# Patient Record
Sex: FEMALE | Race: BLACK OR AFRICAN AMERICAN | NOT HISPANIC OR LATINO | Employment: UNEMPLOYED | URBAN - METROPOLITAN AREA
[De-identification: names, ages, dates, MRNs, and addresses within clinical notes are randomized per-mention and may not be internally consistent; named-entity substitution may affect disease eponyms.]

---

## 2021-03-07 PROBLEM — M06.9 RHEUMATOID ARTHRITIS: Status: ACTIVE | Noted: 2021-03-07

## 2022-04-12 NOTE — PROGRESS NOTES
Subjective:      Patient ID: Nicole Quiñones is a 47 y.o. female.    Chief Complaint: Disease Management    HPI:   Includes joint pain with subjective swelling.   Antecedent event includes: None;  Pain location includes: shoulders, elbows, hands, knees and feet;  Gradual onset; beginning >years ago;  Constant ache, sharp and burning, Moderate in severity,   Lasting >minutes, Worse at night time;   Improved with change in position;   Worsened with activity and overuse;     Review of Systems   Constitutional: Negative for chills, fatigue, fever and unexpected weight change.   HENT: Negative for nasal congestion, ear pain, hearing loss, mouth dryness, mouth sores, nosebleeds and trouble swallowing.    Eyes: Positive for eye dryness. Negative for photophobia, pain, redness and visual disturbance.   Respiratory: Negative for cough and shortness of breath.    Cardiovascular: Negative for chest pain, palpitations and leg swelling.   Gastrointestinal: Positive for constipation. Negative for abdominal distention, abdominal pain, blood in stool, diarrhea, rectal pain, vomiting and fecal incontinence.   Endocrine: Negative for polydipsia and polyuria.   Genitourinary: Negative for bladder incontinence, dysuria, enuresis, genital sores and hematuria.   Musculoskeletal: Positive for arthralgias and joint swelling. Negative for myalgias.   Integumentary:  Negative for rash, wound and mole/lesion.   Neurological: Positive for headaches. Negative for weakness.   Hematological: Negative for adenopathy. Does not bruise/bleed easily.   Psychiatric/Behavioral: Positive for dysphoric mood and sleep disturbance. The patient is nervous/anxious.       Past Medical History:   Diagnosis Date    Anemia     Hypertension     TTP (thrombotic thrombocytopenic purpura)     TTP (thrombotic thrombocytopenic purpura)      Past Surgical History:   Procedure Laterality Date    SURGICAL REMOVAL OF ENDOMETRIOSIS        See the Assessment/Plan for  further characterization of the HPI, ROS, Medical, Surgical, Family, and Social Histories including Tobacco use, Meds; all of which has been reviewed in Epic.    Medication List with Changes/Refills   Current Medications    ALBUTEROL (PROVENTIL/VENTOLIN HFA) 90 MCG/ACTUATION INHALER    SMARTSI Puff(s) By Mouth Every 4 Hours PRN    AZELASTINE (ASTELIN) 137 MCG (0.1 %) NASAL SPRAY    SPRAY 2 SPRAY(S) TWICE A DAY BY INTRANASAL ROUTE AS NEEDED FOR 7 DAYS.    BUTALBITAL-ACETAMINOPHEN-CAFFEINE -40 MG (FIORICET, ESGIC) -40 MG PER TABLET    SMARTSI Tablet(s) By Mouth Every 6 Hours PRN    CETIRIZINE (ZYRTEC) 10 MG TABLET    Take 10 mg by mouth once daily.    DICLOFENAC SODIUM (VOLTAREN) 1 % GEL    Apply 2 g topically 4 (four) times daily.    DULOXETINE (CYMBALTA) 60 MG CAPSULE    SMARTSI Capsule(s) By Mouth Every Morning    ERGOCALCIFEROL (ERGOCALCIFEROL) 50,000 UNIT CAP    SMARTSI Capsule(s) By Mouth Once a Week    FOLIC ACID (FOLVITE) 1 MG TABLET    SMARTSI Tablet(s) By Mouth Daily    GABAPENTIN (NEURONTIN) 300 MG CAPSULE    Take 300 mg by mouth 3 (three) times daily.    HYDROCODONE-ACETAMINOPHEN (NORCO) 5-325 MG PER TABLET    Take 1 tablet by mouth every 6 (six) hours as needed.    LACTULOSE (CHRONULAC) 10 GRAM/15 ML SOLUTION    SMARTSIG:15 Milliliter(s) By Mouth Daily PRN    LINZESS 72 MCG CAP CAPSULE        LOSARTAN (COZAAR) 50 MG TABLET    SMARTSI Tablet(s) By Mouth Daily    METOPROLOL SUCCINATE (TOPROL-XL) 100 MG 24 HR TABLET    SMARTSI Tablet(s) By Mouth Every Night    QUETIAPINE (SEROQUEL) 50 MG TABLET    SMARTSI Tablet(s) By Mouth Every Evening    RIMEGEPANT SULFATE (NURTEC ODT ORAL)        SERTRALINE (ZOLOFT) 100 MG TABLET    Take 100 mg by mouth once daily.    SIMVASTATIN (ZOCOR) 20 MG TABLET    SMARTSI Tablet(s) By Mouth Every Evening    TRAMADOL (ULTRAM) 50 MG TABLET    SMARTSI Tablet(s) By Mouth Every 8 Hours PRN    VERAPAMIL (CALAN-SR) 180 MG CR TABLET    SMARTSI  "Tablet(s) By Mouth Twice Daily   Discontinued Medications    DOXEPIN (SINEQUAN) 25 MG CAPSULE    SMARTSI Capsule(s) By Mouth Every Night PRN    GABAPENTIN (NEURONTIN) 100 MG CAPSULE    Take 1 capsule (100 mg total) by mouth 3 (three) times daily.    HYDROXYZINE PAMOATE (VISTARIL) 50 MG CAP    SMARTSI Capsule(s) By Mouth Every Night    LORATADINE (CLARITIN) 10 MG TABLET    Take 10 mg by mouth daily as needed.    METHOTREXATE 2.5 MG TAB    Take 6 tablets (15 mg total) by mouth every 7 days.    PANTOPRAZOLE (PROTONIX) 20 MG TABLET    Take 20 mg by mouth once daily.    PANTOPRAZOLE (PROTONIX) 40 MG TABLET    SMARTSI Tablet(s) By Mouth Daily    ZOLPIDEM (AMBIEN) 5 MG TAB    Take 5 mg by mouth nightly as needed.         Objective:   BP (!) 140/81   Pulse 84   Resp 16   Ht 5' 6" (1.676 m)   Wt 117.9 kg (260 lb)   SpO2 98%   BMI 41.97 kg/m²   Physical Exam  Non-toxic appearance. No distress.   Normocephalic and atraumatic.   External ears normal.    Conjunctivae and EOM are normal. No scleral icterus.   RRR, No friction rub; palpable distal pulses.    No tachypnea or signs of respiratory distress. CTAB.  Abdominal: No guarding or rebound tenderness.   Neurological: Oriented. Normal thought content. No cranial nerve deficit. Strength is grossly normal without focal deficit.  Skin is warm. No pallor.   Musculoskeletal: DJD. see below for further input. No overt synovitis.     X-Ray Shoulder 2 or More views Bilateral  EXAMINATION:  XR HAND COMPLETE 3 VIEW LEFT; XR KNEE 3 VIEW RIGHT; XR KNEE 3 VIEW LEFT; XR SHOULDER COMPLETE 2 OR MORE VIEWS BILATERAL; XR HAND COMPLETE 3 VIEW RIGHT    CLINICAL HISTORY:  Pain in unspecified joint; Pain in right knee; Pain in right shoulder    TECHNIQUE:  XR HAND COMPLETE 3 VIEW LEFT; XR KNEE 3 VIEW RIGHT; XR KNEE 3 VIEW LEFT; XR SHOULDER COMPLETE 2 OR MORE VIEWS BILATERAL; XR HAND COMPLETE 3 VIEW RIGHT    COMPARISON:  None    FINDINGS:  Hands: Mild osteoarthritis of the " interphalangeal, 1st CMC, and MCP joints.  There is no acute fracture or dislocation.  There is no erosion or soft tissue swelling.  Bone quality is unremarkable.    Knee: Mild osteoarthritis of the knee joint.  There is no acute fracture or dislocation.  There is no erosion or soft tissue swelling.  Bone quality is unremarkable.    Shoulders: Severe osteoarthritis of the glenohumeral and AC joint.  There is no acute fracture or dislocation.  There is no erosion or soft tissue swelling.  Bone quality is unremarkable.    Electronically signed by resident: Ed Cisneros  Date:    01/13/2021  Time:    13:41    Electronically signed by: Wes Clark  Date:    01/13/2021  Time:    14:15  X-Ray Hand 3 view Right  EXAMINATION:  XR HAND COMPLETE 3 VIEW LEFT; XR KNEE 3 VIEW RIGHT; XR KNEE 3 VIEW LEFT; XR SHOULDER COMPLETE 2 OR MORE VIEWS BILATERAL; XR HAND COMPLETE 3 VIEW RIGHT    CLINICAL HISTORY:  Pain in unspecified joint; Pain in right knee; Pain in right shoulder    TECHNIQUE:  XR HAND COMPLETE 3 VIEW LEFT; XR KNEE 3 VIEW RIGHT; XR KNEE 3 VIEW LEFT; XR SHOULDER COMPLETE 2 OR MORE VIEWS BILATERAL; XR HAND COMPLETE 3 VIEW RIGHT    COMPARISON:  None    FINDINGS:  Hands: Mild osteoarthritis of the interphalangeal, 1st CMC, and MCP joints.  There is no acute fracture or dislocation.  There is no erosion or soft tissue swelling.  Bone quality is unremarkable.    Knee: Mild osteoarthritis of the knee joint.  There is no acute fracture or dislocation.  There is no erosion or soft tissue swelling.  Bone quality is unremarkable.    Shoulders: Severe osteoarthritis of the glenohumeral and AC joint.  There is no acute fracture or dislocation.  There is no erosion or soft tissue swelling.  Bone quality is unremarkable.    Electronically signed by resident: Ed Cisneros  Date:    01/13/2021  Time:    13:41    Electronically signed by: Wes Clark  Date:    01/13/2021  Time:    14:15  X-Ray Knee 3 View  Right  EXAMINATION:  XR HAND COMPLETE 3 VIEW LEFT; XR KNEE 3 VIEW RIGHT; XR KNEE 3 VIEW LEFT; XR SHOULDER COMPLETE 2 OR MORE VIEWS BILATERAL; XR HAND COMPLETE 3 VIEW RIGHT    CLINICAL HISTORY:  Pain in unspecified joint; Pain in right knee; Pain in right shoulder    TECHNIQUE:  XR HAND COMPLETE 3 VIEW LEFT; XR KNEE 3 VIEW RIGHT; XR KNEE 3 VIEW LEFT; XR SHOULDER COMPLETE 2 OR MORE VIEWS BILATERAL; XR HAND COMPLETE 3 VIEW RIGHT    COMPARISON:  None    FINDINGS:  Hands: Mild osteoarthritis of the interphalangeal, 1st CMC, and MCP joints.  There is no acute fracture or dislocation.  There is no erosion or soft tissue swelling.  Bone quality is unremarkable.    Knee: Mild osteoarthritis of the knee joint.  There is no acute fracture or dislocation.  There is no erosion or soft tissue swelling.  Bone quality is unremarkable.    Shoulders: Severe osteoarthritis of the glenohumeral and AC joint.  There is no acute fracture or dislocation.  There is no erosion or soft tissue swelling.  Bone quality is unremarkable.    Electronically signed by resident: Ed Cisneros  Date:    01/13/2021  Time:    13:41    Electronically signed by: Wes Clark  Date:    01/13/2021  Time:    14:15  X-Ray Knee 3 View Left  EXAMINATION:  XR HAND COMPLETE 3 VIEW LEFT; XR KNEE 3 VIEW RIGHT; XR KNEE 3 VIEW LEFT; XR SHOULDER COMPLETE 2 OR MORE VIEWS BILATERAL; XR HAND COMPLETE 3 VIEW RIGHT    CLINICAL HISTORY:  Pain in unspecified joint; Pain in right knee; Pain in right shoulder    TECHNIQUE:  XR HAND COMPLETE 3 VIEW LEFT; XR KNEE 3 VIEW RIGHT; XR KNEE 3 VIEW LEFT; XR SHOULDER COMPLETE 2 OR MORE VIEWS BILATERAL; XR HAND COMPLETE 3 VIEW RIGHT    COMPARISON:  None    FINDINGS:  Hands: Mild osteoarthritis of the interphalangeal, 1st CMC, and MCP joints.  There is no acute fracture or dislocation.  There is no erosion or soft tissue swelling.  Bone quality is unremarkable.    Knee: Mild osteoarthritis of the knee joint.  There is no acute fracture  or dislocation.  There is no erosion or soft tissue swelling.  Bone quality is unremarkable.    Shoulders: Severe osteoarthritis of the glenohumeral and AC joint.  There is no acute fracture or dislocation.  There is no erosion or soft tissue swelling.  Bone quality is unremarkable.    Electronically signed by resident: Ed Cisneros  Date:    01/13/2021  Time:    13:41    Electronically signed by: Wes Clark  Date:    01/13/2021  Time:    14:15  X-Ray Hand 3 view Left  EXAMINATION:  XR HAND COMPLETE 3 VIEW LEFT; XR KNEE 3 VIEW RIGHT; XR KNEE 3 VIEW LEFT; XR SHOULDER COMPLETE 2 OR MORE VIEWS BILATERAL; XR HAND COMPLETE 3 VIEW RIGHT    CLINICAL HISTORY:  Pain in unspecified joint; Pain in right knee; Pain in right shoulder    TECHNIQUE:  XR HAND COMPLETE 3 VIEW LEFT; XR KNEE 3 VIEW RIGHT; XR KNEE 3 VIEW LEFT; XR SHOULDER COMPLETE 2 OR MORE VIEWS BILATERAL; XR HAND COMPLETE 3 VIEW RIGHT    COMPARISON:  None    FINDINGS:  Hands: Mild osteoarthritis of the interphalangeal, 1st CMC, and MCP joints.  There is no acute fracture or dislocation.  There is no erosion or soft tissue swelling.  Bone quality is unremarkable.    Knee: Mild osteoarthritis of the knee joint.  There is no acute fracture or dislocation.  There is no erosion or soft tissue swelling.  Bone quality is unremarkable.    Shoulders: Severe osteoarthritis of the glenohumeral and AC joint.  There is no acute fracture or dislocation.  There is no erosion or soft tissue swelling.  Bone quality is unremarkable.    Electronically signed by resident: Ed Cisneros  Date:    01/13/2021  Time:    13:41    Electronically signed by: Wes Clark  Date:    01/13/2021  Time:    14:15    No visits with results within 1 Year(s) from this visit.   Latest known visit with results is:   Lab Visit on 04/21/2021   Component Date Value Ref Range Status    WBC 04/21/2021 4.38  3.90 - 12.70 K/uL Final    RBC 04/21/2021 6.05 (A) 4.00 - 5.40 M/uL Final    Hemoglobin  04/21/2021 13.5  10.9 - 14.3 g/dL Final    Hematocrit 04/21/2021 45.1  32.0 - 45.4 % Final    MCV 04/21/2021 75 (A) 82 - 98 fL Final    MCH 04/21/2021 22.3 (A) 27.0 - 31.0 pg Final    MCHC 04/21/2021 29.9  29.6 - 33.8 g/dL Final    RDW 04/21/2021 17.9 (A) 11.5 - 14.5 % Final    Platelets 04/21/2021 314  150 - 450 K/uL Final    MPV 04/21/2021 10.1  9.2 - 12.9 fL Final    Immature Granulocytes 04/21/2021 0.0  0.0 - 0.5 % Final    Gran # (ANC) 04/21/2021 2.5  1.8 - 7.7 K/uL Final    Immature Grans (Abs) 04/21/2021 0.00  0.00 - 0.04 K/uL Final    Comment: Mild elevation in immature granulocytes is non specific and   can be seen in a variety of conditions including stress response,   acute inflammation, trauma and pregnancy. Correlation with other   laboratory and clinical findings is essential.      Lymph # 04/21/2021 1.1  1.0 - 4.8 K/uL Final    Mono # 04/21/2021 0.6  0.3 - 1.0 K/uL Final    Eos # 04/21/2021 0.2  0.0 - 0.5 K/uL Final    Baso # 04/21/2021 0.04  0.00 - 0.20 K/uL Final    nRBC 04/21/2021 0  0 /100 WBC Final    Gran % 04/21/2021 56.8  38.0 - 73.0 % Final    Lymph % 04/21/2021 25.6  18.0 - 48.0 % Final    Mono % 04/21/2021 13.0  4.0 - 15.0 % Final    Eosinophil % 04/21/2021 3.7  0.0 - 8.0 % Final    Basophil % 04/21/2021 0.9  0.0 - 1.9 % Final    Differential Method 04/21/2021 Automated   Final    Total Protein 04/21/2021 7.1  6.0 - 8.4 g/dL Final    Albumin 04/21/2021 3.6  3.5 - 5.2 g/dL Final    Total Bilirubin 04/21/2021 0.6  0.1 - 1.0 mg/dL Final    Comment: For infants and newborns, interpretation of results should be based  on gestational age, weight and in agreement with clinical  observations.    Premature Infant recommended reference ranges:  Up to 24 hours.............<8.0 mg/dL  Up to 48 hours............<12.0 mg/dL  3-5 days..................<15.0 mg/dL  6-29 days.................<15.0 mg/dL    For patients on Eltrombopag therapy, use of Dimension Vista TBIL is   not    recommended.      Bilirubin, Direct 04/21/2021 0.1  0.1 - 0.3 mg/dL Final    AST 04/21/2021 12  10 - 40 U/L Final    ALT 04/21/2021 20  10 - 44 U/L Final    Alkaline Phosphatase 04/21/2021 150 (A) 55 - 135 U/L Final    Glucose 04/21/2021 89  70 - 110 mg/dL Final    Sodium 04/21/2021 143  136 - 145 mmol/L Final    Potassium 04/21/2021 4.0  3.5 - 5.1 mmol/L Final    Chloride 04/21/2021 111 (A) 95 - 110 mmol/L Final    CO2 04/21/2021 29  23 - 29 mmol/L Final    BUN 04/21/2021 11  6 - 20 mg/dL Final    Calcium 04/21/2021 9.4  8.7 - 10.5 mg/dL Final    Creatinine 04/21/2021 0.95  0.50 - 1.40 mg/dL Final    Albumin 04/21/2021 3.6  3.5 - 5.2 g/dL Final    Phosphorus 04/21/2021 2.9  2.5 - 4.9 mg/dL Final    eGFR if African American 04/21/2021 >60.0  >60 mL/min/1.73 m^2 Final    eGFR if non African American 04/21/2021 >60.0  >60 mL/min/1.73 m^2 Final    Comment: Calculation used to obtain the estimated glomerular filtration  rate (eGFR) is the CKD-EPI equation.       Anion Gap 04/21/2021 3 (A) 8 - 16 mmol/L Final    CRP 04/21/2021 1.04 (A) 0.00 - 0.90 mg/dL Final    Sed Rate 04/21/2021 7  0 - 20 mm/Hr Final        All of the data above and below has been reviewed by myself and any further interpretations will be reflected in the assessment and plan.   The data includes review of external notes, and independent interpretation of lab results, x-rays, and imaging reports.  Active inflammatory arthritis is an illness that poses a threat to bodily function and even a threat to life in some cases.  Drug therapy to treat inflammatory arthritis usually requires intensive monitoring for toxicity.    Review of patient's allergies indicates:   Allergen Reactions    Nsaids (non-steroidal anti-inflammatory drug) Other (See Comments)     Causes ulcers to bleed.     Assessment/Plan:       Lab in 2021: SETH neg RF neg CCP6.9+(<3) Acute hepatitis B and C panel negative 2021 TB negative    There is some history to suggest  inflammatory arthritis.    Had been treated for Rheumatoid arthritis with Methotrexate until seeing most recent Rheumatologist below    TTP:  Patient was diagnosed with TTP in 2015 and was treated with plasmapheresis and Rituxan infusions.  She relapsed 3 times since then and had received Rituxan infusion each time.  Last Rituxan infusion was in October 2020.  She does not report any improvement in her joint symptoms after receiving Rituxan therapy.     There is also some mechanical and neurogenic pain.    EMG/NCS showed left cervical radiculopathy and bilateral carpal tunnel    Was to have shoulder surgery for advanced DJD shoulders    She did have partial right shoulder surgery since and to have possible right shoulder replacement in future noted    She tells me she last used 40-20mg prednisone a month ago for widespread 'inflammation all over' that did not help much, but 'may have kept the inflammation at bay'    No overt synovitis  Unable to actively flex shoulders above her head    Verbal education; no overt active disease but she has been treated    Blood work to further evaluate    She should also be working with Orthopedics, Physical Medicine and Rehab MD, and consider Neurosurgery if not already depending on symptoms    Amanda will plan to update her meds next visit    cymbalta    gabapentin 600mg tid    Flexeril prn     Revisit weeks    See above and below    Contact us prn    They are having trouble with Epic per Amanda unable to check in patient and Amanda is to input things when they fix the problem and I will then be able to also close the note    Has been managing with:    Prior Prednisone 40mg for 7 days then 20mg for 7 days     Flexeril    Gabapentin 600mg tid    voltaren gel     Cannot use oral NSAIDs noted    Prior recent tramadol    Prior Methotrexate    Prior Rituxan for TTP      recently started taking her gabapentin to 100 mg t.i.d. and     has been taking her Cymbalta 60 mg daily.      Has  been working with:    We will seek records from PCP    Records PCP Yokasta Abad some of recent note 2/15/22: medical clearance for right shoulder surgery; EMG/NCS showed left cervical radiculopathy and bilateral carpal tunnel; prednisone and flexeril and gabapentin increased 600mg; MRI cervical spine f/u 2 weeks       Records Rheumatology some of note 9/15/21:  ((Nicole Quiñones  is an  46 y.o. female who is being seen in Rheumatology Clinic at [facility]  for the first visit at the request of No ref. provider found      Chief Complaint:  Multiple joint pain     HPI:  Patient is an  55-year-old female with past medical history of TTP, morbid obesity, migraine headaches and hypertension who is being referred to rheumatology clinic for evaluation of multiple joint pain.  Patient reports pain, stiffness and swelling hand joints.  She also has pain and locking of the shoulders bilaterally.  She has pain in the right hip and both knees associated with difficulty walking and climbing stairs.  She has done physical therapy for the knees in the past 2 years ago with some relief.  She is currently on tramadol for her joint pains.  She reports morning stiffness in the joints lasting for 30 minutes in hurts more at the end of the day before she goes to bed.  According to the patient she was diagnosed with rheumatoid arthritis by a rheumatologist and was prescribed tramadol since then.  On review of the outside medical records, she was found to have elevated sed rate at 43 and CRP at 1.1.  X-rays of C-spine, shoulders, hips, knees and hands showing degenerative arthritis.  MRI lower extremity showed mild tendinosis of gluteus medius and minimus muscle.    TTP:  Patient was diagnosed with TTP in 2015 and was treated with plasmapheresis and Rituxan infusions.  She relapsed 3 times since then and had received Rituxan infusion each time.  Last Rituxan infusion was in October 2020.  She  does not report any improvement in her joint symptoms after receiving Rituxan therapy.     Interval history:   Patient returns to clinic for regular follow-up.  Patient was recently seen in rheumatology clinic, she followed up with her PCP in [place] and was told that none of the medications she is currently taking is for rheumatoid arthritis.  Patient was concerned and decided to follow-up her clinic again.  Patient continues to complain of right shoulder locking and left knee pain especially at night.  She is unable to sleep due to the pain.  The patient only recently started taking her gabapentin to 100 mg t.i.d. and has been taking her Cymbalta 60 mg daily.  Patient denies any current fevers, chills, rashes, shortness of breath, vision changes, joint swelling.     Current rheum medications:  1. MTX 15 mg weekly   2. Folic acid 1 mg daily       Allergies:  Nsaids (non-steroidal anti-inflammatory drug)     Past Medical History:   Diagnosis Date    Anemia      Hypertension      TTP (thrombotic thrombocytopenic purpura)      TTP (thrombotic thrombocytopenic purpura)              Past Surgical History:   Procedure Laterality Date    SURGICAL REMOVAL OF ENDOMETRIOSIS          Assessment/Recommendations:     Inflammatory arthritis?  Osteoarthritis  Morbid obesity  History of TTP  Fibromyalgia        -after reviewing labs and imaging, it is likely that the patient has fibromyalgia and not rheumatoid arthritis  -The outside medical records:  X-rays of hands, shoulders, hips, knees showing degenerative joint disease.  -hepatitis panel and T spot negative   -Discontinue MTX 15 mg weekly   -x-rays of knees, hands and shoulders, showing DJD   -Ordered and reviewed the blood work for medication adverse effects and disease activity.   -gave paper script for the physical therapy for bilateral knee osteoarthritis.  Eventually patient may MRI of the knee after completing physical therapy and muscle referral for  orthopedics.  Advised to start using knee brace and use topical Voltaren gel as needed for pain.  I also educated about weight loss which could help with the knee osteoarthritis.  -patient also has fibromyalgia explaining the fatigue and pain.  Poor  sleep might be causing the worsening of symptoms.  -patient reassured that she does not have limited arthritis at this point based on lab tests and diagnostic imaging, explained to her regarding fibromyalgia  -recommend to patient that she should do her physical therapy exercises at home, also recommend to lose weight and exercise, recommended patient also do yoga or [employer] and water aerobics ; will refer patient to physical therapy again  -will inject right shoulder and left knee today  -she is already on Cymbalta 60 mg daily and will start patient on gabapentin 100 mg t.i.d.     RIGHT SHOULDER JOINT/BURSA INJECTION PROCEDURE NOTE :  Medications used : 40 mg Depomedrol, 1% Lidocaine  LEFT KNEE INTRA ARTICULAR INJECTION PROCEDURE NOTE :   Medication Uses : 40 mg Depomedrol and 1% lidocaine  Staff: Dr. Danica Ugarte MD  Rheumatology))    Review of medical records as stated above.  Lab +/- imaging and other ordered diagnostic studies to further evaluate.  See the orders associated with this note visit.  Medications as prescribed as tolerated.    Education including verbal and those noted in the patient instructions.  Revisit as scheduled and call prn.    The following diagnoses influence medical decision making and/or need further workup including the orders listed below:    Pain in other joint  -     SETH by IFA, w/Rflx; Future; Expected date: 04/26/2022  -     Protein Electrophoresis, Serum; Future; Expected date: 04/26/2022  -     Quantiferon Gold TB; Future; Expected date: 04/26/2022  -     Rheumatoid Factor; Future; Expected date: 04/26/2022  -     Sedimentation rate; Future; Expected date: 04/26/2022  -     Uric Acid; Future; Expected date: 04/26/2022  -      Urinalysis; Future; Expected date: 04/26/2022  -     Cyclic Citrullinated Peptide Antibody, IgG; Future; Expected date: 04/26/2022  -     C-Reactive Protein; Future; Expected date: 04/26/2022  -     Comprehensive Metabolic Panel; Future; Expected date: 04/26/2022  -     CK; Future; Expected date: 04/26/2022  -     CBC Auto Differential; Future; Expected date: 04/26/2022  -     C4 Complement; Future; Expected date: 04/26/2022  -     C3 Complement; Future; Expected date: 04/26/2022    Polyarthralgia  -     SETH by IFA, w/Rflx; Future; Expected date: 04/26/2022  -     Protein Electrophoresis, Serum; Future; Expected date: 04/26/2022  -     Quantiferon Gold TB; Future; Expected date: 04/26/2022  -     Rheumatoid Factor; Future; Expected date: 04/26/2022  -     Sedimentation rate; Future; Expected date: 04/26/2022  -     Uric Acid; Future; Expected date: 04/26/2022  -     Urinalysis; Future; Expected date: 04/26/2022  -     Cyclic Citrullinated Peptide Antibody, IgG; Future; Expected date: 04/26/2022  -     C-Reactive Protein; Future; Expected date: 04/26/2022  -     Comprehensive Metabolic Panel; Future; Expected date: 04/26/2022  -     CK; Future; Expected date: 04/26/2022  -     CBC Auto Differential; Future; Expected date: 04/26/2022  -     C4 Complement; Future; Expected date: 04/26/2022  -     C3 Complement; Future; Expected date: 04/26/2022    Other osteoarthritis involving multiple joints  -     SETH by IFA, w/Rflx; Future; Expected date: 04/26/2022  -     Protein Electrophoresis, Serum; Future; Expected date: 04/26/2022  -     Quantiferon Gold TB; Future; Expected date: 04/26/2022  -     Rheumatoid Factor; Future; Expected date: 04/26/2022  -     Sedimentation rate; Future; Expected date: 04/26/2022  -     Uric Acid; Future; Expected date: 04/26/2022  -     Urinalysis; Future; Expected date: 04/26/2022  -     Cyclic Citrullinated Peptide Antibody, IgG; Future; Expected date: 04/26/2022  -     C-Reactive Protein;  Future; Expected date: 04/26/2022  -     Comprehensive Metabolic Panel; Future; Expected date: 04/26/2022  -     CK; Future; Expected date: 04/26/2022  -     CBC Auto Differential; Future; Expected date: 04/26/2022  -     C4 Complement; Future; Expected date: 04/26/2022  -     C3 Complement; Future; Expected date: 04/26/2022    History of inflammatory arthritis  -     SETH by IFA, w/Rflx; Future; Expected date: 04/26/2022  -     Protein Electrophoresis, Serum; Future; Expected date: 04/26/2022  -     Quantiferon Gold TB; Future; Expected date: 04/26/2022  -     Rheumatoid Factor; Future; Expected date: 04/26/2022  -     Sedimentation rate; Future; Expected date: 04/26/2022  -     Uric Acid; Future; Expected date: 04/26/2022  -     Urinalysis; Future; Expected date: 04/26/2022  -     Cyclic Citrullinated Peptide Antibody, IgG; Future; Expected date: 04/26/2022  -     C-Reactive Protein; Future; Expected date: 04/26/2022  -     Comprehensive Metabolic Panel; Future; Expected date: 04/26/2022  -     CK; Future; Expected date: 04/26/2022  -     CBC Auto Differential; Future; Expected date: 04/26/2022  -     C4 Complement; Future; Expected date: 04/26/2022  -     C3 Complement; Future; Expected date: 04/26/2022    TTP (thrombotic thrombopenic purpura)  -     SETH by IFA, w/Rflx; Future; Expected date: 04/26/2022  -     Protein Electrophoresis, Serum; Future; Expected date: 04/26/2022  -     Quantiferon Gold TB; Future; Expected date: 04/26/2022  -     Rheumatoid Factor; Future; Expected date: 04/26/2022  -     Sedimentation rate; Future; Expected date: 04/26/2022  -     Uric Acid; Future; Expected date: 04/26/2022  -     Urinalysis; Future; Expected date: 04/26/2022  -     Cyclic Citrullinated Peptide Antibody, IgG; Future; Expected date: 04/26/2022  -     C-Reactive Protein; Future; Expected date: 04/26/2022  -     Comprehensive Metabolic Panel; Future; Expected date: 04/26/2022  -     CK; Future; Expected date:  04/26/2022  -     CBC Auto Differential; Future; Expected date: 04/26/2022  -     C4 Complement; Future; Expected date: 04/26/2022  -     C3 Complement; Future; Expected date: 04/26/2022    Cervical spondylosis  -     SETH by IFA, w/Rflx; Future; Expected date: 04/26/2022  -     Protein Electrophoresis, Serum; Future; Expected date: 04/26/2022  -     Quantiferon Gold TB; Future; Expected date: 04/26/2022  -     Rheumatoid Factor; Future; Expected date: 04/26/2022  -     Sedimentation rate; Future; Expected date: 04/26/2022  -     Uric Acid; Future; Expected date: 04/26/2022  -     Urinalysis; Future; Expected date: 04/26/2022  -     Cyclic Citrullinated Peptide Antibody, IgG; Future; Expected date: 04/26/2022  -     C-Reactive Protein; Future; Expected date: 04/26/2022  -     Comprehensive Metabolic Panel; Future; Expected date: 04/26/2022  -     CK; Future; Expected date: 04/26/2022  -     CBC Auto Differential; Future; Expected date: 04/26/2022  -     C4 Complement; Future; Expected date: 04/26/2022  -     C3 Complement; Future; Expected date: 04/26/2022    Rotator cuff arthropathy of both shoulders  -     SETH by IFA, w/Rflx; Future; Expected date: 04/26/2022  -     Protein Electrophoresis, Serum; Future; Expected date: 04/26/2022  -     Quantiferon Gold TB; Future; Expected date: 04/26/2022  -     Rheumatoid Factor; Future; Expected date: 04/26/2022  -     Sedimentation rate; Future; Expected date: 04/26/2022  -     Uric Acid; Future; Expected date: 04/26/2022  -     Urinalysis; Future; Expected date: 04/26/2022  -     Cyclic Citrullinated Peptide Antibody, IgG; Future; Expected date: 04/26/2022  -     C-Reactive Protein; Future; Expected date: 04/26/2022  -     Comprehensive Metabolic Panel; Future; Expected date: 04/26/2022  -     CK; Future; Expected date: 04/26/2022  -     CBC Auto Differential; Future; Expected date: 04/26/2022  -     C4 Complement; Future; Expected date: 04/26/2022  -     C3 Complement;  Future; Expected date: 04/26/2022    Bilateral carpal tunnel syndrome  -     SETH by IFA, w/Rflx; Future; Expected date: 04/26/2022  -     Protein Electrophoresis, Serum; Future; Expected date: 04/26/2022  -     Quantiferon Gold TB; Future; Expected date: 04/26/2022  -     Rheumatoid Factor; Future; Expected date: 04/26/2022  -     Sedimentation rate; Future; Expected date: 04/26/2022  -     Uric Acid; Future; Expected date: 04/26/2022  -     Urinalysis; Future; Expected date: 04/26/2022  -     Cyclic Citrullinated Peptide Antibody, IgG; Future; Expected date: 04/26/2022  -     C-Reactive Protein; Future; Expected date: 04/26/2022  -     Comprehensive Metabolic Panel; Future; Expected date: 04/26/2022  -     CK; Future; Expected date: 04/26/2022  -     CBC Auto Differential; Future; Expected date: 04/26/2022  -     C4 Complement; Future; Expected date: 04/26/2022  -     C3 Complement; Future; Expected date: 04/26/2022    Nonspecific abnormal finding  -     SETH by IFA, w/Rflx; Future; Expected date: 04/26/2022  -     Protein Electrophoresis, Serum; Future; Expected date: 04/26/2022  -     Quantiferon Gold TB; Future; Expected date: 04/26/2022  -     Rheumatoid Factor; Future; Expected date: 04/26/2022  -     Sedimentation rate; Future; Expected date: 04/26/2022  -     Uric Acid; Future; Expected date: 04/26/2022  -     Urinalysis; Future; Expected date: 04/26/2022  -     Cyclic Citrullinated Peptide Antibody, IgG; Future; Expected date: 04/26/2022  -     C-Reactive Protein; Future; Expected date: 04/26/2022  -     Comprehensive Metabolic Panel; Future; Expected date: 04/26/2022  -     CK; Future; Expected date: 04/26/2022  -     CBC Auto Differential; Future; Expected date: 04/26/2022  -     C4 Complement; Future; Expected date: 04/26/2022  -     C3 Complement; Future; Expected date: 04/26/2022    Screening for rheumatic disorder  -     SETH by IFA, w/Rflx; Future; Expected date: 04/26/2022  -     Protein  Electrophoresis, Serum; Future; Expected date: 04/26/2022  -     Quantiferon Gold TB; Future; Expected date: 04/26/2022  -     Rheumatoid Factor; Future; Expected date: 04/26/2022  -     Sedimentation rate; Future; Expected date: 04/26/2022  -     Uric Acid; Future; Expected date: 04/26/2022  -     Urinalysis; Future; Expected date: 04/26/2022  -     Cyclic Citrullinated Peptide Antibody, IgG; Future; Expected date: 04/26/2022  -     C-Reactive Protein; Future; Expected date: 04/26/2022  -     Comprehensive Metabolic Panel; Future; Expected date: 04/26/2022  -     CK; Future; Expected date: 04/26/2022  -     CBC Auto Differential; Future; Expected date: 04/26/2022  -     C4 Complement; Future; Expected date: 04/26/2022  -     C3 Complement; Future; Expected date: 04/26/2022      Follow up in about 4 weeks (around 5/24/2022).     Otto Marcial MD

## 2022-04-26 ENCOUNTER — OFFICE VISIT (OUTPATIENT)
Dept: RHEUMATOLOGY | Facility: CLINIC | Age: 47
End: 2022-04-26
Payer: MEDICAID

## 2022-04-26 VITALS
BODY MASS INDEX: 41.78 KG/M2 | HEART RATE: 84 BPM | HEIGHT: 66 IN | DIASTOLIC BLOOD PRESSURE: 81 MMHG | WEIGHT: 260 LBS | SYSTOLIC BLOOD PRESSURE: 140 MMHG | RESPIRATION RATE: 16 BRPM | OXYGEN SATURATION: 98 %

## 2022-04-26 DIAGNOSIS — M25.50 POLYARTHRALGIA: ICD-10-CM

## 2022-04-26 DIAGNOSIS — R68.89 NONSPECIFIC ABNORMAL FINDING: ICD-10-CM

## 2022-04-26 DIAGNOSIS — M47.812 CERVICAL SPONDYLOSIS: ICD-10-CM

## 2022-04-26 DIAGNOSIS — M15.8 OTHER OSTEOARTHRITIS INVOLVING MULTIPLE JOINTS: ICD-10-CM

## 2022-04-26 DIAGNOSIS — M25.59 PAIN IN OTHER JOINT: Primary | ICD-10-CM

## 2022-04-26 DIAGNOSIS — M12.811 ROTATOR CUFF ARTHROPATHY OF BOTH SHOULDERS: ICD-10-CM

## 2022-04-26 DIAGNOSIS — G56.03 BILATERAL CARPAL TUNNEL SYNDROME: ICD-10-CM

## 2022-04-26 DIAGNOSIS — Z87.39 HISTORY OF INFLAMMATORY ARTHRITIS: ICD-10-CM

## 2022-04-26 DIAGNOSIS — Z13.89 SCREENING FOR RHEUMATIC DISORDER: ICD-10-CM

## 2022-04-26 DIAGNOSIS — M12.812 ROTATOR CUFF ARTHROPATHY OF BOTH SHOULDERS: ICD-10-CM

## 2022-04-26 DIAGNOSIS — M31.19 TTP (THROMBOTIC THROMBOPENIC PURPURA): ICD-10-CM

## 2022-04-26 LAB
ALBUMIN SERPL BCP-MCNC: 3.6 G/DL (ref 3.4–5)
ALBUMIN/GLOBULIN RATIO: 0.92 RATIO (ref 1.1–1.8)
ALP SERPL-CCNC: 147 U/L (ref 46–116)
ALT SERPL W P-5'-P-CCNC: 17 U/L (ref 12–78)
ANION GAP SERPL CALC-SCNC: 7 MMOL/L (ref 3–11)
AST SERPL-CCNC: 16 U/L (ref 15–37)
BASOPHILS NFR BLD: 0.4 % (ref 0–3)
BILIRUB SERPL-MCNC: 0.5 MG/DL (ref 0–1)
BUN SERPL-MCNC: 15 MG/DL (ref 7–18)
BUN/CREAT SERPL: 17.04 RATIO (ref 7–18)
CALCIUM SERPL-MCNC: 8.6 MG/DL (ref 8.8–10.5)
CHLORIDE SERPL-SCNC: 107 MMOL/L (ref 100–108)
CO2 SERPL-SCNC: 26 MMOL/L (ref 21–32)
CREAT SERPL-MCNC: 0.88 MG/DL (ref 0.55–1.02)
CREATINE KINASE, SERUM: 87 U/L (ref 26–308)
CRP QUANTITATIVE: 1.1 MG/DL (ref 0–0.9)
EOSINOPHIL NFR BLD: 2 % (ref 1–3)
ERYTHROCYTE [DISTWIDTH] IN BLOOD BY AUTOMATED COUNT: 15.9 % (ref 12.5–18)
ERYTHROCYTE [SEDIMENTATION RATE] IN BLOOD: 6 MM/HR (ref 0–20)
GFR ESTIMATION: > 60
GLOBULIN: 3.9 G/DL (ref 2.3–3.5)
GLUCOSE SERPL-MCNC: 88 MG/DL (ref 70–110)
HCT VFR BLD AUTO: 43.9 % (ref 37–47)
HGB BLD-MCNC: 13.3 G/DL (ref 12–16)
LYMPHOCYTES NFR BLD: 20.7 % (ref 25–40)
MCH RBC QN AUTO: 22.1 PG (ref 27–31.2)
MCHC RBC AUTO-ENTMCNC: 30.3 G/DL (ref 31.8–35.4)
MCV RBC AUTO: 73 FL (ref 80–97)
MICROCYTES BLD QL SMEAR: NORMAL
MONOCYTES NFR BLD: 11.8 % (ref 1–15)
NEUTROPHILS # BLD AUTO: 2.98 10*3/UL (ref 1.8–7.7)
NEUTROPHILS NFR BLD: 64.9 % (ref 37–80)
NUCLEATED RED BLOOD CELLS: 0 %
PLATELETS: 262 10*3/UL (ref 142–424)
POTASSIUM SERPL-SCNC: 3.5 MMOL/L (ref 3.6–5.2)
PROT SERPL-MCNC: 7.5 G/DL (ref 6.4–8.2)
RBC # BLD AUTO: 6.01 10*6/UL (ref 4.2–5.4)
SODIUM BLD-SCNC: 140 MMOL/L (ref 135–145)
URATE SERPL-MCNC: 3.9 MG/DL (ref 2.6–6)
WBC # BLD: 4.6 10*3/UL (ref 4.6–10.2)

## 2022-04-26 PROCEDURE — 3079F PR MOST RECENT DIASTOLIC BLOOD PRESSURE 80-89 MM HG: ICD-10-PCS | Mod: CPTII,S$GLB,, | Performed by: INTERNAL MEDICINE

## 2022-04-26 PROCEDURE — 1160F PR REVIEW ALL MEDS BY PRESCRIBER/CLIN PHARMACIST DOCUMENTED: ICD-10-PCS | Mod: CPTII,S$GLB,, | Performed by: INTERNAL MEDICINE

## 2022-04-26 PROCEDURE — 3077F PR MOST RECENT SYSTOLIC BLOOD PRESSURE >= 140 MM HG: ICD-10-PCS | Mod: CPTII,S$GLB,, | Performed by: INTERNAL MEDICINE

## 2022-04-26 PROCEDURE — 99204 PR OFFICE/OUTPT VISIT, NEW, LEVL IV, 45-59 MIN: ICD-10-PCS | Mod: S$GLB,,, | Performed by: INTERNAL MEDICINE

## 2022-04-26 PROCEDURE — 1159F MED LIST DOCD IN RCRD: CPT | Mod: CPTII,S$GLB,, | Performed by: INTERNAL MEDICINE

## 2022-04-26 PROCEDURE — 3008F BODY MASS INDEX DOCD: CPT | Mod: CPTII,S$GLB,, | Performed by: INTERNAL MEDICINE

## 2022-04-26 PROCEDURE — 1160F RVW MEDS BY RX/DR IN RCRD: CPT | Mod: CPTII,S$GLB,, | Performed by: INTERNAL MEDICINE

## 2022-04-26 PROCEDURE — 3077F SYST BP >= 140 MM HG: CPT | Mod: CPTII,S$GLB,, | Performed by: INTERNAL MEDICINE

## 2022-04-26 PROCEDURE — 99204 OFFICE O/P NEW MOD 45 MIN: CPT | Mod: S$GLB,,, | Performed by: INTERNAL MEDICINE

## 2022-04-26 PROCEDURE — 3079F DIAST BP 80-89 MM HG: CPT | Mod: CPTII,S$GLB,, | Performed by: INTERNAL MEDICINE

## 2022-04-26 PROCEDURE — 3008F PR BODY MASS INDEX (BMI) DOCUMENTED: ICD-10-PCS | Mod: CPTII,S$GLB,, | Performed by: INTERNAL MEDICINE

## 2022-04-26 PROCEDURE — 1159F PR MEDICATION LIST DOCUMENTED IN MEDICAL RECORD: ICD-10-PCS | Mod: CPTII,S$GLB,, | Performed by: INTERNAL MEDICINE

## 2022-04-26 PROCEDURE — 4010F ACE/ARB THERAPY RXD/TAKEN: CPT | Mod: CPTII,S$GLB,, | Performed by: INTERNAL MEDICINE

## 2022-04-26 PROCEDURE — 4010F PR ACE/ARB THEARPY RXD/TAKEN: ICD-10-PCS | Mod: CPTII,S$GLB,, | Performed by: INTERNAL MEDICINE

## 2022-04-26 RX ORDER — SERTRALINE HYDROCHLORIDE 100 MG/1
100 TABLET, FILM COATED ORAL DAILY
COMMUNITY
Start: 2022-04-11 | End: 2023-04-19

## 2022-04-26 RX ORDER — HYDROCODONE BITARTRATE AND ACETAMINOPHEN 5; 325 MG/1; MG/1
1 TABLET ORAL EVERY 6 HOURS PRN
COMMUNITY
Start: 2022-04-07 | End: 2023-04-19

## 2022-04-29 LAB
ALBUMIN, ELECTROPHORESIS: 3.69 G/DL (ref 3.75–5.01)
ALPHA1 GLOB FLD ELPH-MCNC: 0.39 G/DL (ref 0.19–0.46)
ALPHA2 GLOB FLD ELPH-MCNC: 0.89 G/DL (ref 0.48–1.05)
ANA SER-ACNC: NORMAL
B-GLOBULIN FLD ELPH-MCNC: 1.05 G/DL (ref 0.48–1.1)
C3 SERPL-MCNC: 160 MG/DL (ref 90–180)
C4 NEF SERPL-MCNC: 37 MG/DL (ref 10–40)
CYCLIC CITRULLINATED PEPTIDE (CCP)AB(IGG): 34 UNITS (ref 0–19)
EER PROTEIN ELECTROPHORESIS, SERUM: ABNORMAL
GAMMA GLOB FLD ELPH-MCNC: 0.98 G/DL (ref 0.62–1.51)
IMMUNOFIXATION INTERPRETATION: ABNORMAL
RHEUMATOID FACT SERPL-ACNC: 11 IU/ML (ref 0–14)
TOTAL PROTEIN, ELECTROPHORESIS: 7 G/DL (ref 6.3–8.2)

## 2022-05-17 NOTE — PROGRESS NOTES
Subjective:      Patient ID: Nicole Quiñones is a 47 y.o. female.    Chief Complaint: Disease Management    HPI:   Includes joint pain without subjective swelling.   Antecedent event includes: None;  Pain location includes: shoulders, hands and knees;  Gradual onset; beginning >years ago;  Constant ache, Mild in severity,   Lasting >minutes, Worse during the daytime;   Improved with rest, medication, change in position and none;   Worsened with activity and overuse;     Review of Systems   Constitutional: Negative for fever and unexpected weight change.   HENT: Negative for mouth sores and trouble swallowing.    Eyes: Negative for redness.   Respiratory: Negative for cough and shortness of breath.    Cardiovascular: Negative for chest pain.   Gastrointestinal: Negative for constipation and diarrhea.   Genitourinary: Negative for dysuria and genital sores.   Musculoskeletal: Positive for arthralgias.   Integumentary:  Negative for rash.   Neurological: Positive for headaches.   Hematological: Does not bruise/bleed easily.   Psychiatric/Behavioral: Negative for dysphoric mood and sleep disturbance. The patient is not nervous/anxious.       Past Medical History:   Diagnosis Date    Anemia     Hypertension     TTP (thrombotic thrombocytopenic purpura)     TTP (thrombotic thrombocytopenic purpura)      Past Surgical History:   Procedure Laterality Date    SURGICAL REMOVAL OF ENDOMETRIOSIS        See the Assessment/Plan for further characterization of the HPI, ROS, Medical, Surgical, Family, and Social Histories including Tobacco use, Meds; all of which has been reviewed in Epic.    Medication List with Changes/Refills   New Medications    CALCIUM CARBONATE-VITAMIN D3 (CALCIUM 500 + D) 500 MG-10 MCG (400 UNIT) TAB    Take 1 tablet by mouth once daily at 6am.    HYDROXYCHLOROQUINE (PLAQUENIL) 200 MG TABLET    Take 1 tablet (200 mg total) by mouth once daily.   Current Medications    ALBUTEROL (PROVENTIL/VENTOLIN HFA)  "90 MCG/ACTUATION INHALER    SMARTSI Puff(s) By Mouth Every 4 Hours PRN    AZELASTINE (ASTELIN) 137 MCG (0.1 %) NASAL SPRAY    SPRAY 2 SPRAY(S) TWICE A DAY BY INTRANASAL ROUTE AS NEEDED FOR 7 DAYS.    BUTALBITAL-ACETAMINOPHEN-CAFFEINE -40 MG (FIORICET, ESGIC) -40 MG PER TABLET    SMARTSI Tablet(s) By Mouth Every 6 Hours PRN    CETIRIZINE (ZYRTEC) 10 MG TABLET    Take 10 mg by mouth once daily.    DICLOFENAC SODIUM (VOLTAREN) 1 % GEL    Apply 2 g topically 4 (four) times daily.    DULOXETINE (CYMBALTA) 60 MG CAPSULE    SMARTSI Capsule(s) By Mouth Every Morning    ERGOCALCIFEROL (ERGOCALCIFEROL) 50,000 UNIT CAP    SMARTSI Capsule(s) By Mouth Once a Week    FOLIC ACID (FOLVITE) 1 MG TABLET    SMARTSI Tablet(s) By Mouth Daily    GABAPENTIN (NEURONTIN) 300 MG CAPSULE    Take 300 mg by mouth 3 (three) times daily.    HYDROCODONE-ACETAMINOPHEN (NORCO) 5-325 MG PER TABLET    Take 1 tablet by mouth every 6 (six) hours as needed.    LACTULOSE (CHRONULAC) 10 GRAM/15 ML SOLUTION    SMARTSIG:15 Milliliter(s) By Mouth Daily PRN    LINZESS 72 MCG CAP CAPSULE        LOSARTAN (COZAAR) 50 MG TABLET    SMARTSI Tablet(s) By Mouth Daily    METOPROLOL SUCCINATE (TOPROL-XL) 100 MG 24 HR TABLET    SMARTSI Tablet(s) By Mouth Every Night    QUETIAPINE (SEROQUEL) 50 MG TABLET    SMARTSI Tablet(s) By Mouth Every Evening    RIMEGEPANT SULFATE (NURTEC ODT ORAL)        SERTRALINE (ZOLOFT) 100 MG TABLET    Take 100 mg by mouth once daily.    SIMVASTATIN (ZOCOR) 20 MG TABLET    SMARTSI Tablet(s) By Mouth Every Evening    TRAMADOL (ULTRAM) 50 MG TABLET    SMARTSI Tablet(s) By Mouth Every 8 Hours PRN    VERAPAMIL (CALAN-SR) 180 MG CR TABLET    SMARTSI Tablet(s) By Mouth Twice Daily         Objective:   /82   Pulse 84   Resp 16   Ht 5' 6" (1.676 m)   Wt 117 kg (258 lb)   SpO2 99%   BMI 41.64 kg/m²   Physical Exam  Non-toxic appearance. No distress.   Normocephalic and atraumatic.   External ears " normal.    Conjunctivae and EOM are normal. No scleral icterus.   OP clear, moist.  No salivary gland enlargement or tenderness.  No submental, no submandibular, no preauricular, nor cervical adenopathy.   No JVD. No carotid bruit. No thyromegaly.   RRR, No friction rub; palpable distal pulses.    No tachypnea or signs of respiratory distress. CTAB.  Abdominal: No guarding or rebound tenderness.   Neurological: Oriented. Normal thought content. No cranial nerve deficit. Strength is grossly normal without focal deficit.  Skin is warm. No pallor.   Musculoskeletal: see below for further input. No overt synovitis.     X-Ray Shoulder 2 or More views Bilateral  EXAMINATION:  XR HAND COMPLETE 3 VIEW LEFT; XR KNEE 3 VIEW RIGHT; XR KNEE 3 VIEW LEFT; XR SHOULDER COMPLETE 2 OR MORE VIEWS BILATERAL; XR HAND COMPLETE 3 VIEW RIGHT    CLINICAL HISTORY:  Pain in unspecified joint; Pain in right knee; Pain in right shoulder    TECHNIQUE:  XR HAND COMPLETE 3 VIEW LEFT; XR KNEE 3 VIEW RIGHT; XR KNEE 3 VIEW LEFT; XR SHOULDER COMPLETE 2 OR MORE VIEWS BILATERAL; XR HAND COMPLETE 3 VIEW RIGHT    COMPARISON:  None    FINDINGS:  Hands: Mild osteoarthritis of the interphalangeal, 1st CMC, and MCP joints.  There is no acute fracture or dislocation.  There is no erosion or soft tissue swelling.  Bone quality is unremarkable.    Knee: Mild osteoarthritis of the knee joint.  There is no acute fracture or dislocation.  There is no erosion or soft tissue swelling.  Bone quality is unremarkable.    Shoulders: Severe osteoarthritis of the glenohumeral and AC joint.  There is no acute fracture or dislocation.  There is no erosion or soft tissue swelling.  Bone quality is unremarkable.    Electronically signed by resident: Ed Cisneros  Date:    01/13/2021  Time:    13:41    Electronically signed by: Wes Clark  Date:    01/13/2021  Time:    14:15  X-Ray Hand 3 view Right  EXAMINATION:  XR HAND COMPLETE 3 VIEW LEFT; XR KNEE 3 VIEW RIGHT; XR  KNEE 3 VIEW LEFT; XR SHOULDER COMPLETE 2 OR MORE VIEWS BILATERAL; XR HAND COMPLETE 3 VIEW RIGHT    CLINICAL HISTORY:  Pain in unspecified joint; Pain in right knee; Pain in right shoulder    TECHNIQUE:  XR HAND COMPLETE 3 VIEW LEFT; XR KNEE 3 VIEW RIGHT; XR KNEE 3 VIEW LEFT; XR SHOULDER COMPLETE 2 OR MORE VIEWS BILATERAL; XR HAND COMPLETE 3 VIEW RIGHT    COMPARISON:  None    FINDINGS:  Hands: Mild osteoarthritis of the interphalangeal, 1st CMC, and MCP joints.  There is no acute fracture or dislocation.  There is no erosion or soft tissue swelling.  Bone quality is unremarkable.    Knee: Mild osteoarthritis of the knee joint.  There is no acute fracture or dislocation.  There is no erosion or soft tissue swelling.  Bone quality is unremarkable.    Shoulders: Severe osteoarthritis of the glenohumeral and AC joint.  There is no acute fracture or dislocation.  There is no erosion or soft tissue swelling.  Bone quality is unremarkable.    Electronically signed by resident: Ed Cisneros  Date:    01/13/2021  Time:    13:41    Electronically signed by: Wes Clark  Date:    01/13/2021  Time:    14:15  X-Ray Knee 3 View Right  EXAMINATION:  XR HAND COMPLETE 3 VIEW LEFT; XR KNEE 3 VIEW RIGHT; XR KNEE 3 VIEW LEFT; XR SHOULDER COMPLETE 2 OR MORE VIEWS BILATERAL; XR HAND COMPLETE 3 VIEW RIGHT    CLINICAL HISTORY:  Pain in unspecified joint; Pain in right knee; Pain in right shoulder    TECHNIQUE:  XR HAND COMPLETE 3 VIEW LEFT; XR KNEE 3 VIEW RIGHT; XR KNEE 3 VIEW LEFT; XR SHOULDER COMPLETE 2 OR MORE VIEWS BILATERAL; XR HAND COMPLETE 3 VIEW RIGHT    COMPARISON:  None    FINDINGS:  Hands: Mild osteoarthritis of the interphalangeal, 1st CMC, and MCP joints.  There is no acute fracture or dislocation.  There is no erosion or soft tissue swelling.  Bone quality is unremarkable.    Knee: Mild osteoarthritis of the knee joint.  There is no acute fracture or dislocation.  There is no erosion or soft tissue swelling.  Bone  quality is unremarkable.    Shoulders: Severe osteoarthritis of the glenohumeral and AC joint.  There is no acute fracture or dislocation.  There is no erosion or soft tissue swelling.  Bone quality is unremarkable.    Electronically signed by resident: Ed Cisneros  Date:    01/13/2021  Time:    13:41    Electronically signed by: Wes Clark  Date:    01/13/2021  Time:    14:15  X-Ray Knee 3 View Left  EXAMINATION:  XR HAND COMPLETE 3 VIEW LEFT; XR KNEE 3 VIEW RIGHT; XR KNEE 3 VIEW LEFT; XR SHOULDER COMPLETE 2 OR MORE VIEWS BILATERAL; XR HAND COMPLETE 3 VIEW RIGHT    CLINICAL HISTORY:  Pain in unspecified joint; Pain in right knee; Pain in right shoulder    TECHNIQUE:  XR HAND COMPLETE 3 VIEW LEFT; XR KNEE 3 VIEW RIGHT; XR KNEE 3 VIEW LEFT; XR SHOULDER COMPLETE 2 OR MORE VIEWS BILATERAL; XR HAND COMPLETE 3 VIEW RIGHT    COMPARISON:  None    FINDINGS:  Hands: Mild osteoarthritis of the interphalangeal, 1st CMC, and MCP joints.  There is no acute fracture or dislocation.  There is no erosion or soft tissue swelling.  Bone quality is unremarkable.    Knee: Mild osteoarthritis of the knee joint.  There is no acute fracture or dislocation.  There is no erosion or soft tissue swelling.  Bone quality is unremarkable.    Shoulders: Severe osteoarthritis of the glenohumeral and AC joint.  There is no acute fracture or dislocation.  There is no erosion or soft tissue swelling.  Bone quality is unremarkable.    Electronically signed by resident: Ed Cisneros  Date:    01/13/2021  Time:    13:41    Electronically signed by: Wes Clark  Date:    01/13/2021  Time:    14:15  X-Ray Hand 3 view Left  EXAMINATION:  XR HAND COMPLETE 3 VIEW LEFT; XR KNEE 3 VIEW RIGHT; XR KNEE 3 VIEW LEFT; XR SHOULDER COMPLETE 2 OR MORE VIEWS BILATERAL; XR HAND COMPLETE 3 VIEW RIGHT    CLINICAL HISTORY:  Pain in unspecified joint; Pain in right knee; Pain in right shoulder    TECHNIQUE:  XR HAND COMPLETE 3 VIEW LEFT; XR KNEE 3 VIEW RIGHT;  XR KNEE 3 VIEW LEFT; XR SHOULDER COMPLETE 2 OR MORE VIEWS BILATERAL; XR HAND COMPLETE 3 VIEW RIGHT    COMPARISON:  None    FINDINGS:  Hands: Mild osteoarthritis of the interphalangeal, 1st CMC, and MCP joints.  There is no acute fracture or dislocation.  There is no erosion or soft tissue swelling.  Bone quality is unremarkable.    Knee: Mild osteoarthritis of the knee joint.  There is no acute fracture or dislocation.  There is no erosion or soft tissue swelling.  Bone quality is unremarkable.    Shoulders: Severe osteoarthritis of the glenohumeral and AC joint.  There is no acute fracture or dislocation.  There is no erosion or soft tissue swelling.  Bone quality is unremarkable.    Electronically signed by resident: Ed Cisneros  Date:    01/13/2021  Time:    13:41    Electronically signed by: Wes Clark  Date:    01/13/2021  Time:    14:15    Office Visit on 04/26/2022   Component Date Value Ref Range Status    Total Protein, Electrophoresis 04/26/2022 7.0  6.3 - 8.2 g/dL Final    Albumin, Electrophoresis 04/26/2022 3.69 (A) 3.75 - 5.01 g/dL Final    Alpha-1-Globulin 04/26/2022 0.39  0.19 - 0.46 g/dL Final    Alpha-2-Globulin 04/26/2022 0.89  0.48 - 1.05 g/dL Final    Beta Globulin 04/26/2022 1.05  0.48 - 1.10 g/dL Final    Gamma Globulin 04/26/2022 0.98  0.62 - 1.51 g/dL Final    Immunofix Interp. 04/26/2022 SEE NOTE   Final    Normal SPEP pattern.  Immunofixation electrophoresis (ZULMA)is a more sensitive technique for the identification ofsmall M-proteins.    EER Protein Electrophoresis 04/26/2022 SEE NOTE   Final    Access AR Enhanced Report using the link below:-Direct access:[email or web address] By: [address] [id number] Director: Keri Hernandez MD    Uric Acid 04/26/2022 3.9  2.6 - 6.0 mg/dL Final    Cyclic Citrullinated Peptide (CCP)* 04/26/2022 34 (A) 0 - 19 Units Final    Comment: INTERPRETIVE  INFORMATION: Cyclic Citrullinated Peptide Antibody,IgG  19 Units or less ................... Negative  20-39 Units ........................ Weak Positive  40-59 Units ........................ Moderate Positive  60 Units or greater   ................ Strong PositiveAnti-cyclic citrullinated peptide (anti-CCP), IgG antibodies arepresent in about 69-83 percent of patients with rheumatoidarthritis (RA) and have specificities of 93-95 percent. Theseautoantibodies may be present in the   preclinical phase ofdisease, are associated with future RA development, and maypredict radiographic joint destruction. Patients with weakpositive results should be monitored and testing repeated.Performed By: [facility] [place] [address] [id number] Director: Keri Hernandez MD      CRP QUANTITATIVE 04/26/2022 1.1 (A) 0.0 - 0.9 mg/dL Final    Sodium 04/26/2022 140  135 - 145 mmol/L Final    Potassium 04/26/2022 3.5 (A) 3.6 - 5.2 mmol/L Final    Chloride 04/26/2022 107  100 - 108 mmol/L Final    CO2 04/26/2022 26  21 - 32 mmol/L Final    Anion Gap 04/26/2022 7.0  3.0 - 11.0 mmol/L Final    BUN 04/26/2022 15  7 - 18 mg/dL Final    Creatinine 04/26/2022 0.88  0.55 - 1.02 mg/dL Final    GFR ESTIMATION 04/26/2022 > 60  >60 Final               DESCRIPTION       GLOMERULAR FILTRATION RATE             NORMAL                     >60             KIDNEY  DISEASE           15-60             KIDNEY FAILURE             <15    BUN/Creatinine Ratio 04/26/2022 17.04  7 - 18 Ratio Final    Glucose 04/26/2022 88  70 - 110 mg/dL Final    Calcium 04/26/2022 8.6 (A) 8.8 - 10.5 mg/dL Final    Total Bilirubin 04/26/2022 0.5  0.0 - 1.0 mg/dL Final    AST 04/26/2022 16  15 - 37 U/L Final    ALT 04/26/2022 17  12 - 78 U/L Final    Total Protein 04/26/2022 7.5  6.4 - 8.2 g/dL Final    Albumin 04/26/2022 3.6  3.4 - 5.0 g/dL Final    Globulin 04/26/2022 3.9 (A) 2.3 - 3.5 g/dL Final    Albumin/Globulin Ratio 04/26/2022  0.923 (A) 1.1 - 1.8 Ratio Final    Alkaline Phosphatase 04/26/2022 147 (A) 46 - 116 U/L Final    CK, Serum 04/26/2022 87  26 - 308 U/L Final    WBC 04/26/2022 4.6  4.6 - 10.2 10*3/uL Final    RBC 04/26/2022 6.01 (A) 4.2 - 5.4 10*6/uL Final    Hemoglobin 04/26/2022 13.3  12.0 - 16.0 g/dL Final    Hematocrit 04/26/2022 43.9  37.0 - 47.0 % Final    MCV 04/26/2022 73.0 (A) 80 - 97 fL Final    MCH 04/26/2022 22.1 (A) 27.0 - 31.2 pg Final    MCHC 04/26/2022 30.3 (A) 31.8 - 35.4 g/dL Final    RDW RBC Auto-Rto 04/26/2022 15.9  12.5 - 18.0 % Final    Platelets 04/26/2022 262  142 - 424 10*3/uL Final    Neutrophils 04/26/2022 64.9  37 - 80 % Final    Lymphocytes 04/26/2022 20.7 (A) 25 - 40 % Final    Monocytes 04/26/2022 11.8  1 - 15 % Final    Eosinophils 04/26/2022 2.0  1 - 3 % Final    Basophils 04/26/2022 0.4  0 - 3 % Final    nRBC# 04/26/2022 0.0  % Final    Neutrophils Absolute 04/26/2022 2.98  1.8 - 7.7 10*3/uL Final    C4 Complement 04/26/2022 37  10 - 40 mg/dL Final    Comment: REFERENCE INTERVAL: Complement Component 4Access complete set of age- and/or gender-specific referenceintervals for this test in the [facility] Laboratory Test Directory(aruplab.com).Performed By: [facility] [place] [address]   [id number] Director: Keri Hernandez MD      C3 Complement 04/26/2022 160  90 - 180 mg/dL Final    Comment: REFERENCE INTERVAL: Complement Component 3Access complete set of age- and/or gender-specific referenceintervals for this test in the [facility] Laboratory Test Directory([email or web address].com).Performed By: [facility] [place] [address]   [id number] Director: Keri Hernandez MD      Microcytes 04/26/2022 1+   Final    Sed Rate 04/26/2022 6  0 - 20 mm/hr Final    SETH 04/26/2022 NONE DETECTED  None Detected Final    Comment: If suspicion of connective tissue disease is strong and SETH EIAis negative, consider testing for SETH by IFA ([id number]).No antibodies to  Anti-Nuclear Antibodies (SETH) detected.  TheExtractable Nuclear Antigen Antibodies (RNP, Navarro, SSA 52, SSA60,   Scleroderma, Chely-1 and SSB) and Double Stranded DNA (dsDNA)Antibody, IgG will not be performed.INTERPRETIVE INFORMATION: Anti-Nuclear Antibodies (SETH), IgG byELISAAntinuclear Antibodies (SETH), IgG by EVELINA: SETH specimens arescreened using enzyme-linked   immunosorbent assay (EVELINA)methodology. All EVELINA results reported as Detected are furthertested by indirect fluorescent assay (IFA) using HEp-2 substratewith an IgG-specific conjugate. The SETH EVELINA screen is designedto detect antibodies against dsDNA,   histones, SS-A (Ro), SS-B(La), Navarro, Smith/RNP, Scl-70, Chely-1, centromeric proteins,other antigens extracted from the HEp-2 cell nucleus. SETH ELISAassays have been reported to have lower sensitivities than ANAIFA for systemic autoimmune rheum                           atic   diseases (SARD).Negative results do not necessarily rule out SARD.Performed By: [facility] [place] [id number] Director: Keri Hernandez MD      Rheumatoid Factor 04/26/2022 11  0 - 14 IU/mL Final    Performed By: [facility] [place], [place] [id number] Director: Keri Hernandez MD        All of the data above and below has been reviewed by myself and any further interpretations will be reflected in the assessment and plan.   The data includes review of external notes, and independent interpretation of lab results, x-rays, and imaging reports.  Active inflammatory arthritis is an illness that poses a threat to bodily function and even a threat to life in some cases.  Drug therapy to treat inflammatory arthritis usually requires intensive monitoring for toxicity.    Review of patient's allergies indicates:   Allergen Reactions    Nsaids (non-steroidal anti-inflammatory drug) Other (See Comments)     Causes ulcers to bleed.     Assessment/Plan:       Prev noted/prior  plan:  (No overt synovitis  Unable to actively flex shoulders above her head     Verbal education; no overt active disease but she has been treated     Blood work to further evaluate     She should also be working with Orthopedics, Physical Medicine and Rehab MD, and consider Neurosurgery if not already depending on symptoms     Amanda will plan to update her meds next visit)      This visit:      Interim lab:  Lab Results   Component Value Date     04/26/2022    K 3.5 (L) 04/26/2022     04/26/2022    CO2 26 04/26/2022    ANIONGAP 7.0 04/26/2022    GLU 88 04/26/2022    CALCIUM 8.6 (L) 04/26/2022    BUN 15 04/26/2022    CREATININE 0.88 04/26/2022    PROT 7.5 04/26/2022    AST 16 04/26/2022    ALT 17 04/26/2022    BILITOT 0.5 04/26/2022    ALKPHOS 147 (H) 04/26/2022    WBC 4.6 04/26/2022    NEUTROPHILS 64.9 04/26/2022    HGB 13.3 04/26/2022    MCV 73.0 (L) 04/26/2022     04/21/2021    LABPLAT 262 04/26/2022    SEDRATE 6 04/26/2022       Ca 8.6    SPEP normal    CPK 87    C3,4 normal    ESR 6 CRP 1.1      SETH neg RF neg CCP 34+ uric acid 3.9    Prev noted cymbalta 60mg  gabapentin 600mg tid   recently started taking her gabapentin to 100 mg t.i.d. and    has been taking her Cymbalta 60 mg daily.    Flexeril prn)        She has been having interim symptoms of inflammatory arthritis    She also has mechanical and neurogenic pain    She increased gabapentin to 600mg tid and has not noticed much noted    No overt synovitis  DJD    See prior    Consider Add plaquenil 200mg daily with eye exam; she can start prior but if vision problem hold off until seeing ophthal    Add Ca D daily to her vitamin D she uses    Revisit lab 2 weeks prior    Contact us prn    Addendum 6/20/22: Patient called Amanda to let her know she was   recently found to have a fracture in her right wrist with an MD and   to revisit MD next week but it was causing her a lot of pain and she was not even given a splint, which   Amanda  had her contact the MD who told her she had the fracture and have the records forwarded/shared with us to clarify things;       Lab in 2021: SETH neg RF neg CCP6.9+(<3) Acute hepatitis B and C panel negative 2021 TB negative  SETH neg RF neg CCP 34+ uric acid 3.9   There is some history to suggest inflammatory arthritis.     Had been treated for Rheumatoid arthritis with Methotrexate until seeing most recent Rheumatologist below     TTP:  Patient was diagnosed with TTP in 2015 and was treated with plasmapheresis and Rituxan infusions.  She relapsed 3 times since then and had received Rituxan infusion each time.  Last Rituxan infusion was in October 2020.  She does not report any improvement in her joint symptoms after receiving Rituxan therapy.     There is also some mechanical and neurogenic pain.     EMG/NCS showed left cervical radiculopathy and bilateral carpal tunnel     Was to have shoulder surgery for advanced DJD shoulders     She did have partial right shoulder surgery since and to have possible right shoulder replacement in future noted     She tells me she last used 40-20mg prednisone a month ago for widespread 'inflammation all over' that did not help much, but 'may have kept the inflammation at bay'     Prev noted/prior plan:  (No overt synovitis  Unable to actively flex shoulders above her head     Verbal education; no overt active disease but she has been treated     Blood work to further evaluate     She should also be working with Orthopedics, Physical Medicine and Rehab MD, and consider Neurosurgery if not already depending on symptoms     Amanda will plan to update her meds next visit)     cymbalta 60mg     gabapentin 600mg tid     Flexeril prn      Revisit weeks     See above and below     Contact us prn     They are having trouble with Epic per Amanda unable to check in patient and Amanda is to input things when they fix the problem and I will then be able to also close the note prev  noted     Has been managing with:     Prior Prednisone 40mg for 7 days then 20mg for 7 days      Flexeril     Gabapentin 600mg tid     voltaren gel      Cannot use oral NSAIDs noted     Prior recent tramadol     Prior Methotrexate     Prior Rituxan for TTP        recently started taking her gabapentin to 100 mg t.i.d. and      has been taking her Cymbalta 60 mg daily.       Has been working with:     We will seek records from PCP     Records PCP Yokasta Abad some of recent note 2/15/22: medical clearance for right shoulder surgery; EMG/NCS showed left cervical radiculopathy and bilateral carpal tunnel; prednisone and flexeril and gabapentin increased 600mg; MRI cervical spine f/u 2 weeks         Records Rheumatology some of note 9/15/21:  ((Nicole Quiñones  is an  46 y.o. female who is being seen in Rheumatology Clinic at [facility]  for the first visit at the request of No ref. provider found      Chief Complaint:  Multiple joint pain     HPI:  Patient is an  55-year-old female with past medical history of TTP, morbid obesity, migraine headaches and hypertension who is being referred to rheumatology clinic for evaluation of multiple joint pain.  Patient reports pain, stiffness and swelling hand joints.  She also has pain and locking of the shoulders bilaterally.  She has pain in the right hip and both knees associated with difficulty walking and climbing stairs.  She has done physical therapy for the knees in the past 2 years ago with some relief.  She is currently on tramadol for her joint pains.  She reports morning stiffness in the joints lasting for 30 minutes in hurts more at the end of the day before she goes to bed.  According to the patient she was diagnosed with rheumatoid arthritis by a rheumatologist and was prescribed tramadol since then.  On review of the outside medical records, she was found to have elevated sed rate at 43 and CRP at 1.1.  X-rays of  C-spine, shoulders, hips, knees and hands showing degenerative arthritis.  MRI lower extremity showed mild tendinosis of gluteus medius and minimus muscle.    TTP:  Patient was diagnosed with TTP in 2015 and was treated with plasmapheresis and Rituxan infusions.  She relapsed 3 times since then and had received Rituxan infusion each time.  Last Rituxan infusion was in October 2020.  She does not report any improvement in her joint symptoms after receiving Rituxan therapy.     Interval history:   Patient returns to clinic for regular follow-up.  Patient was recently seen in rheumatology clinic, she followed up with her PCP in [place]  and was told that none of the medications she is currently taking is for rheumatoid arthritis.  Patient was concerned and decided to follow-up her clinic again.  Patient continues to complain of right shoulder locking and left knee pain especially at night.  She is unable to sleep due to the pain.  The patient only recently started taking her gabapentin to 100 mg t.i.d. and has been taking her Cymbalta 60 mg daily.  Patient denies any current fevers, chills, rashes, shortness of breath, vision changes, joint swelling.     Current rheum medications:  1. MTX 15 mg weekly   2. Folic acid 1 mg daily       Allergies:  Nsaids (non-steroidal anti-inflammatory drug)          Past Medical History:   Diagnosis Date    Anemia      Hypertension      TTP (thrombotic thrombocytopenic purpura)      TTP (thrombotic thrombocytopenic purpura)                  Past Surgical History:   Procedure Laterality Date    SURGICAL REMOVAL OF ENDOMETRIOSIS          Assessment/Recommendations:     Inflammatory arthritis?  Osteoarthritis  Morbid obesity  History of TTP  Fibromyalgia        -after reviewing labs and imaging, it is likely that the patient has fibromyalgia and not rheumatoid arthritis  -The outside medical records:  X-rays of hands, shoulders, hips, knees showing degenerative joint  disease.  -hepatitis panel and T spot negative   -Discontinue MTX 15 mg weekly   -x-rays of knees, hands and shoulders, showing DJD   -Ordered and reviewed the blood work for medication adverse effects and disease activity.   -gave paper script for the physical therapy for bilateral knee osteoarthritis.  Eventually patient may MRI of the knee after completing physical therapy and muscle referral for orthopedics.  Advised to start using knee brace and use topical Voltaren gel as needed for pain.  I also educated about weight loss which could help with the knee osteoarthritis.  -patient also has fibromyalgia explaining the fatigue and pain.  Poor  sleep might be causing the worsening of symptoms.  -patient reassured that she does not have limited arthritis at this point based on lab tests and diagnostic imaging, explained to her regarding fibromyalgia  -recommend to patient that she should do her physical therapy exercises at home, also recommend to lose weight and exercise, recommended patient also do yoga or [employer] and water aerobics ; will refer patient to physical therapy again  -will inject right shoulder and left knee today  -she is already on Cymbalta 60 mg daily and will start patient on gabapentin 100 mg t.i.d.     RIGHT SHOULDER JOINT/BURSA INJECTION PROCEDURE NOTE :  Medications used : 40 mg Depomedrol, 1% Lidocaine  LEFT KNEE INTRA ARTICULAR INJECTION PROCEDURE NOTE :   Medication Uses : 40 mg Depomedrol and 1% lidocaine  Staff: Dr. Danica Ugarte MD  Rheumatology))     Review of medical records as stated above.  Lab +/- imaging and other ordered diagnostic studies to further evaluate.  See the orders associated with this note visit.  Medications as prescribed as tolerated.    Education including verbal and those noted in the patient instructions.  Revisit as scheduled and call prn.    The following diagnoses influence medical decision making and/or need further workup including the orders listed  below:    Rheumatoid arthritis of multiple sites with negative rheumatoid factor  -     CBC Auto Differential; Future; Expected date: 09/12/2022  -     Comprehensive Metabolic Panel; Future; Expected date: 09/12/2022  -     C-Reactive Protein; Future; Expected date: 09/12/2022  -     Sedimentation rate; Future; Expected date: 09/12/2022  -     Urinalysis; Future; Expected date: 09/12/2022  -     CK; Future; Expected date: 09/12/2022  -     hydrOXYchloroQUINE (PLAQUENIL) 200 mg tablet; Take 1 tablet (200 mg total) by mouth once daily.  Dispense: 30 tablet; Refill: 5  -     Ambulatory referral/consult to Ophthalmology; Future; Expected date: 06/07/2022    Other osteoarthritis involving multiple joints  -     CBC Auto Differential; Future; Expected date: 09/12/2022  -     Comprehensive Metabolic Panel; Future; Expected date: 09/12/2022  -     C-Reactive Protein; Future; Expected date: 09/12/2022  -     Sedimentation rate; Future; Expected date: 09/12/2022  -     Urinalysis; Future; Expected date: 09/12/2022  -     CK; Future; Expected date: 09/12/2022    History of inflammatory arthritis  -     CBC Auto Differential; Future; Expected date: 09/12/2022  -     Comprehensive Metabolic Panel; Future; Expected date: 09/12/2022  -     C-Reactive Protein; Future; Expected date: 09/12/2022  -     Sedimentation rate; Future; Expected date: 09/12/2022  -     Urinalysis; Future; Expected date: 09/12/2022  -     CK; Future; Expected date: 09/12/2022    TTP (thrombotic thrombopenic purpura)  -     CBC Auto Differential; Future; Expected date: 09/12/2022  -     Comprehensive Metabolic Panel; Future; Expected date: 09/12/2022  -     C-Reactive Protein; Future; Expected date: 09/12/2022  -     Sedimentation rate; Future; Expected date: 09/12/2022  -     Urinalysis; Future; Expected date: 09/12/2022  -     CK; Future; Expected date: 09/12/2022    Cervical spondylosis  -     CBC Auto Differential; Future; Expected date: 09/12/2022  -      Comprehensive Metabolic Panel; Future; Expected date: 09/12/2022  -     C-Reactive Protein; Future; Expected date: 09/12/2022  -     Sedimentation rate; Future; Expected date: 09/12/2022  -     Urinalysis; Future; Expected date: 09/12/2022  -     CK; Future; Expected date: 09/12/2022    Rotator cuff arthropathy of both shoulders  -     CBC Auto Differential; Future; Expected date: 09/12/2022  -     Comprehensive Metabolic Panel; Future; Expected date: 09/12/2022  -     C-Reactive Protein; Future; Expected date: 09/12/2022  -     Sedimentation rate; Future; Expected date: 09/12/2022  -     Urinalysis; Future; Expected date: 09/12/2022  -     CK; Future; Expected date: 09/12/2022    Bilateral carpal tunnel syndrome  -     CBC Auto Differential; Future; Expected date: 09/12/2022  -     Comprehensive Metabolic Panel; Future; Expected date: 09/12/2022  -     C-Reactive Protein; Future; Expected date: 09/12/2022  -     Sedimentation rate; Future; Expected date: 09/12/2022  -     Urinalysis; Future; Expected date: 09/12/2022  -     CK; Future; Expected date: 09/12/2022    Screening for rheumatic disorder  -     CBC Auto Differential; Future; Expected date: 09/12/2022  -     Comprehensive Metabolic Panel; Future; Expected date: 09/12/2022  -     C-Reactive Protein; Future; Expected date: 09/12/2022  -     Sedimentation rate; Future; Expected date: 09/12/2022  -     Urinalysis; Future; Expected date: 09/12/2022  -     CK; Future; Expected date: 09/12/2022    Medication monitoring encounter  -     CBC Auto Differential; Future; Expected date: 09/12/2022  -     Comprehensive Metabolic Panel; Future; Expected date: 09/12/2022  -     C-Reactive Protein; Future; Expected date: 09/12/2022  -     Sedimentation rate; Future; Expected date: 09/12/2022  -     Urinalysis; Future; Expected date: 09/12/2022  -     CK; Future; Expected date: 09/12/2022  -     hydrOXYchloroQUINE (PLAQUENIL) 200 mg tablet; Take 1 tablet (200 mg total) by  mouth once daily.  Dispense: 30 tablet; Refill: 5  -     Ambulatory referral/consult to Ophthalmology; Future; Expected date: 06/07/2022    Hypocalcemia  -     calcium carbonate-vitamin D3 (CALCIUM 500 + D) 500 mg-10 mcg (400 unit) Tab; Take 1 tablet by mouth once daily at 6am.  Dispense: 30 each; Refill: 6      Follow up in about 4 months (around 9/30/2022).     Otto Marcial MD

## 2022-05-31 ENCOUNTER — OFFICE VISIT (OUTPATIENT)
Dept: RHEUMATOLOGY | Facility: CLINIC | Age: 47
End: 2022-05-31
Payer: MEDICAID

## 2022-05-31 VITALS
DIASTOLIC BLOOD PRESSURE: 82 MMHG | RESPIRATION RATE: 16 BRPM | HEART RATE: 84 BPM | HEIGHT: 66 IN | OXYGEN SATURATION: 99 % | WEIGHT: 258 LBS | BODY MASS INDEX: 41.46 KG/M2 | SYSTOLIC BLOOD PRESSURE: 132 MMHG

## 2022-05-31 DIAGNOSIS — M12.811 ROTATOR CUFF ARTHROPATHY OF BOTH SHOULDERS: ICD-10-CM

## 2022-05-31 DIAGNOSIS — Z13.89 SCREENING FOR RHEUMATIC DISORDER: ICD-10-CM

## 2022-05-31 DIAGNOSIS — M12.812 ROTATOR CUFF ARTHROPATHY OF BOTH SHOULDERS: ICD-10-CM

## 2022-05-31 DIAGNOSIS — G56.03 BILATERAL CARPAL TUNNEL SYNDROME: ICD-10-CM

## 2022-05-31 DIAGNOSIS — M06.09 RHEUMATOID ARTHRITIS OF MULTIPLE SITES WITH NEGATIVE RHEUMATOID FACTOR: Primary | ICD-10-CM

## 2022-05-31 DIAGNOSIS — Z51.81 MEDICATION MONITORING ENCOUNTER: ICD-10-CM

## 2022-05-31 DIAGNOSIS — Z87.39 HISTORY OF INFLAMMATORY ARTHRITIS: ICD-10-CM

## 2022-05-31 DIAGNOSIS — M15.8 OTHER OSTEOARTHRITIS INVOLVING MULTIPLE JOINTS: ICD-10-CM

## 2022-05-31 DIAGNOSIS — E83.51 HYPOCALCEMIA: ICD-10-CM

## 2022-05-31 DIAGNOSIS — M47.812 CERVICAL SPONDYLOSIS: ICD-10-CM

## 2022-05-31 DIAGNOSIS — M31.19 TTP (THROMBOTIC THROMBOPENIC PURPURA): ICD-10-CM

## 2022-05-31 PROCEDURE — 3008F PR BODY MASS INDEX (BMI) DOCUMENTED: ICD-10-PCS | Mod: CPTII,S$GLB,, | Performed by: INTERNAL MEDICINE

## 2022-05-31 PROCEDURE — 1159F MED LIST DOCD IN RCRD: CPT | Mod: CPTII,S$GLB,, | Performed by: INTERNAL MEDICINE

## 2022-05-31 PROCEDURE — 4010F ACE/ARB THERAPY RXD/TAKEN: CPT | Mod: CPTII,S$GLB,, | Performed by: INTERNAL MEDICINE

## 2022-05-31 PROCEDURE — 99214 PR OFFICE/OUTPT VISIT, EST, LEVL IV, 30-39 MIN: ICD-10-PCS | Mod: S$GLB,,, | Performed by: INTERNAL MEDICINE

## 2022-05-31 PROCEDURE — 1159F PR MEDICATION LIST DOCUMENTED IN MEDICAL RECORD: ICD-10-PCS | Mod: CPTII,S$GLB,, | Performed by: INTERNAL MEDICINE

## 2022-05-31 PROCEDURE — 3079F PR MOST RECENT DIASTOLIC BLOOD PRESSURE 80-89 MM HG: ICD-10-PCS | Mod: CPTII,S$GLB,, | Performed by: INTERNAL MEDICINE

## 2022-05-31 PROCEDURE — 99214 OFFICE O/P EST MOD 30 MIN: CPT | Mod: S$GLB,,, | Performed by: INTERNAL MEDICINE

## 2022-05-31 PROCEDURE — 4010F PR ACE/ARB THEARPY RXD/TAKEN: ICD-10-PCS | Mod: CPTII,S$GLB,, | Performed by: INTERNAL MEDICINE

## 2022-05-31 PROCEDURE — 1160F RVW MEDS BY RX/DR IN RCRD: CPT | Mod: CPTII,S$GLB,, | Performed by: INTERNAL MEDICINE

## 2022-05-31 PROCEDURE — 3075F PR MOST RECENT SYSTOLIC BLOOD PRESS GE 130-139MM HG: ICD-10-PCS | Mod: CPTII,S$GLB,, | Performed by: INTERNAL MEDICINE

## 2022-05-31 PROCEDURE — 1160F PR REVIEW ALL MEDS BY PRESCRIBER/CLIN PHARMACIST DOCUMENTED: ICD-10-PCS | Mod: CPTII,S$GLB,, | Performed by: INTERNAL MEDICINE

## 2022-05-31 PROCEDURE — 3075F SYST BP GE 130 - 139MM HG: CPT | Mod: CPTII,S$GLB,, | Performed by: INTERNAL MEDICINE

## 2022-05-31 PROCEDURE — 3008F BODY MASS INDEX DOCD: CPT | Mod: CPTII,S$GLB,, | Performed by: INTERNAL MEDICINE

## 2022-05-31 PROCEDURE — 3079F DIAST BP 80-89 MM HG: CPT | Mod: CPTII,S$GLB,, | Performed by: INTERNAL MEDICINE

## 2022-05-31 RX ORDER — PNV NO.95/FERROUS FUM/FOLIC AC 28MG-0.8MG
1 TABLET ORAL DAILY
Qty: 30 EACH | Refills: 6 | Status: SHIPPED | OUTPATIENT
Start: 2022-05-31 | End: 2022-09-28 | Stop reason: SDUPTHER

## 2022-05-31 RX ORDER — HYDROXYCHLOROQUINE SULFATE 200 MG/1
200 TABLET, FILM COATED ORAL DAILY
Qty: 30 TABLET | Refills: 5 | Status: SHIPPED | OUTPATIENT
Start: 2022-05-31 | End: 2022-09-28 | Stop reason: SDUPTHER

## 2022-05-31 NOTE — PATIENT INSTRUCTIONS
Hydroxychloroquine (Plaquenil) is considered a disease-modifying anti-rheumatic drug (DMARD). It can decrease the pain and swelling of arthritis. It may prevent joint damage and reduce the risk of long-term disability. Hydroxychloroquine is in a class of medications that was first used to prevent and treat malaria. Today, it is used to treat rheumatoid arthritis, some symptoms of lupus, childhood arthritis (or juvenile idiopathic arthritis) and other autoimmune diseases.     How to Take It  Hydroxychloroquine comes in an oral tablet. Adult dosing ranges from 200 mg or 400 mg per day (6.5mg/kg). In some cases, higher doses can be used. It is recommended one tablet twice daily if taking more than one tablet. It is recommended to be taken with food.  It may take up to six months before the full benefits of this medication are experienced.    Hydroxychloroquine typically is very well tolerated. Serious side effects are rare. The most common side effects are nausea and diarrhea, which often improve with time. Less common side effects include rash, changes in skin pigment (such as darkening or dark spots), hair changes.    In rare cases, hydroxychloroquine can cause visual changes or loss of vision. Such vision problems are more likely to occur in individuals taking high doses for many years, individuals 60 years or older, or those with significant kidney disease. At the recommended dose, development of visual problems due to the medication is rare. It is recommended that you have an eye exam within the first year of use, then repeat every one to five years based on current guidelines.    Tell Your Doctor  Although there are few drug interactions with hydroxychloroquine, to be safe be sure to tell your doctor about all of the medications you are taking, including over-the-counter drugs and natural remedies.    Be sure to notify your other physicians when taking this drug. This drug does not have a strong effect on the  immune system, so vaccines recommended by other physicians are generally acceptable. Notify your eye doctor when you are on this medication so regular visual screening tests can be performed. If you are pregnant, considering becoming pregnant, or lactating, please discuss this with your doctor before taking this medication. However, hydroxycholorquine has been shown to be safe during pregnancy and breast feeding.

## 2022-08-29 PROBLEM — E66.01 MORBID OBESITY WITH BMI OF 40.0-44.9, ADULT: Status: ACTIVE | Noted: 2022-08-29

## 2022-08-29 PROBLEM — M22.41 CHONDROMALACIA OF PATELLOFEMORAL JOINT, RIGHT: Status: ACTIVE | Noted: 2022-08-29

## 2022-08-29 PROBLEM — M22.42 CHONDROMALACIA OF PATELLOFEMORAL JOINT, LEFT: Status: ACTIVE | Noted: 2022-08-29

## 2022-09-14 NOTE — PROGRESS NOTES
Subjective:      Patient ID: Nicole Quiñones is a 47 y.o. female.    Chief Complaint: Disease Management    HPI:   Includes joint pain without subjective swelling.   Antecedent event includes: None;  Pain location includes: shoulder, hand, and knees;  Gradual onset; beginning >years ago;  Constant ache, Mild in severity,   Lasting >minutes, Worse during the daytime;   Improved with rest, medication, change in position, and none;   Worsened with activity, overuse, stress, and rest;         Review of Systems   Constitutional:  Negative for chills, fatigue and fever.   HENT:  Negative for nasal congestion, ear pain, hearing loss, mouth dryness, mouth sores, nosebleeds and trouble swallowing.    Eyes:  Positive for itching. Negative for photophobia, pain, redness, visual disturbance and eye dryness.   Respiratory:  Negative for cough and shortness of breath.    Cardiovascular:  Positive for chest pain. Negative for palpitations and leg swelling.   Gastrointestinal:  Negative for abdominal distention, abdominal pain, blood in stool, constipation, diarrhea, rectal pain, vomiting and fecal incontinence.   Endocrine: Negative for polydipsia and polyuria.   Genitourinary:  Negative for bladder incontinence, dysuria, enuresis, genital sores and hematuria.   Musculoskeletal:  Positive for arthralgias. Negative for joint swelling and myalgias.   Integumentary:  Negative for rash, wound and mole/lesion.   Neurological:  Negative for weakness, numbness and headaches.   Hematological:  Negative for adenopathy. Does not bruise/bleed easily.   Psychiatric/Behavioral:  Positive for sleep disturbance. Negative for dysphoric mood. The patient is nervous/anxious.     Past Medical History:   Diagnosis Date    Anemia     Arthritis     Hypertension     Migraines     TTP (thrombotic thrombocytopenic purpura)     TTP (thrombotic thrombocytopenic purpura)      Past Surgical History:   Procedure Laterality Date    SURGICAL REMOVAL OF  ENDOMETRIOSIS        See the Assessment/Plan for further characterization of the HPI, ROS, Medical, Surgical, Family, and Social Histories including Tobacco use, Meds; all of which has been reviewed in Epic.    Medication List with Changes/Refills   Current Medications    ALBUTEROL (PROVENTIL) 2.5 MG /3 ML (0.083 %) NEBULIZER SOLUTION    USE 1 VIAL IN NEBULIZER EVERY 8 HOURS AS NEEDED    ALBUTEROL (PROVENTIL/VENTOLIN HFA) 90 MCG/ACTUATION INHALER    SMARTSI Puff(s) By Mouth Every 4 Hours PRN    ARIPIPRAZOLE (ABILIFY) 2 MG TAB    TAKE 1 TABLET(S) ONCE A DAY (IN THE EVENING)    AZELASTINE (ASTELIN) 137 MCG (0.1 %) NASAL SPRAY    SPRAY 2 SPRAY(S) TWICE A DAY BY INTRANASAL ROUTE AS NEEDED FOR 7 DAYS.    BUTALBITAL-ACETAMINOPHEN-CAFFEINE -40 MG (FIORICET, ESGIC) -40 MG PER TABLET    SMARTSI Tablet(s) By Mouth Every 6 Hours PRN    EMGALITY  MG/ML PNIJ    AS DIRECTED SUBCUTANEOUS MONTHLY 30 DAY(S)    ERGOCALCIFEROL (ERGOCALCIFEROL) 50,000 UNIT CAP    SMARTSI Capsule(s) By Mouth Once a Week    FOLIC ACID (FOLVITE) 1 MG TABLET    SMARTSI Tablet(s) By Mouth Daily    GABAPENTIN (NEURONTIN) 600 MG TABLET    Take 600 mg by mouth 3 (three) times daily.    HYDROCODONE-ACETAMINOPHEN (NORCO) 5-325 MG PER TABLET    Take 1 tablet by mouth every 6 (six) hours as needed.    ISOSORBIDE MONONITRATE (IMDUR) 30 MG 24 HR TABLET    Take 30 mg by mouth once daily.    LACTULOSE (CHRONULAC) 10 GRAM/15 ML SOLUTION    SMARTSIG:15 Milliliter(s) By Mouth Daily PRN    LORATADINE (CLARITIN) 10 MG TABLET    1 tablet.    LOSARTAN (COZAAR) 50 MG TABLET    SMARTSI Tablet(s) By Mouth Daily    METOPROLOL SUCCINATE (TOPROL-XL) 100 MG 24 HR TABLET    SMARTSI Tablet(s) By Mouth Every Night    ONDANSETRON (ZOFRAN) 8 MG TABLET    TAKE 1 TABLET BY MOUTH EVERY 8 HOURS AS NEEDED FOR NAUSEA AND VOMITING    RIMEGEPANT SULFATE (NURTEC ODT ORAL)        SERTRALINE (ZOLOFT) 100 MG TABLET    Take 100 mg by mouth once daily.     "TIZANIDINE (ZANAFLEX) 4 MG TABLET    TAKE 1 TABLET BY MOUTH AT BEDTIME AS NEEDED FOR MUSCLE TENSION    TRAMADOL (ULTRAM) 50 MG TABLET    SMARTSI Tablet(s) By Mouth Every 8 Hours PRN    VERAPAMIL (CALAN-SR) 180 MG CR TABLET    SMARTSI Tablet(s) By Mouth Twice Daily   Changed and/or Refilled Medications    Modified Medication Previous Medication    CALCIUM CARBONATE-VITAMIN D3 (CALCIUM 500 + D) 500 MG-10 MCG (400 UNIT) TAB calcium carbonate-vitamin D3 (CALCIUM 500 + D) 500 mg-10 mcg (400 unit) Tab       Take 1 tablet by mouth once daily.    Take 1 tablet by mouth once daily at 6am.    HYDROXYCHLOROQUINE (PLAQUENIL) 200 MG TABLET hydrOXYchloroQUINE (PLAQUENIL) 200 mg tablet       Take 1 tablet (200 mg total) by mouth once daily.    Take 1 tablet (200 mg total) by mouth once daily.   Discontinued Medications    CETIRIZINE (ZYRTEC) 10 MG TABLET    Take 10 mg by mouth once daily.    DICLOFENAC SODIUM (VOLTAREN) 1 % GEL    Apply 2 g topically 4 (four) times daily.    DULOXETINE (CYMBALTA) 60 MG CAPSULE    SMARTSI Capsule(s) By Mouth Every Morning    GABAPENTIN (NEURONTIN) 300 MG CAPSULE    Take 300 mg by mouth 3 (three) times daily.    LINZESS 72 MCG CAP CAPSULE        QUETIAPINE (SEROQUEL) 50 MG TABLET    SMARTSI Tablet(s) By Mouth Every Evening    SIMVASTATIN (ZOCOR) 20 MG TABLET    SMARTSI Tablet(s) By Mouth Every Evening         Objective:   /85   Pulse 72   Resp 18   Ht 5' 6" (1.676 m)   Wt 116.8 kg (257 lb 9.6 oz)   SpO2 99%   BMI 41.58 kg/m²   Physical Exam  Non-toxic appearance. No distress.   Normocephalic and atraumatic.   External ears normal.    Conjunctivae and EOM are normal. No scleral icterus.   RRR, No friction rub; palpable distal pulses.    No tachypnea or signs of respiratory distress. CTAB.  Abdominal: No guarding or rebound tenderness.   Neurological: Oriented. Normal thought content. No cranial nerve deficit. Strength is grossly normal without focal deficit.  Skin is warm. " No pallor.   Musculoskeletal:  see below for further input. No overt synovitis.     X-Ray Knee 3 View Left  See Procedure Notes for results.     IMPRESSION: Please see Ortho procedure notes for report.      This procedure was auto-finalized by: Virtual Radiologist    Office Visit on 04/26/2022   Component Date Value Ref Range Status    Total Protein, Electrophoresis 04/26/2022 7.0  6.3 - 8.2 g/dL Final    Albumin, Electrophoresis 04/26/2022 3.69 (A)  3.75 - 5.01 g/dL Final    Alpha-1-Globulin 04/26/2022 0.39  0.19 - 0.46 g/dL Final    Alpha-2-Globulin 04/26/2022 0.89  0.48 - 1.05 g/dL Final    Beta Globulin 04/26/2022 1.05  0.48 - 1.10 g/dL Final    Gamma Globulin 04/26/2022 0.98  0.62 - 1.51 g/dL Final    Immunofix Interp. 04/26/2022 SEE NOTE   Final    Normal SPEP pattern.  Immunofixation electrophoresis (ZULMA)is a more sensitive technique for the identification ofsmall M-proteins.    EER Protein Electrophoresis 04/26/2022 SEE NOTE   Final    Access [employer] Enhanced Report using the link below:-Direct access:[email or web address] By: [facility] [place] [id number] Director: Keri Hernandez MD    Uric Acid 04/26/2022 3.9  2.6 - 6.0 mg/dL Final    Cyclic Citrullinated Peptide (CCP)* 04/26/2022 34 (A)  0 - 19 Units Final    Comment: INTERPRETIVE INFORMATION: Cyclic Citrullinated Peptide Antibody,IgG  19 Units or less ................... Negative  20-39 Units ........................ Weak Positive  40-59 Units ........................ Moderate Positive  60 Units or greater   ................ Strong PositiveAnti-cyclic citrullinated peptide (anti-CCP), IgG antibodies arepresent in about 69-83 percent of patients with rheumatoidarthritis (RA) and have specificities of 93-95 percent. Theseautoantibodies may be present in the   preclinical phase ofdisease, are associated with future RA development, and maypredict radiographic joint destruction.  Patients with weakpositive results should be monitored and testing repeated.Performed By: [facility] [place] [id number] Director: Keri Hernandez MD      CRP QUANTITATIVE 04/26/2022 1.1 (H)  0.0 - 0.9 mg/dL Final    Sodium 04/26/2022 140  135 - 145 mmol/L Final    Potassium 04/26/2022 3.5 (L)  3.6 - 5.2 mmol/L Final    Chloride 04/26/2022 107  100 - 108 mmol/L Final    CO2 04/26/2022 26  21 - 32 mmol/L Final    Anion Gap 04/26/2022 7.0  3.0 - 11.0 mmol/L Final    BUN 04/26/2022 15  7 - 18 mg/dL Final    Creatinine 04/26/2022 0.88  0.55 - 1.02 mg/dL Final    GFR ESTIMATION 04/26/2022 > 60  >60 Final               DESCRIPTION       GLOMERULAR FILTRATION RATE             NORMAL                     >60             KIDNEY  DISEASE           15-60             KIDNEY FAILURE             <15    BUN/Creatinine Ratio 04/26/2022 17.04  7 - 18 Ratio Final    Glucose 04/26/2022 88  70 - 110 mg/dL Final    Calcium 04/26/2022 8.6 (L)  8.8 - 10.5 mg/dL Final    Total Bilirubin 04/26/2022 0.5  0.0 - 1.0 mg/dL Final    AST 04/26/2022 16  15 - 37 U/L Final    ALT 04/26/2022 17  12 - 78 U/L Final    Total Protein 04/26/2022 7.5  6.4 - 8.2 g/dL Final    Albumin 04/26/2022 3.6  3.4 - 5.0 g/dL Final    Globulin 04/26/2022 3.9 (H)  2.3 - 3.5 g/dL Final    Albumin/Globulin Ratio 04/26/2022 0.923 (L)  1.1 - 1.8 Ratio Final    Alkaline Phosphatase 04/26/2022 147 (H)  46 - 116 U/L Final    CK, Serum 04/26/2022 87  26 - 308 U/L Final    WBC 04/26/2022 4.6  4.6 - 10.2 10*3/uL Final    RBC 04/26/2022 6.01 (H)  4.2 - 5.4 10*6/uL Final    Hemoglobin 04/26/2022 13.3  12.0 - 16.0 g/dL Final    Hematocrit 04/26/2022 43.9  37.0 - 47.0 % Final    MCV 04/26/2022 73.0 (L)  80 - 97 fL Final    MCH 04/26/2022 22.1 (L)  27.0 - 31.2 pg Final    MCHC 04/26/2022 30.3 (L)  31.8 - 35.4 g/dL Final    RDW RBC Auto-Rto 04/26/2022 15.9  12.5 - 18.0 % Final    Platelets 04/26/2022 262  142 - 424 10*3/uL Final    Neutrophils  04/26/2022 64.9  37 - 80 % Final    Lymphocytes 04/26/2022 20.7 (L)  25 - 40 % Final    Monocytes 04/26/2022 11.8  1 - 15 % Final    Eosinophils 04/26/2022 2.0  1 - 3 % Final    Basophils 04/26/2022 0.4  0 - 3 % Final    nRBC# 04/26/2022 0.0  % Final    Neutrophils Absolute 04/26/2022 2.98  1.8 - 7.7 10*3/uL Final    C4 Complement 04/26/2022 37  10 - 40 mg/dL Final    Comment: REFERENCE INTERVAL: Complement Component 4Access complete set of age- and/or gender-specific referenceintervals for this test in the [facility] Test Directory([email or web address]).Performed By: [facility] [place] [address]   [id number] Director: Keri Hernandez MD      C3 Complement 04/26/2022 160  90 - 180 mg/dL Final    Comment: REFERENCE INTERVAL: Complement Component 3Access complete set of age- and/or gender-specific referenceintervals for this test in the [facility] Test Directory([email or web address]).Performed By: [facility] [place] [address]   [id number] Director: Keri Hernandez MD      Microcytes 04/26/2022 1+   Final    Sed Rate 04/26/2022 6  0 - 20 mm/hr Final    SETH 04/26/2022 NONE DETECTED  None Detected Final    Comment: If suspicion of connective tissue disease is strong and SETH EIAis negative, consider testing for SETH by IFA ([id number]).No antibodies to Anti-Nuclear Antibodies (SETH) detected.  TheExtractable Nuclear Antigen Antibodies (RNP, Navarro, SSA 52, SSA60,   Scleroderma, Chely-1 and SSB) and Double Stranded DNA (dsDNA)Antibody, IgG will not be performed.INTERPRETIVE INFORMATION: Anti-Nuclear Antibodies (SETH), IgG byELISAAntinuclear Antibodies (SETH), IgG by EVELINA: SETH specimens arescreened using enzyme-linked   immunosorbent assay (EVELINA)methodology. All EVELINA results reported as Detected are furthertested by indirect fluorescent assay (IFA) using HEp-2 substratewith an IgG-specific conjugate. The SETH EVELINA screen is designedto detect antibodies against dsDNA,   histones, SS-A  (Ro), SS-B(La), Navarro, Smith/RNP, Scl-70, Chely-1, centromeric proteins,other antigens extracted from the HEp-2 cell nucleus. SETH ELISAassays have been reported to have lower sensitivities than ANAIFA for systemic autoimmune rheum                           atic   diseases (SARD).Negative results do not necessarily rule out SARD.Performed By: [email or web address] [place], [place] [id number] Director: Keri Hernandez MD      Rheumatoid Factor 04/26/2022 11  0 - 14 IU/mL Final    Performed By: [email or web address] [place], [place] [id number] Director: Keri Hernandez MD        All of the data above and below has been reviewed by myself and any further interpretations will be reflected in the assessment and plan.   The data includes review of external notes, and independent interpretation of lab results, x-rays, and imaging reports.  Active inflammatory arthritis is an illness that poses a threat to bodily function and even a threat to life in some cases.  Drug therapy to treat inflammatory arthritis usually requires intensive monitoring for toxicity.    Review of patient's allergies indicates:   Allergen Reactions    Nsaids (non-steroidal anti-inflammatory drug) Other (See Comments)     Causes ulcers to bleed.     Assessment/Plan:       Prev noted/prior plan:  (No overt synovitis  Unable to actively flex shoulders above her head     Verbal education; no overt active disease but she has been treated     Blood work to further evaluate     She should also be working with Orthopedics, Physical Medicine and Rehab MD, and consider Neurosurgery if not already depending on symptoms     Amanda will plan to update her meds next visit)      Last visit:      Interim lab:        Lab Results   Component Value Date      04/26/2022     K 3.5 (L) 04/26/2022      04/26/2022     CO2 26 04/26/2022     ANIONGAP 7.0 04/26/2022     GLU 88 04/26/2022     CALCIUM 8.6 (L) 04/26/2022     BUN 15  04/26/2022     CREATININE 0.88 04/26/2022     PROT 7.5 04/26/2022     AST 16 04/26/2022     ALT 17 04/26/2022     BILITOT 0.5 04/26/2022     ALKPHOS 147 (H) 04/26/2022     WBC 4.6 04/26/2022     NEUTROPHILS 64.9 04/26/2022     HGB 13.3 04/26/2022     MCV 73.0 (L) 04/26/2022      04/21/2021     LABPLAT 262 04/26/2022     SEDRATE 6 04/26/2022         Ca 8.6     SPEP normal     CPK 87     C3,4 normal     ESR 6 CRP 1.1        SETH neg RF neg CCP 34+ uric acid 3.9     Prev noted cymbalta 60mg  gabapentin 600mg tid   recently started taking her gabapentin to 100 mg t.i.d. and    has been taking her Cymbalta 60 mg daily.    Flexeril prn)         She has been having interim symptoms of inflammatory arthritis     She also has mechanical and neurogenic pain     She increased gabapentin to 600mg tid and has not noticed much noted     No overt synovitis  DJD      See prior     Consider Add plaquenil 200mg daily with eye exam; she can start prior but if vision problem hold off until seeing ophthal     Add Ca D daily to her vitamin D she uses     Revisit lab 2 weeks prior     Contact us prn     Addendum 6/20/22: Patient called Amanda to let her know she was   recently found to have a fracture in her right wrist with an MD and   to revisit MD next week but it was causing her a lot of pain and she was not even given a splint, which   Amanda had her contact the MD who told her she had the fracture and have the records forwarded/shared with us to clarify things;      This visit:      Interim lab:        Prev noted/prior plan:  (cymbalta 60mg  gabapentin 600mg tid   recently started taking her gabapentin to 100 mg t.i.d. and    has been taking her Cymbalta 60 mg daily.    Flexeril prn   She increased gabapentin to 600mg tid and has not noticed much noted  See prior   Consider Add plaquenil 200mg daily with eye exam; she can start prior but if vision problem hold off until seeing ophthal   Add Ca D daily to her vitamin D she  "uses  Addendum 6/20/22: Patient called Amanda to let her know she was   recently found to have a fracture in her right wrist with an MD and   to revisit MD next week but it was causing her a lot of pain and she was not even given a splint, which   Amanda had her contact the MD who told her she had the fracture and have the records forwarded/shared with us to clarify things; ))))       Kristina was unable to get in touch with her about the lab and she came in this morning and may have had lab and Kristina is tracking down    She has some interim pain in knees, hands and shoulder but sounds like doing fairly well    Has the fracture healed? She tells me yes; she did not need cast only splint and no longer needing    Interim some of recent note from Orthopedics Dr. Snow 8/29/22: Chondromalacia of patellofemoral: ("Had a long discussion with the patient regarding her weight and the affect it is actually having on her patellofemoral joint and knees in general.  Strongly recommended aquatic therapy and aerobics.  Do not believe that steroid injections will have much of any affect.  I do not believe viscosupplementation will have any benefit.  So essentially she is down to either losing weight or patellofemoral joint reconstruction.  She does not wish to proceed with any surgical remedy so will hold off on the referral for that.  She is to contact her primary care physician for medical treatment and weight loss plan.")    Looks like she may have also been treated for pneumonia interim per very limited records in Epic 8/17/22 diagnosis?    No longer using cymbalta? No changed to zoloft and a 'sleeping medicine' with a new PCP who she tells me also did some lab and treated her for pneumonia with an inhaler    No overt synovitis  DJD generalized    Verbal education    She will get Kristina's contact information and we can work on getting her recent lab and records and touch base in advance if needed moving forward    Cont plaquenil " 200mg daily    gabapentin 600mg tid     Ca D daily with vitamin D     Revisit 2 months  Revisit lab 2 weeks prior    Contact us prn     Lab in 2021: SETH neg RF neg CCP6.9+(<3) Acute hepatitis B and C panel negative 2021 TB negative  SETH neg RF neg CCP 34+ uric acid 3.9   There is some history to suggest inflammatory arthritis.     Had been treated for Rheumatoid arthritis with Methotrexate until seeing most recent Rheumatologist below     TTP:  Patient was diagnosed with TTP in 2015 and was treated with plasmapheresis and Rituxan infusions.  She relapsed 3 times since then and had received Rituxan infusion each time.  Last Rituxan infusion was in October 2020.  She does not report any improvement in her joint symptoms after receiving Rituxan therapy.     There is also some mechanical and neurogenic pain.     EMG/NCS showed left cervical radiculopathy and bilateral carpal tunnel     Was to have shoulder surgery for advanced DJD shoulders     She did have partial right shoulder surgery since and to have possible right shoulder replacement in future noted     She tells me she last used 40-20mg prednisone a month ago for widespread 'inflammation all over' that did not help much, but 'may have kept the inflammation at bay'     Prev noted/prior plan:  (No overt synovitis  Unable to actively flex shoulders above her head     Verbal education; no overt active disease but she has been treated     Blood work to further evaluate     She should also be working with Orthopedics, Physical Medicine and Rehab MD, and consider Neurosurgery if not already depending on symptoms     Amanda will plan to update her meds next visit)     cymbalta 60mg     gabapentin 600mg tid     Flexeril prn      Revisit weeks     See above and below     Contact us prn     They are having trouble with Epic per Amanda unable to check in patient and Amanda is to input things when they fix the problem and I will then be able to also close the note prev  noted     Has been managing with:     Prior Prednisone 40mg for 7 days then 20mg for 7 days      Flexeril     Gabapentin 600mg tid     voltaren gel      Cannot use oral NSAIDs noted     Prior recent tramadol     Prior Methotrexate     Prior Rituxan for TTP        recently started taking her gabapentin to 100 mg t.i.d. and      has been taking her Cymbalta 60 mg daily.       Has been working with:     We will seek records from PCP     Records PCP Yokasta Abad some of recent note 2/15/22: medical clearance for right shoulder surgery; EMG/NCS showed left cervical radiculopathy and bilateral carpal tunnel; prednisone and flexeril and gabapentin increased 600mg; MRI cervical spine f/u 2 weeks         Records Rheumatology some of note 9/15/21:  ((Nicole Quiñones  is an  46 y.o. female who is being seen in Rheumatology Clinic at [facility]  for the first visit at the request of No ref. provider found      Chief Complaint:  Multiple joint pain     HPI:  Patient is an  55-year-old female with past medical history of TTP, morbid obesity, migraine headaches and hypertension who is being referred to rheumatology clinic for evaluation of multiple joint pain.  Patient reports pain, stiffness and swelling hand joints.  She also has pain and locking of the shoulders bilaterally.  She has pain in the right hip and both knees associated with difficulty walking and climbing stairs.  She has done physical therapy for the knees in the past 2 years ago with some relief.  She is currently on tramadol for her joint pains.  She reports morning stiffness in the joints lasting for 30 minutes in hurts more at the end of the day before she goes to bed.  According to the patient she was diagnosed with rheumatoid arthritis by a rheumatologist and was prescribed tramadol since then.  On review of the outside medical records, she was found to have elevated sed rate at 43 and CRP at 1.1.  X-rays of  C-spine, shoulders, hips, knees and hands showing degenerative arthritis.  MRI lower extremity showed mild tendinosis of gluteus medius and minimus muscle.    TTP:  Patient was diagnosed with TTP in 2015 and was treated with plasmapheresis and Rituxan infusions.  She relapsed 3 times since then and had received Rituxan infusion each time.  Last Rituxan infusion was in October 2020.  She does not report any improvement in her joint symptoms after receiving Rituxan therapy.     Interval history:   Patient returns to clinic for regular follow-up.  Patient was recently seen in rheumatology clinic, she followed up with her PCP in [place]  and was told that none of the medications she is currently taking is for rheumatoid arthritis.  Patient was concerned and decided to follow-up her clinic again.  Patient continues to complain of right shoulder locking and left knee pain especially at night.  She is unable to sleep due to the pain.  The patient only recently started taking her gabapentin to 100 mg t.i.d. and has been taking her Cymbalta 60 mg daily.  Patient denies any current fevers, chills, rashes, shortness of breath, vision changes, joint swelling.     Current rheum medications:  1. MTX 15 mg weekly   2. Folic acid 1 mg daily       Allergies:  Nsaids (non-steroidal anti-inflammatory drug)             Past Medical History:   Diagnosis Date    Anemia      Hypertension      TTP (thrombotic thrombocytopenic purpura)      TTP (thrombotic thrombocytopenic purpura)                  Past Surgical History:   Procedure Laterality Date    SURGICAL REMOVAL OF ENDOMETRIOSIS          Assessment/Recommendations:     Inflammatory arthritis?  Osteoarthritis  Morbid obesity  History of TTP  Fibromyalgia        -after reviewing labs and imaging, it is likely that the patient has fibromyalgia and not rheumatoid arthritis  -The outside medical records:  X-rays of hands, shoulders, hips, knees showing degenerative joint  disease.  -hepatitis panel and T spot negative   -Discontinue MTX 15 mg weekly   -x-rays of knees, hands and shoulders, showing DJD   -Ordered and reviewed the blood work for medication adverse effects and disease activity.   -gave paper script for the physical therapy for bilateral knee osteoarthritis.  Eventually patient may MRI of the knee after completing physical therapy and muscle referral for orthopedics.  Advised to start using knee brace and use topical Voltaren gel as needed for pain.  I also educated about weight loss which could help with the knee osteoarthritis.  -patient also has fibromyalgia explaining the fatigue and pain.  Poor  sleep might be causing the worsening of symptoms.  -patient reassured that she does not have limited arthritis at this point based on lab tests and diagnostic imaging, explained to her regarding fibromyalgia  -recommend to patient that she should do her physical therapy exercises at home, also recommend to lose weight and exercise, recommended patient also do yoga or [employer] and water aerobics ; will refer patient to physical therapy again  -will inject right shoulder and left knee today  -she is already on Cymbalta 60 mg daily and will start patient on gabapentin 100 mg t.i.d.     RIGHT SHOULDER JOINT/BURSA INJECTION PROCEDURE NOTE :  Medications used : 40 mg Depomedrol, 1% Lidocaine  LEFT KNEE INTRA ARTICULAR INJECTION PROCEDURE NOTE :   Medication Uses : 40 mg Depomedrol and 1% lidocaine  Staff: Dr. Danica Ugarte MD  Rheumatology))     Review of medical records as stated above.  Lab +/- imaging and other ordered diagnostic studies to further evaluate.  See the orders associated with this note visit.  Medications as prescribed as tolerated.    Education including verbal and those noted in the patient instructions.  Revisit as scheduled and call prn.    The following diagnoses influence medical decision making and/or need further workup including the orders listed  below:    Rheumatoid arthritis of multiple sites with negative rheumatoid factor  -     hydrOXYchloroQUINE (PLAQUENIL) 200 mg tablet; Take 1 tablet (200 mg total) by mouth once daily.  Dispense: 30 tablet; Refill: 5  -     CBC Auto Differential; Future; Expected date: 11/14/2022  -     CK; Future; Expected date: 11/14/2022  -     Comprehensive Metabolic Panel; Future; Expected date: 11/14/2022  -     C-Reactive Protein; Future; Expected date: 11/14/2022  -     Sedimentation rate; Future; Expected date: 11/14/2022  -     Urinalysis; Future; Expected date: 11/14/2022    Other osteoarthritis involving multiple joints  -     CBC Auto Differential; Future; Expected date: 11/14/2022  -     CK; Future; Expected date: 11/14/2022  -     Comprehensive Metabolic Panel; Future; Expected date: 11/14/2022  -     C-Reactive Protein; Future; Expected date: 11/14/2022  -     Sedimentation rate; Future; Expected date: 11/14/2022  -     Urinalysis; Future; Expected date: 11/14/2022    Cervical spondylosis  -     CBC Auto Differential; Future; Expected date: 11/14/2022  -     CK; Future; Expected date: 11/14/2022  -     Comprehensive Metabolic Panel; Future; Expected date: 11/14/2022  -     C-Reactive Protein; Future; Expected date: 11/14/2022  -     Sedimentation rate; Future; Expected date: 11/14/2022  -     Urinalysis; Future; Expected date: 11/14/2022    Rotator cuff arthropathy of both shoulders  -     CBC Auto Differential; Future; Expected date: 11/14/2022  -     CK; Future; Expected date: 11/14/2022  -     Comprehensive Metabolic Panel; Future; Expected date: 11/14/2022  -     C-Reactive Protein; Future; Expected date: 11/14/2022  -     Sedimentation rate; Future; Expected date: 11/14/2022  -     Urinalysis; Future; Expected date: 11/14/2022    Medication monitoring encounter  -     hydrOXYchloroQUINE (PLAQUENIL) 200 mg tablet; Take 1 tablet (200 mg total) by mouth once daily.  Dispense: 30 tablet; Refill: 5  -     CBC Auto  Differential; Future; Expected date: 11/14/2022  -     CK; Future; Expected date: 11/14/2022  -     Comprehensive Metabolic Panel; Future; Expected date: 11/14/2022  -     C-Reactive Protein; Future; Expected date: 11/14/2022  -     Sedimentation rate; Future; Expected date: 11/14/2022  -     Urinalysis; Future; Expected date: 11/14/2022    TTP (thrombotic thrombopenic purpura)  -     CBC Auto Differential; Future; Expected date: 11/14/2022  -     CK; Future; Expected date: 11/14/2022  -     Comprehensive Metabolic Panel; Future; Expected date: 11/14/2022  -     C-Reactive Protein; Future; Expected date: 11/14/2022  -     Sedimentation rate; Future; Expected date: 11/14/2022  -     Urinalysis; Future; Expected date: 11/14/2022    Bilateral carpal tunnel syndrome  -     CBC Auto Differential; Future; Expected date: 11/14/2022  -     CK; Future; Expected date: 11/14/2022  -     Comprehensive Metabolic Panel; Future; Expected date: 11/14/2022  -     C-Reactive Protein; Future; Expected date: 11/14/2022  -     Sedimentation rate; Future; Expected date: 11/14/2022  -     Urinalysis; Future; Expected date: 11/14/2022    Hypocalcemia  -     calcium carbonate-vitamin D3 (CALCIUM 500 + D) 500 mg-10 mcg (400 unit) Tab; Take 1 tablet by mouth once daily.  Dispense: 30 each; Refill: 6  -     CBC Auto Differential; Future; Expected date: 11/14/2022  -     CK; Future; Expected date: 11/14/2022  -     Comprehensive Metabolic Panel; Future; Expected date: 11/14/2022  -     C-Reactive Protein; Future; Expected date: 11/14/2022  -     Sedimentation rate; Future; Expected date: 11/14/2022  -     Urinalysis; Future; Expected date: 11/14/2022    Follow up in about 2 months (around 11/28/2022).     Otto Marcial MD

## 2022-09-28 ENCOUNTER — OFFICE VISIT (OUTPATIENT)
Dept: RHEUMATOLOGY | Facility: CLINIC | Age: 47
End: 2022-09-28
Payer: MEDICAID

## 2022-09-28 VITALS
RESPIRATION RATE: 18 BRPM | DIASTOLIC BLOOD PRESSURE: 85 MMHG | BODY MASS INDEX: 41.4 KG/M2 | HEART RATE: 72 BPM | SYSTOLIC BLOOD PRESSURE: 123 MMHG | WEIGHT: 257.63 LBS | HEIGHT: 66 IN | OXYGEN SATURATION: 99 %

## 2022-09-28 DIAGNOSIS — G56.03 BILATERAL CARPAL TUNNEL SYNDROME: ICD-10-CM

## 2022-09-28 DIAGNOSIS — Z51.81 MEDICATION MONITORING ENCOUNTER: ICD-10-CM

## 2022-09-28 DIAGNOSIS — M06.09 RHEUMATOID ARTHRITIS OF MULTIPLE SITES WITH NEGATIVE RHEUMATOID FACTOR: Primary | ICD-10-CM

## 2022-09-28 DIAGNOSIS — M47.812 CERVICAL SPONDYLOSIS: ICD-10-CM

## 2022-09-28 DIAGNOSIS — M12.811 ROTATOR CUFF ARTHROPATHY OF BOTH SHOULDERS: ICD-10-CM

## 2022-09-28 DIAGNOSIS — M12.812 ROTATOR CUFF ARTHROPATHY OF BOTH SHOULDERS: ICD-10-CM

## 2022-09-28 DIAGNOSIS — M15.8 OTHER OSTEOARTHRITIS INVOLVING MULTIPLE JOINTS: ICD-10-CM

## 2022-09-28 DIAGNOSIS — M31.19 TTP (THROMBOTIC THROMBOPENIC PURPURA): ICD-10-CM

## 2022-09-28 DIAGNOSIS — E83.51 HYPOCALCEMIA: ICD-10-CM

## 2022-09-28 PROCEDURE — 1160F RVW MEDS BY RX/DR IN RCRD: CPT | Mod: CPTII,S$GLB,, | Performed by: INTERNAL MEDICINE

## 2022-09-28 PROCEDURE — 1159F PR MEDICATION LIST DOCUMENTED IN MEDICAL RECORD: ICD-10-PCS | Mod: CPTII,S$GLB,, | Performed by: INTERNAL MEDICINE

## 2022-09-28 PROCEDURE — 3074F PR MOST RECENT SYSTOLIC BLOOD PRESSURE < 130 MM HG: ICD-10-PCS | Mod: CPTII,S$GLB,, | Performed by: INTERNAL MEDICINE

## 2022-09-28 PROCEDURE — 99214 PR OFFICE/OUTPT VISIT, EST, LEVL IV, 30-39 MIN: ICD-10-PCS | Mod: S$GLB,,, | Performed by: INTERNAL MEDICINE

## 2022-09-28 PROCEDURE — 3079F PR MOST RECENT DIASTOLIC BLOOD PRESSURE 80-89 MM HG: ICD-10-PCS | Mod: CPTII,S$GLB,, | Performed by: INTERNAL MEDICINE

## 2022-09-28 PROCEDURE — 4010F PR ACE/ARB THEARPY RXD/TAKEN: ICD-10-PCS | Mod: CPTII,S$GLB,, | Performed by: INTERNAL MEDICINE

## 2022-09-28 PROCEDURE — 4010F ACE/ARB THERAPY RXD/TAKEN: CPT | Mod: CPTII,S$GLB,, | Performed by: INTERNAL MEDICINE

## 2022-09-28 PROCEDURE — 3079F DIAST BP 80-89 MM HG: CPT | Mod: CPTII,S$GLB,, | Performed by: INTERNAL MEDICINE

## 2022-09-28 PROCEDURE — 3008F PR BODY MASS INDEX (BMI) DOCUMENTED: ICD-10-PCS | Mod: CPTII,S$GLB,, | Performed by: INTERNAL MEDICINE

## 2022-09-28 PROCEDURE — 1160F PR REVIEW ALL MEDS BY PRESCRIBER/CLIN PHARMACIST DOCUMENTED: ICD-10-PCS | Mod: CPTII,S$GLB,, | Performed by: INTERNAL MEDICINE

## 2022-09-28 PROCEDURE — 99214 OFFICE O/P EST MOD 30 MIN: CPT | Mod: S$GLB,,, | Performed by: INTERNAL MEDICINE

## 2022-09-28 PROCEDURE — 3074F SYST BP LT 130 MM HG: CPT | Mod: CPTII,S$GLB,, | Performed by: INTERNAL MEDICINE

## 2022-09-28 PROCEDURE — 1159F MED LIST DOCD IN RCRD: CPT | Mod: CPTII,S$GLB,, | Performed by: INTERNAL MEDICINE

## 2022-09-28 PROCEDURE — 3008F BODY MASS INDEX DOCD: CPT | Mod: CPTII,S$GLB,, | Performed by: INTERNAL MEDICINE

## 2022-09-28 RX ORDER — ONDANSETRON HYDROCHLORIDE 8 MG/1
TABLET, FILM COATED ORAL
COMMUNITY
Start: 2022-07-12 | End: 2023-07-25 | Stop reason: SDUPTHER

## 2022-09-28 RX ORDER — ISOSORBIDE MONONITRATE 30 MG/1
30 TABLET, EXTENDED RELEASE ORAL DAILY
COMMUNITY
Start: 2022-09-04 | End: 2023-04-19

## 2022-09-28 RX ORDER — HYDROXYCHLOROQUINE SULFATE 200 MG/1
200 TABLET, FILM COATED ORAL DAILY
Qty: 30 TABLET | Refills: 5 | Status: SHIPPED | OUTPATIENT
Start: 2022-09-28 | End: 2022-11-28 | Stop reason: SDUPTHER

## 2022-09-28 RX ORDER — TIZANIDINE 4 MG/1
TABLET ORAL
COMMUNITY
Start: 2022-09-04 | End: 2023-04-19

## 2022-09-28 RX ORDER — GABAPENTIN 600 MG/1
600 TABLET ORAL 3 TIMES DAILY
COMMUNITY
Start: 2022-09-19 | End: 2023-05-23 | Stop reason: SDUPTHER

## 2022-09-28 RX ORDER — ARIPIPRAZOLE 2 MG/1
TABLET ORAL
COMMUNITY
Start: 2022-09-18 | End: 2023-04-19

## 2022-09-28 RX ORDER — LORATADINE 10 MG/1
1 TABLET ORAL
COMMUNITY
End: 2023-06-30

## 2022-09-28 RX ORDER — ALBUTEROL SULFATE 0.83 MG/ML
SOLUTION RESPIRATORY (INHALATION)
COMMUNITY
End: 2023-05-23 | Stop reason: SDUPTHER

## 2022-09-28 RX ORDER — PNV NO.95/FERROUS FUM/FOLIC AC 28MG-0.8MG
1 TABLET ORAL DAILY
Qty: 30 EACH | Refills: 6 | Status: SHIPPED | OUTPATIENT
Start: 2022-09-28 | End: 2022-11-28 | Stop reason: SDUPTHER

## 2022-09-28 RX ORDER — GALCANEZUMAB 120 MG/ML
INJECTION, SOLUTION SUBCUTANEOUS
COMMUNITY
Start: 2022-08-26 | End: 2024-01-11 | Stop reason: SDUPTHER

## 2022-11-14 NOTE — PROGRESS NOTES
Subjective:      Patient ID: Nicole Quiñones is a 47 y.o. female.    Chief Complaint: Disease Management    HPI:   Includes joint pain without subjective swelling.   Antecedent event includes: None;  Pain location includes: hands and knees;  Gradual onset; beginning >years ago;  Constant ache, Moderate in severity,   Lasting >minutes, Worse during the daytime;   Improved with rest, medication, change in position, and none;   Worsened with activity, overuse, and stress;         Review of Systems   Constitutional:  Negative for chills, fatigue and fever.   HENT:  Negative for nasal congestion, ear pain, hearing loss, mouth dryness, mouth sores, nosebleeds and trouble swallowing.    Eyes:  Negative for photophobia, pain, redness, visual disturbance and eye dryness.   Respiratory:  Negative for cough and shortness of breath.    Cardiovascular:  Negative for chest pain, palpitations and leg swelling.   Gastrointestinal:  Negative for abdominal distention, abdominal pain, blood in stool, constipation, diarrhea, rectal pain, vomiting and fecal incontinence.   Endocrine: Negative for polydipsia and polyuria.   Genitourinary:  Negative for bladder incontinence, dysuria, enuresis, genital sores and hematuria.   Musculoskeletal:  Positive for arthralgias. Negative for joint swelling and myalgias.   Integumentary:  Negative for rash, wound and mole/lesion.   Neurological:  Negative for weakness, numbness and headaches.   Hematological:  Negative for adenopathy. Does not bruise/bleed easily.   Psychiatric/Behavioral:  Negative for dysphoric mood and sleep disturbance. The patient is not nervous/anxious.     Past Medical History:   Diagnosis Date    Anemia     Arthritis     Hypertension     Migraines     TTP (thrombotic thrombocytopenic purpura)     TTP (thrombotic thrombocytopenic purpura)      Past Surgical History:   Procedure Laterality Date    SURGICAL REMOVAL OF ENDOMETRIOSIS        See the Assessment/Plan for further  characterization of the HPI, ROS, Medical, Surgical, Family, and Social Histories including Tobacco use, Meds; all of which has been reviewed in Epic.    Medication List with Changes/Refills   Current Medications    ALBUTEROL (PROVENTIL) 2.5 MG /3 ML (0.083 %) NEBULIZER SOLUTION    USE 1 VIAL IN NEBULIZER EVERY 8 HOURS AS NEEDED    ALBUTEROL (PROVENTIL/VENTOLIN HFA) 90 MCG/ACTUATION INHALER    SMARTSI Puff(s) By Mouth Every 4 Hours PRN    ARIPIPRAZOLE (ABILIFY) 2 MG TAB    TAKE 1 TABLET(S) ONCE A DAY (IN THE EVENING)    AZELASTINE (ASTELIN) 137 MCG (0.1 %) NASAL SPRAY    SPRAY 2 SPRAY(S) TWICE A DAY BY INTRANASAL ROUTE AS NEEDED FOR 7 DAYS.    BUTALBITAL-ACETAMINOPHEN-CAFFEINE -40 MG (FIORICET, ESGIC) -40 MG PER TABLET    SMARTSI Tablet(s) By Mouth Every 6 Hours PRN    EMGALITY  MG/ML PNIJ    AS DIRECTED SUBCUTANEOUS MONTHLY 30 DAY(S)    ERGOCALCIFEROL (ERGOCALCIFEROL) 50,000 UNIT CAP    SMARTSI Capsule(s) By Mouth Once a Week    FOLIC ACID (FOLVITE) 1 MG TABLET    SMARTSI Tablet(s) By Mouth Daily    GABAPENTIN (NEURONTIN) 600 MG TABLET    Take 600 mg by mouth 3 (three) times daily.    HYDROCODONE-ACETAMINOPHEN (NORCO) 5-325 MG PER TABLET    Take 1 tablet by mouth every 6 (six) hours as needed.    ISOSORBIDE MONONITRATE (IMDUR) 30 MG 24 HR TABLET    Take 30 mg by mouth once daily.    LACTULOSE (CHRONULAC) 10 GRAM/15 ML SOLUTION    SMARTSIG:15 Milliliter(s) By Mouth Daily PRN    LORATADINE (CLARITIN) 10 MG TABLET    1 tablet.    METOPROLOL SUCCINATE (TOPROL-XL) 100 MG 24 HR TABLET    SMARTSI Tablet(s) By Mouth Every Night    OLMESARTAN-HYDROCHLOROTHIAZIDE (BENICAR HCT) 20-12.5 MG PER TABLET    Take 1 tablet by mouth.    ONDANSETRON (ZOFRAN) 8 MG TABLET    TAKE 1 TABLET BY MOUTH EVERY 8 HOURS AS NEEDED FOR NAUSEA AND VOMITING    RIMEGEPANT SULFATE (NURTEC ODT ORAL)        SERTRALINE (ZOLOFT) 100 MG TABLET    Take 100 mg by mouth once daily.    TIZANIDINE (ZANAFLEX) 4 MG TABLET    TAKE  "1 TABLET BY MOUTH AT BEDTIME AS NEEDED FOR MUSCLE TENSION    TRAMADOL (ULTRAM) 50 MG TABLET    SMARTSI Tablet(s) By Mouth Every 8 Hours PRN    VERAPAMIL (CALAN-SR) 180 MG CR TABLET    SMARTSI Tablet(s) By Mouth Twice Daily   Changed and/or Refilled Medications    Modified Medication Previous Medication    CALCIUM CARBONATE-VITAMIN D3 (CALCIUM 500 + D) 500 MG-10 MCG (400 UNIT) TAB calcium carbonate-vitamin D3 (CALCIUM 500 + D) 500 mg-10 mcg (400 unit) Tab       Take 1 tablet by mouth once daily.    Take 1 tablet by mouth once daily.    HYDROXYCHLOROQUINE (PLAQUENIL) 200 MG TABLET hydrOXYchloroQUINE (PLAQUENIL) 200 mg tablet       Take 1 tablet (200 mg total) by mouth once daily.    Take 1 tablet (200 mg total) by mouth once daily.   Discontinued Medications    LOSARTAN (COZAAR) 50 MG TABLET    SMARTSI Tablet(s) By Mouth Daily         Objective:   BP (!) 140/88   Pulse 83   Resp 18   Ht 5' 6" (1.676 m)   Wt 118.2 kg (260 lb 8 oz)   SpO2 98%   BMI 42.05 kg/m²   Physical Exam  Non-toxic appearance. No distress.   Normocephalic and atraumatic.   External ears normal.    Conjunctivae and EOM are normal. No scleral icterus.   RRR, No friction rub; palpable distal pulses.    No tachypnea or signs of respiratory distress.   Abdominal: No guarding or rebound tenderness.   Neurological: Oriented. Normal thought content. No cranial nerve deficit. Strength is grossly normal without focal deficit.  Skin is warm. No pallor.   Musculoskeletal: see below for further input. No overt synovitis.     X-Ray Knee 3 View Left  See Procedure Notes for results.     IMPRESSION: Please see Ortho procedure notes for report.      This procedure was auto-finalized by: Virtual Radiologist    Office Visit on 2022   Component Date Value Ref Range Status    Total Protein, Electrophoresis 2022 7.0  6.3 - 8.2 g/dL Final    Albumin, Electrophoresis 2022 3.69 (A)  3.75 - 5.01 g/dL Final    Alpha-1-Globulin 2022 0.39 "  0.19 - 0.46 g/dL Final    Alpha-2-Globulin 04/26/2022 0.89  0.48 - 1.05 g/dL Final    Beta Globulin 04/26/2022 1.05  0.48 - 1.10 g/dL Final    Gamma Globulin 04/26/2022 0.98  0.62 - 1.51 g/dL Final    Immunofix Interp. 04/26/2022 SEE NOTE   Final    Normal SPEP pattern.  Immunofixation electrophoresis (ZULMA)is a more sensitive technique for the identification ofsmall M-proteins.    EER Protein Electrophoresis 04/26/2022 SEE NOTE   Final    Access [facility] Enhanced Report using the link below:-Direct access:[email or web address] By: [employer] [place] [address] [id number] Director: Keri Hernandez MD    Uric Acid 04/26/2022 3.9  2.6 - 6.0 mg/dL Final    Cyclic Citrullinated Peptide (CCP)* 04/26/2022 34 (A)  0 - 19 Units Final    Comment: INTERPRETIVE INFORMATION: Cyclic Citrullinated Peptide Antibody,IgG  19 Units or less ................... Negative  20-39 Units ........................ Weak Positive  40-59 Units ........................ Moderate Positive  60 Units or greater   ................ Strong PositiveAnti-cyclic citrullinated peptide (anti-CCP), IgG antibodies arepresent in about 69-83 percent of patients with rheumatoidarthritis (RA) and have specificities of 93-95 percent. Theseautoantibodies may be present in the   preclinical phase ofdisease, are associated with future RA development, and maypredict radiographic joint destruction. Patients with weakpositive results should be monitored and testing repeated.Performed By: [employer] [place] [address] [id number] Director: Keri Hernandez MD      CRP QUANTITATIVE 04/26/2022 1.1 (H)  0.0 - 0.9 mg/dL Final    Sodium 04/26/2022 140  135 - 145 mmol/L Final    Potassium 04/26/2022 3.5 (L)  3.6 - 5.2 mmol/L Final    Chloride 04/26/2022 107  100 - 108 mmol/L Final    CO2 04/26/2022 26  21 - 32 mmol/L Final    Anion Gap 04/26/2022 7.0  3.0 - 11.0 mmol/L Final    BUN  04/26/2022 15  7 - 18 mg/dL Final    Creatinine 04/26/2022 0.88  0.55 - 1.02 mg/dL Final    GFR ESTIMATION 04/26/2022 > 60  >60 Final               DESCRIPTION       GLOMERULAR FILTRATION RATE             NORMAL                     >60             KIDNEY  DISEASE           15-60             KIDNEY FAILURE             <15    BUN/Creatinine Ratio 04/26/2022 17.04  7 - 18 Ratio Final    Glucose 04/26/2022 88  70 - 110 mg/dL Final    Calcium 04/26/2022 8.6 (L)  8.8 - 10.5 mg/dL Final    Total Bilirubin 04/26/2022 0.5  0.0 - 1.0 mg/dL Final    AST 04/26/2022 16  15 - 37 U/L Final    ALT 04/26/2022 17  12 - 78 U/L Final    Total Protein 04/26/2022 7.5  6.4 - 8.2 g/dL Final    Albumin 04/26/2022 3.6  3.4 - 5.0 g/dL Final    Globulin 04/26/2022 3.9 (H)  2.3 - 3.5 g/dL Final    Albumin/Globulin Ratio 04/26/2022 0.923 (L)  1.1 - 1.8 Ratio Final    Alkaline Phosphatase 04/26/2022 147 (H)  46 - 116 U/L Final    CK, Serum 04/26/2022 87  26 - 308 U/L Final    WBC 04/26/2022 4.6  4.6 - 10.2 10*3/uL Final    RBC 04/26/2022 6.01 (H)  4.2 - 5.4 10*6/uL Final    Hemoglobin 04/26/2022 13.3  12.0 - 16.0 g/dL Final    Hematocrit 04/26/2022 43.9  37.0 - 47.0 % Final    MCV 04/26/2022 73.0 (L)  80 - 97 fL Final    MCH 04/26/2022 22.1 (L)  27.0 - 31.2 pg Final    MCHC 04/26/2022 30.3 (L)  31.8 - 35.4 g/dL Final    RDW RBC Auto-Rto 04/26/2022 15.9  12.5 - 18.0 % Final    Platelets 04/26/2022 262  142 - 424 10*3/uL Final    Neutrophils 04/26/2022 64.9  37 - 80 % Final    Lymphocytes 04/26/2022 20.7 (L)  25 - 40 % Final    Monocytes 04/26/2022 11.8  1 - 15 % Final    Eosinophils 04/26/2022 2.0  1 - 3 % Final    Basophils 04/26/2022 0.4  0 - 3 % Final    nRBC# 04/26/2022 0.0  % Final    Neutrophils Absolute 04/26/2022 2.98  1.8 - 7.7 10*3/uL Final    C4 Complement 04/26/2022 37  10 - 40 mg/dL Final    Comment: REFERENCE INTERVAL: Complement Component 4Access complete set of age- and/or gender-specific referenceintervals for this test in the  [facility] Test Directory([email or web address]).Performed By: [employer] [place] [address]   [id number] Director: Keri Hernandez MD      C3 Complement 04/26/2022 160  90 - 180 mg/dL Final    Comment: REFERENCE INTERVAL: Complement Component 3Access complete set of age- and/or gender-specific referenceintervals for this test in the [facility] Test [facility]([email or web address]).Performed By: [employer] [place] [address]   [id number] Director: Keri Hernandez MD      Microcytes 04/26/2022 1+   Final    Sed Rate 04/26/2022 6  0 - 20 mm/hr Final    SETH 04/26/2022 NONE DETECTED  None Detected Final    Comment: If suspicion of connective tissue disease is strong and SETH EIAis negative, consider testing for SETH by IFA ([id number]).No antibodies to Anti-Nuclear Antibodies (SETH) detected.  TheExtractable Nuclear Antigen Antibodies (RNP, Navarro, SSA 52, SSA60,   Scleroderma, Chely-1 and SSB) and Double Stranded DNA (dsDNA)Antibody, IgG will not be performed.INTERPRETIVE INFORMATION: Anti-Nuclear Antibodies (SETH), IgG byELISAAntinuclear Antibodies (SETH), IgG by EVELINA: SETH specimens arescreened using enzyme-linked   immunosorbent assay (EVELINA)methodology. All EVELINA results reported as Detected are furthertested by indirect fluorescent assay (IFA) using HEp-2 substratewith an IgG-specific conjugate. The SETH EVELINA screen is designedto detect antibodies against dsDNA,   histones, SS-A (Ro), SS-B(La), Navarro, Smith/RNP, Scl-70, Chely-1, centromeric proteins,other antigens extracted from the HEp-2 cell nucleus. SETH ELISAassays have been reported to have lower sensitivities than ANAIFA for systemic autoimmune rheum                           atic   diseases (SARD).Negative results do not necessarily rule out SARD.Performed By: [employer] [place] [address] [id number] Director: Keri Hernandez MD      Rheumatoid Factor 04/26/2022 11  0 - 14 IU/mL Final    Performed By:  [address] [id number] Director: Keri Hernandez MD        All of the data above and below has been reviewed by myself and any further interpretations will be reflected in the assessment and plan.   The data includes review of external notes, and independent interpretation of lab results, x-rays, and imaging reports.  Active inflammatory arthritis is an illness that poses a threat to bodily function and even a threat to life in some cases.  Drug therapy to treat inflammatory arthritis usually requires intensive monitoring for toxicity.    Review of patient's allergies indicates:   Allergen Reactions    Nsaids (non-steroidal anti-inflammatory drug) Other (See Comments)     Causes ulcers to bleed.     Assessment/Plan:       Prev noted/prior plan:  (No overt synovitis  Unable to actively flex shoulders above her head     Verbal education; no overt active disease but she has been treated     Blood work to further evaluate     She should also be working with Orthopedics, Physical Medicine and Rehab MD, and consider Neurosurgery if not already depending on symptoms     Amanda will plan to update her meds next visit)      Visit prior to Last visit:      Interim lab:            Lab Results   Component Value Date      04/26/2022     K 3.5 (L) 04/26/2022      04/26/2022     CO2 26 04/26/2022     ANIONGAP 7.0 04/26/2022     GLU 88 04/26/2022     CALCIUM 8.6 (L) 04/26/2022     BUN 15 04/26/2022     CREATININE 0.88 04/26/2022     PROT 7.5 04/26/2022     AST 16 04/26/2022     ALT 17 04/26/2022     BILITOT 0.5 04/26/2022     ALKPHOS 147 (H) 04/26/2022     WBC 4.6 04/26/2022     NEUTROPHILS 64.9 04/26/2022     HGB 13.3 04/26/2022     MCV 73.0 (L) 04/26/2022      04/21/2021     LABPLAT 262 04/26/2022     SEDRATE 6 04/26/2022         Ca 8.6     SPEP normal     CPK 87     C3,4 normal     ESR 6 CRP 1.1        SETH neg RF neg CCP 34+ uric acid 3.9     Prev noted cymbalta  60mg  gabapentin 600mg tid   recently started taking her gabapentin to 100 mg t.i.d. and    has been taking her Cymbalta 60 mg daily.    Flexeril prn)         She has been having interim symptoms of inflammatory arthritis     She also has mechanical and neurogenic pain     She increased gabapentin to 600mg tid and has not noticed much noted     No overt synovitis  DJD      See prior     Consider Add plaquenil 200mg daily with eye exam; she can start prior but if vision problem hold off until seeing ophthal     Add Ca D daily to her vitamin D she uses     Revisit lab 2 weeks prior     Contact us prn     Addendum 6/20/22: Patient called Amanda to let her know she was   recently found to have a fracture in her right wrist with an MD and   to revisit MD next week but it was causing her a lot of pain and she was not even given a splint, which   Amanda had her contact the MD who told her she had the fracture and have the records forwarded/shared with us to clarify things;      Last visit:        Interim lab:           Prev noted/prior plan:  (cymbalta 60mg  gabapentin 600mg tid   recently started taking her gabapentin to 100 mg t.i.d. and    has been taking her Cymbalta 60 mg daily.    Flexeril prn   She increased gabapentin to 600mg tid and has not noticed much noted  See prior   Consider Add plaquenil 200mg daily with eye exam; she can start prior but if vision problem hold off until seeing ophthal   Add Ca D daily to her vitamin D she uses  Addendum 6/20/22: Patient called Amanda to let her know she was   recently found to have a fracture in her right wrist with an MD and   to revisit MD next week but it was causing her a lot of pain and she was not even given a splint, which   Amanda had her contact the MD who told her she had the fracture and have the records forwarded/shared with us to clarify things; ))))        Kristina was unable to get in touch with her about the lab and she came in this morning and may have had  "lab and Kristina is tracking down     She has some interim pain in knees, hands and shoulder but sounds like doing fairly well     Has the fracture healed? She tells me yes; she did not need cast only splint and no longer needing     Interim some of recent note from Orthopedics Dr. Snow 8/29/22: Chondromalacia of patellofemoral: ("Had a long discussion with the patient regarding her weight and the affect it is actually having on her patellofemoral joint and knees in general.  Strongly recommended aquatic therapy and aerobics.  Do not believe that steroid injections will have much of any affect.  I do not believe viscosupplementation will have any benefit.  So essentially she is down to either losing weight or patellofemoral joint reconstruction.  She does not wish to proceed with any surgical remedy so will hold off on the referral for that.  She is to contact her primary care physician for medical treatment and weight loss plan.")     Looks like she may have also been treated for pneumonia interim per very limited records in Epic 8/17/22 diagnosis?     No longer using cymbalta? No changed to zoloft and a 'sleeping medicine' with a new PCP who she tells me also did some lab and treated her for pneumonia with an inhaler     No overt synovitis  DJD generalized     Verbal education     She will get Kristina's contact information and we can work on getting her recent lab and records and touch base in advance if needed moving forward     Cont plaquenil 200mg daily     gabapentin 600mg tid      Ca D daily with vitamin D      Revisit 2 months  Revisit lab 2 weeks prior     Contact us prn    This visit:    Interim lab Kristina is looking into as some from the prior visit were recently sent also noted:      Prev noted/prior plan:  (((Prev noted/prior plan: (cymbalta 60mg  gabapentin 600mg tid   recently started taking her gabapentin to 100 mg t.i.d. and    has been taking her Cymbalta 60 mg daily.    Flexeril prn   She increased " "gabapentin to 600mg tid and has not noticed much noted  See prior   Consider Add plaquenil 200mg daily with eye exam; she can start prior but if vision problem hold off until seeing ophthal   Add Ca D daily to her vitamin D she uses  Addendum 6/20/22: Patient called Amanda to let her know she was   recently found to have a fracture in her right wrist with an MD and   to revisit MD next week but it was causing her a lot of pain and she was not even given a splint, which   Amanda had her contact the MD who told her she had the fracture and have the records forwarded/shared with us to clarify things; ))))      Kristina was unable to get in touch with her about the lab and she came in this morning and may have had lab and Kristina is tracking down     She has some interim pain in knees, hands and shoulder but sounds like doing fairly well     Has the fracture healed? She tells me yes; she did not need cast only splint and no longer needing     Interim some of recent note from Orthopedics Dr. Snow 8/29/22: Chondromalacia of patellofemoral: ("Had a long discussion with the patient regarding her weight and the affect it is actually having on her patellofemoral joint and knees in general.  Strongly recommended aquatic therapy and aerobics.  Do not believe that steroid injections will have much of any affect.  I do not believe viscosupplementation will have any benefit.  So essentially she is down to either losing weight or patellofemoral joint reconstruction.  She does not wish to proceed with any surgical remedy so will hold off on the referral for that.  She is to contact her primary care physician for medical treatment and weight loss plan.")     Looks like she may have also been treated for pneumonia interim per very limited records in Epic 8/17/22 diagnosis?   No longer using cymbalta? No changed to zoloft and a 'sleeping medicine' with a new PCP who she tells me also did some lab and treated her for pneumonia with an " inhaler     No overt synovitis  DJD generalized   Verbal education   She will get Kristina's contact information and we can work on getting her recent lab and records and touch base in advance if needed moving forward   Cont plaquenil 200mg daily   gabapentin 600mg tid    Ca D daily with vitamin D)))))    She came in without lab results but Kristina tells me she may have done and they are tracking florian    She has interim arthralgias in hands and knees but sounds like managing fairly well; she is a bit back and forth but can call us if needed; we are here for her    She asks if she has lupus or rheumatoid arthritis as she tells me her other doctors are still entertaining both which Rheumatoid is more suggestive given her history and serology    No overt synovitis    Cont current    Contact us prn    We will track down lab          Revisit lab 2 weeks prior    Contact us prn     Lab in 2021: SETH neg RF neg CCP6.9+(<3) Acute hepatitis B and C panel negative 2021 TB negative  SETH neg RF neg CCP 34+ uric acid 3.9   There is some history to suggest inflammatory arthritis.     Had been treated for Rheumatoid arthritis with Methotrexate until seeing most recent Rheumatologist below     TTP:  Patient was diagnosed with TTP in 2015 and was treated with plasmapheresis and Rituxan infusions.  She relapsed 3 times since then and had received Rituxan infusion each time.  Last Rituxan infusion was in October 2020.  She does not report any improvement in her joint symptoms after receiving Rituxan therapy.     There is also some mechanical and neurogenic pain.     EMG/NCS showed left cervical radiculopathy and bilateral carpal tunnel     Was to have shoulder surgery for advanced DJD shoulders     She did have partial right shoulder surgery since and to have possible right shoulder replacement in future noted     She tells me she last used 40-20mg prednisone a month ago for widespread 'inflammation all over' that did not help much, but 'may  "have kept the inflammation at bay' prev noted    recent note from Orthopedics Dr. Snow 8/29/22: Chondromalacia of patellofemoral: ("Had a long discussion with the patient regarding her weight and the affect it is actually having on her patellofemoral joint and knees in general.  Strongly recommended aquatic therapy and aerobics.  Do not believe that steroid injections will have much of any affect.  I do not believe viscosupplementation will have any benefit.  So essentially she is down to either losing weight or patellofemoral joint reconstruction.  She does not wish to proceed with any surgical remedy so will hold off on the referral for that.  She is to contact her primary care physician for medical treatment and weight loss plan.")      Prev noted/prior plan:  (No overt synovitis  Unable to actively flex shoulders above her head     Verbal education; no overt active disease but she has been treated     Blood work to further evaluate     She should also be working with Orthopedics, Physical Medicine and Rehab MD, and consider Neurosurgery if not already depending on symptoms     Amanda will plan to update her meds next visit)     cymbalta 60mg     gabapentin 600mg tid     Flexeril prn      Revisit weeks     See above and below     Contact us prn     They are having trouble with Epic per Amanda unable to check in patient and Amanda is to input things when they fix the problem and I will then be able to also close the note prev noted     Has been managing with:     Prior Prednisone 40mg for 7 days then 20mg for 7 days      Flexeril     Gabapentin 600mg tid     voltaren gel      Cannot use oral NSAIDs noted     Prior recent tramadol     Prior Methotrexate     Prior Rituxan for TTP        recently started taking her gabapentin to 100 mg t.i.d. and      has been taking her Cymbalta 60 mg daily.       Has been working with:     We will seek records from PCP     Records PCP Yokasta Abad some of recent note " 2/15/22: medical clearance for right shoulder surgery; EMG/NCS showed left cervical radiculopathy and bilateral carpal tunnel; prednisone and flexeril and gabapentin increased 600mg; MRI cervical spine f/u 2 weeks         Records Rheumatology some of note 9/15/21:  ((Nicole Quiñones  is an  46 y.o. female who is being seen in Rheumatology Clinic at [facility]  for the first visit at the request of No ref. provider found      Chief Complaint:  Multiple joint pain     HPI:  Patient is an  55-year-old female with past medical history of TTP, morbid obesity, migraine headaches and hypertension who is being referred to rheumatology clinic for evaluation of multiple joint pain.  Patient reports pain, stiffness and swelling hand joints.  She also has pain and locking of the shoulders bilaterally.  She has pain in the right hip and both knees associated with difficulty walking and climbing stairs.  She has done physical therapy for the knees in the past 2 years ago with some relief.  She is currently on tramadol for her joint pains.  She reports morning stiffness in the joints lasting for 30 minutes in hurts more at the end of the day before she goes to bed.  According to the patient she was diagnosed with rheumatoid arthritis by a rheumatologist and was prescribed tramadol since then.  On review of the outside medical records, she was found to have elevated sed rate at 43 and CRP at 1.1.  X-rays of C-spine, shoulders, hips, knees and hands showing degenerative arthritis.  MRI lower extremity showed mild tendinosis of gluteus medius and minimus muscle.    TTP:  Patient was diagnosed with TTP in 2015 and was treated with plasmapheresis and Rituxan infusions.  She relapsed 3 times since then and had received Rituxan infusion each time.  Last Rituxan infusion was in October 2020.  She does not report any improvement in her joint symptoms after receiving Rituxan therapy.     Interval  history:   Patient returns to clinic for regular follow-up.  Patient was recently seen in rheumatology clinic, she followed up with [place] PCP in [place] and was told that none of the medications she is currently taking is for rheumatoid arthritis.  Patient was concerned and decided to follow-up her clinic again.  Patient continues to complain of right shoulder locking and left knee pain especially at night.  She is unable to sleep due to the pain.  The patient only recently started taking her gabapentin to 100 mg t.i.d. and has been taking her Cymbalta 60 mg daily.  Patient denies any current fevers, chills, rashes, shortness of breath, vision changes, joint swelling.     Current rheum medications:  1. MTX 15 mg weekly   2. Folic acid 1 mg daily       Allergies:  Nsaids (non-steroidal anti-inflammatory drug)             Past Medical History:   Diagnosis Date    Anemia      Hypertension      TTP (thrombotic thrombocytopenic purpura)      TTP (thrombotic thrombocytopenic purpura)                  Past Surgical History:   Procedure Laterality Date    SURGICAL REMOVAL OF ENDOMETRIOSIS          Assessment/Recommendations:     Inflammatory arthritis?  Osteoarthritis  Morbid obesity  History of TTP  Fibromyalgia        -after reviewing labs and imaging, it is likely that the patient has fibromyalgia and not rheumatoid arthritis  -The outside medical records:  X-rays of hands, shoulders, hips, knees showing degenerative joint disease.  -hepatitis panel and T spot negative   -Discontinue MTX 15 mg weekly   -x-rays of knees, hands and shoulders, showing DJD   -Ordered and reviewed the blood work for medication adverse effects and disease activity.   -gave paper script for the physical therapy for bilateral knee osteoarthritis.  Eventually patient may MRI of the knee after completing physical therapy and muscle referral for orthopedics.  Advised to start using knee brace and use topical Voltaren gel as needed for pain.  I  also educated about weight loss which could help with the knee osteoarthritis.  -patient also has fibromyalgia explaining the fatigue and pain.  Poor  sleep might be causing the worsening of symptoms.  -patient reassured that she does not have limited arthritis at this point based on lab tests and diagnostic imaging, explained to her regarding fibromyalgia  -recommend to patient that she should do her physical therapy exercises at home, also recommend to lose weight and exercise, recommended patient also do yoga or [employer] and water aerobics ; will refer patient to physical therapy again  -will inject right shoulder and left knee today  -she is already on Cymbalta 60 mg daily and will start patient on gabapentin 100 mg t.i.d.     RIGHT SHOULDER JOINT/BURSA INJECTION PROCEDURE NOTE :  Medications used : 40 mg Depomedrol, 1% Lidocaine  LEFT KNEE INTRA ARTICULAR INJECTION PROCEDURE NOTE :   Medication Uses : 40 mg Depomedrol and 1% lidocaine  Staff: Dr. Danica Ugarte MD  Rheumatology))     Review of medical records as stated above.  Lab +/- imaging and other ordered diagnostic studies to further evaluate.  See the orders associated with this note visit.  Medications as prescribed as tolerated.    Education including verbal and those noted in the patient instructions.  Revisit as scheduled and call prn.    The following diagnoses influence medical decision making and/or need further workup including the orders listed below:    Rheumatoid arthritis of multiple sites with negative rheumatoid factor  -     SETH by IFA, w/Rflx; Future; Expected date: 02/13/2023  -     Comprehensive Metabolic Panel; Future; Expected date: 02/13/2023  -     C-Reactive Protein; Future; Expected date: 02/13/2023  -     C3 Complement; Future; Expected date: 02/13/2023  -     C4 Complement; Future; Expected date: 02/13/2023  -     CBC Auto Differential; Future; Expected date: 02/13/2023  -     Urinalysis Microscopic; Future; Expected  date: 02/13/2023  -     Sedimentation rate; Future; Expected date: 02/13/2023  -     CK; Future; Expected date: 02/13/2023  -     Rheumatoid Factor; Future; Expected date: 02/13/2023  -     Cyclic Citrullinated Peptide Antibody, IgG; Future; Expected date: 02/13/2023  -     hydrOXYchloroQUINE (PLAQUENIL) 200 mg tablet; Take 1 tablet (200 mg total) by mouth once daily.  Dispense: 30 tablet; Refill: 5    Other osteoarthritis involving multiple joints  -     SETH by IFA, w/Rflx; Future; Expected date: 02/13/2023  -     Comprehensive Metabolic Panel; Future; Expected date: 02/13/2023  -     C-Reactive Protein; Future; Expected date: 02/13/2023  -     C3 Complement; Future; Expected date: 02/13/2023  -     C4 Complement; Future; Expected date: 02/13/2023  -     CBC Auto Differential; Future; Expected date: 02/13/2023  -     Urinalysis Microscopic; Future; Expected date: 02/13/2023  -     Sedimentation rate; Future; Expected date: 02/13/2023  -     CK; Future; Expected date: 02/13/2023  -     Rheumatoid Factor; Future; Expected date: 02/13/2023  -     Cyclic Citrullinated Peptide Antibody, IgG; Future; Expected date: 02/13/2023    Cervical spondylosis  -     SETH by IFA, w/Rflx; Future; Expected date: 02/13/2023  -     Comprehensive Metabolic Panel; Future; Expected date: 02/13/2023  -     C-Reactive Protein; Future; Expected date: 02/13/2023  -     C3 Complement; Future; Expected date: 02/13/2023  -     C4 Complement; Future; Expected date: 02/13/2023  -     CBC Auto Differential; Future; Expected date: 02/13/2023  -     Urinalysis Microscopic; Future; Expected date: 02/13/2023  -     Sedimentation rate; Future; Expected date: 02/13/2023  -     CK; Future; Expected date: 02/13/2023  -     Rheumatoid Factor; Future; Expected date: 02/13/2023  -     Cyclic Citrullinated Peptide Antibody, IgG; Future; Expected date: 02/13/2023    Rotator cuff arthropathy of both shoulders  -     SETH by IFA, w/Rflx; Future; Expected date:  02/13/2023  -     Comprehensive Metabolic Panel; Future; Expected date: 02/13/2023  -     C-Reactive Protein; Future; Expected date: 02/13/2023  -     C3 Complement; Future; Expected date: 02/13/2023  -     C4 Complement; Future; Expected date: 02/13/2023  -     CBC Auto Differential; Future; Expected date: 02/13/2023  -     Urinalysis Microscopic; Future; Expected date: 02/13/2023  -     Sedimentation rate; Future; Expected date: 02/13/2023  -     CK; Future; Expected date: 02/13/2023  -     Rheumatoid Factor; Future; Expected date: 02/13/2023  -     Cyclic Citrullinated Peptide Antibody, IgG; Future; Expected date: 02/13/2023    TTP (thrombotic thrombopenic purpura)  -     SETH by darrick BISHOP/Rflx; Future; Expected date: 02/13/2023  -     Comprehensive Metabolic Panel; Future; Expected date: 02/13/2023  -     C-Reactive Protein; Future; Expected date: 02/13/2023  -     C3 Complement; Future; Expected date: 02/13/2023  -     C4 Complement; Future; Expected date: 02/13/2023  -     CBC Auto Differential; Future; Expected date: 02/13/2023  -     Urinalysis Microscopic; Future; Expected date: 02/13/2023  -     Sedimentation rate; Future; Expected date: 02/13/2023  -     CK; Future; Expected date: 02/13/2023  -     Rheumatoid Factor; Future; Expected date: 02/13/2023  -     Cyclic Citrullinated Peptide Antibody, IgG; Future; Expected date: 02/13/2023    Medication monitoring encounter  -     SETH by DARIO w/Rflx; Future; Expected date: 02/13/2023  -     Comprehensive Metabolic Panel; Future; Expected date: 02/13/2023  -     C-Reactive Protein; Future; Expected date: 02/13/2023  -     C3 Complement; Future; Expected date: 02/13/2023  -     C4 Complement; Future; Expected date: 02/13/2023  -     CBC Auto Differential; Future; Expected date: 02/13/2023  -     Urinalysis Microscopic; Future; Expected date: 02/13/2023  -     Sedimentation rate; Future; Expected date: 02/13/2023  -     CK; Future; Expected date: 02/13/2023  -      Rheumatoid Factor; Future; Expected date: 02/13/2023  -     Cyclic Citrullinated Peptide Antibody, IgG; Future; Expected date: 02/13/2023  -     hydrOXYchloroQUINE (PLAQUENIL) 200 mg tablet; Take 1 tablet (200 mg total) by mouth once daily.  Dispense: 30 tablet; Refill: 5    Bilateral carpal tunnel syndrome  -     SETH by IFA, w/Rflx; Future; Expected date: 02/13/2023  -     Comprehensive Metabolic Panel; Future; Expected date: 02/13/2023  -     C-Reactive Protein; Future; Expected date: 02/13/2023  -     C3 Complement; Future; Expected date: 02/13/2023  -     C4 Complement; Future; Expected date: 02/13/2023  -     CBC Auto Differential; Future; Expected date: 02/13/2023  -     Urinalysis Microscopic; Future; Expected date: 02/13/2023  -     Sedimentation rate; Future; Expected date: 02/13/2023  -     CK; Future; Expected date: 02/13/2023  -     Rheumatoid Factor; Future; Expected date: 02/13/2023  -     Cyclic Citrullinated Peptide Antibody, IgG; Future; Expected date: 02/13/2023    Hypocalcemia  -     SETH by IFA, w/Rflx; Future; Expected date: 02/13/2023  -     Comprehensive Metabolic Panel; Future; Expected date: 02/13/2023  -     C-Reactive Protein; Future; Expected date: 02/13/2023  -     C3 Complement; Future; Expected date: 02/13/2023  -     C4 Complement; Future; Expected date: 02/13/2023  -     CBC Auto Differential; Future; Expected date: 02/13/2023  -     Urinalysis Microscopic; Future; Expected date: 02/13/2023  -     Sedimentation rate; Future; Expected date: 02/13/2023  -     CK; Future; Expected date: 02/13/2023  -     Rheumatoid Factor; Future; Expected date: 02/13/2023  -     Cyclic Citrullinated Peptide Antibody, IgG; Future; Expected date: 02/13/2023  -     calcium carbonate-vitamin D3 (CALCIUM 500 + D) 500 mg-10 mcg (400 unit) Tab; Take 1 tablet by mouth once daily.  Dispense: 30 each; Refill: 6    Follow up in about 3 months (around 2/28/2023).     Otto Marcial MD

## 2022-11-28 ENCOUNTER — OFFICE VISIT (OUTPATIENT)
Dept: RHEUMATOLOGY | Facility: CLINIC | Age: 47
End: 2022-11-28
Payer: MEDICAID

## 2022-11-28 VITALS
HEART RATE: 83 BPM | RESPIRATION RATE: 18 BRPM | DIASTOLIC BLOOD PRESSURE: 88 MMHG | OXYGEN SATURATION: 98 % | SYSTOLIC BLOOD PRESSURE: 140 MMHG | BODY MASS INDEX: 41.87 KG/M2 | WEIGHT: 260.5 LBS | HEIGHT: 66 IN

## 2022-11-28 DIAGNOSIS — M15.8 OTHER OSTEOARTHRITIS INVOLVING MULTIPLE JOINTS: ICD-10-CM

## 2022-11-28 DIAGNOSIS — M12.812 ROTATOR CUFF ARTHROPATHY OF BOTH SHOULDERS: ICD-10-CM

## 2022-11-28 DIAGNOSIS — M12.811 ROTATOR CUFF ARTHROPATHY OF BOTH SHOULDERS: ICD-10-CM

## 2022-11-28 DIAGNOSIS — G56.03 BILATERAL CARPAL TUNNEL SYNDROME: ICD-10-CM

## 2022-11-28 DIAGNOSIS — M31.19 TTP (THROMBOTIC THROMBOPENIC PURPURA): ICD-10-CM

## 2022-11-28 DIAGNOSIS — M47.812 CERVICAL SPONDYLOSIS: ICD-10-CM

## 2022-11-28 DIAGNOSIS — E83.51 HYPOCALCEMIA: ICD-10-CM

## 2022-11-28 DIAGNOSIS — Z51.81 MEDICATION MONITORING ENCOUNTER: ICD-10-CM

## 2022-11-28 DIAGNOSIS — M06.09 RHEUMATOID ARTHRITIS OF MULTIPLE SITES WITH NEGATIVE RHEUMATOID FACTOR: Primary | ICD-10-CM

## 2022-11-28 PROCEDURE — 3079F PR MOST RECENT DIASTOLIC BLOOD PRESSURE 80-89 MM HG: ICD-10-PCS | Mod: CPTII,S$GLB,, | Performed by: INTERNAL MEDICINE

## 2022-11-28 PROCEDURE — 1159F MED LIST DOCD IN RCRD: CPT | Mod: CPTII,S$GLB,, | Performed by: INTERNAL MEDICINE

## 2022-11-28 PROCEDURE — 4010F ACE/ARB THERAPY RXD/TAKEN: CPT | Mod: CPTII,S$GLB,, | Performed by: INTERNAL MEDICINE

## 2022-11-28 PROCEDURE — 3008F PR BODY MASS INDEX (BMI) DOCUMENTED: ICD-10-PCS | Mod: CPTII,S$GLB,, | Performed by: INTERNAL MEDICINE

## 2022-11-28 PROCEDURE — 1159F PR MEDICATION LIST DOCUMENTED IN MEDICAL RECORD: ICD-10-PCS | Mod: CPTII,S$GLB,, | Performed by: INTERNAL MEDICINE

## 2022-11-28 PROCEDURE — 99214 OFFICE O/P EST MOD 30 MIN: CPT | Mod: S$GLB,,, | Performed by: INTERNAL MEDICINE

## 2022-11-28 PROCEDURE — 3077F PR MOST RECENT SYSTOLIC BLOOD PRESSURE >= 140 MM HG: ICD-10-PCS | Mod: CPTII,S$GLB,, | Performed by: INTERNAL MEDICINE

## 2022-11-28 PROCEDURE — 3079F DIAST BP 80-89 MM HG: CPT | Mod: CPTII,S$GLB,, | Performed by: INTERNAL MEDICINE

## 2022-11-28 PROCEDURE — 1160F PR REVIEW ALL MEDS BY PRESCRIBER/CLIN PHARMACIST DOCUMENTED: ICD-10-PCS | Mod: CPTII,S$GLB,, | Performed by: INTERNAL MEDICINE

## 2022-11-28 PROCEDURE — 4010F PR ACE/ARB THEARPY RXD/TAKEN: ICD-10-PCS | Mod: CPTII,S$GLB,, | Performed by: INTERNAL MEDICINE

## 2022-11-28 PROCEDURE — 3008F BODY MASS INDEX DOCD: CPT | Mod: CPTII,S$GLB,, | Performed by: INTERNAL MEDICINE

## 2022-11-28 PROCEDURE — 99214 PR OFFICE/OUTPT VISIT, EST, LEVL IV, 30-39 MIN: ICD-10-PCS | Mod: S$GLB,,, | Performed by: INTERNAL MEDICINE

## 2022-11-28 PROCEDURE — 3077F SYST BP >= 140 MM HG: CPT | Mod: CPTII,S$GLB,, | Performed by: INTERNAL MEDICINE

## 2022-11-28 PROCEDURE — 1160F RVW MEDS BY RX/DR IN RCRD: CPT | Mod: CPTII,S$GLB,, | Performed by: INTERNAL MEDICINE

## 2022-11-28 RX ORDER — PNV NO.95/FERROUS FUM/FOLIC AC 28MG-0.8MG
1 TABLET ORAL DAILY
Qty: 30 EACH | Refills: 6 | Status: SHIPPED | OUTPATIENT
Start: 2022-11-28 | End: 2023-03-03 | Stop reason: SDUPTHER

## 2022-11-28 RX ORDER — HYDROXYCHLOROQUINE SULFATE 200 MG/1
200 TABLET, FILM COATED ORAL DAILY
Qty: 30 TABLET | Refills: 5 | Status: SHIPPED | OUTPATIENT
Start: 2022-11-28 | End: 2023-03-03 | Stop reason: SDUPTHER

## 2022-11-28 RX ORDER — OLMESARTAN MEDOXOMIL AND HYDROCHLOROTHIAZIDE 20/12.5 20; 12.5 MG/1; MG/1
1 TABLET ORAL
COMMUNITY
Start: 2022-11-18 | End: 2023-04-19 | Stop reason: SDUPTHER

## 2023-02-21 NOTE — PROGRESS NOTES
Subjective:      Patient ID: Nicole Quiñones is a 47 y.o. female.    Chief Complaint: Disease Management    HPI:   Includes joint pain without subjective swelling.   Antecedent event includes: None;  Pain location includes: hands and knees;  Gradual onset; beginning >years ago;  Constant ache, Mild in severity,   Lasting >minutes, Worse during the daytime;   Improved with rest, medication, change in position, and none;   Worsened with activity and overuse;         Review of Systems   Constitutional:  Positive for fatigue. Negative for chills and fever.   HENT:  Negative for nasal congestion, ear pain, hearing loss, mouth dryness, mouth sores, nosebleeds and trouble swallowing.    Eyes:  Negative for photophobia, pain, redness, visual disturbance and eye dryness.   Respiratory:  Negative for cough and shortness of breath.    Cardiovascular:  Negative for chest pain, palpitations and leg swelling.   Gastrointestinal:  Positive for constipation. Negative for abdominal distention, abdominal pain, blood in stool, diarrhea, rectal pain, vomiting and fecal incontinence.   Endocrine: Negative for polydipsia and polyuria.   Genitourinary:  Negative for bladder incontinence, dysuria, enuresis, genital sores and hematuria.   Musculoskeletal:  Positive for arthralgias. Negative for joint swelling and myalgias.   Integumentary:  Negative for rash, wound and mole/lesion.   Neurological:  Positive for headaches. Negative for weakness.   Hematological:  Negative for adenopathy. Does not bruise/bleed easily.   Psychiatric/Behavioral:  Positive for sleep disturbance. Negative for dysphoric mood. The patient is not nervous/anxious.     Past Medical History:   Diagnosis Date    Anemia     Arthritis     Hypertension     Migraines     TTP (thrombotic thrombocytopenic purpura)     TTP (thrombotic thrombocytopenic purpura)      Past Surgical History:   Procedure Laterality Date    SURGICAL REMOVAL OF ENDOMETRIOSIS        See the  Assessment/Plan for further characterization of the HPI, ROS, Medical, Surgical, Family, and Social Histories including Tobacco use, Meds; all of which has been reviewed in Epic.    Medication List with Changes/Refills   New Medications    METHOTREXATE 2.5 MG TAB    Take 3 tablets (7.5 mg total) by mouth every 7 days.   Current Medications    ALBUTEROL (PROVENTIL) 2.5 MG /3 ML (0.083 %) NEBULIZER SOLUTION    USE 1 VIAL IN NEBULIZER EVERY 8 HOURS AS NEEDED    ALBUTEROL (PROVENTIL/VENTOLIN HFA) 90 MCG/ACTUATION INHALER    SMARTSI Puff(s) By Mouth Every 4 Hours PRN    ARIPIPRAZOLE (ABILIFY) 2 MG TAB    TAKE 1 TABLET(S) ONCE A DAY (IN THE EVENING)    AZELASTINE (ASTELIN) 137 MCG (0.1 %) NASAL SPRAY    SPRAY 2 SPRAY(S) TWICE A DAY BY INTRANASAL ROUTE AS NEEDED FOR 7 DAYS.    BUTALBITAL-ACETAMINOPHEN-CAFFEINE -40 MG (FIORICET, ESGIC) -40 MG PER TABLET    SMARTSI Tablet(s) By Mouth Every 6 Hours PRN    EMGALITY  MG/ML PNIJ    AS DIRECTED SUBCUTANEOUS MONTHLY 30 DAY(S)    ERGOCALCIFEROL (ERGOCALCIFEROL) 50,000 UNIT CAP    SMARTSI Capsule(s) By Mouth Once a Week    GABAPENTIN (NEURONTIN) 600 MG TABLET    Take 600 mg by mouth 3 (three) times daily.    HYDROCODONE-ACETAMINOPHEN (NORCO) 5-325 MG PER TABLET    Take 1 tablet by mouth every 6 (six) hours as needed.    ISOSORBIDE MONONITRATE (IMDUR) 30 MG 24 HR TABLET    Take 30 mg by mouth once daily.    LACTULOSE (CHRONULAC) 10 GRAM/15 ML SOLUTION    SMARTSIG:15 Milliliter(s) By Mouth Daily PRN    LORATADINE (CLARITIN) 10 MG TABLET    1 tablet.    METOPROLOL SUCCINATE (TOPROL-XL) 100 MG 24 HR TABLET    SMARTSI Tablet(s) By Mouth Every Night    OLMESARTAN-HYDROCHLOROTHIAZIDE (BENICAR HCT) 20-12.5 MG PER TABLET    Take 1 tablet by mouth.    ONDANSETRON (ZOFRAN) 8 MG TABLET    TAKE 1 TABLET BY MOUTH EVERY 8 HOURS AS NEEDED FOR NAUSEA AND VOMITING    RIMEGEPANT SULFATE (NURTEC ODT ORAL)        SERTRALINE (ZOLOFT) 100 MG TABLET    Take 100 mg by mouth  "once daily.    TIZANIDINE (ZANAFLEX) 4 MG TABLET    TAKE 1 TABLET BY MOUTH AT BEDTIME AS NEEDED FOR MUSCLE TENSION    TRAMADOL (ULTRAM) 50 MG TABLET    SMARTSI Tablet(s) By Mouth Every 8 Hours PRN    VERAPAMIL (CALAN-SR) 180 MG CR TABLET    SMARTSI Tablet(s) By Mouth Twice Daily   Changed and/or Refilled Medications    Modified Medication Previous Medication    CALCIUM CARBONATE-VITAMIN D3 (CALCIUM 500 + D) 500 MG-10 MCG (400 UNIT) TAB calcium carbonate-vitamin D3 (CALCIUM 500 + D) 500 mg-10 mcg (400 unit) Tab       Take 1 tablet by mouth once daily.    Take 1 tablet by mouth once daily.    FOLIC ACID (FOLVITE) 1 MG TABLET folic acid (FOLVITE) 1 MG tablet       Take 1 tablet (1,000 mcg total) by mouth once daily.    SMARTSI Tablet(s) By Mouth Daily    HYDROXYCHLOROQUINE (PLAQUENIL) 200 MG TABLET hydrOXYchloroQUINE (PLAQUENIL) 200 mg tablet       Take 1 tablet (200 mg total) by mouth once daily.    Take 1 tablet (200 mg total) by mouth once daily.         Objective:   BP (!) 141/99   Pulse 76   Resp 18   Ht 5' 6" (1.676 m)   Wt 112.4 kg (247 lb 12.8 oz)   SpO2 96%   BMI 40.00 kg/m²   Physical Exam  Non-toxic appearance. No distress.   Normocephalic and atraumatic.   External ears normal.    Conjunctivae and EOM are normal. No scleral icterus.   OP clear, moist.  No salivary gland enlargement or tenderness.  No submental, no submandibular, no preauricular, nor cervical adenopathy.   No JVD. No carotid bruit. No thyromegaly.   RRR, No friction rub; palpable distal pulses.    No tachypnea or signs of respiratory distress.   Abdominal: No guarding or rebound tenderness.   Neurological: Oriented. Normal thought content.   Skin is warm. No pallor.   Musculoskeletal: see below for further input.     X-Ray Knee 3 View Left  See Procedure Notes for results.     IMPRESSION: Please see Ortho procedure notes for report.      This procedure was auto-finalized by: Virtual Radiologist    Office Visit on 2022 "   Component Date Value Ref Range Status    Total Protein, Electrophoresis 04/26/2022 7.0  6.3 - 8.2 g/dL Final    Albumin, Electrophoresis 04/26/2022 3.69 (A)  3.75 - 5.01 g/dL Final    Alpha-1-Globulin 04/26/2022 0.39  0.19 - 0.46 g/dL Final    Alpha-2-Globulin 04/26/2022 0.89  0.48 - 1.05 g/dL Final    Beta Globulin 04/26/2022 1.05  0.48 - 1.10 g/dL Final    Gamma Globulin 04/26/2022 0.98  0.62 - 1.51 g/dL Final    Immunofix Interp. 04/26/2022 SEE NOTE   Final    Normal SPEP pattern.  Immunofixation electrophoresis (ZULMA)is a more sensitive technique for the identification ofsmall M-proteins.    EER Protein Electrophoresis 04/26/2022 SEE NOTE   Final    Access [facility] Enhanced Report using the link below:-Direct access:[email or web address] By: [facility] [place] [address] [id number] Director: Keri Hernandez MD    Uric Acid 04/26/2022 3.9  2.6 - 6.0 mg/dL Final    Cyclic Citrullinated Peptide (CCP)* 04/26/2022 34 (A)  0 - 19 Units Final    Comment: INTERPRETIVE INFORMATION: Cyclic Citrullinated Peptide Antibody,IgG  19 Units or less ................... Negative  20-39 Units ........................ Weak Positive  40-59 Units ........................ Moderate Positive  60 Units or greater   ................ Strong PositiveAnti-cyclic citrullinated peptide (anti-CCP), IgG antibodies arepresent in about 69-83 percent of patients with rheumatoidarthritis (RA) and have specificities of 93-95 percent. Theseautoantibodies may be present in the   preclinical phase ofdisease, are associated with future RA development, and maypredict radiographic joint destruction. Patients with weakpositive results should be monitored and testing repeated.Performed By: [facility] [place] [address] [id number] Director: Keri Hernandez MD      CRP QUANTITATIVE 04/26/2022 1.1 (H)  0.0 - 0.9 mg/dL Final    Sodium 04/26/2022 140  135 - 145 mmol/L Final     Potassium 04/26/2022 3.5 (L)  3.6 - 5.2 mmol/L Final    Chloride 04/26/2022 107  100 - 108 mmol/L Final    CO2 04/26/2022 26  21 - 32 mmol/L Final    Anion Gap 04/26/2022 7.0  3.0 - 11.0 mmol/L Final    BUN 04/26/2022 15  7 - 18 mg/dL Final    Creatinine 04/26/2022 0.88  0.55 - 1.02 mg/dL Final    GFR ESTIMATION 04/26/2022 > 60  >60 Final               DESCRIPTION       GLOMERULAR FILTRATION RATE             NORMAL                     >60             KIDNEY  DISEASE           15-60             KIDNEY FAILURE             <15    BUN/Creatinine Ratio 04/26/2022 17.04  7 - 18 Ratio Final    Glucose 04/26/2022 88  70 - 110 mg/dL Final    Calcium 04/26/2022 8.6 (L)  8.8 - 10.5 mg/dL Final    Total Bilirubin 04/26/2022 0.5  0.0 - 1.0 mg/dL Final    AST 04/26/2022 16  15 - 37 U/L Final    ALT 04/26/2022 17  12 - 78 U/L Final    Total Protein 04/26/2022 7.5  6.4 - 8.2 g/dL Final    Albumin 04/26/2022 3.6  3.4 - 5.0 g/dL Final    Globulin 04/26/2022 3.9 (H)  2.3 - 3.5 g/dL Final    Albumin/Globulin Ratio 04/26/2022 0.923 (L)  1.1 - 1.8 Ratio Final    Alkaline Phosphatase 04/26/2022 147 (H)  46 - 116 U/L Final    CK, Serum 04/26/2022 87  26 - 308 U/L Final    WBC 04/26/2022 4.6  4.6 - 10.2 10*3/uL Final    RBC 04/26/2022 6.01 (H)  4.2 - 5.4 10*6/uL Final    Hemoglobin 04/26/2022 13.3  12.0 - 16.0 g/dL Final    Hematocrit 04/26/2022 43.9  37.0 - 47.0 % Final    MCV 04/26/2022 73.0 (L)  80 - 97 fL Final    MCH 04/26/2022 22.1 (L)  27.0 - 31.2 pg Final    MCHC 04/26/2022 30.3 (L)  31.8 - 35.4 g/dL Final    RDW RBC Auto-Rto 04/26/2022 15.9  12.5 - 18.0 % Final    Platelets 04/26/2022 262  142 - 424 10*3/uL Final    Neutrophils 04/26/2022 64.9  37 - 80 % Final    Lymphocytes 04/26/2022 20.7 (L)  25 - 40 % Final    Monocytes 04/26/2022 11.8  1 - 15 % Final    Eosinophils 04/26/2022 2.0  1 - 3 % Final    Basophils 04/26/2022 0.4  0 - 3 % Final    nRBC# 04/26/2022 0.0  % Final    Neutrophils Absolute 04/26/2022 2.98  1.8 - 7.7  10*3/uL Final    C4 Complement 04/26/2022 37  10 - 40 mg/dL Final    Comment: REFERENCE INTERVAL: Complement Component 4Access complete set of age- and/or gender-specific referenceintervals for this test in the [facility] Test Directory([email or web address]).Performed By: [facility] [place] [address]   [id number] Director: Keri Hernandez MD      C3 Complement 04/26/2022 160  90 - 180 mg/dL Final    Comment: REFERENCE INTERVAL: Complement Component 3Access complete set of age- and/or gender-specific referenceintervals for this test in the [facility] Test Directory([email or web address]).Performed By: [facility] [place] [address]   [id number] Director: Keri Hernandez MD      Microcytes 04/26/2022 1+   Final    Sed Rate 04/26/2022 6  0 - 20 mm/hr Final    SETH 04/26/2022 NONE DETECTED  None Detected Final    Comment: If suspicion of connective tissue disease is strong and SETH EIAis negative, consider testing for SETH by IFA ([id number]).No antibodies to Anti-Nuclear Antibodies (SETH) detected.  TheExtractable Nuclear Antigen Antibodies (RNP, Navarro, SSA 52, SSA60,   Scleroderma, Chely-1 and SSB) and Double Stranded DNA (dsDNA)Antibody, IgG will not be performed.INTERPRETIVE INFORMATION: Anti-Nuclear Antibodies (SETH), IgG byELISAAntinuclear Antibodies (SETH), IgG by EVELINA: SETH specimens arescreened using enzyme-linked   immunosorbent assay (EVELINA)methodology. All EVELINA results reported as Detected are furthertested by indirect fluorescent assay (IFA) using HEp-2 substratewith an IgG-specific conjugate. The SETH EVELINA screen is designedto detect antibodies against dsDNA,   histones, SS-A (Ro), SS-B(La), Navarro, Smith/RNP, Scl-70, Chely-1, centromeric proteins,other antigens extracted from the HEp-2 cell nucleus. SETH ELISAassays have been reported to have lower sensitivities than ANAIFA for systemic autoimmune rheum                           atic   diseases (SARD).Negative results do not  necessarily rule out SARD.Performed By: [facility] [place], [place] [id number] Director: Keri Hernandez MD      Rheumatoid Factor 04/26/2022 11  0 - 14 IU/mL Final    Performed By: [facility] [place], [place] [id number] Director: Keri Hernandez MD        All of the data above and below has been reviewed by myself and any further interpretations will be reflected in the assessment and plan.   The data includes review of external notes, and independent interpretation of lab results, x-rays, and imaging reports.  Active inflammatory arthritis is an illness that poses a threat to bodily function and even a threat to life in some cases.  Drug therapy to treat inflammatory arthritis usually requires intensive monitoring for toxicity.    Review of patient's allergies indicates:   Allergen Reactions    Nsaids (non-steroidal anti-inflammatory drug) Other (See Comments)     Causes ulcers to bleed.     Assessment/Plan:       Prev noted/prior plan:  (No overt synovitis  Unable to actively flex shoulders above her head     Verbal education; no overt active disease but she has been treated     Blood work to further evaluate     She should also be working with Orthopedics, Physical Medicine and Rehab MD, and consider Neurosurgery if not already depending on symptoms     Amanda will plan to update her meds next visit)      Visit prior to Visit prior to Last visit:      Interim lab:            Lab Results   Component Value Date      04/26/2022     K 3.5 (L) 04/26/2022      04/26/2022     CO2 26 04/26/2022     ANIONGAP 7.0 04/26/2022     GLU 88 04/26/2022     CALCIUM 8.6 (L) 04/26/2022     BUN 15 04/26/2022     CREATININE 0.88 04/26/2022     PROT 7.5 04/26/2022     AST 16 04/26/2022     ALT 17 04/26/2022     BILITOT 0.5 04/26/2022     ALKPHOS 147 (H) 04/26/2022     WBC 4.6 04/26/2022     NEUTROPHILS 64.9 04/26/2022     HGB 13.3 04/26/2022     MCV 73.0 (L)  04/26/2022      04/21/2021     LABPLAT 262 04/26/2022     SEDRATE 6 04/26/2022         Ca 8.6     SPEP normal     CPK 87     C3,4 normal     ESR 6 CRP 1.1        SETH neg RF neg CCP 34+ uric acid 3.9     Prev noted cymbalta 60mg  gabapentin 600mg tid   recently started taking her gabapentin to 100 mg t.i.d. and    has been taking her Cymbalta 60 mg daily.    Flexeril prn)         She has been having interim symptoms of inflammatory arthritis     She also has mechanical and neurogenic pain     She increased gabapentin to 600mg tid and has not noticed much noted     No overt synovitis  DJD      See prior     Consider Add plaquenil 200mg daily with eye exam; she can start prior but if vision problem hold off until seeing ophthal     Add Ca D daily to her vitamin D she uses     Revisit lab 2 weeks prior     Contact us prn     Addendum 6/20/22: Patient called Amanda to let her know she was   recently found to have a fracture in her right wrist with an MD and   to revisit MD next week but it was causing her a lot of pain and she was not even given a splint, which   Amanda had her contact the MD who told her she had the fracture and have the records forwarded/shared with us to clarify things;      Visit prior to Last visit:        Interim lab:           Prev noted/prior plan:  (cymbalta 60mg  gabapentin 600mg tid   recently started taking her gabapentin to 100 mg t.i.d. and    has been taking her Cymbalta 60 mg daily.    Flexeril prn   She increased gabapentin to 600mg tid and has not noticed much noted  See prior   Consider Add plaquenil 200mg daily with eye exam; she can start prior but if vision problem hold off until seeing ophthal   Add Ca D daily to her vitamin D she uses  Addendum 6/20/22: Patient called Amanda to let her know she was   recently found to have a fracture in her right wrist with an MD and   to revisit MD next week but it was causing her a lot of pain and she was not even given a splint, which  "  Amanda had her contact the MD who told her she had the fracture and have the records forwarded/shared with us to clarify things; ))))        Kristina was unable to get in touch with her about the lab and she came in this morning and may have had lab and Kristina is tracking down     She has some interim pain in knees, hands and shoulder but sounds like doing fairly well     Has the fracture healed? She tells me yes; she did not need cast only splint and no longer needing     Interim some of recent note from Orthopedics Dr. Snow 8/29/22: Chondromalacia of patellofemoral: ("Had a long discussion with the patient regarding her weight and the affect it is actually having on her patellofemoral joint and knees in general.  Strongly recommended aquatic therapy and aerobics.  Do not believe that steroid injections will have much of any affect.  I do not believe viscosupplementation will have any benefit.  So essentially she is down to either losing weight or patellofemoral joint reconstruction.  She does not wish to proceed with any surgical remedy so will hold off on the referral for that.  She is to contact her primary care physician for medical treatment and weight loss plan.")     Looks like she may have also been treated for pneumonia interim per very limited records in Epic 8/17/22 diagnosis?     No longer using cymbalta? No changed to zoloft and a 'sleeping medicine' with a new PCP who she tells me also did some lab and treated her for pneumonia with an inhaler     No overt synovitis  DJD generalized     Verbal education     She will get Kristina's contact information and we can work on getting her recent lab and records and touch base in advance if needed moving forward     Cont plaquenil 200mg daily     gabapentin 600mg tid      Ca D daily with vitamin D      Revisit 2 months  Revisit lab 2 weeks prior     Contact us prn     Last visit:     Interim lab Kristina is looking into as some from the prior visit were recently sent " "also noted:        Prev noted/prior plan:  (((Prev noted/prior plan: (cymbalta 60mg  gabapentin 600mg tid   recently started taking her gabapentin to 100 mg t.i.d. and    has been taking her Cymbalta 60 mg daily.    Flexeril prn   She increased gabapentin to 600mg tid and has not noticed much noted  See prior   Consider Add plaquenil 200mg daily with eye exam; she can start prior but if vision problem hold off until seeing ophthal   Add Ca D daily to her vitamin D she uses  Addendum 6/20/22: Patient called Amanda to let her know she was   recently found to have a fracture in her right wrist with an MD and   to revisit MD next week but it was causing her a lot of pain and she was not even given a splint, which   Amanda had her contact the MD who told her she had the fracture and have the records forwarded/shared with us to clarify things; ))))      Kristina was unable to get in touch with her about the lab and she came in this morning and may have had lab and Kristina is tracking down     She has some interim pain in knees, hands and shoulder but sounds like doing fairly well     Has the fracture healed? She tells me yes; she did not need cast only splint and no longer needing     Interim some of recent note from Orthopedics Dr. Snow 8/29/22: Chondromalacia of patellofemoral: ("Had a long discussion with the patient regarding her weight and the affect it is actually having on her patellofemoral joint and knees in general.  Strongly recommended aquatic therapy and aerobics.  Do not believe that steroid injections will have much of any affect.  I do not believe viscosupplementation will have any benefit.  So essentially she is down to either losing weight or patellofemoral joint reconstruction.  She does not wish to proceed with any surgical remedy so will hold off on the referral for that.  She is to contact her primary care physician for medical treatment and weight loss plan.")      Looks like she may have also been " treated for pneumonia interim per very limited records in Epic 8/17/22 diagnosis?   No longer using cymbalta? No changed to zoloft and a 'sleeping medicine' with a new PCP who she tells me also did some lab and treated her for pneumonia with an inhaler      No overt synovitis  DJD generalized   Verbal education   She will get Kristina's contact information and we can work on getting her recent lab and records and touch base in advance if needed moving forward   Cont plaquenil 200mg daily   gabapentin 600mg tid    Ca D daily with vitamin D)))))     She came in without lab results but Kristina tells me she may have done and they are tracking florian     She has interim arthralgias in hands and knees but sounds like managing fairly well; she is a bit back and forth but can call us if needed; we are here for her     She asks if she has lupus or rheumatoid arthritis as she tells me her other doctors are still entertaining both which Rheumatoid is more suggestive given her history and serology     No overt synovitis     Cont current     Contact us prn     We will track down lab              Revisit lab 2 weeks prior     Contact us prn     This visit:    Interim lab:      Kristina is seeking as not sent    2/23/23: WBC 4.7 Hgb 12.8; plt 281    UA SG 1.026 5-6 WBC     GFR 81    Alb 4.2    CPK 87      SETH+ 1:160 specific Ab not done RF 23+ CCP 42+    C3,4 161, 36        ESR 78 CRP 1.1    Prev noted/prior plan:  (Interim lab Kristina is looking into as some from the prior visit were recently sent also noted:      ((Prev noted/prior plan: (cymbalta 60mg  gabapentin 600mg tid   recently started taking her gabapentin to 100 mg t.i.d. and    has been taking her Cymbalta 60 mg daily.    Flexeril prn   She increased gabapentin to 600mg tid and has not noticed much noted  See prior   Consider Add plaquenil 200mg daily with eye exam; she can start prior but if vision problem hold off until seeing ophthal   Add Ca D daily to her vitamin D she  "uses  Addendum 6/20/22: Patient called Amanda to let her know she was   recently found to have a fracture in her right wrist with an MD and   to revisit MD next week but it was causing her a lot of pain and she was not even given a splint, which   Amanda had her contact the MD who told her she had the fracture and have the records forwarded/shared with us to clarify things; ))))    Prev noted/prior plan:    (Kristina was unable to get in touch with her about the lab and she came in this morning and may have had lab and Kristina is tracking down     She has some interim pain in knees, hands and shoulder but sounds like doing fairly well     Has the fracture healed? She tells me yes; she did not need cast only splint and no longer needing     Interim some of recent note from Orthopedics Dr. Snow 8/29/22: Chondromalacia of patellofemoral: ("Had a long discussion with the patient regarding her weight and the affect it is actually having on her patellofemoral joint and knees in general.  Strongly recommended aquatic therapy and aerobics.  Do not believe that steroid injections will have much of any affect.  I do not believe viscosupplementation will have any benefit.  So essentially she is down to either losing weight or patellofemoral joint reconstruction.  She does not wish to proceed with any surgical remedy so will hold off on the referral for that.  She is to contact her primary care physician for medical treatment and weight loss plan.")      Looks like she may have also been treated for pneumonia interim per very limited records in Epic 8/17/22 diagnosis?   No longer using cymbalta? No changed to zoloft and a 'sleeping medicine' with a new PCP who she tells me also did some lab and treated her for pneumonia with an inhaler      No overt synovitis  DJD generalized   Verbal education   She will get Kristina's contact information and we can work on getting her recent lab and records and touch base in advance if needed " moving forward   Cont plaquenil 200mg daily   gabapentin 600mg tid    Ca D daily with vitamin D)))))     She came in without lab results but Kristina tells me she may have done and they are tracking florian     She has interim arthralgias in hands and knees but sounds like managing fairly well; she is a bit back and forth but can call us if needed; we are here for her     She asks if she has lupus or rheumatoid arthritis as she tells me her other doctors are still entertaining both which Rheumatoid is more suggestive given her history and serology     No overt synovitis   Cont current   Contact us prn   We will track down lab)       She came in without lab and Kristina is seeking if she had it done; she did and Kristina sought as per above    She has some interim pain in knees and fingers but sounds like managing fairly well    She tells me she rather recently went to ED for sudden onset of left sided pain and thought to have pinched nerve and [facility]    She rather clarifies she has been having a lot of pain and interim swelling hands    No overt synovitis but possibly subtle    Verbal education and some written    Went over lab    She should go elsewhere for the lab moving forward where we can get all of the ordered results and also shared with us    Resume Methotrexate 7.5mg PO weekly for now  Cont/resume folic acid 1mg daily    She will plan to clarify meds with us moving forward as sounds like no longer using many on the list noted    Cont plaquenil 200mg daily    Gabapentin 600mg tid noted    Tizanidine noted    She cannot use NSAID noted    Ca D daily with vitamin D    Revisit lab 2 weeks prior    Contact us prn      Lab in 2021: SETH neg RF neg CCP6.9+(<3) Acute hepatitis B and C panel negative 2021 TB negative  SETH neg RF neg CCP 34+ uric acid 3.9   Repeat SETH+ 1:160 specific Ab not done RF 23+ CCP 42+  There is some history to suggest inflammatory arthritis.     Had  "been treated for Rheumatoid arthritis with Methotrexate until seeing most recent Rheumatologist below     TTP:  Patient was diagnosed with TTP in 2015 and was treated with plasmapheresis and Rituxan infusions.  She relapsed 3 times since then and had received Rituxan infusion each time.  Last Rituxan infusion was in October 2020.  She does not report any improvement in her joint symptoms after receiving Rituxan therapy.     There is also some mechanical and neurogenic pain.     EMG/NCS showed left cervical radiculopathy and bilateral carpal tunnel     Was to have shoulder surgery for advanced DJD shoulders     She did have partial right shoulder surgery since and to have possible right shoulder replacement in future noted     She tells me she last used 40-20mg prednisone a month ago for widespread 'inflammation all over' that did not help much, but 'may have kept the inflammation at bay' prev noted     recent note from Orthopedics Dr. Snow 8/29/22: Chondromalacia of patellofemoral: ("Had a long discussion with the patient regarding her weight and the affect it is actually having on her patellofemoral joint and knees in general.  Strongly recommended aquatic therapy and aerobics.  Do not believe that steroid injections will have much of any affect.  I do not believe viscosupplementation will have any benefit.  So essentially she is down to either losing weight or patellofemoral joint reconstruction.  She does not wish to proceed with any surgical remedy so will hold off on the referral for that.  She is to contact her primary care physician for medical treatment and weight loss plan.")      Prev noted/prior plan:  (No overt synovitis  Unable to actively flex shoulders above her head     Verbal education; no overt active disease but she has been treated     Blood work to further evaluate     She should also be working with Orthopedics, Physical Medicine and Rehab MD, and consider Neurosurgery if not already " depending on symptoms     Amanda will plan to update her meds next visit)     cymbalta 60mg     gabapentin 600mg tid     Flexeril prn      Revisit weeks     See above and below     Contact us prn     They are having trouble with Epic per Amanda unable to check in patient and Amanda is to input things when they fix the problem and I will then be able to also close the note prev noted     Has been managing with:     Prior Prednisone 40mg for 7 days then 20mg for 7 days      Flexeril     Gabapentin 600mg tid     voltaren gel      Cannot use oral NSAIDs noted     Prior recent tramadol     Prior Methotrexate     Prior Rituxan for TTP        recently started taking her gabapentin to 100 mg t.i.d. and      has been taking her Cymbalta 60 mg daily.       Has been working with:     We will seek records from PCP     Records PCP Yokasta Abad some of recent note 2/15/22: medical clearance for right shoulder surgery; EMG/NCS showed left cervical radiculopathy and bilateral carpal tunnel; prednisone and flexeril and gabapentin increased 600mg; MRI cervical spine f/u 2 weeks         Records Rheumatology some of note 9/15/21:  ((Nicole Quiñones  is an  46 y.o. female who is being seen in Rheumatology Clinic at [facility]  for the first visit at the request of No ref. provider found      Chief Complaint:  Multiple joint pain     HPI:  Patient is an  55-year-old female with past medical history of TTP, morbid obesity, migraine headaches and hypertension who is being referred to rheumatology clinic for evaluation of multiple joint pain.  Patient reports pain, stiffness and swelling hand joints.  She also has pain and locking of the shoulders bilaterally.  She has pain in the right hip and both knees associated with difficulty walking and climbing stairs.  She has done physical therapy for the knees in the past 2 years ago with some relief.  She is currently on tramadol for her joint  pains.  She reports morning stiffness in the joints lasting for 30 minutes in hurts more at the end of the day before she goes to bed.  According to the patient she was diagnosed with rheumatoid arthritis by a rheumatologist and was prescribed tramadol since then.  On review of the outside medical records, she was found to have elevated sed rate at 43 and CRP at 1.1.  X-rays of C-spine, shoulders, hips, knees and hands showing degenerative arthritis.  MRI lower extremity showed mild tendinosis of gluteus medius and minimus muscle.    TTP:  Patient was diagnosed with TTP in 2015 and was treated with plasmapheresis and Rituxan infusions.  She relapsed 3 times since then and had received Rituxan infusion each time.  Last Rituxan infusion was in October 2020.  She does not report any improvement in her joint symptoms after receiving Rituxan therapy.     Interval history:   Patient returns to clinic for regular follow-up.  Patient was recently seen in rheumatology clinic, [facility] and was told that none of the medications she is currently taking is for rheumatoid arthritis.  Patient was concerned and decided to follow-up her clinic again.  Patient continues to complain of right shoulder locking and left knee pain especially at night.  She is unable to sleep due to the pain.  The patient only recently started taking her gabapentin to 100 mg t.i.d. and has been taking her Cymbalta 60 mg daily.  Patient denies any current fevers, chills, rashes, shortness of breath, vision changes, joint swelling.     Current rheum medications:  1. MTX 15 mg weekly   2. Folic acid 1 mg daily       Allergies:  Nsaids (non-steroidal anti-inflammatory drug)             Past Medical History:   Diagnosis Date    Anemia      Hypertension      TTP (thrombotic thrombocytopenic purpura)      TTP (thrombotic thrombocytopenic purpura)                  Past Surgical History:   Procedure Laterality Date    SURGICAL  REMOVAL OF ENDOMETRIOSIS          Assessment/Recommendations:     Inflammatory arthritis?  Osteoarthritis  Morbid obesity  History of TTP  Fibromyalgia        -after reviewing labs and imaging, it is likely that the patient has fibromyalgia and not rheumatoid arthritis  -The outside medical records:  X-rays of hands, shoulders, hips, knees showing degenerative joint disease.  -hepatitis panel and T spot negative   -Discontinue MTX 15 mg weekly   -x-rays of knees, hands and shoulders, showing DJD   -Ordered and reviewed the blood work for medication adverse effects and disease activity.   -gave paper script for the physical therapy for bilateral knee osteoarthritis.  Eventually patient may MRI of the knee after completing physical therapy and muscle referral for orthopedics.  Advised to start using knee brace and use topical Voltaren gel as needed for pain.  I also educated about weight loss which could help with the knee osteoarthritis.  -patient also has fibromyalgia explaining the fatigue and pain.  Poor  sleep might be causing the worsening of symptoms.  -patient reassured that she does not have limited arthritis at this point based on lab tests and diagnostic imaging, explained to her regarding fibromyalgia  -recommend to patient that she should do her physical therapy exercises at home, also recommend to lose weight and exercise, recommended patient also do yoga or [employer] and water aerobics ; will refer patient to physical therapy again  -will inject right shoulder and left knee today  -she is already on Cymbalta 60 mg daily and will start patient on gabapentin 100 mg t.i.d.     RIGHT SHOULDER JOINT/BURSA INJECTION PROCEDURE NOTE :  Medications used : 40 mg Depomedrol, 1% Lidocaine  LEFT KNEE INTRA ARTICULAR INJECTION PROCEDURE NOTE :   Medication Uses : 40 mg Depomedrol and 1% lidocaine  Staff: Dr. Danica Ugarte MD  Rheumatology))    Review of medical records as stated above.  Lab +/- imaging and  other ordered diagnostic studies to further evaluate.  See the orders associated with this note visit.  Medications as prescribed as tolerated.    Education including verbal and those noted in the patient instructions.  Revisit as scheduled and call prn.    The following diagnoses influence medical decision making and/or need further workup including the orders listed below:    Rheumatoid arthritis involving multiple sites with positive rheumatoid factor  -     folic acid (FOLVITE) 1 MG tablet; Take 1 tablet (1,000 mcg total) by mouth once daily.  Dispense: 30 tablet; Refill: 3  -     hydrOXYchloroQUINE (PLAQUENIL) 200 mg tablet; Take 1 tablet (200 mg total) by mouth once daily.  Dispense: 30 tablet; Refill: 5  -     methotrexate 2.5 MG Tab; Take 3 tablets (7.5 mg total) by mouth every 7 days.  Dispense: 12 tablet; Refill: 3  -     SETH by IFA, w/Rflx; Future; Expected date: 04/19/2023  -     Comprehensive Metabolic Panel; Future; Expected date: 04/19/2023  -     C-Reactive Protein; Future; Expected date: 04/19/2023  -     Anti-DNA Ab, Double-Stranded; Future; Expected date: 04/19/2023  -     C3 Complement; Future; Expected date: 04/19/2023  -     C4 Complement; Future; Expected date: 04/19/2023  -     CBC Auto Differential; Future; Expected date: 04/19/2023  -     Urinalysis Microscopic; Future; Expected date: 04/19/2023  -     Sedimentation rate; Future; Expected date: 04/19/2023  -     CK; Future; Expected date: 04/19/2023  -     Rheumatoid Factor; Future; Expected date: 04/19/2023  -     Cyclic Citrullinated Peptide Antibody, IgG; Future; Expected date: 04/19/2023    Other osteoarthritis involving multiple joints  -     SETH by IFA, w/Rflx; Future; Expected date: 04/19/2023  -     Comprehensive Metabolic Panel; Future; Expected date: 04/19/2023  -     C-Reactive Protein; Future; Expected date: 04/19/2023  -     Anti-DNA Ab, Double-Stranded; Future; Expected date: 04/19/2023  -     C3 Complement; Future; Expected  date: 04/19/2023  -     C4 Complement; Future; Expected date: 04/19/2023  -     CBC Auto Differential; Future; Expected date: 04/19/2023  -     Urinalysis Microscopic; Future; Expected date: 04/19/2023  -     Sedimentation rate; Future; Expected date: 04/19/2023  -     CK; Future; Expected date: 04/19/2023  -     Rheumatoid Factor; Future; Expected date: 04/19/2023  -     Cyclic Citrullinated Peptide Antibody, IgG; Future; Expected date: 04/19/2023    Cervical spondylosis  -     SETH by IFA, w/Rflx; Future; Expected date: 04/19/2023  -     Comprehensive Metabolic Panel; Future; Expected date: 04/19/2023  -     C-Reactive Protein; Future; Expected date: 04/19/2023  -     Anti-DNA Ab, Double-Stranded; Future; Expected date: 04/19/2023  -     C3 Complement; Future; Expected date: 04/19/2023  -     C4 Complement; Future; Expected date: 04/19/2023  -     CBC Auto Differential; Future; Expected date: 04/19/2023  -     Urinalysis Microscopic; Future; Expected date: 04/19/2023  -     Sedimentation rate; Future; Expected date: 04/19/2023  -     CK; Future; Expected date: 04/19/2023  -     Rheumatoid Factor; Future; Expected date: 04/19/2023  -     Cyclic Citrullinated Peptide Antibody, IgG; Future; Expected date: 04/19/2023    Rotator cuff arthropathy of both shoulders  -     SETH by IFA, w/Rflx; Future; Expected date: 04/19/2023  -     Comprehensive Metabolic Panel; Future; Expected date: 04/19/2023  -     C-Reactive Protein; Future; Expected date: 04/19/2023  -     Anti-DNA Ab, Double-Stranded; Future; Expected date: 04/19/2023  -     C3 Complement; Future; Expected date: 04/19/2023  -     C4 Complement; Future; Expected date: 04/19/2023  -     CBC Auto Differential; Future; Expected date: 04/19/2023  -     Urinalysis Microscopic; Future; Expected date: 04/19/2023  -     Sedimentation rate; Future; Expected date: 04/19/2023  -     CK; Future; Expected date: 04/19/2023  -     Rheumatoid Factor; Future; Expected date:  04/19/2023  -     Cyclic Citrullinated Peptide Antibody, IgG; Future; Expected date: 04/19/2023    TTP (thrombotic thrombopenic purpura)  -     SETH by IFA, w/Rflx; Future; Expected date: 04/19/2023  -     Comprehensive Metabolic Panel; Future; Expected date: 04/19/2023  -     C-Reactive Protein; Future; Expected date: 04/19/2023  -     Anti-DNA Ab, Double-Stranded; Future; Expected date: 04/19/2023  -     C3 Complement; Future; Expected date: 04/19/2023  -     C4 Complement; Future; Expected date: 04/19/2023  -     CBC Auto Differential; Future; Expected date: 04/19/2023  -     Urinalysis Microscopic; Future; Expected date: 04/19/2023  -     Sedimentation rate; Future; Expected date: 04/19/2023  -     CK; Future; Expected date: 04/19/2023  -     Rheumatoid Factor; Future; Expected date: 04/19/2023  -     Cyclic Citrullinated Peptide Antibody, IgG; Future; Expected date: 04/19/2023    Medication monitoring encounter  -     folic acid (FOLVITE) 1 MG tablet; Take 1 tablet (1,000 mcg total) by mouth once daily.  Dispense: 30 tablet; Refill: 3  -     hydrOXYchloroQUINE (PLAQUENIL) 200 mg tablet; Take 1 tablet (200 mg total) by mouth once daily.  Dispense: 30 tablet; Refill: 5  -     methotrexate 2.5 MG Tab; Take 3 tablets (7.5 mg total) by mouth every 7 days.  Dispense: 12 tablet; Refill: 3  -     SETH by IFA, w/Rflx; Future; Expected date: 04/19/2023  -     Comprehensive Metabolic Panel; Future; Expected date: 04/19/2023  -     C-Reactive Protein; Future; Expected date: 04/19/2023  -     Anti-DNA Ab, Double-Stranded; Future; Expected date: 04/19/2023  -     C3 Complement; Future; Expected date: 04/19/2023  -     C4 Complement; Future; Expected date: 04/19/2023  -     CBC Auto Differential; Future; Expected date: 04/19/2023  -     Urinalysis Microscopic; Future; Expected date: 04/19/2023  -     Sedimentation rate; Future; Expected date: 04/19/2023  -     CK; Future; Expected date: 04/19/2023  -     Rheumatoid Factor;  Future; Expected date: 04/19/2023  -     Cyclic Citrullinated Peptide Antibody, IgG; Future; Expected date: 04/19/2023    Bilateral carpal tunnel syndrome  -     SETH by IFA, w/Rflx; Future; Expected date: 04/19/2023  -     Comprehensive Metabolic Panel; Future; Expected date: 04/19/2023  -     C-Reactive Protein; Future; Expected date: 04/19/2023  -     Anti-DNA Ab, Double-Stranded; Future; Expected date: 04/19/2023  -     C3 Complement; Future; Expected date: 04/19/2023  -     C4 Complement; Future; Expected date: 04/19/2023  -     CBC Auto Differential; Future; Expected date: 04/19/2023  -     Urinalysis Microscopic; Future; Expected date: 04/19/2023  -     Sedimentation rate; Future; Expected date: 04/19/2023  -     CK; Future; Expected date: 04/19/2023  -     Rheumatoid Factor; Future; Expected date: 04/19/2023  -     Cyclic Citrullinated Peptide Antibody, IgG; Future; Expected date: 04/19/2023    SETH positive  -     SETH by IFA, w/Rflx; Future; Expected date: 04/19/2023  -     Comprehensive Metabolic Panel; Future; Expected date: 04/19/2023  -     C-Reactive Protein; Future; Expected date: 04/19/2023  -     Anti-DNA Ab, Double-Stranded; Future; Expected date: 04/19/2023  -     C3 Complement; Future; Expected date: 04/19/2023  -     C4 Complement; Future; Expected date: 04/19/2023  -     CBC Auto Differential; Future; Expected date: 04/19/2023  -     Urinalysis Microscopic; Future; Expected date: 04/19/2023  -     Sedimentation rate; Future; Expected date: 04/19/2023  -     CK; Future; Expected date: 04/19/2023  -     Rheumatoid Factor; Future; Expected date: 04/19/2023  -     Cyclic Citrullinated Peptide Antibody, IgG; Future; Expected date: 04/19/2023    Screening for rheumatic disorder  -     SETH by IFA, w/Rflx; Future; Expected date: 04/19/2023  -     Comprehensive Metabolic Panel; Future; Expected date: 04/19/2023  -     C-Reactive Protein; Future; Expected date: 04/19/2023  -     Anti-DNA Ab,  Double-Stranded; Future; Expected date: 04/19/2023  -     C3 Complement; Future; Expected date: 04/19/2023  -     C4 Complement; Future; Expected date: 04/19/2023  -     CBC Auto Differential; Future; Expected date: 04/19/2023  -     Urinalysis Microscopic; Future; Expected date: 04/19/2023  -     Sedimentation rate; Future; Expected date: 04/19/2023  -     CK; Future; Expected date: 04/19/2023  -     Rheumatoid Factor; Future; Expected date: 04/19/2023  -     Cyclic Citrullinated Peptide Antibody, IgG; Future; Expected date: 04/19/2023    Hypocalcemia  -     calcium carbonate-vitamin D3 (CALCIUM 500 + D) 500 mg-10 mcg (400 unit) Tab; Take 1 tablet by mouth once daily.  Dispense: 30 each; Refill: 6  -     SETH by IFA, w/Rflx; Future; Expected date: 04/19/2023  -     Comprehensive Metabolic Panel; Future; Expected date: 04/19/2023  -     C-Reactive Protein; Future; Expected date: 04/19/2023  -     Anti-DNA Ab, Double-Stranded; Future; Expected date: 04/19/2023  -     C3 Complement; Future; Expected date: 04/19/2023  -     C4 Complement; Future; Expected date: 04/19/2023  -     CBC Auto Differential; Future; Expected date: 04/19/2023  -     Urinalysis Microscopic; Future; Expected date: 04/19/2023  -     Sedimentation rate; Future; Expected date: 04/19/2023  -     CK; Future; Expected date: 04/19/2023  -     Rheumatoid Factor; Future; Expected date: 04/19/2023  -     Cyclic Citrullinated Peptide Antibody, IgG; Future; Expected date: 04/19/2023    Rheumatoid arthritis of multiple sites with negative rheumatoid factor  -     SETH by IFA, w/Rflx; Future; Expected date: 04/19/2023  -     Comprehensive Metabolic Panel; Future; Expected date: 04/19/2023  -     C-Reactive Protein; Future; Expected date: 04/19/2023  -     Anti-DNA Ab, Double-Stranded; Future; Expected date: 04/19/2023  -     C3 Complement; Future; Expected date: 04/19/2023  -     C4 Complement; Future; Expected date: 04/19/2023  -     CBC Auto Differential;  Future; Expected date: 04/19/2023  -     Urinalysis Microscopic; Future; Expected date: 04/19/2023  -     Sedimentation rate; Future; Expected date: 04/19/2023  -     CK; Future; Expected date: 04/19/2023  -     Rheumatoid Factor; Future; Expected date: 04/19/2023  -     Cyclic Citrullinated Peptide Antibody, IgG; Future; Expected date: 04/19/2023      Follow up in about 2 months (around 5/3/2023).     Otto Marcial MD

## 2023-03-03 ENCOUNTER — OFFICE VISIT (OUTPATIENT)
Dept: RHEUMATOLOGY | Facility: CLINIC | Age: 48
End: 2023-03-03
Payer: MEDICAID

## 2023-03-03 VITALS
SYSTOLIC BLOOD PRESSURE: 141 MMHG | BODY MASS INDEX: 39.82 KG/M2 | HEART RATE: 76 BPM | HEIGHT: 66 IN | WEIGHT: 247.81 LBS | RESPIRATION RATE: 18 BRPM | OXYGEN SATURATION: 96 % | DIASTOLIC BLOOD PRESSURE: 99 MMHG

## 2023-03-03 DIAGNOSIS — M15.8 OTHER OSTEOARTHRITIS INVOLVING MULTIPLE JOINTS: ICD-10-CM

## 2023-03-03 DIAGNOSIS — M47.812 CERVICAL SPONDYLOSIS: ICD-10-CM

## 2023-03-03 DIAGNOSIS — M06.09 RHEUMATOID ARTHRITIS OF MULTIPLE SITES WITH NEGATIVE RHEUMATOID FACTOR: ICD-10-CM

## 2023-03-03 DIAGNOSIS — E83.51 HYPOCALCEMIA: ICD-10-CM

## 2023-03-03 DIAGNOSIS — R76.8 ANA POSITIVE: ICD-10-CM

## 2023-03-03 DIAGNOSIS — Z13.89 SCREENING FOR RHEUMATIC DISORDER: ICD-10-CM

## 2023-03-03 DIAGNOSIS — M12.811 ROTATOR CUFF ARTHROPATHY OF BOTH SHOULDERS: ICD-10-CM

## 2023-03-03 DIAGNOSIS — G56.03 BILATERAL CARPAL TUNNEL SYNDROME: ICD-10-CM

## 2023-03-03 DIAGNOSIS — M31.19 TTP (THROMBOTIC THROMBOPENIC PURPURA): ICD-10-CM

## 2023-03-03 DIAGNOSIS — M05.79 RHEUMATOID ARTHRITIS INVOLVING MULTIPLE SITES WITH POSITIVE RHEUMATOID FACTOR: Primary | ICD-10-CM

## 2023-03-03 DIAGNOSIS — M12.812 ROTATOR CUFF ARTHROPATHY OF BOTH SHOULDERS: ICD-10-CM

## 2023-03-03 DIAGNOSIS — Z51.81 MEDICATION MONITORING ENCOUNTER: ICD-10-CM

## 2023-03-03 PROCEDURE — 3008F BODY MASS INDEX DOCD: CPT | Mod: CPTII,S$GLB,, | Performed by: INTERNAL MEDICINE

## 2023-03-03 PROCEDURE — 99214 PR OFFICE/OUTPT VISIT, EST, LEVL IV, 30-39 MIN: ICD-10-PCS | Mod: S$GLB,,, | Performed by: INTERNAL MEDICINE

## 2023-03-03 PROCEDURE — 3080F PR MOST RECENT DIASTOLIC BLOOD PRESSURE >= 90 MM HG: ICD-10-PCS | Mod: CPTII,S$GLB,, | Performed by: INTERNAL MEDICINE

## 2023-03-03 PROCEDURE — 99214 OFFICE O/P EST MOD 30 MIN: CPT | Mod: S$GLB,,, | Performed by: INTERNAL MEDICINE

## 2023-03-03 PROCEDURE — 3008F PR BODY MASS INDEX (BMI) DOCUMENTED: ICD-10-PCS | Mod: CPTII,S$GLB,, | Performed by: INTERNAL MEDICINE

## 2023-03-03 PROCEDURE — 3077F SYST BP >= 140 MM HG: CPT | Mod: CPTII,S$GLB,, | Performed by: INTERNAL MEDICINE

## 2023-03-03 PROCEDURE — 3080F DIAST BP >= 90 MM HG: CPT | Mod: CPTII,S$GLB,, | Performed by: INTERNAL MEDICINE

## 2023-03-03 PROCEDURE — 3077F PR MOST RECENT SYSTOLIC BLOOD PRESSURE >= 140 MM HG: ICD-10-PCS | Mod: CPTII,S$GLB,, | Performed by: INTERNAL MEDICINE

## 2023-03-03 PROCEDURE — 1159F PR MEDICATION LIST DOCUMENTED IN MEDICAL RECORD: ICD-10-PCS | Mod: CPTII,S$GLB,, | Performed by: INTERNAL MEDICINE

## 2023-03-03 PROCEDURE — 1160F RVW MEDS BY RX/DR IN RCRD: CPT | Mod: CPTII,S$GLB,, | Performed by: INTERNAL MEDICINE

## 2023-03-03 PROCEDURE — 1160F PR REVIEW ALL MEDS BY PRESCRIBER/CLIN PHARMACIST DOCUMENTED: ICD-10-PCS | Mod: CPTII,S$GLB,, | Performed by: INTERNAL MEDICINE

## 2023-03-03 PROCEDURE — 1159F MED LIST DOCD IN RCRD: CPT | Mod: CPTII,S$GLB,, | Performed by: INTERNAL MEDICINE

## 2023-03-03 RX ORDER — PNV NO.95/FERROUS FUM/FOLIC AC 28MG-0.8MG
1 TABLET ORAL DAILY
Qty: 30 EACH | Refills: 6 | Status: SHIPPED | OUTPATIENT
Start: 2023-03-03 | End: 2023-04-19

## 2023-03-03 RX ORDER — FOLIC ACID 1 MG/1
1000 TABLET ORAL DAILY
Qty: 30 TABLET | Refills: 3 | Status: SHIPPED | OUTPATIENT
Start: 2023-03-03 | End: 2023-04-24 | Stop reason: SDUPTHER

## 2023-03-03 RX ORDER — METHOTREXATE 2.5 MG/1
7.5 TABLET ORAL
Qty: 12 TABLET | Refills: 3 | Status: SHIPPED | OUTPATIENT
Start: 2023-03-03 | End: 2023-04-24 | Stop reason: SDUPTHER

## 2023-03-03 RX ORDER — HYDROXYCHLOROQUINE SULFATE 200 MG/1
200 TABLET, FILM COATED ORAL DAILY
Qty: 30 TABLET | Refills: 5 | Status: SHIPPED | OUTPATIENT
Start: 2023-03-03 | End: 2023-04-24 | Stop reason: SDUPTHER

## 2023-03-03 NOTE — PATIENT INSTRUCTIONS
Methotrexate is one of the most effective and commonly used medications in the treatment of rheumatoid arthritis and other forms of inflammatory arthritis, and also may be used to treat lupus, inflammatory myositis, vasculitis, and some forms of childhood arthritis. It is often used in combination with other medications to treat arthritis. It is known as a disease-modifying anti-rheumatic drug (DMARD), because it not only decreases the pain and swelling of arthritis, but it also can decrease damage to joints and long-term disability.    How to Take It  Methotrexate comes either as pills or as a subcutaneous injection. Methotrexate is usually taken as a single dose once per week, although occasionally the dose is split into two doses, taken once per week, to improve absorption or avoid side effects. Your doctor also may prescribe a folic acid (or folate) vitamin supplement to decrease the chance of side effects. Methotrexate should not be taken if kidney or liver function is not normal. Alcohol significantly increases the risk for liver damage while taking methotrexate, so alcohol should be avoided. Regular laboratory monitoring is required to monitor blood counts and your liver while taking methotrexate. Improvements in arthritis and other conditions usually are first seen in three - six weeks. The full benefit of this drug may not be seen until after 12 weeks of treatment.    Side Effects  The most common side effects are gastrointestinal upset and elevations of liver function tests.    About 1 - 3% of patients develop mouth sores (called stomatitis), rash, diarrhea, and abnormalities in blood counts. Some side effects do not cause symptoms, so it is important to have routine blood tests performed every 8 - 12 weeks.    Methotrexate may cause cirrhosis (scarring) of the liver, but this side effect is rare and most likely to occur in patients who already have liver problems or are using alcohol or taking other drugs  that are toxic to the liver. Lung problems (persistent cough or unexplained shortness of breath) can occur rarely when taking methotrexate. Slow hair loss is seen in some patients, but hair grows back when the person stops taking this medication. This can often be managed by taking folic acid. It is important to remember that most patients do not experience side effects, and that, for those who do, many of the minor side effects will improve with time.    Tell Your Doctor  Be sure to let your doctor know if you are pregnant, planning to get pregnant, or if you are breastfeeding.  Methotrexate treatment should be discontinued for at least three months before attempting to become pregnant. Even though methotrexate should not be taken during pregnancy, it does not reduce a womans chance of becoming pregnant in the future.

## 2023-04-14 LAB — HPV16+18+H RISK 12 DNA CVX-IMP: NOT DETECTED

## 2023-04-19 ENCOUNTER — OFFICE VISIT (OUTPATIENT)
Dept: FAMILY MEDICINE | Facility: CLINIC | Age: 48
End: 2023-04-19
Payer: MEDICAID

## 2023-04-19 VITALS
BODY MASS INDEX: 40.34 KG/M2 | HEIGHT: 66 IN | RESPIRATION RATE: 18 BRPM | DIASTOLIC BLOOD PRESSURE: 84 MMHG | HEART RATE: 78 BPM | OXYGEN SATURATION: 99 % | TEMPERATURE: 98 F | SYSTOLIC BLOOD PRESSURE: 129 MMHG | WEIGHT: 251 LBS

## 2023-04-19 DIAGNOSIS — M05.79 RHEUMATOID ARTHRITIS INVOLVING MULTIPLE SITES WITH POSITIVE RHEUMATOID FACTOR: ICD-10-CM

## 2023-04-19 DIAGNOSIS — M25.561 BILATERAL CHRONIC KNEE PAIN: ICD-10-CM

## 2023-04-19 DIAGNOSIS — G89.29 BILATERAL CHRONIC KNEE PAIN: ICD-10-CM

## 2023-04-19 DIAGNOSIS — Z12.11 ENCOUNTER FOR COLORECTAL CANCER SCREENING: ICD-10-CM

## 2023-04-19 DIAGNOSIS — Z23 ENCOUNTER FOR IMMUNIZATION: ICD-10-CM

## 2023-04-19 DIAGNOSIS — K21.9 GASTROESOPHAGEAL REFLUX DISEASE WITHOUT ESOPHAGITIS: ICD-10-CM

## 2023-04-19 DIAGNOSIS — Z76.89 ENCOUNTER TO ESTABLISH CARE: ICD-10-CM

## 2023-04-19 DIAGNOSIS — M25.562 BILATERAL CHRONIC KNEE PAIN: ICD-10-CM

## 2023-04-19 DIAGNOSIS — Z12.12 ENCOUNTER FOR COLORECTAL CANCER SCREENING: ICD-10-CM

## 2023-04-19 DIAGNOSIS — I10 PRIMARY HYPERTENSION: Primary | ICD-10-CM

## 2023-04-19 DIAGNOSIS — Z12.31 ENCOUNTER FOR SCREENING MAMMOGRAM FOR BREAST CANCER: ICD-10-CM

## 2023-04-19 LAB
ALBUMIN SERPL-MCNC: 3.8 G/DL (ref 3.5–5)
ALBUMIN/GLOB SERPL: 1 RATIO (ref 1.1–2)
ALP SERPL-CCNC: 128 UNIT/L (ref 40–150)
ALT SERPL-CCNC: 9 UNIT/L (ref 0–55)
APPEARANCE UR: CLEAR
AST SERPL-CCNC: 14 UNIT/L (ref 5–34)
BACTERIA #/AREA URNS AUTO: ABNORMAL /HPF
BASOPHILS # BLD AUTO: 0.03 X10(3)/MCL (ref 0–0.2)
BASOPHILS NFR BLD AUTO: 0.7 %
BILIRUB UR QL STRIP.AUTO: NEGATIVE MG/DL
BILIRUBIN DIRECT+TOT PNL SERPL-MCNC: 0.7 MG/DL
BUN SERPL-MCNC: 13.2 MG/DL (ref 7–18.7)
C3 SERPL-MCNC: 143 MG/DL (ref 80–173)
C4 SERPL-MCNC: 37 MG/DL (ref 13–46)
CALCIUM SERPL-MCNC: 9.4 MG/DL (ref 8.4–10.2)
CHLORIDE SERPL-SCNC: 106 MMOL/L (ref 98–107)
CK SERPL-CCNC: 78 U/L (ref 29–168)
CO2 SERPL-SCNC: 25 MMOL/L (ref 22–29)
COLOR UR AUTO: YELLOW
CREAT SERPL-MCNC: 0.91 MG/DL (ref 0.55–1.02)
CRP SERPL-MCNC: 11.9 MG/L
EOSINOPHIL # BLD AUTO: 0.11 X10(3)/MCL (ref 0–0.9)
EOSINOPHIL NFR BLD AUTO: 2.5 %
ERYTHROCYTE [DISTWIDTH] IN BLOOD BY AUTOMATED COUNT: 15.9 % (ref 11.5–17)
ERYTHROCYTE [SEDIMENTATION RATE] IN BLOOD: 47 MM/HR (ref 0–20)
GFR SERPLBLD CREATININE-BSD FMLA CKD-EPI: >60 MLS/MIN/1.73/M2
GLOBULIN SER-MCNC: 3.8 GM/DL (ref 2.4–3.5)
GLUCOSE SERPL-MCNC: 92 MG/DL (ref 74–100)
GLUCOSE UR QL STRIP.AUTO: NORMAL MG/DL
HCT VFR BLD AUTO: 43.1 % (ref 37–47)
HGB BLD-MCNC: 13.3 G/DL (ref 12–16)
HYALINE CASTS #/AREA URNS LPF: ABNORMAL /LPF
IMM GRANULOCYTES # BLD AUTO: 0.01 X10(3)/MCL (ref 0–0.04)
IMM GRANULOCYTES NFR BLD AUTO: 0.2 %
KETONES UR QL STRIP.AUTO: NEGATIVE MG/DL
LEUKOCYTE ESTERASE UR QL STRIP.AUTO: NEGATIVE UNIT/L
LYMPHOCYTES # BLD AUTO: 1.19 X10(3)/MCL (ref 0.6–4.6)
LYMPHOCYTES NFR BLD AUTO: 27.1 %
MCH RBC QN AUTO: 21.5 PG (ref 27–31)
MCHC RBC AUTO-ENTMCNC: 30.9 G/DL (ref 33–36)
MCV RBC AUTO: 69.7 FL (ref 80–94)
MONOCYTES # BLD AUTO: 0.5 X10(3)/MCL (ref 0.1–1.3)
MONOCYTES NFR BLD AUTO: 11.4 %
MUCOUS THREADS URNS QL MICRO: ABNORMAL /LPF
NEUTROPHILS # BLD AUTO: 2.55 X10(3)/MCL (ref 2.1–9.2)
NEUTROPHILS NFR BLD AUTO: 58.1 %
NITRITE UR QL STRIP.AUTO: NEGATIVE
NRBC BLD AUTO-RTO: 0 %
PH UR STRIP.AUTO: 5.5 [PH]
PLATELET # BLD AUTO: 276 X10(3)/MCL (ref 130–400)
PMV BLD AUTO: 9.9 FL (ref 7.4–10.4)
POTASSIUM SERPL-SCNC: 4 MMOL/L (ref 3.5–5.1)
PROT SERPL-MCNC: 7.6 GM/DL (ref 6.4–8.3)
PROT UR QL STRIP.AUTO: ABNORMAL MG/DL
RBC # BLD AUTO: 6.18 X10(6)/MCL (ref 4.2–5.4)
RBC #/AREA URNS AUTO: ABNORMAL /HPF
RBC UR QL AUTO: NEGATIVE UNIT/L
SODIUM SERPL-SCNC: 139 MMOL/L (ref 136–145)
SP GR UR STRIP.AUTO: 1.03
SQUAMOUS #/AREA URNS LPF: ABNORMAL /HPF
UROBILINOGEN UR STRIP-ACNC: NORMAL MG/DL
WBC # SPEC AUTO: 4.4 X10(3)/MCL (ref 4.5–11.5)
WBC #/AREA URNS AUTO: ABNORMAL /HPF

## 2023-04-19 PROCEDURE — 3074F SYST BP LT 130 MM HG: CPT | Mod: CPTII,,, | Performed by: NURSE PRACTITIONER

## 2023-04-19 PROCEDURE — 99204 OFFICE O/P NEW MOD 45 MIN: CPT | Mod: S$PBB,,, | Performed by: NURSE PRACTITIONER

## 2023-04-19 PROCEDURE — 90471 IMMUNIZATION ADMIN: CPT | Mod: PBBFAC

## 2023-04-19 PROCEDURE — 3074F PR MOST RECENT SYSTOLIC BLOOD PRESSURE < 130 MM HG: ICD-10-PCS | Mod: CPTII,,, | Performed by: NURSE PRACTITIONER

## 2023-04-19 PROCEDURE — 3008F BODY MASS INDEX DOCD: CPT | Mod: CPTII,,, | Performed by: NURSE PRACTITIONER

## 2023-04-19 PROCEDURE — 3079F DIAST BP 80-89 MM HG: CPT | Mod: CPTII,,, | Performed by: NURSE PRACTITIONER

## 2023-04-19 PROCEDURE — 99215 OFFICE O/P EST HI 40 MIN: CPT | Mod: PBBFAC | Performed by: NURSE PRACTITIONER

## 2023-04-19 PROCEDURE — 90686 IIV4 VACC NO PRSV 0.5 ML IM: CPT | Mod: PBBFAC

## 2023-04-19 PROCEDURE — 36415 COLL VENOUS BLD VENIPUNCTURE: CPT | Performed by: INTERNAL MEDICINE

## 2023-04-19 PROCEDURE — 99204 PR OFFICE/OUTPT VISIT, NEW, LEVL IV, 45-59 MIN: ICD-10-PCS | Mod: S$PBB,,, | Performed by: NURSE PRACTITIONER

## 2023-04-19 PROCEDURE — 1159F MED LIST DOCD IN RCRD: CPT | Mod: CPTII,,, | Performed by: NURSE PRACTITIONER

## 2023-04-19 PROCEDURE — 3008F PR BODY MASS INDEX (BMI) DOCUMENTED: ICD-10-PCS | Mod: CPTII,,, | Performed by: NURSE PRACTITIONER

## 2023-04-19 PROCEDURE — 1160F RVW MEDS BY RX/DR IN RCRD: CPT | Mod: CPTII,,, | Performed by: NURSE PRACTITIONER

## 2023-04-19 PROCEDURE — 90472 IMMUNIZATION ADMIN EACH ADD: CPT | Mod: PBBFAC

## 2023-04-19 PROCEDURE — 90677 PCV20 VACCINE IM: CPT | Mod: PBBFAC

## 2023-04-19 PROCEDURE — 3079F PR MOST RECENT DIASTOLIC BLOOD PRESSURE 80-89 MM HG: ICD-10-PCS | Mod: CPTII,,, | Performed by: NURSE PRACTITIONER

## 2023-04-19 PROCEDURE — 1159F PR MEDICATION LIST DOCUMENTED IN MEDICAL RECORD: ICD-10-PCS | Mod: CPTII,,, | Performed by: NURSE PRACTITIONER

## 2023-04-19 PROCEDURE — 1160F PR REVIEW ALL MEDS BY PRESCRIBER/CLIN PHARMACIST DOCUMENTED: ICD-10-PCS | Mod: CPTII,,, | Performed by: NURSE PRACTITIONER

## 2023-04-19 RX ORDER — CETIRIZINE HYDROCHLORIDE 10 MG/1
10 TABLET ORAL DAILY
COMMUNITY
Start: 2023-04-14 | End: 2023-04-19

## 2023-04-19 RX ORDER — PANTOPRAZOLE SODIUM 20 MG/1
20 TABLET, DELAYED RELEASE ORAL DAILY
COMMUNITY
Start: 2023-04-14 | End: 2023-07-25 | Stop reason: SDUPTHER

## 2023-04-19 RX ORDER — ISOSORBIDE DINITRATE 40 MG/1
40 TABLET ORAL DAILY
Qty: 90 TABLET | Refills: 2 | Status: SHIPPED | OUTPATIENT
Start: 2023-04-19 | End: 2024-02-08 | Stop reason: SDUPTHER

## 2023-04-19 RX ORDER — UBROGEPANT 50 MG/1
1 TABLET ORAL 2 TIMES DAILY
COMMUNITY
Start: 2022-10-28 | End: 2023-05-23 | Stop reason: SDUPTHER

## 2023-04-19 RX ORDER — ISOSORBIDE DINITRATE 40 MG/1
1 TABLET ORAL DAILY
COMMUNITY
End: 2023-04-19 | Stop reason: SDUPTHER

## 2023-04-19 RX ORDER — PREDNISONE 20 MG/1
1 TABLET ORAL DAILY PRN
COMMUNITY
Start: 2023-02-20 | End: 2023-04-19

## 2023-04-19 RX ORDER — CLONIDINE HYDROCHLORIDE 0.1 MG/1
1 TABLET ORAL DAILY PRN
COMMUNITY
Start: 2023-01-06

## 2023-04-19 RX ORDER — SUCRALFATE 1 G/1
1 TABLET ORAL 3 TIMES DAILY
COMMUNITY
Start: 2023-01-10 | End: 2024-02-08 | Stop reason: ALTCHOICE

## 2023-04-19 RX ORDER — METOCLOPRAMIDE 10 MG/1
1 TABLET ORAL 2 TIMES DAILY
COMMUNITY
Start: 2023-02-18 | End: 2023-04-19

## 2023-04-19 RX ORDER — OLMESARTAN MEDOXOMIL AND HYDROCHLOROTHIAZIDE 20/12.5 20; 12.5 MG/1; MG/1
1 TABLET ORAL DAILY
Qty: 90 TABLET | Refills: 2 | Status: SHIPPED | OUTPATIENT
Start: 2023-04-19 | End: 2023-06-27 | Stop reason: ALTCHOICE

## 2023-04-19 RX ORDER — BUDESONIDE 180 UG/1
1 AEROSOL, POWDER RESPIRATORY (INHALATION) 2 TIMES DAILY
COMMUNITY
End: 2023-04-19

## 2023-04-19 RX ORDER — LINACLOTIDE 72 UG/1
1 CAPSULE, GELATIN COATED ORAL DAILY
COMMUNITY
Start: 2023-01-11 | End: 2023-09-19

## 2023-04-19 RX ORDER — EZETIMIBE 10 MG/1
10 TABLET ORAL DAILY
COMMUNITY
Start: 2023-01-10 | End: 2024-02-08 | Stop reason: SDUPTHER

## 2023-04-19 RX ORDER — MOMETASONE FUROATE AND FORMOTEROL FUMARATE DIHYDRATE 100; 5 UG/1; UG/1
1 AEROSOL RESPIRATORY (INHALATION) 2 TIMES DAILY
COMMUNITY
Start: 2022-12-12 | End: 2024-02-08 | Stop reason: SDUPTHER

## 2023-04-19 RX ORDER — FLUTICASONE PROPIONATE 50 MCG
2 SPRAY, SUSPENSION (ML) NASAL 2 TIMES DAILY
COMMUNITY
Start: 2022-12-21 | End: 2023-05-23 | Stop reason: SDUPTHER

## 2023-04-19 RX ORDER — METOPROLOL SUCCINATE 100 MG/1
100 TABLET, EXTENDED RELEASE ORAL NIGHTLY
Qty: 90 TABLET | Refills: 2 | Status: SHIPPED | OUTPATIENT
Start: 2023-04-19 | End: 2023-10-09 | Stop reason: SDUPTHER

## 2023-04-19 RX ADMIN — INFLUENZA VIRUS VACCINE 0.5 ML: 15; 15; 15; 15 SUSPENSION INTRAMUSCULAR at 10:04

## 2023-04-19 RX ADMIN — PNEUMOCOCCAL 20-VALENT CONJUGATE VACCINE 0.5 ML
2.2; 2.2; 2.2; 2.2; 2.2; 2.2; 2.2; 2.2; 2.2; 2.2; 2.2; 2.2; 2.2; 2.2; 2.2; 2.2; 4.4; 2.2; 2.2; 2.2 INJECTION, SUSPENSION INTRAMUSCULAR at 10:04

## 2023-04-19 NOTE — PROGRESS NOTES
"Patient Name: Nicole Quiñones   : 1975  MRN: [id number]     SUBJECTIVE DATA:    CHIEF COMPLAINT:   Nicole Quiñones is a 48 y.o. female who presents to clinic today with Medication Refill (re) and Referral (Cardiology, Ortho)        HPI:  HPI      ALLERGIES:   Review of patient's allergies indicates:   Allergen Reactions    Nsaids (non-steroidal anti-inflammatory drug) Other (See Comments)     Causes ulcers to bleed.         ROS:  ROS      OBJECTIVE DATA:  Vital signs  Vitals:    23 0903   BP: (!) 144/89   Pulse: 78   Resp: 18   Temp: 97.9 °F (36.6 °C)   TempSrc: Oral   SpO2: 99%   Weight: 113.9 kg (251 lb)   Height: 5' 6" (1.676 m)      Body mass index is 40.51 kg/m².    PHYSICAL EXAM:   Physical Exam     ASSESSMENT/PLAN:  {There are no diagnoses linked to this encounter. (Refresh or delete this SmartLink)}       RESULTS:  No results found for this or any previous visit (from the past 1008 hour(s)).      Follow Up:  No follow-ups on file.     Total time 30 minutes spent with patient: greater than 50% of that time spent discussing {HIS/HER:39093} chronic conditions and management of disease processes, educating on ***, med management, and answering questions.          This note was created with the assistance of a voice recognition software or phone dictation. There may be transcription errors as a result of using this technology however minimal. Effort has been made to assure accuracy of transcription but any obvious errors or omissions should be clarified with the author of the document    "

## 2023-04-19 NOTE — PROGRESS NOTES
Patient Name: Nicole Quiñones   : 1975  MRN: [id number]     SUBJECTIVE DATA:    CHIEF COMPLAINT:   Nicole Quiñones is a 48 y.o. female who presents to clinic today with Medication Refill (re) and Referral (Cardiology, Ortho)      HPI:  48-year-old female presents to the clinic to establish care.  Patient state she has moved from [place] to reside in [place]. Past medical history of hypertension, rheumatoid arthritis, migraine headaches, GERD, constipation.    Hypertension:  Initial blood pressure 144 / 89, patient states she has not taking her medications for over 4 weeks.  Patient requesting medication refill on olmesartan-hydrochlorothiazide 20mg/12.5mg, metoprolol  mg p.o. once nightly, isosorbide dinitrate 40 mg p.o. once daily.  Patient stopped taking verapamil.  Patient states she used to be managed by [facility] cardiology in [place], [place].  Patient verbalized she is not sure why she is taking all these medications.  Medical records requested.  Continue medications as directed.  Follow low-salt diet, lose weight, start physical activity at least brisk walking 30 minutes a day up to 5 days a week, stay hydrated with water, follow low-fat, low-cholesterol diet, lifestyle modification.  Questions solicited answered, patient verbalized.  Patient to return 4 weeks for follow-up.    Rheumatology:  Patient currently being followed by Dr. Otto Marcial.  Patient requesting to be established locally with rheumatologist.  Patient has an appointment coming up in May.  Patient has outpatient lab orders to be completed as requested by Dr. Menjivar.  Patient requesting to be established with Rheumatology. Rheumatology referral has been initiated.  Instructed patient to ask if Dr. Otto Marcial can give a courtesy call to Keshav Peguero.  Instructed patient to continue medication as prescribed to keep follow-up appointment.  Questions solicited and answered, patient verbalized.    GERD:  Patient  "currently in Carafate 1 g po t.i.d. daily with Protonix 20 mg p.o. once daily.  Discussed GERD diet and lifestyle modification, questions solicited answered, patient verbalized.    Bilateral knee pain:  Patient has chronic bilateral knee pain with left knee being the worst.  Patient was under care of Dr. Abraham Snow at [facility].  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  Denies any complaints at this time.    Care gaps:    1-Occult fecal immunoassay initiated, patient to complete.   2-Mammogram referral initiated, medical records from [facility] mammogram records requested.  3-immunizations:  Flu vaccine, pneumococcal 20 IM given the clinic.  4-patient to complete Pap smear tomorrow with Dr. Lonnie SANDY.    Patient denies chest pain, shortness of breath, dyspnea on exertion, palpitations, peripheral edema, abdominal pain, nausea, vomiting, diarrhea, constipation, fatigue, fever, chills, dysuria,  hematuria,  or hematochezia.     Different medical records has been requested from different specialties, please see under media.  Patient to return in 4 weeks for wellness with fasting blood work.     HPI      ALLERGIES:   Review of patient's allergies indicates:   Allergen Reactions    Nsaids (non-steroidal anti-inflammatory drug) Other (See Comments)     Causes ulcers to bleed.         ROS:  Review of Systems   All other systems reviewed and are negative.      OBJECTIVE DATA:  Vital signs  Vitals:    04/19/23 0903 04/19/23 1012   BP: (!) 144/89 129/84   Pulse: 78    Resp: 18    Temp: 97.9 °F (36.6 °C)    TempSrc: Oral    SpO2: 99%    Weight: 113.9 kg (251 lb)    Height: 5' 6" (1.676 m)       Body mass index is 40.51 kg/m².    PHYSICAL EXAM:   Physical Exam  Vitals and nursing note reviewed.   Constitutional:       General: She is awake. She is not in acute distress.     Appearance: Normal " appearance. She is well-developed and well-groomed. She is morbidly obese. She is not ill-appearing, toxic-appearing or diaphoretic.   HENT:      Head: Normocephalic and atraumatic.      Right Ear: Tympanic membrane, ear canal and external ear normal. There is no impacted cerumen.      Left Ear: Tympanic membrane, ear canal and external ear normal. There is no impacted cerumen.      Nose: Nose normal. No congestion or rhinorrhea.      Mouth/Throat:      Lips: Pink.      Mouth: Mucous membranes are moist.      Pharynx: Oropharynx is clear. Uvula midline. No posterior oropharyngeal erythema.   Eyes:      General: Lids are normal. Gaze aligned appropriately.      Extraocular Movements: Extraocular movements intact.      Conjunctiva/sclera: Conjunctivae normal.      Pupils: Pupils are equal, round, and reactive to light.   Neck:      Trachea: Trachea and phonation normal.   Cardiovascular:      Rate and Rhythm: Normal rate and regular rhythm.      Pulses: Normal pulses.           Radial pulses are 2+ on the right side and 2+ on the left side.      Heart sounds: Normal heart sounds. No murmur heard.  Pulmonary:      Effort: Pulmonary effort is normal.      Breath sounds: Normal breath sounds and air entry. No wheezing or rhonchi.   Abdominal:      General: Bowel sounds are normal.      Palpations: Abdomen is soft.      Tenderness: There is no abdominal tenderness. There is no right CVA tenderness or left CVA tenderness.   Musculoskeletal:         General: Normal range of motion.      Cervical back: Normal range of motion and neck supple. No rigidity or tenderness.      Right lower leg: No edema.      Left lower leg: No edema.   Lymphadenopathy:      Cervical: No cervical adenopathy.   Skin:     General: Skin is warm and dry.      Capillary Refill: Capillary refill takes less than 2 seconds.      Findings: No rash.   Neurological:      General: No focal deficit present.      Mental Status: She is alert and oriented to  person, place, and time. Mental status is at baseline.      GCS: GCS eye subscore is 4. GCS verbal subscore is 5. GCS motor subscore is 6.      Gait: Gait normal.   Psychiatric:         Attention and Perception: Attention and perception normal.         Mood and Affect: Mood normal.         Speech: Speech normal.         Behavior: Behavior normal. Behavior is cooperative.         Thought Content: Thought content normal.         Cognition and Memory: Cognition and memory normal.         Judgment: Judgment normal.        ASSESSMENT/PLAN:  1. Primary hypertension  Assessment & Plan:  Continue medications as directed.  Follow low-salt diet, lose weight, start physical activity at least brisk walking 30 minutes a day up to 5 days a week, stay hydrated with water, follow low-fat, low-cholesterol diet, lifestyle modification.  Questions solicited answered, patient verbalized.  Patient to return 4 weeks for follow-up.    Orders:  -     olmesartan-hydrochlorothiazide (BENICAR HCT) 20-12.5 mg per tablet; Take 1 tablet by mouth once daily.  Dispense: 90 tablet; Refill: 2  -     metoprolol succinate (TOPROL-XL) 100 MG 24 hr tablet; Take 1 tablet (100 mg total) by mouth every evening.  Dispense: 90 tablet; Refill: 2  -     isosorbide dinitrate (ISORDIL) 40 MG Tab; Take 1 tablet (40 mg total) by mouth once daily.  Dispense: 90 tablet; Refill: 2    2. Rheumatoid arthritis involving multiple sites with positive rheumatoid factor  Assessment & Plan:  Rheumatology referral has been initiated.  Instructed patient to ask if Dr. Otto Marcial can give a courtesy call to Keshav Peguero.  Instructed patient to continue medication as prescribed to keep follow-up appointment.   Take medications as prescribed. Questions solicited and answered, patient verbalized.    Orders:  -     Ambulatory referral/consult to Rheumatology; Future; Expected date: 04/26/2023    3. Bilateral chronic knee pain  Assessment & Plan:  Referral to orthopedic to  establish care has been initiated.    Orders:  -     Ambulatory referral/consult to Orthopedics; Future; Expected date: 04/26/2023    4. Gastroesophageal reflux disease without esophagitis  Assessment & Plan:  Continue Carafate 1 g po t.i.d. daily with Protonix 20 mg p.o. once daily.  Discussed GERD diet and lifestyle modification, questions solicited answered, patient verbalized.      5. Encounter for screening mammogram for breast cancer  -     Mammo Digital Screening Bilat w/ Fareed; Future; Expected date: 04/19/2023    6. Encounter to establish care  -     Mammo Digital Screening Bilat w/ Fareed; Future; Expected date: 04/19/2023  -     OCCULT BLOOD FECAL IMMUNOASSAY; Future; Expected date: 04/19/2023  -     influenza (QUADRIVALENT PF) vaccine 0.5 mL  -     pneumoc 20-lilian conj-dip cr(PF) (PREVNAR-20 (PF)) injection Syrg 0.5 mL  -     Ambulatory referral/consult to Rheumatology; Future; Expected date: 04/26/2023  -     olmesartan-hydrochlorothiazide (BENICAR HCT) 20-12.5 mg per tablet; Take 1 tablet by mouth once daily.  Dispense: 90 tablet; Refill: 2  -     metoprolol succinate (TOPROL-XL) 100 MG 24 hr tablet; Take 1 tablet (100 mg total) by mouth every evening.  Dispense: 90 tablet; Refill: 2  -     isosorbide dinitrate (ISORDIL) 40 MG Tab; Take 1 tablet (40 mg total) by mouth once daily.  Dispense: 90 tablet; Refill: 2  -     Ambulatory referral/consult to Orthopedics; Future; Expected date: 04/26/2023    7. Encounter for colorectal cancer screening  -     OCCULT BLOOD FECAL IMMUNOASSAY; Future; Expected date: 04/19/2023    8. Encounter for immunization  -     influenza (QUADRIVALENT PF) vaccine 0.5 mL  -     pneumoc 20-lilian conj-dip cr(PF) (PREVNAR-20 (PF)) injection Syrg 0.5 mL           RESULTS:  Recent Results (from the past 1008 hour(s))   CK    Collection Time: 04/19/23 10:45 AM   Result Value Ref Range    Creatine Kinase 78 29 - 168 U/L   Comprehensive Metabolic Panel    Collection Time: 04/19/23 10:45 AM    Result Value Ref Range    Sodium Level 139 136 - 145 mmol/L    Potassium Level 4.0 3.5 - 5.1 mmol/L    Chloride 106 98 - 107 mmol/L    Carbon Dioxide 25 22 - 29 mmol/L    Glucose Level 92 74 - 100 mg/dL    Blood Urea Nitrogen 13.2 7.0 - 18.7 mg/dL    Creatinine 0.91 0.55 - 1.02 mg/dL    Calcium Level Total 9.4 8.4 - 10.2 mg/dL    Protein Total 7.6 6.4 - 8.3 gm/dL    Albumin Level 3.8 3.5 - 5.0 g/dL    Globulin 3.8 (H) 2.4 - 3.5 gm/dL    Albumin/Globulin Ratio 1.0 (L) 1.1 - 2.0 ratio    Bilirubin Total 0.7 <=1.5 mg/dL    Alkaline Phosphatase 128 40 - 150 unit/L    Alanine Aminotransferase 9 0 - 55 unit/L    Aspartate Aminotransferase 14 5 - 34 unit/L    eGFR >60 mls/min/1.73/m2   C-Reactive Protein    Collection Time: 04/19/23 10:45 AM   Result Value Ref Range    C-Reactive Protein 11.90 (H) <5.00 mg/L   Sedimentation rate    Collection Time: 04/19/23 10:45 AM   Result Value Ref Range    Sed Rate 47 (H) 0 - 20 mm/hr   Urinalysis    Collection Time: 04/19/23 10:45 AM   Result Value Ref Range    Color, UA Yellow Yellow, Light-Yellow, Dark Yellow, Abby, Straw    Appearance, UA Clear Clear    Specific Gravity, UA 1.030     pH, UA 5.5 5.0 - 8.5    Protein, UA Trace (A) Negative mg/dL    Glucose, UA Normal Negative, Normal mg/dL    Ketones, UA Negative Negative mg/dL    Blood, UA Negative Negative unit/L    Bilirubin, UA Negative Negative mg/dL    Urobilinogen, UA Normal 0.2, 1.0, Normal mg/dL    Nitrites, UA Negative Negative    Leukocyte Esterase, UA Negative Negative unit/L    WBC, UA 0-5 None Seen, 0-2, 3-5, 0-5 /HPF    Bacteria, UA Trace (A) None Seen /HPF    Squamous Epithelial Cells, UA Occ (A) None Seen /HPF    Mucous, UA Many (A) None Seen /LPF    Hyaline Casts, UA 0-2 (A) None Seen /lpf    RBC, UA 0-5 None Seen, 0-2, 3-5, 0-5 /HPF   C3 Complement    Collection Time: 04/19/23 10:45 AM   Result Value Ref Range    C3 Complement 143 80 - 173 mg/dL   C4 Complement    Collection Time: 04/19/23 10:45 AM   Result  Value Ref Range    C4 Complement 37.0 13.0 - 46.0 mg/dL   CBC with Differential    Collection Time: 04/19/23 10:45 AM   Result Value Ref Range    WBC 4.4 (L) 4.5 - 11.5 x10(3)/mcL    RBC 6.18 (H) 4.20 - 5.40 x10(6)/mcL    Hgb 13.3 12.0 - 16.0 g/dL    Hct 43.1 37.0 - 47.0 %    MCV 69.7 (L) 80.0 - 94.0 fL    MCH 21.5 (L) 27.0 - 31.0 pg    MCHC 30.9 (L) 33.0 - 36.0 g/dL    RDW 15.9 11.5 - 17.0 %    Platelet 276 130 - 400 x10(3)/mcL    MPV 9.9 7.4 - 10.4 fL    Neut % 58.1 %    Lymph % 27.1 %    Mono % 11.4 %    Eos % 2.5 %    Basophil % 0.7 %    Lymph # 1.19 0.6 - 4.6 x10(3)/mcL    Neut # 2.55 2.1 - 9.2 x10(3)/mcL    Mono # 0.50 0.1 - 1.3 x10(3)/mcL    Eos # 0.11 0 - 0.9 x10(3)/mcL    Baso # 0.03 0 - 0.2 x10(3)/mcL    IG# 0.01 0 - 0.04 x10(3)/mcL    IG% 0.2 %    NRBC% 0.0 %         Follow Up:  Follow up in about 4 weeks (around 5/17/2023).      Previous medical history/lab work/radiology reviewed and considered during medical management decisions.   Medication list reviewed and medication reconciliation performed.  Patient was provided  and care about his/her current diagnosis (es) and medications including risk/benefit and side effects/adverse events, over the counter medication uses/doses, home self-care and contact precautions,  and red flags and indications for when to seek immediate medical attention.   Patient was advised to continue compliance with current medication list and medical recommendations.  Patient dvised continued compliance with recommended eating habits/ diets for medical conditions and exercise 150 minutes/ week (if possible) for medical condition (s).  Educational handouts and instructions on selected disease management in AVS (After Visit Summary).    All of the patient's questions were answered to patient's satisfaction.   The patient was receptive, expressed verbal understanding and agreement the above plan.         This note was created with the assistance of a voice recognition  software or phone dictation. There may be transcription errors as a result of using this technology however minimal. Effort has been made to assure accuracy of transcription but any obvious errors or omissions should be clarified with the author of the document

## 2023-04-19 NOTE — ASSESSMENT & PLAN NOTE
Continue medications as directed.  Follow low-salt diet, lose weight, start physical activity at least brisk walking 30 minutes a day up to 5 days a week, stay hydrated with water, follow low-fat, low-cholesterol diet, lifestyle modification.  Questions solicited answered, patient verbalized.  Patient to return 4 weeks for follow-up.

## 2023-04-19 NOTE — ASSESSMENT & PLAN NOTE
Rheumatology referral has been initiated.  Instructed patient to ask if Dr. Otto Marcial can give a courtesy call to Keshav Peguero.  Instructed patient to continue medication as prescribed to keep follow-up appointment.   Take medications as prescribed. Questions solicited and answered, patient verbalized.

## 2023-04-19 NOTE — ASSESSMENT & PLAN NOTE
Continue Carafate 1 g po t.i.d. daily with Protonix 20 mg p.o. once daily.  Discussed GERD diet and lifestyle modification, questions solicited answered, patient verbalized.

## 2023-04-21 LAB
CCP IGG SERPL-ACNC: 69 UNITS
RHEUMATOID FACT SERPL-ACNC: 26 IU/ML

## 2023-04-24 ENCOUNTER — TELEPHONE (OUTPATIENT)
Dept: FAMILY MEDICINE | Facility: CLINIC | Age: 48
End: 2023-04-24
Payer: MEDICAID

## 2023-04-24 ENCOUNTER — OFFICE VISIT (OUTPATIENT)
Dept: RHEUMATOLOGY | Facility: CLINIC | Age: 48
End: 2023-04-24
Payer: MEDICAID

## 2023-04-24 VITALS
SYSTOLIC BLOOD PRESSURE: 137 MMHG | HEIGHT: 66 IN | WEIGHT: 251.31 LBS | RESPIRATION RATE: 18 BRPM | BODY MASS INDEX: 40.39 KG/M2 | HEART RATE: 77 BPM | DIASTOLIC BLOOD PRESSURE: 86 MMHG | OXYGEN SATURATION: 99 %

## 2023-04-24 DIAGNOSIS — M05.79 RHEUMATOID ARTHRITIS INVOLVING MULTIPLE SITES WITH POSITIVE RHEUMATOID FACTOR: Primary | ICD-10-CM

## 2023-04-24 DIAGNOSIS — M47.812 CERVICAL SPONDYLOSIS: ICD-10-CM

## 2023-04-24 DIAGNOSIS — Z13.89 SCREENING FOR RHEUMATIC DISORDER: ICD-10-CM

## 2023-04-24 DIAGNOSIS — M12.812 ROTATOR CUFF ARTHROPATHY OF BOTH SHOULDERS: ICD-10-CM

## 2023-04-24 DIAGNOSIS — M31.19 TTP (THROMBOTIC THROMBOPENIC PURPURA): ICD-10-CM

## 2023-04-24 DIAGNOSIS — G56.03 BILATERAL CARPAL TUNNEL SYNDROME: ICD-10-CM

## 2023-04-24 DIAGNOSIS — Z51.81 MEDICATION MONITORING ENCOUNTER: ICD-10-CM

## 2023-04-24 DIAGNOSIS — M15.8 OTHER OSTEOARTHRITIS INVOLVING MULTIPLE JOINTS: ICD-10-CM

## 2023-04-24 DIAGNOSIS — M12.811 ROTATOR CUFF ARTHROPATHY OF BOTH SHOULDERS: ICD-10-CM

## 2023-04-24 DIAGNOSIS — Z11.1 SCREENING-PULMONARY TB: ICD-10-CM

## 2023-04-24 PROCEDURE — 3075F PR MOST RECENT SYSTOLIC BLOOD PRESS GE 130-139MM HG: ICD-10-PCS | Mod: CPTII,S$GLB,, | Performed by: INTERNAL MEDICINE

## 2023-04-24 PROCEDURE — 3079F DIAST BP 80-89 MM HG: CPT | Mod: CPTII,S$GLB,, | Performed by: INTERNAL MEDICINE

## 2023-04-24 PROCEDURE — 99214 PR OFFICE/OUTPT VISIT, EST, LEVL IV, 30-39 MIN: ICD-10-PCS | Mod: S$GLB,,, | Performed by: INTERNAL MEDICINE

## 2023-04-24 PROCEDURE — 1159F PR MEDICATION LIST DOCUMENTED IN MEDICAL RECORD: ICD-10-PCS | Mod: CPTII,S$GLB,, | Performed by: INTERNAL MEDICINE

## 2023-04-24 PROCEDURE — 1159F MED LIST DOCD IN RCRD: CPT | Mod: CPTII,S$GLB,, | Performed by: INTERNAL MEDICINE

## 2023-04-24 PROCEDURE — 3075F SYST BP GE 130 - 139MM HG: CPT | Mod: CPTII,S$GLB,, | Performed by: INTERNAL MEDICINE

## 2023-04-24 PROCEDURE — 1160F PR REVIEW ALL MEDS BY PRESCRIBER/CLIN PHARMACIST DOCUMENTED: ICD-10-PCS | Mod: CPTII,S$GLB,, | Performed by: INTERNAL MEDICINE

## 2023-04-24 PROCEDURE — 99214 OFFICE O/P EST MOD 30 MIN: CPT | Mod: S$GLB,,, | Performed by: INTERNAL MEDICINE

## 2023-04-24 PROCEDURE — 1160F RVW MEDS BY RX/DR IN RCRD: CPT | Mod: CPTII,S$GLB,, | Performed by: INTERNAL MEDICINE

## 2023-04-24 PROCEDURE — 3008F BODY MASS INDEX DOCD: CPT | Mod: CPTII,S$GLB,, | Performed by: INTERNAL MEDICINE

## 2023-04-24 PROCEDURE — 3079F PR MOST RECENT DIASTOLIC BLOOD PRESSURE 80-89 MM HG: ICD-10-PCS | Mod: CPTII,S$GLB,, | Performed by: INTERNAL MEDICINE

## 2023-04-24 PROCEDURE — 3008F PR BODY MASS INDEX (BMI) DOCUMENTED: ICD-10-PCS | Mod: CPTII,S$GLB,, | Performed by: INTERNAL MEDICINE

## 2023-04-24 RX ORDER — METHOTREXATE 2.5 MG/1
12.5 TABLET ORAL
Qty: 20 TABLET | Refills: 3 | Status: SHIPPED | OUTPATIENT
Start: 2023-04-24 | End: 2023-07-13

## 2023-04-24 RX ORDER — FOLIC ACID 1 MG/1
1000 TABLET ORAL DAILY
Qty: 30 TABLET | Refills: 3 | Status: SHIPPED | OUTPATIENT
Start: 2023-04-24 | End: 2024-02-09 | Stop reason: ALTCHOICE

## 2023-04-24 RX ORDER — HYDROXYCHLOROQUINE SULFATE 200 MG/1
200 TABLET, FILM COATED ORAL DAILY
Qty: 30 TABLET | Refills: 5 | Status: SHIPPED | OUTPATIENT
Start: 2023-04-24 | End: 2023-07-13

## 2023-04-24 NOTE — TELEPHONE ENCOUNTER
Patient called stating that she saw her rheumatologist. She states that she was told she has prediabetes and lupus. She wants to know if you can reorder the test and explain them to her.

## 2023-04-24 NOTE — PROGRESS NOTES
Subjective:      Patient ID: Nicole Quiñones is a 48 y.o. female.    Chief Complaint: Disease Management    HPI:   Includes joint pain without subjective swelling.   Antecedent event includes: None;  Pain location includes: shoulders, hands, and knees;  Gradual onset; beginning >years ago;  Constant ache, Moderate in severity,   Lasting >minutes, Worse during the daytime;   Improved with rest, medication, change in position, and none;   Worsened with activity, overuse, stress, and rest;         Review of Systems   Constitutional:  Negative for chills, fatigue and fever.   HENT:  Negative for nasal congestion, ear pain, hearing loss, mouth dryness, mouth sores, nosebleeds and trouble swallowing.    Eyes:  Negative for photophobia, pain, redness, visual disturbance and eye dryness.   Respiratory:  Negative for cough and shortness of breath.    Cardiovascular:  Negative for chest pain, palpitations and leg swelling.   Gastrointestinal:  Negative for abdominal distention, abdominal pain, blood in stool, constipation, diarrhea, rectal pain, vomiting and fecal incontinence.   Endocrine: Negative for polydipsia and polyuria.   Genitourinary:  Negative for bladder incontinence, dysuria, enuresis, genital sores and hematuria.   Musculoskeletal:  Positive for arthralgias. Negative for joint swelling and myalgias.   Integumentary:  Negative for rash, wound and mole/lesion.   Neurological:  Positive for headaches. Negative for weakness.   Hematological:  Negative for adenopathy. Does not bruise/bleed easily.   Psychiatric/Behavioral:  Positive for sleep disturbance. Negative for dysphoric mood. The patient is not nervous/anxious.     Past Medical History:   Diagnosis Date    Anemia     Arthritis     Hypertension     Migraines     Thrombocytopenia, unspecified     TTP (thrombotic thrombocytopenic purpura)     TTP (thrombotic thrombocytopenic purpura)      Past Surgical History:   Procedure Laterality Date    CARPAL TUNNEL  RELEASE      SURGICAL REMOVAL OF ENDOMETRIOSIS        See the Assessment/Plan for further characterization of the HPI, ROS, Medical, Surgical, Family, and Social Histories including Tobacco use, Meds; all of which has been reviewed in Epic.    Medication List with Changes/Refills   Current Medications    ALBUTEROL (PROVENTIL) 2.5 MG /3 ML (0.083 %) NEBULIZER SOLUTION    USE 1 VIAL IN NEBULIZER EVERY 8 HOURS AS NEEDED    BUTALBITAL-ACETAMINOPHEN-CAFFEINE -40 MG (FIORICET, ESGIC) -40 MG PER TABLET    SMARTSI Tablet(s) By Mouth Every 6 Hours PRN    CLONIDINE (CATAPRES) 0.1 MG TABLET    Take 1 tablet by mouth daily as needed.    DULERA 100-5 MCG/ACTUATION HFAA    Inhale 1 puff into the lungs 2 (two) times daily.    EMGALITY  MG/ML PNIJ    AS DIRECTED SUBCUTANEOUS MONTHLY 30 DAY(S)    ERGOCALCIFEROL (ERGOCALCIFEROL) 50,000 UNIT CAP    SMARTSI Capsule(s) By Mouth Once a Week    EZETIMIBE (ZETIA) 10 MG TABLET    Take 10 mg by mouth once daily.    FLUTICASONE PROPIONATE (FLONASE) 50 MCG/ACTUATION NASAL SPRAY    2 sprays by Each Nostril route 2 (two) times a day.    GABAPENTIN (NEURONTIN) 600 MG TABLET    Take 600 mg by mouth 3 (three) times daily.    ISOSORBIDE DINITRATE (ISORDIL) 40 MG TAB    Take 1 tablet (40 mg total) by mouth once daily.    LINZESS 72 MCG CAP CAPSULE    Take 1 capsule by mouth once daily.    LORATADINE (CLARITIN) 10 MG TABLET    1 tablet.    METOPROLOL SUCCINATE (TOPROL-XL) 100 MG 24 HR TABLET    Take 1 tablet (100 mg total) by mouth every evening.    OLMESARTAN-HYDROCHLOROTHIAZIDE (BENICAR HCT) 20-12.5 MG PER TABLET    Take 1 tablet by mouth once daily.    ONDANSETRON (ZOFRAN) 8 MG TABLET    TAKE 1 TABLET BY MOUTH EVERY 8 HOURS AS NEEDED FOR NAUSEA AND VOMITING    PANTOPRAZOLE (PROTONIX) 20 MG TABLET    Take 20 mg by mouth once daily.    SUCRALFATE (CARAFATE) 1 GRAM TABLET    Take 1 g by mouth 3 (three) times daily.    UBRELVY 50 MG TABLET    Take 1 tablet by mouth 2 (two)  "times a day.    VERAPAMIL (CALAN-SR) 180 MG CR TABLET    SMARTSI Tablet(s) By Mouth Twice Daily   Changed and/or Refilled Medications    Modified Medication Previous Medication    FOLIC ACID (FOLVITE) 1 MG TABLET folic acid (FOLVITE) 1 MG tablet       Take 1 tablet (1,000 mcg total) by mouth once daily.    Take 1 tablet (1,000 mcg total) by mouth once daily.    HYDROXYCHLOROQUINE (PLAQUENIL) 200 MG TABLET hydrOXYchloroQUINE (PLAQUENIL) 200 mg tablet       Take 1 tablet (200 mg total) by mouth once daily.    Take 1 tablet (200 mg total) by mouth once daily.    METHOTREXATE 2.5 MG TAB methotrexate 2.5 MG Tab       Take 5 tablets (12.5 mg total) by mouth every 7 days.    Take 3 tablets (7.5 mg total) by mouth every 7 days.         Objective:   /86   Pulse 77   Resp 18   Ht 5' 6" (1.676 m)   Wt 114 kg (251 lb 4.8 oz)   SpO2 99%   BMI 40.56 kg/m²   Physical Exam  Non-toxic appearance. No distress.   Normocephalic and atraumatic.   External ears normal.    Conjunctivae and EOM are normal. No scleral icterus.   RRR, No friction rub; palpable distal pulses.    No tachypnea or signs of respiratory distress.   Abdominal: No guarding or rebound tenderness.   Neurological: Oriented. Normal thought content.   Skin is warm. No pallor.   Musculoskeletal: see below for further input.     X-Ray Knee 3 View Left  See Procedure Notes for results.     IMPRESSION: Please see Ortho procedure notes for report.      This procedure was auto-finalized by: Virtual Radiologist    Office Visit on 2022   Component Date Value Ref Range Status    C3 Complement 2023 143  80 - 173 mg/dL Final    C4 Complement 2023 37.0  13.0 - 46.0 mg/dL Final    Rheumatoid Factor 2023 26 (H)  0 - 14 IU/mL Final    Performed By: [facility]  [address] [place] [id number]  : Alejandro Berrios MD, PhD    Cyclic Citrullinated Peptide Ab, I* 2023 69 (H)  0 - 19 Units Final    " INTERPRETIVE INFORMATION: Cyclic Citrullinated Peptide Antibody,   IgG      19 Units or less ................... Negative    20-39 Units ........................ Weak Positive    40-59 Units ........................ Moderate Positive    60 Units or greater ................ Strong Positive    Anti-cyclic citrullinated peptide (anti-CCP), IgG antibodies are   present in about 69-83 percent of patients with rheumatoid   arthritis (RA) and have specificities of 93-95 percent. These   autoantibodies may be present in the preclinical phase of disease,   are associated with future RA development, and may predict   radiographic joint destruction. Patients with weak positive   results should be monitored and testing repeated.  Performed By: [facility]  [address] [place] [id number]  : Alejandro Berrios MD, PhD   Office Visit on 09/28/2022   Component Date Value Ref Range Status    Creatine Kinase 04/19/2023 78  29 - 168 U/L Final    Sodium Level 04/19/2023 139  136 - 145 mmol/L Final    Potassium Level 04/19/2023 4.0  3.5 - 5.1 mmol/L Final    Chloride 04/19/2023 106  98 - 107 mmol/L Final    Carbon Dioxide 04/19/2023 25  22 - 29 mmol/L Final    Glucose Level 04/19/2023 92  74 - 100 mg/dL Final    Blood Urea Nitrogen 04/19/2023 13.2  7.0 - 18.7 mg/dL Final    Creatinine 04/19/2023 0.91  0.55 - 1.02 mg/dL Final    Calcium Level Total 04/19/2023 9.4  8.4 - 10.2 mg/dL Final    Protein Total 04/19/2023 7.6  6.4 - 8.3 gm/dL Final    Albumin Level 04/19/2023 3.8  3.5 - 5.0 g/dL Final    Globulin 04/19/2023 3.8 (H)  2.4 - 3.5 gm/dL Final    Albumin/Globulin Ratio 04/19/2023 1.0 (L)  1.1 - 2.0 ratio Final    Bilirubin Total 04/19/2023 0.7  <=1.5 mg/dL Final    Alkaline Phosphatase 04/19/2023 128  40 - 150 unit/L Final    Alanine Aminotransferase 04/19/2023 9  0 - 55 unit/L Final    Aspartate Aminotransferase 04/19/2023 14  5 - 34 unit/L Final    eGFR 04/19/2023 >60  mls/min/1.73/m2 Final     C-Reactive Protein 04/19/2023 11.90 (H)  <5.00 mg/L Final    Sed Rate 04/19/2023 47 (H)  0 - 20 mm/hr Final    Color, UA 04/19/2023 Yellow  Yellow, Light-Yellow, Dark Yellow, Abby, Straw Final    Appearance, UA 04/19/2023 Clear  Clear Final    Specific Gravity, UA 04/19/2023 1.030   Final    pH, UA 04/19/2023 5.5  5.0 - 8.5 Final    Protein, UA 04/19/2023 Trace (A)  Negative mg/dL Final    Glucose, UA 04/19/2023 Normal  Negative, Normal mg/dL Final    Ketones, UA 04/19/2023 Negative  Negative mg/dL Final    Blood, UA 04/19/2023 Negative  Negative unit/L Final    Bilirubin, UA 04/19/2023 Negative  Negative mg/dL Final    Urobilinogen, UA 04/19/2023 Normal  0.2, 1.0, Normal mg/dL Final    Nitrites, UA 04/19/2023 Negative  Negative Final    Leukocyte Esterase, UA 04/19/2023 Negative  Negative unit/L Final    WBC, UA 04/19/2023 0-5  None Seen, 0-2, 3-5, 0-5 /HPF Final    Bacteria, UA 04/19/2023 Trace (A)  None Seen /HPF Final    Squamous Epithelial Cells, UA 04/19/2023 Occ (A)  None Seen /HPF Final    Mucous, UA 04/19/2023 Many (A)  None Seen /LPF Final    Hyaline Casts, UA 04/19/2023 0-2 (A)  None Seen /lpf Final    RBC, UA 04/19/2023 0-5  None Seen, 0-2, 3-5, 0-5 /HPF Final    WBC 04/19/2023 4.4 (L)  4.5 - 11.5 x10(3)/mcL Final    RBC 04/19/2023 6.18 (H)  4.20 - 5.40 x10(6)/mcL Final    Hgb 04/19/2023 13.3  12.0 - 16.0 g/dL Final    Hct 04/19/2023 43.1  37.0 - 47.0 % Final    MCV 04/19/2023 69.7 (L)  80.0 - 94.0 fL Final    MCH 04/19/2023 21.5 (L)  27.0 - 31.0 pg Final    MCHC 04/19/2023 30.9 (L)  33.0 - 36.0 g/dL Final    RDW 04/19/2023 15.9  11.5 - 17.0 % Final    Platelet 04/19/2023 276  130 - 400 x10(3)/mcL Final    MPV 04/19/2023 9.9  7.4 - 10.4 fL Final    Neut % 04/19/2023 58.1  % Final    Lymph % 04/19/2023 27.1  % Final    Mono % 04/19/2023 11.4  % Final    Eos % 04/19/2023 2.5  % Final    Basophil % 04/19/2023 0.7  % Final    Lymph # 04/19/2023 1.19  0.6 - 4.6 x10(3)/mcL Final    Neut # 04/19/2023 2.55  2.1  - 9.2 x10(3)/mcL Final    Mono # 04/19/2023 0.50  0.1 - 1.3 x10(3)/mcL Final    Eos # 04/19/2023 0.11  0 - 0.9 x10(3)/mcL Final    Baso # 04/19/2023 0.03  0 - 0.2 x10(3)/mcL Final    IG# 04/19/2023 0.01  0 - 0.04 x10(3)/mcL Final    IG% 04/19/2023 0.2  % Final    NRBC% 04/19/2023 0.0  % Final   Office Visit on 04/26/2022   Component Date Value Ref Range Status    Total Protein, Electrophoresis 04/26/2022 7.0  6.3 - 8.2 g/dL Final    Albumin, Electrophoresis 04/26/2022 3.69 (A)  3.75 - 5.01 g/dL Final    Alpha-1-Globulin 04/26/2022 0.39  0.19 - 0.46 g/dL Final    Alpha-2-Globulin 04/26/2022 0.89  0.48 - 1.05 g/dL Final    Beta Globulin 04/26/2022 1.05  0.48 - 1.10 g/dL Final    Gamma Globulin 04/26/2022 0.98  0.62 - 1.51 g/dL Final    Immunofix Interp. 04/26/2022 SEE NOTE   Final    Normal SPEP pattern.  Immunofixation electrophoresis (ZULMA)is a more sensitive technique for the identification ofsmall M-proteins.    EER Protein Electrophoresis 04/26/2022 SEE NOTE   Final    Access [facility] [[email or web address]] Enhanced Report using the link below:-Direct access:[email or web address] By: [facility] [place], [place] [id number] Director: Keri Hernandez MD    Uric Acid 04/26/2022 3.9  2.6 - 6.0 mg/dL Final    Cyclic Citrullinated Peptide (CCP)* 04/26/2022 34 (A)  0 - 19 Units Final    Comment: INTERPRETIVE INFORMATION: Cyclic Citrullinated Peptide Antibody,IgG  19 Units or less ................... Negative  20-39 Units ........................ Weak Positive  40-59 Units ........................ Moderate Positive  60 Units or greater   ................ Strong PositiveAnti-cyclic citrullinated peptide (anti-CCP), IgG antibodies arepresent in about 69-83 percent of patients with rheumatoidarthritis (RA) and have specificities of 93-95 percent. Theseautoantibodies may be present in the   preclinical phase ofdisease, are associated with future RA development, and maypredict  radiographic joint destruction. Patients with weakpositive results should be monitored and testing repeated.Performed By: [facility] [address] [place] [id number] Director: Keri Hernandez MD      CRP QUANTITATIVE 04/26/2022 1.1 (H)  0.0 - 0.9 mg/dL Final    Sodium 04/26/2022 140  135 - 145 mmol/L Final    Potassium 04/26/2022 3.5 (L)  3.6 - 5.2 mmol/L Final    Chloride 04/26/2022 107  100 - 108 mmol/L Final    CO2 04/26/2022 26  21 - 32 mmol/L Final    Anion Gap 04/26/2022 7.0  3.0 - 11.0 mmol/L Final    BUN 04/26/2022 15  7 - 18 mg/dL Final    Creatinine 04/26/2022 0.88  0.55 - 1.02 mg/dL Final    GFR ESTIMATION 04/26/2022 > 60  >60 Final               DESCRIPTION       GLOMERULAR FILTRATION RATE             NORMAL                     >60             KIDNEY  DISEASE           15-60             KIDNEY FAILURE             <15    BUN/Creatinine Ratio 04/26/2022 17.04  7 - 18 Ratio Final    Glucose 04/26/2022 88  70 - 110 mg/dL Final    Calcium 04/26/2022 8.6 (L)  8.8 - 10.5 mg/dL Final    Total Bilirubin 04/26/2022 0.5  0.0 - 1.0 mg/dL Final    AST 04/26/2022 16  15 - 37 U/L Final    ALT 04/26/2022 17  12 - 78 U/L Final    Total Protein 04/26/2022 7.5  6.4 - 8.2 g/dL Final    Albumin 04/26/2022 3.6  3.4 - 5.0 g/dL Final    Globulin 04/26/2022 3.9 (H)  2.3 - 3.5 g/dL Final    Albumin/Globulin Ratio 04/26/2022 0.923 (L)  1.1 - 1.8 Ratio Final    Alkaline Phosphatase 04/26/2022 147 (H)  46 - 116 U/L Final    CK, Serum 04/26/2022 87  26 - 308 U/L Final    WBC 04/26/2022 4.6  4.6 - 10.2 10*3/uL Final    RBC 04/26/2022 6.01 (H)  4.2 - 5.4 10*6/uL Final    Hemoglobin 04/26/2022 13.3  12.0 - 16.0 g/dL Final    Hematocrit 04/26/2022 43.9  37.0 - 47.0 % Final    MCV 04/26/2022 73.0 (L)  80 - 97 fL Final    MCH 04/26/2022 22.1 (L)  27.0 - 31.2 pg Final    MCHC 04/26/2022 30.3 (L)  31.8 - 35.4 g/dL Final    RDW RBC Auto-Rto 04/26/2022 15.9  12.5 - 18.0 % Final    Platelets 04/26/2022 262  142 - 424  10*3/uL Final    Neutrophils 04/26/2022 64.9  37 - 80 % Final    Lymphocytes 04/26/2022 20.7 (L)  25 - 40 % Final    Monocytes 04/26/2022 11.8  1 - 15 % Final    Eosinophils 04/26/2022 2.0  1 - 3 % Final    Basophils 04/26/2022 0.4  0 - 3 % Final    nRBC# 04/26/2022 0.0  % Final    Neutrophils Absolute 04/26/2022 2.98  1.8 - 7.7 10*3/uL Final    C4 Complement 04/26/2022 37  10 - 40 mg/dL Final    Comment: REFERENCE INTERVAL: Complement Component 4Access complete set of age- and/or gender-specific referenceintervals for this test in the [facility] Test Directory([email or web address]).Performed By: [facility] [place] [address]   [id number] Director: Keri Hernandez MD      C3 Complement 04/26/2022 160  90 - 180 mg/dL Final    Comment: REFERENCE INTERVAL: Complement Component 3Access complete set of age- and/or gender-specific referenceintervals for this test in the [facility] Test Directory([email or web address]).Performed By: [facility] [place] [address]   [id number] Director: Keri Hernandez MD      Microcytes 04/26/2022 1+   Final    Sed Rate 04/26/2022 6  0 - 20 mm/hr Final    SETH 04/26/2022 NONE DETECTED  None Detected Final    Comment: If suspicion of connective tissue disease is strong and SETH EIAis negative, consider testing for SETH by IFA ([id number]).No antibodies to Anti-Nuclear Antibodies (SETH) detected.  TheExtractable Nuclear Antigen Antibodies (RNP, Navarro, SSA 52, SSA60,   Scleroderma, Chely-1 and SSB) and Double Stranded DNA (dsDNA)Antibody, IgG will not be performed.INTERPRETIVE INFORMATION: Anti-Nuclear Antibodies (SETH), IgG byELISAAntinuclear Antibodies (SETH), IgG by EVELINA: SETH specimens arescreened using enzyme-linked   immunosorbent assay (EVELINA)methodology. All EVELINA results reported as Detected are furthertested by indirect fluorescent assay (IFA) using HEp-2 substratewith an IgG-specific conjugate. The SETH EVELINA screen is designedto detect antibodies  against dsDNA,   histones, SS-A (Ro), SS-B(La), Navarro, Smith/RNP, Scl-70, Chely-1, centromeric proteins,other antigens extracted from the HEp-2 cell nucleus. SETH ELISAassays have been reported to have lower sensitivities than ANAIFA for systemic autoimmune rheum                           atic   diseases (SARD).Negative results do not necessarily rule out SARD.Performed By: [facility] [place] [id number] Director: Keri Hernandez MD      Rheumatoid Factor 04/26/2022 11  0 - 14 IU/mL Final    Performed By: [facility] [place], [place] [address]Neanxgynxt Director: Keri Hernandez MD        All of the data above and below has been reviewed by myself and any further interpretations will be reflected in the assessment and plan.   The data includes review of external notes, and independent interpretation of lab results, x-rays, and imaging reports.  Active inflammatory arthritis is an illness that poses a threat to bodily function and even a threat to life in some cases.  Drug therapy to treat inflammatory arthritis usually requires intensive monitoring for toxicity.    Review of patient's allergies indicates:   Allergen Reactions    Nsaids (non-steroidal anti-inflammatory drug) Other (See Comments)     Causes ulcers to bleed.     Assessment/Plan:       Prev noted/prior plan:  (No overt synovitis  Unable to actively flex shoulders above her head     Verbal education; no overt active disease but she has been treated     Blood work to further evaluate     She should also be working with Orthopedics, Physical Medicine and Rehab MD, and consider Neurosurgery if not already depending on symptoms     Amanda will plan to update her meds next visit)      Visit prior to Visit prior to Visit prior to Last visit:      Interim lab:            Lab Results   Component Value Date      04/26/2022     K 3.5 (L) 04/26/2022      04/26/2022     CO2 26 04/26/2022     ANIONGAP  7.0 04/26/2022     GLU 88 04/26/2022     CALCIUM 8.6 (L) 04/26/2022     BUN 15 04/26/2022     CREATININE 0.88 04/26/2022     PROT 7.5 04/26/2022     AST 16 04/26/2022     ALT 17 04/26/2022     BILITOT 0.5 04/26/2022     ALKPHOS 147 (H) 04/26/2022     WBC 4.6 04/26/2022     NEUTROPHILS 64.9 04/26/2022     HGB 13.3 04/26/2022     MCV 73.0 (L) 04/26/2022      04/21/2021     LABPLAT 262 04/26/2022     SEDRATE 6 04/26/2022         Ca 8.6     SPEP normal     CPK 87     C3,4 normal     ESR 6 CRP 1.1        SETH neg RF neg CCP 34+ uric acid 3.9     Prev noted cymbalta 60mg  gabapentin 600mg tid   recently started taking her gabapentin to 100 mg t.i.d. and    has been taking her Cymbalta 60 mg daily.    Flexeril prn)         She has been having interim symptoms of inflammatory arthritis     She also has mechanical and neurogenic pain     She increased gabapentin to 600mg tid and has not noticed much noted     No overt synovitis  DJD      See prior     Consider Add plaquenil 200mg daily with eye exam; she can start prior but if vision problem hold off until seeing ophthal     Add Ca D daily to her vitamin D she uses     Revisit lab 2 weeks prior     Contact us prn     Addendum 6/20/22: Patient called Amanda to let her know she was   recently found to have a fracture in her right wrist with an MD and   to revisit MD next week but it was causing her a lot of pain and she was not even given a splint, which   Amanda had her contact the MD who told her she had the fracture and have the records forwarded/shared with us to clarify things;      Visit prior to Visit prior to Last visit:        Interim lab:           Prev noted/prior plan:  (cymbalta 60mg  gabapentin 600mg tid   recently started taking her gabapentin to 100 mg t.i.d. and    has been taking her Cymbalta 60 mg daily.    Flexeril prn   She increased gabapentin to 600mg tid and has not noticed much noted  See prior   Consider Add plaquenil 200mg daily with eye  "exam; she can start prior but if vision problem hold off until seeing ophthal   Add Ca D daily to her vitamin D she uses  Addendum 6/20/22: Patient called Amanda to let her know she was   recently found to have a fracture in her right wrist with an MD and   to revisit MD next week but it was causing her a lot of pain and she was not even given a splint, which   Amanda had her contact the MD who told her she had the fracture and have the records forwarded/shared with us to clarify things; ))))        Kristina was unable to get in touch with her about the lab and she came in this morning and may have had lab and Kristina is tracking down     She has some interim pain in knees, hands and shoulder but sounds like doing fairly well     Has the fracture healed? She tells me yes; she did not need cast only splint and no longer needing     Interim some of recent note from Orthopedics Dr. Snow 8/29/22: Chondromalacia of patellofemoral: ("Had a long discussion with the patient regarding her weight and the affect it is actually having on her patellofemoral joint and knees in general.  Strongly recommended aquatic therapy and aerobics.  Do not believe that steroid injections will have much of any affect.  I do not believe viscosupplementation will have any benefit.  So essentially she is down to either losing weight or patellofemoral joint reconstruction.  She does not wish to proceed with any surgical remedy so will hold off on the referral for that.  She is to contact her primary care physician for medical treatment and weight loss plan.")     Looks like she may have also been treated for pneumonia interim per very limited records in Epic 8/17/22 diagnosis?     No longer using cymbalta? No changed to zoloft and a 'sleeping medicine' with a new PCP who she tells me also did some lab and treated her for pneumonia with an inhaler     No overt synovitis  DJD generalized     Verbal education     She will get Kristina's contact " "information and we can work on getting her recent lab and records and touch base in advance if needed moving forward     Cont plaquenil 200mg daily     gabapentin 600mg tid      Ca D daily with vitamin D      Revisit 2 months  Revisit lab 2 weeks prior     Contact us prn     Visit prior to Last visit:     Interim lab Kristina is looking into as some from the prior visit were recently sent also noted:        Prev noted/prior plan:  (((Prev noted/prior plan: (cymbalta 60mg  gabapentin 600mg tid   recently started taking her gabapentin to 100 mg t.i.d. and    has been taking her Cymbalta 60 mg daily.    Flexeril prn   She increased gabapentin to 600mg tid and has not noticed much noted  See prior   Consider Add plaquenil 200mg daily with eye exam; she can start prior but if vision problem hold off until seeing ophthal   Add Ca D daily to her vitamin D she uses  Addendum 6/20/22: Patient called Amanda to let her know she was   recently found to have a fracture in her right wrist with an MD and   to revisit MD next week but it was causing her a lot of pain and she was not even given a splint, which   Amanda had her contact the MD who told her she had the fracture and have the records forwarded/shared with us to clarify things; ))))      Kristina was unable to get in touch with her about the lab and she came in this morning and may have had lab and Kristina is tracking down     She has some interim pain in knees, hands and shoulder but sounds like doing fairly well     Has the fracture healed? She tells me yes; she did not need cast only splint and no longer needing     Interim some of recent note from Orthopedics Dr. Snow 8/29/22: Chondromalacia of patellofemoral: ("Had a long discussion with the patient regarding her weight and the affect it is actually having on her patellofemoral joint and knees in general.  Strongly recommended aquatic therapy and aerobics.  Do not believe that steroid injections will have much of any " "affect.  I do not believe viscosupplementation will have any benefit.  So essentially she is down to either losing weight or patellofemoral joint reconstruction.  She does not wish to proceed with any surgical remedy so will hold off on the referral for that.  She is to contact her primary care physician for medical treatment and weight loss plan.")      Looks like she may have also been treated for pneumonia interim per very limited records in Epic 8/17/22 diagnosis?   No longer using cymbalta? No changed to zoloft and a 'sleeping medicine' with a new PCP who she tells me also did some lab and treated her for pneumonia with an inhaler      No overt synovitis  DJD generalized   Verbal education   She will get Kristina's contact information and we can work on getting her recent lab and records and touch base in advance if needed moving forward   Cont plaquenil 200mg daily   gabapentin 600mg tid    Ca D daily with vitamin D)))))     She came in without lab results but Kristina tells me she may have done and they are tracking florian     She has interim arthralgias in hands and knees but sounds like managing fairly well; she is a bit back and forth but can call us if needed; we are here for her     She asks if she has lupus or rheumatoid arthritis as she tells me her other doctors are still entertaining both which Rheumatoid is more suggestive given her history and serology     No overt synovitis     Cont current     Contact us prn     We will track down lab              Revisit lab 2 weeks prior     Contact us prn     Last visit:     Interim lab:        Kristina is seeking as not sent     2/23/23: WBC 4.7 Hgb 12.8; plt 281     UA SG 1.026 5-6 WBC      GFR 81     Alb 4.2     CPK 87        SETH+ 1:160 specific Ab not done RF 23+ CCP 42+     C3,4 161, 36           ESR 78 CRP 1.1     Prev noted/prior plan:  (Interim lab Kristina is looking into as some from the prior visit were recently sent also noted:      ((Prev noted/prior plan: (cymbalta " "60mg  gabapentin 600mg tid   recently started taking her gabapentin to 100 mg t.i.d. and    has been taking her Cymbalta 60 mg daily.    Flexeril prn   She increased gabapentin to 600mg tid and has not noticed much noted  See prior   Consider Add plaquenil 200mg daily with eye exam; she can start prior but if vision problem hold off until seeing ophthal   Add Ca D daily to her vitamin D she uses  Addendum 6/20/22: Patient called Amanda to let her know she was   recently found to have a fracture in her right wrist with an MD and   to revisit MD next week but it was causing her a lot of pain and she was not even given a splint, which   Amanda had her contact the MD who told her she had the fracture and have the records forwarded/shared with us to clarify things; ))))     Prev noted/prior plan:    (Kristina was unable to get in touch with her about the lab and she came in this morning and may have had lab and Kristina is tracking down     She has some interim pain in knees, hands and shoulder but sounds like doing fairly well     Has the fracture healed? She tells me yes; she did not need cast only splint and no longer needing     Interim some of recent note from Orthopedics Dr. Snow 8/29/22: Chondromalacia of patellofemoral: ("Had a long discussion with the patient regarding her weight and the affect it is actually having on her patellofemoral joint and knees in general.  Strongly recommended aquatic therapy and aerobics.  Do not believe that steroid injections will have much of any affect.  I do not believe viscosupplementation will have any benefit.  So essentially she is down to either losing weight or patellofemoral joint reconstruction.  She does not wish to proceed with any surgical remedy so will hold off on the referral for that.  She is to contact her primary care physician for medical treatment and weight loss plan.")      Looks like she may have also been treated for pneumonia interim per very limited records " in Epic 8/17/22 diagnosis?   No longer using cymbalta? No changed to zoloft and a 'sleeping medicine' with a new PCP who she tells me also did some lab and treated her for pneumonia with an inhaler      No overt synovitis  DJD generalized   Verbal education   She will get Kristina's contact information and we can work on getting her recent lab and records and touch base in advance if needed moving forward   Cont plaquenil 200mg daily   gabapentin 600mg tid    Ca D daily with vitamin D)))))     She came in without lab results but Kristina tells me she may have done and they are tracking florian     She has interim arthralgias in hands and knees but sounds like managing fairly well; she is a bit back and forth but can call us if needed; we are here for her     She asks if she has lupus or rheumatoid arthritis as she tells me her other doctors are still entertaining both which Rheumatoid is more suggestive given her history and serology     No overt synovitis   Cont current   Contact us prn   We will track down lab)        She came in without lab and Kristina is seeking if she had it done; she did and Kristina sought as per above     She has some interim pain in knees and fingers but sounds like managing fairly well     She tells me she rather recently went to ED for sudden onset of left sided pain and thought to have pinched nerve and [facility]     She rather clarifies she has been having a lot of pain and interim swelling hands     No overt synovitis but possibly subtle     Verbal education and some written     Went over lab     She should go elsewhere for the lab moving forward where we can get all of the ordered results and also shared with us     Resume Methotrexate 7.5mg PO weekly for now  Cont/resume folic acid 1mg daily     She will plan to clarify meds with us moving forward as sounds like no longer using many on the list noted     Cont plaquenil 200mg daily     Gabapentin  "600mg tid noted     Tizanidine noted     She cannot use NSAID noted     Ca D daily with vitamin D    This visit:    Interim lab:        Prev noted/prior plan:  (Interim lab Kristina is looking into as some from the prior visit were recently sent also noted:      ((Prev noted/prior plan: (cymbalta 60mg  gabapentin 600mg tid   recently started taking her gabapentin to 100 mg t.i.d. and    has been taking her Cymbalta 60 mg daily.    Flexeril prn   She increased gabapentin to 600mg tid and has not noticed much noted  See prior   Consider Add plaquenil 200mg daily with eye exam; she can start prior but if vision problem hold off until seeing ophthal   Add Ca D daily to her vitamin D she uses  Addendum 6/20/22: Patient called Amanda to let her know she was   recently found to have a fracture in her right wrist with an MD and   to revisit MD next week but it was causing her a lot of pain and she was not even given a splint, which   Amanda had her contact the MD who told her she had the fracture and have the records forwarded/shared with us to clarify things; ))))     Prev noted/prior plan:    (Kristina was unable to get in touch with her about the lab and she came in this morning and may have had lab and Kristina is tracking down     She has some interim pain in knees, hands and shoulder but sounds like doing fairly well     Has the fracture healed? She tells me yes; she did not need cast only splint and no longer needing     Interim some of recent note from Orthopedics Dr. Snow 8/29/22: Chondromalacia of patellofemoral: ("Had a long discussion with the patient regarding her weight and the affect it is actually having on her patellofemoral joint and knees in general.  Strongly recommended aquatic therapy and aerobics.  Do not believe that steroid injections will have much of any affect.  I do not believe viscosupplementation will have any benefit.  So essentially she is down to either losing weight or patellofemoral joint " "reconstruction.  She does not wish to proceed with any surgical remedy so will hold off on the referral for that.  She is to contact her primary care physician for medical treatment and weight loss plan.")      Looks like she may have also been treated for pneumonia interim per very limited records in Epic 8/17/22 diagnosis?   No longer using cymbalta? No changed to zoloft and a 'sleeping medicine' with a new PCP who she tells me also did some lab and treated her for pneumonia with an inhaler      No overt synovitis  DJD generalized   Verbal education   She will get Kristina's contact information and we can work on getting her recent lab and records and touch base in advance if needed moving forward   Cont plaquenil 200mg daily   gabapentin 600mg tid    Ca D daily with vitamin D)))))     She came in without lab results but Kristina tells me she may have done and they are tracking florian     She has interim arthralgias in hands and knees but sounds like managing fairly well; she is a bit back and forth but can call us if needed; we are here for her     She asks if she has lupus or rheumatoid arthritis as she tells me her other doctors are still entertaining both which Rheumatoid is more suggestive given her history and serology     No overt synovitis   Cont current   Contact us prn   We will track down lab)     Prev noted/prior plan:   (She came in without lab and Kristina is seeking if she had it done; she did and Kristina sought as per above     She has some interim pain in knees and fingers but sounds like managing fairly well     She tells me she rather recently went to ED for sudden onset of left sided pain and thought to have pinched nerve and saw [facility]CP [facility]     She rather clarifies she has been having a lot of pain and interim swelling hands     No overt synovitis but possibly subtle     Verbal education and some written     Went over lab     She should go elsewhere for the lab " moving forward where we can get all of the ordered results and also shared with us     Resume Methotrexate 7.5mg PO weekly for now  Cont/resume folic acid 1mg daily     She will plan to clarify meds with us moving forward as sounds like no longer using many on the list noted     Cont plaquenil 200mg daily     Gabapentin 600mg tid noted     Tizanidine noted     She cannot use NSAID noted     Ca D daily with vitamin D)      She was put on the schedule urgently without notice and then came in without lab and Kristina is working on getting her results as she had done at some outside clinic per Kristina.    She tells me the call center put her on the schedule and she will get Kristina's number moving forward and can sort this out ahead of her appointment moving forward as sounds like most of her lab was missed again per Kristina    She should go to different lab moving forward also      She has interim pain in knees shoulders, fingers    No overt synovitis        Prev noted:  (She should go elsewhere for the lab moving forward where we can get all of the ordered results and also shared with us     Resume Methotrexate 7.5mg PO weekly for now  Cont/resume folic acid 1mg daily     She will plan to clarify meds with us moving forward as sounds like no longer using many on the list noted     Cont plaquenil 200mg daily     Gabapentin 600mg tid noted     Tizanidine noted     She cannot use NSAID noted     Ca D daily with vitamin D)    Increase Methotrexate to 12.5mg PO weekly     Cont folic acid 1mg daily    Cont plaquenil 200mg daily    She rather has intermittently been using gabapentin as tells me having trouble communicating with her pharmacist to get the correct script (I see she has not gotten since January PMP?) noted    I don't think she is using tizanidine    Nor using the calcium D; she will plan to clarify     Revisit lab 2 weeks prior     Contact us prn        Lab in 2021: SETH neg RF neg CCP6.9+(<3) Acute hepatitis B and C panel  "negative 2021 TB negative  SETH neg RF neg CCP 34+ uric acid 3.9   Repeat SETH+ 1:160 specific Ab not done RF 23+ CCP 42+  There is some history to suggest inflammatory arthritis.     2021 TB negative    Had been treated for Rheumatoid arthritis with Methotrexate until seeing most recent Rheumatologist below     TTP:  Patient was diagnosed with TTP in 2015 and was treated with plasmapheresis and Rituxan infusions.  She relapsed 3 times since then and had received Rituxan infusion each time.  Last Rituxan infusion was in October 2020.  She does not report any improvement in her joint symptoms after receiving Rituxan therapy.     There is also some mechanical and neurogenic pain.     EMG/NCS showed left cervical radiculopathy and bilateral carpal tunnel     Was to have shoulder surgery for advanced DJD shoulders     She did have partial right shoulder surgery since and to have possible right shoulder replacement in future noted     She tells me she last used 40-20mg prednisone a month ago for widespread 'inflammation all over' that did not help much, but 'may have kept the inflammation at bay' prev noted     recent note from Orthopedics Dr. Snow 8/29/22: Chondromalacia of patellofemoral: ("Had a long discussion with the patient regarding her weight and the affect it is actually having on her patellofemoral joint and knees in general.  Strongly recommended aquatic therapy and aerobics.  Do not believe that steroid injections will have much of any affect.  I do not believe viscosupplementation will have any benefit.  So essentially she is down to either losing weight or patellofemoral joint reconstruction.  She does not wish to proceed with any surgical remedy so will hold off on the referral for that.  She is to contact her primary care physician for medical treatment and weight loss plan.")      Prev noted/prior plan:  (No overt synovitis  Unable to actively flex shoulders above her head     Verbal education; no " overt active disease but she has been treated     Blood work to further evaluate     She should also be working with Orthopedics, Physical Medicine and Rehab MD, and consider Neurosurgery if not already depending on symptoms     Amanda will plan to update her meds next visit)     cymbalta 60mg     gabapentin 600mg tid     Flexeril prn      Revisit weeks     See above and below     Contact us prn     They are having trouble with Epic per Amanda unable to check in patient and Amanda is to input things when they fix the problem and I will then be able to also close the note prev noted     Has been managing with:     Prior Prednisone 40mg for 7 days then 20mg for 7 days      Flexeril     Gabapentin 600mg tid     voltaren gel      Cannot use oral NSAIDs noted     Prior recent tramadol     Prior Methotrexate     Prior Rituxan for TTP        recently started taking her gabapentin to 100 mg t.i.d. and      has been taking her Cymbalta 60 mg daily.       Has been working with:     We will seek records from PCP     Records PCP Yokasta Abad some of recent note 2/15/22: medical clearance for right shoulder surgery; EMG/NCS showed left cervical radiculopathy and bilateral carpal tunnel; prednisone and flexeril and gabapentin increased 600mg; MRI cervical spine f/u 2 weeks         Records Rheumatology some of note 9/15/21:  ((Nicole Quiñones  is an  46 y.o. female who is being seen in Rheumatology Clinic at [facility]  for the first visit at the request of No ref. provider found      Chief Complaint:  Multiple joint pain     HPI:  Patient is an  55-year-old female with past medical history of TTP, morbid obesity, migraine headaches and hypertension who is being referred to rheumatology clinic for evaluation of multiple joint pain.  Patient reports pain, stiffness and swelling hand joints.  She also has pain and locking of the shoulders bilaterally.  She has pain in the right hip  and both knees associated with difficulty walking and climbing stairs.  She has done physical therapy for the knees in the past 2 years ago with some relief.  She is currently on tramadol for her joint pains.  She reports morning stiffness in the joints lasting for 30 minutes in hurts more at the end of the day before she goes to bed.  According to the patient she was diagnosed with rheumatoid arthritis by a rheumatologist and was prescribed tramadol since then.  On review of the outside medical records, she was found to have elevated sed rate at 43 and CRP at 1.1.  X-rays of C-spine, shoulders, hips, knees and hands showing degenerative arthritis.  MRI lower extremity showed mild tendinosis of gluteus medius and minimus muscle.    TTP:  Patient was diagnosed with TTP in 2015 and was treated with plasmapheresis and Rituxan infusions.  She relapsed 3 times since then and had received Rituxan infusion each time.  Last Rituxan infusion was in October 2020.  She does not report any improvement in her joint symptoms after receiving Rituxan therapy.     Interval history:   Patient returns to clinic for regular follow-up.  Patient was recently seen in rheumatology clinic, she followed up with her PCP in [facility] and was told that none of the medications she is currently taking is for rheumatoid arthritis.  Patient was concerned and decided to follow-up her clinic again.  Patient continues to complain of right shoulder locking and left knee pain especially at night.  She is unable to sleep due to the pain.  The patient only recently started taking her gabapentin to 100 mg t.i.d. and has been taking her Cymbalta 60 mg daily.  Patient denies any current fevers, chills, rashes, shortness of breath, vision changes, joint swelling.     Current rheum medications:  1. MTX 15 mg weekly   2. Folic acid 1 mg daily       Allergies:  Nsaids (non-steroidal anti-inflammatory drug)             Past Medical History:   Diagnosis Date     Anemia      Hypertension      TTP (thrombotic thrombocytopenic purpura)      TTP (thrombotic thrombocytopenic purpura)                  Past Surgical History:   Procedure Laterality Date    SURGICAL REMOVAL OF ENDOMETRIOSIS          Assessment/Recommendations:     Inflammatory arthritis?  Osteoarthritis  Morbid obesity  History of TTP  Fibromyalgia        -after reviewing labs and imaging, it is likely that the patient has fibromyalgia and not rheumatoid arthritis  -The outside medical records:  X-rays of hands, shoulders, hips, knees showing degenerative joint disease.  -hepatitis panel and T spot negative   -Discontinue MTX 15 mg weekly   -x-rays of knees, hands and shoulders, showing DJD   -Ordered and reviewed the blood work for medication adverse effects and disease activity.   -gave paper script for the physical therapy for bilateral knee osteoarthritis.  Eventually patient may MRI of the knee after completing physical therapy and muscle referral for orthopedics.  Advised to start using knee brace and use topical Voltaren gel as needed for pain.  I also educated about weight loss which could help with the knee osteoarthritis.  -patient also has fibromyalgia explaining the fatigue and pain.  Poor  sleep might be causing the worsening of symptoms.  -patient reassured that she does not have limited arthritis at this point based on lab tests and diagnostic imaging, explained to her regarding fibromyalgia  -recommend to patient that she should do her physical therapy exercises at home, also recommend to lose weight and exercise, recommended patient also do yoga or [employer] and water aerobics ; will refer patient to physical therapy again  -will inject right shoulder and left knee today  -she is already on Cymbalta 60 mg daily and will start patient on gabapentin 100 mg t.i.d.     RIGHT SHOULDER JOINT/BURSA INJECTION PROCEDURE NOTE :  Medications used : 40 mg Depomedrol, 1% Lidocaine  LEFT KNEE INTRA ARTICULAR  INJECTION PROCEDURE NOTE :   Medication Uses : 40 mg Depomedrol and 1% lidocaine  Staff: Dr. Danica Ugarte MD  Rheumatology))     Review of medical records as stated above.  Lab +/- imaging and other ordered diagnostic studies to further evaluate.  See the orders associated with this note visit.  Medications as prescribed as tolerated.    Education including verbal and those noted in the patient instructions.  Revisit as scheduled and call prn.    The following diagnoses influence medical decision making and/or need further workup including the orders listed below:    Rheumatoid arthritis involving multiple sites with positive rheumatoid factor  -     CBC Auto Differential; Future; Expected date: 06/09/2023  -     CK; Future; Expected date: 06/09/2023  -     Comprehensive Metabolic Panel; Future; Expected date: 06/09/2023  -     C-Reactive Protein; Future; Expected date: 06/09/2023  -     Sedimentation rate; Future; Expected date: 06/09/2023  -     Urinalysis; Future; Expected date: 06/09/2023  -     Quantiferon Gold TB; Future; Expected date: 06/09/2023  -     hydrOXYchloroQUINE (PLAQUENIL) 200 mg tablet; Take 1 tablet (200 mg total) by mouth once daily.  Dispense: 30 tablet; Refill: 5  -     folic acid (FOLVITE) 1 MG tablet; Take 1 tablet (1,000 mcg total) by mouth once daily.  Dispense: 30 tablet; Refill: 3  -     methotrexate 2.5 MG Tab; Take 5 tablets (12.5 mg total) by mouth every 7 days.  Dispense: 20 tablet; Refill: 3    Other osteoarthritis involving multiple joints  -     CBC Auto Differential; Future; Expected date: 06/09/2023  -     CK; Future; Expected date: 06/09/2023  -     Comprehensive Metabolic Panel; Future; Expected date: 06/09/2023  -     C-Reactive Protein; Future; Expected date: 06/09/2023  -     Sedimentation rate; Future; Expected date: 06/09/2023  -     Urinalysis; Future; Expected date: 06/09/2023  -     Quantiferon Gold TB; Future; Expected date: 06/09/2023    Cervical  spondylosis  -     CBC Auto Differential; Future; Expected date: 06/09/2023  -     CK; Future; Expected date: 06/09/2023  -     Comprehensive Metabolic Panel; Future; Expected date: 06/09/2023  -     C-Reactive Protein; Future; Expected date: 06/09/2023  -     Sedimentation rate; Future; Expected date: 06/09/2023  -     Urinalysis; Future; Expected date: 06/09/2023  -     Quantiferon Gold TB; Future; Expected date: 06/09/2023    Rotator cuff arthropathy of both shoulders  -     CBC Auto Differential; Future; Expected date: 06/09/2023  -     CK; Future; Expected date: 06/09/2023  -     Comprehensive Metabolic Panel; Future; Expected date: 06/09/2023  -     C-Reactive Protein; Future; Expected date: 06/09/2023  -     Sedimentation rate; Future; Expected date: 06/09/2023  -     Urinalysis; Future; Expected date: 06/09/2023  -     Quantiferon Gold TB; Future; Expected date: 06/09/2023    TTP (thrombotic thrombopenic purpura)  -     CBC Auto Differential; Future; Expected date: 06/09/2023  -     CK; Future; Expected date: 06/09/2023  -     Comprehensive Metabolic Panel; Future; Expected date: 06/09/2023  -     C-Reactive Protein; Future; Expected date: 06/09/2023  -     Sedimentation rate; Future; Expected date: 06/09/2023  -     Urinalysis; Future; Expected date: 06/09/2023  -     Quantiferon Gold TB; Future; Expected date: 06/09/2023    Medication monitoring encounter  -     CBC Auto Differential; Future; Expected date: 06/09/2023  -     CK; Future; Expected date: 06/09/2023  -     Comprehensive Metabolic Panel; Future; Expected date: 06/09/2023  -     C-Reactive Protein; Future; Expected date: 06/09/2023  -     Sedimentation rate; Future; Expected date: 06/09/2023  -     Urinalysis; Future; Expected date: 06/09/2023  -     Quantiferon Gold TB; Future; Expected date: 06/09/2023  -     hydrOXYchloroQUINE (PLAQUENIL) 200 mg tablet; Take 1 tablet (200 mg total) by mouth once daily.  Dispense: 30 tablet; Refill: 5  -      folic acid (FOLVITE) 1 MG tablet; Take 1 tablet (1,000 mcg total) by mouth once daily.  Dispense: 30 tablet; Refill: 3  -     methotrexate 2.5 MG Tab; Take 5 tablets (12.5 mg total) by mouth every 7 days.  Dispense: 20 tablet; Refill: 3    Bilateral carpal tunnel syndrome  -     CBC Auto Differential; Future; Expected date: 06/09/2023  -     CK; Future; Expected date: 06/09/2023  -     Comprehensive Metabolic Panel; Future; Expected date: 06/09/2023  -     C-Reactive Protein; Future; Expected date: 06/09/2023  -     Sedimentation rate; Future; Expected date: 06/09/2023  -     Urinalysis; Future; Expected date: 06/09/2023  -     Quantiferon Gold TB; Future; Expected date: 06/09/2023    Screening for rheumatic disorder  -     CBC Auto Differential; Future; Expected date: 06/09/2023  -     CK; Future; Expected date: 06/09/2023  -     Comprehensive Metabolic Panel; Future; Expected date: 06/09/2023  -     C-Reactive Protein; Future; Expected date: 06/09/2023  -     Sedimentation rate; Future; Expected date: 06/09/2023  -     Urinalysis; Future; Expected date: 06/09/2023  -     Quantiferon Gold TB; Future; Expected date: 06/09/2023    Screening-pulmonary TB  -     CBC Auto Differential; Future; Expected date: 06/09/2023  -     CK; Future; Expected date: 06/09/2023  -     Comprehensive Metabolic Panel; Future; Expected date: 06/09/2023  -     C-Reactive Protein; Future; Expected date: 06/09/2023  -     Sedimentation rate; Future; Expected date: 06/09/2023  -     Urinalysis; Future; Expected date: 06/09/2023  -     Quantiferon Gold TB; Future; Expected date: 06/09/2023      Follow up in about 2 months (around 6/24/2023).     Otto Marcial MD

## 2023-04-28 ENCOUNTER — HOSPITAL ENCOUNTER (OUTPATIENT)
Dept: RADIOLOGY | Facility: HOSPITAL | Age: 48
Discharge: HOME | End: 2023-04-28
Attending: NURSE PRACTITIONER
Payer: MEDICAID

## 2023-04-28 DIAGNOSIS — Z76.89 ENCOUNTER TO ESTABLISH CARE: ICD-10-CM

## 2023-04-28 DIAGNOSIS — Z12.31 ENCOUNTER FOR SCREENING MAMMOGRAM FOR BREAST CANCER: ICD-10-CM

## 2023-04-28 PROCEDURE — 77067 SCR MAMMO BI INCL CAD: CPT | Mod: 26,,, | Performed by: RADIOLOGY

## 2023-04-28 PROCEDURE — 77063 BREAST TOMOSYNTHESIS BI: CPT | Mod: 26,,, | Performed by: RADIOLOGY

## 2023-04-28 PROCEDURE — 77063 MAMMO DIGITAL SCREENING BILAT WITH TOMO: ICD-10-PCS | Mod: 26,,, | Performed by: RADIOLOGY

## 2023-04-28 PROCEDURE — 77067 SCR MAMMO BI INCL CAD: CPT | Mod: TC

## 2023-04-28 PROCEDURE — 77067 MAMMO DIGITAL SCREENING BILAT WITH TOMO: ICD-10-PCS | Mod: 26,,, | Performed by: RADIOLOGY

## 2023-05-01 NOTE — PROGRESS NOTES
Please notify patient regarding her mammogram test results,  FINDINGS:   No suspicious mass, asymmetry, distortion, or calcification is identified.         IMPRESSION: NEGATIVE  Right Breast: Negative (BI-RADS 1)  Left Breast: Negative (BI-RADS 1)     Recommendations:  Recommend continued annual screening mammography (with Digital Breast Tomosynthesis), according to [employer] guidelines.

## 2023-05-02 ENCOUNTER — TELEPHONE (OUTPATIENT)
Dept: FAMILY MEDICINE | Facility: CLINIC | Age: 48
End: 2023-05-02
Payer: MEDICAID

## 2023-05-02 NOTE — TELEPHONE ENCOUNTER
----- Message from BHAVANA Nguyen sent at 5/1/2023  7:52 AM CDT -----  Please notify patient regarding her mammogram test results,  FINDINGS:   No suspicious mass, asymmetry, distortion, or calcification is identified.         IMPRESSION: NEGATIVE  Right Breast: Negative (BI-RADS 1)  Left Breast: Negative (BI-RADS 1)     Recommendations:  Recommend continued annual screening mammography (with Digital Breast Tomosynthesis), according to [employer] guidelines.

## 2023-05-09 ENCOUNTER — HOSPITAL ENCOUNTER (EMERGENCY)
Facility: HOSPITAL | Age: 48
Discharge: HOME | End: 2023-05-09
Attending: FAMILY MEDICINE
Payer: MEDICAID

## 2023-05-09 VITALS
BODY MASS INDEX: 40.07 KG/M2 | DIASTOLIC BLOOD PRESSURE: 90 MMHG | RESPIRATION RATE: 18 BRPM | WEIGHT: 249.31 LBS | HEIGHT: 66 IN | SYSTOLIC BLOOD PRESSURE: 147 MMHG | TEMPERATURE: 97 F | OXYGEN SATURATION: 97 % | HEART RATE: 74 BPM

## 2023-05-09 DIAGNOSIS — G43.009 MIGRAINE WITHOUT AURA AND WITHOUT STATUS MIGRAINOSUS, NOT INTRACTABLE: Primary | ICD-10-CM

## 2023-05-09 LAB
ALBUMIN SERPL-MCNC: 3.8 G/DL (ref 3.5–5)
ALBUMIN/GLOB SERPL: 1.1 RATIO (ref 1.1–2)
ALP SERPL-CCNC: 136 UNIT/L (ref 40–150)
ALT SERPL-CCNC: 9 UNIT/L (ref 0–55)
APPEARANCE UR: CLEAR
AST SERPL-CCNC: 13 UNIT/L (ref 5–34)
BACTERIA #/AREA URNS AUTO: ABNORMAL /HPF
BASOPHILS # BLD AUTO: 0.03 X10(3)/MCL
BASOPHILS NFR BLD AUTO: 0.7 %
BILIRUB UR QL STRIP.AUTO: NEGATIVE MG/DL
BILIRUBIN DIRECT+TOT PNL SERPL-MCNC: 0.3 MG/DL
BUN SERPL-MCNC: 14.7 MG/DL (ref 7–18.7)
CALCIUM SERPL-MCNC: 9 MG/DL (ref 8.4–10.2)
CHLORIDE SERPL-SCNC: 109 MMOL/L (ref 98–107)
CO2 SERPL-SCNC: 23 MMOL/L (ref 22–29)
COLOR UR AUTO: YELLOW
CREAT SERPL-MCNC: 0.81 MG/DL (ref 0.55–1.02)
EOSINOPHIL # BLD AUTO: 0.19 X10(3)/MCL (ref 0–0.9)
EOSINOPHIL NFR BLD AUTO: 4.2 %
ERYTHROCYTE [DISTWIDTH] IN BLOOD BY AUTOMATED COUNT: 15.5 % (ref 11.5–17)
GFR SERPLBLD CREATININE-BSD FMLA CKD-EPI: >60 MLS/MIN/1.73/M2
GLOBULIN SER-MCNC: 3.6 GM/DL (ref 2.4–3.5)
GLUCOSE SERPL-MCNC: 97 MG/DL (ref 74–100)
GLUCOSE UR QL STRIP.AUTO: NORMAL MG/DL
HCT VFR BLD AUTO: 42.2 % (ref 37–47)
HGB BLD-MCNC: 13.2 G/DL (ref 12–16)
HYALINE CASTS #/AREA URNS LPF: ABNORMAL /LPF
IMM GRANULOCYTES # BLD AUTO: 0.01 X10(3)/MCL (ref 0–0.04)
IMM GRANULOCYTES NFR BLD AUTO: 0.2 %
KETONES UR QL STRIP.AUTO: NEGATIVE MG/DL
LEUKOCYTE ESTERASE UR QL STRIP.AUTO: 25 UNIT/L
LYMPHOCYTES # BLD AUTO: 1.4 X10(3)/MCL (ref 0.6–4.6)
LYMPHOCYTES NFR BLD AUTO: 30.6 %
MCH RBC QN AUTO: 21.9 PG (ref 27–31)
MCHC RBC AUTO-ENTMCNC: 31.3 G/DL (ref 33–36)
MCV RBC AUTO: 69.9 FL (ref 80–94)
MONOCYTES # BLD AUTO: 0.49 X10(3)/MCL (ref 0.1–1.3)
MONOCYTES NFR BLD AUTO: 10.7 %
MUCOUS THREADS URNS QL MICRO: ABNORMAL /LPF
NEUTROPHILS # BLD AUTO: 2.45 X10(3)/MCL (ref 2.1–9.2)
NEUTROPHILS NFR BLD AUTO: 53.6 %
NITRITE UR QL STRIP.AUTO: NEGATIVE
NRBC BLD AUTO-RTO: 0 %
PH UR STRIP.AUTO: 5.5 [PH]
PLATELET # BLD AUTO: 297 X10(3)/MCL (ref 130–400)
PMV BLD AUTO: 10.1 FL (ref 7.4–10.4)
POTASSIUM SERPL-SCNC: 3.8 MMOL/L (ref 3.5–5.1)
PROT SERPL-MCNC: 7.4 GM/DL (ref 6.4–8.3)
PROT UR QL STRIP.AUTO: ABNORMAL MG/DL
RBC # BLD AUTO: 6.04 X10(6)/MCL (ref 4.2–5.4)
RBC #/AREA URNS AUTO: ABNORMAL /HPF
RBC UR QL AUTO: ABNORMAL UNIT/L
SODIUM SERPL-SCNC: 143 MMOL/L (ref 136–145)
SP GR UR STRIP.AUTO: 1.03
SQUAMOUS #/AREA URNS LPF: ABNORMAL /HPF
UROBILINOGEN UR STRIP-ACNC: ABNORMAL MG/DL
WBC # SPEC AUTO: 4.57 X10(3)/MCL (ref 4.5–11.5)
WBC #/AREA URNS AUTO: ABNORMAL /HPF

## 2023-05-09 PROCEDURE — 99284 EMERGENCY DEPT VISIT MOD MDM: CPT | Mod: 25

## 2023-05-09 PROCEDURE — 81001 URINALYSIS AUTO W/SCOPE: CPT | Performed by: FAMILY MEDICINE

## 2023-05-09 PROCEDURE — 96374 THER/PROPH/DIAG INJ IV PUSH: CPT

## 2023-05-09 PROCEDURE — 96375 TX/PRO/DX INJ NEW DRUG ADDON: CPT

## 2023-05-09 PROCEDURE — 85025 COMPLETE CBC W/AUTO DIFF WBC: CPT | Performed by: FAMILY MEDICINE

## 2023-05-09 PROCEDURE — 80053 COMPREHEN METABOLIC PANEL: CPT | Performed by: FAMILY MEDICINE

## 2023-05-09 PROCEDURE — 25000003 PHARM REV CODE 250: Performed by: FAMILY MEDICINE

## 2023-05-09 PROCEDURE — 63600175 PHARM REV CODE 636 W HCPCS: Performed by: FAMILY MEDICINE

## 2023-05-09 RX ORDER — METOCLOPRAMIDE HYDROCHLORIDE 5 MG/ML
10 INJECTION INTRAMUSCULAR; INTRAVENOUS
Status: COMPLETED | OUTPATIENT
Start: 2023-05-09 | End: 2023-05-09

## 2023-05-09 RX ORDER — DIPHENHYDRAMINE HYDROCHLORIDE 50 MG/ML
12.5 INJECTION INTRAMUSCULAR; INTRAVENOUS
Status: COMPLETED | OUTPATIENT
Start: 2023-05-09 | End: 2023-05-09

## 2023-05-09 RX ADMIN — SODIUM CHLORIDE 1000 ML: 9 INJECTION, SOLUTION INTRAVENOUS at 10:05

## 2023-05-09 RX ADMIN — METOCLOPRAMIDE 10 MG: 5 INJECTION, SOLUTION INTRAMUSCULAR; INTRAVENOUS at 10:05

## 2023-05-09 RX ADMIN — DIPHENHYDRAMINE HYDROCHLORIDE 12.5 MG: 50 INJECTION INTRAMUSCULAR; INTRAVENOUS at 10:05

## 2023-05-10 NOTE — ED PROVIDER NOTES
Encounter Date: 5/9/2023       History     Chief Complaint   Patient presents with    Neck Pain     Neck and bilat should pain x 3 days, no trauma      Patient is a 48-year-old female presents emergency room complaints of headache for the past 3 days.  Patient reports headache is mainly posterior.  Reports light sensitivity, and symptoms worsening with loud noises.  Patient has a history of migraine headaches.  Has been using medications at home, but still has persistent headaches.  Patient has a history of rheumatoid arthritis, osteoarthritis, thrombocytopenia.  Patient reports an NSAID allergy due to gastric ulcers.  She denies dysuria or hematuria.  Denies fever chills.  Also reports discomfort in both shoulders posteriorly similar to tension in her shoulders that she normally gets.  Patient reports that she normally tries to manage at home, but a primary care physician has encouraged her to come to the emergency room when she has these symptoms in order to help keep track of how many flares the patient experiences.    The history is provided by the patient.   Review of patient's allergies indicates:   Allergen Reactions    Nsaids (non-steroidal anti-inflammatory drug) Nausea Only     History reviewed. No pertinent past medical history.  History reviewed. No pertinent surgical history.  History reviewed. No pertinent family history.     Review of Systems   Constitutional:  Negative for chills, fatigue and fever.   HENT:  Negative for ear pain, rhinorrhea and sore throat.    Eyes:  Negative for photophobia and pain.   Respiratory:  Negative for cough, shortness of breath and wheezing.    Cardiovascular:  Negative for chest pain.   Gastrointestinal:  Negative for abdominal pain, diarrhea, nausea and vomiting.   Genitourinary:  Negative for dysuria.   Neurological:  Positive for headaches. Negative for dizziness and weakness.   All other systems reviewed and are negative.    Physical Exam     Initial Vitals  [05/09/23 2055]   BP Pulse Resp Temp SpO2   (!) 159/99 84 18 97 °F (36.1 °C) 100 %      MAP       --         Physical Exam    Nursing note and vitals reviewed.  Constitutional: She appears well-developed and well-nourished. No distress.   Patient currently nontoxic appearing, sitting in a dark room   HENT:   Head: Normocephalic and atraumatic.   Eyes: Conjunctivae and EOM are normal. Pupils are equal, round, and reactive to light.   Neck: Neck supple.   Full range of motion of the neck without restriction.  Tenderness to palpation bilateral trapezius region.  No tenderness to palpation over the cervical processes themselves.  No increased pain with neck flexion.   Normal range of motion.  Cardiovascular:  Normal rate, regular rhythm, normal heart sounds and intact distal pulses.           Pulmonary/Chest: Breath sounds normal. No respiratory distress. She has no wheezes. She has no rhonchi. She has no rales.   Abdominal: Abdomen is soft. Bowel sounds are normal. She exhibits no distension. There is no abdominal tenderness. There is no rebound and no guarding.   Musculoskeletal:         General: Normal range of motion.      Cervical back: Normal range of motion and neck supple.     Neurological: She is alert and oriented to person, place, and time.   Skin: Skin is warm and dry. Capillary refill takes less than 2 seconds. No erythema.   Psychiatric: She has a normal mood and affect. Her behavior is normal. Judgment and thought content normal.       ED Course   Procedures  Labs Reviewed   COMPREHENSIVE METABOLIC PANEL - Abnormal; Notable for the following components:       Result Value    Chloride 109 (*)     Globulin 3.6 (*)     All other components within normal limits   URINALYSIS, REFLEX TO URINE CULTURE - Abnormal; Notable for the following components:    Protein, UA Trace (*)     Blood, UA Trace (*)     Urobilinogen, UA 1+ (*)     Leukocyte Esterase, UA 25 (*)     Bacteria, UA Trace (*)     Squamous Epithelial  Cells, UA Few (*)     Mucous, UA Moderate (*)     Hyaline Casts, UA 0-2 (*)     All other components within normal limits   CBC WITH DIFFERENTIAL - Abnormal; Notable for the following components:    RBC 6.04 (*)     MCV 69.9 (*)     MCH 21.9 (*)     MCHC 31.3 (*)     All other components within normal limits   CBC W/ AUTO DIFFERENTIAL    Narrative:     The following orders were created for panel order CBC Auto Differential.  Procedure                               Abnormality         Status                     ---------                               -----------         ------                     CBC with Differential[[id number]]        Abnormal            Final result                 Please view results for these tests on the individual orders.   EXTRA TUBES    Narrative:     The following orders were created for panel order EXTRA TUBES.  Procedure                               Abnormality         Status                     ---------                               -----------         ------                     Gold Top Hold[[id number]]                                                                 Please view results for these tests on the individual orders.   GOLD TOP HOLD          Imaging Results    None          Medications   diphenhydrAMINE injection 12.5 mg (12.5 mg Intravenous Given 5/9/23 2232)   metoclopramide HCl injection 10 mg (10 mg Intravenous Given 5/9/23 2233)   sodium chloride 0.9% bolus 1,000 mL 1,000 mL (1,000 mLs Intravenous New Bag 5/9/23 2233)     Medical Decision Making:   Initial Assessment:   Patient is a 39-year-old female with a history of rheumatoid arthritis and osteoarthritis presents emergency room with complaints of migraine for the past 3 days.  Currently nontoxic appearing.  Will give medications for headache treatment, and also check laboratory evaluation including a CBC and CMP.  Also provide IV fluids to help with the headache.  Will continue to monitor.  Differential Diagnosis:    Tension headache, migraine him, electrolyte abnormality           ED Course as of 05/09/23 2348   Tue May 09, 2023   2346 Patient feeling much improved.  Stable for discharge to home.  Patient denies any dysuria.  Urinalysis obtained in the event there was any abnormality with renal function.  Patient noted to have trace bacteria on urinalysis, but due to not complaining of any dysuria, will not treat for urinary tract infection.  Stable for discharge home.  ER precautions given for any acute worsening. [MW]      ED Course User Index  [MW] James Hendricks MD                 Clinical Impression:   Final diagnoses:  [G43.009] Migraine without aura and without status migrainosus, not intractable (Primary)        ED Disposition Condition    Discharge Stable          ED Prescriptions    None       Follow-up Information       Follow up With Specialties Details Why Contact Info    Joy Huff MD Family Medicine   [address] [facility] [address]  [phone]      [facility] Emergency Medicine  As needed, If symptoms worsen [address] [facility] [place] [id number] [phone]             James Hendricks MD  05/09/23 2342

## 2023-05-23 ENCOUNTER — OFFICE VISIT (OUTPATIENT)
Dept: FAMILY MEDICINE | Facility: CLINIC | Age: 48
End: 2023-05-23
Payer: MEDICAID

## 2023-05-23 VITALS
SYSTOLIC BLOOD PRESSURE: 121 MMHG | OXYGEN SATURATION: 100 % | DIASTOLIC BLOOD PRESSURE: 86 MMHG | TEMPERATURE: 98 F | BODY MASS INDEX: 39.7 KG/M2 | WEIGHT: 247 LBS | HEIGHT: 66 IN | RESPIRATION RATE: 18 BRPM | HEART RATE: 86 BPM

## 2023-05-23 DIAGNOSIS — G89.29 CHRONIC PAIN OF LEFT KNEE: ICD-10-CM

## 2023-05-23 DIAGNOSIS — Z76.0 ENCOUNTER FOR MEDICATION REFILL: ICD-10-CM

## 2023-05-23 DIAGNOSIS — J02.9 SORE THROAT: ICD-10-CM

## 2023-05-23 DIAGNOSIS — B35.3 TINEA PEDIS OF LEFT FOOT: ICD-10-CM

## 2023-05-23 DIAGNOSIS — M25.562 CHRONIC PAIN OF LEFT KNEE: ICD-10-CM

## 2023-05-23 DIAGNOSIS — Z00.00 WELLNESS EXAMINATION: Primary | ICD-10-CM

## 2023-05-23 LAB
ALBUMIN SERPL-MCNC: 3.6 G/DL (ref 3.5–5)
ALBUMIN/GLOB SERPL: 0.9 RATIO (ref 1.1–2)
ALP SERPL-CCNC: 127 UNIT/L (ref 40–150)
ALT SERPL-CCNC: 12 UNIT/L (ref 0–55)
APPEARANCE UR: CLEAR
AST SERPL-CCNC: 13 UNIT/L (ref 5–34)
BACTERIA #/AREA URNS AUTO: ABNORMAL /HPF
BILIRUB UR QL STRIP.AUTO: NEGATIVE MG/DL
BILIRUBIN DIRECT+TOT PNL SERPL-MCNC: 0.6 MG/DL
BUN SERPL-MCNC: 14.2 MG/DL (ref 7–18.7)
CALCIUM SERPL-MCNC: 9.3 MG/DL (ref 8.4–10.2)
CHLORIDE SERPL-SCNC: 106 MMOL/L (ref 98–107)
CHOLEST SERPL-MCNC: 217 MG/DL
CHOLEST/HDLC SERPL: 5 {RATIO} (ref 0–5)
CO2 SERPL-SCNC: 24 MMOL/L (ref 22–29)
COLOR UR: YELLOW
CREAT SERPL-MCNC: 0.85 MG/DL (ref 0.55–1.02)
ERYTHROCYTE [DISTWIDTH] IN BLOOD BY AUTOMATED COUNT: 15.5 % (ref 11.5–17)
EST. AVERAGE GLUCOSE BLD GHB EST-MCNC: 105.4 MG/DL
GFR SERPLBLD CREATININE-BSD FMLA CKD-EPI: >60 MLS/MIN/1.73/M2
GLOBULIN SER-MCNC: 4.1 GM/DL (ref 2.4–3.5)
GLUCOSE SERPL-MCNC: 74 MG/DL (ref 74–100)
GLUCOSE UR QL STRIP.AUTO: NORMAL MG/DL
HBA1C MFR BLD: 5.3 %
HCT VFR BLD AUTO: 42.1 % (ref 37–47)
HDLC SERPL-MCNC: 42 MG/DL (ref 35–60)
HGB BLD-MCNC: 13 G/DL (ref 12–16)
HIV 1+2 AB+HIV1 P24 AG SERPL QL IA: NONREACTIVE
HYALINE CASTS #/AREA URNS LPF: ABNORMAL /LPF
KETONES UR QL STRIP.AUTO: NEGATIVE MG/DL
LDLC SERPL CALC-MCNC: 158 MG/DL (ref 50–140)
LEUKOCYTE ESTERASE UR QL STRIP.AUTO: NEGATIVE UNIT/L
MCH RBC QN AUTO: 21.8 PG (ref 27–31)
MCHC RBC AUTO-ENTMCNC: 30.9 G/DL (ref 33–36)
MCV RBC AUTO: 70.8 FL (ref 80–94)
MUCOUS THREADS URNS QL MICRO: ABNORMAL /LPF
NITRITE UR QL STRIP.AUTO: NEGATIVE
NRBC BLD AUTO-RTO: 0 %
PH UR STRIP.AUTO: 6 [PH]
PLATELET # BLD AUTO: 277 X10(3)/MCL (ref 130–400)
PMV BLD AUTO: 10.2 FL (ref 7.4–10.4)
POTASSIUM SERPL-SCNC: 3.7 MMOL/L (ref 3.5–5.1)
PROT SERPL-MCNC: 7.7 GM/DL (ref 6.4–8.3)
PROT UR QL STRIP.AUTO: ABNORMAL MG/DL
RBC # BLD AUTO: 5.95 X10(6)/MCL (ref 4.2–5.4)
RBC #/AREA URNS AUTO: ABNORMAL /HPF
RBC UR QL AUTO: NEGATIVE UNIT/L
SODIUM SERPL-SCNC: 140 MMOL/L (ref 136–145)
SP GR UR STRIP.AUTO: 1.03
SQUAMOUS #/AREA URNS LPF: ABNORMAL /HPF
STREP A PCR (OHS): NOT DETECTED
TRIGL SERPL-MCNC: 87 MG/DL (ref 37–140)
TSH SERPL-ACNC: 1.21 UIU/ML (ref 0.35–4.94)
UROBILINOGEN UR STRIP-ACNC: ABNORMAL MG/DL
VLDLC SERPL CALC-MCNC: 17 MG/DL
WBC # SPEC AUTO: 5.74 X10(3)/MCL (ref 4.5–11.5)
WBC #/AREA URNS AUTO: ABNORMAL /HPF

## 2023-05-23 PROCEDURE — 83036 HEMOGLOBIN GLYCOSYLATED A1C: CPT | Performed by: NURSE PRACTITIONER

## 2023-05-23 PROCEDURE — 84443 ASSAY THYROID STIM HORMONE: CPT | Performed by: NURSE PRACTITIONER

## 2023-05-23 PROCEDURE — 85027 COMPLETE CBC AUTOMATED: CPT | Performed by: NURSE PRACTITIONER

## 2023-05-23 PROCEDURE — 99214 OFFICE O/P EST MOD 30 MIN: CPT | Mod: S$PBB,25,, | Performed by: NURSE PRACTITIONER

## 2023-05-23 PROCEDURE — 80053 COMPREHEN METABOLIC PANEL: CPT | Performed by: NURSE PRACTITIONER

## 2023-05-23 PROCEDURE — 99215 OFFICE O/P EST HI 40 MIN: CPT | Mod: PBBFAC | Performed by: NURSE PRACTITIONER

## 2023-05-23 PROCEDURE — 1160F RVW MEDS BY RX/DR IN RCRD: CPT | Mod: CPTII,,, | Performed by: NURSE PRACTITIONER

## 2023-05-23 PROCEDURE — 99396 PREV VISIT EST AGE 40-64: CPT | Mod: S$PBB,,, | Performed by: NURSE PRACTITIONER

## 2023-05-23 PROCEDURE — 1160F PR REVIEW ALL MEDS BY PRESCRIBER/CLIN PHARMACIST DOCUMENTED: ICD-10-PCS | Mod: CPTII,,, | Performed by: NURSE PRACTITIONER

## 2023-05-23 PROCEDURE — 3079F PR MOST RECENT DIASTOLIC BLOOD PRESSURE 80-89 MM HG: ICD-10-PCS | Mod: CPTII,,, | Performed by: NURSE PRACTITIONER

## 2023-05-23 PROCEDURE — 3074F SYST BP LT 130 MM HG: CPT | Mod: CPTII,,, | Performed by: NURSE PRACTITIONER

## 2023-05-23 PROCEDURE — 87389 HIV-1 AG W/HIV-1&-2 AB AG IA: CPT | Performed by: NURSE PRACTITIONER

## 2023-05-23 PROCEDURE — 3074F PR MOST RECENT SYSTOLIC BLOOD PRESSURE < 130 MM HG: ICD-10-PCS | Mod: CPTII,,, | Performed by: NURSE PRACTITIONER

## 2023-05-23 PROCEDURE — 3079F DIAST BP 80-89 MM HG: CPT | Mod: CPTII,,, | Performed by: NURSE PRACTITIONER

## 2023-05-23 PROCEDURE — 36415 COLL VENOUS BLD VENIPUNCTURE: CPT | Performed by: NURSE PRACTITIONER

## 2023-05-23 PROCEDURE — 1159F PR MEDICATION LIST DOCUMENTED IN MEDICAL RECORD: ICD-10-PCS | Mod: CPTII,,, | Performed by: NURSE PRACTITIONER

## 2023-05-23 PROCEDURE — 1159F MED LIST DOCD IN RCRD: CPT | Mod: CPTII,,, | Performed by: NURSE PRACTITIONER

## 2023-05-23 PROCEDURE — 3008F PR BODY MASS INDEX (BMI) DOCUMENTED: ICD-10-PCS | Mod: CPTII,,, | Performed by: NURSE PRACTITIONER

## 2023-05-23 PROCEDURE — 3008F BODY MASS INDEX DOCD: CPT | Mod: CPTII,,, | Performed by: NURSE PRACTITIONER

## 2023-05-23 PROCEDURE — 99396 PR PREVENTIVE VISIT,EST,40-64: ICD-10-PCS | Mod: S$PBB,,, | Performed by: NURSE PRACTITIONER

## 2023-05-23 PROCEDURE — 81001 URINALYSIS AUTO W/SCOPE: CPT | Performed by: NURSE PRACTITIONER

## 2023-05-23 PROCEDURE — 99214 PR OFFICE/OUTPT VISIT, EST, LEVL IV, 30-39 MIN: ICD-10-PCS | Mod: S$PBB,25,, | Performed by: NURSE PRACTITIONER

## 2023-05-23 PROCEDURE — 80061 LIPID PANEL: CPT | Performed by: NURSE PRACTITIONER

## 2023-05-23 PROCEDURE — 87651 STREP A DNA AMP PROBE: CPT | Performed by: NURSE PRACTITIONER

## 2023-05-23 RX ORDER — ALBUTEROL SULFATE 0.83 MG/ML
SOLUTION RESPIRATORY (INHALATION)
Qty: 1 EACH | Refills: 1 | Status: SHIPPED | OUTPATIENT
Start: 2023-05-23 | End: 2023-07-25 | Stop reason: SDUPTHER

## 2023-05-23 RX ORDER — SUCRALFATE 1 G/1
1 TABLET ORAL 3 TIMES DAILY
Qty: 90 TABLET | Refills: 1 | Status: CANCELLED | OUTPATIENT
Start: 2023-05-23

## 2023-05-23 RX ORDER — MICONAZOLE NITRATE 2 %
POWDER (GRAM) TOPICAL 2 TIMES DAILY
Qty: 71 G | Refills: 0 | Status: SHIPPED | OUTPATIENT
Start: 2023-05-23 | End: 2023-09-20

## 2023-05-23 RX ORDER — ALBUTEROL SULFATE 0.83 MG/ML
SOLUTION RESPIRATORY (INHALATION)
Qty: 3 ML | Refills: 1 | Status: CANCELLED | OUTPATIENT
Start: 2023-05-23

## 2023-05-23 RX ORDER — HYDROCHLOROTHIAZIDE 25 MG/1
1 TABLET ORAL DAILY
COMMUNITY
End: 2023-05-23

## 2023-05-23 RX ORDER — GABAPENTIN 600 MG/1
600 TABLET ORAL 2 TIMES DAILY PRN
Qty: 30 TABLET | Refills: 3 | Status: SHIPPED | OUTPATIENT
Start: 2023-05-23 | End: 2023-07-13

## 2023-05-23 RX ORDER — POTASSIUM CHLORIDE 750 MG/1
1 TABLET, EXTENDED RELEASE ORAL DAILY
COMMUNITY
End: 2023-07-13

## 2023-05-23 RX ORDER — SERTRALINE HYDROCHLORIDE 25 MG/1
25 TABLET, FILM COATED ORAL DAILY
COMMUNITY
Start: 2023-05-13 | End: 2023-07-13

## 2023-05-23 RX ORDER — FLUTICASONE PROPIONATE 50 MCG
2 SPRAY, SUSPENSION (ML) NASAL DAILY PRN
Qty: 18.2 ML | Refills: 1 | Status: SHIPPED | OUTPATIENT
Start: 2023-05-23

## 2023-05-23 RX ORDER — UBROGEPANT 100 MG/1
1 TABLET ORAL DAILY
COMMUNITY
Start: 2023-04-10 | End: 2023-06-27 | Stop reason: SDUPTHER

## 2023-05-23 RX ORDER — ERGOCALCIFEROL 1.25 MG/1
50000 CAPSULE ORAL
Qty: 4 CAPSULE | Refills: 1 | Status: CANCELLED | OUTPATIENT
Start: 2023-05-23

## 2023-05-23 RX ORDER — CETIRIZINE HYDROCHLORIDE 10 MG/1
10 TABLET ORAL DAILY
COMMUNITY
Start: 2023-05-11 | End: 2023-05-23

## 2023-05-23 RX ORDER — ERGOCALCIFEROL 1.25 MG/1
50000 CAPSULE ORAL
Qty: 12 CAPSULE | Refills: 3 | Status: SHIPPED | OUTPATIENT
Start: 2023-05-23

## 2023-05-23 RX ORDER — GABAPENTIN 600 MG/1
600 TABLET ORAL 3 TIMES DAILY
Qty: 90 TABLET | Refills: 1 | Status: CANCELLED | OUTPATIENT
Start: 2023-05-23

## 2023-05-23 NOTE — ASSESSMENT & PLAN NOTE
Rx Zeasorb topical powder b.i.d. for 14 days.  Continue with good foot hygiene.  Dry gently between toe webs.  Return to clinic if no improvement

## 2023-05-23 NOTE — PROGRESS NOTES
Patient Name: Nicole Quiñones   : 1975  MRN: [id number]     SUBJECTIVE DATA:    CHIEF COMPLAINT:   Nicole Quiñones is a 48 y.o. female who presents to clinic today with Annual Exam        HPI:  48-year-old female presents to the clinic for her yearly wellness exam but also she would like to discuss sore throat, left chronic knee pain, medication refills.    Sore throat:  Patient state her sore throat started about a week ago, denies any fever or nausea or headache or vomiting or chills or diarrhea.  Patient state she has a cough with green mucus coming up.  Report Nasal congestion, post nasal drip.  Discussed with patient will collect throat swab, continue with Claritin 10 mg p.o. once daily and will refill Flonase nasal spray and use as directed, warm salt water gargles, stay hydrated with water will notify of test results when they become available.  if bacterial will prescribe antibiotics.  Return to clinic if no improvement or go to nearest emergency department or urgent care during weekend or after office hours.  Questions solicited and answered, patient verbalized and agreed to plan of care.    Chronic Left knee:  Patient requesting referral to orthopedic clinic for management and possibly knee injection.  Denies any recent fall or trauma.  Gabapentin medication refilled.  600 mg b.i.d. p.r.n. as needed for pain.  Precaution discussed, best to be taken at bedtime.    Tinea pedis:  Tinea pedis noted to her left foot.  Rx Zeasorb topical powder b.i.d. for 14 days.  Continue with good foot hygiene.  Dry gently between toe webs.  Return to clinic if no improvement. HPI      ALLERGIES:   Review of patient's allergies indicates:   Allergen Reactions    Nsaids (non-steroidal anti-inflammatory drug) Other (See Comments)     Causes ulcers to bleed.    Ibuprofen     Latex     Meloxicam     Nsaids (non-steroidal anti-inflammatory drug) Nausea Only         ROS:  Review of Systems   HENT:   "Positive for congestion and sore throat.    Musculoskeletal:  Positive for joint pain (Chronic left knee pain.).   All other systems reviewed and are negative.      OBJECTIVE DATA:  Vital signs  Vitals:    05/23/23 1001   BP: 121/86   Pulse: 86   Resp: 18   Temp: 98.2 °F (36.8 °C)   TempSrc: Oral   SpO2: 100%   Weight: 112 kg (247 lb)   Height: 5' 6" (1.676 m)      Body mass index is 39.87 kg/m².    PHYSICAL EXAM:   Physical Exam  Vitals and nursing note reviewed.   Constitutional:       General: She is awake. She is not in acute distress.     Appearance: Normal appearance. She is well-developed and well-groomed. She is morbidly obese. She is not ill-appearing, toxic-appearing or diaphoretic.   HENT:      Head: Normocephalic and atraumatic.      Right Ear: Tympanic membrane, ear canal and external ear normal.      Left Ear: Tympanic membrane, ear canal and external ear normal.      Nose: Congestion present. No rhinorrhea.      Right Nostril: No epistaxis.      Left Nostril: No epistaxis.      Right Turbinates: Not swollen.      Left Turbinates: Not swollen.      Right Sinus: No maxillary sinus tenderness or frontal sinus tenderness.      Left Sinus: No maxillary sinus tenderness or frontal sinus tenderness.      Mouth/Throat:      Lips: Pink.      Mouth: Mucous membranes are moist.      Pharynx: Oropharynx is clear. Uvula midline. Posterior oropharyngeal erythema present.     Eyes:      General: Lids are normal.      Extraocular Movements: Extraocular movements intact.      Conjunctiva/sclera: Conjunctivae normal.      Pupils: Pupils are equal, round, and reactive to light.   Neck:      Trachea: Trachea and phonation normal.   Cardiovascular:      Rate and Rhythm: Normal rate and regular rhythm.      Pulses: Normal pulses.           Radial pulses are 2+ on the right side and 2+ on the left side.        Dorsalis pedis pulses are 2+ on the right side and 2+ on the left side.        Posterior tibial pulses are 2+ on " the right side and 2+ on the left side.      Heart sounds: Normal heart sounds. No murmur heard.  Pulmonary:      Effort: Pulmonary effort is normal.      Breath sounds: Normal breath sounds and air entry.   Abdominal:      General: Bowel sounds are normal.      Palpations: Abdomen is soft.   Musculoskeletal:         General: Tenderness present. Normal range of motion.      Cervical back: Normal range of motion and neck supple. No rigidity or tenderness.      Right lower leg: No edema.      Left lower leg: No edema.        Legs:    Lymphadenopathy:      Cervical: No cervical adenopathy.      Right cervical: No superficial cervical adenopathy.     Left cervical: No superficial cervical adenopathy.   Skin:     General: Skin is warm and dry.      Capillary Refill: Capillary refill takes less than 2 seconds.      Findings: No rash.   Neurological:      General: No focal deficit present.      Mental Status: She is alert and oriented to person, place, and time. Mental status is at baseline.      GCS: GCS eye subscore is 4. GCS verbal subscore is 5. GCS motor subscore is 6.      Cranial Nerves: Cranial nerves 2-12 are intact.      Sensory: Sensation is intact.      Motor: Motor function is intact.      Coordination: Coordination is intact.      Gait: Gait is intact. Gait normal.   Psychiatric:         Attention and Perception: Attention and perception normal.         Mood and Affect: Mood and affect normal.         Speech: Speech normal.         Behavior: Behavior normal. Behavior is cooperative.         Thought Content: Thought content normal.         Cognition and Memory: Cognition and memory normal.         Judgment: Judgment normal.        ASSESSMENT/PLAN:  1. Wellness examination  -     Urinalysis, Reflex to Urine Culture  -     HIV 1/2 Ag/Ab (4th Gen)  -     Hemoglobin A1C  -     TSH  -     CBC Without Differential  -     Comprehensive Metabolic Panel  -     Lipid Panel  -     Ambulatory referral/consult to  Ophthalmology; Future; Expected date: 05/30/2023  -     Cancel: Ambulatory referral/consult to Gastroenterology; Future; Expected date: 05/30/2023  -     Ambulatory referral/consult to Gastroenterology; Future; Expected date: 05/30/2023    2. Sore throat  Assessment & Plan:  Discussed with patient will collect throat swab, continue with Claritin 10 mg p.o. once daily and will refill Flonase nasal spray and use as directed, warm salt water gargles, stay hydrated with water will notify of test results if bacterial will prescribe antibiotics.  Return to clinic if no improvement or go to nearest emergency department or urgent care during weekend or after office hours.  Questions solicited and answered, patient verbalized and agreed to plan of care.    Orders:  -     POCT rapid strep A  -     fluticasone propionate (FLONASE) 50 mcg/actuation nasal spray; 2 sprays (100 mcg total) by Each Nostril route daily as needed for Allergies or Rhinitis.  Dispense: 18.2 mL; Refill: 1  -     Strep Group A by PCR    3. Chronic pain of left knee  Assessment & Plan:  Rest, ice, elevate as needed.  Take medication as prescribed.  Orthopedic referral initiated.    Orders:  -     Ambulatory referral/consult to Orthopedics; Future; Expected date: 05/30/2023    4. Tinea pedis of left foot  -     miconazole NITRATE 2 % (ZEASORB AF) 2 % top powder; Apply topically 2 (two) times daily. for 14 days  Dispense: 71 g; Refill: 0    5. Encounter for medication refill  -     gabapentin (NEURONTIN) 600 MG tablet; Take 1 tablet (600 mg total) by mouth 2 (two) times daily as needed (pain).  Dispense: 30 tablet; Refill: 3  -     ergocalciferol (ERGOCALCIFEROL) 50,000 unit Cap; Take 1 capsule (50,000 Units total) by mouth every 7 days.  Dispense: 12 capsule; Refill: 3  -     albuterol (PROVENTIL) 2.5 mg /3 mL (0.083 %) nebulizer solution; USE 1 VIAL IN NEBULIZER EVERY 8 HOURS AS NEEDED  Dispense: 1 each; Refill: 1  -     fluticasone propionate (FLONASE) 50  mcg/actuation nasal spray; 2 sprays (100 mcg total) by Each Nostril route daily as needed for Allergies or Rhinitis.  Dispense: 18.2 mL; Refill: 1           RESULTS:  Recent Results (from the past 1008 hour(s))   CK    Collection Time: 04/19/23 10:45 AM   Result Value Ref Range    Creatine Kinase 78 29 - 168 U/L   Comprehensive Metabolic Panel    Collection Time: 04/19/23 10:45 AM   Result Value Ref Range    Sodium Level 139 136 - 145 mmol/L    Potassium Level 4.0 3.5 - 5.1 mmol/L    Chloride 106 98 - 107 mmol/L    Carbon Dioxide 25 22 - 29 mmol/L    Glucose Level 92 74 - 100 mg/dL    Blood Urea Nitrogen 13.2 7.0 - 18.7 mg/dL    Creatinine 0.91 0.55 - 1.02 mg/dL    Calcium Level Total 9.4 8.4 - 10.2 mg/dL    Protein Total 7.6 6.4 - 8.3 gm/dL    Albumin Level 3.8 3.5 - 5.0 g/dL    Globulin 3.8 (H) 2.4 - 3.5 gm/dL    Albumin/Globulin Ratio 1.0 (L) 1.1 - 2.0 ratio    Bilirubin Total 0.7 <=1.5 mg/dL    Alkaline Phosphatase 128 40 - 150 unit/L    Alanine Aminotransferase 9 0 - 55 unit/L    Aspartate Aminotransferase 14 5 - 34 unit/L    eGFR >60 mls/min/1.73/m2   C-Reactive Protein    Collection Time: 04/19/23 10:45 AM   Result Value Ref Range    C-Reactive Protein 11.90 (H) <5.00 mg/L   Sedimentation rate    Collection Time: 04/19/23 10:45 AM   Result Value Ref Range    Sed Rate 47 (H) 0 - 20 mm/hr   Urinalysis    Collection Time: 04/19/23 10:45 AM   Result Value Ref Range    Color, UA Yellow Yellow, Light-Yellow, Dark Yellow, Abby, Straw    Appearance, UA Clear Clear    Specific Gravity, UA 1.030     pH, UA 5.5 5.0 - 8.5    Protein, UA Trace (A) Negative mg/dL    Glucose, UA Normal Negative, Normal mg/dL    Ketones, UA Negative Negative mg/dL    Blood, UA Negative Negative unit/L    Bilirubin, UA Negative Negative mg/dL    Urobilinogen, UA Normal 0.2, 1.0, Normal mg/dL    Nitrites, UA Negative Negative    Leukocyte Esterase, UA Negative Negative unit/L    WBC, UA 0-5 None Seen, 0-2, 3-5, 0-5 /HPF    Bacteria, UA Trace  (A) None Seen /HPF    Squamous Epithelial Cells, UA Occ (A) None Seen /HPF    Mucous, UA Many (A) None Seen /LPF    Hyaline Casts, UA 0-2 (A) None Seen /lpf    RBC, UA 0-5 None Seen, 0-2, 3-5, 0-5 /HPF   C3 Complement    Collection Time: 04/19/23 10:45 AM   Result Value Ref Range    C3 Complement 143 80 - 173 mg/dL   C4 Complement    Collection Time: 04/19/23 10:45 AM   Result Value Ref Range    C4 Complement 37.0 13.0 - 46.0 mg/dL   Rheumatoid Factor    Collection Time: 04/19/23 10:45 AM   Result Value Ref Range    Rheumatoid Factor 26 (H) 0 - 14 IU/mL   Cyclic Citrullinated Peptide Antibody, IgG    Collection Time: 04/19/23 10:45 AM   Result Value Ref Range    Cyclic Citrullinated Peptide Ab, IgG 69 (H) 0 - 19 Units   CBC with Differential    Collection Time: 04/19/23 10:45 AM   Result Value Ref Range    WBC 4.4 (L) 4.5 - 11.5 x10(3)/mcL    RBC 6.18 (H) 4.20 - 5.40 x10(6)/mcL    Hgb 13.3 12.0 - 16.0 g/dL    Hct 43.1 37.0 - 47.0 %    MCV 69.7 (L) 80.0 - 94.0 fL    MCH 21.5 (L) 27.0 - 31.0 pg    MCHC 30.9 (L) 33.0 - 36.0 g/dL    RDW 15.9 11.5 - 17.0 %    Platelet 276 130 - 400 x10(3)/mcL    MPV 9.9 7.4 - 10.4 fL    Neut % 58.1 %    Lymph % 27.1 %    Mono % 11.4 %    Eos % 2.5 %    Basophil % 0.7 %    Lymph # 1.19 0.6 - 4.6 x10(3)/mcL    Neut # 2.55 2.1 - 9.2 x10(3)/mcL    Mono # 0.50 0.1 - 1.3 x10(3)/mcL    Eos # 0.11 0 - 0.9 x10(3)/mcL    Baso # 0.03 0 - 0.2 x10(3)/mcL    IG# 0.01 0 - 0.04 x10(3)/mcL    IG% 0.2 %    NRBC% 0.0 %   Comprehensive Metabolic Panel    Collection Time: 05/09/23 10:33 PM   Result Value Ref Range    Sodium Level 143 136 - 145 mmol/L    Potassium Level 3.8 3.5 - 5.1 mmol/L    Chloride 109 (H) 98 - 107 mmol/L    Carbon Dioxide 23 22 - 29 mmol/L    Glucose Level 97 74 - 100 mg/dL    Blood Urea Nitrogen 14.7 7.0 - 18.7 mg/dL    Creatinine 0.81 0.55 - 1.02 mg/dL    Calcium Level Total 9.0 8.4 - 10.2 mg/dL    Protein Total 7.4 6.4 - 8.3 gm/dL    Albumin Level 3.8 3.5 - 5.0 g/dL    Globulin 3.6  (H) 2.4 - 3.5 gm/dL    Albumin/Globulin Ratio 1.1 1.1 - 2.0 ratio    Bilirubin Total 0.3 <=1.5 mg/dL    Alkaline Phosphatase 136 40 - 150 unit/L    Alanine Aminotransferase 9 0 - 55 unit/L    Aspartate Aminotransferase 13 5 - 34 unit/L    eGFR >60 mls/min/1.73/m2   CBC with Differential    Collection Time: 05/09/23 10:33 PM   Result Value Ref Range    WBC 4.57 4.50 - 11.50 x10(3)/mcL    RBC 6.04 (H) 4.20 - 5.40 x10(6)/mcL    Hgb 13.2 12.0 - 16.0 g/dL    Hct 42.2 37.0 - 47.0 %    MCV 69.9 (L) 80.0 - 94.0 fL    MCH 21.9 (L) 27.0 - 31.0 pg    MCHC 31.3 (L) 33.0 - 36.0 g/dL    RDW 15.5 11.5 - 17.0 %    Platelet 297 130 - 400 x10(3)/mcL    MPV 10.1 7.4 - 10.4 fL    Neut % 53.6 %    Lymph % 30.6 %    Mono % 10.7 %    Eos % 4.2 %    Basophil % 0.7 %    Lymph # 1.40 0.6 - 4.6 x10(3)/mcL    Neut # 2.45 2.1 - 9.2 x10(3)/mcL    Mono # 0.49 0.1 - 1.3 x10(3)/mcL    Eos # 0.19 0 - 0.9 x10(3)/mcL    Baso # 0.03 <=0.2 x10(3)/mcL    IG# 0.01 0 - 0.04 x10(3)/mcL    IG% 0.2 %    NRBC% 0.0 %   Urinalysis, Reflex to Urine Culture    Collection Time: 05/09/23 11:02 PM    Specimen: Urine   Result Value Ref Range    Color, UA Yellow Yellow, Light-Yellow, Dark Yellow, Abby, Straw    Appearance, UA Clear Clear    Specific Gravity, UA 1.026     pH, UA 5.5 5.0 - 8.5    Protein, UA Trace (A) Negative mg/dL    Glucose, UA Normal Negative, Normal mg/dL    Ketones, UA Negative Negative mg/dL    Blood, UA Trace (A) Negative unit/L    Bilirubin, UA Negative Negative mg/dL    Urobilinogen, UA 1+ (A) 0.2, 1.0, Normal mg/dL    Nitrites, UA Negative Negative    Leukocyte Esterase, UA 25 (A) Negative unit/L    WBC, UA 0-5 None Seen, 0-2, 3-5, 0-5 /HPF    Bacteria, UA Trace (A) None Seen /HPF    Squamous Epithelial Cells, UA Few (A) None Seen /HPF    Mucous, UA Moderate (A) None Seen /LPF    Hyaline Casts, UA 0-2 (A) None Seen /lpf    RBC, UA 0-5 None Seen, 0-2, 3-5, 0-5 /HPF   Urinalysis, Reflex to Urine Culture    Collection Time: 05/23/23 11:33 AM     Specimen: Urine   Result Value Ref Range    Color, UA Yellow Yellow, Light-Yellow, Dark Yellow, Abby, Straw    Appearance, UA Clear Clear    Specific Gravity, UA 1.031     pH, UA 6.0 5.0 - 8.5    Protein, UA 1+ (A) Negative mg/dL    Glucose, UA Normal Negative, Normal mg/dL    Ketones, UA Negative Negative mg/dL    Blood, UA Negative Negative unit/L    Bilirubin, UA Negative Negative mg/dL    Urobilinogen, UA 1+ (A) 0.2, 1.0, Normal mg/dL    Nitrites, UA Negative Negative    Leukocyte Esterase, UA Negative Negative unit/L    WBC, UA 0-5 None Seen, 0-2, 3-5, 0-5 /HPF    Bacteria, UA Trace (A) None Seen /HPF    Squamous Epithelial Cells, UA Few (A) None Seen /HPF    Mucous, UA Many (A) None Seen /LPF    Hyaline Casts, UA None Seen None Seen /lpf    RBC, UA 0-5 None Seen, 0-2, 3-5, 0-5 /HPF   Hemoglobin A1C    Collection Time: 05/23/23 11:33 AM   Result Value Ref Range    Hemoglobin A1c 5.3 <=7.0 %    Estimated Average Glucose 105.4 mg/dL   CBC Without Differential    Collection Time: 05/23/23 11:33 AM   Result Value Ref Range    WBC 5.74 4.50 - 11.50 x10(3)/mcL    RBC 5.95 (H) 4.20 - 5.40 x10(6)/mcL    Hgb 13.0 12.0 - 16.0 g/dL    Hct 42.1 37.0 - 47.0 %    MCV 70.8 (L) 80.0 - 94.0 fL    MCH 21.8 (L) 27.0 - 31.0 pg    MCHC 30.9 (L) 33.0 - 36.0 g/dL    RDW 15.5 11.5 - 17.0 %    Platelet 277 130 - 400 x10(3)/mcL    MPV 10.2 7.4 - 10.4 fL    NRBC% 0.0 %         Follow Up:  Follow up in about 6 months (around 11/23/2023).      Previous medical history/lab work/radiology reviewed and considered during medical management decisions.   Medication list reviewed and medication reconciliation performed.  Patient was provided  and care about his/her current diagnosis (es) and medications including risk/benefit and side effects/adverse events, over the counter medication uses/doses, home self-care and contact precautions,  and red flags and indications for when to seek immediate medical attention.   Patient was advised to  continue compliance with current medication list and medical recommendations.  Patient dvised continued compliance with recommended eating habits/ diets for medical conditions and exercise 150 minutes/ week (if possible) for medical condition (s).  Educational handouts and instructions on selected disease management in AVS (After Visit Summary).    All of the patient's questions were answered to patient's satisfaction.   The patient was receptive, expressed verbal understanding and agreement the above plan.     This note was created with the assistance of a voice recognition software or phone dictation. There may be transcription errors as a result of using this technology however minimal. Effort has been made to assure accuracy of transcription but any obvious errors or omissions should be clarified with the author of the document

## 2023-05-23 NOTE — ASSESSMENT & PLAN NOTE
Discussed with patient will collect throat swab, continue with Claritin 10 mg p.o. once daily and will refill Flonase nasal spray and use as directed, warm salt water gargles, stay hydrated with water will notify of test results if bacterial will prescribe antibiotics.  Return to clinic if no improvement or go to nearest emergency department or urgent care during weekend or after office hours.  Questions solicited and answered, patient verbalized and agreed to plan of care.

## 2023-05-23 NOTE — PROGRESS NOTES
Patient Name: Nicole Quiñones   : 1975  MRN: [id number]     SUBJECTIVE DATA:    CHIEF COMPLAINT:   Nicole Quiñones is a 48 y.o. female who presents to clinic today with Annual Exam        HPI:  48-year-old female presents to the clinic to complete his yearly wellness exam.   Social Determinants of Health     Tobacco Use: Low Risk     Smoking Tobacco Use: Never    Smokeless Tobacco Use: Never    Passive Exposure: Not on file   Alcohol Use: Not At Risk    Frequency of Alcohol Consumption: Never    Average Number of Drinks: Patient does not drink    Frequency of Binge Drinking: Never   Financial Resource Strain: Low Risk     Difficulty of Paying Living Expenses: Not very hard   Food Insecurity: Food Insecurity Present    Worried About Running Out of Food in the Last Year: Sometimes true    Ran Out of Food in the Last Year: Never true   Transportation Needs: No Transportation Needs    Lack of Transportation (Medical): No    Lack of Transportation (Non-Medical): No   Physical Activity: Inactive    Days of Exercise per Week: 0 days    Minutes of Exercise per Session: 0 min   Stress: Stress Concern Present    Feeling of Stress : To some extent   Social Connections: Moderately Integrated    Frequency of Communication with Friends and Family: Three times a week    Frequency of Social Gatherings with Friends and Family: Twice a week    Attends Jew Services: 1 to 4 times per year    Active Member of Clubs or Organizations: Yes    Attends Club or Organization Meetings: 1 to 4 times per year    Marital Status: Never    Housing Stability: Low Risk     Unable to Pay for Housing in the Last Year: No    Number of Places Lived in the Last Year: 2    Unstable Housing in the Last Year: No   Depression: Low Risk     Last PHQ Score: 2      LMP:  premenuapause follows dr steve  Gun safety:  no guns  Car safety:  Seat belt used all the time.  Water:  Stay hydrated with fluids, drink 6-8 cups  "of water daily.  Smoke: dose not smoke.  Alcohol: dose not drink.  Exercise:  Need to start exercise 30 minutes a day up to 5 days a week as simple as brisk walking.  Stay active, lose weight.  Nutrition:  Follow low-cholesterol, low-fat, low-salt diet.  eatin fresh fruits and vegetables, eat high fiber food. At least 30 grams/day.  Dental:  Patient does not have a dentist, denitis lists provided, patient to call and schedule..  Ophthalmology:  Patient does not follow-up with ophthalmologist yearly.  Ophthalmology referral initiated  [facility] rectal :screening referral initiated.  Cervical cancer: screening exam  completed at Dr. Braden gyn, [facility].  Medical records requested.  Fasting blood work drawn today will notify of test results when they become available.    Patient denies chest pain, shortness of breath, dyspnea on exertion, palpitations, peripheral edema, abdominal pain, nausea, vomiting, diarrhea, constipation, fatigue, fever, chills, dysuria,  hematuria, or hematochezia.    HPI      ALLERGIES:   Review of patient's allergies indicates:   Allergen Reactions    Nsaids (non-steroidal anti-inflammatory drug) Other (See Comments)     Causes ulcers to bleed.    Ibuprofen     Latex     Meloxicam     Nsaids (non-steroidal anti-inflammatory drug) Nausea Only         ROS:  Review of Systems   All other systems reviewed and are negative.      OBJECTIVE DATA:  Vital signs  Vitals:    05/23/23 1001   BP: 121/86   Pulse: 86   Resp: 18   Temp: 98.2 °F (36.8 °C)   TempSrc: Oral   SpO2: 100%   Weight: 112 kg (247 lb)   Height: 5' 6" (1.676 m)      Body mass index is 39.87 kg/m².    PHYSICAL EXAM:   Physical Exam  Vitals and nursing note reviewed.   Constitutional:       General: She is awake. She is not in acute distress.     Appearance: Normal appearance. She is well-developed and well-groomed. She is morbidly obese. She is not ill-appearing, toxic-appearing or diaphoretic.   HENT:      Head: Normocephalic " and atraumatic.      Right Ear: Hearing, tympanic membrane, ear canal and external ear normal. There is no impacted cerumen.      Left Ear: Hearing, tympanic membrane, ear canal and external ear normal. There is no impacted cerumen.      Nose: Nose normal. No congestion or rhinorrhea.      Right Nostril: No epistaxis.      Left Nostril: No epistaxis.      Right Turbinates: Not swollen.      Left Turbinates: Not swollen.      Right Sinus: No maxillary sinus tenderness or frontal sinus tenderness.      Left Sinus: No maxillary sinus tenderness or frontal sinus tenderness.      Mouth/Throat:      Lips: Pink.      Mouth: Mucous membranes are moist.      Dentition: Dental caries present.      Pharynx: Oropharynx is clear. Uvula midline.      Comments: Dentist list provided to patient.    Eyes:      General: Lids are normal. Gaze aligned appropriately.      Extraocular Movements: Extraocular movements intact.      Conjunctiva/sclera: Conjunctivae normal.      Pupils: Pupils are equal, round, and reactive to light.   Neck:      Thyroid: No thyroid mass, thyromegaly or thyroid tenderness.      Trachea: Trachea and phonation normal.   Cardiovascular:      Rate and Rhythm: Normal rate and regular rhythm.      Pulses: Normal pulses.           Radial pulses are 2+ on the right side and 2+ on the left side.        Femoral pulses are 2+ on the right side and 2+ on the left side.       Dorsalis pedis pulses are 2+ on the right side and 2+ on the left side.        Posterior tibial pulses are 2+ on the right side and 2+ on the left side.      Heart sounds: Normal heart sounds. No murmur heard.  Pulmonary:      Effort: Pulmonary effort is normal.      Breath sounds: Normal breath sounds and air entry. No wheezing or rhonchi.   Abdominal:      General: Abdomen is flat. Bowel sounds are normal. There is no distension.      Palpations: Abdomen is soft. There is no mass.      Tenderness: There is no abdominal tenderness. There is no  right CVA tenderness, left CVA tenderness, guarding or rebound.      Hernia: No hernia is present.   Musculoskeletal:         General: Normal range of motion.      Cervical back: Full passive range of motion without pain, normal range of motion and neck supple. No rigidity or tenderness.      Right lower leg: No edema.      Left lower leg: No edema.      Right foot: Normal range of motion. No deformity, bunion, Charcot foot, foot drop or prominent metatarsal heads.      Left foot: Normal range of motion. No deformity, bunion, Charcot foot, foot drop or prominent metatarsal heads.        Feet:    Feet:      Right foot:      Protective Sensation: 10 sites tested.  10 sites sensed.      Skin integrity: Callus present.      Toenail Condition: Right toenails are normal.      Left foot:      Protective Sensation: 10 sites tested.  10 sites sensed.      Skin integrity: Skin breakdown, callus and dry skin present.      Toenail Condition: Left toenails are abnormally thick. Fungal disease present.  Lymphadenopathy:      Cervical: No cervical adenopathy.   Skin:     General: Skin is warm and dry.      Capillary Refill: Capillary refill takes less than 2 seconds.      Findings: No rash.   Neurological:      General: No focal deficit present.      Mental Status: She is alert and oriented to person, place, and time. Mental status is at baseline.      GCS: GCS eye subscore is 4. GCS verbal subscore is 5. GCS motor subscore is 6.      Cranial Nerves: Cranial nerves 2-12 are intact.      Sensory: Sensation is intact.      Motor: Motor function is intact.      Coordination: Coordination is intact.      Gait: Gait is intact. Gait normal.      Deep Tendon Reflexes:      Reflex Scores:       Tricep reflexes are 2+ on the right side and 2+ on the left side.       Bicep reflexes are 2+ on the right side and 2+ on the left side.       Patellar reflexes are 2+ on the right side and 2+ on the left side.  Psychiatric:         Attention and  Perception: Attention and perception normal.         Mood and Affect: Mood and affect normal.         Speech: Speech normal.         Behavior: Behavior normal. Behavior is cooperative.         Thought Content: Thought content normal.         Cognition and Memory: Cognition and memory normal.         Judgment: Judgment normal.        ASSESSMENT/PLAN:  1. Wellness examination  -     Urinalysis, Reflex to Urine Culture  -     HIV 1/2 Ag/Ab (4th Gen)  -     Hemoglobin A1C  -     TSH  -     CBC Without Differential  -     Comprehensive Metabolic Panel  -     Lipid Panel  -     Ambulatory referral/consult to Ophthalmology; Future; Expected date: 05/30/2023  -     Cancel: Ambulatory referral/consult to Gastroenterology; Future; Expected date: 05/30/2023  -     Ambulatory referral/consult to Gastroenterology; Future; Expected date: 05/30/2023    2. Sore throat  -     POCT rapid strep A  -     fluticasone propionate (FLONASE) 50 mcg/actuation nasal spray; 2 sprays (100 mcg total) by Each Nostril route daily as needed for Allergies or Rhinitis.  Dispense: 18.2 mL; Refill: 1  -     Strep Group A by PCR    3. Chronic pain of left knee  -     Ambulatory referral/consult to Orthopedics; Future; Expected date: 05/30/2023    4. Tinea pedis of left foot  -     miconazole NITRATE 2 % (ZEASORB AF) 2 % top powder; Apply topically 2 (two) times daily. for 14 days  Dispense: 71 g; Refill: 0    5. Encounter for medication refill  -     gabapentin (NEURONTIN) 600 MG tablet; Take 1 tablet (600 mg total) by mouth 2 (two) times daily as needed (pain).  Dispense: 30 tablet; Refill: 3  -     ergocalciferol (ERGOCALCIFEROL) 50,000 unit Cap; Take 1 capsule (50,000 Units total) by mouth every 7 days.  Dispense: 12 capsule; Refill: 3  -     albuterol (PROVENTIL) 2.5 mg /3 mL (0.083 %) nebulizer solution; USE 1 VIAL IN NEBULIZER EVERY 8 HOURS AS NEEDED  Dispense: 1 each; Refill: 1  -     fluticasone propionate (FLONASE) 50 mcg/actuation nasal spray;  2 sprays (100 mcg total) by Each Nostril route daily as needed for Allergies or Rhinitis.  Dispense: 18.2 mL; Refill: 1           RESULTS:  Recent Results (from the past 1008 hour(s))   CK    Collection Time: 04/19/23 10:45 AM   Result Value Ref Range    Creatine Kinase 78 29 - 168 U/L   Comprehensive Metabolic Panel    Collection Time: 04/19/23 10:45 AM   Result Value Ref Range    Sodium Level 139 136 - 145 mmol/L    Potassium Level 4.0 3.5 - 5.1 mmol/L    Chloride 106 98 - 107 mmol/L    Carbon Dioxide 25 22 - 29 mmol/L    Glucose Level 92 74 - 100 mg/dL    Blood Urea Nitrogen 13.2 7.0 - 18.7 mg/dL    Creatinine 0.91 0.55 - 1.02 mg/dL    Calcium Level Total 9.4 8.4 - 10.2 mg/dL    Protein Total 7.6 6.4 - 8.3 gm/dL    Albumin Level 3.8 3.5 - 5.0 g/dL    Globulin 3.8 (H) 2.4 - 3.5 gm/dL    Albumin/Globulin Ratio 1.0 (L) 1.1 - 2.0 ratio    Bilirubin Total 0.7 <=1.5 mg/dL    Alkaline Phosphatase 128 40 - 150 unit/L    Alanine Aminotransferase 9 0 - 55 unit/L    Aspartate Aminotransferase 14 5 - 34 unit/L    eGFR >60 mls/min/1.73/m2   C-Reactive Protein    Collection Time: 04/19/23 10:45 AM   Result Value Ref Range    C-Reactive Protein 11.90 (H) <5.00 mg/L   Sedimentation rate    Collection Time: 04/19/23 10:45 AM   Result Value Ref Range    Sed Rate 47 (H) 0 - 20 mm/hr   Urinalysis    Collection Time: 04/19/23 10:45 AM   Result Value Ref Range    Color, UA Yellow Yellow, Light-Yellow, Dark Yellow, Abby, Straw    Appearance, UA Clear Clear    Specific Gravity, UA 1.030     pH, UA 5.5 5.0 - 8.5    Protein, UA Trace (A) Negative mg/dL    Glucose, UA Normal Negative, Normal mg/dL    Ketones, UA Negative Negative mg/dL    Blood, UA Negative Negative unit/L    Bilirubin, UA Negative Negative mg/dL    Urobilinogen, UA Normal 0.2, 1.0, Normal mg/dL    Nitrites, UA Negative Negative    Leukocyte Esterase, UA Negative Negative unit/L    WBC, UA 0-5 None Seen, 0-2, 3-5, 0-5 /HPF    Bacteria, UA Trace (A) None Seen /HPF     Squamous Epithelial Cells, UA Occ (A) None Seen /HPF    Mucous, UA Many (A) None Seen /LPF    Hyaline Casts, UA 0-2 (A) None Seen /lpf    RBC, UA 0-5 None Seen, 0-2, 3-5, 0-5 /HPF   C3 Complement    Collection Time: 04/19/23 10:45 AM   Result Value Ref Range    C3 Complement 143 80 - 173 mg/dL   C4 Complement    Collection Time: 04/19/23 10:45 AM   Result Value Ref Range    C4 Complement 37.0 13.0 - 46.0 mg/dL   Rheumatoid Factor    Collection Time: 04/19/23 10:45 AM   Result Value Ref Range    Rheumatoid Factor 26 (H) 0 - 14 IU/mL   Cyclic Citrullinated Peptide Antibody, IgG    Collection Time: 04/19/23 10:45 AM   Result Value Ref Range    Cyclic Citrullinated Peptide Ab, IgG 69 (H) 0 - 19 Units   CBC with Differential    Collection Time: 04/19/23 10:45 AM   Result Value Ref Range    WBC 4.4 (L) 4.5 - 11.5 x10(3)/mcL    RBC 6.18 (H) 4.20 - 5.40 x10(6)/mcL    Hgb 13.3 12.0 - 16.0 g/dL    Hct 43.1 37.0 - 47.0 %    MCV 69.7 (L) 80.0 - 94.0 fL    MCH 21.5 (L) 27.0 - 31.0 pg    MCHC 30.9 (L) 33.0 - 36.0 g/dL    RDW 15.9 11.5 - 17.0 %    Platelet 276 130 - 400 x10(3)/mcL    MPV 9.9 7.4 - 10.4 fL    Neut % 58.1 %    Lymph % 27.1 %    Mono % 11.4 %    Eos % 2.5 %    Basophil % 0.7 %    Lymph # 1.19 0.6 - 4.6 x10(3)/mcL    Neut # 2.55 2.1 - 9.2 x10(3)/mcL    Mono # 0.50 0.1 - 1.3 x10(3)/mcL    Eos # 0.11 0 - 0.9 x10(3)/mcL    Baso # 0.03 0 - 0.2 x10(3)/mcL    IG# 0.01 0 - 0.04 x10(3)/mcL    IG% 0.2 %    NRBC% 0.0 %   Comprehensive Metabolic Panel    Collection Time: 05/09/23 10:33 PM   Result Value Ref Range    Sodium Level 143 136 - 145 mmol/L    Potassium Level 3.8 3.5 - 5.1 mmol/L    Chloride 109 (H) 98 - 107 mmol/L    Carbon Dioxide 23 22 - 29 mmol/L    Glucose Level 97 74 - 100 mg/dL    Blood Urea Nitrogen 14.7 7.0 - 18.7 mg/dL    Creatinine 0.81 0.55 - 1.02 mg/dL    Calcium Level Total 9.0 8.4 - 10.2 mg/dL    Protein Total 7.4 6.4 - 8.3 gm/dL    Albumin Level 3.8 3.5 - 5.0 g/dL    Globulin 3.6 (H) 2.4 - 3.5 gm/dL     Albumin/Globulin Ratio 1.1 1.1 - 2.0 ratio    Bilirubin Total 0.3 <=1.5 mg/dL    Alkaline Phosphatase 136 40 - 150 unit/L    Alanine Aminotransferase 9 0 - 55 unit/L    Aspartate Aminotransferase 13 5 - 34 unit/L    eGFR >60 mls/min/1.73/m2   CBC with Differential    Collection Time: 05/09/23 10:33 PM   Result Value Ref Range    WBC 4.57 4.50 - 11.50 x10(3)/mcL    RBC 6.04 (H) 4.20 - 5.40 x10(6)/mcL    Hgb 13.2 12.0 - 16.0 g/dL    Hct 42.2 37.0 - 47.0 %    MCV 69.9 (L) 80.0 - 94.0 fL    MCH 21.9 (L) 27.0 - 31.0 pg    MCHC 31.3 (L) 33.0 - 36.0 g/dL    RDW 15.5 11.5 - 17.0 %    Platelet 297 130 - 400 x10(3)/mcL    MPV 10.1 7.4 - 10.4 fL    Neut % 53.6 %    Lymph % 30.6 %    Mono % 10.7 %    Eos % 4.2 %    Basophil % 0.7 %    Lymph # 1.40 0.6 - 4.6 x10(3)/mcL    Neut # 2.45 2.1 - 9.2 x10(3)/mcL    Mono # 0.49 0.1 - 1.3 x10(3)/mcL    Eos # 0.19 0 - 0.9 x10(3)/mcL    Baso # 0.03 <=0.2 x10(3)/mcL    IG# 0.01 0 - 0.04 x10(3)/mcL    IG% 0.2 %    NRBC% 0.0 %   Urinalysis, Reflex to Urine Culture    Collection Time: 05/09/23 11:02 PM    Specimen: Urine   Result Value Ref Range    Color, UA Yellow Yellow, Light-Yellow, Dark Yellow, Abby, Straw    Appearance, UA Clear Clear    Specific Gravity, UA 1.026     pH, UA 5.5 5.0 - 8.5    Protein, UA Trace (A) Negative mg/dL    Glucose, UA Normal Negative, Normal mg/dL    Ketones, UA Negative Negative mg/dL    Blood, UA Trace (A) Negative unit/L    Bilirubin, UA Negative Negative mg/dL    Urobilinogen, UA 1+ (A) 0.2, 1.0, Normal mg/dL    Nitrites, UA Negative Negative    Leukocyte Esterase, UA 25 (A) Negative unit/L    WBC, UA 0-5 None Seen, 0-2, 3-5, 0-5 /HPF    Bacteria, UA Trace (A) None Seen /HPF    Squamous Epithelial Cells, UA Few (A) None Seen /HPF    Mucous, UA Moderate (A) None Seen /LPF    Hyaline Casts, UA 0-2 (A) None Seen /lpf    RBC, UA 0-5 None Seen, 0-2, 3-5, 0-5 /HPF   Urinalysis, Reflex to Urine Culture    Collection Time: 05/23/23 11:33 AM    Specimen: Urine    Result Value Ref Range    Color, UA Yellow Yellow, Light-Yellow, Dark Yellow, Abby, Straw    Appearance, UA Clear Clear    Specific Gravity, UA 1.031     pH, UA 6.0 5.0 - 8.5    Protein, UA 1+ (A) Negative mg/dL    Glucose, UA Normal Negative, Normal mg/dL    Ketones, UA Negative Negative mg/dL    Blood, UA Negative Negative unit/L    Bilirubin, UA Negative Negative mg/dL    Urobilinogen, UA 1+ (A) 0.2, 1.0, Normal mg/dL    Nitrites, UA Negative Negative    Leukocyte Esterase, UA Negative Negative unit/L    WBC, UA 0-5 None Seen, 0-2, 3-5, 0-5 /HPF    Bacteria, UA Trace (A) None Seen /HPF    Squamous Epithelial Cells, UA Few (A) None Seen /HPF    Mucous, UA Many (A) None Seen /LPF    Hyaline Casts, UA None Seen None Seen /lpf    RBC, UA 0-5 None Seen, 0-2, 3-5, 0-5 /HPF   Hemoglobin A1C    Collection Time: 05/23/23 11:33 AM   Result Value Ref Range    Hemoglobin A1c 5.3 <=7.0 %    Estimated Average Glucose 105.4 mg/dL   CBC Without Differential    Collection Time: 05/23/23 11:33 AM   Result Value Ref Range    WBC 5.74 4.50 - 11.50 x10(3)/mcL    RBC 5.95 (H) 4.20 - 5.40 x10(6)/mcL    Hgb 13.0 12.0 - 16.0 g/dL    Hct 42.1 37.0 - 47.0 %    MCV 70.8 (L) 80.0 - 94.0 fL    MCH 21.8 (L) 27.0 - 31.0 pg    MCHC 30.9 (L) 33.0 - 36.0 g/dL    RDW 15.5 11.5 - 17.0 %    Platelet 277 130 - 400 x10(3)/mcL    MPV 10.2 7.4 - 10.4 fL    NRBC% 0.0 %         Follow Up:  Follow up in about 6 months (around 11/23/2023).      Previous medical history/lab work/radiology reviewed and considered during medical management decisions.   Medication list reviewed and medication reconciliation performed.  Patient was provided  and care about his/her current diagnosis (es) and medications including risk/benefit and side effects/adverse events, over the counter medication uses/doses, home self-care and contact precautions,  and red flags and indications for when to seek immediate medical attention.   Patient was advised to continue  compliance with current medication list and medical recommendations.  Patient dvised continued compliance with recommended eating habits/ diets for medical conditions and exercise 150 minutes/ week (if possible) for medical condition (s).  Educational handouts and instructions on selected disease management in AVS (After Visit Summary).    All of the patient's questions were answered to patient's satisfaction.   The patient was receptive, expressed verbal understanding and agreement the above plan.       This note was created with the assistance of a voice recognition software or phone dictation. There may be transcription errors as a result of using this technology however minimal. Effort has been made to assure accuracy of transcription but any obvious errors or omissions should be clarified with the author of the document

## 2023-05-25 ENCOUNTER — TELEPHONE (OUTPATIENT)
Dept: FAMILY MEDICINE | Facility: CLINIC | Age: 48
End: 2023-05-25
Payer: MEDICAID

## 2023-05-25 NOTE — TELEPHONE ENCOUNTER
----- Message from BHAVANA Nguyen sent at 5/24/2023  3:27 PM CDT -----  Please notify patient regarding her strep throat test results negative.  Take medication as prescribed.  Anemia noted.  Keep appointment with Hematology as directed.  LDL elevated at 158 mg/dL.  Continue cholesterol medications as prescribed, lose weight, follow low-fat, low-cholesterol diet and keep fiber intake at 30 g per day if possible, stay hydrated with fluids exercise at least 30 minutes a day up to 5 days a week as.  Discussed in the clinic.  Thyroid within normal range, HIV and hemoglobin A1c within normal range no sign of diabetes.  Continue medications as prescribed, continue to follow-up with other providers as directed.  Return to clinic as needed.

## 2023-05-29 ENCOUNTER — OFFICE VISIT (OUTPATIENT)
Dept: ORTHOPEDICS | Facility: CLINIC | Age: 48
End: 2023-05-29
Payer: MEDICAID

## 2023-05-29 ENCOUNTER — HOSPITAL ENCOUNTER (OUTPATIENT)
Dept: RADIOLOGY | Facility: HOSPITAL | Age: 48
Discharge: HOME | End: 2023-05-29
Attending: NURSE PRACTITIONER
Payer: MEDICAID

## 2023-05-29 VITALS — WEIGHT: 245 LBS | HEIGHT: 66 IN | BODY MASS INDEX: 39.37 KG/M2

## 2023-05-29 DIAGNOSIS — M17.12 PRIMARY OSTEOARTHRITIS OF LEFT KNEE: ICD-10-CM

## 2023-05-29 DIAGNOSIS — M17.12 PRIMARY OSTEOARTHRITIS OF LEFT KNEE: Primary | ICD-10-CM

## 2023-05-29 PROCEDURE — 73564 X-RAY EXAM KNEE 4 OR MORE: CPT | Mod: TC,LT

## 2023-05-29 PROCEDURE — 99215 OFFICE O/P EST HI 40 MIN: CPT | Mod: S$PBB,,, | Performed by: NURSE PRACTITIONER

## 2023-05-29 PROCEDURE — 99215 PR OFFICE/OUTPT VISIT, EST, LEVL V, 40-54 MIN: ICD-10-PCS | Mod: S$PBB,,, | Performed by: NURSE PRACTITIONER

## 2023-05-29 PROCEDURE — 99215 OFFICE O/P EST HI 40 MIN: CPT | Mod: PBBFAC | Performed by: NURSE PRACTITIONER

## 2023-05-29 PROCEDURE — 3008F BODY MASS INDEX DOCD: CPT | Mod: CPTII,,, | Performed by: NURSE PRACTITIONER

## 2023-05-29 PROCEDURE — 1160F RVW MEDS BY RX/DR IN RCRD: CPT | Mod: CPTII,,, | Performed by: NURSE PRACTITIONER

## 2023-05-29 PROCEDURE — 1159F PR MEDICATION LIST DOCUMENTED IN MEDICAL RECORD: ICD-10-PCS | Mod: CPTII,,, | Performed by: NURSE PRACTITIONER

## 2023-05-29 PROCEDURE — 1159F MED LIST DOCD IN RCRD: CPT | Mod: CPTII,,, | Performed by: NURSE PRACTITIONER

## 2023-05-29 PROCEDURE — 1160F PR REVIEW ALL MEDS BY PRESCRIBER/CLIN PHARMACIST DOCUMENTED: ICD-10-PCS | Mod: CPTII,,, | Performed by: NURSE PRACTITIONER

## 2023-05-29 PROCEDURE — 3008F PR BODY MASS INDEX (BMI) DOCUMENTED: ICD-10-PCS | Mod: CPTII,,, | Performed by: NURSE PRACTITIONER

## 2023-05-29 NOTE — PROGRESS NOTES
"  Subjective:   PATIENT ID: Nicole Quiñones is a 48 y.o. female. Non-smoker. Employment HX: , currently self employed.    Seen [facility] ortho for same DX since n/a.   CHIEF COMPLAINT: No chief complaint on file.    HPI:    Left aching medial knee pain.   Injury: no known injury  Onset: several years ago fluctuates   Modifying Factors: worse with activity, improves with rest, stiffness after immobilization, and improves with less than 30 minutes activity  Associated Symptoms: crepitus, decreased ROM, swelling, "giving out", and locking/ catching with ROM   Activity: sedentary with light activity and pain moderately interferes with ADLs   Previous Treatments:  HEP withTheraBand, RX NSAIDs, and CSI since  last injection approximately 1 year ago  with some relief but symptoms returning  PMH: + RA  Family History:  And family history    NOTE: New patient  referral for left knee pain with limited conservative treatments.     Current Outpatient Medications:     albuterol (PROVENTIL) 2.5 mg /3 mL (0.083 %) nebulizer solution, USE 1 VIAL IN NEBULIZER EVERY 8 HOURS AS NEEDED, Disp: 1 each, Rfl: 1    butalbital-acetaminophen-caffeine -40 mg (FIORICET, ESGIC) -40 mg per tablet, SMARTSI Tablet(s) By Mouth Every 6 Hours PRN, Disp: , Rfl:     cloNIDine (CATAPRES) 0.1 MG tablet, Take 1 tablet by mouth daily as needed., Disp: , Rfl:     DULERA 100-5 mcg/actuation HFAA, Inhale 1 puff into the lungs 2 (two) times daily., Disp: , Rfl:     EMGALITY  mg/mL PnIj, AS DIRECTED SUBCUTANEOUS MONTHLY 30 DAY(S), Disp: , Rfl:     ergocalciferol (ERGOCALCIFEROL) 50,000 unit Cap, Take 1 capsule (50,000 Units total) by mouth every 7 days., Disp: 12 capsule, Rfl: 3    ezetimibe (ZETIA) 10 mg tablet, Take 10 mg by mouth once daily., Disp: , Rfl:     fluticasone propionate (FLONASE) 50 mcg/actuation nasal spray, 2 sprays (100 mcg total) by Each Nostril route daily as needed for Allergies or Rhinitis., Disp: 18.2 mL, " Rfl: 1    folic acid (FOLVITE) 1 MG tablet, Take 1 tablet (1,000 mcg total) by mouth once daily., Disp: 30 tablet, Rfl: 3    gabapentin (NEURONTIN) 600 MG tablet, Take 1 tablet (600 mg total) by mouth 2 (two) times daily as needed (pain)., Disp: 30 tablet, Rfl: 3    hydrOXYchloroQUINE (PLAQUENIL) 200 mg tablet, Take 1 tablet (200 mg total) by mouth once daily., Disp: 30 tablet, Rfl: 5    isosorbide dinitrate (ISORDIL) 40 MG Tab, Take 1 tablet (40 mg total) by mouth once daily., Disp: 90 tablet, Rfl: 2    LINZESS 72 mcg Cap capsule, Take 1 capsule by mouth once daily., Disp: , Rfl:     loratadine (CLARITIN) 10 mg tablet, 1 tablet., Disp: , Rfl:     methotrexate 2.5 MG Tab, Take 5 tablets (12.5 mg total) by mouth every 7 days., Disp: 20 tablet, Rfl: 3    metoprolol succinate (TOPROL-XL) 100 MG 24 hr tablet, Take 1 tablet (100 mg total) by mouth every evening., Disp: 90 tablet, Rfl: 2    miconazole NITRATE 2 % (ZEASORB AF) 2 % top powder, Apply topically 2 (two) times daily. for 14 days, Disp: 71 g, Rfl: 0    olmesartan-hydrochlorothiazide (BENICAR HCT) 20-12.5 mg per tablet, Take 1 tablet by mouth once daily., Disp: 90 tablet, Rfl: 2    ondansetron (ZOFRAN) 8 MG tablet, TAKE 1 TABLET BY MOUTH EVERY 8 HOURS AS NEEDED FOR NAUSEA AND VOMITING, Disp: , Rfl:     pantoprazole (PROTONIX) 20 MG tablet, Take 20 mg by mouth once daily., Disp: , Rfl:     potassium chloride SA (KLOR-CON M10) 10 MEQ tablet, Take 1 tablet by mouth once daily., Disp: , Rfl:     sertraline (ZOLOFT) 25 MG tablet, Take 25 mg by mouth once daily., Disp: , Rfl:     sucralfate (CARAFATE) 1 gram tablet, Take 1 g by mouth 3 (three) times daily., Disp: , Rfl:     UBRELVY 100 mg tablet, Take 1 tablet by mouth once daily., Disp: , Rfl:     verapamiL (CALAN-SR) 180 MG CR tablet, SMARTSI Tablet(s) By Mouth Twice Daily, Disp: , Rfl:   Review of patient's allergies indicates:   Allergen Reactions    Nsaids (non-steroidal anti-inflammatory drug) Other (See  Comments)     Causes ulcers to bleed.    Ibuprofen     Latex     Meloxicam     Nsaids (non-steroidal anti-inflammatory drug) Nausea Only     Hemoglobin A1c   Date Value Ref Range Status   05/23/2023 5.3 <=7.0 % Final      There is no height or weight on file to calculate BMI.   There were no vitals filed for this visit.   REVIEW OF SYSTEMS:  A ten-point review of systems was performed and is negative, except as mentioned above   Objective:   MSK Bilateral Knee  General:  No apparent distress, no pain indicators, obesity   Inspection: limping gait, mild effusion, full weight bearing, no erythema, no contusion, mild quad deconditioning, varus deformity   Palpation: LEFT knee tenderness with palpation at medial joint line, peripatellar soft tissue , non-tender: patellar tendon, tibial plateau  ROM:    Active Flexion / Extension (0-140):  0 / 135 non-painful (R)  0 / 119 painful (L)  Strength:   Flexion     5 / 5 (R)  4 / 5 (L)  Extension     5 / 5 (R)  4 / 5 (L)  Special Testing:  IT Band Testing  Chrissy's Test:  -- (R)  -- (L)  Effusion Testing  Ballotable Effusion:  -- (R)  -- (L)  Fluid Wave:    -- (R)  -- (L)  Patellar Testing  Patellar Grind:    -- (R)  + (L)  Patellar Facet Pain:  -- (R)  + (L)  Apprehension:    -- (R)  -- (L)  Meniscal Testing  Inderjit's test:    -- (R)  + (L)  Thessaly's Test:    Not tested (R)  not tested (L)  Ligament Testing  ACL Anterior Drawer:   -- (R) -- (L)  PCL Posterior Drawer:   -- (R) -- (L)  LCL Varus Test:    -- (R) -- (L)  MCL Valgus Test:    -- (R) -- (L)  Hip Exam normal  Ankle Exam normal  Neurovascular: Intact to light touch  Neuro/ Psych: Awake, alert, oriented, normal mood and affect  Lymphatic: No LAD  Skin/ Soft Tissue: no rash, skin intact  Assessment:   IMAGING: X-ray 4 views of left knee ordered, reviewed and independently interpreted by me. Discussed with patient. Noted no acute findings, DJD noted, awaiting radiologist findings    EMR REVIEW: completed with noted  Referral documentation reviewed    DIAGNOSIS:  1. Primary osteoarthritis of left knee    2. BMI 39.0-39.9,adult       Plan:      Ongoing education about DX and treatment recommendations including conservative treatments of daily HEP with TheraBand, BMI reduction goal 5-10% of body weight (215# overall goal), muscle strengthening with use of stationary bike (RPM set at 80 or > with slow progression to goal of 40 minutes 3-4 times per week as tolerated), adequate vit D/C, glucosamine 1500 mg/day and daily acetaminophen 1000 mg 3 times/ day if able to tolerate.    Treatment plan: Moderate exacerbation of chronic Left Moderate knee arthritis complicated by gait dysfunction s/t left ankle over pronation  Recommend starting initial conservative treatments including: RX topical NSAID , HEP with TheraBand > 6 weeks, formal RX PT, and recommended supportive shoes with OTC arch supports.  If inadequate at f/u will consider CSI.   Procedure: CSI v. VS injections discussed as treatment options in future if conservative treatments fail.   RX Medications: continue medications as RX per PCP.  RTC: 2 months  NOTE: None     Elsie Carter FNP    Procedure Note:   None    Timed Billing Note  Total Time Spent with Patient: 40 minutes .  Visit Start Time: 1400  10 minutes spent prior to visit reviewing EMR, prior labs and x-rays.  20 minutes spent in visit with patient face-to-face time completing exam, obtaining history, educating on DX and treatment plan.  10 minutes spent after visit completing EMR documentation.   Visit End Time: 1440

## 2023-05-30 ENCOUNTER — PATIENT MESSAGE (OUTPATIENT)
Dept: ADMINISTRATIVE | Facility: HOSPITAL | Age: 48
End: 2023-05-30
Payer: MEDICAID

## 2023-06-05 DIAGNOSIS — G43.009 MIGRAINE WITHOUT AURA AND WITHOUT STATUS MIGRAINOSUS, NOT INTRACTABLE: Primary | ICD-10-CM

## 2023-06-07 ENCOUNTER — TELEPHONE (OUTPATIENT)
Dept: FAMILY MEDICINE | Facility: CLINIC | Age: 48
End: 2023-06-07
Payer: MEDICAID

## 2023-06-07 NOTE — TELEPHONE ENCOUNTER
Patient called to give name of Rheumatology in [facility] she would like for us to refer her to.    Dr. Burgos [phone]

## 2023-06-09 DIAGNOSIS — M05.79 RHEUMATOID ARTHRITIS INVOLVING MULTIPLE SITES WITH POSITIVE RHEUMATOID FACTOR: Primary | ICD-10-CM

## 2023-06-14 ENCOUNTER — DOCUMENTATION ONLY (OUTPATIENT)
Dept: ADMINISTRATIVE | Facility: HOSPITAL | Age: 48
End: 2023-06-14
Payer: MEDICAID

## 2023-06-27 ENCOUNTER — OFFICE VISIT (OUTPATIENT)
Dept: FAMILY MEDICINE | Facility: CLINIC | Age: 48
End: 2023-06-27
Payer: MEDICAID

## 2023-06-27 VITALS
BODY MASS INDEX: 39.86 KG/M2 | RESPIRATION RATE: 18 BRPM | TEMPERATURE: 98 F | OXYGEN SATURATION: 98 % | HEART RATE: 76 BPM | DIASTOLIC BLOOD PRESSURE: 99 MMHG | HEIGHT: 66 IN | SYSTOLIC BLOOD PRESSURE: 147 MMHG | WEIGHT: 248 LBS

## 2023-06-27 DIAGNOSIS — G43.109 MIGRAINE WITH AURA AND WITHOUT STATUS MIGRAINOSUS, NOT INTRACTABLE: ICD-10-CM

## 2023-06-27 DIAGNOSIS — I10 PRIMARY HYPERTENSION: Primary | ICD-10-CM

## 2023-06-27 DIAGNOSIS — G47.00 INSOMNIA, UNSPECIFIED TYPE: ICD-10-CM

## 2023-06-27 DIAGNOSIS — M54.2 ACUTE NECK PAIN: ICD-10-CM

## 2023-06-27 PROBLEM — G43.909 MIGRAINE: Status: ACTIVE | Noted: 2020-07-01

## 2023-06-27 PROCEDURE — 99215 OFFICE O/P EST HI 40 MIN: CPT | Mod: PBBFAC | Performed by: NURSE PRACTITIONER

## 2023-06-27 PROCEDURE — 3080F PR MOST RECENT DIASTOLIC BLOOD PRESSURE >= 90 MM HG: ICD-10-PCS | Mod: CPTII,,, | Performed by: NURSE PRACTITIONER

## 2023-06-27 PROCEDURE — 4010F ACE/ARB THERAPY RXD/TAKEN: CPT | Mod: CPTII,,, | Performed by: NURSE PRACTITIONER

## 2023-06-27 PROCEDURE — 1160F PR REVIEW ALL MEDS BY PRESCRIBER/CLIN PHARMACIST DOCUMENTED: ICD-10-PCS | Mod: CPTII,,, | Performed by: NURSE PRACTITIONER

## 2023-06-27 PROCEDURE — 4010F PR ACE/ARB THEARPY RXD/TAKEN: ICD-10-PCS | Mod: CPTII,,, | Performed by: NURSE PRACTITIONER

## 2023-06-27 PROCEDURE — 99214 PR OFFICE/OUTPT VISIT, EST, LEVL IV, 30-39 MIN: ICD-10-PCS | Mod: S$PBB,,, | Performed by: NURSE PRACTITIONER

## 2023-06-27 PROCEDURE — 99214 OFFICE O/P EST MOD 30 MIN: CPT | Mod: S$PBB,,, | Performed by: NURSE PRACTITIONER

## 2023-06-27 PROCEDURE — 3080F DIAST BP >= 90 MM HG: CPT | Mod: CPTII,,, | Performed by: NURSE PRACTITIONER

## 2023-06-27 PROCEDURE — 3008F BODY MASS INDEX DOCD: CPT | Mod: CPTII,,, | Performed by: NURSE PRACTITIONER

## 2023-06-27 PROCEDURE — 1160F RVW MEDS BY RX/DR IN RCRD: CPT | Mod: CPTII,,, | Performed by: NURSE PRACTITIONER

## 2023-06-27 PROCEDURE — 3008F PR BODY MASS INDEX (BMI) DOCUMENTED: ICD-10-PCS | Mod: CPTII,,, | Performed by: NURSE PRACTITIONER

## 2023-06-27 PROCEDURE — 3077F SYST BP >= 140 MM HG: CPT | Mod: CPTII,,, | Performed by: NURSE PRACTITIONER

## 2023-06-27 PROCEDURE — 1159F MED LIST DOCD IN RCRD: CPT | Mod: CPTII,,, | Performed by: NURSE PRACTITIONER

## 2023-06-27 PROCEDURE — 1159F PR MEDICATION LIST DOCUMENTED IN MEDICAL RECORD: ICD-10-PCS | Mod: CPTII,,, | Performed by: NURSE PRACTITIONER

## 2023-06-27 PROCEDURE — 3077F PR MOST RECENT SYSTOLIC BLOOD PRESSURE >= 140 MM HG: ICD-10-PCS | Mod: CPTII,,, | Performed by: NURSE PRACTITIONER

## 2023-06-27 RX ORDER — TIZANIDINE 2 MG/1
2 TABLET ORAL 3 TIMES DAILY PRN
COMMUNITY
Start: 2023-05-24 | End: 2023-07-13

## 2023-06-27 RX ORDER — UBROGEPANT 100 MG/1
100 TABLET ORAL DAILY
Qty: 30 TABLET | Refills: 2 | Status: SHIPPED | OUTPATIENT
Start: 2023-06-27 | End: 2023-10-05 | Stop reason: SDUPTHER

## 2023-06-27 RX ORDER — CETIRIZINE HYDROCHLORIDE 10 MG/1
10 TABLET ORAL DAILY
COMMUNITY
Start: 2023-06-07

## 2023-06-27 RX ORDER — BUTALBITAL, ACETAMINOPHEN AND CAFFEINE 50; 325; 40 MG/1; MG/1; MG/1
TABLET ORAL
Qty: 30 TABLET | Refills: 2 | Status: SHIPPED | OUTPATIENT
Start: 2023-06-27 | End: 2023-10-05

## 2023-06-27 RX ORDER — VALSARTAN 80 MG/1
80 TABLET ORAL DAILY
Qty: 90 TABLET | Refills: 3 | Status: SHIPPED | OUTPATIENT
Start: 2023-06-27 | End: 2023-10-09 | Stop reason: SDUPTHER

## 2023-06-27 NOTE — PROGRESS NOTES
Patient Name: Nicole Quiñones   : 1975  MRN: [id number]     SUBJECTIVE DATA:    CHIEF COMPLAINT:   Nicole Quiñones is a 48 y.o. female who presents to clinic today with Neck Pain        HPI:  48-year-old female presents to the clinic to discuss left-sided neck pain as well requesting medication refills.    Left sided neck pain:  neck pain started on 2023.  Patient state started on the right side of her neck and now is on her left side.  Patient denies recent trauma or falls.  Discussed with patient continue with current medications tizanidine as needed, may add OTC Tylenol Arthritis and follow direction on the label as needed for pain every 8 hours. will refer her to physical therapy as well will initiate cervical spine x-ray.  Rest, alternate between heat and ice, stay hydrated with water.  [facility] physical therapy referral initiated.   2023 cervical spine AP and lateral test results;  Impression:   1. Limited visualization without evidence of acute abnormality.  2. Mild degenerative changes of the cervical spine.  Patient notified with test results.     Hypertension:  Current blood pressure 138/90.  Discussed with patient will continue with metoprolol  mg p.o. daily at bedtime, isosorbide dinitrate 40 mg p.o. once daily .  Discussed with patient to stop olmesartan with hydrochlorothiazide.  Will add Rx Diovan 80 mg p.o. once daily.  Continue to monitor blood pressure at home, follow low-salt diet less than 2 g per day if possible.  Stay hydrated with water.  Stay physically active.  Lose Weight.  Return in 2 weeks for blood pressure check.  Questions solicited and answered, patient verbalized.    MRI of the pelvis request:  Patient state Dr. Lonnie SANDY had done ultrasound pelvic.  Patient state ultrasound came back abnormal for that reason Dr. Braden requesting for her PCP to order MRI of the pelvis.  Discussed with patient we need to request records of ultrasound before  ordering MRI.  Patient agreed, medical records requested.    Rheumatology referral:  Patient has an appointment set up on December 23/2023 at [facility].  Patient is wondering if she can be referred to someone else at [facility] rheumatology Main Office Location:  [address] [place] [address], [address]  [place] [address]    Physicians:  Anushka Davis MD, MD Sheldon Shah MD Bobby J. Dupre, MD Harmanjot Grewal, MD    Satellite Locations:  [address], [address]  [place]  [address]    Appointments and Information:  Phone: [phone]  Fax: [phone]  Has been initiated and medical records has been faxed.  Phone number provided to patient to follow-up on her appointment.  Questions solicited and answered, patient verbalized and agreed to plan of care.    Insomnia:  Patient state she does not get enough sleep.  Discussed with patient sleep hygiene tips.  Patient will try to follow the tips and re-evaluate on her next appointment.  Also discussed she may try OTC melatonin 5 mg p.o. 30-60 minutes prior to going to bed.  Stay hydrated with water.  Questions solicited and answered, patient verbalized.    Medications refill:  Patient requesting medication refill on her Ubrelvy 100 mg tablet as needed for breakthrough migraine headache.  Rx Fioricet .  Medications has been sent to her preferred pharmacy.  Take as directed.    Patient denies chest pain, shortness of breath, dyspnea on exertion, palpitations, peripheral edema, abdominal pain, nausea, vomiting, diarrhea, constipation, fatigue, fever, chills, dysuria,  hematuria, melena, or hematochezia.   HPI      ALLERGIES:   Review of patient's allergies indicates:   Allergen Reactions    Nsaids (non-steroidal anti-inflammatory drug) Other (See Comments)     Causes ulcers to bleed.    Ibuprofen     Latex     Meloxicam     Nsaids (non-steroidal anti-inflammatory drug) Nausea Only         ROS:  Review  "of Systems   Musculoskeletal:  Positive for neck pain.   All other systems reviewed and are negative.      OBJECTIVE DATA:  Vital signs  Vitals:    06/27/23 1224 06/27/23 1322   BP: (!) 138/90 (!) 147/99   Pulse: 76    Resp: 18    Temp: 97.9 °F (36.6 °C)    TempSrc: Oral    SpO2: 98%    Weight: 112.5 kg (248 lb)    Height: 5' 6" (1.676 m)       Body mass index is 40.03 kg/m².    PHYSICAL EXAM:   Physical Exam  Vitals and nursing note reviewed.   Constitutional:       General: She is awake. She is not in acute distress.     Appearance: Normal appearance. She is well-developed and well-groomed. She is obese. She is not ill-appearing, toxic-appearing or diaphoretic.   HENT:      Head: Normocephalic and atraumatic.      Right Ear: Tympanic membrane, ear canal and external ear normal.      Left Ear: Tympanic membrane, ear canal and external ear normal.      Nose: Nose normal.      Mouth/Throat:      Lips: Pink.      Mouth: Mucous membranes are moist.      Pharynx: Oropharynx is clear. Uvula midline.   Eyes:      General: Lids are normal. Gaze aligned appropriately.      Extraocular Movements: Extraocular movements intact.      Pupils: Pupils are equal, round, and reactive to light.   Neck:      Trachea: Trachea and phonation normal.     Cardiovascular:      Rate and Rhythm: Normal rate and regular rhythm.      Pulses: Normal pulses.           Radial pulses are 2+ on the right side and 2+ on the left side.      Heart sounds: Normal heart sounds. No murmur heard.  Pulmonary:      Effort: Pulmonary effort is normal.      Breath sounds: Normal breath sounds and air entry. No wheezing or rhonchi.   Abdominal:      Palpations: Abdomen is soft.      Tenderness: There is no abdominal tenderness. There is no right CVA tenderness or left CVA tenderness.   Musculoskeletal:         General: Tenderness present. Normal range of motion.      Cervical back: Neck supple. Tenderness present. No rigidity. Pain with movement and muscular " tenderness present. No spinous process tenderness.      Right lower leg: No edema.      Left lower leg: No edema.   Lymphadenopathy:      Cervical: No cervical adenopathy.      Right cervical: No superficial cervical adenopathy.     Left cervical: No superficial cervical adenopathy.   Skin:     Capillary Refill: Capillary refill takes less than 2 seconds.      Findings: No rash.   Neurological:      General: No focal deficit present.      Mental Status: She is alert and oriented to person, place, and time. Mental status is at baseline.      GCS: GCS eye subscore is 4. GCS verbal subscore is 5. GCS motor subscore is 6.      Cranial Nerves: Cranial nerves 2-12 are intact.      Sensory: Sensation is intact.      Motor: Motor function is intact.      Coordination: Coordination is intact.      Gait: Gait is intact. Gait normal.   Psychiatric:         Attention and Perception: Attention and perception normal.         Mood and Affect: Mood and affect normal.         Speech: Speech normal.         Behavior: Behavior normal. Behavior is cooperative.         Thought Content: Thought content normal.         Cognition and Memory: Cognition and memory normal.         Judgment: Judgment normal.        ASSESSMENT/PLAN:  1. Primary hypertension  Assessment & Plan:  Discussed with patient to stop olmesartan with hydrochlorothiazide.  Will add Rx Diovan 80 mg p.o. once daily.  Continue to monitor blood pressure at home, follow low-salt diet less than 2 g per day if possible.  Stay hydrated with water.  Stay physically active.  Lose Weight.  Return in 2 weeks for blood pressure check.  Questions solicited and answered, patient verbalized.    Orders:  -     valsartan (DIOVAN) 80 MG tablet; Take 1 tablet (80 mg total) by mouth once daily.  Dispense: 90 tablet; Refill: 3    2. Acute neck pain  Assessment & Plan:   Discussed with patient continue with current medications tizanidine as needed, may add OTC Tylenol Arthritis and follow  direction on the label as needed for pain every 8 hours. will refer her to physical therapy as well will initiate cervical spine x-ray.  Rest, alternate between heat and ice, stay hydrated with water.  [facility] physical therapy referral initiated.     Orders:  -     X-Ray Cervical Spine AP And Lateral; Future; Expected date: 2023  -     Ambulatory referral/consult to Physical/Occupational Therapy; Future; Expected date: 2023    3. Migraine with aura and without status migrainosus, not intractable  Assessment & Plan:  Medications refilled.  Take medications as prescribed.    Orders:  -     UBRELVY 100 mg tablet; Take 1 tablet (100 mg total) by mouth once daily.  Dispense: 30 tablet; Refill: 2  -     butalbital-acetaminophen-caffeine -40 mg (FIORICET, ESGIC) -40 mg per tablet; SMARTSI Tablet(s) By Mouth Every 6 Hours PRN  Dispense: 30 tablet; Refill: 2    4. Insomnia, unspecified type  Assessment & Plan:  Discussed with patient sleep hygiene tips.  Patient will try to follow the tips and re-evaluate on her next appointment.  discussed she may try OTC melatonin 5 mg p.o. 30-60 minutes prior to going to bed.  Stay hydrated with water.  Questions solicited and answered, patient verbalized.             RESULTS:  Recent Results (from the past 1008 hour(s))   Urinalysis, Reflex to Urine Culture    Collection Time: 23 11:33 AM    Specimen: Urine   Result Value Ref Range    Color, UA Yellow Yellow, Light-Yellow, Dark Yellow, Abby, Straw    Appearance, UA Clear Clear    Specific Gravity, UA 1.031     pH, UA 6.0 5.0 - 8.5    Protein, UA 1+ (A) Negative mg/dL    Glucose, UA Normal Negative, Normal mg/dL    Ketones, UA Negative Negative mg/dL    Blood, UA Negative Negative unit/L    Bilirubin, UA Negative Negative mg/dL    Urobilinogen, UA 1+ (A) 0.2, 1.0, Normal mg/dL    Nitrites, UA Negative Negative    Leukocyte Esterase, UA Negative Negative unit/L    WBC, UA 0-5 None Seen, 0-2, 3-5, 0-5 /HPF     Bacteria, UA Trace (A) None Seen /HPF    Squamous Epithelial Cells, UA Few (A) None Seen /HPF    Mucous, UA Many (A) None Seen /LPF    Hyaline Casts, UA None Seen None Seen /lpf    RBC, UA 0-5 None Seen, 0-2, 3-5, 0-5 /HPF   HIV 1/2 Ag/Ab (4th Gen)    Collection Time: 05/23/23 11:33 AM   Result Value Ref Range    HIV Nonreactive Nonreactive   Hemoglobin A1C    Collection Time: 05/23/23 11:33 AM   Result Value Ref Range    Hemoglobin A1c 5.3 <=7.0 %    Estimated Average Glucose 105.4 mg/dL   TSH    Collection Time: 05/23/23 11:33 AM   Result Value Ref Range    Thyroid Stimulating Hormone 1.213 0.350 - 4.940 uIU/mL   CBC Without Differential    Collection Time: 05/23/23 11:33 AM   Result Value Ref Range    WBC 5.74 4.50 - 11.50 x10(3)/mcL    RBC 5.95 (H) 4.20 - 5.40 x10(6)/mcL    Hgb 13.0 12.0 - 16.0 g/dL    Hct 42.1 37.0 - 47.0 %    MCV 70.8 (L) 80.0 - 94.0 fL    MCH 21.8 (L) 27.0 - 31.0 pg    MCHC 30.9 (L) 33.0 - 36.0 g/dL    RDW 15.5 11.5 - 17.0 %    Platelet 277 130 - 400 x10(3)/mcL    MPV 10.2 7.4 - 10.4 fL    NRBC% 0.0 %   Comprehensive Metabolic Panel    Collection Time: 05/23/23 11:33 AM   Result Value Ref Range    Sodium Level 140 136 - 145 mmol/L    Potassium Level 3.7 3.5 - 5.1 mmol/L    Chloride 106 98 - 107 mmol/L    Carbon Dioxide 24 22 - 29 mmol/L    Glucose Level 74 74 - 100 mg/dL    Blood Urea Nitrogen 14.2 7.0 - 18.7 mg/dL    Creatinine 0.85 0.55 - 1.02 mg/dL    Calcium Level Total 9.3 8.4 - 10.2 mg/dL    Protein Total 7.7 6.4 - 8.3 gm/dL    Albumin Level 3.6 3.5 - 5.0 g/dL    Globulin 4.1 (H) 2.4 - 3.5 gm/dL    Albumin/Globulin Ratio 0.9 (L) 1.1 - 2.0 ratio    Bilirubin Total 0.6 <=1.5 mg/dL    Alkaline Phosphatase 127 40 - 150 unit/L    Alanine Aminotransferase 12 0 - 55 unit/L    Aspartate Aminotransferase 13 5 - 34 unit/L    eGFR >60 mls/min/1.73/m2   Lipid Panel    Collection Time: 05/23/23 11:33 AM   Result Value Ref Range    Cholesterol Total 217 (H) <=200 mg/dL    HDL Cholesterol 42 35 - 60  mg/dL    Triglyceride 87 37 - 140 mg/dL    Cholesterol/HDL Ratio 5 0 - 5    Very Low Density Lipoprotein 17     LDL Cholesterol 158.00 (H) 50.00 - 140.00 mg/dL   Strep Group A by PCR    Collection Time: 05/23/23 11:37 AM   Result Value Ref Range    STREP A PCR ([facility]) Not Detected Not Detected         Follow Up:  Follow up in about 2 weeks (around 7/11/2023).     Face to face time 30 minutes, including documentation, chart review, counseling, education, review of test results, relevant medical records, and coordination of care.   I have explained the treatment plan, diagnosis, and prognosis to patient. All questions are answered to the best of my knowledge      Previous medical history/lab work/radiology reviewed and considered during medical management decisions.   Medication list reviewed and medication reconciliation performed.  Patient was provided  and care about his/her current diagnosis (es) and medications including risk/benefit and side effects/adverse events, over the counter medication uses/doses, home self-care and contact precautions,  and red flags and indications for when to seek immediate medical attention.   Patient was advised to continue compliance with current medication list and medical recommendations.  Patient dvised continued compliance with recommended eating habits/ diets for medical conditions and exercise 150 minutes/ week (if possible) for medical condition (s).  Educational handouts and instructions on selected disease management in AVS (After Visit Summary).    All of the patient's questions were answered to patient's satisfaction.   The patient was receptive, expressed verbal understanding and agreement the above plan.     This note was created with the assistance of a voice recognition software or phone dictation. There may be transcription errors as a result of using this technology however minimal. Effort has been made to assure accuracy of transcription but any obvious errors  or omissions should be clarified with the author of the document

## 2023-06-28 ENCOUNTER — HOSPITAL ENCOUNTER (OUTPATIENT)
Dept: RADIOLOGY | Facility: HOSPITAL | Age: 48
Discharge: HOME | End: 2023-06-28
Attending: NURSE PRACTITIONER
Payer: MEDICAID

## 2023-06-28 DIAGNOSIS — M54.2 ACUTE NECK PAIN: ICD-10-CM

## 2023-06-28 PROCEDURE — 72040 X-RAY EXAM NECK SPINE 2-3 VW: CPT | Mod: TC

## 2023-06-29 ENCOUNTER — TELEPHONE (OUTPATIENT)
Dept: FAMILY MEDICINE | Facility: CLINIC | Age: 48
End: 2023-06-29
Payer: MEDICAID

## 2023-06-29 NOTE — TELEPHONE ENCOUNTER
----- Message from BHAVANA Nguyen sent at 6/29/2023  2:15 PM CDT -----  Please notify patient regarding her cervical spine x-ray, showed mild degenerative changes.  What it means is the changes that she see from wear and tear on the musculoskeletal system.  Osteoarthritis.

## 2023-06-29 NOTE — PROGRESS NOTES
Please notify patient regarding her cervical spine x-ray, showed mild degenerative changes.  What it means is the changes that she see from wear and tear on the musculoskeletal system.  Osteoarthritis.

## 2023-06-30 NOTE — ASSESSMENT & PLAN NOTE
Discussed with patient continue with current medications tizanidine as needed, may add OTC Tylenol Arthritis and follow direction on the label as needed for pain every 8 hours. will refer her to physical therapy as well will initiate cervical spine x-ray.  Rest, alternate between heat and ice, stay hydrated with water.  [facility] physical therapy referral initiated.

## 2023-06-30 NOTE — ASSESSMENT & PLAN NOTE
Discussed with patient to stop olmesartan with hydrochlorothiazide.  Will add Rx Diovan 80 mg p.o. once daily.  Continue to monitor blood pressure at home, follow low-salt diet less than 2 g per day if possible.  Stay hydrated with water.  Stay physically active.  Lose Weight.  Return in 2 weeks for blood pressure check.  Questions solicited and answered, patient verbalized.

## 2023-06-30 NOTE — ASSESSMENT & PLAN NOTE
Discussed with patient sleep hygiene tips.  Patient will try to follow the tips and re-evaluate on her next appointment.  discussed she may try OTC melatonin 5 mg p.o. 30-60 minutes prior to going to bed.  Stay hydrated with water.  Questions solicited and answered, patient verbalized.

## 2023-07-13 ENCOUNTER — OFFICE VISIT (OUTPATIENT)
Dept: FAMILY MEDICINE | Facility: CLINIC | Age: 48
End: 2023-07-13
Payer: MEDICAID

## 2023-07-13 VITALS
TEMPERATURE: 98 F | WEIGHT: 248 LBS | BODY MASS INDEX: 39.86 KG/M2 | RESPIRATION RATE: 18 BRPM | SYSTOLIC BLOOD PRESSURE: 121 MMHG | DIASTOLIC BLOOD PRESSURE: 76 MMHG | HEART RATE: 81 BPM | HEIGHT: 66 IN | OXYGEN SATURATION: 98 %

## 2023-07-13 DIAGNOSIS — Z76.0 ENCOUNTER FOR MEDICATION REFILL: ICD-10-CM

## 2023-07-13 DIAGNOSIS — I10 PRIMARY HYPERTENSION: Primary | ICD-10-CM

## 2023-07-13 DIAGNOSIS — Z12.11 ENCOUNTER FOR COLORECTAL CANCER SCREENING: ICD-10-CM

## 2023-07-13 DIAGNOSIS — Z12.12 ENCOUNTER FOR COLORECTAL CANCER SCREENING: ICD-10-CM

## 2023-07-13 PROCEDURE — 1160F RVW MEDS BY RX/DR IN RCRD: CPT | Mod: CPTII,,, | Performed by: NURSE PRACTITIONER

## 2023-07-13 PROCEDURE — 99213 OFFICE O/P EST LOW 20 MIN: CPT | Mod: S$PBB,,, | Performed by: NURSE PRACTITIONER

## 2023-07-13 PROCEDURE — 99215 OFFICE O/P EST HI 40 MIN: CPT | Mod: PBBFAC | Performed by: NURSE PRACTITIONER

## 2023-07-13 PROCEDURE — 4010F PR ACE/ARB THEARPY RXD/TAKEN: ICD-10-PCS | Mod: CPTII,,, | Performed by: NURSE PRACTITIONER

## 2023-07-13 PROCEDURE — 99213 PR OFFICE/OUTPT VISIT, EST, LEVL III, 20-29 MIN: ICD-10-PCS | Mod: S$PBB,,, | Performed by: NURSE PRACTITIONER

## 2023-07-13 PROCEDURE — 3078F DIAST BP <80 MM HG: CPT | Mod: CPTII,,, | Performed by: NURSE PRACTITIONER

## 2023-07-13 PROCEDURE — 3074F PR MOST RECENT SYSTOLIC BLOOD PRESSURE < 130 MM HG: ICD-10-PCS | Mod: CPTII,,, | Performed by: NURSE PRACTITIONER

## 2023-07-13 PROCEDURE — 3044F PR MOST RECENT HEMOGLOBIN A1C LEVEL <7.0%: ICD-10-PCS | Mod: CPTII,,, | Performed by: NURSE PRACTITIONER

## 2023-07-13 PROCEDURE — 1159F PR MEDICATION LIST DOCUMENTED IN MEDICAL RECORD: ICD-10-PCS | Mod: CPTII,,, | Performed by: NURSE PRACTITIONER

## 2023-07-13 PROCEDURE — 3078F PR MOST RECENT DIASTOLIC BLOOD PRESSURE < 80 MM HG: ICD-10-PCS | Mod: CPTII,,, | Performed by: NURSE PRACTITIONER

## 2023-07-13 PROCEDURE — 3008F BODY MASS INDEX DOCD: CPT | Mod: CPTII,,, | Performed by: NURSE PRACTITIONER

## 2023-07-13 PROCEDURE — 3044F HG A1C LEVEL LT 7.0%: CPT | Mod: CPTII,,, | Performed by: NURSE PRACTITIONER

## 2023-07-13 PROCEDURE — 3008F PR BODY MASS INDEX (BMI) DOCUMENTED: ICD-10-PCS | Mod: CPTII,,, | Performed by: NURSE PRACTITIONER

## 2023-07-13 PROCEDURE — 4010F ACE/ARB THERAPY RXD/TAKEN: CPT | Mod: CPTII,,, | Performed by: NURSE PRACTITIONER

## 2023-07-13 PROCEDURE — 1160F PR REVIEW ALL MEDS BY PRESCRIBER/CLIN PHARMACIST DOCUMENTED: ICD-10-PCS | Mod: CPTII,,, | Performed by: NURSE PRACTITIONER

## 2023-07-13 PROCEDURE — 3074F SYST BP LT 130 MM HG: CPT | Mod: CPTII,,, | Performed by: NURSE PRACTITIONER

## 2023-07-13 PROCEDURE — 1159F MED LIST DOCD IN RCRD: CPT | Mod: CPTII,,, | Performed by: NURSE PRACTITIONER

## 2023-07-13 RX ORDER — VERAPAMIL HYDROCHLORIDE 180 MG/1
180 CAPSULE, EXTENDED RELEASE ORAL DAILY
COMMUNITY
End: 2024-01-18

## 2023-07-13 NOTE — ASSESSMENT & PLAN NOTE
Continue current medication regiment.  Follow low-salt diet less than 2 g per day if possible, stay hydrated with water, stay physically active, exercise at least 30 minutes a day up to 5 days a week as simple as brisk walking.  Keep cardiology appointment.  Continue to measure blood pressure at home as needed.  Keep the clinic updated if any changes. Patient to read discharge education materials, Questions solicited and answered, patient verbalized.

## 2023-07-13 NOTE — ASSESSMENT & PLAN NOTE
Patient agreed to complete Cologuard screening.  Handout provided, education at bedside provided.  Patient to scan QR logo to watch a video. Questions solicited and answered, patient agreed and verbalized understanding.

## 2023-07-13 NOTE — PROGRESS NOTES
Patient Name: Nicole Quiñones   : 1975  MRN: [id number]     SUBJECTIVE DATA:    CHIEF COMPLAINT:   Nicole Quiñones is a 48 y.o. female who presents to clinic today with Hypertension and Insomnia        HPI:  48-year-old female presents to the clinic to follow-up on her hypertension and medication efficacy.    Hypertension: from previous visit on 2023 Current blood pressure 138/90.  Discussed with patient will continue with metoprolol  mg p.o. daily at bedtime, isosorbide dinitrate 40 mg p.o. once daily . Discussed with patient to stop olmesartan with hydrochlorothiazide.  Will add Rx Diovan 80 mg p.o. once daily.  Continue to monitor blood pressure at home, follow low-salt diet less than 2 g per day if possible.  Stay hydrated with water.  Stay physically active.  Lose Weight.  Return in 2 weeks for blood pressure check.  Questions solicited and answered, patient verbalized.    Today's clinic visit 2023 :initial blood pressure 130/89, repeated on discharge 121/86.  Patient denies any headache or blurry vision, denies any chest pain or short of breath.  Patient tolerating medication without any side effects or complaints. Patient takes verapamil 180 mg p.o. once daily for her headaches.  Takes clonidine 0.1 mg p.o. at bedtime if blood pressure systolic above 160 and diastolic above 100.  Patient state she had to take clonidine once maybe twice last week due to elevated blood pressure caused by pain rheumatic pain. Continue current medication regiment.  Follow low-salt diet less than 2 g per day if possible, stay hydrated with water, stay physically active, exercise at least 30 minutes a day up to 5 days a week as simple as brisk walking.  Keep cardiology appointment.  Continue to measure blood pressure at home as needed.  Keep the clinic updated if any changes. Patient to read discharge education materials, Questions solicited and answered, patient verbalized.    Colorectal cancer  "screening:  Patient agreed to complete Cologuard screening.  Handout provided, education at bedside provided.  Patient to scan QR logo to watch a video. Questions solicited and answered, patient agreed and verbalized understanding.    Patient denies chest pain, shortness of breath, dyspnea on exertion, palpitations, peripheral edema, abdominal pain, nausea, vomiting, diarrhea, constipation, fatigue, fever, chills, dysuria,  hematuria, melena, or hematochezia. HPI      ALLERGIES:   Review of patient's allergies indicates:   Allergen Reactions    Nsaids (non-steroidal anti-inflammatory drug) Other (See Comments)     Causes ulcers to bleed.    Ibuprofen     Latex     Meloxicam     Nsaids (non-steroidal anti-inflammatory drug) Nausea Only       ROS:  Review of Systems   All other systems reviewed and are negative.      OBJECTIVE DATA:  Vital signs  Vitals:    07/13/23 1006 07/13/23 1028   BP: 130/89 121/76   BP Location:  Right arm   Patient Position:  Sitting   Pulse: 84 81   Resp: 18    Temp: 98.1 °F (36.7 °C)    TempSrc: Oral    SpO2: 98%    Weight: 112.5 kg (248 lb)    Height: 5' 6" (1.676 m)       Body mass index is 40.03 kg/m².    PHYSICAL EXAM:   Physical Exam  Vitals and nursing note reviewed.   Constitutional:       General: She is awake. She is not in acute distress.     Appearance: Normal appearance. She is well-developed and well-groomed. She is obese. She is not ill-appearing, toxic-appearing or diaphoretic.   HENT:      Head: Normocephalic and atraumatic.      Right Ear: Tympanic membrane, ear canal and external ear normal.      Left Ear: Tympanic membrane, ear canal and external ear normal.      Nose: Nose normal.      Mouth/Throat:      Mouth: Mucous membranes are moist.      Pharynx: Oropharynx is clear. Uvula midline.   Eyes:      Pupils: Pupils are equal, round, and reactive to light.   Neck:      Trachea: Trachea and phonation normal.   Cardiovascular:      Rate and Rhythm: Normal rate and regular " rhythm.      Pulses: Normal pulses.           Radial pulses are 2+ on the right side and 2+ on the left side.      Heart sounds: Normal heart sounds. No murmur heard.  Pulmonary:      Effort: Pulmonary effort is normal.      Breath sounds: Normal breath sounds and air entry. No wheezing or rhonchi.   Abdominal:      General: Abdomen is flat.   Musculoskeletal:         General: Normal range of motion.      Cervical back: Normal range of motion and neck supple. No rigidity or tenderness.      Right lower leg: No edema.      Left lower leg: No edema.   Lymphadenopathy:      Cervical: No cervical adenopathy.   Skin:     Capillary Refill: Capillary refill takes less than 2 seconds.   Neurological:      General: No focal deficit present.      Mental Status: She is alert and oriented to person, place, and time. Mental status is at baseline.      GCS: GCS eye subscore is 4. GCS verbal subscore is 5. GCS motor subscore is 6.      Gait: Gait normal.   Psychiatric:         Attention and Perception: Attention and perception normal.         Mood and Affect: Mood and affect normal.         Speech: Speech normal.         Behavior: Behavior normal. Behavior is cooperative.         Thought Content: Thought content normal.         Cognition and Memory: Cognition and memory normal.         Judgment: Judgment normal.        ASSESSMENT/PLAN:  1. Encounter for colorectal cancer screening  Assessment & Plan:  Patient agreed to complete Cologuard screening.  Handout provided, education at bedside provided.  Patient to scan QR logo to watch a video. Questions solicited and answered, patient agreed and verbalized understanding.    Orders:  -     Cologuard Screening (Multitarget Stool DNA); Future; Expected date: 07/13/2023    2. Primary hypertension  Assessment & Plan:  Continue current medication regiment.  Follow low-salt diet less than 2 g per day if possible, stay hydrated with water, stay physically active, exercise at least 30 minutes  a day up to 5 days a week as simple as brisk walking.  Keep cardiology appointment.  Continue to measure blood pressure at home as needed.  Keep the clinic updated if any changes. Patient to read discharge education materials, Questions solicited and answered, patient verbalized.             RESULTS:  No results found for this or any previous visit (from the past 1008 hour(s)).      Follow Up:  Keep scheduled appointment.    Face to face time 20 minutes, including documentation, chart review, counseling, education, review of test results, relevant medical records, and coordination of care.   I have explained the treatment plan, diagnosis, and prognosis to patient. All questions are answered to the best of my knowledge.     Previous medical history/lab work/radiology reviewed and considered during medical management decisions.   Medication list reviewed and medication reconciliation performed.  Patient was provided  and care about his/her current diagnosis (es) and medications including risk/benefit and side effects/adverse events, over the counter medication uses/doses, home self-care and contact precautions,  and red flags and indications for when to seek immediate medical attention.   Patient was advised to continue compliance with current medication list and medical recommendations.  Patient dvised continued compliance with recommended eating habits/ diets for medical conditions and exercise 150 minutes/ week (if possible) for medical condition (s).  Educational handouts and instructions on selected disease management in AVS (After Visit Summary).    All of the patient's questions were answered to patient's satisfaction.   The patient was receptive, expressed verbal understanding and agreement the above plan.       This note was created with the assistance of a voice recognition software or phone dictation. There may be transcription errors as a result of using this technology however minimal. Effort has  been made to assure accuracy of transcription but any obvious errors or omissions should be clarified with the author of the document

## 2023-07-24 DIAGNOSIS — Z76.0 ENCOUNTER FOR MEDICATION REFILL: ICD-10-CM

## 2023-07-24 RX ORDER — PANTOPRAZOLE SODIUM 20 MG/1
20 TABLET, DELAYED RELEASE ORAL DAILY
Status: CANCELLED | OUTPATIENT
Start: 2023-07-24

## 2023-07-24 RX ORDER — ALBUTEROL SULFATE 0.83 MG/ML
SOLUTION RESPIRATORY (INHALATION)
Qty: 1 EACH | Refills: 1 | Status: CANCELLED | OUTPATIENT
Start: 2023-07-24

## 2023-07-24 RX ORDER — ONDANSETRON HYDROCHLORIDE 8 MG/1
TABLET, FILM COATED ORAL
Status: CANCELLED | OUTPATIENT
Start: 2023-07-24

## 2023-07-24 NOTE — TELEPHONE ENCOUNTER
----- Message from Audra Oscar sent at 7/24/2023 11:13 AM CDT -----  Regarding: Refill  Provider: [facility]:   Last Visit:   Next Visit: 11/27/23  Patient's Contact Number: [phone]    1. Name of Medication: albuterol (PROVENTIL) 2.5 mg /3 mL (0.083 %) nebulizer solution  Dosage:   Comments:     2. Name of Medication: cloNIDine (CATAPRES) 0.1 MG tablet  Dosage:   Comments:     3. Name of Medication: ondansetron (ZOFRAN) 8 MG tablet  Dosage:   Comments     4. Name of Medication: pantoprazole (PROTONIX) 20 MG tablet  Dosage:   Comments:     5. Name of Medication: ergocalciferol (ERGOCALCIFEROL) 50,000 unit CapProvider: DR Garcia     1. Name of Medication: metoprolol succinate (TOPROL-XL) 100 MG 24 hr tablet  Dosage:   Comments:     2. Name of Medication:   Dosage:   Comments:     3. Name of Medication:   Dosage:   Comments     4. Name of Medication:   Dosage:   Comments:     5. Name of Medication:   Dosage:   Comments:      Dosage:   Comments:

## 2023-07-25 RX ORDER — PANTOPRAZOLE SODIUM 20 MG/1
20 TABLET, DELAYED RELEASE ORAL DAILY
Qty: 30 TABLET | Refills: 3 | Status: SHIPPED | OUTPATIENT
Start: 2023-07-25 | End: 2023-10-09 | Stop reason: SDUPTHER

## 2023-07-25 RX ORDER — ONDANSETRON HYDROCHLORIDE 8 MG/1
TABLET, FILM COATED ORAL
Qty: 9 TABLET | Refills: 3 | Status: SHIPPED | OUTPATIENT
Start: 2023-07-25

## 2023-07-25 RX ORDER — CLONIDINE HYDROCHLORIDE 0.1 MG/1
0.1 TABLET ORAL DAILY PRN
OUTPATIENT
Start: 2023-07-25

## 2023-07-25 RX ORDER — ALBUTEROL SULFATE 0.83 MG/ML
SOLUTION RESPIRATORY (INHALATION)
Qty: 1 EACH | Refills: 3 | Status: SHIPPED | OUTPATIENT
Start: 2023-07-25

## 2023-07-31 ENCOUNTER — OFFICE VISIT (OUTPATIENT)
Dept: ORTHOPEDICS | Facility: CLINIC | Age: 48
End: 2023-07-31
Payer: MEDICAID

## 2023-07-31 VITALS — HEIGHT: 66 IN | BODY MASS INDEX: 39.82 KG/M2 | WEIGHT: 247.81 LBS

## 2023-07-31 DIAGNOSIS — M17.12 PRIMARY OSTEOARTHRITIS OF LEFT KNEE: Primary | ICD-10-CM

## 2023-07-31 PROCEDURE — 1159F PR MEDICATION LIST DOCUMENTED IN MEDICAL RECORD: ICD-10-PCS | Mod: CPTII,,, | Performed by: NURSE PRACTITIONER

## 2023-07-31 PROCEDURE — 20610 LARGE JOINT ASPIRATION/INJECTION: L KNEE: ICD-10-PCS | Mod: S$PBB,LT,, | Performed by: NURSE PRACTITIONER

## 2023-07-31 PROCEDURE — 4010F PR ACE/ARB THEARPY RXD/TAKEN: ICD-10-PCS | Mod: CPTII,,, | Performed by: NURSE PRACTITIONER

## 2023-07-31 PROCEDURE — 3044F HG A1C LEVEL LT 7.0%: CPT | Mod: CPTII,,, | Performed by: NURSE PRACTITIONER

## 2023-07-31 PROCEDURE — 3008F PR BODY MASS INDEX (BMI) DOCUMENTED: ICD-10-PCS | Mod: CPTII,,, | Performed by: NURSE PRACTITIONER

## 2023-07-31 PROCEDURE — 4010F ACE/ARB THERAPY RXD/TAKEN: CPT | Mod: CPTII,,, | Performed by: NURSE PRACTITIONER

## 2023-07-31 PROCEDURE — 1160F RVW MEDS BY RX/DR IN RCRD: CPT | Mod: CPTII,,, | Performed by: NURSE PRACTITIONER

## 2023-07-31 PROCEDURE — 99214 OFFICE O/P EST MOD 30 MIN: CPT | Mod: 25,S$PBB,, | Performed by: NURSE PRACTITIONER

## 2023-07-31 PROCEDURE — 99214 OFFICE O/P EST MOD 30 MIN: CPT | Mod: PBBFAC,25 | Performed by: NURSE PRACTITIONER

## 2023-07-31 PROCEDURE — 20610 DRAIN/INJ JOINT/BURSA W/O US: CPT | Mod: PBBFAC | Performed by: NURSE PRACTITIONER

## 2023-07-31 PROCEDURE — 3008F BODY MASS INDEX DOCD: CPT | Mod: CPTII,,, | Performed by: NURSE PRACTITIONER

## 2023-07-31 PROCEDURE — 99214 PR OFFICE/OUTPT VISIT, EST, LEVL IV, 30-39 MIN: ICD-10-PCS | Mod: 25,S$PBB,, | Performed by: NURSE PRACTITIONER

## 2023-07-31 PROCEDURE — 1160F PR REVIEW ALL MEDS BY PRESCRIBER/CLIN PHARMACIST DOCUMENTED: ICD-10-PCS | Mod: CPTII,,, | Performed by: NURSE PRACTITIONER

## 2023-07-31 PROCEDURE — 3044F PR MOST RECENT HEMOGLOBIN A1C LEVEL <7.0%: ICD-10-PCS | Mod: CPTII,,, | Performed by: NURSE PRACTITIONER

## 2023-07-31 PROCEDURE — 1159F MED LIST DOCD IN RCRD: CPT | Mod: CPTII,,, | Performed by: NURSE PRACTITIONER

## 2023-07-31 RX ORDER — LIDOCAINE HYDROCHLORIDE 10 MG/ML
5 INJECTION, SOLUTION EPIDURAL; INFILTRATION; INTRACAUDAL; PERINEURAL
Status: DISCONTINUED | OUTPATIENT
Start: 2023-07-31 | End: 2023-07-31

## 2023-07-31 RX ORDER — TRIAMCINOLONE ACETONIDE 40 MG/ML
40 INJECTION, SUSPENSION INTRA-ARTICULAR; INTRAMUSCULAR
Status: COMPLETED | OUTPATIENT
Start: 2023-07-31 | End: 2023-07-31

## 2023-07-31 RX ORDER — LIDOCAINE HYDROCHLORIDE 10 MG/ML
5 INJECTION, SOLUTION EPIDURAL; INFILTRATION; INTRACAUDAL; PERINEURAL
Status: COMPLETED | OUTPATIENT
Start: 2023-07-31 | End: 2023-07-31

## 2023-07-31 RX ORDER — TRIAMCINOLONE ACETONIDE 40 MG/ML
40 INJECTION, SUSPENSION INTRA-ARTICULAR; INTRAMUSCULAR
Status: DISCONTINUED | OUTPATIENT
Start: 2023-07-31 | End: 2023-07-31

## 2023-07-31 RX ADMIN — TRIAMCINOLONE ACETONIDE 40 MG: 40 INJECTION, SUSPENSION INTRA-ARTICULAR; INTRAMUSCULAR at 11:07

## 2023-07-31 RX ADMIN — LIDOCAINE HYDROCHLORIDE 5 ML: 10 INJECTION, SOLUTION EPIDURAL; INFILTRATION; INTRACAUDAL; PERINEURAL at 11:07

## 2023-07-31 NOTE — PROGRESS NOTES
"  Subjective:   PATIENT ID: Nicole Quiñones is a 48 y.o. female. Non-smoker. Employment HX: , currently self employed.    Seen [facility] ortho for same DX since n/a.   CHIEF COMPLAINT: Pain of the Left Knee    HPI:    Left aching medial knee pain.   Injury: no known injury  Onset: several years ago fluctuates   Modifying Factors: worse with activity, improves with rest, stiffness after immobilization, and improves with less than 30 minutes activity  Associated Symptoms: crepitus, decreased ROM, swelling, "giving out", and locking/ catching with ROM   Activity: sedentary with light activity and pain moderately interferes with ADLs   Previous Treatments:  HEP withTheraBand, RX NSAIDs, and CSI since  last injection approximately 1 year ago  with some relief but symptoms returning  PMH: + RA  Family History:  And family history    NOTE: Follow up  left knee DJD.  Conservative treatments attempted at prior visit of HEP, supportive shoes with arch support, RX PT recommended.  Patient has not completed RX PT due to currently in PT for back issues, but reports doing HEP regularly and has changed to supportive shoes with arch supports.  Pain has persisted. Requesting to try CSI due to symptoms affecting ADLs.     Current Outpatient Medications:     albuterol (PROVENTIL) 2.5 mg /3 mL (0.083 %) nebulizer solution, USE 1 VIAL IN NEBULIZER EVERY 8 HOURS AS NEEDED, Disp: 1 each, Rfl: 3    butalbital-acetaminophen-caffeine -40 mg (FIORICET, ESGIC) -40 mg per tablet, SMARTSI Tablet(s) By Mouth Every 6 Hours PRN, Disp: 30 tablet, Rfl: 2    cetirizine (ZYRTEC) 10 MG tablet, Take 10 mg by mouth once daily., Disp: , Rfl:     cloNIDine (CATAPRES) 0.1 MG tablet, Take 1 tablet by mouth daily as needed., Disp: , Rfl:     DULERA 100-5 mcg/actuation HFAA, Inhale 1 puff into the lungs 2 (two) times daily., Disp: , Rfl:     EMGALITY  mg/mL PnIj, AS DIRECTED SUBCUTANEOUS MONTHLY 30 DAY(S), Disp: , Rfl:     " ergocalciferol (ERGOCALCIFEROL) 50,000 unit Cap, Take 1 capsule (50,000 Units total) by mouth every 7 days., Disp: 12 capsule, Rfl: 3    ezetimibe (ZETIA) 10 mg tablet, Take 10 mg by mouth once daily., Disp: , Rfl:     fluticasone propionate (FLONASE) 50 mcg/actuation nasal spray, 2 sprays (100 mcg total) by Each Nostril route daily as needed for Allergies or Rhinitis., Disp: 18.2 mL, Rfl: 1    folic acid (FOLVITE) 1 MG tablet, Take 1 tablet (1,000 mcg total) by mouth once daily., Disp: 30 tablet, Rfl: 3    isosorbide dinitrate (ISORDIL) 40 MG Tab, Take 1 tablet (40 mg total) by mouth once daily., Disp: 90 tablet, Rfl: 2    LINZESS 72 mcg Cap capsule, Take 1 capsule by mouth once daily., Disp: , Rfl:     metoprolol succinate (TOPROL-XL) 100 MG 24 hr tablet, Take 1 tablet (100 mg total) by mouth every evening., Disp: 90 tablet, Rfl: 2    ondansetron (ZOFRAN) 8 MG tablet, TAKE 1 TABLET BY MOUTH EVERY 8 HOURS AS NEEDED FOR NAUSEA AND VOMITING, Disp: 9 tablet, Rfl: 3    pantoprazole (PROTONIX) 20 MG tablet, Take 1 tablet (20 mg total) by mouth once daily., Disp: 30 tablet, Rfl: 3    sucralfate (CARAFATE) 1 gram tablet, Take 1 g by mouth 3 (three) times daily., Disp: , Rfl:     UBRELVY 100 mg tablet, Take 1 tablet (100 mg total) by mouth once daily., Disp: 30 tablet, Rfl: 2    valsartan (DIOVAN) 80 MG tablet, Take 1 tablet (80 mg total) by mouth once daily., Disp: 90 tablet, Rfl: 3    verapamiL (VERELAN) 180 MG C24P, Take 180 mg by mouth once daily., Disp: , Rfl:     miconazole NITRATE 2 % (ZEASORB AF) 2 % top powder, Apply topically 2 (two) times daily. for 14 days, Disp: 71 g, Rfl: 0    Current Facility-Administered Medications:     LIDOcaine (PF) 10 mg/ml (1%) injection 50 mg, 5 mL, Other, 1 time in Clinic/HOD, Elsie Carter NP    triamcinolone acetonide injection 40 mg, 40 mg, Intra-articular, 1 time in Clinic/HOD, Elsie Carter NP  Review of patient's allergies indicates:   Allergen Reactions    Nsaids  "(non-steroidal anti-inflammatory drug) Other (See Comments)     Causes ulcers to bleed.    Ibuprofen     Latex     Meloxicam     Nsaids (non-steroidal anti-inflammatory drug) Nausea Only     Hemoglobin A1c   Date Value Ref Range Status   05/23/2023 5.3 <=7.0 % Final      Body mass index is 40 kg/m².   Vitals:    07/31/23 1044 07/31/23 1045   Weight: 112.4 kg (247 lb 12.8 oz)    Height: 5' 6" (1.676 m)    PainSc:    7      REVIEW OF SYSTEMS:  A ten-point review of systems was performed and is negative, except as mentioned above   Objective:   MSK Bilateral Knee  General:  No apparent distress, no pain indicators, obesity   Inspection: limping gait, mild effusion, full weight bearing, no erythema, no contusion, mild quad deconditioning, varus deformity   Palpation: LEFT knee tenderness with palpation at medial joint line, peripatellar soft tissue , non-tender: patellar tendon, tibial plateau  ROM:    Active Flexion / Extension (0-140):  0 / 135 non-painful (R)  0 / 119 painful (L)  Strength:   Flexion     5 / 5 (R)  4 / 5 (L)  Extension     5 / 5 (R)  4 / 5 (L)  Special Testing:  IT Band Testing  Chrissy's Test:  -- (R)  -- (L)  Effusion Testing  Ballotable Effusion:  -- (R)  -- (L)  Fluid Wave:    -- (R)  -- (L)  Patellar Testing  Patellar Grind:    -- (R)  + (L)  Patellar Facet Pain:  -- (R)  + (L)  Apprehension:    -- (R)  -- (L)  Meniscal Testing  Inderjit's test:    -- (R)  + (L)  Thessaly's Test:    Not tested (R)  not tested (L)  Ligament Testing  ACL Anterior Drawer:   -- (R) -- (L)  PCL Posterior Drawer:   -- (R) -- (L)  LCL Varus Test:    -- (R) -- (L)  MCL Valgus Test:    -- (R) -- (L)  Hip Exam normal  Ankle Exam normal  Neurovascular: Intact to light touch  Neuro/ Psych: Awake, alert, oriented, normal mood and affect  Lymphatic: No LAD  Skin/ Soft Tissue: no rash, skin intact  Assessment:   IMAGING: X-ray 4 views of left knee dated 5/29/23 reviewed and independently interpreted by me.  Discussed with " patient. Noted no acute, DJD noted.    XR KNEE COMP 4 OR MORE VIEWS LEFT 5/29/23  CLINICAL HISTORY:  Unilateral primary osteoarthritis, left knee  COMPARISON:  None.  FINDINGS:  No acute displaced fractures or dislocations.  There is some narrowing of the medial compartment of the knee joint articular spaces are otherwise preserved with smooth articular surfaces  No blastic or lytic lesions.  Soft tissues within normal limits.  Impression:  Degenerative changes    EMR REVIEW: completed with noted prior visit 5/29/23 reviewed.    DIAGNOSIS:  1. Primary osteoarthritis of left knee    2. BMI 40.0-44.9, adult      Plan:     Orders Placed This Encounter    triamcinolone acetonide injection 40 mg    LIDOcaine (PF) 10 mg/ml (1%) injection 50 mg     Ongoing education about DX and treatment recommendations including conservative treatments of daily HEP with TheraBand, BMI reduction goal 5-10% of body weight (215# overall goal), muscle strengthening with use of stationary bike (RPM set at 80 or > with slow progression to goal of 40 minutes 3-4 times per week as tolerated), adequate vit D/C, glucosamine 1500 mg/day and daily acetaminophen 1000 mg 3 times/ day if able to tolerate.    Treatment plan: Moderate exacerbation of chronic Left Moderate knee arthritis complicated by gait dysfunction s/t left ankle over pronation  Failed conservative treatments including: soft knee splint, RX topical NSAID , HEP with TheraBand > 6 weeks, and supportive shoes and arch supports.  Today recommending CSI.  If inadequate at f/u will consider formal RX PT and VS injections.   Procedure: CSI v. VS injections discussed, s/t failed conservative treatments patient consents to CSI today  RX Medications: continue medications as RX per PCP.  RTC: 6 months  NOTE: None     Elsie Carter FNP    Procedure Note:   Large Joint Aspiration/Injection: L knee    Date/Time: 7/31/2023 11:00 AM    Performed by: Elsie Carter NP  Authorized by: Elsie  MOSHE Carter    Location:  Knee  Site:  L knee  Medications:  5 mL LIDOCAINE 1% (PF) 50 mg / 5 mL; 40 mg KENALOG 40 mg / 1 mL     Ortho Procedure Note   Procedure: LEFT Knee CSI lateral suprapatellar approach     Indications: Therapeutic Indication - Decrease pain, Increase range of motion and improve quality of life:   Risks:  Possible complications with the injection include bleeding, infection (.01%), tendon rupture, steroid flare, fat pad or soft tissue atrophy, skin depigmentation, allergic reaction to medications and vasovagal response. (steroid flare treatment is rest, ice, NSAIDs and resolves in 24-36 hours)  Consent:  Procedure, risks, benefits, and alternatives explained the patient, who voiced understanding and agreed to proceed with procedure.  Consent signed and scanned into the medical record.   No absolute contraindications (cellulitis overlying joint, infection, lack of informed consent, allergy to injection medication, AVN protein or egg allergy for sodium hyaluronate, or history of steroid flare) or relative contraindications (uncontrolled DM2 A1c>10, coagulopathy, INR > 3.5, previous joint replacement or history of AVN).        Staff: Elsie Carter FNP  Description:  Time-out performed.  The patient was prepped in normal sterile fashion use of chlorhexidine scrub and the appropriate and anatomic landmarks were identified.  Sterile 21 gauge 1.5 inch  was used. Topical ethyl chloride was applied. Contents of syringe included:     LEFT KNEE: 5 ml of 1% of lidocaine (PF) with 40 mg of Kenalog      Drainage: n/a   Complications: None   EBL: None   Post Procedure: Patient alert, and moving all extremities. ROM improved, pain decreased.  Good peripheral pulses, no signs of vascular compromise and range of motion intact.  Aftercare instructions were given to patient at time of discharge.  Relative rest for 3 days-avoiding excess activity.  Place ice on the area for 15 minutes every 4-6 hours.  Patient may take Tylenol a 1000 mg b.i.d. or ibuprofen 600 mg t.i.d. for the next 3-4 days if not on medication already and safe to take pending co-morbidities.  Protect the area for the next 1-8 hours if anesthetic was used.  Avoid excessive activity for the next 3-4 weeks.  ER precautions given for fever, severe joint pain or allergic reaction or other new symptoms related to the joint injection.

## 2023-08-28 PROBLEM — Z00.00 WELLNESS EXAMINATION: Status: RESOLVED | Noted: 2023-05-23 | Resolved: 2023-08-28

## 2023-09-05 LAB — CRC RECOMMENDATION EXT: NORMAL

## 2023-09-11 RX ORDER — GALCANEZUMAB 120 MG/ML
INJECTION, SOLUTION SUBCUTANEOUS
Qty: 120 ML | Refills: 1 | OUTPATIENT
Start: 2023-09-11

## 2023-09-11 RX ORDER — GABAPENTIN 300 MG/1
300 CAPSULE ORAL 3 TIMES DAILY
COMMUNITY
End: 2024-01-11

## 2023-09-11 RX ORDER — VERAPAMIL HYDROCHLORIDE 180 MG/1
180 CAPSULE, EXTENDED RELEASE ORAL DAILY
Qty: 30 CAPSULE | Refills: 2 | OUTPATIENT
Start: 2023-09-11

## 2023-09-11 RX ORDER — GABAPENTIN 300 MG/1
300 CAPSULE ORAL 3 TIMES DAILY
Qty: 90 CAPSULE | Refills: 1 | OUTPATIENT
Start: 2023-09-11

## 2023-09-11 RX ORDER — LINACLOTIDE 145 UG/1
145 CAPSULE, GELATIN COATED ORAL DAILY
COMMUNITY
Start: 2023-07-19

## 2023-09-12 ENCOUNTER — OFFICE VISIT (OUTPATIENT)
Dept: URGENT CARE | Facility: CLINIC | Age: 48
End: 2023-09-12
Payer: MEDICAID

## 2023-09-12 ENCOUNTER — HOSPITAL ENCOUNTER (EMERGENCY)
Facility: HOSPITAL | Age: 48
Discharge: HOME | End: 2023-09-12
Attending: STUDENT IN AN ORGANIZED HEALTH CARE EDUCATION/TRAINING PROGRAM
Payer: MEDICAID

## 2023-09-12 VITALS
WEIGHT: 243.81 LBS | DIASTOLIC BLOOD PRESSURE: 87 MMHG | RESPIRATION RATE: 20 BRPM | BODY MASS INDEX: 39.18 KG/M2 | HEIGHT: 66 IN | OXYGEN SATURATION: 98 % | SYSTOLIC BLOOD PRESSURE: 133 MMHG | HEART RATE: 85 BPM | TEMPERATURE: 98 F

## 2023-09-12 VITALS
WEIGHT: 243 LBS | TEMPERATURE: 98 F | DIASTOLIC BLOOD PRESSURE: 93 MMHG | HEIGHT: 66 IN | OXYGEN SATURATION: 100 % | RESPIRATION RATE: 18 BRPM | HEART RATE: 84 BPM | SYSTOLIC BLOOD PRESSURE: 149 MMHG | BODY MASS INDEX: 39.05 KG/M2

## 2023-09-12 DIAGNOSIS — M25.462 PAIN AND SWELLING OF LEFT KNEE: Primary | ICD-10-CM

## 2023-09-12 DIAGNOSIS — M25.562 PAIN AND SWELLING OF LEFT KNEE: Primary | ICD-10-CM

## 2023-09-12 DIAGNOSIS — M23.92 LOCKED KNEE, LEFT: Primary | ICD-10-CM

## 2023-09-12 DIAGNOSIS — M17.12 OSTEOARTHRITIS OF LEFT KNEE, UNSPECIFIED OSTEOARTHRITIS TYPE: ICD-10-CM

## 2023-09-12 PROCEDURE — 25000003 PHARM REV CODE 250: Performed by: STUDENT IN AN ORGANIZED HEALTH CARE EDUCATION/TRAINING PROGRAM

## 2023-09-12 PROCEDURE — 99215 OFFICE O/P EST HI 40 MIN: CPT | Mod: PBBFAC,25 | Performed by: NURSE PRACTITIONER

## 2023-09-12 PROCEDURE — 99284 EMERGENCY DEPT VISIT MOD MDM: CPT | Mod: 25,27

## 2023-09-12 PROCEDURE — 99213 PR OFFICE/OUTPT VISIT, EST, LEVL III, 20-29 MIN: ICD-10-PCS | Mod: S$PBB,,, | Performed by: NURSE PRACTITIONER

## 2023-09-12 PROCEDURE — 99213 OFFICE O/P EST LOW 20 MIN: CPT | Mod: S$PBB,,, | Performed by: NURSE PRACTITIONER

## 2023-09-12 RX ORDER — HYDROCODONE BITARTRATE AND ACETAMINOPHEN 5; 325 MG/1; MG/1
1 TABLET ORAL
Status: COMPLETED | OUTPATIENT
Start: 2023-09-12 | End: 2023-09-12

## 2023-09-12 RX ORDER — TRAMADOL HYDROCHLORIDE 50 MG/1
50 TABLET ORAL EVERY 6 HOURS PRN
Qty: 12 TABLET | Refills: 0 | Status: SHIPPED | OUTPATIENT
Start: 2023-09-12 | End: 2023-09-20

## 2023-09-12 RX ORDER — LIDOCAINE 50 MG/G
1 PATCH TOPICAL DAILY
Qty: 5 PATCH | Refills: 0 | Status: SHIPPED | OUTPATIENT
Start: 2023-09-12 | End: 2023-09-17

## 2023-09-12 RX ORDER — TRAMADOL HYDROCHLORIDE 50 MG/1
50 TABLET ORAL EVERY 6 HOURS PRN
Qty: 12 TABLET | Refills: 0
Start: 2023-09-12 | End: 2023-09-12 | Stop reason: SDUPTHER

## 2023-09-12 RX ADMIN — HYDROCODONE BITARTRATE AND ACETAMINOPHEN 1 TABLET: 5; 325 TABLET ORAL at 09:09

## 2023-09-13 NOTE — ED PROVIDER NOTES
Encounter Date: 9/12/2023       History     Chief Complaint   Patient presents with    Knee Pain     Left knee pain with swelling (x)3 days. Harvey davis     Patient presents to the emergency department due to left knee pain and swelling.  She has a history of some type of arthritis, she states she is been told she was lupus or rheumatoid arthritis but she was unsure.  She states for last few days her pain has been exacerbated and she was had swelling in the knee for about 3 days.  No overlying redness or warmth for patient, no fevers.  She went to an urgent care earlier today and due to concerns of the lower leg being swollen as well as, she was sent here for possibility of a DVT.  She denies any chest pain or shortness of breath.    The history is provided by the patient.     Review of patient's allergies indicates:   Allergen Reactions    Nsaids (non-steroidal anti-inflammatory drug) Other (See Comments)     Causes ulcers to bleed.    Ibuprofen     Latex     Meloxicam     Nsaids (non-steroidal anti-inflammatory drug) Nausea Only     Past Medical History:   Diagnosis Date    Anemia     Arthritis     Hypertension     Migraines     Thrombocytopenia, unspecified     TTP (thrombotic thrombocytopenic purpura)     TTP (thrombotic thrombocytopenic purpura)      Past Surgical History:   Procedure Laterality Date    CARPAL TUNNEL RELEASE      SURGICAL REMOVAL OF ENDOMETRIOSIS       Family History   Problem Relation Age of Onset    Hypertension Mother     Arthritis Mother     Depression Mother     Thyroid disease Sister     Asthma Sister     Stroke Maternal Grandfather     Mental retardation Brother      Social History     Tobacco Use    Smoking status: Never    Smokeless tobacco: Never   Substance Use Topics    Alcohol use: Not Currently     Comment: Last drink @ 20    Drug use: Never     Review of Systems   Constitutional:  Negative for chills and fever.   HENT:  Negative for congestion and sore throat.    Respiratory:   Negative for cough and shortness of breath.    Cardiovascular:  Positive for leg swelling. Negative for chest pain and palpitations.   Gastrointestinal:  Negative for abdominal pain and nausea.   Genitourinary:  Negative for dysuria and hematuria.   Musculoskeletal:  Negative for arthralgias and myalgias.   Neurological:  Negative for dizziness and weakness.       Physical Exam     Initial Vitals [09/12/23 2006]   BP Pulse Resp Temp SpO2   (!) 149/93 84 20 98 °F (36.7 °C) 100 %      MAP       --         Physical Exam    Nursing note and vitals reviewed.  Constitutional: She appears well-developed and well-nourished.   HENT:   Head: Normocephalic and atraumatic.   Eyes: EOM are normal. Pupils are equal, round, and reactive to light.   Neck: Neck supple.   Normal range of motion.  Cardiovascular:  Normal rate and regular rhythm.           Pulmonary/Chest: Breath sounds normal. No respiratory distress.   Abdominal: Abdomen is soft. There is no abdominal tenderness.   Musculoskeletal:         General: Tenderness and edema present.      Cervical back: Normal range of motion and neck supple.      Comments: Swelling and tenderness over the left knee, there is some mild swelling of the lower left extremity as well, no overlying erythema or warmth, painful but good passive range of motion, decreased active range of motion.     Neurological: She is alert and oriented to person, place, and time.   Skin: Skin is warm and dry.         ED Course   Procedures  Labs Reviewed - No data to display       Imaging Results              X-Ray Knee 3 View Left (Final result)  Result time 09/12/23 21:06:29      Final result by Sathya Muñoz MD (09/12/23 21:06:29)                   Impression:      As above      Electronically signed by: Sathya Muñoz  Date:    09/12/2023  Time:    21:06               Narrative:    EXAMINATION:  XR KNEE 3 VIEW LEFT    CLINICAL HISTORY:  Pain in left knee    TECHNIQUE:  AP, lateral, and Merchant views  of the left knee were performed.    COMPARISON:  05/29/2023    FINDINGS:  Degenerative changes with tricompartmental osteophytes.  Loss of joint space greatest in the medial compartment.  No effusion.                                       Medications   HYDROcodone-acetaminophen 5-325 mg per tablet 1 tablet (1 tablet Oral Given 9/12/23 2922)     Medical Decision Making  Vital signs stable, x-rays are negative for bony process on my review.  Ultrasound of the left lower extremity furthermore show no evidence of DVT.  Patient can not take a NSAIDs due to severe reaction, therefore police on a short prescription of tramadol.  She would already received lidocaine patches of the urgent care.  Likely an exacerbation of patient's arthritis.  Follow up with primary care physician, return precautions were given.    Amount and/or Complexity of Data Reviewed  Radiology: ordered and independent interpretation performed. Decision-making details documented in ED Course.    Risk  Prescription drug management.                               Clinical Impression:   Final diagnoses:  [M25.562, M25.462] Pain and swelling of left knee (Primary)        ED Disposition Condition    Discharge Stable          ED Prescriptions       Medication Sig Dispense Start Date End Date Auth. Provider    traMADoL (ULTRAM) 50 mg tablet  (Status: Discontinued) Take 1 tablet (50 mg total) by mouth every 6 (six) hours as needed for Pain. 12 tablet 9/12/2023 9/12/2023 Jose Ambrocio MD    traMADoL (ULTRAM) 50 mg tablet Take 1 tablet (50 mg total) by mouth every 6 (six) hours as needed for Pain. 12 tablet 9/12/2023 -- Jose Ambrocio MD          Follow-up Information    None          Jose Ambrocio MD  09/12/23 8468

## 2023-09-13 NOTE — PROGRESS NOTES
"Subjective:      Patient ID: Nicole Quiñones is a 48 y.o. female.    Vitals:  height is 5' 6" (1.676 m) and weight is 110.6 kg (243 lb 12.8 oz). Her oral temperature is 98 °F (36.7 °C). Her blood pressure is 133/87 and her pulse is 85. Her respiration is 20 and oxygen saturation is 98%.     Chief Complaint: Leg Pain (X 3 days)    HPI Presents with c/o left leg pain and inability to bend knee x 3 days. Pt denies new injury or fall. Has hx of Osteoarthritis  ROS   Objective:     Physical Exam   Constitutional:  Non-toxic appearance. She does not appear ill. No distress. normal  HENT:   Head: Normocephalic.   Ears:   Right Ear: Tympanic membrane normal.   Nose: No congestion.   Mouth/Throat: No oropharyngeal exudate or posterior oropharyngeal erythema.   Eyes: Conjunctivae are normal.   Cardiovascular: Normal rate and normal pulses.   Pulmonary/Chest: Effort normal.   Abdominal: Normal appearance and bowel sounds are normal. Soft.   Musculoskeletal: Normal range of motion.         General: Swelling and tenderness present. No deformity or signs of injury. Normal range of motion.      Right lower leg: No edema.      Left lower leg: No edema.      Comments: There is swelling to left leg and over patella, PT reports pain with movement and bending knee backward. There is no pain for with dorsiflexion. No redness or warmth and when compared to right there is no calf swelling asymmetry. Good pedal pulse.   Neurological: no focal deficit. She is alert.   Skin: Skin is warm, dry and not diaphoretic. Capillary refill takes less than 2 seconds.   Psychiatric: Mood, judgment and thought content normal.   Nursing note and vitals reviewed.      Assessment:     1. Locked knee, left    2. Osteoarthritis of left knee, unspecified osteoarthritis type        Plan:   Please follow instructions on patient education material.  Return to urgent care in 2 to 3 days if symptoms are not improving, immediately if you develop any new or " worsening symptoms.     F/u with PCP    Locked knee, left  -     LIDOcaine (LIDODERM) 5 %; Place 1 patch onto the skin once daily. Remove & Discard patch within 12 hours or as directed by MD for 5 days  Dispense: 5 patch; Refill: 0    Osteoarthritis of left knee, unspecified osteoarthritis type  -     LIDOcaine (LIDODERM) 5 %; Place 1 patch onto the skin once daily. Remove & Discard patch within 12 hours or as directed by MD for 5 days  Dispense: 5 patch; Refill: 0

## 2023-09-13 NOTE — PATIENT INSTRUCTIONS
Keep knee Immobilizer in place  Instructed to avoid weight bearing on right knee.  Ok to go to work as long as can wear immobilizer   Follow up with [facility].  Pain meds Rx'ed/sent to pharmacy.  Elevate as much as possible.  Light ROM exercises as tolerated.  Very light weightbearing as tolerated.   Limit icing the knee to 3x/day, 10 mins max.  Please follow instructions on patient education material.  Return to urgent care in 2 to 3 days if symptoms are not improving, immediately if you develop any new or worsening symptoms.     F/u with PCP

## 2023-09-19 ENCOUNTER — OFFICE VISIT (OUTPATIENT)
Dept: FAMILY MEDICINE | Facility: CLINIC | Age: 48
End: 2023-09-19
Payer: MEDICAID

## 2023-09-19 VITALS
TEMPERATURE: 98 F | HEIGHT: 66 IN | RESPIRATION RATE: 18 BRPM | SYSTOLIC BLOOD PRESSURE: 128 MMHG | WEIGHT: 244 LBS | BODY MASS INDEX: 39.21 KG/M2 | HEART RATE: 76 BPM | OXYGEN SATURATION: 98 % | DIASTOLIC BLOOD PRESSURE: 90 MMHG

## 2023-09-19 DIAGNOSIS — M17.12 PRIMARY OSTEOARTHRITIS OF LEFT KNEE: Primary | ICD-10-CM

## 2023-09-19 DIAGNOSIS — Z23 ENCOUNTER FOR VACCINATION: ICD-10-CM

## 2023-09-19 DIAGNOSIS — I10 PRIMARY HYPERTENSION: ICD-10-CM

## 2023-09-19 DIAGNOSIS — Z12.11 ENCOUNTER FOR COLORECTAL CANCER SCREENING: ICD-10-CM

## 2023-09-19 DIAGNOSIS — Z12.12 ENCOUNTER FOR COLORECTAL CANCER SCREENING: ICD-10-CM

## 2023-09-19 PROCEDURE — 99214 OFFICE O/P EST MOD 30 MIN: CPT | Mod: S$PBB,,, | Performed by: NURSE PRACTITIONER

## 2023-09-19 PROCEDURE — 99215 OFFICE O/P EST HI 40 MIN: CPT | Mod: PBBFAC | Performed by: NURSE PRACTITIONER

## 2023-09-19 PROCEDURE — 4010F ACE/ARB THERAPY RXD/TAKEN: CPT | Mod: CPTII,,, | Performed by: NURSE PRACTITIONER

## 2023-09-19 PROCEDURE — 3074F SYST BP LT 130 MM HG: CPT | Mod: CPTII,,, | Performed by: NURSE PRACTITIONER

## 2023-09-19 PROCEDURE — 1159F MED LIST DOCD IN RCRD: CPT | Mod: CPTII,,, | Performed by: NURSE PRACTITIONER

## 2023-09-19 PROCEDURE — 1160F RVW MEDS BY RX/DR IN RCRD: CPT | Mod: CPTII,,, | Performed by: NURSE PRACTITIONER

## 2023-09-19 PROCEDURE — 3074F PR MOST RECENT SYSTOLIC BLOOD PRESSURE < 130 MM HG: ICD-10-PCS | Mod: CPTII,,, | Performed by: NURSE PRACTITIONER

## 2023-09-19 PROCEDURE — 3008F PR BODY MASS INDEX (BMI) DOCUMENTED: ICD-10-PCS | Mod: CPTII,,, | Performed by: NURSE PRACTITIONER

## 2023-09-19 PROCEDURE — 1159F PR MEDICATION LIST DOCUMENTED IN MEDICAL RECORD: ICD-10-PCS | Mod: CPTII,,, | Performed by: NURSE PRACTITIONER

## 2023-09-19 PROCEDURE — 3080F PR MOST RECENT DIASTOLIC BLOOD PRESSURE >= 90 MM HG: ICD-10-PCS | Mod: CPTII,,, | Performed by: NURSE PRACTITIONER

## 2023-09-19 PROCEDURE — 1160F PR REVIEW ALL MEDS BY PRESCRIBER/CLIN PHARMACIST DOCUMENTED: ICD-10-PCS | Mod: CPTII,,, | Performed by: NURSE PRACTITIONER

## 2023-09-19 PROCEDURE — 90686 IIV4 VACC NO PRSV 0.5 ML IM: CPT | Mod: PBBFAC

## 2023-09-19 PROCEDURE — 3008F BODY MASS INDEX DOCD: CPT | Mod: CPTII,,, | Performed by: NURSE PRACTITIONER

## 2023-09-19 PROCEDURE — 99214 PR OFFICE/OUTPT VISIT, EST, LEVL IV, 30-39 MIN: ICD-10-PCS | Mod: S$PBB,,, | Performed by: NURSE PRACTITIONER

## 2023-09-19 PROCEDURE — 3044F PR MOST RECENT HEMOGLOBIN A1C LEVEL <7.0%: ICD-10-PCS | Mod: CPTII,,, | Performed by: NURSE PRACTITIONER

## 2023-09-19 PROCEDURE — 4010F PR ACE/ARB THEARPY RXD/TAKEN: ICD-10-PCS | Mod: CPTII,,, | Performed by: NURSE PRACTITIONER

## 2023-09-19 PROCEDURE — 3044F HG A1C LEVEL LT 7.0%: CPT | Mod: CPTII,,, | Performed by: NURSE PRACTITIONER

## 2023-09-19 PROCEDURE — 90471 IMMUNIZATION ADMIN: CPT | Mod: PBBFAC

## 2023-09-19 PROCEDURE — 3080F DIAST BP >= 90 MM HG: CPT | Mod: CPTII,,, | Performed by: NURSE PRACTITIONER

## 2023-09-19 RX ORDER — DULOXETIN HYDROCHLORIDE 60 MG/1
1 CAPSULE, DELAYED RELEASE ORAL DAILY
COMMUNITY
End: 2023-09-20

## 2023-09-19 RX ORDER — OLMESARTAN MEDOXOMIL AND HYDROCHLOROTHIAZIDE 20/12.5 20; 12.5 MG/1; MG/1
1 TABLET ORAL DAILY
COMMUNITY
Start: 2023-07-16 | End: 2023-09-19

## 2023-09-19 RX ADMIN — INFLUENZA VIRUS VACCINE 0.5 ML: 15; 15; 15; 15 SUSPENSION INTRAMUSCULAR at 08:09

## 2023-09-19 NOTE — ASSESSMENT & PLAN NOTE
Take medications as prescribed.  Elevate, ice, rest.  Keep appointment with orthopedic as directed.  Lose weight.  Questions solicited and answered, patient verbalized.

## 2023-09-19 NOTE — ASSESSMENT & PLAN NOTE
Lose Weight.  Stay physically active as tolerated.  Read Nutritional labels.  Calorie control.  Stay hydrated with water.  Follow low fat, low-cholesterol diet.

## 2023-09-19 NOTE — PROGRESS NOTES
"Patient Name: Nicole Quiñones   : 1975  MRN: [id number]     SUBJECTIVE DATA:    CHIEF COMPLAINT:   Nicole Quiñones is a 48 y.o. female who presents to clinic today with Follow-up (ER/URGENT CARE)        HPI:  48-year-old female presents to the clinic to follow-up on left knee pain after ED visits also requesting flu vaccine.    Chronic left knee pain:  Patient stated the pain only presents itself when " I bends my knee".  Denies any knee tenderness.  Can ambulate with ease without any discomfort.   Emergency department x-rays reviewed at bedside with patient.  EXAMINATION:  XR KNEE 3 VIEW LEFT     CLINICAL HISTORY:  Pain in left knee     TECHNIQUE:  AP, lateral, and Merchant views of the left knee were performed.     COMPARISON:  2023     FINDINGS:  Degenerative changes with tricompartmental osteophytes.  Loss of joint space greatest in the medial compartment.  No effusion.     Also she had completed CV ultrasound Doppler venous DVT left leg  Reason for Exam  Priority: Routine  Dx: Pain and swelling of left knee     Conclusion:     There is no evidence of a left lower extremity DVT.    The contralateral (right) common femoral vein is patent.  Continue with current medication as prescribed, elevate, ice, rest and keep your appointment with orthopedic.    Patient denies chest pain, shortness of breath, dyspnea on exertion, palpitations, peripheral edema, abdominal pain, nausea, vomiting, diarrhea, constipation, fatigue, fever, chills, dysuria,  hematuria, melena, or hematochezia.   Patient was discharged with Ultram 50 mg p.o. but she had not picked up the medication yet.        ALLERGIES:   Review of patient's allergies indicates:   Allergen Reactions    Nsaids (non-steroidal anti-inflammatory drug) Other (See Comments)     Causes ulcers to bleed.    Ibuprofen     Latex     Meloxicam     Nsaids (non-steroidal anti-inflammatory drug) Nausea Only         ROS:  Review of Systems   Musculoskeletal:  " "Positive for joint pain (Left knee pain).   All other systems reviewed and are negative.        OBJECTIVE DATA:  Vital signs  Vitals:    09/19/23 0754   BP: (!) 128/90   Pulse: 76   Resp: 18   Temp: 98 °F (36.7 °C)   TempSrc: Oral   SpO2: 98%   Weight: 110.7 kg (244 lb)   Height: 5' 6" (1.676 m)      Body mass index is 39.38 kg/m².    PHYSICAL EXAM:   Physical Exam  Vitals and nursing note reviewed.   Constitutional:       General: She is awake. She is not in acute distress.     Appearance: Normal appearance. She is well-developed and well-groomed. She is obese. She is not ill-appearing, toxic-appearing or diaphoretic.   HENT:      Head: Normocephalic and atraumatic.      Right Ear: Tympanic membrane, ear canal and external ear normal.      Left Ear: Tympanic membrane, ear canal and external ear normal.      Nose: Nose normal.      Mouth/Throat:      Mouth: Mucous membranes are moist.      Pharynx: Oropharynx is clear. Uvula midline.   Eyes:      General: Lids are normal.      Extraocular Movements: Extraocular movements intact.      Conjunctiva/sclera: Conjunctivae normal.      Pupils: Pupils are equal, round, and reactive to light.   Neck:      Trachea: Trachea and phonation normal.   Cardiovascular:      Rate and Rhythm: Normal rate and regular rhythm.      Pulses: Normal pulses.           Radial pulses are 2+ on the right side and 2+ on the left side.        Dorsalis pedis pulses are 2+ on the right side and 2+ on the left side.        Posterior tibial pulses are 2+ on the right side and 2+ on the left side.      Heart sounds: Normal heart sounds. No murmur heard.  Pulmonary:      Effort: Pulmonary effort is normal.      Breath sounds: Normal breath sounds and air entry. No wheezing or rhonchi.   Abdominal:      Palpations: Abdomen is soft.   Musculoskeletal:         General: No tenderness. Normal range of motion.      Cervical back: Normal range of motion and neck supple.      Right lower leg: No edema.      " Left lower leg: No edema.        Legs:    Skin:     General: Skin is warm and dry.      Capillary Refill: Capillary refill takes less than 2 seconds.   Neurological:      General: No focal deficit present.      Mental Status: She is alert and oriented to person, place, and time. Mental status is at baseline.      GCS: GCS eye subscore is 4. GCS verbal subscore is 5. GCS motor subscore is 6.      Gait: Gait normal.   Psychiatric:         Mood and Affect: Mood normal.         Behavior: Behavior normal. Behavior is cooperative.         Thought Content: Thought content normal.         Judgment: Judgment normal.          ASSESSMENT/PLAN:  1. Primary osteoarthritis of left knee  Assessment & Plan:  Take medications as prescribed.  Elevate, ice, rest.  Keep appointment with orthopedic as directed.  Lose weight.  Questions solicited and answered, patient verbalized.      2. Primary hypertension  Assessment & Plan:  Take medications as prescribed.  Keep appointment with cardiologist.      3. BMI 39.0-39.9,adult  Assessment & Plan:  Lose Weight.  Stay physically active as tolerated.  Read Nutritional labels.  Calorie control.  Stay hydrated with water.  Follow low fat, low-cholesterol diet.      4. Encounter for colorectal cancer screening  Assessment & Plan:  Completed on September 5, 2023 with Dr. Block.  Recommendation to return in 5 years.      5. Encounter for vaccination  -     Discontinue: influenza (QUADRIVALENT PF) vaccine 0.5 mL  -     influenza (QUADRIVALENT PF) vaccine 0.5 mL           RESULTS:  No results found for this or any previous visit (from the past 1008 hour(s)).      Follow Up:  Keep follow-up appointment in November 27th, 2023.    Face to face time 30 minutes, including documentation, chart review, counseling, education, review of test results, relevant medical records, and coordination of care.   I have explained the treatment plan, diagnosis, and prognosis to patient. All questions are answered to  the best of my knowledge.     Previous medical history/lab work/radiology reviewed and considered during medical management decisions.   Medication list reviewed and medication reconciliation performed.  Patient was provided  and care about his/her current diagnosis (es) and medications including risk/benefit and side effects/adverse events, over the counter medication uses/doses, home self-care and contact precautions,  and red flags and indications for when to seek immediate medical attention.   Patient was advised to continue compliance with current medication list and medical recommendations.  Patient dvised continued compliance with recommended eating habits/ diets for medical conditions and exercise 150 minutes/ week (if possible) for medical condition (s).  Educational handouts and instructions on selected disease management in AVS (After Visit Summary).    All of the patient's questions were answered to patient's satisfaction.   The patient was receptive, expressed verbal understanding and agreement the above plan.     This note was created with the assistance of a voice recognition software or phone dictation. There may be transcription errors as a result of using this technology however minimal. Effort has been made to assure accuracy of transcription but any obvious errors or omissions should be clarified with the author of the document

## 2023-09-20 ENCOUNTER — HOSPITAL ENCOUNTER (OUTPATIENT)
Dept: RADIOLOGY | Facility: HOSPITAL | Age: 48
Discharge: HOME | End: 2023-09-20
Attending: INTERNAL MEDICINE
Payer: MEDICAID

## 2023-09-20 ENCOUNTER — OFFICE VISIT (OUTPATIENT)
Dept: RHEUMATOLOGY | Facility: CLINIC | Age: 48
End: 2023-09-20
Payer: MEDICAID

## 2023-09-20 VITALS
SYSTOLIC BLOOD PRESSURE: 119 MMHG | HEIGHT: 66 IN | HEART RATE: 61 BPM | BODY MASS INDEX: 39.24 KG/M2 | OXYGEN SATURATION: 95 % | RESPIRATION RATE: 20 BRPM | TEMPERATURE: 98 F | WEIGHT: 244.19 LBS | DIASTOLIC BLOOD PRESSURE: 89 MMHG

## 2023-09-20 DIAGNOSIS — M79.642 BILATERAL HAND PAIN: ICD-10-CM

## 2023-09-20 DIAGNOSIS — G89.29 CHRONIC BILATERAL LOW BACK PAIN WITH BILATERAL SCIATICA: ICD-10-CM

## 2023-09-20 DIAGNOSIS — M54.42 CHRONIC BILATERAL LOW BACK PAIN WITH BILATERAL SCIATICA: ICD-10-CM

## 2023-09-20 DIAGNOSIS — K21.9 GASTROESOPHAGEAL REFLUX DISEASE WITHOUT ESOPHAGITIS: ICD-10-CM

## 2023-09-20 DIAGNOSIS — I10 PRIMARY HYPERTENSION: ICD-10-CM

## 2023-09-20 DIAGNOSIS — G89.29 BILATERAL CHRONIC KNEE PAIN: ICD-10-CM

## 2023-09-20 DIAGNOSIS — M05.79 RHEUMATOID ARTHRITIS INVOLVING MULTIPLE SITES WITH POSITIVE RHEUMATOID FACTOR: ICD-10-CM

## 2023-09-20 DIAGNOSIS — M54.41 CHRONIC BILATERAL LOW BACK PAIN WITH BILATERAL SCIATICA: ICD-10-CM

## 2023-09-20 DIAGNOSIS — M25.562 BILATERAL CHRONIC KNEE PAIN: ICD-10-CM

## 2023-09-20 DIAGNOSIS — M79.641 BILATERAL HAND PAIN: ICD-10-CM

## 2023-09-20 DIAGNOSIS — G43.809 OTHER MIGRAINE WITHOUT STATUS MIGRAINOSUS, NOT INTRACTABLE: ICD-10-CM

## 2023-09-20 DIAGNOSIS — M25.561 BILATERAL CHRONIC KNEE PAIN: ICD-10-CM

## 2023-09-20 DIAGNOSIS — G89.29 CHRONIC PAIN OF BOTH SHOULDERS: ICD-10-CM

## 2023-09-20 DIAGNOSIS — M25.511 CHRONIC PAIN OF BOTH SHOULDERS: ICD-10-CM

## 2023-09-20 DIAGNOSIS — M25.512 CHRONIC PAIN OF BOTH SHOULDERS: ICD-10-CM

## 2023-09-20 DIAGNOSIS — M05.79 RHEUMATOID ARTHRITIS INVOLVING MULTIPLE SITES WITH POSITIVE RHEUMATOID FACTOR: Primary | ICD-10-CM

## 2023-09-20 PROCEDURE — 3008F BODY MASS INDEX DOCD: CPT | Mod: CPTII,,, | Performed by: INTERNAL MEDICINE

## 2023-09-20 PROCEDURE — 73630 X-RAY EXAM OF FOOT: CPT | Mod: TC,LT

## 2023-09-20 PROCEDURE — 99215 OFFICE O/P EST HI 40 MIN: CPT | Mod: S$PBB,,, | Performed by: INTERNAL MEDICINE

## 2023-09-20 PROCEDURE — 3074F PR MOST RECENT SYSTOLIC BLOOD PRESSURE < 130 MM HG: ICD-10-PCS | Mod: CPTII,,, | Performed by: INTERNAL MEDICINE

## 2023-09-20 PROCEDURE — 3079F PR MOST RECENT DIASTOLIC BLOOD PRESSURE 80-89 MM HG: ICD-10-PCS | Mod: CPTII,,, | Performed by: INTERNAL MEDICINE

## 2023-09-20 PROCEDURE — 3008F PR BODY MASS INDEX (BMI) DOCUMENTED: ICD-10-PCS | Mod: CPTII,,, | Performed by: INTERNAL MEDICINE

## 2023-09-20 PROCEDURE — 99215 PR OFFICE/OUTPT VISIT, EST, LEVL V, 40-54 MIN: ICD-10-PCS | Mod: S$PBB,,, | Performed by: INTERNAL MEDICINE

## 2023-09-20 PROCEDURE — 3079F DIAST BP 80-89 MM HG: CPT | Mod: CPTII,,, | Performed by: INTERNAL MEDICINE

## 2023-09-20 PROCEDURE — 4010F PR ACE/ARB THEARPY RXD/TAKEN: ICD-10-PCS | Mod: CPTII,,, | Performed by: INTERNAL MEDICINE

## 2023-09-20 PROCEDURE — 73130 X-RAY EXAM OF HAND: CPT | Mod: TC,LT

## 2023-09-20 PROCEDURE — 73030 X-RAY EXAM OF SHOULDER: CPT | Mod: TC,LT

## 2023-09-20 PROCEDURE — 4010F ACE/ARB THERAPY RXD/TAKEN: CPT | Mod: CPTII,,, | Performed by: INTERNAL MEDICINE

## 2023-09-20 PROCEDURE — 1159F MED LIST DOCD IN RCRD: CPT | Mod: CPTII,,, | Performed by: INTERNAL MEDICINE

## 2023-09-20 PROCEDURE — 73560 X-RAY EXAM OF KNEE 1 OR 2: CPT | Mod: TC,RT

## 2023-09-20 PROCEDURE — 3074F SYST BP LT 130 MM HG: CPT | Mod: CPTII,,, | Performed by: INTERNAL MEDICINE

## 2023-09-20 PROCEDURE — 71046 X-RAY EXAM CHEST 2 VIEWS: CPT | Mod: TC

## 2023-09-20 PROCEDURE — 73030 X-RAY EXAM OF SHOULDER: CPT | Mod: TC,RT

## 2023-09-20 PROCEDURE — 1159F PR MEDICATION LIST DOCUMENTED IN MEDICAL RECORD: ICD-10-PCS | Mod: CPTII,,, | Performed by: INTERNAL MEDICINE

## 2023-09-20 PROCEDURE — 99215 OFFICE O/P EST HI 40 MIN: CPT | Mod: PBBFAC,25 | Performed by: INTERNAL MEDICINE

## 2023-09-20 PROCEDURE — 3044F HG A1C LEVEL LT 7.0%: CPT | Mod: CPTII,,, | Performed by: INTERNAL MEDICINE

## 2023-09-20 PROCEDURE — 3044F PR MOST RECENT HEMOGLOBIN A1C LEVEL <7.0%: ICD-10-PCS | Mod: CPTII,,, | Performed by: INTERNAL MEDICINE

## 2023-09-20 PROCEDURE — 73130 X-RAY EXAM OF HAND: CPT | Mod: TC,RT

## 2023-09-20 PROCEDURE — 73630 X-RAY EXAM OF FOOT: CPT | Mod: TC,RT

## 2023-09-20 RX ORDER — HYDROXYCHLOROQUINE SULFATE 200 MG/1
200 TABLET, FILM COATED ORAL 2 TIMES DAILY
Qty: 60 TABLET | Refills: 5 | Status: SHIPPED | OUTPATIENT
Start: 2023-09-20 | End: 2023-10-09 | Stop reason: SDUPTHER

## 2023-09-20 NOTE — PROGRESS NOTES
Patient ID: [id number]     Chief Complaint: Rheumatoid Arthritis (Pt having generalized joint pain and swelling. )      Referred By: Yaritza Browne     HPI:     Nicole Quiñones is a 48 y.o. female here today for a new patient visit for evaluation of joint pain.      She had seen Dr Marcial, she was diagnosed with RA and was started on Plaquenil and MTX. She had brain fog with those meds along with fatigue and GI upset, stopped them and did not go back for follow up. She also saw Dr Martinez in the past few times, did not give her any medication.   C/o pain in bilateral hands since 2015. Admits camps in hands at night. Hand pain worse in morning, she has some good and bad days. Admits tingling and numbness in bilateral hands. She wears brace sometimes to help with swelling. Hand pain associated with swelling in bilateral hands. Had Carpel tunnel surgery on left hand.   Pain in lower back for last 2 years and its getting worse, back pain radiates to bilateral legs. Admits burning and stabbing pain in feet intermittently.   C/o pain in left shoulder started few years back and getting worse. Sometimes have pain in right shoulder too. Admits cramps in shoulders some times. She was told she has torn rotator cuff on right and had it shaved by ortho in the past.   Pain in bilateral knees and She had seen sports medicine for knee pain. She was referred to PT.     H/o thrombocytopenia in the past, normal now. Had seen hematology in the past in [place], she was treated with IVIG and rituximab around 2015 and 2019.     Denies history of fevers, rashes, photosensitivity, oral or nasal ulcers, h/o MI, stroke, seizures, h/o PE or DVT, Raynaud's phenomenon, uveitis, malignancies.   Family history of autoimmune disease: none.   Pregnancies: 6 Miscarriages: none  Smoking: nonsmoker.       Social History     Tobacco Use   Smoking Status Never   Smokeless Tobacco Never           ----------------------------  Anemia  Arthritis  Fibromyalgia  Hypercholesterolemia  Hypertension  Migraines  Thrombocytopenia, unspecified  TTP (thrombotic thrombocytopenic purpura)  TTP (thrombotic thrombocytopenic purpura)     Past Surgical History:   Procedure Laterality Date    CARPAL TUNNEL RELEASE      Right shouler surgery      SURGICAL REMOVAL OF ENDOMETRIOSIS         Review of patient's allergies indicates:   Allergen Reactions    Nsaids (non-steroidal anti-inflammatory drug) Other (See Comments)     Causes ulcers to bleed.    Ibuprofen     Latex     Meloxicam     Nsaids (non-steroidal anti-inflammatory drug) Nausea Only       Outpatient Medications Marked as Taking for the 23 encounter (Office Visit) with Keshav Gibson MD   Medication Sig Dispense Refill    albuterol (PROVENTIL) 2.5 mg /3 mL (0.083 %) nebulizer solution USE 1 VIAL IN NEBULIZER EVERY 8 HOURS AS NEEDED 1 each 3    butalbital-acetaminophen-caffeine -40 mg (FIORICET, ESGIC) -40 mg per tablet SMARTSI Tablet(s) By Mouth Every 6 Hours PRN 30 tablet 2    cetirizine (ZYRTEC) 10 MG tablet Take 10 mg by mouth once daily.      cloNIDine (CATAPRES) 0.1 MG tablet Take 1 tablet by mouth daily as needed.      DULERA 100-5 mcg/actuation HFAA Inhale 1 puff into the lungs 2 (two) times daily.      EMGALITY  mg/mL PnIj AS DIRECTED SUBCUTANEOUS MONTHLY 30 DAY(S)      ergocalciferol (ERGOCALCIFEROL) 50,000 unit Cap Take 1 capsule (50,000 Units total) by mouth every 7 days. 12 capsule 3    ezetimibe (ZETIA) 10 mg tablet Take 10 mg by mouth once daily.      fluticasone propionate (FLONASE) 50 mcg/actuation nasal spray 2 sprays (100 mcg total) by Each Nostril route daily as needed for Allergies or Rhinitis. 18.2 mL 1    folic acid (FOLVITE) 1 MG tablet Take 1 tablet (1,000 mcg total) by mouth once daily. 30 tablet 3    gabapentin (NEURONTIN) 300 MG capsule Take 300 mg by mouth 3 (three) times daily.      isosorbide dinitrate  (ISORDIL) 40 MG Tab Take 1 tablet (40 mg total) by mouth once daily. 90 tablet 2    LINZESS 145 mcg Cap capsule Take 145 mcg by mouth once daily.      metoprolol succinate (TOPROL-XL) 100 MG 24 hr tablet Take 1 tablet (100 mg total) by mouth every evening. 90 tablet 2    ondansetron (ZOFRAN) 8 MG tablet TAKE 1 TABLET BY MOUTH EVERY 8 HOURS AS NEEDED FOR NAUSEA AND VOMITING 9 tablet 3    pantoprazole (PROTONIX) 20 MG tablet Take 1 tablet (20 mg total) by mouth once daily. 30 tablet 3    sucralfate (CARAFATE) 1 gram tablet Take 1 g by mouth 3 (three) times daily.      UBRELVY 100 mg tablet Take 1 tablet (100 mg total) by mouth once daily. 30 tablet 2    valsartan (DIOVAN) 80 MG tablet Take 1 tablet (80 mg total) by mouth once daily. 90 tablet 3    verapamiL (VERELAN) 180 MG C24P Take 180 mg by mouth once daily.         Social History     Socioeconomic History    Marital status:    Tobacco Use    Smoking status: Never    Smokeless tobacco: Never   Substance and Sexual Activity    Alcohol use: Not Currently     Comment: Last drink @ 20    Drug use: Never    Sexual activity: Not Currently     Partners: Male     Birth control/protection: Abstinence   Social History Narrative    ** Merged History Encounter **          Social Determinants of Health     Financial Resource Strain: Low Risk  (4/19/2023)    Overall Financial Resource Strain (CARDIA)     Difficulty of Paying Living Expenses: Not very hard   Food Insecurity: Food Insecurity Present (4/19/2023)    Hunger Vital Sign     Worried About Running Out of Food in the Last Year: Sometimes true     Ran Out of Food in the Last Year: Never true   Transportation Needs: No Transportation Needs (4/19/2023)    PRAPARE - Transportation     Lack of Transportation (Medical): No     Lack of Transportation (Non-Medical): No   Physical Activity: Inactive (4/19/2023)    Exercise Vital Sign     Days of Exercise per Week: 0 days     Minutes of Exercise per Session: 0 min   Stress:  Stress Concern Present (4/19/2023)    [employer] - Occupational Stress Questionnaire     Feeling of Stress : To some extent   Social Connections: Moderately Integrated (4/19/2023)    Social Connection and Isolation Panel [NHANES]     Frequency of Communication with Friends and Family: Three times a week     Frequency of Social Gatherings with Friends and Family: Twice a week     Attends Faith Services: 1 to 4 times per year     Active Member of Clubs or Organizations: Yes     Attends Club or Organization Meetings: 1 to 4 times per year     Marital Status: Never    Housing Stability: Low Risk  (4/19/2023)    Housing Stability Vital Sign     Unable to Pay for Housing in the Last Year: No     Number of Places Lived in the Last Year: 2     Unstable Housing in the Last Year: No        Family History   Problem Relation Age of Onset    Hypertension Mother     Arthritis Mother     Depression Mother     Thyroid disease Sister     Asthma Sister     Mental retardation Brother     Epilepsy Brother     Stroke Maternal Grandfather         Immunization History   Administered Date(s) Administered    DTP 1974, 1974, 06/09/1975, 06/14/1976, 05/09/1979    Hepatitis B, Adult 03/20/2018    Influenza 11/07/2008    Influenza (FLUBLOK) - Quadrivalent - Recombinant - PF *Preferred* (egg allergy) 10/30/2019    Influenza - Quadrivalent - PF *Preferred* (6 months and older) 10/30/2019, 11/16/2020, 04/19/2023, 09/19/2023    Influenza - Trivalent - PF (ADULT) 01/09/2013, 01/07/2014    MMR 03/20/2018    Measles / Rubella 08/11/1975    OPV 1974, 1974, 06/14/1976, 05/09/1979, 06/09/1995    PPD Test 09/30/2013    Pneumococcal Conjugate - 20 Valent 04/19/2023    Pneumococcal Polysaccharide - 23 Valent 03/04/2020    Td (ADULT) 03/27/1989, 01/25/2006    Tdap 01/07/2014, 03/20/2018       Patient Care Team:  Yaritza Browne FNP as PCP - General (Family Medicine)  Joy Huff MD  "(Family Medicine)     Subjective:     Constitutional:  Denies chills. Denies fever. Denies night sweats. Denies weight loss.   Ophthalmology: Denies blurred vision. Denies dry eyes. Denies eye pain. Denies Itching and redness.   ENT: Denies oral ulcers. Denies epistaxis. Denies dry mouth. Denies swollen glands.   Endocrine: Denies diabetes. Denies thyroid Problems.   Respiratory: Denies cough. Denies shortness of breath. Denies shortness of breath with exertion. Denies hemoptysis.   Cardiovascular: Denies chest pain at rest. Denies chest pain with exertion. Denies palpitations.    Gastrointestinal: Denies abdominal pain. Denies diarrhea. Denies nausea. Denies vomiting. Denies hematemesis or hematochezia. Denies heartburn.  Genitourinary: Denies blood in urine.  Musculoskeletal: See HPI for details  Integumentary: Denies rash. Denies photosensitivity.   Peripheral Vascular: Denies Ulcers of hands and/or feet. Denies Cold extremities.   Neurologic: Denies dizziness. Admits headache.  Denies loss of strength. Admits numbness or tingling.   Psychiatric: Admits depression. Admits anxiety. Denies suicidal/homicidal ideations.      Objective:     /89 (BP Location: Left arm, Patient Position: Sitting, BP Method: Large (Automatic))   Pulse 61   Temp 97.8 °F (36.6 °C) (Oral)   Resp 20   Ht 5' 6" (1.676 m)   Wt 110.8 kg (244 lb 3.2 oz)   SpO2 95%   BMI 39.41 kg/m²     Physical Exam    General Appearance: alert, pleasant, in no acute distress.  Skin: Skin color, texture, turgor normal. No rashes or lesions.  Eyes:  extraocular movement intact (EOMI), pupils equal, round, reactive to light and accommodation, conjunctiva clear.  ENT: No oral or nasal ulcers.  Neck:  Neck supple. No adenopathy.   Lungs: CTA throughout without crackles, rhonchi, or wheezes.   Heart: RRR w/o murmurs.  No edema. 2+ DP pulse.  Abdomen: Soft, non-tender, no masses, rebound or guarding.  Neuro: Alert, oriented, CN II-XII GI, sensory and " motor innervation intact.  Musculoskeletal: No overt synovitis on exam.  No tenderness in bilateral wrists or MCPs.  No swelling or tenderness in bilateral elbows.  Tenderness with range of motion of bilateral shoulders.  No tenderness over spine or SI joints.  Tenderness with range of motion of bilateral knees.  No swelling or warmth in bilateral knees.  No tenderness with MTP squeeze.  Psych: Alert, oriented, normal eye contact.    Labs:     : SETH negative by ZULMA.  Anti CCP 6.9, low titer, normal less than 3. RF negative.   2022: SETH negative. RF negative. C3, C4 normal. CCP 34(<19).   23: WBC 4.4. ANC ok. ESR 47. CRP 11.9. CPK normal. RF 26(<14), CCP 69, high titer.   2023: CBC ok. CMP ok. TSH normal. 1+ protein and no blood in urine.     Imagin/12/23:  X-ray of left knee showed tricompartmental DJD changes worse in medial compartment, no effusion.    2023:  X-ray of cervical spine showed small multilevel marginal osteophytes and mild facet arthropathy.  Alignment is preserved.    2012:  X-ray of bilateral shoulder showed glenohumeral joint and AC joint arthritis.  No acute fracture or dislocation.      Assessment:       ICD-10-CM ICD-9-CM   1. Rheumatoid arthritis involving multiple sites with positive rheumatoid factor  M05.79 714.0   2. Chronic bilateral low back pain with bilateral sciatica  M54.42 724.2    M54.41 724.3    G89.29 338.29   3. Bilateral chronic knee pain  M25.561 719.46    M25.562 338.29    G89.29    4. Bilateral hand pain  M79.641 729.5    M79.642    5. Chronic pain of both shoulders  M25.511 719.41    G89.29 338.29    M25.512    6. Primary hypertension  I10 401.9   7. BMI 39.0-39.9,adult  Z68.39 V85.39   8. Gastroesophageal reflux disease without esophagitis  K21.9 530.81   9. Other migraine without status migrainosus, not intractable  G43.809 346.80        Plan:     1. Rheumatoid arthritis involving multiple sites with positive rheumatoid factor:  Low titer  rheumatoid factor and a high titer anti CCP.  No overt synovitis on exam.  She has arthralgias and joint pain likely from osteoarthritis.  As she is having pain in bilateral hands which is worse in the mornings will start her on Plaquenil.  Start Plaquenil 200 mg b.i.d., discussed the risks and benefits of medication with the patient.  Send referral to Ophthalmology for eye exam on Plaquenil.     2. Osteoarthritis:  Osteoarthritis of cervical spine on x-rays.  DJD of bilateral knees and shoulders.  Recommend follow-up with Orthopedics.  Send referral to physical therapy.  Used to work as CNA and lift and push patients, likely the reason for severe osteoarthritis.  Had issues with rotator cuff on the right had shoulder arthroscopy for that.     3. Primary hypertension: follow up with PCP.      4. Obesity:  Discussed watching her diet and recommend weight loss.  Weight loss will help with knee pain.     5. Persons with rheumatoid arthritis, lupus, psoriatic arthritis and other autoimmune diseases are at increased risk of cardiovascular disease including heart attack and stroke. We recommend that all patients with these conditions have annual health maintenance exams including lipid measurements, blood pressure measurements, and smoking cessation counseling when applicable at their primary care provider's office.   - Advised to stay up-to-date on age appropriate vaccinations and malignancy screening, including yearly skin exams.          Plaquenil(hydroxychloroquine) treatment was discussed with the patient.    Risks and benefits of this medication was explained to the patient.  Discussed side effects including retinal deposits and retinopathy related to plaquenil.  Discussed risk of myopathy, neuropathy and nausea as well.  Instructed patient it is recommended to see an eye doctor for a baseline exam and yearly after that, although eye complications are rare and occur after many years(and are dose related).      Recommend starting Plaquenil at 200mg bid.     Follow up in about 3 months (around 12/20/2023). In addition to their scheduled follow up, the patient has also been instructed to follow up on as needed basis.        Total time spent with patient and documentation is more than 60 minutes. All questions were answered to patient's satisfaction and patient verbalized understanding.

## 2023-09-25 ENCOUNTER — OFFICE VISIT (OUTPATIENT)
Dept: ORTHOPEDICS | Facility: CLINIC | Age: 48
End: 2023-09-25
Payer: MEDICAID

## 2023-09-25 VITALS — HEIGHT: 66 IN | WEIGHT: 244.63 LBS | BODY MASS INDEX: 39.31 KG/M2

## 2023-09-25 DIAGNOSIS — M17.12 PRIMARY OSTEOARTHRITIS OF LEFT KNEE: ICD-10-CM

## 2023-09-25 DIAGNOSIS — M23.92 INTERNAL DERANGEMENT OF KNEE JOINT, LEFT: Primary | ICD-10-CM

## 2023-09-25 PROCEDURE — 1159F MED LIST DOCD IN RCRD: CPT | Mod: CPTII,,, | Performed by: NURSE PRACTITIONER

## 2023-09-25 PROCEDURE — 4010F ACE/ARB THERAPY RXD/TAKEN: CPT | Mod: CPTII,,, | Performed by: NURSE PRACTITIONER

## 2023-09-25 PROCEDURE — 3044F HG A1C LEVEL LT 7.0%: CPT | Mod: CPTII,,, | Performed by: NURSE PRACTITIONER

## 2023-09-25 PROCEDURE — 3044F PR MOST RECENT HEMOGLOBIN A1C LEVEL <7.0%: ICD-10-PCS | Mod: CPTII,,, | Performed by: NURSE PRACTITIONER

## 2023-09-25 PROCEDURE — 4010F PR ACE/ARB THEARPY RXD/TAKEN: ICD-10-PCS | Mod: CPTII,,, | Performed by: NURSE PRACTITIONER

## 2023-09-25 PROCEDURE — 1160F RVW MEDS BY RX/DR IN RCRD: CPT | Mod: CPTII,,, | Performed by: NURSE PRACTITIONER

## 2023-09-25 PROCEDURE — 1160F PR REVIEW ALL MEDS BY PRESCRIBER/CLIN PHARMACIST DOCUMENTED: ICD-10-PCS | Mod: CPTII,,, | Performed by: NURSE PRACTITIONER

## 2023-09-25 PROCEDURE — 99215 OFFICE O/P EST HI 40 MIN: CPT | Mod: PBBFAC | Performed by: NURSE PRACTITIONER

## 2023-09-25 PROCEDURE — 1159F PR MEDICATION LIST DOCUMENTED IN MEDICAL RECORD: ICD-10-PCS | Mod: CPTII,,, | Performed by: NURSE PRACTITIONER

## 2023-09-25 PROCEDURE — 99214 PR OFFICE/OUTPT VISIT, EST, LEVL IV, 30-39 MIN: ICD-10-PCS | Mod: S$PBB,,, | Performed by: NURSE PRACTITIONER

## 2023-09-25 PROCEDURE — 99214 OFFICE O/P EST MOD 30 MIN: CPT | Mod: S$PBB,,, | Performed by: NURSE PRACTITIONER

## 2023-09-25 PROCEDURE — 3008F PR BODY MASS INDEX (BMI) DOCUMENTED: ICD-10-PCS | Mod: CPTII,,, | Performed by: NURSE PRACTITIONER

## 2023-09-25 PROCEDURE — 3008F BODY MASS INDEX DOCD: CPT | Mod: CPTII,,, | Performed by: NURSE PRACTITIONER

## 2023-09-25 NOTE — PROGRESS NOTES
"  Subjective:   PATIENT ID: Nicole Quiñones is a 48 y.o. female. Non-smoker. Employment HX: , currently self employed.    Seen [facility] ortho for same DX since n/a.   CHIEF COMPLAINT: Knee Pain of the Left Knee    HPI:    Left aching medial knee pain.   Injury: no known injury  Onset: several years ago fluctuates   Modifying Factors: worse with activity, improves with rest, stiffness after immobilization, and improves with less than 30 minutes activity  Associated Symptoms: crepitus, decreased ROM, swelling, "giving out", and locking/ catching with ROM   Activity: sedentary with light activity and pain moderately interferes with ADLs   Previous Treatments:  HEP withTheraBand, RX NSAIDs, and CSI since  last injection 23  with some relief but symptoms returning  PMH: + RA  Family History:  unknown family history of OA    NOTE: Follow up  left knee DJD.  Conservative treatments attempted at prior visit of HEP, supportive shoes with arch support, RX PT recommended.  Patient has not completed RX PT due to currently in PT for back issues, but reports doing HEP regularly and has changed to supportive shoes with arch supports.  Pain has persisted. CSI given 23 with good relief, but seen in ED 23 for knee pain and swelling.  Patient reporting new onset mechanical locking.      Current Outpatient Medications:     albuterol (PROVENTIL) 2.5 mg /3 mL (0.083 %) nebulizer solution, USE 1 VIAL IN NEBULIZER EVERY 8 HOURS AS NEEDED, Disp: 1 each, Rfl: 3    butalbital-acetaminophen-caffeine -40 mg (FIORICET, ESGIC) -40 mg per tablet, SMARTSI Tablet(s) By Mouth Every 6 Hours PRN, Disp: 30 tablet, Rfl: 2    cetirizine (ZYRTEC) 10 MG tablet, Take 10 mg by mouth once daily., Disp: , Rfl:     cloNIDine (CATAPRES) 0.1 MG tablet, Take 1 tablet by mouth daily as needed., Disp: , Rfl:     DULERA 100-5 mcg/actuation HFAA, Inhale 1 puff into the lungs 2 (two) times daily., Disp: , Rfl:     EMGALITY PEN " 120 mg/mL PnIj, AS DIRECTED SUBCUTANEOUS MONTHLY 30 DAY(S), Disp: , Rfl:     ergocalciferol (ERGOCALCIFEROL) 50,000 unit Cap, Take 1 capsule (50,000 Units total) by mouth every 7 days., Disp: 12 capsule, Rfl: 3    ezetimibe (ZETIA) 10 mg tablet, Take 10 mg by mouth once daily., Disp: , Rfl:     fluticasone propionate (FLONASE) 50 mcg/actuation nasal spray, 2 sprays (100 mcg total) by Each Nostril route daily as needed for Allergies or Rhinitis., Disp: 18.2 mL, Rfl: 1    folic acid (FOLVITE) 1 MG tablet, Take 1 tablet (1,000 mcg total) by mouth once daily., Disp: 30 tablet, Rfl: 3    gabapentin (NEURONTIN) 300 MG capsule, Take 300 mg by mouth 3 (three) times daily., Disp: , Rfl:     hydroxychloroquine (PLAQUENIL) 200 mg tablet, Take 1 tablet (200 mg total) by mouth 2 (two) times daily. After food, Disp: 60 tablet, Rfl: 5    isosorbide dinitrate (ISORDIL) 40 MG Tab, Take 1 tablet (40 mg total) by mouth once daily., Disp: 90 tablet, Rfl: 2    LINZESS 145 mcg Cap capsule, Take 145 mcg by mouth once daily., Disp: , Rfl:     metoprolol succinate (TOPROL-XL) 100 MG 24 hr tablet, Take 1 tablet (100 mg total) by mouth every evening., Disp: 90 tablet, Rfl: 2    ondansetron (ZOFRAN) 8 MG tablet, TAKE 1 TABLET BY MOUTH EVERY 8 HOURS AS NEEDED FOR NAUSEA AND VOMITING, Disp: 9 tablet, Rfl: 3    pantoprazole (PROTONIX) 20 MG tablet, Take 1 tablet (20 mg total) by mouth once daily., Disp: 30 tablet, Rfl: 3    sucralfate (CARAFATE) 1 gram tablet, Take 1 g by mouth 3 (three) times daily., Disp: , Rfl:     UBRELVY 100 mg tablet, Take 1 tablet (100 mg total) by mouth once daily., Disp: 30 tablet, Rfl: 2    valsartan (DIOVAN) 80 MG tablet, Take 1 tablet (80 mg total) by mouth once daily., Disp: 90 tablet, Rfl: 3    verapamiL (VERELAN) 180 MG C24P, Take 180 mg by mouth once daily., Disp: , Rfl:   Review of patient's allergies indicates:   Allergen Reactions    Nsaids (non-steroidal anti-inflammatory drug) Other (See Comments)     Causes  "ulcers to bleed.    Ibuprofen     Latex     Meloxicam     Nsaids (non-steroidal anti-inflammatory drug) Nausea Only     Hemoglobin A1c   Date Value Ref Range Status   05/23/2023 5.3 <=7.0 % Final      Body mass index is 39.48 kg/m².   Vitals:    09/25/23 1339 09/25/23 1340   Weight: 110.9 kg (244 lb 9.6 oz)    Height: 5' 6" (1.676 m)    PainSc:    7      REVIEW OF SYSTEMS:  A ten-point review of systems was performed and is negative, except as mentioned above   Objective:   MSK Bilateral Knee  General:  No apparent distress, no pain indicators, obesity   Inspection: limping gait, mild effusion, full weight bearing, no erythema, no contusion, mild quad deconditioning, varus deformity   Palpation: LEFT knee tenderness with palpation at medial joint line, peripatellar soft tissue , non-tender: patellar tendon, tibial plateau  ROM:    Active Flexion / Extension (0-140):  0 / 135 non-painful (R)  0 / 119 painful (L)  Strength:   Flexion     5 / 5 (R)  4 / 5 (L)  Extension     5 / 5 (R)  4 / 5 (L)  Special Testing:  IT Band Testing  Chrissy's Test:  -- (R)  -- (L)  Effusion Testing  Ballotable Effusion:  -- (R)  -- (L)  Fluid Wave:    -- (R)  -- (L)  Patellar Testing  Patellar Grind:    -- (R)  + (L)  Patellar Facet Pain:  -- (R)  + (L)  Apprehension:    -- (R)  -- (L)  Meniscal Testing  Inderjit's test:    -- (R)  + (L)  Thessaly's Test:    Not tested (R)  not tested (L)  Ligament Testing  ACL Anterior Drawer:   -- (R) -- (L)  PCL Posterior Drawer:   -- (R) -- (L)  LCL Varus Test:    -- (R) -- (L)  MCL Valgus Test:    -- (R) -- (L)  Hip Exam normal  Ankle Exam normal  Neurovascular: Intact to light touch  Neuro/ Psych: Awake, alert, oriented, normal mood and affect  Lymphatic: No LAD  Skin/ Soft Tissue: no rash, skin intact  Assessment:   IMAGING: X-ray 4 views of left knee dated 5/29/23 reviewed and independently interpreted by me.  Discussed with patient. Noted no acute, DJD noted.    XR KNEE COMP 4 OR MORE VIEWS LEFT " 5/29/23  CLINICAL HISTORY:  Unilateral primary osteoarthritis, left knee  COMPARISON:  None.  FINDINGS:  No acute displaced fractures or dislocations.  There is some narrowing of the medial compartment of the knee joint articular spaces are otherwise preserved with smooth articular surfaces  No blastic or lytic lesions.  Soft tissues within normal limits.  Impression:  Degenerative changes    EMR REVIEW: completed with noted prior visit 7/31/23 reviewed.    DIAGNOSIS:  1. Internal derangement of knee joint, left    2. Primary osteoarthritis of left knee    3. BMI 39.0-39.9,adult      Plan:   \  Ongoing education about DX and treatment recommendations including conservative treatments of daily HEP with TheraBand, BMI reduction goal 5-10% of body weight (215# overall goal), muscle strengthening with use of stationary bike (RPM set at 80 or > with slow progression to goal of 40 minutes 3-4 times per week as tolerated), adequate vit D/C, glucosamine 1500 mg/day and daily acetaminophen 1000 mg 3 times/ day if able to tolerate.    Treatment plan: Moderate exacerbation of chronic Left Moderate knee arthritis complicated by gait dysfunction s/t left ankle over pronation  MRI ordered s/t failed conservative treatments including: RX NSAID, CSI, and HEP > 3 months with inadequate relief.  Patient continues with symptoms of meniscal injury despite > 6 weeks treatment.  MRI required to evaluate need for surgical treatments.   RX PT, instructed to contact insurance provider in order to obtain provider which takes patient's insurance    Procedure: n/a  RX Medications: continue medications as RX per PCP.  RTC: after imaging complete  NOTE: None     Elsie Carter FNP    Procedure Note:   None    Total Time Spent with Patient: 30 minutes .  Visit Start Time: 1340  10 minutes spent prior to visit reviewing EMR, prior labs and x-rays.  10 minutes spent in visit with patient face-to-face time completing exam, obtaining history,  educating on DX and treatment plan.  10 minutes spent after visit completing EMR documentation.   Visit End Time: 1410

## 2023-09-26 ENCOUNTER — TELEPHONE (OUTPATIENT)
Dept: RHEUMATOLOGY | Facility: CLINIC | Age: 48
End: 2023-09-26
Payer: MEDICAID

## 2023-09-26 NOTE — TELEPHONE ENCOUNTER
----- Message from Keshav Gibson MD sent at 9/25/2023  2:01 PM CDT -----  It says SETH will follow, but I do not see the result.    ----- Message -----  From: Liliana Funk LPN  Sent: 9/22/2023  11:13 AM CDT  To: Keshav Gibson MD

## 2023-09-27 NOTE — TELEPHONE ENCOUNTER
----- Message from Jessica Gold sent at 9/26/2023  2:48 PM CDT -----  Regarding: Please call pt  Pt called about her referral to Physical Therapy and would like a call back @ [phone]            Thank You  Altagracia CASH

## 2023-09-27 NOTE — TELEPHONE ENCOUNTER
Pt requesting labs sent to [facility] Physical therapy. Phone number [phone] fax [phone]

## 2023-10-04 ENCOUNTER — TELEPHONE (OUTPATIENT)
Dept: RHEUMATOLOGY | Facility: CLINIC | Age: 48
End: 2023-10-04
Payer: MEDICAID

## 2023-10-05 ENCOUNTER — OFFICE VISIT (OUTPATIENT)
Dept: NEUROLOGY | Facility: CLINIC | Age: 48
End: 2023-10-05
Payer: MEDICAID

## 2023-10-05 VITALS
HEART RATE: 74 BPM | DIASTOLIC BLOOD PRESSURE: 87 MMHG | HEIGHT: 66 IN | SYSTOLIC BLOOD PRESSURE: 135 MMHG | OXYGEN SATURATION: 99 % | BODY MASS INDEX: 39.7 KG/M2 | WEIGHT: 247 LBS

## 2023-10-05 DIAGNOSIS — G43.109 MIGRAINE WITH AURA AND WITHOUT STATUS MIGRAINOSUS, NOT INTRACTABLE: ICD-10-CM

## 2023-10-05 DIAGNOSIS — G43.009 MIGRAINE WITHOUT AURA AND WITHOUT STATUS MIGRAINOSUS, NOT INTRACTABLE: ICD-10-CM

## 2023-10-05 DIAGNOSIS — G43.E01 CHRONIC MIGRAINE WITH AURA AND WITH STATUS MIGRAINOSUS, NOT INTRACTABLE: Primary | ICD-10-CM

## 2023-10-05 PROBLEM — G43.E09 CHRONIC MIGRAINE WITH AURA WITHOUT STATUS MIGRAINOSUS, NOT INTRACTABLE: Status: ACTIVE | Noted: 2020-07-01

## 2023-10-05 PROBLEM — G43.909 MIGRAINE: Status: RESOLVED | Noted: 2020-07-01 | Resolved: 2023-10-05

## 2023-10-05 PROCEDURE — 99205 PR OFFICE/OUTPT VISIT, NEW, LEVL V, 60-74 MIN: ICD-10-PCS | Mod: S$PBB,,, | Performed by: NURSE PRACTITIONER

## 2023-10-05 PROCEDURE — 1160F RVW MEDS BY RX/DR IN RCRD: CPT | Mod: CPTII,,, | Performed by: NURSE PRACTITIONER

## 2023-10-05 PROCEDURE — 3075F PR MOST RECENT SYSTOLIC BLOOD PRESS GE 130-139MM HG: ICD-10-PCS | Mod: CPTII,,, | Performed by: NURSE PRACTITIONER

## 2023-10-05 PROCEDURE — 99215 OFFICE O/P EST HI 40 MIN: CPT | Mod: PBBFAC | Performed by: NURSE PRACTITIONER

## 2023-10-05 PROCEDURE — 3008F BODY MASS INDEX DOCD: CPT | Mod: CPTII,,, | Performed by: NURSE PRACTITIONER

## 2023-10-05 PROCEDURE — 4010F ACE/ARB THERAPY RXD/TAKEN: CPT | Mod: CPTII,,, | Performed by: NURSE PRACTITIONER

## 2023-10-05 PROCEDURE — 3008F PR BODY MASS INDEX (BMI) DOCUMENTED: ICD-10-PCS | Mod: CPTII,,, | Performed by: NURSE PRACTITIONER

## 2023-10-05 PROCEDURE — 99205 OFFICE O/P NEW HI 60 MIN: CPT | Mod: S$PBB,,, | Performed by: NURSE PRACTITIONER

## 2023-10-05 PROCEDURE — 3044F PR MOST RECENT HEMOGLOBIN A1C LEVEL <7.0%: ICD-10-PCS | Mod: CPTII,,, | Performed by: NURSE PRACTITIONER

## 2023-10-05 PROCEDURE — 3079F PR MOST RECENT DIASTOLIC BLOOD PRESSURE 80-89 MM HG: ICD-10-PCS | Mod: CPTII,,, | Performed by: NURSE PRACTITIONER

## 2023-10-05 PROCEDURE — 3079F DIAST BP 80-89 MM HG: CPT | Mod: CPTII,,, | Performed by: NURSE PRACTITIONER

## 2023-10-05 PROCEDURE — 1159F MED LIST DOCD IN RCRD: CPT | Mod: CPTII,,, | Performed by: NURSE PRACTITIONER

## 2023-10-05 PROCEDURE — 1160F PR REVIEW ALL MEDS BY PRESCRIBER/CLIN PHARMACIST DOCUMENTED: ICD-10-PCS | Mod: CPTII,,, | Performed by: NURSE PRACTITIONER

## 2023-10-05 PROCEDURE — 3044F HG A1C LEVEL LT 7.0%: CPT | Mod: CPTII,,, | Performed by: NURSE PRACTITIONER

## 2023-10-05 PROCEDURE — 4010F PR ACE/ARB THEARPY RXD/TAKEN: ICD-10-PCS | Mod: CPTII,,, | Performed by: NURSE PRACTITIONER

## 2023-10-05 PROCEDURE — 1159F PR MEDICATION LIST DOCUMENTED IN MEDICAL RECORD: ICD-10-PCS | Mod: CPTII,,, | Performed by: NURSE PRACTITIONER

## 2023-10-05 PROCEDURE — 3075F SYST BP GE 130 - 139MM HG: CPT | Mod: CPTII,,, | Performed by: NURSE PRACTITIONER

## 2023-10-05 RX ORDER — VENLAFAXINE HYDROCHLORIDE 37.5 MG/1
37.5 CAPSULE, EXTENDED RELEASE ORAL DAILY
Qty: 30 CAPSULE | Refills: 11 | Status: SHIPPED | OUTPATIENT
Start: 2023-10-05 | End: 2024-10-04

## 2023-10-05 RX ORDER — UBROGEPANT 100 MG/1
100 TABLET ORAL 2 TIMES DAILY PRN
Qty: 16 TABLET | Refills: 2 | Status: SHIPPED | OUTPATIENT
Start: 2023-10-05

## 2023-10-05 RX ORDER — BACLOFEN 5 MG/1
5 TABLET ORAL 2 TIMES DAILY PRN
Qty: 30 TABLET | Refills: 2 | Status: SHIPPED | OUTPATIENT
Start: 2023-10-05

## 2023-10-05 NOTE — PROGRESS NOTES
"[facility] Neurology Initial Office Visit Note    Initial Visit Date: 10/5/2023  Last Visit Date: Visit date not found  Current Visit Date:  10/05/2023    Chief Complaint:     Chief Complaint   Patient presents with    Headache     Patient reports she has been suffering with headaches for approx. 3-4 years. She c/o approx. 12-15 headaches per month. Pain feels "like she is under water". She says her head feels tight and heavy. Associated w/ photo/phonophobia, nausea, blurred vision. Has been to ED several times for migraine cocktail. Nurtec used to be effective, but became ineffective. Has been on Emgality approx. 1 year. Currently on Ubrelvy and Fioricet. Tried and failed Imitrex.      History of Present Illness:      This is 48 y.o. female with history of HTN, insomnia, acute neck pain, migraine, Vitamin D deficiency, who is referred headache disorder.    Age of Onset : 16 YO    Headache Description: Starts to R trapezius and radiates up neck to R side of head, to forehead and across to other side, on a bad day must lay in a dark room, severe in nature. Worsened w/activity. Unresponsive to Ubrelvy. May last from 1 hour to 3-4 days. May resolved after a nap, not always. Accompanied by photophobia/phonophobia, nausea, flashes of lights to both eyes, numbness to her face.    Frequency: 15 headache days per month.     Provocation Factors: odors,     Risk Factors  - Family history of headache disorder: Yes daughter  - History of focal CNS lesions: No  - History of CNS infections: No  - History head trauma: No  - History of underlying mood disorder: Yes diagnosed w/major depressive d/o  - History of sleep disorder: Yes mild GIAN; but not qualified for machine  - Recreational drug use: No  - Tobacco use: No  - Alcohol use: No  - Weight fluctuation: No  - Isotretinoin or Tetracycline use:  No  - Family planning and contraceptive use: No    Medications:     Current Prophylactic  Emgality 120mg/ml SQ Qmonth  Gabapentin 300mg TID " PRN  Toprol XL 100mg Qday    Current Abortive    Prior Prophylactic  Amitriptyline 25 mg nightly - brain fog/over sedated    Prior Abortive  Nurtec - ineffective  Imitrex - ineffective  Rizatriptan - ineffective  Ubrelvy - only effective for mild-mod migraine  Fioricet PRN - ineffective  Tizanidine 4mg PRN - worsened migraine    Devices:     - VNS:  - TNS  - TMS:     Procedures:     - Botox:  - PSG block:   - Occipital nerve block:     Labs:     Results for orders placed or performed in visit on 09/20/23   HIV 1/2 Ag/Ab (4th Gen)   Result Value Ref Range    HIV Nonreactive Nonreactive   Quantiferon Gold TB   Result Value Ref Range    QuantiFERON-Tb Gold Plus Result Negative Negative    TB1 Ag minus Nil Result 0.00 IU/mL    TB2 Ag minus Nil Result 0.00 IU/mL    Mitogen minus Nil Result >10.00 IU/mL    Nil Result 0.02 IU/mL   Hepatitis C Antibody   Result Value Ref Range    Hep C Ab Interp Nonreactive Nonreactive   Hepatitis B Surface Antigen   Result Value Ref Range    Hepatitis B Surface Antigen Nonreactive Nonreactive   Hepatitis B Core Antibody, Total   Result Value Ref Range    Hepatitis B Core Antibody Nonreactive Nonreactive   Hepatitis B Surface Ab, Qualitative   Result Value Ref Range    Hepatitis B Surface Antibody Reactive (A) Nonreactive    Hepatitis B Surface Antibody Qnt 6,118.94 mIU/mL   Comprehensive Metabolic Panel   Result Value Ref Range    Sodium Level 142 136 - 145 mmol/L    Potassium Level 4.0 3.5 - 5.1 mmol/L    Chloride 109 (H) 98 - 107 mmol/L    Carbon Dioxide 27 22 - 29 mmol/L    Glucose Level 81 74 - 100 mg/dL    Blood Urea Nitrogen 12.6 7.0 - 18.7 mg/dL    Creatinine 0.88 0.55 - 1.02 mg/dL    Calcium Level Total 9.3 8.4 - 10.2 mg/dL    Protein Total 7.7 6.4 - 8.3 gm/dL    Albumin Level 3.8 3.5 - 5.0 g/dL    Globulin 3.9 (H) 2.4 - 3.5 gm/dL    Albumin/Globulin Ratio 1.0 (L) 1.1 - 2.0 ratio    Bilirubin Total 0.5 <=1.5 mg/dL    Alkaline Phosphatase 132 40 - 150 unit/L    Alanine  Aminotransferase 8 0 - 55 unit/L    Aspartate Aminotransferase 14 5 - 34 unit/L    eGFR >60 mls/min/1.73/m2   Sedimentation rate   Result Value Ref Range    Sed Rate 43 (H) 0 - 20 mm/hr   C-Reactive Protein   Result Value Ref Range    C-Reactive Protein 10.10 (H) <5.00 mg/L   CBC with Differential   Result Value Ref Range    WBC 4.60 4.50 - 11.50 x10(3)/mcL    RBC 6.16 (H) 4.20 - 5.40 x10(6)/mcL    Hgb 13.4 12.0 - 16.0 g/dL    Hct 43.7 37.0 - 47.0 %    MCV 70.9 (L) 80.0 - 94.0 fL    MCH 21.8 (L) 27.0 - 31.0 pg    MCHC 30.7 (L) 33.0 - 36.0 g/dL    RDW 16.0 11.5 - 17.0 %    Platelet 285 130 - 400 x10(3)/mcL    MPV 10.8 (H) 7.4 - 10.4 fL    Neut % 55.9 %    Lymph % 29.8 %    Mono % 10.4 %    Eos % 3.0 %    Basophil % 0.7 %    Lymph # 1.37 0.6 - 4.6 x10(3)/mcL    Neut # 2.57 2.1 - 9.2 x10(3)/mcL    Mono # 0.48 0.1 - 1.3 x10(3)/mcL    Eos # 0.14 0 - 0.9 x10(3)/mcL    Baso # 0.03 <=0.2 x10(3)/mcL    IG# 0.01 0 - 0.04 x10(3)/mcL    IG% 0.2 %    NRBC% 0.0 %       Studies:     - MRI Brain:   - MRA Head w/o Joaquin:   - MRV Head w/o Joaquin:   - NCHCT:  - Lumbar Puncture:    Review of Systems:     ROS  As per HPI; all other systems negative  Physical Exams:     Vitals:    10/05/23 1308   BP: 135/87   Pulse: 74       Physical Exam  HENT:      Head: Normocephalic.      Right Ear: External ear normal.      Left Ear: External ear normal.      Nose: Nose normal.      Mouth/Throat:      Mouth: Mucous membranes are moist.   Eyes:      Pupils: Pupils are equal, round, and reactive to light.   Cardiovascular:      Rate and Rhythm: Normal rate.      Pulses: Normal pulses.   Pulmonary:      Effort: Pulmonary effort is normal.   Abdominal:      General: There is no distension.      Tenderness: There is no guarding.      Comments: round   Musculoskeletal:         General: Normal range of motion.      Cervical back: Normal range of motion.   Skin:     General: Skin is warm and dry.      Capillary Refill: Capillary refill takes less than 2 seconds.       Findings: No lesion or rash.   Neurological:      General: No focal deficit present.      Mental Status: She is alert.   Psychiatric:         Mood and Affect: Mood normal.     Comprehensive Neurological Exam:  Mental Status: Alert Oriented to Self, Date, and Place. Naming, repetition, reading, and writing wnl. Comprehension wnl. No dysarthria.   CN II - XII: ATIYA, No APD, VA grossly intact to finger counting at 6 ft, VFFC, No ptosis OU, EOMI without nystagmus LT/Temp symmetric in CN V1-3 distribution, Hearing grossly intact, Face Symmetric, Tongue and Uvula midline, Trapezius symmetric bilateral.   Motor: tone and bulk wnl throughout, no abnormal involuntary or voluntary movements, 5/5 to confrontation, Fine finger movements wnl b/l, No pronator drift.   Sensory: LT, Proprioception, Vibration, PP, Temp symmetric. No sensory simultagnosia.   Reflexes: 2+ throughout, plantar reflexes downward bilateral.   Cerebellar: FNF wnl b/l, RAHM wnl b/l  Romberg: Negative  Gait: normal. Heel Gait, Toe Gait, Tandem Gait wnl.     Assessment:     This is 48 y.o. female with history of HTN, insomnia, acute neck pain, migraine, Vitamin D deficiency, who is referred headache disorder. Pt experiencing 15 migraine w/aura HA days per month. Has tried and failed multiple abortive therapies, current regimen not very effective. Emgality for preventative, Zavzpret for abortive. Has tried and failed sumatriptan and rizatriptan. This is not a medication overuse HA. Currently attending college for a degree in psychology. Has been dx with mild GIAN, but states [employer] would not cover a machine. Discussed Botox in the future. States a case of water may last 2-3 days (possible over hydration).    Problem List Items Addressed This Visit          Other    Chronic migraine with aura without status migrainosus, not intractable - Primary    Relevant Medications    venlafaxine (EFFEXOR-XR) 37.5 MG 24 hr capsule    zavegepant 10 mg/actuation [place]     baclofen (LIORESAL) 5 mg Tab tablet    UBRELVY 100 mg tablet       Plan:     [] start venlafaxine 37.5mg nightly  [] start Baclofen 5mg PRN for tension  [] start Zavzpret intranasal once at onset of migraine; needs PA  [] c/w Ubrelvy 100mg PO BID PRN at onset of migraine for mild-moderate migraine until PA for Zavzpret approved  [] Limit water intake to approximately 120 oz/day  [] start MagOx 400mg daily  [] start riboflavin (Vit B2) 400mg daily  [] start exercise program  [] call office for migraine > 24 hrs and failed abortive therapy; will call in headache cocktail    RTC 3 mths    Headache education provided: good sleep hygiene and 7 hours of sleep per night, stress management, medication overuse education provided. Using more 3 OTC per week may worsen headaches, high intensity interval training has shown to reduce headache frequency. Low carb, high protein has shown to reduce headache frequency. Patient is instructed in keep headache diary.     I have explained the treatment plan, diagnosis, and prognosis to patient. All questions are answered to the best of my knowledge.     Face to face time 60 minutes, including documentation, chart review, counseling, education, review of test results, relevant medical records, and coordination of care.     10/05/2023

## 2023-10-06 ENCOUNTER — TELEPHONE (OUTPATIENT)
Dept: RHEUMATOLOGY | Facility: CLINIC | Age: 48
End: 2023-10-06
Payer: MEDICAID

## 2023-10-06 DIAGNOSIS — M05.79 RHEUMATOID ARTHRITIS INVOLVING MULTIPLE SITES WITH POSITIVE RHEUMATOID FACTOR: Primary | ICD-10-CM

## 2023-10-06 NOTE — TELEPHONE ENCOUNTER
Reviewed the labs that she did outside in May 2023.  The SETH came back positive but it was done by EVELINA method, there are lot of false positives with the test is done like that.  I will have to repeat SETH by immunofluorescence method, which is more specific. Ordered labs, please ask her do that when she can in the next few weeks..

## 2023-10-06 NOTE — TELEPHONE ENCOUNTER
----- Message from Liliana Funk LPN sent at 10/6/2023  9:09 AM CDT -----    ----- Message -----  From: Aguilar River LPN  Sent: 10/5/2023   3:02 PM CDT  To: Liliana Funk LPN       [facility]

## 2023-10-09 ENCOUNTER — LAB VISIT (OUTPATIENT)
Dept: LAB | Facility: HOSPITAL | Age: 48
End: 2023-10-09
Attending: INTERNAL MEDICINE
Payer: MEDICAID

## 2023-10-09 DIAGNOSIS — J02.9 SORE THROAT: ICD-10-CM

## 2023-10-09 DIAGNOSIS — M05.79 RHEUMATOID ARTHRITIS INVOLVING MULTIPLE SITES WITH POSITIVE RHEUMATOID FACTOR: ICD-10-CM

## 2023-10-09 DIAGNOSIS — M05.79 RHEUMATOID ARTHRITIS INVOLVING MULTIPLE SITES WITH POSITIVE RHEUMATOID FACTOR: Primary | ICD-10-CM

## 2023-10-09 DIAGNOSIS — Z76.89 ENCOUNTER TO ESTABLISH CARE: ICD-10-CM

## 2023-10-09 DIAGNOSIS — Z76.0 ENCOUNTER FOR MEDICATION REFILL: ICD-10-CM

## 2023-10-09 DIAGNOSIS — I10 PRIMARY HYPERTENSION: ICD-10-CM

## 2023-10-09 LAB
C3 SERPL-MCNC: 144 MG/DL (ref 80–173)
C4 SERPL-MCNC: 35 MG/DL (ref 13–46)

## 2023-10-09 PROCEDURE — 86235 NUCLEAR ANTIGEN ANTIBODY: CPT

## 2023-10-09 PROCEDURE — 36415 COLL VENOUS BLD VENIPUNCTURE: CPT

## 2023-10-09 PROCEDURE — 86225 DNA ANTIBODY NATIVE: CPT

## 2023-10-09 PROCEDURE — 86160 COMPLEMENT ANTIGEN: CPT

## 2023-10-09 PROCEDURE — 86039 ANTINUCLEAR ANTIBODIES (ANA): CPT

## 2023-10-09 PROCEDURE — 86376 MICROSOMAL ANTIBODY EACH: CPT

## 2023-10-09 PROCEDURE — 86800 THYROGLOBULIN ANTIBODY: CPT

## 2023-10-10 RX ORDER — ALBUTEROL SULFATE 0.83 MG/ML
SOLUTION RESPIRATORY (INHALATION)
Qty: 1 EACH | Refills: 3 | OUTPATIENT
Start: 2023-10-10

## 2023-10-10 RX ORDER — FLUTICASONE PROPIONATE 50 MCG
2 SPRAY, SUSPENSION (ML) NASAL DAILY PRN
Qty: 18.2 ML | Refills: 1 | OUTPATIENT
Start: 2023-10-10

## 2023-10-10 RX ORDER — METOPROLOL SUCCINATE 100 MG/1
100 TABLET, EXTENDED RELEASE ORAL NIGHTLY
Qty: 90 TABLET | Refills: 3 | Status: SHIPPED | OUTPATIENT
Start: 2023-10-10

## 2023-10-10 RX ORDER — HYDROXYCHLOROQUINE SULFATE 200 MG/1
200 TABLET, FILM COATED ORAL 2 TIMES DAILY
Qty: 60 TABLET | Refills: 5 | Status: SHIPPED | OUTPATIENT
Start: 2023-10-10 | End: 2024-02-08 | Stop reason: ALTCHOICE

## 2023-10-10 RX ORDER — VALSARTAN 80 MG/1
80 TABLET ORAL DAILY
Qty: 90 TABLET | Refills: 3 | Status: SHIPPED | OUTPATIENT
Start: 2023-10-10 | End: 2024-10-09

## 2023-10-10 RX ORDER — ERGOCALCIFEROL 1.25 MG/1
50000 CAPSULE ORAL
Qty: 12 CAPSULE | Refills: 3 | OUTPATIENT
Start: 2023-10-10

## 2023-10-10 RX ORDER — PANTOPRAZOLE SODIUM 20 MG/1
20 TABLET, DELAYED RELEASE ORAL DAILY
Qty: 30 TABLET | Refills: 3 | Status: SHIPPED | OUTPATIENT
Start: 2023-10-10 | End: 2024-02-08

## 2023-10-11 LAB
ANA SER QL HEP2 SUBST: NORMAL
DSDNA IGG SERPL IA-ACNC: <12.3 IU/ML
ENA SS-A IGG SER-ACNC: 0.3 U
ENA SS-B IGG SER-ACNC: <0.2 U
THYROGLOB AB SERPL IA-ACNC: <1.8 IU/ML
THYROPEROXIDASE AB SERPL-ACNC: <0.3 IU/ML

## 2023-10-12 LAB
ENA SCL70 IGG SER IA-ACNC: 1 AU/ML
ENA SM IGG SER-ACNC: 1 AU/ML
U1 SNRNP IGG SER-ACNC: 3 UNITS

## 2023-10-19 DIAGNOSIS — Z51.81 MEDICATION MONITORING ENCOUNTER: ICD-10-CM

## 2023-10-19 DIAGNOSIS — M05.79 RHEUMATOID ARTHRITIS INVOLVING MULTIPLE SITES WITH POSITIVE RHEUMATOID FACTOR: ICD-10-CM

## 2023-10-19 RX ORDER — FOLIC ACID 1 MG/1
1000 TABLET ORAL DAILY
Qty: 30 TABLET | Refills: 3 | OUTPATIENT
Start: 2023-10-19

## 2023-10-19 RX ORDER — VERAPAMIL HYDROCHLORIDE 180 MG/1
180 CAPSULE, EXTENDED RELEASE ORAL DAILY
Qty: 30 CAPSULE | Refills: 1 | OUTPATIENT
Start: 2023-10-19

## 2023-10-19 RX ORDER — CETIRIZINE HYDROCHLORIDE 10 MG/1
10 TABLET ORAL DAILY
Qty: 30 TABLET | Refills: 1 | OUTPATIENT
Start: 2023-10-19

## 2023-10-19 RX ORDER — CLONIDINE HYDROCHLORIDE 0.1 MG/1
0.1 TABLET ORAL DAILY PRN
Qty: 30 TABLET | Refills: 1 | OUTPATIENT
Start: 2023-10-19

## 2023-10-19 RX ORDER — GABAPENTIN 300 MG/1
300 CAPSULE ORAL 3 TIMES DAILY
Qty: 90 CAPSULE | Refills: 2 | OUTPATIENT
Start: 2023-10-19

## 2023-10-20 ENCOUNTER — PATIENT OUTREACH (OUTPATIENT)
Dept: ADMINISTRATIVE | Facility: HOSPITAL | Age: 48
End: 2023-10-20
Payer: MEDICAID

## 2023-10-20 ENCOUNTER — TELEPHONE (OUTPATIENT)
Dept: NEUROLOGY | Facility: CLINIC | Age: 48
End: 2023-10-20
Payer: MEDICAID

## 2023-10-20 NOTE — PROGRESS NOTES
Population Health Outreach. The following record(s) were uploaded for Health Maintenance.     Colonoscopy 9/5/2023    Population Health Chart Review & Patient Outreach Details:     Reason for Outreach Encounter:     []  Non-Compliant Report   []  Payor Report ([employer], PHN, BCBS, MSSP, MCIP, UHC, etc.)   []  Pre-Visit Chart Review     Updates Requested / Reviewed:     []  Care Everywhere    [x]     []  External Sources ([facility], [facility], DIS, etc.)   [x]  Care Team Updated    Patient Outreach Method:    []  Telephone Outreach Completed   [] Successful   [] Left Voicemail   [] Unable to Contact (wrong number, no voicemail)  []  [facility] Outreach Sent  []  Letter Outreach Mailed  []  Fax Sent for External Records  [x]  External Records Upload    Health Maintenance Topics Addressed and Outreach Outcomes / Actions Taken:        []      Breast Cancer Screening []  Mammo Scheduled      []  External Records Requested     []  Added Reminder to Complete to Upcoming Primary Care Appt Notes     []  Patient Declined     []  Patient Will Call Back to Schedule     []  Patient Will Schedule with External Provider / Order Routed if Applicable             []       Cervical Cancer Screening []  Pap Scheduled      []  External Records Requested     []  Added Reminder to Complete to Upcoming Primary Care Appt Notes     []  Patient Declined     []  Patient Will Call Back to Schedule     []  Patient Will Schedule with External Provider               [x]          Colorectal Cancer Screening []  Colonoscopy Case Request or Referral Placed     []  External Records Requested     []  Added Reminder to Complete to Upcoming Primary Care Appt Notes     []  Patient Declined     []  Patient Will Call Back to Schedule     []  Patient Will Schedule with External Provider     []  Fit Kit Mailed (add the SmartPhrase under additional notes)     []  Reminded Patient to Complete Home Test             []      Diabetic Eye Exam []  Eye  Camera Scheduled or Optometry Referral Placed     []  External Records Requested     []  Added Reminder to Complete to Upcoming Primary Care Appt Notes     []  Patient Declined     []  Patient Will Call Back to Schedule     []  Patient Will Schedule with External Provider             []      Blood Pressure Control []  Primary Care Follow Up Visit Scheduled     []  Remote Blood Pressure Reading Captured     []  Added Reminder to Complete to Upcoming Primary Care Appt Notes     []  Patient Declined     []  Patient Will Call Back / Patient Will Send Portal Message with Reading     []  Patient Will Call Back to Schedule Provider Visit             []       HbA1c & Other Labs []  Lab Appt Scheduled for Due Labs     []  Primary Care Follow Up Visit Scheduled      []  Reminded Patient to Complete Home Test     []  Added Reminder to Complete to Upcoming Primary Care Appt Notes     []  Patient Declined     []  Patient Will Call Back to Schedule     []  Patient Will Schedule with External Provider / Order Routed if Applicable           []    Schedule Primary Care Appt []  Primary Care Appt Scheduled     []  Patient Declined     []  Patient Will Call Back to Schedule     []  Pt Established with External Provider & Updated Care Team             []      Medication Adherence []  Primary Care Appointment Scheduled     []  Added Reminder to Upcoming Primary Care Appt Notes     []  Patient Reminded to  Prescription     []  Patient Declined, Provider Notified if Needed     []  Sent Provider Message to Review and/or Add Exclusion to Problem List             []      Osteoporosis Screening []  DXA Appointment Scheduled     []  External Records Requested     []  Added Reminder to Complete to Upcoming Primary Care Appt Notes     []  Patient Declined     []  Patient Will Call Back to Schedule     []  Patient Will Schedule with External Provider / Order Routed if Applicable     Additional Care Coordinator Notes:         Further Action  Needed If Patient Returns Outreach:

## 2023-10-20 NOTE — TELEPHONE ENCOUNTER
----- Message from Lauren Taylor sent at 10/19/2023 11:42 AM CDT -----  Pt called stating this medication zavegepant 10 mg/actuation [place] needs a PA. Pt can be reached @ [phone]

## 2023-10-30 ENCOUNTER — TELEPHONE (OUTPATIENT)
Dept: RHEUMATOLOGY | Facility: CLINIC | Age: 48
End: 2023-10-30
Payer: MEDICAID

## 2023-10-30 NOTE — TELEPHONE ENCOUNTER
----- Message from Lorri Pham sent at 10/30/2023 12:52 PM CDT -----  Regarding: Lab Results  Pt is in need of a call regarding lab results. Pt can be reached at [phone]    Thank You

## 2023-11-01 ENCOUNTER — OFFICE VISIT (OUTPATIENT)
Dept: CARDIOLOGY | Facility: CLINIC | Age: 48
End: 2023-11-01
Payer: MEDICAID

## 2023-11-01 VITALS
TEMPERATURE: 98 F | DIASTOLIC BLOOD PRESSURE: 62 MMHG | HEART RATE: 72 BPM | WEIGHT: 244.94 LBS | HEIGHT: 66 IN | OXYGEN SATURATION: 98 % | RESPIRATION RATE: 18 BRPM | SYSTOLIC BLOOD PRESSURE: 144 MMHG | BODY MASS INDEX: 39.36 KG/M2

## 2023-11-01 DIAGNOSIS — I1A.0 RESISTANT HYPERTENSION: ICD-10-CM

## 2023-11-01 DIAGNOSIS — I10 PRIMARY HYPERTENSION: ICD-10-CM

## 2023-11-01 DIAGNOSIS — R06.02 SHORTNESS OF BREATH: ICD-10-CM

## 2023-11-01 DIAGNOSIS — R00.2 PALPITATIONS: Primary | ICD-10-CM

## 2023-11-01 DIAGNOSIS — R07.9 CHEST PAIN, UNSPECIFIED TYPE: ICD-10-CM

## 2023-11-01 DIAGNOSIS — E78.5 HYPERLIPIDEMIA, UNSPECIFIED HYPERLIPIDEMIA TYPE: ICD-10-CM

## 2023-11-01 DIAGNOSIS — R06.09 DYSPNEA ON EXERTION: ICD-10-CM

## 2023-11-01 PROCEDURE — 99215 OFFICE O/P EST HI 40 MIN: CPT | Mod: PBBFAC

## 2023-11-01 PROCEDURE — 93005 ELECTROCARDIOGRAM TRACING: CPT

## 2023-11-01 RX ORDER — NITROGLYCERIN 0.4 MG/1
0.4 TABLET SUBLINGUAL EVERY 5 MIN PRN
Qty: 25 TABLET | Refills: 4 | Status: SHIPPED | OUTPATIENT
Start: 2023-11-01

## 2023-11-01 NOTE — PROGRESS NOTES
CHIEF COMPLAINT:   Chief Complaint   Patient presents with    Follow-up     Primary htn pt states she has cp at night with palpitations.                                                  HPI:  Nicole Quiñones 48 y.o. female with past medical history of hypertension, GERD, obesity, migraines, chronic pain, and rheumatoid arthritis presents to Cardiology Clinic today to establish care.  Today she complains of several cardiac complaints.  She complains of chest pain that occurs on a nearly daily basis.  She describes the pain as sharp and stabbing.  It occurs randomly, sometimes at rest and sometimes with exertion.  She also reports on several occasions that her left side has become numb.  She states that she has visited the ER several times who have diagnosed her with angina but has had no further cardiac workup.  She also complains of palpitations that occur at night.  She states that she can feel her heart beating through her chest and she states that she feels her heart racing.  She reports shortness of breath with minimal exertion.  She states that walking from office to parking lot makes her becomes short of breath.  She also reports bendopnea.  Otherwise she denies any other cardiac complaints of lightheadedness, dizziness, syncope, or claudication symptoms.  Of note she states that she is been diagnosed with sleep apnea in the past but states that it was classified as mild and she was not treated with CPAP.  She has resistant hypertension in his on 4+ antihypertensives with BP still uncontrolled.  She reports compliance with all medications.  She states that she is able to complete her ADLs without any issues.  She reports being mildly physically active, her main limitation is chronic pain in various joints.  She denies any tobacco or illicit drug use.                                                                                                                                                                                                                                                                                                                                                                                                                                                                                       CARDIAC TESTING:  No results found for this or any previous visit.    No results found for this or any previous visit.     No results found for this or any previous visit.       Patient Active Problem List   Diagnosis    Rheumatoid arthritis    Chondromalacia of patellofemoral joint, left    Chondromalacia of patellofemoral joint, right    Primary hypertension    Bilateral chronic knee pain    Encounter for immunization    Encounter for colorectal cancer screening    Encounter for screening mammogram for breast cancer    Encounter to establish care    Gastroesophageal reflux disease without esophagitis    Sore throat    Chronic pain of left knee    Tinea pedis of left foot    Encounter for medication refill    Primary osteoarthritis of left knee    BMI 39.0-39.9,adult    Acute neck pain    Chronic migraine with aura without status migrainosus, not intractable    Insomnia    Encounter for vaccination    Internal derangement of knee joint, left     Past Surgical History:   Procedure Laterality Date    CARPAL TUNNEL RELEASE      COLONOSCOPY W/ BIOPSIES AND POLYPECTOMY  09/05/2023    Deepak De Luna MD 5 years    Right shouler surgery      SURGICAL REMOVAL OF ENDOMETRIOSIS       Social History     Socioeconomic History    Marital status:    Tobacco Use    Smoking status: Never    Smokeless tobacco: Never   Substance and Sexual Activity    Alcohol use: Not Currently     Comment: Last drink @ 20    Drug use: Never    Sexual activity: Not Currently     Partners: Male     Birth control/protection: Abstinence   Social History Narrative    ** Merged History Encounter **          Social Determinants of Health      Financial Resource Strain: Low Risk  (4/19/2023)    Overall Financial Resource Strain (CARDIA)     Difficulty of Paying Living Expenses: Not very hard   Food Insecurity: Food Insecurity Present (4/19/2023)    Hunger Vital Sign     Worried About Running Out of Food in the Last Year: Sometimes true     Ran Out of Food in the Last Year: Never true   Transportation Needs: No Transportation Needs (4/19/2023)    PRAPARE - Transportation     Lack of Transportation (Medical): No     Lack of Transportation (Non-Medical): No   Physical Activity: Inactive (4/19/2023)    Exercise Vital Sign     Days of Exercise per Week: 0 days     Minutes of Exercise per Session: 0 min   Stress: Stress Concern Present (4/19/2023)    [place] [employer] - Occupational Stress Questionnaire     Feeling of Stress : To some extent   Social Connections: Moderately Integrated (4/19/2023)    Social Connection and Isolation Panel [NHANES]     Frequency of Communication with Friends and Family: Three times a week     Frequency of Social Gatherings with Friends and Family: Twice a week     Attends [employer]ae[employer] Services: 1 to 4 times per year     Active Member of Clubs or Organizations: Yes     Attends Club or Organization Meetings: 1 to 4 times per year     Marital Status: Never    Housing Stability: Low Risk  (4/19/2023)    Housing Stability Vital Sign     Unable to Pay for Housing in the Last Year: No     Number of Places Lived in the Last Year: 2     Unstable Housing in the Last Year: No        Family History   Problem Relation Age of Onset    Hypertension Mother     Arthritis Mother     Depression Mother     Thyroid disease Sister     Asthma Sister     Mental retardation Brother     Epilepsy Brother     Stroke Maternal Grandfather      Review of patient's allergies indicates:   Allergen Reactions    Nsaids (non-steroidal anti-inflammatory drug) Other (See Comments)     Causes ulcers to bleed.    Ibuprofen     Latex      "Meloxicam     Nsaids (non-steroidal anti-inflammatory drug) Nausea Only         ROS:  Review of Systems   Constitutional: Negative.    HENT: Negative.     Eyes: Negative.    Respiratory:  Positive for shortness of breath.    Cardiovascular:  Positive for chest pain and palpitations. Negative for orthopnea, claudication, leg swelling and PND.   Gastrointestinal: Negative.    Genitourinary: Negative.    Musculoskeletal:  Positive for joint pain.   Skin: Negative.    Neurological: Negative.  Negative for dizziness and weakness.   Endo/Heme/Allergies: Negative.    Psychiatric/Behavioral: Negative.                                                                                                                                                                                  Negative except as stated in the history of present illness. See HPI for details.    PHYSICAL EXAM:  Visit Vitals  BP (!) 141/94 (BP Location: Right arm, Patient Position: Sitting, BP Method: Medium (Automatic))   Pulse 72   Temp 98.1 °F (36.7 °C)   Resp 18   Ht 5' 6.04" (1.677 m)   Wt 111.1 kg (244 lb 14.9 oz)   SpO2 98%   BMI 39.49 kg/m²       Physical Exam  HENT:      Head: Normocephalic.      Nose: Nose normal.      Mouth/Throat:      Mouth: Mucous membranes are moist.   Eyes:      Extraocular Movements: Extraocular movements intact.      Pupils: Pupils are equal, round, and reactive to light.   Neck:      Vascular: No carotid bruit.   Cardiovascular:      Rate and Rhythm: Normal rate and regular rhythm.      Pulses: Normal pulses.      Heart sounds: Normal heart sounds. No murmur heard.  Pulmonary:      Effort: Pulmonary effort is normal.      Breath sounds: Normal breath sounds.   Abdominal:      General: There is no distension.      Palpations: Abdomen is soft.   Musculoskeletal:         General: Normal range of motion.      Cervical back: Normal range of motion.      Right lower leg: No edema.      Left lower leg: No edema.   Skin:     General: " Skin is warm.   Neurological:      General: No focal deficit present.      Mental Status: She is alert.   Psychiatric:         Mood and Affect: Mood normal.       Current Outpatient Medications   Medication Instructions    albuterol (PROVENTIL) 2.5 mg /3 mL (0.083 %) nebulizer solution USE 1 VIAL IN NEBULIZER EVERY 8 HOURS AS NEEDED    baclofen (LIORESAL) 5 mg, Oral, 2 times daily PRN    cetirizine (ZYRTEC) 10 mg, Oral, Daily    cloNIDine (CATAPRES) 0.1 MG tablet 1 tablet, Oral, Daily PRN    DULERA 100-5 mcg/actuation HFAA 1 puff, Inhalation, 2 times daily    EMGALITY  mg/mL PnIj AS DIRECTED SUBCUTANEOUS MONTHLY 30 DAY(S)    ergocalciferol (ERGOCALCIFEROL) 50,000 Units, Oral, Every 7 days    ezetimibe (ZETIA) 10 mg, Oral, Daily    fluticasone propionate (FLONASE) 100 mcg, Each Nostril, Daily PRN    folic acid (FOLVITE) 1,000 mcg, Oral, Daily    gabapentin (NEURONTIN) 300 mg, Oral, 3 times daily    hydroxychloroquine (PLAQUENIL) 200 mg, Oral, 2 times daily, After food    isosorbide dinitrate (ISORDIL) 40 mg, Oral, Daily    LINZESS 145 mcg, Oral, Daily    metoprolol succinate (TOPROL-XL) 100 mg, Oral, Nightly    ondansetron (ZOFRAN) 8 MG tablet TAKE 1 TABLET BY MOUTH EVERY 8 HOURS AS NEEDED FOR NAUSEA AND VOMITING    pantoprazole (PROTONIX) 20 mg, Oral, Daily    sucralfate (CARAFATE) 1 g, Oral, 3 times daily    UBRELVY 100 mg, Oral, 2 times daily PRN    valsartan (DIOVAN) 80 mg, Oral, Daily    venlafaxine (EFFEXOR-XR) 37.5 mg, Oral, Daily    verapamiL (VERELAN) 180 mg, Oral, Daily    zavegepant 10 mg, Nasal, Daily PRN        All medications, laboratory studies, cardiac diagnostic imaging reviewed.     Lab Results   Component Value Date    .00 (H) 05/23/2023    TRIG 87 05/23/2023    CREATININE 0.88 09/20/2023    K 4.0 09/20/2023        ASSESSMENT/PLAN:    Primary Hypertension  Untreated GIAN  - BP above goal today  - Patient currently taking Isordil 40, Toprol 100, Valsartan 80, Verapamil 180, and  "Clonidine 0.1 PRN  - Will have patient log BP for 2-3 weeks and call clinic with readings   - May consider making medication adjustments if BP still elevated, however patient is to complete repeat sleep study in search of causes for secondary hypertension   - Patient states that she was previously diagnosed with GIAN but states that she was told it was a "mild form" and was told she did not need a CPAP   - Given ongoing GIAN symptoms and resistant hypertension despite 4+ medications concerned that untreated GIAN may be contributing to her difficult to control blood pressure  - Counseled on the importance of following a low-sodium, heart healthy diet and exercise as tolerated  - Ambulatory referral/consult to Cardiology    Hyperlipidemia  - , Total Chol 217 per labs May 2023  - On Zetia 10 MG daily   - Patient states that she would like to repeat FLP prior to starting any statin therapy  - Patient to complete FLP/CMP, if LDL still elevated will start patient on statin therapy  - Counseled on importance of following a low cholesterol, low-fat diet and exercise as tolerated  - ASCVD Risk 18.8%, high intensity statin recommended     Chest Pain  Shortness of Breath/ORTEGA  - Patient reports sharp, stabbing chest pain that occurs daily at rest and with exertion.  Occasionally associated with left upper extremity numbness.  She states that this last minutes at a time and resolves on its own  - She also endorses shortness of breath and dyspnea on exertion.  She states that she becomes short of breath with only mild exertion.  She is still able to complete her ADLs, however she is required to take breaks at times   - She also endorses some bend apnea  - See HPI for more detailed report  - Will order echo to evaluate for any underlying valvular/structural disease that may be contributing to shortness a breath symptoms   - Will order treadmill stress test to rule out any underlying ischemia that may be contributing to chest " pain and shortness a breath with exertion.  Patient does note that she has chronic pain in multiple joint issues, but she is only to try treadmill stress test.  If patient is unable to complete test/results nondiagnostic will proceed at that time with a nuclear stress test  - Patient with multiple risk factors for heart disease   - Will prescribe SL Nitro PRN for CP   - ED precautions given     Palpitations  - Patient reports palpitations that typically occur at night.  She states that it feels like her heart is racing and she can feel her heart beating through her chest  - She denies any excessive caffeine use in fact, she states that she recently greatly reduced her caffeine consumption  - She denies any symptoms of lightheadedness, dizziness, or syncope that accompanied the palpitations   - Will order 48 hour Holter monitor to rule out any underlying arrhythmias that may be contributing to symptoms    Obesity   - Counseled on the importance of diet and exercise for weight loss and overall cardiac risk reduction    GIAN   - Patient reports being diagnosed with sleep apnea in the past, but states that it was only a mild version and she was told that she did not need to be treated with CPAP at that time   - Given patient's ongoing sleep apnea symptoms and difficult to control hypertension, will have patient repeat sleep study as concerns for untreated GIAN causing the resistant hypertension  - Sleep study referral     Management of other problems per PCP/other specialties.    Complete Echo   Complete treadmill stress test   Complete FLP/CMP  Complete 48 hour Holter monitor   Sleep study referral   Follow up in cardiology clinic in 3 months or sooner if needed   Follow up with PCP as directed   ED precautions given

## 2023-11-01 NOTE — PATIENT INSTRUCTIONS
Complete Echo   Complete treadmill stress test   Complete FLP/CMP  Complete 48 hour Holter monitor   Sleep study referral   Follow up in cardiology clinic in 3 months or sooner if needed   Follow up with PCP as directed   ED precautions given

## 2023-11-13 ENCOUNTER — HOSPITAL ENCOUNTER (EMERGENCY)
Facility: HOSPITAL | Age: 48
Discharge: HOME | End: 2023-11-13
Attending: INTERNAL MEDICINE
Payer: MEDICAID

## 2023-11-13 VITALS
WEIGHT: 248 LBS | BODY MASS INDEX: 39.86 KG/M2 | SYSTOLIC BLOOD PRESSURE: 122 MMHG | HEIGHT: 66 IN | HEART RATE: 99 BPM | OXYGEN SATURATION: 96 % | TEMPERATURE: 100 F | DIASTOLIC BLOOD PRESSURE: 74 MMHG | RESPIRATION RATE: 20 BRPM

## 2023-11-13 DIAGNOSIS — J10.1 INFLUENZA A: Primary | ICD-10-CM

## 2023-11-13 LAB
ALBUMIN SERPL-MCNC: 3.9 G/DL (ref 3.5–5)
ALBUMIN/GLOB SERPL: 1 RATIO (ref 1.1–2)
ALP SERPL-CCNC: 158 UNIT/L (ref 40–150)
ALT SERPL-CCNC: 10 UNIT/L (ref 0–55)
APPEARANCE UR: CLEAR
AST SERPL-CCNC: 14 UNIT/L (ref 5–34)
BACTERIA #/AREA URNS AUTO: ABNORMAL /HPF
BASOPHILS # BLD AUTO: 0.04 X10(3)/MCL
BASOPHILS NFR BLD AUTO: 0.6 %
BILIRUB SERPL-MCNC: 0.6 MG/DL
BILIRUB UR QL STRIP.AUTO: NEGATIVE
BUN SERPL-MCNC: 12.8 MG/DL (ref 7–18.7)
CALCIUM SERPL-MCNC: 9.1 MG/DL (ref 8.4–10.2)
CHLORIDE SERPL-SCNC: 105 MMOL/L (ref 98–107)
CO2 SERPL-SCNC: 22 MMOL/L (ref 22–29)
COLOR UR AUTO: ABNORMAL
CREAT SERPL-MCNC: 0.91 MG/DL (ref 0.55–1.02)
EOSINOPHIL # BLD AUTO: 0.04 X10(3)/MCL (ref 0–0.9)
EOSINOPHIL NFR BLD AUTO: 0.6 %
ERYTHROCYTE [DISTWIDTH] IN BLOOD BY AUTOMATED COUNT: 15.3 % (ref 11.5–17)
FLUAV AG UPPER RESP QL IA.RAPID: DETECTED
FLUBV AG UPPER RESP QL IA.RAPID: NOT DETECTED
GFR SERPLBLD CREATININE-BSD FMLA CKD-EPI: >60 MLS/MIN/1.73/M2
GLOBULIN SER-MCNC: 3.8 GM/DL (ref 2.4–3.5)
GLUCOSE SERPL-MCNC: 109 MG/DL (ref 74–100)
GLUCOSE UR QL STRIP.AUTO: NORMAL
HCT VFR BLD AUTO: 44.4 % (ref 37–47)
HGB BLD-MCNC: 13.9 G/DL (ref 12–16)
HOLD SPECIMEN: NORMAL
HYALINE CASTS #/AREA URNS LPF: ABNORMAL /LPF
IMM GRANULOCYTES # BLD AUTO: 0.01 X10(3)/MCL (ref 0–0.04)
IMM GRANULOCYTES NFR BLD AUTO: 0.1 %
KETONES UR QL STRIP.AUTO: NEGATIVE
LEUKOCYTE ESTERASE UR QL STRIP.AUTO: NEGATIVE
LIPASE SERPL-CCNC: 12 U/L
LYMPHOCYTES # BLD AUTO: 0.88 X10(3)/MCL (ref 0.6–4.6)
LYMPHOCYTES NFR BLD AUTO: 12.2 %
MCH RBC QN AUTO: 21.7 PG (ref 27–31)
MCHC RBC AUTO-ENTMCNC: 31.3 G/DL (ref 33–36)
MCV RBC AUTO: 69.3 FL (ref 80–94)
MONOCYTES # BLD AUTO: 0.86 X10(3)/MCL (ref 0.1–1.3)
MONOCYTES NFR BLD AUTO: 12 %
MUCOUS THREADS URNS QL MICRO: ABNORMAL /LPF
NEUTROPHILS # BLD AUTO: 5.36 X10(3)/MCL (ref 2.1–9.2)
NEUTROPHILS NFR BLD AUTO: 74.5 %
NITRITE UR QL STRIP.AUTO: NEGATIVE
NRBC BLD AUTO-RTO: 0 %
PH UR STRIP.AUTO: 7 [PH]
PLATELET # BLD AUTO: 253 X10(3)/MCL (ref 130–400)
PMV BLD AUTO: 10.1 FL (ref 7.4–10.4)
POTASSIUM SERPL-SCNC: 3.7 MMOL/L (ref 3.5–5.1)
PROT SERPL-MCNC: 7.7 GM/DL (ref 6.4–8.3)
PROT UR QL STRIP.AUTO: NEGATIVE
RBC # BLD AUTO: 6.41 X10(6)/MCL (ref 4.2–5.4)
RBC #/AREA URNS AUTO: ABNORMAL /HPF
RBC UR QL AUTO: NEGATIVE
SARS-COV-2 RNA RESP QL NAA+PROBE: NOT DETECTED
SODIUM SERPL-SCNC: 137 MMOL/L (ref 136–145)
SP GR UR STRIP.AUTO: 1.02 (ref 1–1.03)
SQUAMOUS #/AREA URNS LPF: ABNORMAL /HPF
UROBILINOGEN UR STRIP-ACNC: NORMAL
WBC # SPEC AUTO: 7.19 X10(3)/MCL (ref 4.5–11.5)
WBC #/AREA URNS AUTO: ABNORMAL /HPF

## 2023-11-13 PROCEDURE — 81001 URINALYSIS AUTO W/SCOPE: CPT | Performed by: PHYSICIAN ASSISTANT

## 2023-11-13 PROCEDURE — 96374 THER/PROPH/DIAG INJ IV PUSH: CPT

## 2023-11-13 PROCEDURE — 96361 HYDRATE IV INFUSION ADD-ON: CPT

## 2023-11-13 PROCEDURE — 0240U COVID/FLU A&B PCR: CPT | Performed by: PHYSICIAN ASSISTANT

## 2023-11-13 PROCEDURE — 25000003 PHARM REV CODE 250: Performed by: PHYSICIAN ASSISTANT

## 2023-11-13 PROCEDURE — 80053 COMPREHEN METABOLIC PANEL: CPT | Performed by: PHYSICIAN ASSISTANT

## 2023-11-13 PROCEDURE — 63600175 PHARM REV CODE 636 W HCPCS: Performed by: PHYSICIAN ASSISTANT

## 2023-11-13 PROCEDURE — 83690 ASSAY OF LIPASE: CPT | Performed by: PHYSICIAN ASSISTANT

## 2023-11-13 PROCEDURE — 99284 EMERGENCY DEPT VISIT MOD MDM: CPT | Mod: 25

## 2023-11-13 PROCEDURE — 85025 COMPLETE CBC W/AUTO DIFF WBC: CPT | Performed by: PHYSICIAN ASSISTANT

## 2023-11-13 RX ORDER — OSELTAMIVIR PHOSPHATE 75 MG/1
75 CAPSULE ORAL 2 TIMES DAILY
Qty: 10 CAPSULE | Refills: 0 | Status: SHIPPED | OUTPATIENT
Start: 2023-11-13 | End: 2023-11-13 | Stop reason: SDUPTHER

## 2023-11-13 RX ORDER — ONDANSETRON 4 MG/1
8 TABLET, ORALLY DISINTEGRATING ORAL
Status: COMPLETED | OUTPATIENT
Start: 2023-11-13 | End: 2023-11-13

## 2023-11-13 RX ORDER — ONDANSETRON 8 MG/1
8 TABLET, ORALLY DISINTEGRATING ORAL EVERY 12 HOURS PRN
Qty: 10 TABLET | Refills: 0 | Status: SHIPPED | OUTPATIENT
Start: 2023-11-13 | End: 2023-11-13 | Stop reason: SDUPTHER

## 2023-11-13 RX ORDER — ACETAMINOPHEN 500 MG
1000 TABLET ORAL
Status: COMPLETED | OUTPATIENT
Start: 2023-11-13 | End: 2023-11-13

## 2023-11-13 RX ORDER — OSELTAMIVIR PHOSPHATE 75 MG/1
75 CAPSULE ORAL 2 TIMES DAILY
Qty: 10 CAPSULE | Refills: 0 | Status: SHIPPED | OUTPATIENT
Start: 2023-11-13 | End: 2023-11-18

## 2023-11-13 RX ORDER — KETOROLAC TROMETHAMINE 30 MG/ML
15 INJECTION, SOLUTION INTRAMUSCULAR; INTRAVENOUS
Status: COMPLETED | OUTPATIENT
Start: 2023-11-13 | End: 2023-11-13

## 2023-11-13 RX ADMIN — KETOROLAC TROMETHAMINE 15 MG: 30 INJECTION, SOLUTION INTRAMUSCULAR at 07:11

## 2023-11-13 RX ADMIN — SODIUM CHLORIDE 1000 ML: 9 INJECTION, SOLUTION INTRAVENOUS at 05:11

## 2023-11-13 RX ADMIN — ACETAMINOPHEN 1000 MG: 500 TABLET ORAL at 04:11

## 2023-11-13 RX ADMIN — ONDANSETRON 8 MG: 4 TABLET, ORALLY DISINTEGRATING ORAL at 04:11

## 2023-11-13 NOTE — Clinical Note
"Nicole Kraftshay Quiñones was seen and treated in our emergency department on 11/13/2023.  She may return to work on 11/17/2023.       If you have any questions or concerns, please don't hesitate to call.      Kofi Bailey PA"

## 2023-11-13 NOTE — ED PROVIDER NOTES
Encounter Date: 11/13/2023       History     Chief Complaint   Patient presents with    Emesis     Reports n/v,headache, body aches, fever x24  hours. Temp 102.3 currently. Actively vomiting.      47 YO AAF in ER with complaints of body aches, fever, nausea, vomiting, diarrhea, cough and HA X 2 days. States other members of family with same symptoms. Denies chest pain, SOB, abdominal pain or dizziness. No other complaints.    The history is provided by the patient.     Review of patient's allergies indicates:   Allergen Reactions    Nsaids (non-steroidal anti-inflammatory drug) Other (See Comments)     Causes ulcers to bleed.    Ibuprofen     Latex     Meloxicam     Nsaids (non-steroidal anti-inflammatory drug) Nausea Only     Past Medical History:   Diagnosis Date    Anemia     Arthritis     Fibromyalgia     Hypercholesterolemia     Hypertension     Migraines     Personal history of colonic polyps 09/05/2023    colonoscopy/polypectomy    Thrombocytopenia, unspecified     TTP (thrombotic thrombocytopenic purpura)     TTP (thrombotic thrombocytopenic purpura)      Past Surgical History:   Procedure Laterality Date    CARPAL TUNNEL RELEASE      COLONOSCOPY W/ BIOPSIES AND POLYPECTOMY  09/05/2023    Deepak De Luna MD 5 years    Right shouler surgery      SURGICAL REMOVAL OF ENDOMETRIOSIS       Family History   Problem Relation Age of Onset    Hypertension Mother     Arthritis Mother     Depression Mother     Thyroid disease Sister     Asthma Sister     Intellectual disability Brother     Epilepsy Brother     Stroke Maternal Grandfather      Social History     Tobacco Use    Smoking status: Never    Smokeless tobacco: Never   Substance Use Topics    Alcohol use: Not Currently     Comment: Last drink @ 20    Drug use: Never     Review of Systems   Constitutional:  Positive for chills and fever.   HENT:  Positive for congestion. Negative for sore throat.    Respiratory:  Positive for cough. Negative for shortness of  breath.    Cardiovascular:  Negative for chest pain.   Gastrointestinal:  Positive for diarrhea, nausea and vomiting. Negative for abdominal pain.   Genitourinary:  Negative for dysuria.   Musculoskeletal:  Negative for back pain.   Skin:  Negative for rash.   Neurological:  Positive for headaches. Negative for dizziness, weakness and light-headedness.   Hematological:  Does not bruise/bleed easily.   All other systems reviewed and are negative.      Physical Exam     Initial Vitals   BP Pulse Resp Temp SpO2   11/13/23 1637 11/13/23 1635 11/13/23 1637 11/13/23 1637 11/13/23 1637   134/80 110 20 (!) 102.3 °F (39.1 °C) 95 %      MAP       --                Physical Exam    ED Course   Procedures  Labs Reviewed   COMPREHENSIVE METABOLIC PANEL - Abnormal; Notable for the following components:       Result Value    Glucose Level 109 (*)     Globulin 3.8 (*)     Albumin/Globulin Ratio 1.0 (*)     Alkaline Phosphatase 158 (*)     All other components within normal limits   URINALYSIS, REFLEX TO URINE CULTURE - Abnormal; Notable for the following components:    Bacteria, UA Occ (*)     Squamous Epithelial Cells, UA Trace (*)     Mucous, UA Trace (*)     Hyaline Casts, UA 0-2 (*)     All other components within normal limits   COVID/FLU A&B PCR - Abnormal; Notable for the following components:    Influenza A PCR Detected (*)     All other components within normal limits    Narrative:     The Xpert Xpress SARS-CoV-2/FLU/RSV plus is a rapid, multiplexed real-time PCR test intended for the simultaneous qualitative detection and differentiation of SARS-CoV-2, Influenza A, Influenza B, and respiratory syncytial virus (RSV) viral RNA in either nasopharyngeal swab or nasal swab specimens.         CBC WITH DIFFERENTIAL - Abnormal; Notable for the following components:    RBC 6.41 (*)     MCV 69.3 (*)     MCH 21.7 (*)     MCHC 31.3 (*)     All other components within normal limits   LIPASE - Normal   CBC W/ AUTO DIFFERENTIAL     Narrative:     The following orders were created for panel order CBC Auto Differential.  Procedure                               Abnormality         Status                     ---------                               -----------         ------                     CBC with Differential[[id number]]       Abnormal            Final result                 Please view results for these tests on the individual orders.   EXTRA TUBES    Narrative:     The following orders were created for panel order EXTRA TUBES.  Procedure                               Abnormality         Status                     ---------                               -----------         ------                     Light Blue Top Hold[[id number]]                             Final result               Red Top Hold[[id number]]                                    Final result               Light Green Top Hold[[id number]]                            Final result               Gold Top Hold[[id number]]                                   Final result                 Please view results for these tests on the individual orders.   LIGHT BLUE TOP HOLD   RED TOP HOLD   LIGHT GREEN TOP HOLD   GOLD TOP HOLD          Imaging Results    None          Medications   ketorolac injection 15 mg (has no administration in time range)   ondansetron disintegrating tablet 8 mg (8 mg Oral Given 11/13/23 1657)   acetaminophen tablet 1,000 mg (1,000 mg Oral Given 11/13/23 1657)   sodium chloride 0.9% bolus 1,000 mL 1,000 mL (1,000 mLs Intravenous New Bag 11/13/23 4059)     Medical Decision Making  Risk  OTC drugs.  Prescription drug management.               ED Course as of 11/13/23 1909 Mon Nov 13, 2023   1832 VSS, NAD, pt is non-toxic or ill appearing, labs reviewed with pt, treatment plan and discharge instructions including follow up discussed, pt verbalized understanding, all questions answered, pt is stable and ready for discharge [TT]   1900 Pt still has a HA, will give  Toradol through IV to bypass the stomach and allow her IVF to finish, meds will be sent to her preferred pharmacy, pt verbalized understanding and can be discharged after IVF are done [TT]      ED Course User Index  [TT] Kofi Bailey PA                    Clinical Impression:   Final diagnoses:  [J10.1] Influenza A (Primary)        ED Disposition Condition    Discharge Stable          ED Prescriptions       Medication Sig Dispense Start Date End Date Auth. Provider    oseltamivir (TAMIFLU) 75 MG capsule  (Status: Discontinued) Take 1 capsule (75 mg total) by mouth 2 (two) times daily. for 5 days 10 capsule 11/13/2023 11/13/2023 Kofi Bailey PA    ondansetron (ZOFRAN-ODT) 8 MG TbDL  (Status: Discontinued) Take 1 tablet (8 mg total) by mouth every 12 (twelve) hours as needed (nausea). 10 tablet 11/13/2023 11/13/2023 Kofi Bailey PA    oseltamivir (TAMIFLU) 75 MG capsule Take 1 capsule (75 mg total) by mouth 2 (two) times daily. for 5 days 10 capsule 11/13/2023 11/18/2023 Kofi Bailey PA          Follow-up Information       Follow up With Specialties Details Why Contact Info    Yaritza Browne, FNP Family Medicine Schedule an appointment as soon as possible for a visit in 3 days  [address] [facility] [id number]  [phone]      [facility] Emergency Medicine In 3 days As needed, If symptoms worsen [facility] [id number] [phone]             Kofi Bailey PA  11/13/23 3340

## 2023-11-14 NOTE — DISCHARGE INSTRUCTIONS
Take all medications as prescribed.     Drink plenty of fluids and get a lot of rest.     Return to ER for any changes or worsening of symptoms.

## 2023-11-14 NOTE — ED NOTES
Pt given discharge instructions, pt instructed to follow up with pcp and return to er if any complications.

## 2023-11-20 ENCOUNTER — HOSPITAL ENCOUNTER (OUTPATIENT)
Dept: CARDIOLOGY | Facility: HOSPITAL | Age: 48
Discharge: HOME | End: 2023-11-20
Payer: MEDICAID

## 2023-11-20 VITALS
HEART RATE: 67 BPM | BODY MASS INDEX: 40.03 KG/M2 | RESPIRATION RATE: 20 BRPM | HEIGHT: 66 IN | DIASTOLIC BLOOD PRESSURE: 87 MMHG | SYSTOLIC BLOOD PRESSURE: 124 MMHG

## 2023-11-20 DIAGNOSIS — R07.9 CHEST PAIN, UNSPECIFIED TYPE: ICD-10-CM

## 2023-11-20 DIAGNOSIS — R06.02 SHORTNESS OF BREATH: ICD-10-CM

## 2023-11-20 DIAGNOSIS — R06.09 DYSPNEA ON EXERTION: ICD-10-CM

## 2023-11-20 LAB
CV STRESS BASE HR: 67 BPM
DIASTOLIC BLOOD PRESSURE: 87 MMHG
OHS CV CPX 85 PERCENT MAX PREDICTED HEART RATE MALE: 145
OHS CV CPX ESTIMATED METS: 6
OHS CV CPX MAX PREDICTED HEART RATE: 171
OHS CV CPX PATIENT IS FEMALE: 1
OHS CV CPX PATIENT IS MALE: 0
OHS CV CPX PEAK DIASTOLIC BLOOD PRESSURE: 88 MMHG
OHS CV CPX PEAK HEAR RATE: 148 BPM
OHS CV CPX PEAK RATE PRESSURE PRODUCT: NORMAL
OHS CV CPX PEAK SYSTOLIC BLOOD PRESSURE: 199 MMHG
OHS CV CPX PERCENT MAX PREDICTED HEART RATE ACHIEVED: 91
OHS CV CPX RATE PRESSURE PRODUCT PRESENTING: 8308
STRESS ECHO POST EXERCISE DUR MIN: 3 MINUTES
STRESS ECHO POST EXERCISE DUR SEC: 29 SECONDS
SYSTOLIC BLOOD PRESSURE: 124 MMHG

## 2023-11-20 PROCEDURE — 93017 CV STRESS TEST TRACING ONLY: CPT

## 2023-11-21 ENCOUNTER — TELEPHONE (OUTPATIENT)
Dept: CARDIOLOGY | Facility: CLINIC | Age: 48
End: 2023-11-21
Payer: MEDICAID

## 2023-11-21 DIAGNOSIS — R07.9 CHEST PAIN, UNSPECIFIED TYPE: ICD-10-CM

## 2023-11-21 DIAGNOSIS — R06.09 DYSPNEA ON EXERTION: Primary | ICD-10-CM

## 2023-11-21 NOTE — TELEPHONE ENCOUNTER
Spoke with patient regarding the results of her treadmill stress test.  Given that patient is considered to be intermediate risk, will have patient complete a stress echocardiogram  for further evaluation. Patient is amenable to plan. All questions answered.     Nathalia Cherry PA-C

## 2023-12-05 ENCOUNTER — HOSPITAL ENCOUNTER (OUTPATIENT)
Dept: CARDIOLOGY | Facility: HOSPITAL | Age: 48
Discharge: HOME | End: 2023-12-05
Payer: MEDICAID

## 2023-12-05 DIAGNOSIS — R00.2 PALPITATIONS: ICD-10-CM

## 2023-12-05 PROCEDURE — 93226 XTRNL ECG REC<48 HR SCAN A/R: CPT

## 2023-12-07 LAB
OHS CV EVENT MONITOR DAY: 0
OHS CV HOLTER LENGTH DECIMAL HOURS: 48
OHS CV HOLTER LENGTH HOURS: 48
OHS CV HOLTER LENGTH MINUTES: 0
OHS CV HOLTER SINUS AVERAGE HR: 81
OHS CV HOLTER SINUS MAX HR: 126
OHS CV HOLTER SINUS MIN HR: 60

## 2023-12-22 ENCOUNTER — HOSPITAL ENCOUNTER (OUTPATIENT)
Dept: CARDIOLOGY | Facility: HOSPITAL | Age: 48
Discharge: HOME | End: 2023-12-22
Payer: MEDICAID

## 2023-12-22 VITALS
SYSTOLIC BLOOD PRESSURE: 165 MMHG | HEIGHT: 66 IN | BODY MASS INDEX: 39.86 KG/M2 | WEIGHT: 248 LBS | DIASTOLIC BLOOD PRESSURE: 99 MMHG

## 2023-12-22 DIAGNOSIS — R06.02 SHORTNESS OF BREATH: ICD-10-CM

## 2023-12-22 DIAGNOSIS — R07.9 CHEST PAIN, UNSPECIFIED TYPE: ICD-10-CM

## 2023-12-22 DIAGNOSIS — R00.2 PALPITATIONS: ICD-10-CM

## 2023-12-22 DIAGNOSIS — R06.09 DYSPNEA ON EXERTION: ICD-10-CM

## 2023-12-22 LAB
AV INDEX (PROSTH): 0.7
AV MEAN GRADIENT: 5 MMHG
AV PEAK GRADIENT: 9 MMHG
AV VALVE AREA BY VELOCITY RATIO: 1.85 CM²
AV VALVE AREA: 2.2 CM²
AV VELOCITY RATIO: 0.59
BSA FOR ECHO PROCEDURE: 2.29 M2
CV ECHO LV RWT: 0.46 CM
DOP CALC AO PEAK VEL: 1.46 M/S
DOP CALC AO VTI: 31.9 CM
DOP CALC LVOT AREA: 3.1 CM2
DOP CALC LVOT DIAMETER: 2 CM
DOP CALC LVOT PEAK VEL: 0.86 M/S
DOP CALC LVOT STROKE VOLUME: 70.34 CM3
DOP CALC MV VTI: 24.2 CM
DOP CALCLVOT PEAK VEL VTI: 22.4 CM
E WAVE DECELERATION TIME: 217.01 MSEC
E/A RATIO: 0.77
ECHO LV POSTERIOR WALL: 1.17 CM (ref 0.6–1.1)
FRACTIONAL SHORTENING: 26 % (ref 28–44)
HR MV ECHO: 70 BPM
INTERVENTRICULAR SEPTUM: 1.09 CM (ref 0.6–1.1)
IVC DIAMETER: 1.5 CM
LEFT ATRIUM SIZE: 3.46 CM
LEFT ATRIUM VOLUME INDEX MOD: 20.1 ML/M2
LEFT ATRIUM VOLUME MOD: 43.98 CM3
LEFT INTERNAL DIMENSION IN SYSTOLE: 3.76 CM (ref 2.1–4)
LEFT VENTRICLE DIASTOLIC VOLUME INDEX: 56.69 ML/M2
LEFT VENTRICLE DIASTOLIC VOLUME: 124.16 ML
LEFT VENTRICLE MASS INDEX: 102 G/M2
LEFT VENTRICLE SYSTOLIC VOLUME INDEX: 27.6 ML/M2
LEFT VENTRICLE SYSTOLIC VOLUME: 60.45 ML
LEFT VENTRICULAR INTERNAL DIMENSION IN DIASTOLE: 5.11 CM (ref 3.5–6)
LEFT VENTRICULAR MASS: 222.65 G
LV LATERAL E/E' RATIO: 5.88 M/S
LVOT MG: 1.57 MMHG
LVOT MV: 0.59 CM/S
MV MEAN GRADIENT: 1 MMHG
MV PEAK A VEL: 0.61 M/S
MV PEAK E VEL: 0.47 M/S
MV PEAK GRADIENT: 2 MMHG
MV STENOSIS PRESSURE HALF TIME: 62.93 MS
MV VALVE AREA BY CONTINUITY EQUATION: 2.91 CM2
MV VALVE AREA P 1/2 METHOD: 3.5 CM2
OHS LV EJECTION FRACTION SIMPSONS BIPLANE MOD: 50 %
PISA MRMAX VEL: 4.83 M/S
PISA TR MAX VEL: 2.37 M/S
RA MAJOR: 3.98 CM
RA PRESSURE ESTIMATED: 3 MMHG
RV TB RVSP: 5 MMHG
TDI LATERAL: 0.08 M/S
TR MAX PG: 22 MMHG
TRICUSPID ANNULAR PLANE SYSTOLIC EXCURSION: 2.05 CM
TV REST PULMONARY ARTERY PRESSURE: 25 MMHG
Z-SCORE OF LEFT VENTRICULAR DIMENSION IN END DIASTOLE: -3.7
Z-SCORE OF LEFT VENTRICULAR DIMENSION IN END SYSTOLE: -1.42

## 2023-12-22 PROCEDURE — 93306 TTE W/DOPPLER COMPLETE: CPT

## 2023-12-27 ENCOUNTER — HOSPITAL ENCOUNTER (OUTPATIENT)
Dept: CARDIOLOGY | Facility: HOSPITAL | Age: 48
Discharge: HOME | End: 2023-12-27
Payer: MEDICAID

## 2023-12-27 VITALS
BODY MASS INDEX: 39.86 KG/M2 | RESPIRATION RATE: 20 BRPM | SYSTOLIC BLOOD PRESSURE: 150 MMHG | WEIGHT: 248 LBS | HEART RATE: 76 BPM | DIASTOLIC BLOOD PRESSURE: 102 MMHG | HEIGHT: 66 IN

## 2023-12-27 DIAGNOSIS — R07.9 CHEST PAIN, UNSPECIFIED TYPE: ICD-10-CM

## 2023-12-27 DIAGNOSIS — R06.09 DYSPNEA ON EXERTION: ICD-10-CM

## 2023-12-27 LAB
BSA FOR ECHO PROCEDURE: 2.29 M2
CV STRESS BASE HR: 75 BPM
DIASTOLIC BLOOD PRESSURE: 102 MMHG
OHS CV CPX 1 MINUTE RECOVERY HEART RATE: 148 BPM
OHS CV CPX 85 PERCENT MAX PREDICTED HEART RATE MALE: 145
OHS CV CPX ESTIMATED METS: 1
OHS CV CPX MAX PREDICTED HEART RATE: 171
OHS CV CPX PATIENT IS FEMALE: 1
OHS CV CPX PATIENT IS MALE: 0
OHS CV CPX PEAK DIASTOLIC BLOOD PRESSURE: 79 MMHG
OHS CV CPX PEAK HEAR RATE: 153 BPM
OHS CV CPX PEAK RATE PRESSURE PRODUCT: NORMAL
OHS CV CPX PEAK SYSTOLIC BLOOD PRESSURE: 176 MMHG
OHS CV CPX PERCENT MAX PREDICTED HEART RATE ACHIEVED: 94
OHS CV CPX RATE PRESSURE PRODUCT PRESENTING: NORMAL
OHS CV INITIAL DOSE: 10 MCG/KG/MIN
OHS CV PEAK DOSE: 20 MCG/KG/MIN
STRESS ECHO POST EXERCISE DUR MIN: 7 MINUTES
STRESS ECHO POST EXERCISE DUR SEC: 53 SECONDS
SYSTOLIC BLOOD PRESSURE: 150 MMHG

## 2023-12-27 PROCEDURE — 93351 STRESS TTE COMPLETE: CPT

## 2023-12-27 RX ORDER — DOBUTAMINE HYDROCHLORIDE 400 MG/100ML
20 INJECTION, SOLUTION INTRAVENOUS CONTINUOUS
Status: ACTIVE | OUTPATIENT
Start: 2023-12-27 | End: 2023-12-27

## 2023-12-27 RX ORDER — ATROPINE SULFATE 0.4 MG/ML
0.4 INJECTION, SOLUTION ENDOTRACHEAL; INTRAMEDULLARY; INTRAMUSCULAR; INTRAVENOUS; SUBCUTANEOUS
Status: DISCONTINUED | OUTPATIENT
Start: 2023-12-27 | End: 2024-03-06 | Stop reason: HOSPADM

## 2023-12-27 RX ORDER — METOPROLOL TARTRATE 1 MG/ML
5 INJECTION, SOLUTION INTRAVENOUS
Status: CANCELLED | OUTPATIENT
Start: 2023-12-27 | End: 2023-12-27

## 2023-12-27 RX ORDER — ATROPINE SULFATE 0.4 MG/ML
0.4 INJECTION, SOLUTION ENDOTRACHEAL; INTRAMEDULLARY; INTRAMUSCULAR; INTRAVENOUS; SUBCUTANEOUS
Status: CANCELLED | OUTPATIENT
Start: 2023-12-27 | End: 2023-12-27

## 2023-12-27 RX ORDER — DOBUTAMINE HYDROCHLORIDE 400 MG/100ML
10 INJECTION, SOLUTION INTRAVENOUS CONTINUOUS
Status: CANCELLED | OUTPATIENT
Start: 2023-12-27

## 2023-12-27 RX ORDER — METOPROLOL TARTRATE 1 MG/ML
1 INJECTION, SOLUTION INTRAVENOUS
Status: DISCONTINUED | OUTPATIENT
Start: 2023-12-27 | End: 2024-03-06 | Stop reason: HOSPADM

## 2024-01-11 ENCOUNTER — OFFICE VISIT (OUTPATIENT)
Dept: NEUROLOGY | Facility: CLINIC | Age: 49
End: 2024-01-11
Payer: MEDICAID

## 2024-01-11 VITALS
BODY MASS INDEX: 38.57 KG/M2 | WEIGHT: 240 LBS | OXYGEN SATURATION: 98 % | DIASTOLIC BLOOD PRESSURE: 84 MMHG | HEIGHT: 66 IN | HEART RATE: 78 BPM | SYSTOLIC BLOOD PRESSURE: 141 MMHG

## 2024-01-11 DIAGNOSIS — G43.E01 CHRONIC MIGRAINE WITH AURA AND WITH STATUS MIGRAINOSUS, NOT INTRACTABLE: ICD-10-CM

## 2024-01-11 DIAGNOSIS — G43.E09 CHRONIC MIGRAINE WITH AURA WITHOUT STATUS MIGRAINOSUS, NOT INTRACTABLE: Primary | ICD-10-CM

## 2024-01-11 DIAGNOSIS — E66.01 CLASS 2 SEVERE OBESITY WITH SERIOUS COMORBIDITY AND BODY MASS INDEX (BMI) OF 38.0 TO 38.9 IN ADULT, UNSPECIFIED OBESITY TYPE: ICD-10-CM

## 2024-01-11 PROBLEM — E66.812 CLASS 2 SEVERE OBESITY WITH SERIOUS COMORBIDITY AND BODY MASS INDEX (BMI) OF 38.0 TO 38.9 IN ADULT: Status: ACTIVE | Noted: 2024-01-11

## 2024-01-11 PROCEDURE — 1160F RVW MEDS BY RX/DR IN RCRD: CPT | Mod: CPTII,,, | Performed by: NURSE PRACTITIONER

## 2024-01-11 PROCEDURE — G2211 COMPLEX E/M VISIT ADD ON: HCPCS | Mod: S$PBB,,, | Performed by: NURSE PRACTITIONER

## 2024-01-11 PROCEDURE — 3008F BODY MASS INDEX DOCD: CPT | Mod: CPTII,,, | Performed by: NURSE PRACTITIONER

## 2024-01-11 PROCEDURE — 99214 OFFICE O/P EST MOD 30 MIN: CPT | Mod: S$PBB,,, | Performed by: NURSE PRACTITIONER

## 2024-01-11 PROCEDURE — 99215 OFFICE O/P EST HI 40 MIN: CPT | Mod: PBBFAC | Performed by: NURSE PRACTITIONER

## 2024-01-11 PROCEDURE — 3075F SYST BP GE 130 - 139MM HG: CPT | Mod: CPTII,,, | Performed by: NURSE PRACTITIONER

## 2024-01-11 PROCEDURE — 1159F MED LIST DOCD IN RCRD: CPT | Mod: CPTII,,, | Performed by: NURSE PRACTITIONER

## 2024-01-11 PROCEDURE — 3079F DIAST BP 80-89 MM HG: CPT | Mod: CPTII,,, | Performed by: NURSE PRACTITIONER

## 2024-01-11 RX ORDER — CYCLOSPORINE 0.5 MG/ML
1 EMULSION OPHTHALMIC 2 TIMES DAILY
Status: ON HOLD | COMMUNITY
Start: 2023-12-28

## 2024-01-11 RX ORDER — GALCANEZUMAB 120 MG/ML
INJECTION, SOLUTION SUBCUTANEOUS
Qty: 1 EACH | Refills: 11 | Status: ON HOLD | OUTPATIENT
Start: 2024-01-11

## 2024-01-11 RX ORDER — GABAPENTIN 600 MG/1
600 TABLET ORAL 2 TIMES DAILY
Status: ON HOLD | COMMUNITY
Start: 2023-11-20

## 2024-01-11 NOTE — PROGRESS NOTES
[facility] Neurology Follow Up Office Visit Note    Initial Visit Date: 10/5/2023  Last Visit Date:   Current Visit Date:  01/11/2024    Chief Complaint:     Chief Complaint   Patient presents with    Migraine     Patient reports migraines are about the same.     History of Present Illness:      This is 48 y.o. female with history of HTN, insomnia, acute neck pain, migraine, Vitamin D deficiency, who was referred headache disorder. During initial visit, venlafaxine 37.5mg Qday, MagOx 400mg daily, Riboflavin 400mg daily, baclofen PRN, Zavspret, and Ubrelvy were initiated.    Today, Pt states she continues to awaken with HA 15 days/month. Zavspret and Ubrelvy effective, but must catch at onset. Has been told she snores at night. Has been tested for GIAN in past and was told she had mild case and did not require machine. Seems HA stem from shoulder and neck pain. Taking Baclofen PRN. Continues w/MagOx and Riboflavin daily. Has been out of Emaglity for last month.    Age of Onset : 16 YO    Headache Description: Starts to R trapezius and radiates up neck to R side of head, to forehead and across to other side, on a bad day must lay in a dark room, severe in nature. Worsened w/activity. Unresponsive to Ubrelvy. May last from 1 hour to 3-4 days. May resolved after a nap, not always. Accompanied by photophobia/phonophobia, nausea, flashes of lights to both eyes, numbness to her face.    Frequency: 15 headache days per month.     Provocation Factors: odors,     Risk Factors  - Family history of headache disorder: Yes daughter  - History of focal CNS lesions: No  - History of CNS infections: No  - History head trauma: No  - History of underlying mood disorder: Yes diagnosed w/major depressive d/o  - History of sleep disorder: Yes mild GIAN; but not qualified for machine  - Recreational drug use: No  - Tobacco use: No  - Alcohol use: No  - Weight fluctuation: No  - Isotretinoin or Tetracycline use:  No  - Family planning and  contraceptive use: No    Medications:     Current Prophylactic  Gabapentin 300mg TID PRN (4/26/2023 - present) ineffective for HA  Toprol XL 100mg Qday (1/17/2023 - present) ineffective for HA  Venlafaxine 37.5mg PO Qday (10/5/2023 - present) effective for mood    Current Abortive  Baclofen 5mg PO PRN - (10/5/2023 - present)  Zavzpret 10mg nasal spray - (10/19/2023 - present) effective  Ubrelvy 100mg PO BID (4/10/2023 - current)- only effective for mild-mod migraine    Prior Prophylactic  Amitriptyline 25 mg nightly - brain fog/over sedated  Emgality 120mg/ml SQ Qmonth (2/13/2023 - 4/10/2023)  Olmestartan/HCTZ (1/17/2023 - 7/16/2023) ineffective for HA  Sertraline 25mg Qday (3/9/2023 - 6/19/2023) ineffective for HA  Valsartan 80mg PO Qday (6/27/2023 - 11/20/2023) ineffective for HA  Verapamil ER 180mg daily (3/4/2023 - 7/4/2023) ineffective for HA    Prior Abortive  Nurtec - ineffective  Imitrex - ineffective  Rizatriptan - ineffective    Fioricet PRN - ineffective  Tizanidine 4mg PRN (2/22/2023 - 5/24/2023)- worsened migraine  Ondansetron (7/25/2023 - 9/11/2023)    Devices:     - VNS:  - TNS  - TMS:     Procedures:     - Botox:  - PSG block:   - Occipital nerve block:     Labs:     Results for orders placed or performed during the hospital encounter of 12/27/23   Stress Echo Which stress agent will be used? Pharmacological; Color Flow Doppler? No   Result Value Ref Range    BSA 2.29 m2    85% Max Predicted      Max Predicted      [facility] CV CPX PATIENT IS MALE 0.0     [facility] CV CPX PATIENT IS FEMALE 1.0     HR at rest 75 bpm    Systolic blood pressure 150 mmHg    Diastolic blood pressure 102 mmHg    RPP 11,250     Exercise duration (min) 7 minutes    Exercise duration (sec) 53 seconds    Peak  bpm    Peak Systolic  mmHg    Peak Diatolic BP 79 mmHg    Peak RPP 26,928     Estimated METs 1     % Max HR Achieved 94     1 Minute Recovery  bpm    dose 10.0 mcg/kg/min    dose 20.0 mcg/kg/min        Studies:     - MRI Brain:   - MRA Head w/o Joaquin:   - MRV Head w/o Joaquin:   - NCHCT:  - Lumbar Puncture:    Review of Systems:     ROS  As per HPI; all other systems negative  Physical Exams:     Vitals:    01/11/24 0934   BP: (!) 141/84   Pulse:      Physical Exam  HENT:      Head: Normocephalic.      Right Ear: External ear normal.      Left Ear: External ear normal.      Nose: Nose normal.      Mouth/Throat:      Mouth: Mucous membranes are moist.   Eyes:      Pupils: Pupils are equal, round, and reactive to light.   Cardiovascular:      Rate and Rhythm: Normal rate.      Pulses: Normal pulses.   Pulmonary:      Effort: Pulmonary effort is normal.   Abdominal:      General: There is no distension.      Tenderness: There is no guarding.      Comments: round   Musculoskeletal:         General: Normal range of motion.      Cervical back: Normal range of motion.   Skin:     General: Skin is warm and dry.      Capillary Refill: Capillary refill takes less than 2 seconds.      Findings: No lesion or rash.   Neurological:      General: No focal deficit present.      Mental Status: She is alert.   Psychiatric:         Mood and Affect: Mood normal.     Comprehensive Neurological Exam:  Mental Status: Alert Oriented to Self, Date, and Place. Naming, repetition, reading, and writing wnl. Comprehension wnl. No dysarthria.   CN II - XII: ATIYA, No APD, VA grossly intact to finger counting at 6 ft, VFFC, No ptosis OU, EOMI without nystagmus LT/Temp symmetric in CN V1-3 distribution, Hearing grossly intact, Face Symmetric, Tongue and Uvula midline, Trapezius symmetric bilateral.   Motor: tone and bulk wnl throughout, no abnormal involuntary or voluntary movements, 5/5 to confrontation, Fine finger movements wnl b/l, No pronator drift.   Sensory: LT, Proprioception, Vibration, PP, Temp symmetric. No sensory simultagnosia.   Reflexes: 2+ throughout, plantar reflexes downward bilateral.   Cerebellar: FNF wnl b/l, RAHM wnl  b/l  Romberg: Negative  Gait: normal. Heel Gait, Toe Gait, Tandem Gait wnl.     Assessment:     This is 48 y.o. female with history of HTN, insomnia, acute neck pain, migraine, Vitamin D deficiency, who was referred headache disorder. Pt experiencing 15 migraine w/aura HA days per month. Has tried and failed multiple abortive therapies, current regimen not very effective. Emgality for preventative, Zavzpret for abortive. Has tried and failed sumatriptan and rizatriptan. This is not a medication overuse HA. Currently attending college for a degree in psychology. Has been dx with mild GIAN, but states [facility] would not cover a machine. Continues to awaken w/HA that start in shoulder and neck area.  Discussed Botox in the future.     Problem List Items Addressed This Visit          Endocrine    Class 2 severe obesity with serious comorbidity and body mass index (BMI) of 38.0 to 38.9 in adult       Other    Chronic migraine with aura without status migrainosus, not intractable - Primary    Relevant Medications    zavegepant 10 mg/actuation Spry     Other Visit Diagnoses       Chronic migraine with aura and with status migrainosus, not intractable        Relevant Medications    zavegepant 10 mg/actuation Spry          Plan:     [] restart Emgality 120mg SQ Q month - requires PA  [] c/w venlafaxine 37.5mg nightly  [] c/w Baclofen 5 mg nightly for muscle tension - take consistently  [] c/w Zavzpret intranasal once at onset of migraine; PA required  [] c/w Ubrelvy 100mg PO BID PRN at onset of migraine for mild-moderate migraine  [] Limit water intake to approximately 120 oz/day  [] c/w MagOx 400mg daily  [] c/w riboflavin (Vit B2) 400mg daily  [] start exercise program 30 min daily x 5 days weekly for overall health and wellness  [] call office for migraine > 24 hrs and failed abortive therapy; will call in headache cocktail    RTC 3 mths    Headache education provided: good sleep hygiene and 7 hours of sleep per night, stress  management, medication overuse education provided. Using more 3 OTC per week may worsen headaches, high intensity interval training has shown to reduce headache frequency. Low carb, high protein has shown to reduce headache frequency. Patient is instructed in keep headache diary.     I have explained the treatment plan, diagnosis, and prognosis to patient. All questions are answered to the best of my knowledge.     Face to face time 30 minutes, including documentation, chart review, counseling, education, review of test results, relevant medical records, and coordination of care.     01/11/2024

## 2024-01-16 NOTE — PROGRESS NOTES
CHIEF COMPLAINT:   Chief Complaint   Patient presents with    Follow-up     3 mos f/u denies cardiac targtes                                                  HPI:  Nicole Quiñones 48 y.o. female with past medical history of hypertension, GERD, obesity, migraines, chronic pain, and rheumatoid arthritis presents to Cardiology Clinic for follow up and ongoing care. At initial visit she had several cardiac complaints.  She complained of chest pain, associated left-sided numbness, palpitations, SOB/ORTEGA, and bend apnea.  Patient completed several cardiac tests since then.  Echocardiogram completed December 2023 revealed EF of 50%, grade 1 diastolic dysfunction, no significant valvular or structural abnormalities.  See full report below.  48 hour Holter monitor completed December 2023 revealed no significant arrhythmia, no pauses, underlying rhythm was sinus, very rare PVCs and PACs.  See full report below.  She did complete a exercise stress test in November 2023, however the patient was unable to exercise for a full 5 minutes and the test was considered intermediate risk.  Subsequently she completed a dobutamine stress echo which revealed no new regional wall motion abnormalities, overall the echo was negative for ischemia or infarction.  See full report below.  Given that patient does have resistant hypertension and complained of snoring and daytime fatigue at last visit, sleep study was ordered.  Patient states that sleep clinic never called her to schedule the study so she did not complete it.    Today the patient states that she is feeling better overall.  She states that she has not had any chest pains since last office visit.  She continues to have occasional SOB/ORTEGA, only with moderate to high levels of exertion.  She also endorses occasional palpitations that occur at night, usually while lying in bed.  She states that they are not very bothersome.  Otherwise she denies any further cardiac complaints such as  chest pain, dizziness, lightheadedness, PND, orthopnea, peripheral edema, or claudication symptoms.  She states that she downloaded a sleep kimberly since she was not able to complete her sleep study, and she found that she snores every night and is consistently waking up throughout the night, as well as only sleeping an average of 3-5 hours.  She states that she is able to complete her ADLs without any issues or ischemic symptoms.  She states that she has increased her physically activity and has started doing yoga.  Her main limitation to physical activity as her chronic pain and arthritis in various joints.  She states that she recently started walking with a cane due to instability and increased falling.  She denies any tobacco or other illicit drug use.  She reports a heart healthy diet.                                                                                                                                                                                                                                                                                                                                                                                                                                                                    CARDIAC TESTING:  Dobutamine Stress Echo 12.27.23    Stress Protocol: The test was stopped because the end of the protocol was reached.    Baseline ECG: The Baseline ECG reveals sinus rhythm.    Stress ECG: There are no arrhythmias during stress.    Post-stress Echo: The left ventricle systolic function is normal.  Baseline Ejection Fraction:  60%  The Ejection Fraction improved on the stress images  No new regional wall motion abnormality noted  The stress echo is negative for ischemia     Echo 12.22.23    Left Ventricle: The left ventricle is normal in size. Normal wall thickness. There is low normal systolic function. Biplane (2D) method of discs ejection fraction is 50%. Grade I  diastolic dysfunction.    Right Ventricle: Normal right ventricular cavity size. Systolic function is normal.    Left Atrium: Left atrium is mildly dilated.    IVC/SVC: Normal venous pressure at 3 mmHg.    48 Hour Holter Monitor 12.5.23  The predominant rhythm is sinus.  Underlying rhythm:   Sinus  Arrythmia:  No significant arrhythmia noted   Pauses:  No significant pause noted  Symptoms:  None reported   Events:  No patient marked events      Exercise EKG Stress 11.20.23    The patient reported no chest pain during the stress test.    There were no arrhythmias during stress.    The patient exercised for 3 minutes 29 seconds on a Mitul protocol, corresponding to a functional capacity of 6 METS, achieving a peak heart rate of 148 bpm, which is 91 % of the age predicted maximum heart rate.  Patient reached target heart rate  Minutes exercised:  3 min 29 sec  Adjusted ST deviation:  0 mm   Angina:  no angina (0)    [facility] treadmill score (Min - ST - Index):  3.  Patient's Score:  less than 5 but greater than -10   The patient's risk for cardiovascular events  is INTERMEDIATE (based on Gresham Score)   If clinically indicated consider further evaluation     Patient Active Problem List   Diagnosis    Rheumatoid arthritis    Chondromalacia of patellofemoral joint, left    Chondromalacia of patellofemoral joint, right    Primary hypertension    Bilateral chronic knee pain    Encounter for immunization    Encounter for colorectal cancer screening    Encounter for screening mammogram for breast cancer    Encounter to establish care    Gastroesophageal reflux disease without esophagitis    Sore throat    Chronic pain of left knee    Tinea pedis of left foot    Encounter for medication refill    Primary osteoarthritis of left knee    BMI 39.0-39.9,adult    Acute neck pain    Chronic migraine with aura without status migrainosus, not intractable    Insomnia    Encounter for vaccination    Internal derangement of knee joint, left     Palpitations    Hyperlipidemia    Shortness of breath    Chest pain    Dyspnea on exertion    Resistant hypertension    Class 2 severe obesity with serious comorbidity and body mass index (BMI) of 38.0 to 38.9 in adult     Past Surgical History:   Procedure Laterality Date    CARPAL TUNNEL RELEASE      COLONOSCOPY W/ BIOPSIES AND POLYPECTOMY  09/05/2023    Deepak De Luna MD 5 years    Right shouler surgery      SURGICAL REMOVAL OF ENDOMETRIOSIS       Social History     Socioeconomic History    Marital status:    Tobacco Use    Smoking status: Never    Smokeless tobacco: Never   Substance and Sexual Activity    Alcohol use: Not Currently     Comment: Last drink @ 20    Drug use: Never    Sexual activity: Not Currently     Partners: Male     Birth control/protection: Abstinence   Social History Narrative    ** Merged History Encounter **          Social Determinants of Health     Financial Resource Strain: Low Risk  (4/19/2023)    Overall Financial Resource Strain (CARDIA)     Difficulty of Paying Living Expenses: Not very hard   Food Insecurity: Food Insecurity Present (4/19/2023)    Hunger Vital Sign     Worried About Running Out of Food in the Last Year: Sometimes true     Ran Out of Food in the Last Year: Never true   Transportation Needs: No Transportation Needs (4/19/2023)    PRAPARE - Transportation     Lack of Transportation (Medical): No     Lack of Transportation (Non-Medical): No   Physical Activity: Inactive (4/19/2023)    Exercise Vital Sign     Days of Exercise per Week: 0 days     Minutes of Exercise per Session: 0 min   Stress: Stress Concern Present (4/19/2023)    [employer] - Occupational Stress Questionnaire     Feeling of Stress : To some extent   Social Connections: Moderately Integrated (4/19/2023)    Social Connection and Isolation Panel [NHANES]     Frequency of Communication with Friends and Family: Three times a week     Frequency of Social Gatherings with  "Friends and Family: Twice a week     Attends Restoration[employer] Services: 1 to 4 times per year     Active Member of Clubs or Organizations: Yes     Attends Club or Organization Meetings: 1 to 4 times per year     Marital Status: Never    Housing Stability: Low Risk  (4/19/2023)    Housing Stability Vital Sign     Unable to Pay for Housing in the Last Year: No     Number of Places Lived in the Last Year: 2     Unstable Housing in the Last Year: No        Family History   Problem Relation Age of Onset    Hypertension Mother     Arthritis Mother     Depression Mother     Thyroid disease Sister     Asthma Sister     Intellectual disability Brother     Epilepsy Brother     Stroke Maternal Grandfather      Review of patient's allergies indicates:   Allergen Reactions    Nsaids (non-steroidal anti-inflammatory drug) Other (See Comments)     Causes ulcers to bleed.    Ibuprofen     Latex     Meloxicam     Nsaids (non-steroidal anti-inflammatory drug) Nausea Only         ROS:  Review of Systems   Constitutional: Negative.    HENT: Negative.     Eyes: Negative.    Respiratory:  Positive for shortness of breath.    Cardiovascular:  Positive for chest pain and palpitations. Negative for orthopnea, claudication, leg swelling and PND.   Gastrointestinal: Negative.    Genitourinary: Negative.    Musculoskeletal:  Positive for joint pain.   Skin: Negative.    Neurological: Negative.  Negative for dizziness and weakness.   Endo/Heme/Allergies: Negative.    Psychiatric/Behavioral: Negative.                                                                                                                                                                                  Negative except as stated in the history of present illness. See HPI for details.    PHYSICAL EXAM:  Visit Vitals  BP (!) 143/91 (BP Location: Right arm, Patient Position: Sitting, BP Method: Medium (Automatic))   Pulse 68   Temp 97.8 °F (36.6 °C)   Resp 18   Ht 5' 6.04" " (1.677 m)   Wt 110.7 kg (244 lb)   SpO2 100%   BMI 39.34 kg/m²       Physical Exam  HENT:      Head: Normocephalic.      Nose: Nose normal.      Mouth/Throat:      Mouth: Mucous membranes are moist.   Eyes:      Extraocular Movements: Extraocular movements intact.      Pupils: Pupils are equal, round, and reactive to light.   Neck:      Vascular: No carotid bruit.   Cardiovascular:      Rate and Rhythm: Normal rate and regular rhythm.      Pulses: Normal pulses.      Heart sounds: Normal heart sounds. No murmur heard.  Pulmonary:      Effort: Pulmonary effort is normal.      Breath sounds: Normal breath sounds.   Abdominal:      General: There is no distension.      Palpations: Abdomen is soft.   Musculoskeletal:         General: Normal range of motion.      Cervical back: Normal range of motion.      Right lower leg: No edema.      Left lower leg: No edema.   Skin:     General: Skin is warm.   Neurological:      General: No focal deficit present.      Mental Status: She is alert.   Psychiatric:         Mood and Affect: Mood normal.         Current Outpatient Medications   Medication Instructions    albuterol (PROVENTIL) 2.5 mg /3 mL (0.083 %) nebulizer solution USE 1 VIAL IN NEBULIZER EVERY 8 HOURS AS NEEDED    azaTHIOprine (IMURAN) 50 mg Tab Start Imuran 50 mg daily and after 1 week increase dose to 100 mg daily.  Hold for infection or fever.    baclofen (LIORESAL) 5 mg, Oral, 2 times daily PRN    cetirizine (ZYRTEC) 10 mg, Oral, Daily    cloNIDine (CATAPRES) 0.1 MG tablet 1 tablet, Oral, Daily PRN    DULERA 100-5 mcg/actuation HFAA 1 puff, Inhalation, 2 times daily    EMGALITY  mg/mL PnIj AS DIRECTED SUBCUTANEOUS MONTHLY 30 DAY(S)    ergocalciferol (ERGOCALCIFEROL) 50,000 Units, Oral, Every 7 days    ezetimibe (ZETIA) 10 mg, Oral, Daily    fluticasone propionate (FLONASE) 100 mcg, Each Nostril, Daily PRN    folic acid (FOLVITE) 1,000 mcg, Oral, Daily    gabapentin (NEURONTIN) 600 mg, Oral, 2 times daily     "hydroxychloroquine (PLAQUENIL) 200 mg, Oral, 2 times daily, After food    isosorbide dinitrate (ISORDIL) 40 mg, Oral, Daily    LINZESS 145 mcg, Oral, Daily    metoprolol succinate (TOPROL-XL) 100 mg, Oral, Nightly    nitroGLYCERIN (NITROSTAT) 0.4 mg, Sublingual, Every 5 min PRN    ondansetron (ZOFRAN) 8 MG tablet TAKE 1 TABLET BY MOUTH EVERY 8 HOURS AS NEEDED FOR NAUSEA AND VOMITING    pantoprazole (PROTONIX) 20 mg, Oral, Daily    RESTASIS 0.05 % ophthalmic emulsion 1 drop, Both Eyes, 2 times daily    sucralfate (CARAFATE) 1 g, Oral, 3 times daily    UBRELVY 100 mg, Oral, 2 times daily PRN    valsartan (DIOVAN) 80 mg, Oral, Daily    venlafaxine (EFFEXOR-XR) 37.5 mg, Oral, Daily    zavegepant 10 mg, Nasal, Daily PRN        All medications, laboratory studies, cardiac diagnostic imaging reviewed.     Lab Results   Component Value Date    .00 (H) 05/23/2023    TRIG 87 05/23/2023    CREATININE 1.00 01/18/2024    K 3.9 01/18/2024        ASSESSMENT/PLAN:    Primary Hypertension  Untreated GIAN  - BP above goal today- 143/89 on repeat   - Patient currently taking Isordil 40, Toprol 100, Valsartan 80, Verapamil 180, and Clonidine 0.1 PRN  - Will have patient log BP for 2-3 weeks and call clinic with readings   - May consider making medication adjustments if BP still elevated, however patient is to complete repeat sleep study in search of causes for secondary hypertension   - Patient states that she was previously diagnosed with GIAN but states that she was told it was a "mild form" and was told she did not need a CPAP   - Given ongoing GIAN symptoms and resistant hypertension despite 4+ medications concerned that untreated GIAN may be contributing to her difficult to control blood pressure  - Counseled on the importance of following a low-sodium, heart healthy diet and exercise as tolerated  - Sleep study pending, number to clinic was given to patient today    Hyperlipidemia  - , HDL 48, total cholesterol 201, " triglycerides 71 per labs February 1, 2024.  Improved from prior  - On Zetia 10 MG daily   - Patient would like to continue with Zetia for now and hold off on statin  - Counseled on importance of following a low cholesterol, low-fat diet and exercise as tolerated  - ASCVD Risk 18.8%, high intensity statin recommended     Chest Pain  Shortness of Breath/ORTEGA  - Mostly resolved.  She endorses occasional SOB/ORTEGA that she reports occurs with over exertion or moderate to high exertional activities  - Echo revealed EF of 50%, grade 1 diastolic dysfunction, no significant valvular or structural abnormalities.  See full report above.  - Exercise stress test revealed intermediate risk for cardiac events based off of Gresham score, due to patient being not able to complete 5 minutes of exercise.  Subsequently, patient underwent stress echocardiogram which revealed no evidence of ischemia or infarction, there were no new wall motion abnormalities.  See full report above.  - Will continue to monitor for now and focus on risk factor management for CAD/CHF   - Counseled on importance of following a heart healthy diet and exercise as tolerated   - No indication for further testing at this time    Palpitations  - reports occasional palpitations that she describes as her heart beating out of her chest  - She denies any excessive caffeine use in fact, she states that she recently greatly reduced her caffeine consumption  - She denies any symptoms of lightheadedness, dizziness, or syncope that accompanied the palpitations   - 48 hour Holter monitor revealed underlying rhythm to be sinus, no significant arrhythmias noted, no significant pauses noted, average heart rate was 81 beats per minute    Obesity   - Counseled on the importance of diet and exercise for weight loss and overall cardiac risk reduction    GIAN   - Patient reports being diagnosed with sleep apnea in the past, but states that it was only a mild version and she was told  that she did not need to be treated with CPAP at that time   - Given patient's ongoing sleep apnea symptoms and difficult to control hypertension, will have patient repeat sleep study as concerns for untreated GIAN causing the resistant hypertension  - Sleep clinic number was given to patient today.  She was referred to sleep clinic at last office visit but states that she never heard from them to schedule her study    Management of other problems per PCP/other specialties.      Complete sleep study   Follow up in cardiology clinic in 4 months or sooner if needed  Follow up with PCP as directed

## 2024-01-18 ENCOUNTER — OFFICE VISIT (OUTPATIENT)
Dept: RHEUMATOLOGY | Facility: CLINIC | Age: 49
End: 2024-01-18
Payer: MEDICAID

## 2024-01-18 VITALS
SYSTOLIC BLOOD PRESSURE: 142 MMHG | RESPIRATION RATE: 18 BRPM | DIASTOLIC BLOOD PRESSURE: 81 MMHG | WEIGHT: 240.81 LBS | HEART RATE: 95 BPM | HEIGHT: 66 IN | TEMPERATURE: 98 F | OXYGEN SATURATION: 99 % | BODY MASS INDEX: 38.7 KG/M2

## 2024-01-18 DIAGNOSIS — M25.562 BILATERAL CHRONIC KNEE PAIN: ICD-10-CM

## 2024-01-18 DIAGNOSIS — D84.821 DRUG-INDUCED IMMUNODEFICIENCY: ICD-10-CM

## 2024-01-18 DIAGNOSIS — I10 PRIMARY HYPERTENSION: ICD-10-CM

## 2024-01-18 DIAGNOSIS — M15.9 PRIMARY OSTEOARTHRITIS INVOLVING MULTIPLE JOINTS: ICD-10-CM

## 2024-01-18 DIAGNOSIS — M54.41 CHRONIC BILATERAL LOW BACK PAIN WITH BILATERAL SCIATICA: ICD-10-CM

## 2024-01-18 DIAGNOSIS — M25.561 BILATERAL CHRONIC KNEE PAIN: ICD-10-CM

## 2024-01-18 DIAGNOSIS — M54.42 CHRONIC BILATERAL LOW BACK PAIN WITH BILATERAL SCIATICA: ICD-10-CM

## 2024-01-18 DIAGNOSIS — R06.02 SOB (SHORTNESS OF BREATH): ICD-10-CM

## 2024-01-18 DIAGNOSIS — M79.642 BILATERAL HAND PAIN: ICD-10-CM

## 2024-01-18 DIAGNOSIS — M79.641 BILATERAL HAND PAIN: ICD-10-CM

## 2024-01-18 DIAGNOSIS — Z79.899 DRUG-INDUCED IMMUNODEFICIENCY: ICD-10-CM

## 2024-01-18 DIAGNOSIS — Z86.2 HISTORY OF ITP: ICD-10-CM

## 2024-01-18 DIAGNOSIS — M05.79 RHEUMATOID ARTHRITIS INVOLVING MULTIPLE SITES WITH POSITIVE RHEUMATOID FACTOR: Primary | ICD-10-CM

## 2024-01-18 DIAGNOSIS — G89.29 CHRONIC BILATERAL LOW BACK PAIN WITH BILATERAL SCIATICA: ICD-10-CM

## 2024-01-18 DIAGNOSIS — G89.29 BILATERAL CHRONIC KNEE PAIN: ICD-10-CM

## 2024-01-18 PROCEDURE — 3079F DIAST BP 80-89 MM HG: CPT | Mod: CPTII,,, | Performed by: INTERNAL MEDICINE

## 2024-01-18 PROCEDURE — 99215 OFFICE O/P EST HI 40 MIN: CPT | Mod: S$PBB,,, | Performed by: INTERNAL MEDICINE

## 2024-01-18 PROCEDURE — 3008F BODY MASS INDEX DOCD: CPT | Mod: CPTII,,, | Performed by: INTERNAL MEDICINE

## 2024-01-18 PROCEDURE — 1159F MED LIST DOCD IN RCRD: CPT | Mod: CPTII,,, | Performed by: INTERNAL MEDICINE

## 2024-01-18 PROCEDURE — 99215 OFFICE O/P EST HI 40 MIN: CPT | Mod: PBBFAC | Performed by: INTERNAL MEDICINE

## 2024-01-18 PROCEDURE — 3077F SYST BP >= 140 MM HG: CPT | Mod: CPTII,,, | Performed by: INTERNAL MEDICINE

## 2024-01-18 NOTE — PROGRESS NOTES
Patient ID: [id number]     Chief Complaint: Rheumatoid arthritis involving multiple sites with positive (Patient complaining of pain and stiffness to all joints today. )      Referred By: No ref. provider found     HPI:     Nicole Quiñones is a 48 y.o. female here today for follow-up of rheumatoid arthritis.     She had seen Dr Marcial, she was diagnosed with RA and was started on Plaquenil and MTX. She had brain fog with those meds along with fatigue and GI upset, stopped them and did not go back for follow up. She also saw Dr Martinez in the past few times, did not give her any medication.     Patient was started on plaquenil last visit. Since then patient states she has noticed minimal improvement. She still reports morning pain and stiffness, primarily in her wrists and knees. Pain is usually 8/10 and improves by the afternoon. Denies any significant swelling. She also notes pain in her bilateral shoulders and lower back though not as significant. Has history of right torn rotator cuff in the past.    She does note that she has had two recent episodes in which her left hand and forearm would lock up and she'd become unable to move them for 30-40 minutes. Also had one fall getting out of bed because her leg gave out but no injuries sustained.     Denies history of fevers, rashes, photosensitivity, oral or nasal ulcers, h/o MI, stroke, seizures, h/o PE or DVT, Raynaud's phenomenon, uveitis, malignancies.   Family history of autoimmune disease: none.   Pregnancies: 6 Miscarriages: none  Smoking: nonsmoker.       Social History     Tobacco Use   Smoking Status Never   Smokeless Tobacco Never          ----------------------------  Anemia  Arthritis  Fibromyalgia  Hypercholesterolemia  Hypertension  Migraines  Personal history of colonic polyps      Comment:  colonoscopy/polypectomy  Thrombocytopenia, unspecified  TTP (thrombotic thrombocytopenic purpura)  TTP (thrombotic thrombocytopenic purpura)     Past Surgical  History:   Procedure Laterality Date    CARPAL TUNNEL RELEASE      COLONOSCOPY W/ BIOPSIES AND POLYPECTOMY  09/05/2023    Deepak De Luna MD 5 years    Right shouler surgery      SURGICAL REMOVAL OF ENDOMETRIOSIS         Review of patient's allergies indicates:   Allergen Reactions    Nsaids (non-steroidal anti-inflammatory drug) Other (See Comments)     Causes ulcers to bleed.    Ibuprofen     Latex     Meloxicam     Nsaids (non-steroidal anti-inflammatory drug) Nausea Only       Outpatient Medications Marked as Taking for the 1/18/24 encounter (Office Visit) with Keshav Gibson MD   Medication Sig Dispense Refill    albuterol (PROVENTIL) 2.5 mg /3 mL (0.083 %) nebulizer solution USE 1 VIAL IN NEBULIZER EVERY 8 HOURS AS NEEDED 1 each 3    baclofen (LIORESAL) 5 mg Tab tablet Take 1 tablet (5 mg total) by mouth 2 (two) times daily as needed (tension). 30 tablet 2    cetirizine (ZYRTEC) 10 MG tablet Take 10 mg by mouth once daily.      cloNIDine (CATAPRES) 0.1 MG tablet Take 1 tablet by mouth daily as needed.      EMGALITY  mg/mL PnIj AS DIRECTED SUBCUTANEOUS MONTHLY 30 DAY(S) 1 each 11    ergocalciferol (ERGOCALCIFEROL) 50,000 unit Cap Take 1 capsule (50,000 Units total) by mouth every 7 days. 12 capsule 3    ezetimibe (ZETIA) 10 mg tablet Take 10 mg by mouth once daily.      fluticasone propionate (FLONASE) 50 mcg/actuation nasal spray 2 sprays (100 mcg total) by Each Nostril route daily as needed for Allergies or Rhinitis. 18.2 mL 1    folic acid (FOLVITE) 1 MG tablet Take 1 tablet (1,000 mcg total) by mouth once daily. 30 tablet 3    gabapentin (NEURONTIN) 600 MG tablet Take 600 mg by mouth 2 (two) times daily.      hydroxychloroquine (PLAQUENIL) 200 mg tablet Take 1 tablet (200 mg total) by mouth 2 (two) times daily. After food 60 tablet 5    isosorbide dinitrate (ISORDIL) 40 MG Tab Take 1 tablet (40 mg total) by mouth once daily. 90 tablet 2    LINZESS 145 mcg Cap capsule Take 145 mcg by mouth once daily.       metoprolol succinate (TOPROL-XL) 100 MG 24 hr tablet Take 1 tablet (100 mg total) by mouth every evening. 90 tablet 3    nitroGLYCERIN (NITROSTAT) 0.4 MG SL tablet Place 1 tablet (0.4 mg total) under the tongue every 5 (five) minutes as needed for Chest pain. 25 tablet 4    ondansetron (ZOFRAN) 8 MG tablet TAKE 1 TABLET BY MOUTH EVERY 8 HOURS AS NEEDED FOR NAUSEA AND VOMITING 9 tablet 3    pantoprazole (PROTONIX) 20 MG tablet Take 1 tablet (20 mg total) by mouth once daily. 30 tablet 3    RESTASIS 0.05 % ophthalmic emulsion Place 1 drop into both eyes 2 (two) times daily.      sucralfate (CARAFATE) 1 gram tablet Take 1 g by mouth 3 (three) times daily.      UBRELVY 100 mg tablet Take 1 tablet (100 mg total) by mouth 2 (two) times daily as needed for Migraine. 16 tablet 2    valsartan (DIOVAN) 80 MG tablet Take 1 tablet (80 mg total) by mouth once daily. 90 tablet 3    venlafaxine (EFFEXOR-XR) 37.5 MG 24 hr capsule Take 1 capsule (37.5 mg total) by mouth once daily. 30 capsule 11    zavegepant 10 mg/actuation Spry 10 mg by Nasal route daily as needed (Migraine). 1 each 5     Current Facility-Administered Medications for the 1/18/24 encounter (Office Visit) with Keshav Gibson MD   Medication Dose Route Frequency Provider Last Rate Last Admin    atropine injection 0.4 mg  0.4 mg Intravenous 1 time in Clinic/HOD Nathalia Cherry PA-C        metoprolol injection 1 mg  1 mg Intravenous 1 time in Clinic/HOD Nathalia Cherry PA-C           Social History     Socioeconomic History    Marital status:    Tobacco Use    Smoking status: Never    Smokeless tobacco: Never   Substance and Sexual Activity    Alcohol use: Not Currently     Comment: Last drink @ 20    Drug use: Never    Sexual activity: Not Currently     Partners: Male     Birth control/protection: Abstinence   Social History Narrative    ** Merged History Encounter **          Social Determinants of Health     Financial Resource Strain: Low  Risk  (4/19/2023)    Overall Financial Resource Strain (CARDIA)     Difficulty of Paying Living Expenses: Not very hard   Food Insecurity: Food Insecurity Present (4/19/2023)    Hunger Vital Sign     Worried About Running Out of Food in the Last Year: Sometimes true     Ran Out of Food in the Last Year: Never true   Transportation Needs: No Transportation Needs (4/19/2023)    PRAPARE - Transportation     Lack of Transportation (Medical): No     Lack of Transportation (Non-Medical): No   Physical Activity: Inactive (4/19/2023)    Exercise Vital Sign     Days of Exercise per Week: 0 days     Minutes of Exercise per Session: 0 min   Stress: Stress Concern Present (4/19/2023)    [employer] - Occupational Stress Questionnaire     Feeling of Stress : To some extent   Social Connections: Moderately Integrated (4/19/2023)    Social Connection and Isolation Panel [NHANES]     Frequency of Communication with Friends and Family: Three times a week     Frequency of Social Gatherings with Friends and Family: Twice a week     Attends Jain Services: 1 to 4 times per year     Active Member of Clubs or Organizations: Yes     Attends Club or Organization Meetings: 1 to 4 times per year     Marital Status: Never    Housing Stability: Low Risk  (4/19/2023)    Housing Stability Vital Sign     Unable to Pay for Housing in the Last Year: No     Number of Places Lived in the Last Year: 2     Unstable Housing in the Last Year: No        Family History   Problem Relation Age of Onset    Hypertension Mother     Arthritis Mother     Depression Mother     Thyroid disease Sister     Asthma Sister     Intellectual disability Brother     Epilepsy Brother     Stroke Maternal Grandfather         Immunization History   Administered Date(s) Administered    DTP 1974, 1974, 06/09/1975, 06/14/1976, 05/09/1979    Hepatitis B, Adult 03/20/2018    Influenza 11/07/2008    Influenza (FLUBLOK) -  Quadrivalent - Recombinant - PF *Preferred* (egg allergy) 10/30/2019    Influenza - Quadrivalent - PF *Preferred* (6 months and older) 10/30/2019, 11/16/2020, 04/19/2023, 09/19/2023    Influenza - Trivalent - PF (ADULT) 01/09/2013, 01/07/2014    MMR 03/20/2018    Measles / Rubella 08/11/1975    OPV 1974, 1974, 06/14/1976, 05/09/1979, 06/09/1995    PPD Test 09/30/2013    Pneumococcal Conjugate - 20 Valent 04/19/2023    Pneumococcal Polysaccharide - 23 Valent 03/04/2020    Td (ADULT) 03/27/1989, 01/25/2006    Tdap 01/07/2014, 03/20/2018       Patient Care Team:  Yaritza Browne FNP as PCP - General (Family Medicine)  Joy Huff MD (Family Medicine)  Loretta Noble LPN as Care Coordinator  Deepak Block MD as Consulting Physician (Gastroenterology)     Subjective:     Constitutional:  Denies chills. Denies fever. Denies night sweats. Denies weight loss.   Ophthalmology: Denies blurred vision. Denies dry eyes. Denies eye pain. Denies Itching and redness.   ENT: Denies oral ulcers. Denies epistaxis. Denies dry mouth. Denies swollen glands.   Endocrine: Denies diabetes. Denies thyroid Problems.   Respiratory: Denies cough. Denies shortness of breath. Denies shortness of breath with exertion. Denies hemoptysis.   Cardiovascular: Denies chest pain at rest. Denies chest pain with exertion. Denies palpitations.    Gastrointestinal: Denies abdominal pain. Denies diarrhea. Denies nausea. Denies vomiting. Denies hematemesis or hematochezia. Denies heartburn.  Genitourinary: Denies blood in urine.  Musculoskeletal: See HPI for details  Integumentary: Denies rash. Denies photosensitivity.   Peripheral Vascular: Denies Ulcers of hands and/or feet. Denies Cold extremities.   Neurologic: Denies dizziness. Admits headache.  Denies loss of strength. Admits numbness or tingling.   Psychiatric: Admits depression. Admits anxiety. Denies suicidal/homicidal ideations.      Objective:     BP (!) 142/81 (BP  "Location: Right arm, Patient Position: Sitting, BP Method: Large (Automatic))   Pulse 95   Temp 98.4 °F (36.9 °C) (Oral)   Resp 18   Ht 5' 6" (1.676 m)   Wt 109.2 kg (240 lb 12.8 oz)   SpO2 99%   BMI 38.87 kg/m²     Physical Exam    General Appearance: alert, pleasant, in no acute distress.  Skin: Skin color, texture, turgor normal. No rashes or lesions.  Eyes:  extraocular movement intact (EOMI), pupils equal, round, reactive to light and accommodation, conjunctiva clear.  ENT: No oral or nasal ulcers.  Neck:  Neck supple. No adenopathy.   Lungs: CTA throughout without crackles, rhonchi, or wheezes.   Heart: RRR w/o murmurs.  No edema. 2+ DP pulse.  Abdomen: Soft, non-tender, no masses, rebound or guarding.  Neuro: Alert, oriented, CN II-XII GI, sensory and motor innervation intact.  Musculoskeletal: No overt synovitis on exam.  Mild tenderness of bilateral wrist and left 4th MCP. No PIP or DIP tenderness.  No swelling or tenderness in bilateral elbows. Mild tenderness of right shoulder, none of left shoulder.  No tenderness over spine or SI joints. Right knee tender, left knee nontender to palpation.  No swelling or warmth in bilateral knees. No tenderness with MTP squeeze.  Psych: Alert, oriented, normal eye contact.    Labs:     2021: SETH negative by ZULMA.  Anti CCP 6.9, low titer, normal less than 3. RF negative.   4/2022: SETH negative. RF negative. C3, C4 normal. CCP 34(<19).   4/19/23: WBC 4.4. ANC ok. ESR 47. CRP 11.9. CPK normal. RF 26(<14), CCP 69, high titer.   5/2023: CBC ok. CMP ok. TSH normal. 1+ protein and no blood in urine.   5/2023: SETH direct positive.   09/20/2023: hepatitis-B surface antibody reactive.  Hepatitis-B surface antigen and core antibody negative.  Negative hepatitis-C and HIV.  CMP okay.  CRP 10.1, ESR 43.  CBC okay. Quantiferon tb negative.  10/9/23;  C3 and C4 normal.  Negative SSA, SSB, thyroglobulin, TPO, Navarro, RNP, Scl 70.  DsDNA negative.  SETH negative.    Imaging: "     09/20/2023; chest x-ray showed lungs are clear.  DJD of bilateral acromioclavicular joints and glenohumeral joint. Mild DJD of bilateral feet, calcaneal spurring on left.  Mild DJD of right knee.  DJD of bilateral hands in PIP, DIPs and bilateral 1st CMC joints.    9/12/23:  X-ray of left knee showed tricompartmental DJD changes worse in medial compartment, no effusion.    6/2023:  X-ray of cervical spine showed small multilevel marginal osteophytes and mild facet arthropathy.  Alignment is preserved.    1/2012:  X-ray of bilateral shoulder showed glenohumeral joint and AC joint arthritis.  No acute fracture or dislocation.      Assessment:       ICD-10-CM ICD-9-CM   1. Rheumatoid arthritis involving multiple sites with positive rheumatoid factor  M05.79 714.0   2. Chronic bilateral low back pain with bilateral sciatica  M54.42 724.2    M54.41 724.3    G89.29 338.29   3. Primary osteoarthritis involving multiple joints  M15.9 715.98   4. Bilateral chronic knee pain  M25.561 719.46    M25.562 338.29    G89.29    5. Bilateral hand pain  M79.641 729.5    M79.642    6. Primary hypertension  I10 401.9   7. BMI 39.0-39.9,adult  Z68.39 V85.39   8. Drug-induced immunodeficiency  D84.821 279.3    Z79.899 E947.9   9. History of ITP  Z86.2 V12.3   10. SOB (shortness of breath)  R06.02 786.05          Plan:     1. Rheumatoid arthritis involving multiple sites with positive rheumatoid factor:  Low titer rheumatoid factor and a high titer anti CCP.  No overt synovitis on exam.  She has arthralgias and joint pain likely multifactorial from both RA and osteoarthritis.  She continues with significant pain despite being started on Plaquenil 200 mg b.i.d. Will check TMPT genotype and if normal will start her on azathioprine with CBC/CMP after 4 weeks. She states she has been seen by ophthalmology but unclear if all the correct testing has been performed.  Will also order PFTs given her shortness of breath and if abnormal obtain CT  of chest.     2. Osteoarthritis:  Osteoarthritis of cervical spine on x-rays.  DJD of bilateral knees and shoulders.  Recommend follow-up with Orthopedics.  Referral to physical therapy had been sent. Can use Tylenol PRN. Used to work as CNA and lift and push patients, likely the reason for severe osteoarthritis.  Had issues with rotator cuff on the right had shoulder arthroscopy for that.     3. Primary hypertension: follow up with PCP.      4. Obesity:  Discussed watching her diet and recommend weight loss.  Weight loss will help with knee pain.     5. Persons with rheumatoid arthritis, lupus, psoriatic arthritis and other autoimmune diseases are at increased risk of cardiovascular disease including heart attack and stroke. We recommend that all patients with these conditions have annual health maintenance exams including lipid measurements, blood pressure measurements, and smoking cessation counseling when applicable at their primary care provider's office.   - Advised to stay up-to-date on age appropriate vaccinations and malignancy screening, including yearly skin exams.        Plaquenil(hydroxychloroquine) treatment was discussed with the patient.    Risks and benefits of this medication was explained to the patient.  Discussed side effects including retinal deposits and retinopathy related to plaquenil.  Discussed risk of myopathy, neuropathy and nausea as well.  Instructed patient it is recommended to see an eye doctor for a baseline exam and yearly after that, although eye complications are rare and occur after many years(and are dose related).       Discussed the medication azathioprine(imuran).  Risks and benefits explained to patient.  Discussed risks of hepatotoxicity, bone marrow suppression, allergic reactions, malignancy and increased risk of infection.   Would start azathioprine at 50mg daily if TPMT genotype is normal.  Will need to check cbc, cmp in 3-4 weeks      Follow up in about 3 months  (around 4/18/2024). In addition to their scheduled follow up, the patient has also been instructed to follow up on as needed basis.        Total time spent with patient and documentation is more than 60 minutes. All questions were answered to patient's satisfaction and patient verbalized understanding.

## 2024-01-23 ENCOUNTER — TELEPHONE (OUTPATIENT)
Dept: RHEUMATOLOGY | Facility: CLINIC | Age: 49
End: 2024-01-23
Payer: MEDICAID

## 2024-01-23 DIAGNOSIS — M05.79 RHEUMATOID ARTHRITIS INVOLVING MULTIPLE SITES WITH POSITIVE RHEUMATOID FACTOR: Primary | ICD-10-CM

## 2024-01-23 RX ORDER — AZATHIOPRINE 50 MG/1
TABLET ORAL
Qty: 60 TABLET | Refills: 3 | Status: SHIPPED | OUTPATIENT
Start: 2024-01-23 | End: 2024-05-24

## 2024-01-23 NOTE — TELEPHONE ENCOUNTER
Please let her know I have reviewed the results of her tests.  TPMT level okay.  Okay to start Imuran.  Send prescription for Imuran were pharmacy, asked her to start taking 50 mg daily and after 1 week increase dose to 100 mg daily.    Ordered CBC and CMP to be repeated in 4 weeks after starting Imuran.

## 2024-01-31 ENCOUNTER — OFFICE VISIT (OUTPATIENT)
Dept: ORTHOPEDICS | Facility: CLINIC | Age: 49
End: 2024-01-31
Payer: MEDICAID

## 2024-01-31 VITALS
SYSTOLIC BLOOD PRESSURE: 124 MMHG | TEMPERATURE: 98 F | BODY MASS INDEX: 39.34 KG/M2 | DIASTOLIC BLOOD PRESSURE: 84 MMHG | WEIGHT: 244.81 LBS | HEIGHT: 66 IN | HEART RATE: 80 BPM

## 2024-01-31 DIAGNOSIS — M17.12 PRIMARY OSTEOARTHRITIS OF LEFT KNEE: Primary | ICD-10-CM

## 2024-01-31 PROCEDURE — 1160F RVW MEDS BY RX/DR IN RCRD: CPT | Mod: CPTII,,, | Performed by: NURSE PRACTITIONER

## 2024-01-31 PROCEDURE — 3008F BODY MASS INDEX DOCD: CPT | Mod: CPTII,,, | Performed by: NURSE PRACTITIONER

## 2024-01-31 PROCEDURE — 3074F SYST BP LT 130 MM HG: CPT | Mod: CPTII,,, | Performed by: NURSE PRACTITIONER

## 2024-01-31 PROCEDURE — 99215 OFFICE O/P EST HI 40 MIN: CPT | Mod: PBBFAC | Performed by: NURSE PRACTITIONER

## 2024-01-31 PROCEDURE — 99214 OFFICE O/P EST MOD 30 MIN: CPT | Mod: S$PBB,,, | Performed by: NURSE PRACTITIONER

## 2024-01-31 PROCEDURE — 3079F DIAST BP 80-89 MM HG: CPT | Mod: CPTII,,, | Performed by: NURSE PRACTITIONER

## 2024-01-31 PROCEDURE — 1159F MED LIST DOCD IN RCRD: CPT | Mod: CPTII,,, | Performed by: NURSE PRACTITIONER

## 2024-02-01 ENCOUNTER — LAB VISIT (OUTPATIENT)
Dept: LAB | Facility: HOSPITAL | Age: 49
End: 2024-02-01
Payer: MEDICAID

## 2024-02-01 ENCOUNTER — OFFICE VISIT (OUTPATIENT)
Dept: CARDIOLOGY | Facility: CLINIC | Age: 49
End: 2024-02-01
Payer: MEDICAID

## 2024-02-01 VITALS
OXYGEN SATURATION: 100 % | HEART RATE: 68 BPM | DIASTOLIC BLOOD PRESSURE: 89 MMHG | TEMPERATURE: 98 F | RESPIRATION RATE: 18 BRPM | BODY MASS INDEX: 39.21 KG/M2 | WEIGHT: 244 LBS | SYSTOLIC BLOOD PRESSURE: 143 MMHG | HEIGHT: 66 IN

## 2024-02-01 DIAGNOSIS — E78.5 HYPERLIPIDEMIA, UNSPECIFIED HYPERLIPIDEMIA TYPE: ICD-10-CM

## 2024-02-01 DIAGNOSIS — M05.79 RHEUMATOID ARTHRITIS INVOLVING MULTIPLE SITES WITH POSITIVE RHEUMATOID FACTOR: ICD-10-CM

## 2024-02-01 DIAGNOSIS — I1A.0 RESISTANT HYPERTENSION: Primary | ICD-10-CM

## 2024-02-01 DIAGNOSIS — R00.2 PALPITATIONS: ICD-10-CM

## 2024-02-01 DIAGNOSIS — Z12.31 ENCOUNTER FOR SCREENING MAMMOGRAM FOR BREAST CANCER: Primary | ICD-10-CM

## 2024-02-01 DIAGNOSIS — R07.9 CHEST PAIN, UNSPECIFIED TYPE: ICD-10-CM

## 2024-02-01 DIAGNOSIS — G47.33 OSA (OBSTRUCTIVE SLEEP APNEA): Primary | ICD-10-CM

## 2024-02-01 DIAGNOSIS — I10 PRIMARY HYPERTENSION: ICD-10-CM

## 2024-02-01 DIAGNOSIS — R06.09 DYSPNEA ON EXERTION: ICD-10-CM

## 2024-02-01 LAB
ALBUMIN SERPL-MCNC: 3.4 G/DL (ref 3.5–5)
ALBUMIN/GLOB SERPL: 1 RATIO (ref 1.1–2)
ALP SERPL-CCNC: 124 UNIT/L (ref 40–150)
ALT SERPL-CCNC: 9 UNIT/L (ref 0–55)
AST SERPL-CCNC: 11 UNIT/L (ref 5–34)
BASOPHILS # BLD AUTO: 0.02 X10(3)/MCL
BASOPHILS NFR BLD AUTO: 0.4 %
BILIRUB SERPL-MCNC: 0.5 MG/DL
BUN SERPL-MCNC: 13 MG/DL (ref 7–18.7)
CALCIUM SERPL-MCNC: 9 MG/DL (ref 8.4–10.2)
CHLORIDE SERPL-SCNC: 110 MMOL/L (ref 98–107)
CHOLEST SERPL-MCNC: 201 MG/DL
CHOLEST/HDLC SERPL: 4 {RATIO} (ref 0–5)
CO2 SERPL-SCNC: 26 MMOL/L (ref 22–29)
CREAT SERPL-MCNC: 0.83 MG/DL (ref 0.55–1.02)
EOSINOPHIL # BLD AUTO: 0.13 X10(3)/MCL (ref 0–0.9)
EOSINOPHIL NFR BLD AUTO: 2.8 %
ERYTHROCYTE [DISTWIDTH] IN BLOOD BY AUTOMATED COUNT: 15.8 % (ref 11.5–17)
GFR SERPLBLD CREATININE-BSD FMLA CKD-EPI: >60 MLS/MIN/1.73/M2
GLOBULIN SER-MCNC: 3.5 GM/DL (ref 2.4–3.5)
GLUCOSE SERPL-MCNC: 93 MG/DL (ref 74–100)
HCT VFR BLD AUTO: 39.8 % (ref 37–47)
HDLC SERPL-MCNC: 48 MG/DL (ref 35–60)
HGB BLD-MCNC: 12.4 G/DL (ref 12–16)
IMM GRANULOCYTES # BLD AUTO: 0.01 X10(3)/MCL (ref 0–0.04)
IMM GRANULOCYTES NFR BLD AUTO: 0.2 %
LDLC SERPL CALC-MCNC: 139 MG/DL (ref 50–140)
LYMPHOCYTES # BLD AUTO: 1.37 X10(3)/MCL (ref 0.6–4.6)
LYMPHOCYTES NFR BLD AUTO: 29.2 %
MCH RBC QN AUTO: 21.9 PG (ref 27–31)
MCHC RBC AUTO-ENTMCNC: 31.2 G/DL (ref 33–36)
MCV RBC AUTO: 70.4 FL (ref 80–94)
MONOCYTES # BLD AUTO: 0.59 X10(3)/MCL (ref 0.1–1.3)
MONOCYTES NFR BLD AUTO: 12.6 %
NEUTROPHILS # BLD AUTO: 2.57 X10(3)/MCL (ref 2.1–9.2)
NEUTROPHILS NFR BLD AUTO: 54.8 %
NRBC BLD AUTO-RTO: 0 %
PLATELET # BLD AUTO: 231 X10(3)/MCL (ref 130–400)
PMV BLD AUTO: 9.4 FL (ref 7.4–10.4)
POTASSIUM SERPL-SCNC: 3.7 MMOL/L (ref 3.5–5.1)
PROT SERPL-MCNC: 6.9 GM/DL (ref 6.4–8.3)
RBC # BLD AUTO: 5.65 X10(6)/MCL (ref 4.2–5.4)
SODIUM SERPL-SCNC: 143 MMOL/L (ref 136–145)
TRIGL SERPL-MCNC: 71 MG/DL (ref 37–140)
VLDLC SERPL CALC-MCNC: 14 MG/DL
WBC # SPEC AUTO: 4.69 X10(3)/MCL (ref 4.5–11.5)

## 2024-02-01 PROCEDURE — 85025 COMPLETE CBC W/AUTO DIFF WBC: CPT

## 2024-02-01 PROCEDURE — 99214 OFFICE O/P EST MOD 30 MIN: CPT | Mod: S$PBB,,,

## 2024-02-01 PROCEDURE — 80061 LIPID PANEL: CPT

## 2024-02-01 PROCEDURE — 36415 COLL VENOUS BLD VENIPUNCTURE: CPT

## 2024-02-01 PROCEDURE — 3077F SYST BP >= 140 MM HG: CPT | Mod: CPTII,,,

## 2024-02-01 PROCEDURE — 3008F BODY MASS INDEX DOCD: CPT | Mod: CPTII,,,

## 2024-02-01 PROCEDURE — 1159F MED LIST DOCD IN RCRD: CPT | Mod: CPTII,,,

## 2024-02-01 PROCEDURE — 3079F DIAST BP 80-89 MM HG: CPT | Mod: CPTII,,,

## 2024-02-01 PROCEDURE — 99215 OFFICE O/P EST HI 40 MIN: CPT | Mod: PBBFAC

## 2024-02-01 PROCEDURE — 80053 COMPREHEN METABOLIC PANEL: CPT

## 2024-02-01 PROCEDURE — 1160F RVW MEDS BY RX/DR IN RCRD: CPT | Mod: CPTII,,,

## 2024-02-01 NOTE — PATIENT INSTRUCTIONS
Complete sleep study   Follow up in cardiology clinic in 4 months or sooner if needed  Follow up with PCP as directed

## 2024-02-08 ENCOUNTER — OFFICE VISIT (OUTPATIENT)
Dept: FAMILY MEDICINE | Facility: CLINIC | Age: 49
End: 2024-02-08
Payer: MEDICAID

## 2024-02-08 VITALS
WEIGHT: 247 LBS | HEART RATE: 71 BPM | BODY MASS INDEX: 39.7 KG/M2 | TEMPERATURE: 98 F | SYSTOLIC BLOOD PRESSURE: 134 MMHG | DIASTOLIC BLOOD PRESSURE: 89 MMHG | RESPIRATION RATE: 18 BRPM | OXYGEN SATURATION: 99 % | HEIGHT: 66 IN

## 2024-02-08 DIAGNOSIS — G89.29 CHRONIC LEFT SHOULDER PAIN: ICD-10-CM

## 2024-02-08 DIAGNOSIS — M25.512 CHRONIC LEFT SHOULDER PAIN: ICD-10-CM

## 2024-02-08 DIAGNOSIS — K21.9 GASTROESOPHAGEAL REFLUX DISEASE WITHOUT ESOPHAGITIS: ICD-10-CM

## 2024-02-08 DIAGNOSIS — R71.8 LOW MEAN CORPUSCULAR VOLUME (MCV): ICD-10-CM

## 2024-02-08 DIAGNOSIS — R05.3 CHRONIC COUGH: ICD-10-CM

## 2024-02-08 DIAGNOSIS — E78.49 OTHER HYPERLIPIDEMIA: ICD-10-CM

## 2024-02-08 DIAGNOSIS — R06.02 SHORTNESS OF BREATH: ICD-10-CM

## 2024-02-08 DIAGNOSIS — Z76.0 ENCOUNTER FOR MEDICATION REFILL: ICD-10-CM

## 2024-02-08 DIAGNOSIS — I10 PRIMARY HYPERTENSION: Primary | ICD-10-CM

## 2024-02-08 LAB
FERRITIN SERPL-MCNC: 72.08 NG/ML (ref 4.63–204)
FOLATE SERPL-MCNC: 7.8 NG/ML (ref 7–31.4)
IRON SATN MFR SERPL: 19 % (ref 20–50)
IRON SERPL-MCNC: 49 UG/DL (ref 50–170)
RET# (OHS): 0.09 X10E6/UL (ref 0.02–0.08)
RETICULOCYTE COUNT AUTOMATED (OLG): 1.62 % (ref 1.1–2.1)
TIBC SERPL-MCNC: 207 UG/DL (ref 70–310)
TIBC SERPL-MCNC: 256 UG/DL (ref 250–450)
TRANSFERRIN SERPL-MCNC: 235 MG/DL (ref 180–382)
VIT B12 SERPL-MCNC: 607 PG/ML (ref 213–816)

## 2024-02-08 PROCEDURE — 82607 VITAMIN B-12: CPT | Performed by: NURSE PRACTITIONER

## 2024-02-08 PROCEDURE — 1159F MED LIST DOCD IN RCRD: CPT | Mod: CPTII,,, | Performed by: NURSE PRACTITIONER

## 2024-02-08 PROCEDURE — 99215 OFFICE O/P EST HI 40 MIN: CPT | Mod: S$PBB,,, | Performed by: NURSE PRACTITIONER

## 2024-02-08 PROCEDURE — 36415 COLL VENOUS BLD VENIPUNCTURE: CPT | Performed by: NURSE PRACTITIONER

## 2024-02-08 PROCEDURE — 3075F SYST BP GE 130 - 139MM HG: CPT | Mod: CPTII,,, | Performed by: NURSE PRACTITIONER

## 2024-02-08 PROCEDURE — 82728 ASSAY OF FERRITIN: CPT | Performed by: NURSE PRACTITIONER

## 2024-02-08 PROCEDURE — 99215 OFFICE O/P EST HI 40 MIN: CPT | Mod: PBBFAC | Performed by: NURSE PRACTITIONER

## 2024-02-08 PROCEDURE — 3079F DIAST BP 80-89 MM HG: CPT | Mod: CPTII,,, | Performed by: NURSE PRACTITIONER

## 2024-02-08 PROCEDURE — 85045 AUTOMATED RETICULOCYTE COUNT: CPT | Performed by: NURSE PRACTITIONER

## 2024-02-08 PROCEDURE — 83540 ASSAY OF IRON: CPT | Performed by: NURSE PRACTITIONER

## 2024-02-08 PROCEDURE — 82746 ASSAY OF FOLIC ACID SERUM: CPT | Performed by: NURSE PRACTITIONER

## 2024-02-08 PROCEDURE — 3008F BODY MASS INDEX DOCD: CPT | Mod: CPTII,,, | Performed by: NURSE PRACTITIONER

## 2024-02-08 PROCEDURE — 1160F RVW MEDS BY RX/DR IN RCRD: CPT | Mod: CPTII,,, | Performed by: NURSE PRACTITIONER

## 2024-02-08 RX ORDER — ISOSORBIDE DINITRATE 40 MG/1
40 TABLET ORAL DAILY
Qty: 90 TABLET | Refills: 3 | Status: SHIPPED | OUTPATIENT
Start: 2024-02-08 | End: 2024-06-03 | Stop reason: SDUPTHER

## 2024-02-08 RX ORDER — ALBUTEROL SULFATE 0.83 MG/ML
SOLUTION RESPIRATORY (INHALATION)
Qty: 1 EACH | Refills: 3 | Status: CANCELLED | OUTPATIENT
Start: 2024-02-08

## 2024-02-08 RX ORDER — ERGOCALCIFEROL 1.25 MG/1
50000 CAPSULE ORAL
Qty: 12 CAPSULE | Refills: 3 | Status: CANCELLED | OUTPATIENT
Start: 2024-02-08

## 2024-02-08 RX ORDER — EZETIMIBE 10 MG/1
10 TABLET ORAL NIGHTLY
Qty: 90 TABLET | Refills: 1 | Status: SHIPPED | OUTPATIENT
Start: 2024-02-08 | End: 2024-06-03 | Stop reason: SDUPTHER

## 2024-02-08 RX ORDER — METOPROLOL SUCCINATE 100 MG/1
100 TABLET, EXTENDED RELEASE ORAL NIGHTLY
Qty: 90 TABLET | Refills: 3 | Status: CANCELLED | OUTPATIENT
Start: 2024-02-08

## 2024-02-08 RX ORDER — PANTOPRAZOLE SODIUM 20 MG/1
20 TABLET, DELAYED RELEASE ORAL DAILY
Qty: 30 TABLET | Refills: 3 | Status: CANCELLED | OUTPATIENT
Start: 2024-02-08

## 2024-02-08 RX ORDER — OMEPRAZOLE 40 MG/1
40 CAPSULE, DELAYED RELEASE ORAL DAILY
Qty: 90 CAPSULE | Refills: 1 | Status: SHIPPED | OUTPATIENT
Start: 2024-02-08 | End: 2024-05-24

## 2024-02-08 RX ORDER — ONDANSETRON HYDROCHLORIDE 8 MG/1
TABLET, FILM COATED ORAL
Qty: 9 TABLET | Refills: 3 | Status: CANCELLED | OUTPATIENT
Start: 2024-02-08

## 2024-02-08 RX ORDER — MOMETASONE FUROATE AND FORMOTEROL FUMARATE DIHYDRATE 100; 5 UG/1; UG/1
1 AEROSOL RESPIRATORY (INHALATION) 2 TIMES DAILY
Qty: 8.8 G | Refills: 5 | Status: SHIPPED | OUTPATIENT
Start: 2024-02-08 | End: 2024-06-03 | Stop reason: SDUPTHER

## 2024-02-08 RX ORDER — VALSARTAN 80 MG/1
80 TABLET ORAL DAILY
Qty: 90 TABLET | Refills: 3 | Status: CANCELLED | OUTPATIENT
Start: 2024-02-08 | End: 2025-02-07

## 2024-02-08 RX ORDER — ISOSORBIDE DINITRATE 40 MG/1
40 TABLET ORAL DAILY
Qty: 90 TABLET | Refills: 2 | Status: CANCELLED | OUTPATIENT
Start: 2024-02-08

## 2024-02-08 RX ORDER — FLUTICASONE PROPIONATE 50 MCG
2 SPRAY, SUSPENSION (ML) NASAL DAILY PRN
Qty: 18.2 ML | Refills: 1 | Status: CANCELLED | OUTPATIENT
Start: 2024-02-08

## 2024-02-08 NOTE — PROGRESS NOTES
Patient Name: Nicole Quiñones   : 1975  MRN: [id number]     SUBJECTIVE DATA:    CHIEF COMPLAINT:   Nicole Quiñones is a 48 y.o. female who presents to clinic today with Shoulder Pain and Cough        HPI:  48-year-old female presents to the clinic to discuss chronic left shoulder pain, and chronic cough.  Patient also wants to discuss previous labs.  Past medical history of rheumatoid arthritis, gastric ulcers, iron-deficiency anemia, TTP-Throbotic Microangiopathy, other secondary thrombocytopenia, hypertension, COPD-reported by patient, hyperlipidemia, fibromyalgia, left knee pain, left shoulder pain.    Hypertension:  Controlled.  Current blood pressure 134/89.  Continue Diovan 80 mg p.o. once daily.  Continue metoprolol 100 mg p.o. once daily.  Continue isosorbide dinitrate 40 mg p.o. once daily.  Keep appointment with Cardiology on  at 10:45 a.m..  Follow low-salt diet less than 2 g per day.  Keep fiber intake at least between 25-30 g per day if possible.  Stay hydrated with water.  Lose weight.  Questions solicited and answered, patient verbalized.    Chronic left shoulder pain:  Patient state she has been having this issue for years.  Denies any recent fall or trauma.  Patient currently being managed with Rheumatology rheumatoid arthritis.  Patient report unable to  her arm above shoulder and it is very uncomfortable.  X-rays reviewed at bedside that shows degenerative changes.  Discussed referral to orthopedic clinic for shoulder injection, patient verbalized.  Discussed referral to [facility] physical therapy.  Patient agreed to plan.  Rest, alternate between ice and heat.  OTC Tylenol 500 mg p.o. every 6 hours as needed for pain.    Chronic cough:  Patient state chronic cough since her COVID 19 infection from 2 years ago.  Patient state usually before bedtime she start to cough and short of breath.  Patient state she had pulmonary function test that was completed years back but unable  to remember where and when.  Patient state they prescribe her Proventil nebs as needed cough and Dulera 100-5 mcg 1 puff twice a day.   Patient was referred to complete pulmonary function test at [facility] pulmonary [facility].  Appointment pending.    Labs reviewed and discussed in detail.  Questions solicited and answered.    Iron-deficiency anemia: Low MCV 70.4, hemoglobin 12.4, hematocrit 39.8, MCH 21.9, MCHC 31.2, platelets 231, history of iron-deficiency anemia, TTP-Throbotic Microangiopathy, other secondary thrombocytopenia.  Labs reticulocyte, vitamin B12, folate, ferritin, iron, TIBC pending.  Patient state she has not taking folic acid or iron supplementation at this time.  Notify patient of test results when it becomes available and discuss plan.    Gastric ulcers:  Patient state she has history of gastric ulcers.  Patient feels that she needs to be evaluated.  Continue to have epigastrium discomfort.  Patient is not following GERD diet.  Patient state hot dogs stomach feels bad but she still continued to eat them.  Patient to read discharge education material on GERDs.  Will increase omeprazole to 40 mg p.o. once daily.  Lose weight.  Referral to Dr. Block has been initiated.    Orthopedic clinic chart reviewed.  Cardiology clinic chart reviewed.  Medications refilled.  See medication list.    Patient denies chest pain, shortness of breath, dyspnea on exertion, palpitations, peripheral edema, abdominal pain, nausea, vomiting, diarrhea, constipation, fatigue, fever, chills, dysuria,  hematuria, melena, or hematochezia.          ALLERGIES:   Review of patient's allergies indicates:   Allergen Reactions    Nsaids (non-steroidal anti-inflammatory drug) Other (See Comments)     Causes ulcers to bleed.    Ibuprofen     Latex     Meloxicam     Nsaids (non-steroidal anti-inflammatory drug) Nausea Only         ROS:  Review of Systems   Respiratory:  Positive for cough and shortness of breath.    Gastrointestinal:  " Positive for heartburn.   Musculoskeletal:  Positive for joint pain (Left shoulder).   All other systems reviewed and are negative.        OBJECTIVE DATA:  Vital signs  Vitals:    02/08/24 1249   BP: 134/89   Pulse: 71   Resp: 18   Temp: 97.7 °F (36.5 °C)   TempSrc: Oral   SpO2: 99%   Weight: 112 kg (247 lb)   Height: 5' 6" (1.676 m)      Body mass index is 39.87 kg/m².    PHYSICAL EXAM:   Physical Exam  Vitals and nursing note reviewed.   Constitutional:       General: She is awake. She is not in acute distress.     Appearance: Normal appearance. She is well-developed and well-groomed. She is obese. She is not ill-appearing, toxic-appearing or diaphoretic.   HENT:      Head: Normocephalic and atraumatic.      Right Ear: Tympanic membrane, ear canal and external ear normal.      Left Ear: Tympanic membrane, ear canal and external ear normal.      Nose: Nose normal. No congestion or rhinorrhea.      Mouth/Throat:      Mouth: Mucous membranes are moist.      Pharynx: Oropharynx is clear. Uvula midline. No posterior oropharyngeal erythema.   Eyes:      General: Lids are normal. No scleral icterus.     Extraocular Movements: Extraocular movements intact.      Conjunctiva/sclera: Conjunctivae normal.      Pupils: Pupils are equal, round, and reactive to light.   Neck:      Trachea: Trachea and phonation normal.   Cardiovascular:      Rate and Rhythm: Normal rate and regular rhythm.      Pulses: Normal pulses.           Radial pulses are 2+ on the right side and 2+ on the left side.      Heart sounds: Normal heart sounds. No murmur heard.  Pulmonary:      Effort: Pulmonary effort is normal. No respiratory distress.      Breath sounds: Normal breath sounds and air entry. No stridor. No wheezing, rhonchi or rales.   Chest:      Chest wall: No tenderness.   Abdominal:      General: Abdomen is flat. Bowel sounds are normal. There is no distension.      Palpations: Abdomen is soft. There is no mass.      Tenderness: There is " abdominal tenderness in the epigastric area. There is no right CVA tenderness, left CVA tenderness, guarding or rebound.       Musculoskeletal:      Right shoulder: Normal.      Left shoulder: Tenderness present. No swelling, deformity, effusion, laceration, bony tenderness or crepitus. Decreased range of motion. Normal strength. Normal pulse.        Arms:       Cervical back: Normal range of motion and neck supple. No rigidity or tenderness.      Right lower leg: No edema.      Left lower leg: No edema.   Lymphadenopathy:      Cervical: No cervical adenopathy.   Skin:     General: Skin is warm and dry.      Capillary Refill: Capillary refill takes less than 2 seconds.      Findings: No rash.   Neurological:      General: No focal deficit present.      Mental Status: She is alert and oriented to person, place, and time. Mental status is at baseline.      GCS: GCS eye subscore is 4. GCS verbal subscore is 5. GCS motor subscore is 6.      Cranial Nerves: No cranial nerve deficit.      Sensory: No sensory deficit.      Motor: No weakness.      Coordination: Coordination normal.      Gait: Gait abnormal (Left knee pain, utilizing cane to ambulate.).   Psychiatric:         Attention and Perception: Attention and perception normal.         Mood and Affect: Mood and affect normal.         Speech: Speech normal.         Behavior: Behavior normal. Behavior is cooperative.         Thought Content: Thought content normal.         Cognition and Memory: Cognition and memory normal.         Judgment: Judgment normal.          ASSESSMENT/PLAN:  1. Primary hypertension  Assessment & Plan:  Controlled.  Current blood pressure 134/89.  Continue Diovan 80 mg p.o. once daily.  Continue metoprolol 100 mg p.o. once daily.  Continue isosorbide dinitrate 40 mg p.o. once daily.  Keep appointment with Cardiology on June 27th at 10:45 a.m..  Follow low-salt diet less than 2 g per day.  Keep fiber intake at least between 25-30 g per day if  possible.  Stay hydrated with water.  Lose weight.  Questions solicited and answered, patient verbalized.    Orders:  -     isosorbide dinitrate (ISORDIL) 40 MG Tab; Take 1 tablet (40 mg total) by mouth once daily.  Dispense: 90 tablet; Refill: 3    2. Shortness of breath  Assessment & Plan:  Patient state chronic cough since her COVID 19 infection from 2 years ago.  Patient state usually before bedtime she start to cough and short of breath.  Patient state she had pulmonary function test that was completed years back but unable to remember where and when.  Patient state they prescribe her Proventil nebs as needed cough and Dulera 100-5 mcg 1 puff twice a day.   Patient was referred to complete pulmonary function test at [facility].  Appointment pending.    Orders:  -     DULERA 100-5 mcg/actuation HFAA; Inhale 1 puff into the lungs 2 (two) times daily.  Dispense: 8.8 g; Refill: 5    3. Chronic cough  Assessment & Plan:  Patient state chronic cough since her COVID 19 infection from 2 years ago.  Patient state usually before bedtime she start to cough and short of breath.  Patient state she had pulmonary function test that was completed years back but unable to remember where and when.  Patient state they prescribe her Proventil nebs as needed cough and Dulera 100-5 mcg 1 puff twice a day.   Patient was referred to complete pulmonary function test at [facility].  Appointment pending.    Orders:  -     DULERA 100-5 mcg/actuation HFAA; Inhale 1 puff into the lungs 2 (two) times daily.  Dispense: 8.8 g; Refill: 5    4. Low mean corpuscular volume (MCV)  Assessment & Plan:  Low MCV 70.4, hemoglobin 12.4, hematocrit 39.8, MCH 21.9, MCHC 31.2, platelets 231, history of iron-deficiency anemia, TTP-Throbotic Microangiopathy, other secondary thrombocytopenia.  Labs reticulocyte, vitamin B12, folate, ferritin, iron, TIBC pending.  Patient state she has not taking folic acid or iron  supplementation at this time.  Notify patient of test results when it becomes available and discuss plan.  Results discussed with patient, suggested over-the-counter centrum and to take as directed on the label.  No prescription medication needed at this time.    Orders:  -     Iron and TIBC  -     Ferritin  -     Vitamin B12  -     Folate  -     Cancel: Path Review, Peripheral Smear  -     Reticulocytes    5. Other hyperlipidemia  -     ezetimibe (ZETIA) 10 mg tablet; Take 1 tablet (10 mg total) by mouth every evening.  Dispense: 90 tablet; Refill: 1    6. Gastroesophageal reflux disease without esophagitis  Assessment & Plan:   Patient state she has history of gastric ulcers.  Patient feels that she needs to be evaluated.  Continue to have epigastrium discomfort.  Patient is not following GERD diet.  Patient state hot dogs makes stomach feels bad but she still continued to eat them.  Patient to read discharge education material on GERDs.  Will increase omeprazole to 40 mg p.o. once daily.  Lose weight.  Referral to Dr. Block has been initiated.    Orders:  -     omeprazole (PRILOSEC) 40 MG capsule; Take 1 capsule (40 mg total) by mouth once daily.  Dispense: 90 capsule; Refill: 1  -     Ambulatory referral/consult to Gastroenterology; Future; Expected date: 02/15/2024    7. Chronic left shoulder pain  Assessment & Plan:  Patient state she has been having this issue for years.  Denies any recent fall or trauma.  Patient currently being managed with Rheumatology rheumatoid arthritis.  Patient report unable to  her arm above shoulder and it is very uncomfortable.  X-rays reviewed at bedside that shows degenerative changes.  Discussed referral to orthopedic clinic for shoulder injection, patient verbalized.  Discussed referral to [facility] physical therapy.  Patient agreed to plan.  Rest, alternate between ice and heat.  OTC Tylenol 500 mg p.o. every 6 hours as needed for pain.    Orders:  -     Ambulatory  referral/consult to Orthopedics; Future; Expected date: 02/15/2024  -     Ambulatory referral/consult to Physical/Occupational Therapy; Future; Expected date: 02/15/2024    8. Encounter for medication refill  -     ezetimibe (ZETIA) 10 mg tablet; Take 1 tablet (10 mg total) by mouth every evening.  Dispense: 90 tablet; Refill: 1  -     isosorbide dinitrate (ISORDIL) 40 MG Tab; Take 1 tablet (40 mg total) by mouth once daily.  Dispense: 90 tablet; Refill: 3  -     DULERA 100-5 mcg/actuation HFAA; Inhale 1 puff into the lungs 2 (two) times daily.  Dispense: 8.8 g; Refill: 5           RESULTS:  Recent Results (from the past 1008 hour(s))   Comprehensive Metabolic Panel    Collection Time: 01/18/24  2:18 PM   Result Value Ref Range    Sodium Level 145 136 - 145 mmol/L    Potassium Level 3.9 3.5 - 5.1 mmol/L    Chloride 109 (H) 98 - 107 mmol/L    Carbon Dioxide 27 22 - 29 mmol/L    Glucose Level 94 74 - 100 mg/dL    Blood Urea Nitrogen 19.4 (H) 7.0 - 18.7 mg/dL    Creatinine 1.00 0.55 - 1.02 mg/dL    Calcium Level Total 9.6 8.4 - 10.2 mg/dL    Protein Total 7.7 6.4 - 8.3 gm/dL    Albumin Level 3.7 3.5 - 5.0 g/dL    Globulin 4.0 (H) 2.4 - 3.5 gm/dL    Albumin/Globulin Ratio 0.9 (L) 1.1 - 2.0 ratio    Bilirubin Total 0.6 <=1.5 mg/dL    Alkaline Phosphatase 123 40 - 150 unit/L    Alanine Aminotransferase 8 0 - 55 unit/L    Aspartate Aminotransferase 12 5 - 34 unit/L    eGFR >60 mls/min/1.73/m2   Thiopurine Methyltrans (TPMT) Genotyping    Collection Time: 01/18/24  2:18 PM   Result Value Ref Range    TPMT Genotype 1/1     TPMT Phenotype Normal metabolizer     NUDT15 Genotype 1/1     NUDT15 Phenotype Normal metabolizer     Interpretation SEE COMMENTS     Method SEE COMMENTS     Disclaimer SEE COMMENTS     Reviewed By Leonard Camargo MD    CBC with Differential    Collection Time: 01/18/24  2:18 PM   Result Value Ref Range    WBC 4.32 (L) 4.50 - 11.50 x10(3)/mcL    RBC 6.19 (H) 4.20 - 5.40 x10(6)/mcL    Hgb 13.5 12.0 - 16.0  g/dL    Hct 43.7 37.0 - 47.0 %    MCV 70.6 (L) 80.0 - 94.0 fL    MCH 21.8 (L) 27.0 - 31.0 pg    MCHC 30.9 (L) 33.0 - 36.0 g/dL    RDW 16.3 11.5 - 17.0 %    Platelet 298 130 - 400 x10(3)/mcL    MPV 9.7 7.4 - 10.4 fL    Neut % 58.3 %    Lymph % 28.7 %    Mono % 10.0 %    Eos % 2.1 %    Basophil % 0.7 %    Lymph # 1.24 0.6 - 4.6 x10(3)/mcL    Neut # 2.52 2.1 - 9.2 x10(3)/mcL    Mono # 0.43 0.1 - 1.3 x10(3)/mcL    Eos # 0.09 0 - 0.9 x10(3)/mcL    Baso # 0.03 <=0.2 x10(3)/mcL    IG# 0.01 0 - 0.04 x10(3)/mcL    IG% 0.2 %    NRBC% 0.0 %   Protein/Creatinine Ratio, Urine    Collection Time: 01/18/24  2:21 PM   Result Value Ref Range    Urine Protein Level 16.9 mg/dL    Urine Creatinine 369.9 (H) 45.0 - 106.0 mg/dL    Urine Protein/Creatinine Ratio 0.0    Urinalysis, Reflex to Urine Culture    Collection Time: 01/18/24  2:21 PM    Specimen: Urine   Result Value Ref Range    Color, UA Yellow Yellow, Light-Yellow, Dark Yellow, Abby, Straw    Appearance, UA Clear Clear    Specific Gravity, UA 1.028 1.005 - 1.030    pH, UA 5.5 5.0 - 8.5    Protein, UA Trace (A) Negative    Glucose, UA Normal Negative, Normal    Ketones, UA Negative Negative    Blood, UA Negative Negative    Bilirubin, UA Negative Negative    Urobilinogen, UA Normal 0.2, 1.0, Normal    Nitrites, UA Negative Negative    Leukocyte Esterase, UA 75 (A) Negative    WBC, UA 6-10 (A) None Seen, 0-2, 3-5, 0-5 /HPF    Bacteria, UA Few (A) None Seen /HPF    Squamous Epithelial Cells, UA Occ (A) None Seen /HPF    Mucous, UA Many (A) None Seen /LPF    Hyaline Casts, UA 3-5 (A) None Seen /lpf    RBC, UA 0-5 None Seen, 0-2, 3-5, 0-5 /HPF   Lipid Panel    Collection Time: 02/01/24  9:10 AM   Result Value Ref Range    Cholesterol Total 201 (H) <=200 mg/dL    HDL Cholesterol 48 35 - 60 mg/dL    Triglyceride 71 37 - 140 mg/dL    Cholesterol/HDL Ratio 4 0 - 5    Very Low Density Lipoprotein 14     LDL Cholesterol 139.00 50.00 - 140.00 mg/dL   Comprehensive Metabolic Panel     Collection Time: 02/01/24  9:10 AM   Result Value Ref Range    Sodium Level 143 136 - 145 mmol/L    Potassium Level 3.7 3.5 - 5.1 mmol/L    Chloride 110 (H) 98 - 107 mmol/L    Carbon Dioxide 26 22 - 29 mmol/L    Glucose Level 93 74 - 100 mg/dL    Blood Urea Nitrogen 13.0 7.0 - 18.7 mg/dL    Creatinine 0.83 0.55 - 1.02 mg/dL    Calcium Level Total 9.0 8.4 - 10.2 mg/dL    Protein Total 6.9 6.4 - 8.3 gm/dL    Albumin Level 3.4 (L) 3.5 - 5.0 g/dL    Globulin 3.5 2.4 - 3.5 gm/dL    Albumin/Globulin Ratio 1.0 (L) 1.1 - 2.0 ratio    Bilirubin Total 0.5 <=1.5 mg/dL    Alkaline Phosphatase 124 40 - 150 unit/L    Alanine Aminotransferase 9 0 - 55 unit/L    Aspartate Aminotransferase 11 5 - 34 unit/L    eGFR >60 mls/min/1.73/m2   CBC with Differential    Collection Time: 02/01/24  9:10 AM   Result Value Ref Range    WBC 4.69 4.50 - 11.50 x10(3)/mcL    RBC 5.65 (H) 4.20 - 5.40 x10(6)/mcL    Hgb 12.4 12.0 - 16.0 g/dL    Hct 39.8 37.0 - 47.0 %    MCV 70.4 (L) 80.0 - 94.0 fL    MCH 21.9 (L) 27.0 - 31.0 pg    MCHC 31.2 (L) 33.0 - 36.0 g/dL    RDW 15.8 11.5 - 17.0 %    Platelet 231 130 - 400 x10(3)/mcL    MPV 9.4 7.4 - 10.4 fL    Neut % 54.8 %    Lymph % 29.2 %    Mono % 12.6 %    Eos % 2.8 %    Basophil % 0.4 %    Lymph # 1.37 0.6 - 4.6 x10(3)/mcL    Neut # 2.57 2.1 - 9.2 x10(3)/mcL    Mono # 0.59 0.1 - 1.3 x10(3)/mcL    Eos # 0.13 0 - 0.9 x10(3)/mcL    Baso # 0.02 <=0.2 x10(3)/mcL    IG# 0.01 0 - 0.04 x10(3)/mcL    IG% 0.2 %    NRBC% 0.0 %   Iron and TIBC    Collection Time: 02/08/24  1:45 PM   Result Value Ref Range    Iron Binding Capacity Unsaturated 207 70 - 310 ug/dL    Iron Level 49 (L) 50 - 170 ug/dL    Transferrin 235 180 - 382 mg/dL    Iron Binding Capacity Total 256 250 - 450 ug/dL    Iron Saturation 19 (L) 20 - 50 %   Ferritin    Collection Time: 02/08/24  1:45 PM   Result Value Ref Range    Ferritin Level 72.08 4.63 - 204.00 ng/mL   Vitamin B12    Collection Time: 02/08/24  1:45 PM   Result Value Ref Range    Vitamin  B12 Level 607 213 - 816 pg/mL   Folate    Collection Time: 02/08/24  1:45 PM   Result Value Ref Range    Folate Level 7.8 7.0 - 31.4 ng/mL   Reticulocytes    Collection Time: 02/08/24  1:45 PM   Result Value Ref Range    Retic Cnt Auto 1.62 1.1 - 2.1 %    RET# 0.0938 (H) 0.016 - 0.078 x10e6/uL         Follow Up:  Follow up in about 3 months (around 5/24/2024).     Face to face time 45 minutes, including documentation, chart review, counseling, education, review of test results, relevant medical records, and coordination of care.   I have explained the treatment plan, diagnosis, and prognosis to patient. All questions are answered to the best of my knowledge    Previous medical history/lab work/radiology reviewed and considered during medical management decisions.   Medication list reviewed and medication reconciliation performed.  Patient was provided  and care about his/her current diagnosis (es) and medications including risk/benefit and side effects/adverse events, over the counter medication uses/doses, home self-care and contact precautions,  and red flags and indications for when to seek immediate medical attention.   Patient was advised to continue compliance with current medication list and medical recommendations.  Patient dvised continued compliance with recommended eating habits/ diets for medical conditions and exercise 150 minutes/ week (if possible) for medical condition (s).  Educational handouts and instructions on selected disease management in AVS (After Visit Summary).    All of the patient's questions were answered to patient's satisfaction.   The patient was receptive, expressed verbal understanding and agreement the above plan.        This note was created with the assistance of a voice recognition software or phone dictation. There may be transcription errors as a result of using this technology however minimal. Effort has been made to assure accuracy of transcription but any obvious  errors or omissions should be clarified with the author of the document

## 2024-02-08 NOTE — PATIENT INSTRUCTIONS
Samuel Rivera,     If you are due for any health screening(s) below please notify me so we can arrange them to be ordered and scheduled. Most healthy patients at your age complete them, but you are free to accept or refuse.     If you can't do it, I'll definitely understand. If you can, I'd certainly appreciate it!    Tests to Keep You Healthy    Mammogram: Met on 4/29/2023  Colon Cancer Screening: Met on 9/5/2023  Cervical Cancer Screening: Met on 4/14/2023  Last Blood Pressure <= 139/89 (2/1/2024): NO

## 2024-02-09 ENCOUNTER — TELEPHONE (OUTPATIENT)
Dept: FAMILY MEDICINE | Facility: CLINIC | Age: 49
End: 2024-02-09
Payer: MEDICAID

## 2024-02-09 ENCOUNTER — TELEPHONE (OUTPATIENT)
Dept: ORTHOPEDICS | Facility: CLINIC | Age: 49
End: 2024-02-09
Payer: MEDICAID

## 2024-02-09 NOTE — TELEPHONE ENCOUNTER
Called patient to give results. Patient verbalized understanding. No additional questions at this time.     ----- Message from BHAVANA Nguyen sent at 2/9/2024  9:42 AM CST -----  PLEASE CALL PATIENTS WITH RESULTS,   Iron profile stable.  She can benefit from over-the-counter multivitamin supplementation such as centrum and take as directed on the label.  Folate level 7.8 within normal range.  No treatment needed  Ferritin level 72.08 within normal range no treatment needed  Vitamin B12 level 607 within normal range no treatment needed.  Iron-deficiency anemia stable.  Over-the-counter supplementation of multivitamins is recommended for health maintenance.

## 2024-02-09 NOTE — TELEPHONE ENCOUNTER
----- Message from Vale Nance LPN sent at 2/9/2024 10:06 AM CST -----  Regarding: RE: Visco supplement PA    ----- Message -----  From: Vale Nance LPN  Sent: 2/9/2024   9:46 AM CST  To: [facility] Support Staff  Subject: Visco supplement PA                              Please obtain prior authorization for viscosupplementation Orthovisc. Left knee.  After obtaining pa, schedule a patient for procedure only appointment as appropriate with [facility]/NP.

## 2024-02-09 NOTE — ASSESSMENT & PLAN NOTE
Controlled.  Current blood pressure 134/89.  Continue Diovan 80 mg p.o. once daily.  Continue metoprolol 100 mg p.o. once daily.  Continue isosorbide dinitrate 40 mg p.o. once daily.  Keep appointment with Cardiology on June 27th at 10:45 a.m..  Follow low-salt diet less than 2 g per day.  Keep fiber intake at least between 25-30 g per day if possible.  Stay hydrated with water.  Lose weight.  Questions solicited and answered, patient verbalized.

## 2024-02-09 NOTE — ASSESSMENT & PLAN NOTE
Patient state she has history of gastric ulcers.  Patient feels that she needs to be evaluated.  Continue to have epigastrium discomfort.  Patient is not following GERD diet.  Patient state hot dogs makes stomach feels bad but she still continued to eat them.  Patient to read discharge education material on GERDs.  Will increase omeprazole to 40 mg p.o. once daily.  Lose weight.  Referral to Dr. Block has been initiated.

## 2024-02-09 NOTE — PROGRESS NOTES
PLEASE CALL PATIENTS WITH RESULTS,   Iron profile stable.  She can benefit from over-the-counter multivitamin supplementation such as centrum and take as directed on the label.  Folate level 7.8 within normal range.  No treatment needed  Ferritin level 72.08 within normal range no treatment needed  Vitamin B12 level 607 within normal range no treatment needed.  Iron-deficiency anemia stable.  Over-the-counter supplementation of multivitamins is recommended for health maintenance.

## 2024-02-09 NOTE — ASSESSMENT & PLAN NOTE
Low MCV 70.4, hemoglobin 12.4, hematocrit 39.8, MCH 21.9, MCHC 31.2, platelets 231, history of iron-deficiency anemia, TTP-Throbotic Microangiopathy, other secondary thrombocytopenia.  Labs reticulocyte, vitamin B12, folate, ferritin, iron, TIBC pending.  Patient state she has not taking folic acid or iron supplementation at this time.  Notify patient of test results when it becomes available and discuss plan.  Results discussed with patient, suggested over-the-counter centrum and to take as directed on the label.  No prescription medication needed at this time.

## 2024-02-09 NOTE — ASSESSMENT & PLAN NOTE
Patient state she has been having this issue for years.  Denies any recent fall or trauma.  Patient currently being managed with Rheumatology rheumatoid arthritis.  Patient report unable to  her arm above shoulder and it is very uncomfortable.  X-rays reviewed at bedside that shows degenerative changes.  Discussed referral to orthopedic clinic for shoulder injection, patient verbalized.  Discussed referral to [facility] physical therapy.  Patient agreed to plan.  Rest, alternate between ice and heat.  OTC Tylenol 500 mg p.o. every 6 hours as needed for pain.

## 2024-02-09 NOTE — ASSESSMENT & PLAN NOTE
Patient state chronic cough since her COVID 19 infection from 2 years ago.  Patient state usually before bedtime she start to cough and short of breath.  Patient state she had pulmonary function test that was completed years back but unable to remember where and when.  Patient state they prescribe her Proventil nebs as needed cough and Dulera 100-5 mcg 1 puff twice a day.   Patient was referred to complete pulmonary function test at [facility] pulmonary [facility].  Appointment pending.

## 2024-02-14 NOTE — TELEPHONE ENCOUNTER
Please schedule patient for Lt knee orthovisc with .  Authorization # [id number]. Date good from 02/01/24 to 04/01/24.

## 2024-02-25 ENCOUNTER — HOSPITAL ENCOUNTER (EMERGENCY)
Facility: HOSPITAL | Age: 49
Discharge: ANOTHER HOSPITAL | End: 2024-02-26
Attending: STUDENT IN AN ORGANIZED HEALTH CARE EDUCATION/TRAINING PROGRAM
Payer: MEDICAID

## 2024-02-25 DIAGNOSIS — R10.13 EPIGASTRIC ABDOMINAL PAIN: ICD-10-CM

## 2024-02-25 DIAGNOSIS — M31.19 TTP (THROMBOTIC THROMBOCYTOPENIC PURPURA): Primary | ICD-10-CM

## 2024-02-25 DIAGNOSIS — N12 PYELONEPHRITIS: ICD-10-CM

## 2024-02-25 LAB
ABO + RH BLD: NORMAL
ABORH RETYPE: NORMAL
ALBUMIN SERPL-MCNC: 3.3 G/DL (ref 3.5–5)
ALBUMIN/GLOB SERPL: 0.9 RATIO (ref 1.1–2)
ALP SERPL-CCNC: 117 UNIT/L (ref 40–150)
ALT SERPL-CCNC: 10 UNIT/L (ref 0–55)
ANISOCYTOSIS BLD QL SMEAR: SLIGHT
APPEARANCE UR: ABNORMAL
AST SERPL-CCNC: 25 UNIT/L (ref 5–34)
B-HCG UR QL: NEGATIVE
BACTERIA #/AREA URNS AUTO: ABNORMAL /HPF
BASOPHILS # BLD AUTO: 0.03 X10(3)/MCL
BASOPHILS NFR BLD AUTO: 0.5 %
BILIRUB SERPL-MCNC: 4.1 MG/DL
BILIRUB SERPL-MCNC: 4.1 MG/DL
BILIRUB UR QL STRIP.AUTO: ABNORMAL
BILIRUBIN DIRECT+TOT PNL SERPL-MCNC: 1.2 MG/DL (ref 0–?)
BILIRUBIN DIRECT+TOT PNL SERPL-MCNC: 2.9 MG/DL (ref 0–0.8)
BLD PROD TYP BPU: NORMAL
BLOOD UNIT EXPIRATION DATE: NORMAL
BLOOD UNIT TYPE CODE: 5100
BUN SERPL-MCNC: 24.9 MG/DL (ref 7–18.7)
CALCIUM SERPL-MCNC: 8.6 MG/DL (ref 8.4–10.2)
CHLORIDE SERPL-SCNC: 110 MMOL/L (ref 98–107)
CO2 SERPL-SCNC: 22 MMOL/L (ref 22–29)
COLOR UR AUTO: ABNORMAL
CREAT SERPL-MCNC: 1.36 MG/DL (ref 0.55–1.02)
CROSSMATCH INTERPRETATION: NORMAL
CTP QC/QA: YES
DISPENSE STATUS: NORMAL
EOSINOPHIL # BLD AUTO: 0.09 X10(3)/MCL (ref 0–0.9)
EOSINOPHIL NFR BLD AUTO: 1.4 %
ERYTHROCYTE [DISTWIDTH] IN BLOOD BY AUTOMATED COUNT: 17 % (ref 11.5–17)
FIBRINOGEN PPP-MCNC: 573 MG/DL (ref 210–463)
GFR SERPLBLD CREATININE-BSD FMLA CKD-EPI: 48 MLS/MIN/1.73/M2
GLOBULIN SER-MCNC: 3.8 GM/DL (ref 2.4–3.5)
GLUCOSE SERPL-MCNC: 108 MG/DL (ref 74–100)
GLUCOSE UR QL STRIP.AUTO: ABNORMAL
GROUP & RH: NORMAL
HCT VFR BLD AUTO: 38.1 % (ref 37–47)
HGB BLD-MCNC: 12.4 G/DL (ref 12–16)
HOLD SPECIMEN: NORMAL
HOLD SPECIMEN: NORMAL
HYALINE CASTS #/AREA URNS LPF: ABNORMAL /LPF
IMM GRANULOCYTES # BLD AUTO: 0.03 X10(3)/MCL (ref 0–0.04)
IMM GRANULOCYTES NFR BLD AUTO: 0.5 %
INDIRECT COOMBS: NORMAL
INR PPP: 1.1
KETONES UR QL STRIP.AUTO: NEGATIVE
LACTATE SERPL-SCNC: 0.9 MMOL/L (ref 0.5–2.2)
LDH SERPL-CCNC: 711 U/L (ref 125–220)
LEUKOCYTE ESTERASE UR QL STRIP.AUTO: NEGATIVE
LIPASE SERPL-CCNC: 27 U/L
LYMPHOCYTES # BLD AUTO: 1.4 X10(3)/MCL (ref 0.6–4.6)
LYMPHOCYTES NFR BLD AUTO: 22 %
MCH RBC QN AUTO: 22.3 PG (ref 27–31)
MCHC RBC AUTO-ENTMCNC: 32.5 G/DL (ref 33–36)
MCV RBC AUTO: 68.6 FL (ref 80–94)
MONOCYTES # BLD AUTO: 1.01 X10(3)/MCL (ref 0.1–1.3)
MONOCYTES NFR BLD AUTO: 15.9 %
MUCOUS THREADS URNS QL MICRO: ABNORMAL /LPF
NEUTROPHILS # BLD AUTO: 3.8 X10(3)/MCL (ref 2.1–9.2)
NEUTROPHILS NFR BLD AUTO: 59.7 %
NITRITE UR QL STRIP.AUTO: NEGATIVE
NRBC BLD AUTO-RTO: 0.3 %
PH UR STRIP.AUTO: 6 [PH]
PLATELET # BLD AUTO: 5 X10(3)/MCL (ref 130–400)
PLATELET # BLD EST: ABNORMAL 10*3/UL
PLATELETS.RETICULATED NFR BLD AUTO: 3.5 % (ref 0.9–11.2)
PMV BLD AUTO: ABNORMAL FL
POIKILOCYTOSIS BLD QL SMEAR: SLIGHT
POTASSIUM SERPL-SCNC: 3.3 MMOL/L (ref 3.5–5.1)
PROT SERPL-MCNC: 7.1 GM/DL (ref 6.4–8.3)
PROT UR QL STRIP.AUTO: ABNORMAL
PROTHROMBIN TIME: 14 SECONDS (ref 11.4–14)
RBC # BLD AUTO: 5.55 X10(6)/MCL (ref 4.2–5.4)
RBC #/AREA URNS AUTO: ABNORMAL /HPF
RBC UR QL AUTO: ABNORMAL
RENAL EPI CELLS #/AREA UR COMP ASSIST: ABNORMAL /HPF
SODIUM SERPL-SCNC: 142 MMOL/L (ref 136–145)
SP GR UR STRIP.AUTO: 1.03 (ref 1–1.03)
SPECIMEN OUTDATE: NORMAL
SQUAMOUS #/AREA URNS LPF: ABNORMAL /HPF
TROPONIN I SERPL-MCNC: 0.03 NG/ML (ref 0–0.04)
UNIT NUMBER: NORMAL
WBC # SPEC AUTO: 6.36 X10(3)/MCL (ref 4.5–11.5)
WBC #/AREA URNS AUTO: ABNORMAL /HPF

## 2024-02-25 PROCEDURE — 25000003 PHARM REV CODE 250: Performed by: PHYSICIAN ASSISTANT

## 2024-02-25 PROCEDURE — 93005 ELECTROCARDIOGRAM TRACING: CPT

## 2024-02-25 PROCEDURE — 83690 ASSAY OF LIPASE: CPT | Performed by: PHYSICIAN ASSISTANT

## 2024-02-25 PROCEDURE — 86850 RBC ANTIBODY SCREEN: CPT | Performed by: PHYSICIAN ASSISTANT

## 2024-02-25 PROCEDURE — 81001 URINALYSIS AUTO W/SCOPE: CPT | Performed by: PHYSICIAN ASSISTANT

## 2024-02-25 PROCEDURE — 25000003 PHARM REV CODE 250: Performed by: STUDENT IN AN ORGANIZED HEALTH CARE EDUCATION/TRAINING PROGRAM

## 2024-02-25 PROCEDURE — 36430 TRANSFUSION BLD/BLD COMPNT: CPT

## 2024-02-25 PROCEDURE — 25500020 PHARM REV CODE 255

## 2024-02-25 PROCEDURE — 84484 ASSAY OF TROPONIN QUANT: CPT | Performed by: PHYSICIAN ASSISTANT

## 2024-02-25 PROCEDURE — 96375 TX/PRO/DX INJ NEW DRUG ADDON: CPT

## 2024-02-25 PROCEDURE — 85025 COMPLETE CBC W/AUTO DIFF WBC: CPT | Performed by: PHYSICIAN ASSISTANT

## 2024-02-25 PROCEDURE — 63600175 PHARM REV CODE 636 W HCPCS: Performed by: STUDENT IN AN ORGANIZED HEALTH CARE EDUCATION/TRAINING PROGRAM

## 2024-02-25 PROCEDURE — P9035 PLATELET PHERES LEUKOREDUCED: HCPCS | Performed by: PHYSICIAN ASSISTANT

## 2024-02-25 PROCEDURE — 82248 BILIRUBIN DIRECT: CPT | Performed by: PHYSICIAN ASSISTANT

## 2024-02-25 PROCEDURE — 25000003 PHARM REV CODE 250: Performed by: FAMILY MEDICINE

## 2024-02-25 PROCEDURE — 85610 PROTHROMBIN TIME: CPT | Performed by: STUDENT IN AN ORGANIZED HEALTH CARE EDUCATION/TRAINING PROGRAM

## 2024-02-25 PROCEDURE — 86999 UNLISTED TRANSFUSION MED PX: CPT | Performed by: PHYSICIAN ASSISTANT

## 2024-02-25 PROCEDURE — 85384 FIBRINOGEN ACTIVITY: CPT | Performed by: STUDENT IN AN ORGANIZED HEALTH CARE EDUCATION/TRAINING PROGRAM

## 2024-02-25 PROCEDURE — 82247 BILIRUBIN TOTAL: CPT | Performed by: PHYSICIAN ASSISTANT

## 2024-02-25 PROCEDURE — 99285 EMERGENCY DEPT VISIT HI MDM: CPT | Mod: 25

## 2024-02-25 PROCEDURE — 81025 URINE PREGNANCY TEST: CPT | Performed by: PHYSICIAN ASSISTANT

## 2024-02-25 PROCEDURE — 87077 CULTURE AEROBIC IDENTIFY: CPT | Performed by: PHYSICIAN ASSISTANT

## 2024-02-25 PROCEDURE — 63600175 PHARM REV CODE 636 W HCPCS: Performed by: FAMILY MEDICINE

## 2024-02-25 PROCEDURE — 83605 ASSAY OF LACTIC ACID: CPT | Performed by: STUDENT IN AN ORGANIZED HEALTH CARE EDUCATION/TRAINING PROGRAM

## 2024-02-25 PROCEDURE — 80053 COMPREHEN METABOLIC PANEL: CPT | Performed by: PHYSICIAN ASSISTANT

## 2024-02-25 PROCEDURE — 83615 LACTATE (LD) (LDH) ENZYME: CPT | Performed by: STUDENT IN AN ORGANIZED HEALTH CARE EDUCATION/TRAINING PROGRAM

## 2024-02-25 PROCEDURE — 87040 BLOOD CULTURE FOR BACTERIA: CPT | Performed by: STUDENT IN AN ORGANIZED HEALTH CARE EDUCATION/TRAINING PROGRAM

## 2024-02-25 RX ORDER — ALUMINUM HYDROXIDE, MAGNESIUM HYDROXIDE, AND SIMETHICONE 1200; 120; 1200 MG/30ML; MG/30ML; MG/30ML
30 SUSPENSION ORAL
Status: COMPLETED | OUTPATIENT
Start: 2024-02-25 | End: 2024-02-25

## 2024-02-25 RX ORDER — ACETAMINOPHEN 325 MG/1
650 TABLET ORAL
Status: COMPLETED | OUTPATIENT
Start: 2024-02-25 | End: 2024-02-25

## 2024-02-25 RX ORDER — HYDROCODONE BITARTRATE AND ACETAMINOPHEN 500; 5 MG/1; MG/1
TABLET ORAL
Status: DISCONTINUED | OUTPATIENT
Start: 2024-02-25 | End: 2024-02-26 | Stop reason: HOSPADM

## 2024-02-25 RX ORDER — METOCLOPRAMIDE HYDROCHLORIDE 5 MG/ML
10 INJECTION INTRAMUSCULAR; INTRAVENOUS
Status: COMPLETED | OUTPATIENT
Start: 2024-02-25 | End: 2024-02-25

## 2024-02-25 RX ORDER — ONDANSETRON 4 MG/1
4 TABLET, ORALLY DISINTEGRATING ORAL
Status: COMPLETED | OUTPATIENT
Start: 2024-02-25 | End: 2024-02-25

## 2024-02-25 RX ORDER — DEXAMETHASONE SODIUM PHOSPHATE 4 MG/ML
8 INJECTION, SOLUTION INTRA-ARTICULAR; INTRALESIONAL; INTRAMUSCULAR; INTRAVENOUS; SOFT TISSUE
Status: COMPLETED | OUTPATIENT
Start: 2024-02-25 | End: 2024-02-25

## 2024-02-25 RX ADMIN — ALUMINUM HYDROXIDE, MAGNESIUM HYDROXIDE, AND SIMETHICONE 30 ML: 200; 200; 20 SUSPENSION ORAL at 11:02

## 2024-02-25 RX ADMIN — IOHEXOL 100 ML: 350 INJECTION, SOLUTION INTRAVENOUS at 01:02

## 2024-02-25 RX ADMIN — ACETAMINOPHEN 650 MG: 325 TABLET, FILM COATED ORAL at 09:02

## 2024-02-25 RX ADMIN — PIPERACILLIN AND TAZOBACTAM 4.5 G: 4; .5 INJECTION, POWDER, LYOPHILIZED, FOR SOLUTION INTRAVENOUS; PARENTERAL at 02:02

## 2024-02-25 RX ADMIN — METOCLOPRAMIDE 10 MG: 5 INJECTION, SOLUTION INTRAMUSCULAR; INTRAVENOUS at 10:02

## 2024-02-25 RX ADMIN — DEXAMETHASONE SODIUM PHOSPHATE 8 MG: 4 INJECTION, SOLUTION INTRA-ARTICULAR; INTRALESIONAL; INTRAMUSCULAR; INTRAVENOUS; SOFT TISSUE at 10:02

## 2024-02-25 RX ADMIN — ONDANSETRON 4 MG: 4 TABLET, ORALLY DISINTEGRATING ORAL at 11:02

## 2024-02-25 NOTE — ED PROVIDER NOTES
Encounter Date: 2/25/2024       History     Chief Complaint   Patient presents with    Nausea    Abdominal Pain     Pt reports mid epigastric burning with nausea x 2 days.       Nicole AGRAWAL Mc Junior Quiñones is a 48 y.o. female with a history of anemia, HTN, HLD, and thrombocytopenia who presents to the ED complaining of epigastric abdominal pain and nausea x 2 days. States it is a burning sensation that does not radiate. Dec appetite 2/2 symptoms. LBM was 3 days ago, reports chronic constipation and is on linzess. She denies fevers, chills, chest pain, SOB, vomiting, diarrhea, dysuria, flank pain. No previous abdominal surgeries.     The history is provided by the patient.     Review of patient's allergies indicates:   Allergen Reactions    Nsaids (non-steroidal anti-inflammatory drug) Other (See Comments)     Causes ulcers to bleed.    Ibuprofen     Latex     Meloxicam     Nsaids (non-steroidal anti-inflammatory drug) Nausea Only     Past Medical History:   Diagnosis Date    Anemia     Arthritis     Fibromyalgia     Hypercholesterolemia     Hypertension     Migraines     Personal history of colonic polyps 09/05/2023    colonoscopy/polypectomy    Thrombocytopenia, unspecified     TTP (thrombotic thrombocytopenic purpura)     TTP (thrombotic thrombocytopenic purpura)      Past Surgical History:   Procedure Laterality Date    CARPAL TUNNEL RELEASE      COLONOSCOPY W/ BIOPSIES AND POLYPECTOMY  09/05/2023    Deepak De Luna MD 5 years    Right shouler surgery      SURGICAL REMOVAL OF ENDOMETRIOSIS       Family History   Problem Relation Age of Onset    Hypertension Mother     Arthritis Mother     Depression Mother     Thyroid disease Sister     Asthma Sister     Intellectual disability Brother     Epilepsy Brother     Stroke Maternal Grandfather      Social History     Tobacco Use    Smoking status: Never    Smokeless tobacco: Never   Substance Use Topics    Alcohol use: Not Currently     Comment: Last drink @ 20    Drug  use: Never     Review of Systems   Constitutional:  Negative for chills and fever.   HENT:  Negative for congestion and sore throat.    Eyes:  Negative for redness and itching.   Respiratory:  Negative for cough and shortness of breath.    Cardiovascular:  Negative for chest pain and leg swelling.   Gastrointestinal:  Positive for abdominal pain and nausea. Negative for diarrhea and vomiting.   Genitourinary:  Negative for dysuria and frequency.   Musculoskeletal:  Negative for arthralgias and back pain.   Skin:  Negative for rash and wound.   Neurological:  Negative for dizziness and weakness.   Hematological:  Does not bruise/bleed easily.       Physical Exam     Initial Vitals [02/25/24 1124]   BP Pulse Resp Temp SpO2   130/82 98 16 98.2 °F (36.8 °C) 100 %      MAP       --         Physical Exam    Nursing note and vitals reviewed.  Constitutional: She appears well-developed and well-nourished. No distress.   HENT:   Head: Normocephalic and atraumatic.   Mouth/Throat: No oropharyngeal exudate.   Eyes: EOM are normal. No scleral icterus.   Neck: Neck supple.   Normal range of motion.  Cardiovascular:  Normal rate and regular rhythm.           No murmur heard.  Pulmonary/Chest: Breath sounds normal. No respiratory distress. She has no wheezes.   Abdominal: Abdomen is soft. Bowel sounds are normal. She exhibits no distension. There is abdominal tenderness in the epigastric area. There is no rebound and no guarding.   Musculoskeletal:         General: No tenderness. Normal range of motion.      Cervical back: Normal range of motion and neck supple.     Neurological: She is alert and oriented to person, place, and time. No cranial nerve deficit.   Skin: Skin is warm and dry. Capillary refill takes less than 2 seconds. No erythema.   Psychiatric: She has a normal mood and affect. Her behavior is normal. Judgment and thought content normal.         ED Course   Procedures  Labs Reviewed   COMPREHENSIVE METABOLIC PANEL -  Abnormal; Notable for the following components:       Result Value    Potassium Level 3.3 (*)     Chloride 110 (*)     Glucose Level 108 (*)     Blood Urea Nitrogen 24.9 (*)     Creatinine 1.36 (*)     Albumin Level 3.3 (*)     Globulin 3.8 (*)     Albumin/Globulin Ratio 0.9 (*)     Bilirubin Total 4.1 (*)     All other components within normal limits   URINALYSIS, REFLEX TO URINE CULTURE - Abnormal; Notable for the following components:    Color, UA Dark-Orange (*)     Appearance, UA Turbid (*)     Protein, UA 4+ (*)     Glucose, UA Trace (*)     Blood, UA 3+ (*)     Bilirubin, UA 1+ (*)     WBC, UA 11-20 (*)     Bacteria, UA Moderate (*)     Squamous Epithelial Cells, UA Moderate (*)     Renal Epithelial Cells, UA Trace (*)     Mucous, UA Many (*)     Hyaline Casts, UA 3-5 (*)     RBC, UA 21-50 (*)     All other components within normal limits   CBC WITH DIFFERENTIAL - Abnormal; Notable for the following components:    RBC 5.55 (*)     MCV 68.6 (*)     MCH 22.3 (*)     MCHC 32.5 (*)     Platelet 5 (*)     All other components within normal limits   BILIRUBIN, TOTAL AND DIRECT - Abnormal; Notable for the following components:    Bilirubin Total 4.1 (*)     Bilirubin Direct 1.2 (*)     Bilirubin Indirect 2.90 (*)     All other components within normal limits   BLOOD SMEAR MICROSCOPIC EXAM ([facility]) - Abnormal; Notable for the following components:    Anisocytosis Slight (*)     Poikilocytosis Slight (*)     Platelets Decreased (*)     All other components within normal limits   FIBRINOGEN - Abnormal; Notable for the following components:    Fibrinogen 573 (*)     All other components within normal limits   LACTATE DEHYDROGENASE - Abnormal; Notable for the following components:    Lactate Dehydrogenase 711 (*)     All other components within normal limits   HAPTOGLOBIN - Abnormal; Notable for the following components:    Haptoglobin <8 (*)     All other components within normal limits   LACTATE DEHYDROGENASE - Abnormal;  Notable for the following components:    Lactate Dehydrogenase 778 (*)     All other components within normal limits   D DIMER, QUANTITATIVE - Abnormal; Notable for the following components:    D-Dimer 7.45 (*)     All other components within normal limits   FIBRINOGEN - Abnormal; Notable for the following components:    Fibrinogen 507 (*)     All other components within normal limits   PROTIME-INR - Abnormal; Notable for the following components:    PT 14.1 (*)     All other components within normal limits   CBC WITH DIFFERENTIAL - Abnormal; Notable for the following components:    Hgb 11.5 (*)     Hct 36.0 (*)     MCV 68.8 (*)     MCH 22.0 (*)     MCHC 31.9 (*)     RDW 17.2 (*)     Platelet 10 (*)     All other components within normal limits   COMPREHENSIVE METABOLIC PANEL - Abnormal; Notable for the following components:    Chloride 108 (*)     Glucose Level 122 (*)     Blood Urea Nitrogen 30.5 (*)     Creatinine 1.21 (*)     Albumin Level 3.4 (*)     Globulin 3.6 (*)     Albumin/Globulin Ratio 0.9 (*)     Bilirubin Total 3.1 (*)     All other components within normal limits   CBC WITH DIFFERENTIAL - Abnormal; Notable for the following components:    Hgb 11.3 (*)     Hct 35.5 (*)     MCV 69.2 (*)     MCH 22.0 (*)     MCHC 31.8 (*)     RDW 17.4 (*)     Platelet 10 (*)     MPV 0.0 (*)     All other components within normal limits   BLOOD SMEAR MICROSCOPIC EXAM ([facility]) - Abnormal; Notable for the following components:    Polychromasia Slight (*)     Platelets Decreased (*)     All other components within normal limits   CBC WITH DIFFERENTIAL - Abnormal; Notable for the following components:    Hgb 9.9 (*)     Hct 30.4 (*)     MCV 68.9 (*)     MCH 22.4 (*)     MCHC 32.6 (*)     RDW 17.5 (*)     Platelet 6 (*)     All other components within normal limits   COMPREHENSIVE METABOLIC PANEL - Abnormal; Notable for the following components:    Chloride 109 (*)     Glucose Level 110 (*)     Blood Urea Nitrogen 30.9 (*)      Creatinine 1.13 (*)     Albumin Level 3.3 (*)     Albumin/Globulin Ratio 1.0 (*)     Bilirubin Total 3.2 (*)     All other components within normal limits   BLOOD SMEAR MICROSCOPIC EXAM ([facility]) - Abnormal; Notable for the following components:    RBC Morph Abnormal (*)     Anisocytosis 1+ (*)     Hypochromasia 1+ (*)     Microcytosis 1+ (*)     Platelets Decreased (*)     All other components within normal limits   BLOOD CULTURE [facility] - Normal   BLOOD CULTURE [facility] - Normal   TROPONIN I - Normal   LIPASE - Normal   PROTIME-INR - Normal   LACTIC ACID, PLASMA - Normal   APTT - Normal   SARS-COV-2 RNA AMPLIFICATION, QUAL - Normal    Narrative:     The IDNOW COVID-19 assay is a rapid molecular in vitro diagnostic test utilizing an isothermal nucleic acid amplification technology intended for the qualitative detection of nucleic acid from the SARS-CoV-2 viral RNA in direct nasal, nasopharyngeal or throat swabs from individuals who are suspected of COVID-19 by their healthcare provider.   CULTURE, URINE   CBC W/ AUTO DIFFERENTIAL    Narrative:     The following orders were created for panel order CBC auto differential.  Procedure                               Abnormality         Status                     ---------                               -----------         ------                     CBC with Differential[[id number]]       Abnormal            Final result                 Please view results for these tests on the individual orders.   EXTRA TUBES    Narrative:     The following orders were created for panel order EXTRA TUBES.  Procedure                               Abnormality         Status                     ---------                               -----------         ------                     Light Blue Top Hold[[id number]]                             Final result               Gold Top Hold[[id number]]                                   Final result                 Please view results for these tests on the  individual orders.   LIGHT BLUE TOP HOLD   GOLD TOP HOLD   CBC W/ AUTO DIFFERENTIAL    Narrative:     The following orders were created for panel order CBC Auto Differential.  Procedure                               Abnormality         Status                     ---------                               -----------         ------                     CBC with Differential[[id number]]       Abnormal            Final result                 Please view results for these tests on the individual orders.   EXTRA TUBES    Narrative:     The following orders were created for panel order EXTRA TUBES.  Procedure                               Abnormality         Status                     ---------                               -----------         ------                     Light Green Top Hold[[id number]]                            Final result               Lavender Top Hold[[id number]]                               Final result                 Please view results for these tests on the individual orders.   LIGHT GREEN TOP HOLD   LAVENDER TOP HOLD   PATH REVIEW OF BLOOD SMEAR   EXTRA TUBES    Narrative:     The following orders were created for panel order EXTRA TUBES.  Procedure                               Abnormality         Status                     ---------                               -----------         ------                     Light Blue Top Hold[[id number]]                             Final result               Light Green Top Hold[[id number]]                            Final result               Lavender Top Hold[[id number]]                               Final result               Gold Top Hold[[id number]]                                   Final result                 Please view results for these tests on the individual orders.   LIGHT BLUE TOP HOLD   LIGHT GREEN TOP HOLD   LAVENDER TOP HOLD   GOLD TOP HOLD   CBC W/ AUTO DIFFERENTIAL    Narrative:     The following orders were created for panel order CBC Auto  Differential.  Procedure                               Abnormality         Status                     ---------                               -----------         ------                     CBC with Differential[[id number]]       Abnormal            Final result                 Please view results for these tests on the individual orders.   CBC W/ AUTO DIFFERENTIAL    Narrative:     The following orders were created for panel order CBC auto differential.  Procedure                               Abnormality         Status                     ---------                               -----------         ------                     CBC with Differential[[id number]]       Abnormal            Final result                 Please view results for these tests on the individual orders.   [id number] EVALUATION   POCT URINE PREGNANCY   TYPE & SCREEN   ABORH RETYPE   PREPARE PLATELETS (DOSE) SOFT   PREPARE PLATELETS (DOSE) SOFT   PREPARE PLATELETS (DOSE) SOFT        ECG Results              EKG 12-lead (Final result)        Collection Time Result Time QRS Duration OHS QTC Calculation    02/25/24 12:05:29 02/26/24 15:05:05 82 455                     Final result by Interface, Lab In [facility] (02/26/24 15:05:13)                   Narrative:    Test Reason : R10.13,    Vent. Rate : 091 BPM     Atrial Rate : 091 BPM     P-R Int : 182 ms          QRS Dur : 082 ms      QT Int : 370 ms       P-R-T Axes : 035 -31 013 degrees     QTc Int : 455 ms    Normal sinus rhythm  Possible Left atrial enlargement  Left axis deviation  Nonspecific T wave abnormality  Abnormal ECG  When compared with ECG of 27-DEC-2023 11:25,  Nonspecific T wave abnormality now evident in Anterior-lateral leads  Confirmed by Leonard Quinones MD ([id number]) on 2/26/2024 3:05:02 PM    Referred By: [email or web address]   SELF           Confirmed By:Leonard Quinones MD                                  Imaging Results              X-Ray Chest 1 View (Final result)  Result time 02/26/24 06:33:05       Final result by Sathya Muñoz MD (02/26/24 06:33:05)                   Impression:      No acute cardiopulmonary process.      Electronically signed by: Sathya Muñoz  Date:    02/26/2024  Time:    06:33               Narrative:    EXAMINATION:  XR CHEST 1 VIEW    CLINICAL HISTORY:  thrombocytopenia;    TECHNIQUE:  Single view of the chest    COMPARISON:  09/20/2023    FINDINGS:  No focal opacification, pleural effusion, or pneumothorax.    The cardiomediastinal silhouette is within normal limits.    No acute osseous abnormality.                        Wet Read by James Hendricks MD (02/26/24 01:56:46, [facility], Emergency Medicine)    No acute abnormality appreciated.                                     CT Abdomen Pelvis With IV Contrast NO Oral Contrast (Final result)  Result time 02/25/24 13:46:18      Final result by Sheila Patel MD (02/25/24 13:46:18)                   Impression:      Nonspecific retroperitoneal edema.  Correlate with lipase levels to exclude pancreatitis.  Correlate with urinalysis to exclude urinary tract infection.  Otherwise consider duodenitis      Electronically signed by: Sheila Patel  Date:    02/25/2024  Time:    13:46               Narrative:    EXAMINATION:  CT ABDOMEN PELVIS WITH IV CONTRAST    CLINICAL HISTORY:  Epigastric pain;    TECHNIQUE:  Helically acquired images with axial, sagittal and coronal reformations were obtained from the lung bases to the pubic symphysis after the IV administration of contrast.    Automated tube current modulation, weight-based exposure dosing, and/or iterative reconstruction technique utilized to reach lowest reasonably achievable exposure rate.    DLP: 463 mGy*cm    COMPARISON:  No relevant prior available for comparison at the time of dictation.    FINDINGS:  HEART: Normal in size. No pericardial effusion.    LUNG BASES: Well aerated.    LIVER: No focal lesion seen.    BILIARY: No  calcified gallstones.    PANCREAS: Mild retroperitoneal and peripancreatic edema.    SPLEEN: Normal in size    ADRENALS: No mass.    KIDNEYS/URETERS: Incidental 3-4 cm right renal cyst.  Incidental 1 cm left renal cyst.  Incidental cysts require no imaging follow-up.  Kidneys enhance symmetrically.  No hydronephrosis.    Mild perinephric stranding.    GI TRACT/MESENTERY:  No evidence of bowel obstruction.  The appendix is normal.    PERITONEUM: Trace pelvic free fluid.No free air.    LYMPH NODES: No enlarged lymph nodes by size criteria.    VASCULATURE: No significant atherosclerosis or aneurysm.    BLADDER: Normal appearance given degree of distention.    REPRODUCTIVE ORGANS: Normal as visualized.    SOFT TISSUES: Unremarkable.    BONES: No acute osseous abnormality.                                       Medications   ondansetron disintegrating tablet 4 mg (4 mg Oral Given 2/25/24 1156)   aluminum-magnesium hydroxide-simethicone 200-200-20 mg/5 mL suspension 30 mL (30 mLs Oral Given 2/25/24 1156)   iohexoL (OMNIPAQUE 350) 350 mg iodine/mL injection (100 mLs Intravenous Given 2/25/24 1345)   piperacillin-tazobactam (ZOSYN) 4.5 g in dextrose 5 % in water (D5W) 100 mL IVPB (MB+) (0 g Intravenous Stopped 2/25/24 1505)   acetaminophen tablet 650 mg (650 mg Oral Given 2/25/24 2146)   dexAMETHasone injection 8 mg (8 mg Intravenous Given 2/25/24 2212)   metoclopramide injection 10 mg (10 mg Intravenous Given 2/25/24 2212)   sodium chloride 0.9% bolus 1,000 mL 1,000 mL (0 mLs Intravenous Stopped 2/26/24 1437)   aluminum-magnesium hydroxide-simethicone 200-200-20 mg/5 mL suspension 30 mL (30 mLs Oral Given 2/26/24 1255)   morphine injection 4 mg (4 mg Intravenous Given 2/26/24 1356)   ondansetron injection 4 mg (4 mg Intravenous Given 2/26/24 1357)   dexAMETHasone injection 40 mg (40 mg Intravenous Given 2/26/24 1812)     Medical Decision Making  Initial assessment: resting comfortably in NAD. HDS and afebrile. Epigastric  "abdominal tenderness, no rebound or guarding.     Differential diagnosis: gastritis, GERD, cholecystitis, pancreatitis, among others.     Clinical tests/ED management: pt HDS and afebrile. Epigastric abdominal tenderness without rebound or gaurding. UA infectious. Labs reveal platelets of 5 and elevated tbili of 4.1. LDH also inc. H/H stable at baseline. CT with mild retroperitoneal edema. Discussed with Dr. Ambrocio who evaluated patient and labs. Suspect TTP secondary to pyelonephritis. Pt consented and platelets transfused. Recommend transfer for facility with hematology services. Pt is in agreement with this plan.     Amount and/or Complexity of Data Reviewed  Labs: ordered. Decision-making details documented in ED Course.  Radiology: ordered and independent interpretation performed.    Risk  OTC drugs.  Prescription drug management.               ED Course as of 02/26/24 2257   Sun Feb 25, 2024   1308 BILIRUBIN TOTAL(!): 4.1 [KD]   1322 Platelet Count(!!): 5 [KD]   1436 Lactate Dehydrogenase(!) [AW]   1436 Lactate Dehydrogenase(!): 711 [AW]   2119 Awaiting transfer. Pt transitioned to Dr. Hendricks at this time.  [KD]   2210 Patient currently in no acute distress though complaining of a headache.  Currently trying to transfer for oncology services.  Transfer center has indicated thatyes, the patient was declined by [facility] as they do not have heme/onc on call, their hospitalist recommended to transfuse and have the patient follow up outpatient- [facility] is giving me a lot of push back and want the patient to be waitlist for [facility].  The transfer center was able to speak with the heme/onc MD on call for [facility], Dr Jasper Day, he says that plasmapharesis is not indicated and "This is not concerning for TTP based on labs. Potentially ITP, so ok to start dexamethasone 40 mg x 4 days."   I discussed with patient and family.  Will give dose of IV dexamethasone for headache, and if patient " responds well, then will discuss with patient and family regarding outpatient treatment.  Will continue to closely monitor. [MW]   Mon Feb 26, 2024   0700 Patient has been accepted to [facility] with accepting physician Dr. Long.  Currently awaiting on a bed.  At this time, patient remains hemodynamically stable.  Patient reports history of TTP, concerning with patient's platelet count being so low.  Does appear to have had an elevated bilirubin earlier.  Will repeat lab work at present.  Patient has been transition to Dr. Ambrocio. [MW]   1320 While awaiting a bed in [facility], transfer center connected me with Dr. Tariq at [facility] in [place], patients case was discussed, Dr. Tariq was unsure if they do plasmapheresis currently at [facility],  will find out and if so we will accept as a transfer.  Pending confirmation of plasmapheresis services. [AW]   1648 End of my shift, patient had been accepted by [facility] if they are able to perform plasmapheresis.  [facility] transfer center stated they needed to talk with their oncologist, but this has been delayed by 2 hours.  If patient does have TTP this is a critical diagnosis requiring urgent treatment which we can not perform here.  Our ER nursing staff discussed this with [facility] transfer center to confirm capabilities for plasmapheresis, and at this time still no answer. Requested our transfer center inquire on patients bed status in [facility] where patient is already accepted but reportedly waiting on a bed [AW]      ED Course User Index  [AW] Jose Ambrocio MD  [KD] Marcia Oneill, GAYLE  [MW] James Hendricks MD                           Clinical Impression:  Final diagnoses:  [R10.13] Epigastric abdominal pain  [M31.19] TTP (thrombotic thrombocytopenic purpura) (Primary)  [N12] Pyelonephritis          ED Disposition Condition    Transfer to Another Facility Stable                Rj Tovar MD  02/26/24 2519

## 2024-02-26 ENCOUNTER — HOSPITAL ENCOUNTER (INPATIENT)
Facility: HOSPITAL | Age: 49
LOS: 9 days | Discharge: HOME | DRG: 546 | End: 2024-03-06
Attending: INTERNAL MEDICINE | Admitting: INTERNAL MEDICINE
Payer: MEDICAID

## 2024-02-26 VITALS
HEART RATE: 79 BPM | BODY MASS INDEX: 36.68 KG/M2 | DIASTOLIC BLOOD PRESSURE: 72 MMHG | TEMPERATURE: 98 F | RESPIRATION RATE: 20 BRPM | OXYGEN SATURATION: 99 % | SYSTOLIC BLOOD PRESSURE: 130 MMHG | WEIGHT: 233.69 LBS | HEIGHT: 67 IN

## 2024-02-26 DIAGNOSIS — R06.02 SHORTNESS OF BREATH: ICD-10-CM

## 2024-02-26 DIAGNOSIS — R07.9 CHEST PAIN: ICD-10-CM

## 2024-02-26 DIAGNOSIS — L50.9 URTICARIA: Primary | ICD-10-CM

## 2024-02-26 DIAGNOSIS — M31.19 TTP (THROMBOTIC THROMBOCYTOPENIC PURPURA): ICD-10-CM

## 2024-02-26 DIAGNOSIS — R07.9 CHEST PAIN AT REST: ICD-10-CM

## 2024-02-26 LAB
ABO + RH BLD: NORMAL
ALBUMIN SERPL-MCNC: 3.3 G/DL (ref 3.5–5)
ALBUMIN SERPL-MCNC: 3.4 G/DL (ref 3.5–5)
ALBUMIN SERPL-MCNC: 3.5 G/DL (ref 3.5–5)
ALBUMIN/GLOB SERPL: 0.9 RATIO (ref 1.1–2)
ALBUMIN/GLOB SERPL: 1 RATIO (ref 1.1–2)
ALBUMIN/GLOB SERPL: 1 RATIO (ref 1.1–2)
ALP SERPL-CCNC: 104 UNIT/L (ref 40–150)
ALP SERPL-CCNC: 114 UNIT/L (ref 40–150)
ALP SERPL-CCNC: 115 UNIT/L (ref 40–150)
ALT SERPL-CCNC: 12 UNIT/L (ref 0–55)
ALT SERPL-CCNC: 13 UNIT/L (ref 0–55)
ALT SERPL-CCNC: 14 UNIT/L (ref 0–55)
ANISOCYTOSIS BLD QL SMEAR: ABNORMAL
ANISOCYTOSIS BLD QL SMEAR: ABNORMAL
APTT PPP: 30.5 SECONDS (ref 23.2–33.7)
APTT PPP: 33.6 SECONDS (ref 23.2–33.7)
AST SERPL-CCNC: 26 UNIT/L (ref 5–34)
AST SERPL-CCNC: 28 UNIT/L (ref 5–34)
AST SERPL-CCNC: 28 UNIT/L (ref 5–34)
BASOPHILS # BLD AUTO: 0.01 X10(3)/MCL
BASOPHILS # BLD AUTO: 0.02 X10(3)/MCL
BASOPHILS NFR BLD AUTO: 0.2 %
BILIRUB SERPL-MCNC: 3.1 MG/DL
BILIRUB SERPL-MCNC: 3.2 MG/DL
BILIRUB SERPL-MCNC: 3.7 MG/DL
BLD PROD TYP BPU: NORMAL
BLOOD UNIT EXPIRATION DATE: NORMAL
BLOOD UNIT TYPE CODE: 6200
BUN SERPL-MCNC: 29.9 MG/DL (ref 7–18.7)
BUN SERPL-MCNC: 30.5 MG/DL (ref 7–18.7)
BUN SERPL-MCNC: 30.9 MG/DL (ref 7–18.7)
C3 SERPL-MCNC: 151 MG/DL (ref 80–173)
C4 SERPL-MCNC: 34 MG/DL (ref 13–46)
CALCIUM SERPL-MCNC: 8.4 MG/DL (ref 8.4–10.2)
CALCIUM SERPL-MCNC: 8.8 MG/DL (ref 8.4–10.2)
CALCIUM SERPL-MCNC: 8.8 MG/DL (ref 8.4–10.2)
CHLORIDE SERPL-SCNC: 108 MMOL/L (ref 98–107)
CHLORIDE SERPL-SCNC: 109 MMOL/L (ref 98–107)
CHLORIDE SERPL-SCNC: 110 MMOL/L (ref 98–107)
CO2 SERPL-SCNC: 23 MMOL/L (ref 22–29)
CO2 SERPL-SCNC: 23 MMOL/L (ref 22–29)
CO2 SERPL-SCNC: 27 MMOL/L (ref 22–29)
CREAT SERPL-MCNC: 1.09 MG/DL (ref 0.55–1.02)
CREAT SERPL-MCNC: 1.13 MG/DL (ref 0.55–1.02)
CREAT SERPL-MCNC: 1.21 MG/DL (ref 0.55–1.02)
CROSSMATCH INTERPRETATION: NORMAL
D DIMER PPP IA.FEU-MCNC: 7.45 UG/ML FEU (ref 0–0.5)
DISPENSE STATUS: NORMAL
EOSINOPHIL # BLD AUTO: 0.01 X10(3)/MCL (ref 0–0.9)
EOSINOPHIL # BLD AUTO: 0.02 X10(3)/MCL (ref 0–0.9)
EOSINOPHIL # BLD AUTO: 0.02 X10(3)/MCL (ref 0–0.9)
EOSINOPHIL # BLD AUTO: 0.04 X10(3)/MCL (ref 0–0.9)
EOSINOPHIL NFR BLD AUTO: 0.1 %
EOSINOPHIL NFR BLD AUTO: 0.2 %
EOSINOPHIL NFR BLD AUTO: 0.3 %
EOSINOPHIL NFR BLD AUTO: 0.5 %
ERYTHROCYTE [DISTWIDTH] IN BLOOD BY AUTOMATED COUNT: 17.2 % (ref 11.5–17)
ERYTHROCYTE [DISTWIDTH] IN BLOOD BY AUTOMATED COUNT: 17.4 % (ref 11.5–17)
ERYTHROCYTE [DISTWIDTH] IN BLOOD BY AUTOMATED COUNT: 17.5 % (ref 11.5–17)
ERYTHROCYTE [DISTWIDTH] IN BLOOD BY AUTOMATED COUNT: 18 % (ref 11.5–17)
FERRITIN SERPL-MCNC: 1040.57 NG/ML (ref 4.63–204)
FIBRINOGEN PPP-MCNC: 461 MG/DL (ref 210–463)
FIBRINOGEN PPP-MCNC: 507 MG/DL (ref 210–463)
FOLATE SERPL-MCNC: 9 NG/ML (ref 7–31.4)
GFR SERPLBLD CREATININE-BSD FMLA CKD-EPI: 55 MLS/MIN/1.73/M2
GFR SERPLBLD CREATININE-BSD FMLA CKD-EPI: 60 MLS/MIN/1.73/M2
GFR SERPLBLD CREATININE-BSD FMLA CKD-EPI: >60 MLS/MIN/1.73/M2
GLOBULIN SER-MCNC: 3.2 GM/DL (ref 2.4–3.5)
GLOBULIN SER-MCNC: 3.6 GM/DL (ref 2.4–3.5)
GLOBULIN SER-MCNC: 3.6 GM/DL (ref 2.4–3.5)
GLUCOSE SERPL-MCNC: 110 MG/DL (ref 74–100)
GLUCOSE SERPL-MCNC: 122 MG/DL (ref 74–100)
GLUCOSE SERPL-MCNC: 138 MG/DL (ref 74–100)
GROUP & RH: NORMAL
HAPTOGLOB SERPL-MCNC: <8 MG/DL (ref 35–250)
HCT VFR BLD AUTO: 30.4 % (ref 37–47)
HCT VFR BLD AUTO: 31.6 % (ref 37–47)
HCT VFR BLD AUTO: 35.5 % (ref 37–47)
HCT VFR BLD AUTO: 36 % (ref 37–47)
HEMATOLOGIST REVIEW: NORMAL
HGB BLD-MCNC: 10.4 G/DL (ref 12–16)
HGB BLD-MCNC: 11.3 G/DL (ref 12–16)
HGB BLD-MCNC: 11.5 G/DL (ref 12–16)
HGB BLD-MCNC: 9.9 G/DL (ref 12–16)
HOLD SPECIMEN: NORMAL
HYPOCHROMIA BLD QL SMEAR: ABNORMAL
IMM GRANULOCYTES # BLD AUTO: 0.02 X10(3)/MCL (ref 0–0.04)
IMM GRANULOCYTES # BLD AUTO: 0.03 X10(3)/MCL (ref 0–0.04)
IMM GRANULOCYTES # BLD AUTO: 0.03 X10(3)/MCL (ref 0–0.04)
IMM GRANULOCYTES # BLD AUTO: 0.08 X10(3)/MCL (ref 0–0.04)
IMM GRANULOCYTES NFR BLD AUTO: 0.3 %
IMM GRANULOCYTES NFR BLD AUTO: 0.4 %
IMM GRANULOCYTES NFR BLD AUTO: 0.4 %
IMM GRANULOCYTES NFR BLD AUTO: 0.9 %
INDIRECT COOMBS: NORMAL
INR PPP: 1.1
INR PPP: 1.1
IRON SATN MFR SERPL: 81 % (ref 20–50)
IRON SERPL-MCNC: 206 UG/DL (ref 50–170)
LDH SERPL-CCNC: 778 U/L (ref 125–220)
LYMPHOCYTES # BLD AUTO: 0.6 X10(3)/MCL (ref 0.6–4.6)
LYMPHOCYTES # BLD AUTO: 0.77 X10(3)/MCL (ref 0.6–4.6)
LYMPHOCYTES # BLD AUTO: 0.8 X10(3)/MCL (ref 0.6–4.6)
LYMPHOCYTES # BLD AUTO: 1.14 X10(3)/MCL (ref 0.6–4.6)
LYMPHOCYTES NFR BLD AUTO: 19.4 %
LYMPHOCYTES NFR BLD AUTO: 6.8 %
LYMPHOCYTES NFR BLD AUTO: 9.2 %
LYMPHOCYTES NFR BLD AUTO: 9.4 %
MACROCYTES BLD QL SMEAR: ABNORMAL
MCH RBC QN AUTO: 22 PG (ref 27–31)
MCH RBC QN AUTO: 22 PG (ref 27–31)
MCH RBC QN AUTO: 22.4 PG (ref 27–31)
MCH RBC QN AUTO: 22.7 PG (ref 27–31)
MCHC RBC AUTO-ENTMCNC: 31.8 G/DL (ref 33–36)
MCHC RBC AUTO-ENTMCNC: 31.9 G/DL (ref 33–36)
MCHC RBC AUTO-ENTMCNC: 32.6 G/DL (ref 33–36)
MCHC RBC AUTO-ENTMCNC: 32.9 G/DL (ref 33–36)
MCV RBC AUTO: 68.8 FL (ref 80–94)
MCV RBC AUTO: 68.8 FL (ref 80–94)
MCV RBC AUTO: 68.9 FL (ref 80–94)
MCV RBC AUTO: 69.2 FL (ref 80–94)
MICROCYTES BLD QL SMEAR: ABNORMAL
MICROCYTES BLD QL SMEAR: ABNORMAL
MONOCYTES # BLD AUTO: 0.3 X10(3)/MCL (ref 0.1–1.3)
MONOCYTES # BLD AUTO: 0.32 X10(3)/MCL (ref 0.1–1.3)
MONOCYTES # BLD AUTO: 0.34 X10(3)/MCL (ref 0.1–1.3)
MONOCYTES # BLD AUTO: 0.65 X10(3)/MCL (ref 0.1–1.3)
MONOCYTES NFR BLD AUTO: 11.1 %
MONOCYTES NFR BLD AUTO: 3.6 %
MONOCYTES NFR BLD AUTO: 3.6 %
MONOCYTES NFR BLD AUTO: 4 %
NEUTROPHILS # BLD AUTO: 4.04 X10(3)/MCL (ref 2.1–9.2)
NEUTROPHILS # BLD AUTO: 7.19 X10(3)/MCL (ref 2.1–9.2)
NEUTROPHILS # BLD AUTO: 7.26 X10(3)/MCL (ref 2.1–9.2)
NEUTROPHILS # BLD AUTO: 7.85 X10(3)/MCL (ref 2.1–9.2)
NEUTROPHILS NFR BLD AUTO: 68.7 %
NEUTROPHILS NFR BLD AUTO: 85.8 %
NEUTROPHILS NFR BLD AUTO: 86.1 %
NEUTROPHILS NFR BLD AUTO: 88.4 %
NRBC BLD AUTO-RTO: 0 %
NRBC BLD AUTO-RTO: 0.2 %
NRBC BLD AUTO-RTO: 0.3 %
NRBC BLD AUTO-RTO: 0.5 %
OHS QRS DURATION: 82 MS
OHS QTC CALCULATION: 455 MS
PLATELET # BLD AUTO: 10 X10(3)/MCL (ref 130–400)
PLATELET # BLD AUTO: 10 X10(3)/MCL (ref 130–400)
PLATELET # BLD AUTO: 24 X10(3)/MCL (ref 130–400)
PLATELET # BLD AUTO: 6 X10(3)/MCL (ref 130–400)
PLATELET # BLD EST: ABNORMAL 10*3/UL
PLATELETS.RETICULATED NFR BLD AUTO: 3.3 % (ref 0.9–11.2)
PLATELETS.RETICULATED NFR BLD AUTO: 3.4 % (ref 0.9–11.2)
PLATELETS.RETICULATED NFR BLD AUTO: 4.1 % (ref 0.9–11.2)
PLATELETS.RETICULATED NFR BLD AUTO: 4.3 % (ref 0.9–11.2)
PMV BLD AUTO: 0 FL (ref 7.4–10.4)
PMV BLD AUTO: ABNORMAL FL
POIKILOCYTOSIS BLD QL SMEAR: ABNORMAL
POLYCHROMASIA BLD QL SMEAR: SLIGHT
POTASSIUM SERPL-SCNC: 3.5 MMOL/L (ref 3.5–5.1)
POTASSIUM SERPL-SCNC: 3.6 MMOL/L (ref 3.5–5.1)
POTASSIUM SERPL-SCNC: 3.7 MMOL/L (ref 3.5–5.1)
PROT SERPL-MCNC: 6.5 GM/DL (ref 6.4–8.3)
PROT SERPL-MCNC: 7 GM/DL (ref 6.4–8.3)
PROT SERPL-MCNC: 7.1 GM/DL (ref 6.4–8.3)
PROTHROMBIN TIME: 14 SECONDS (ref 12.5–14.5)
PROTHROMBIN TIME: 14.1 SECONDS (ref 11.4–14)
RBC # BLD AUTO: 4.41 X10(6)/MCL (ref 4.2–5.4)
RBC # BLD AUTO: 4.59 X10(6)/MCL (ref 4.2–5.4)
RBC # BLD AUTO: 5.13 X10(6)/MCL (ref 4.2–5.4)
RBC # BLD AUTO: 5.23 X10(6)/MCL (ref 4.2–5.4)
RBC MORPH BLD: ABNORMAL
RBC MORPH BLD: ABNORMAL
RBC MORPH BLD: NORMAL
SARS-COV-2 RDRP RESP QL NAA+PROBE: NEGATIVE
SCHISTOCYTE (OLG): ABNORMAL
SODIUM SERPL-SCNC: 141 MMOL/L (ref 136–145)
SODIUM SERPL-SCNC: 142 MMOL/L (ref 136–145)
SODIUM SERPL-SCNC: 144 MMOL/L (ref 136–145)
SPECIMEN OUTDATE: NORMAL
TIBC SERPL-MCNC: 253 UG/DL (ref 250–450)
TIBC SERPL-MCNC: 47 UG/DL (ref 70–310)
TRANSFERRIN SERPL-MCNC: 216 MG/DL (ref 180–382)
UNIT NUMBER: NORMAL
VIT B12 SERPL-MCNC: 524 PG/ML (ref 213–816)
WBC # SPEC AUTO: 5.88 X10(3)/MCL (ref 4.5–11.5)
WBC # SPEC AUTO: 8.35 X10(3)/MCL (ref 4.5–11.5)
WBC # SPEC AUTO: 8.47 X10(3)/MCL (ref 4.5–11.5)
WBC # SPEC AUTO: 8.88 X10(3)/MCL (ref 4.5–11.5)

## 2024-02-26 PROCEDURE — 85384 FIBRINOGEN ACTIVITY: CPT | Performed by: FAMILY MEDICINE

## 2024-02-26 PROCEDURE — 96361 HYDRATE IV INFUSION ADD-ON: CPT

## 2024-02-26 PROCEDURE — 86901 BLOOD TYPING SEROLOGIC RH(D): CPT | Performed by: INTERNAL MEDICINE

## 2024-02-26 PROCEDURE — 82728 ASSAY OF FERRITIN: CPT | Performed by: INTERNAL MEDICINE

## 2024-02-26 PROCEDURE — 85384 FIBRINOGEN ACTIVITY: CPT | Performed by: INTERNAL MEDICINE

## 2024-02-26 PROCEDURE — P9035 PLATELET PHERES LEUKOREDUCED: HCPCS | Performed by: PHYSICIAN ASSISTANT

## 2024-02-26 PROCEDURE — 86235 NUCLEAR ANTIGEN ANTIBODY: CPT | Performed by: INTERNAL MEDICINE

## 2024-02-26 PROCEDURE — 82746 ASSAY OF FOLIC ACID SERUM: CPT | Performed by: INTERNAL MEDICINE

## 2024-02-26 PROCEDURE — 86160 COMPLEMENT ANTIGEN: CPT | Performed by: INTERNAL MEDICINE

## 2024-02-26 PROCEDURE — 86704 HEP B CORE ANTIBODY TOTAL: CPT | Performed by: INTERNAL MEDICINE

## 2024-02-26 PROCEDURE — 63600175 PHARM REV CODE 636 W HCPCS: Performed by: STUDENT IN AN ORGANIZED HEALTH CARE EDUCATION/TRAINING PROGRAM

## 2024-02-26 PROCEDURE — 80053 COMPREHEN METABOLIC PANEL: CPT | Performed by: FAMILY MEDICINE

## 2024-02-26 PROCEDURE — 21400001 HC TELEMETRY ROOM

## 2024-02-26 PROCEDURE — 85610 PROTHROMBIN TIME: CPT | Performed by: FAMILY MEDICINE

## 2024-02-26 PROCEDURE — 96360 HYDRATION IV INFUSION INIT: CPT | Mod: 59

## 2024-02-26 PROCEDURE — 80053 COMPREHEN METABOLIC PANEL: CPT | Performed by: INTERNAL MEDICINE

## 2024-02-26 PROCEDURE — 80074 ACUTE HEPATITIS PANEL: CPT | Performed by: INTERNAL MEDICINE

## 2024-02-26 PROCEDURE — 96376 TX/PRO/DX INJ SAME DRUG ADON: CPT

## 2024-02-26 PROCEDURE — 96366 THER/PROPH/DIAG IV INF ADDON: CPT

## 2024-02-26 PROCEDURE — 85335 FACTOR INHIBITOR TEST: CPT | Performed by: FAMILY MEDICINE

## 2024-02-26 PROCEDURE — 85025 COMPLETE CBC W/AUTO DIFF WBC: CPT | Performed by: INTERNAL MEDICINE

## 2024-02-26 PROCEDURE — 63600175 PHARM REV CODE 636 W HCPCS: Performed by: INTERNAL MEDICINE

## 2024-02-26 PROCEDURE — 25000003 PHARM REV CODE 250: Performed by: STUDENT IN AN ORGANIZED HEALTH CARE EDUCATION/TRAINING PROGRAM

## 2024-02-26 PROCEDURE — 80053 COMPREHEN METABOLIC PANEL: CPT | Mod: 91 | Performed by: INTERNAL MEDICINE

## 2024-02-26 PROCEDURE — 86039 ANTINUCLEAR ANTIBODIES (ANA): CPT | Performed by: INTERNAL MEDICINE

## 2024-02-26 PROCEDURE — 96375 TX/PRO/DX INJ NEW DRUG ADDON: CPT

## 2024-02-26 PROCEDURE — 86431 RHEUMATOID FACTOR QUANT: CPT | Performed by: INTERNAL MEDICINE

## 2024-02-26 PROCEDURE — 86200 CCP ANTIBODY: CPT | Performed by: INTERNAL MEDICINE

## 2024-02-26 PROCEDURE — 87389 HIV-1 AG W/HIV-1&-2 AB AG IA: CPT | Performed by: INTERNAL MEDICINE

## 2024-02-26 PROCEDURE — 85610 PROTHROMBIN TIME: CPT | Performed by: INTERNAL MEDICINE

## 2024-02-26 PROCEDURE — 85730 THROMBOPLASTIN TIME PARTIAL: CPT | Performed by: INTERNAL MEDICINE

## 2024-02-26 PROCEDURE — 87635 SARS-COV-2 COVID-19 AMP PRB: CPT | Performed by: FAMILY MEDICINE

## 2024-02-26 PROCEDURE — 11000001 HC ACUTE MED/SURG PRIVATE ROOM

## 2024-02-26 PROCEDURE — 85730 THROMBOPLASTIN TIME PARTIAL: CPT | Performed by: FAMILY MEDICINE

## 2024-02-26 PROCEDURE — 85379 FIBRIN DEGRADATION QUANT: CPT | Performed by: FAMILY MEDICINE

## 2024-02-26 PROCEDURE — 96365 THER/PROPH/DIAG IV INF INIT: CPT

## 2024-02-26 PROCEDURE — 83010 ASSAY OF HAPTOGLOBIN QUANT: CPT | Performed by: FAMILY MEDICINE

## 2024-02-26 PROCEDURE — 83615 LACTATE (LD) (LDH) ENZYME: CPT | Performed by: FAMILY MEDICINE

## 2024-02-26 PROCEDURE — 83540 ASSAY OF IRON: CPT | Performed by: INTERNAL MEDICINE

## 2024-02-26 PROCEDURE — 85397 CLOTTING FUNCT ACTIVITY: CPT | Performed by: FAMILY MEDICINE

## 2024-02-26 PROCEDURE — 36430 TRANSFUSION BLD/BLD COMPNT: CPT

## 2024-02-26 PROCEDURE — 85025 COMPLETE CBC W/AUTO DIFF WBC: CPT | Performed by: FAMILY MEDICINE

## 2024-02-26 PROCEDURE — 82607 VITAMIN B-12: CPT | Performed by: INTERNAL MEDICINE

## 2024-02-26 RX ORDER — PROCHLORPERAZINE EDISYLATE 5 MG/ML
5 INJECTION INTRAMUSCULAR; INTRAVENOUS EVERY 6 HOURS PRN
Status: DISCONTINUED | OUTPATIENT
Start: 2024-02-26 | End: 2024-03-06 | Stop reason: HOSPADM

## 2024-02-26 RX ORDER — DEXAMETHASONE SODIUM PHOSPHATE 100 MG/10ML
40 INJECTION INTRAMUSCULAR; INTRAVENOUS ONCE
Status: COMPLETED | OUTPATIENT
Start: 2024-02-26 | End: 2024-02-26

## 2024-02-26 RX ORDER — SODIUM CHLORIDE 0.9 % (FLUSH) 0.9 %
10 SYRINGE (ML) INJECTION
Status: DISCONTINUED | OUTPATIENT
Start: 2024-02-26 | End: 2024-03-06 | Stop reason: HOSPADM

## 2024-02-26 RX ORDER — DEXAMETHASONE SODIUM PHOSPHATE 100 MG/10ML
40 INJECTION INTRAMUSCULAR; INTRAVENOUS DAILY
Status: DISCONTINUED | OUTPATIENT
Start: 2024-02-27 | End: 2024-02-26 | Stop reason: HOSPADM

## 2024-02-26 RX ORDER — AMOXICILLIN 250 MG
2 CAPSULE ORAL 2 TIMES DAILY PRN
Status: DISCONTINUED | OUTPATIENT
Start: 2024-02-26 | End: 2024-03-06 | Stop reason: HOSPADM

## 2024-02-26 RX ORDER — SODIUM CHLORIDE 9 MG/ML
INJECTION, SOLUTION INTRAVENOUS CONTINUOUS
Status: DISCONTINUED | OUTPATIENT
Start: 2024-02-26 | End: 2024-03-04

## 2024-02-26 RX ORDER — DEXAMETHASONE SODIUM PHOSPHATE 4 MG/ML
40 INJECTION, SOLUTION INTRA-ARTICULAR; INTRALESIONAL; INTRAMUSCULAR; INTRAVENOUS; SOFT TISSUE
Status: DISCONTINUED | OUTPATIENT
Start: 2024-02-26 | End: 2024-02-26

## 2024-02-26 RX ORDER — DEXAMETHASONE SODIUM PHOSPHATE 4 MG/ML
40 INJECTION, SOLUTION INTRA-ARTICULAR; INTRALESIONAL; INTRAMUSCULAR; INTRAVENOUS; SOFT TISSUE DAILY
Status: DISCONTINUED | OUTPATIENT
Start: 2024-02-27 | End: 2024-02-26

## 2024-02-26 RX ORDER — POLYETHYLENE GLYCOL 3350 17 G/17G
17 POWDER, FOR SOLUTION ORAL 2 TIMES DAILY PRN
Status: DISCONTINUED | OUTPATIENT
Start: 2024-02-26 | End: 2024-03-06 | Stop reason: HOSPADM

## 2024-02-26 RX ORDER — PANTOPRAZOLE SODIUM 40 MG/1
40 TABLET, DELAYED RELEASE ORAL DAILY
Status: DISCONTINUED | OUTPATIENT
Start: 2024-02-26 | End: 2024-03-06 | Stop reason: HOSPADM

## 2024-02-26 RX ORDER — TALC
6 POWDER (GRAM) TOPICAL NIGHTLY PRN
Status: DISCONTINUED | OUTPATIENT
Start: 2024-02-26 | End: 2024-03-06 | Stop reason: HOSPADM

## 2024-02-26 RX ORDER — ALUMINUM HYDROXIDE, MAGNESIUM HYDROXIDE, AND SIMETHICONE 1200; 120; 1200 MG/30ML; MG/30ML; MG/30ML
30 SUSPENSION ORAL
Status: COMPLETED | OUTPATIENT
Start: 2024-02-26 | End: 2024-02-26

## 2024-02-26 RX ORDER — MORPHINE SULFATE 2 MG/ML
4 INJECTION, SOLUTION INTRAMUSCULAR; INTRAVENOUS
Status: COMPLETED | OUTPATIENT
Start: 2024-02-26 | End: 2024-02-26

## 2024-02-26 RX ORDER — MORPHINE SULFATE 4 MG/ML
2 INJECTION, SOLUTION INTRAMUSCULAR; INTRAVENOUS EVERY 4 HOURS PRN
Status: DISCONTINUED | OUTPATIENT
Start: 2024-02-26 | End: 2024-03-06 | Stop reason: HOSPADM

## 2024-02-26 RX ORDER — DEXAMETHASONE SODIUM PHOSPHATE 100 MG/10ML
40 INJECTION INTRAMUSCULAR; INTRAVENOUS ONCE
Status: DISCONTINUED | OUTPATIENT
Start: 2024-02-26 | End: 2024-02-26

## 2024-02-26 RX ORDER — ACETAMINOPHEN 325 MG/1
650 TABLET ORAL EVERY 4 HOURS PRN
Status: DISCONTINUED | OUTPATIENT
Start: 2024-02-26 | End: 2024-03-06 | Stop reason: HOSPADM

## 2024-02-26 RX ORDER — ALUMINUM HYDROXIDE, MAGNESIUM HYDROXIDE, AND SIMETHICONE 1200; 120; 1200 MG/30ML; MG/30ML; MG/30ML
30 SUSPENSION ORAL 4 TIMES DAILY PRN
Status: DISCONTINUED | OUTPATIENT
Start: 2024-02-26 | End: 2024-03-06 | Stop reason: HOSPADM

## 2024-02-26 RX ORDER — HYDROCODONE BITARTRATE AND ACETAMINOPHEN 5; 325 MG/1; MG/1
1 TABLET ORAL EVERY 6 HOURS PRN
Status: DISCONTINUED | OUTPATIENT
Start: 2024-02-26 | End: 2024-03-06 | Stop reason: HOSPADM

## 2024-02-26 RX ORDER — ONDANSETRON HYDROCHLORIDE 2 MG/ML
4 INJECTION, SOLUTION INTRAVENOUS
Status: COMPLETED | OUTPATIENT
Start: 2024-02-26 | End: 2024-02-26

## 2024-02-26 RX ORDER — ONDANSETRON HYDROCHLORIDE 2 MG/ML
4 INJECTION, SOLUTION INTRAVENOUS EVERY 4 HOURS PRN
Status: DISCONTINUED | OUTPATIENT
Start: 2024-02-26 | End: 2024-03-06 | Stop reason: HOSPADM

## 2024-02-26 RX ORDER — HYDROCODONE BITARTRATE AND ACETAMINOPHEN 500; 5 MG/1; MG/1
TABLET ORAL
Status: DISCONTINUED | OUTPATIENT
Start: 2024-02-26 | End: 2024-02-26 | Stop reason: HOSPADM

## 2024-02-26 RX ORDER — FOLIC ACID 1 MG/1
1 TABLET ORAL DAILY
Status: DISCONTINUED | OUTPATIENT
Start: 2024-02-26 | End: 2024-03-06 | Stop reason: HOSPADM

## 2024-02-26 RX ORDER — FERROUS SULFATE, DRIED 160(50) MG
2 TABLET, EXTENDED RELEASE ORAL 2 TIMES DAILY
Status: DISCONTINUED | OUTPATIENT
Start: 2024-02-26 | End: 2024-03-06 | Stop reason: HOSPADM

## 2024-02-26 RX ORDER — PREDNISONE 1 MG/1
1 TABLET ORAL DAILY
Status: DISCONTINUED | OUTPATIENT
Start: 2024-02-26 | End: 2024-02-27

## 2024-02-26 RX ADMIN — PIPERACILLIN AND TAZOBACTAM 4.5 G: 4; .5 INJECTION, POWDER, LYOPHILIZED, FOR SOLUTION INTRAVENOUS; PARENTERAL at 05:02

## 2024-02-26 RX ADMIN — SODIUM CHLORIDE 1000 ML: 9 INJECTION, SOLUTION INTRAVENOUS at 12:02

## 2024-02-26 RX ADMIN — MORPHINE SULFATE 4 MG: 2 INJECTION, SOLUTION INTRAMUSCULAR; INTRAVENOUS at 01:02

## 2024-02-26 RX ADMIN — DEXAMETHASONE SODIUM PHOSPHATE 40 MG: 10 INJECTION INTRAMUSCULAR; INTRAVENOUS at 06:02

## 2024-02-26 RX ADMIN — ALUMINUM HYDROXIDE, MAGNESIUM HYDROXIDE, AND SIMETHICONE 30 ML: 200; 200; 20 SUSPENSION ORAL at 12:02

## 2024-02-26 RX ADMIN — ONDANSETRON 4 MG: 2 INJECTION INTRAMUSCULAR; INTRAVENOUS at 01:02

## 2024-02-26 NOTE — CARE UPDATE
"Care update:     HPI: Nicole AGRAWAL Mc Junior Quiñones is a 48 y.o. 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 She states that she has had multiple episodes of TTP in the past and had to receive plasma exchange therapy in addition to steroids. She is currently seeing a hematologist is in [place], [place] and was seen several months ago. While in the ED, she was found to have PLT of 5k, LDH of 711, T.bili of 4.1, indirect bili of 2.90 concerning for TTP though blood smear did not reveal schistocytes and no results of [id number].    Vital signs:   /88   Pulse 90   Temp 98.9 °F (37.2 °C) (Oral)   Resp 18   Ht 5' 6.93" (1.7 m)   Wt 106 kg (233 lb 11 oz)   SpO2 100%   Breastfeeding No   BMI 36.68 kg/m²      Physical Examination:  General: Obese w/ mild distress  HEENT: NC/AT; PERRL; nasal and oral mucosa moist and clear  Pulm: CTA bilaterally, normal work of breathing on room air  CV: S1, S2 w/o murmurs or gallops; no edema noted  GI: Soft with normal bowel sounds in all quadrants, no masses on palpation  MSK: Full ROM of all extremities   Derm: No rashes or abnormal bruising  Neuro: AAOx3;  motor/sensory function intact  Psych: Tired    Disposition (02/25/2024): Recommend transferring to a facility with inpatient hematology and plasma exchange services. Agree with initiating steroid therapy.     Louis Cuello, DO   [facility], PGY-III   "

## 2024-02-26 NOTE — ED NOTES
Patient and family updated at this time. Currently waiting on official acceptance to [facility] in [place]. Report given to KENN Weinstein at this time. VSS. Patient resting comfortably, call bell within reach.

## 2024-02-27 LAB
ABO + RH BLD: NORMAL
ALBUMIN SERPL-MCNC: 3.6 G/DL (ref 3.5–5)
ALBUMIN/GLOB SERPL: 1.2 RATIO (ref 1.1–2)
ALP SERPL-CCNC: 108 UNIT/L (ref 40–150)
ALT SERPL-CCNC: 14 UNIT/L (ref 0–55)
ANTINUCLEAR ANTIBODY SCREEN (OHS): NEGATIVE
AST SERPL-CCNC: 27 UNIT/L (ref 5–34)
BACTERIA UR CULT: ABNORMAL
BASOPHILS # BLD AUTO: 0.02 X10(3)/MCL
BASOPHILS NFR BLD AUTO: 0.3 %
BILIRUB SERPL-MCNC: 3.2 MG/DL
BLD PROD TYP BPU: NORMAL
BLOOD UNIT EXPIRATION DATE: NORMAL
BLOOD UNIT TYPE CODE: 5100
BLOOD UNIT TYPE CODE: 5100
BLOOD UNIT TYPE CODE: 7300
BUN SERPL-MCNC: 27.6 MG/DL (ref 7–18.7)
CALCIUM SERPL-MCNC: 8.6 MG/DL (ref 8.4–10.2)
CENTROMERE QUANT (OHS): <0.4 U/ML
CHLORIDE SERPL-SCNC: 109 MMOL/L (ref 98–107)
CK SERPL-CCNC: 245 U/L (ref 29–168)
CO2 SERPL-SCNC: 24 MMOL/L (ref 22–29)
COAGULATION SPECIALIST REVIEW: ABNORMAL
CREAT SERPL-MCNC: 0.89 MG/DL (ref 0.55–1.02)
CROSSMATCH INTERPRETATION: NORMAL
DISPENSE STATUS: NORMAL
DSDNA AB QUANT (OHS): <0.6 IU/ML
EOSINOPHIL # BLD AUTO: 0 X10(3)/MCL (ref 0–0.9)
EOSINOPHIL NFR BLD AUTO: 0 %
ERYTHROCYTE [DISTWIDTH] IN BLOOD BY AUTOMATED COUNT: 18.2 % (ref 11.5–17)
GFR SERPLBLD CREATININE-BSD FMLA CKD-EPI: >60 MLS/MIN/1.73/M2
GLOBULIN SER-MCNC: 2.9 GM/DL (ref 2.4–3.5)
GLUCOSE SERPL-MCNC: 134 MG/DL (ref 74–100)
HAPTOGLOB SERPL-MCNC: <8 MG/DL (ref 35–250)
HAV IGM SERPL QL IA: NONREACTIVE
HBV CORE AB SERPL QL IA: NONREACTIVE
HBV CORE IGM SERPL QL IA: NONREACTIVE
HBV SURFACE AG SERPL QL IA: NONREACTIVE
HBV SURFACE AG SERPL QL IA: NONREACTIVE
HCT VFR BLD AUTO: 29.7 % (ref 37–47)
HCV AB SERPL QL IA: NONREACTIVE
HEMATOLOGIST REVIEW: NORMAL
HGB BLD-MCNC: 9.7 G/DL (ref 12–16)
HIV 1+2 AB+HIV1 P24 AG SERPL QL IA: NONREACTIVE
IMM GRANULOCYTES # BLD AUTO: 0.07 X10(3)/MCL (ref 0–0.04)
IMM GRANULOCYTES NFR BLD AUTO: 0.9 %
JO-1 AB QUANT (OHS): <0.3 U/ML
LDH SERPL-CCNC: 871 U/L (ref 125–220)
LYMPHOCYTES # BLD AUTO: 0.67 X10(3)/MCL (ref 0.6–4.6)
LYMPHOCYTES NFR BLD AUTO: 9.1 %
MAGNESIUM SERPL-MCNC: 1.9 MG/DL (ref 1.6–2.6)
MAGNESIUM SERPL-MCNC: 2.3 MG/DL (ref 1.6–2.6)
MCH RBC QN AUTO: 22 PG (ref 27–31)
MCHC RBC AUTO-ENTMCNC: 32.7 G/DL (ref 33–36)
MCV RBC AUTO: 67.5 FL (ref 80–94)
MONOCYTES # BLD AUTO: 0.2 X10(3)/MCL (ref 0.1–1.3)
MONOCYTES NFR BLD AUTO: 2.7 %
NEUTROPHILS # BLD AUTO: 6.41 X10(3)/MCL (ref 2.1–9.2)
NEUTROPHILS NFR BLD AUTO: 87 %
NRBC BLD AUTO-RTO: 0.4 %
PHOSPHATE SERPL-MCNC: 2.9 MG/DL (ref 2.3–4.7)
PLATELET # BLD AUTO: 12 X10(3)/MCL (ref 130–400)
PLATELETS.RETICULATED NFR BLD AUTO: 3.8 % (ref 0.9–11.2)
PMV BLD AUTO: ABNORMAL FL
POTASSIUM SERPL-SCNC: 3.6 MMOL/L (ref 3.5–5.1)
PROT SERPL-MCNC: 6.5 GM/DL (ref 6.4–8.3)
RBC # BLD AUTO: 4.4 X10(6)/MCL (ref 4.2–5.4)
RHEUMATOID FACT SERPL-ACNC: 21 IU/ML
RHEUMATOID FACTOR IGA QUANTITATIVE (OLG): 14 IU/ML
RHEUMATOID FACTOR IGM QUANTITATIVE (OLG): 12 IU/ML
RNP70 AB QUANT (OHS): <0.3 U/ML
SCL-70S AB QUANT (OHS): <0.6 U/ML
SMITH AB QUANT (OHS): <0.7 U/ML
SODIUM SERPL-SCNC: 141 MMOL/L (ref 136–145)
SSA(RO) AB QUANT (OHS): 0.9 U/ML
SSB(LA) AB QUANT (OHS): <0.4 U/ML
TROPONIN I SERPL-MCNC: 0.04 NG/ML (ref 0–0.04)
U1RNP AB QUANT (OHS): 1.4 U/ML
UNIT NUMBER: NORMAL
VWF CP ACT/NOR PPP CHRO: <5 %
VWF CP INHIB PPP QL: POSITIVE
VWF CP INHIB PPP-ACNC: 2.8
WBC # SPEC AUTO: 7.37 X10(3)/MCL (ref 4.5–11.5)

## 2024-02-27 PROCEDURE — 63600175 PHARM REV CODE 636 W HCPCS: Performed by: INTERNAL MEDICINE

## 2024-02-27 PROCEDURE — 94640 AIRWAY INHALATION TREATMENT: CPT

## 2024-02-27 PROCEDURE — 93010 ELECTROCARDIOGRAM REPORT: CPT | Mod: ,,, | Performed by: INTERNAL MEDICINE

## 2024-02-27 PROCEDURE — 83735 ASSAY OF MAGNESIUM: CPT | Performed by: INTERNAL MEDICINE

## 2024-02-27 PROCEDURE — 82550 ASSAY OF CK (CPK): CPT | Performed by: HOSPITALIST

## 2024-02-27 PROCEDURE — 99900035 HC TECH TIME PER 15 MIN (STAT)

## 2024-02-27 PROCEDURE — 85025 COMPLETE CBC W/AUTO DIFF WBC: CPT | Performed by: INTERNAL MEDICINE

## 2024-02-27 PROCEDURE — 25000003 PHARM REV CODE 250: Mod: JZ,JG | Performed by: INTERNAL MEDICINE

## 2024-02-27 PROCEDURE — 87340 HEPATITIS B SURFACE AG IA: CPT | Performed by: INTERNAL MEDICINE

## 2024-02-27 PROCEDURE — 02HV33Z INSERTION OF INFUSION DEVICE INTO SUPERIOR VENA CAVA, PERCUTANEOUS APPROACH: ICD-10-PCS | Performed by: INTERNAL MEDICINE

## 2024-02-27 PROCEDURE — 30233K1 TRANSFUSION OF NONAUTOLOGOUS FROZEN PLASMA INTO PERIPHERAL VEIN, PERCUTANEOUS APPROACH: ICD-10-PCS | Performed by: INTERNAL MEDICINE

## 2024-02-27 PROCEDURE — 83615 LACTATE (LD) (LDH) ENZYME: CPT | Performed by: INTERNAL MEDICINE

## 2024-02-27 PROCEDURE — 25000003 PHARM REV CODE 250: Performed by: INTERNAL MEDICINE

## 2024-02-27 PROCEDURE — 80053 COMPREHEN METABOLIC PANEL: CPT | Performed by: INTERNAL MEDICINE

## 2024-02-27 PROCEDURE — 25000242 PHARM REV CODE 250 ALT 637 W/ HCPCS: Performed by: HOSPITALIST

## 2024-02-27 PROCEDURE — P9017 PLASMA 1 DONOR FRZ W/IN 8 HR: HCPCS | Performed by: INTERNAL MEDICINE

## 2024-02-27 PROCEDURE — 99233 SBSQ HOSP IP/OBS HIGH 50: CPT | Mod: ,,, | Performed by: INTERNAL MEDICINE

## 2024-02-27 PROCEDURE — 83735 ASSAY OF MAGNESIUM: CPT | Performed by: HOSPITALIST

## 2024-02-27 PROCEDURE — 36430 TRANSFUSION BLD/BLD COMPNT: CPT

## 2024-02-27 PROCEDURE — 93005 ELECTROCARDIOGRAM TRACING: CPT

## 2024-02-27 PROCEDURE — 83010 ASSAY OF HAPTOGLOBIN QUANT: CPT | Performed by: INTERNAL MEDICINE

## 2024-02-27 PROCEDURE — 84100 ASSAY OF PHOSPHORUS: CPT | Performed by: INTERNAL MEDICINE

## 2024-02-27 PROCEDURE — 21400001 HC TELEMETRY ROOM

## 2024-02-27 PROCEDURE — 94761 N-INVAS EAR/PLS OXIMETRY MLT: CPT

## 2024-02-27 PROCEDURE — 84484 ASSAY OF TROPONIN QUANT: CPT | Performed by: HOSPITALIST

## 2024-02-27 RX ORDER — HYDROCODONE BITARTRATE AND ACETAMINOPHEN 500; 5 MG/1; MG/1
TABLET ORAL
Status: DISCONTINUED | OUTPATIENT
Start: 2024-02-27 | End: 2024-03-06 | Stop reason: HOSPADM

## 2024-02-27 RX ORDER — DIPHENHYDRAMINE HCL 25 MG
25 CAPSULE ORAL EVERY 6 HOURS PRN
Status: DISCONTINUED | OUTPATIENT
Start: 2024-02-27 | End: 2024-03-06 | Stop reason: HOSPADM

## 2024-02-27 RX ORDER — HYDROCODONE BITARTRATE AND ACETAMINOPHEN 500; 5 MG/1; MG/1
TABLET ORAL
Status: ACTIVE | OUTPATIENT
Start: 2024-02-27 | End: 2024-02-28

## 2024-02-27 RX ORDER — IPRATROPIUM BROMIDE AND ALBUTEROL SULFATE 2.5; .5 MG/3ML; MG/3ML
3 SOLUTION RESPIRATORY (INHALATION) EVERY 6 HOURS PRN
Status: DISCONTINUED | OUTPATIENT
Start: 2024-02-27 | End: 2024-03-06 | Stop reason: HOSPADM

## 2024-02-27 RX ORDER — CALCIUM GLUCONATE 20 MG/ML
1 INJECTION, SOLUTION INTRAVENOUS
Status: DISPENSED | OUTPATIENT
Start: 2024-02-27 | End: 2024-02-27

## 2024-02-27 RX ORDER — MUPIROCIN 20 MG/G
OINTMENT TOPICAL 2 TIMES DAILY
Status: DISPENSED | OUTPATIENT
Start: 2024-02-27 | End: 2024-03-03

## 2024-02-27 RX ORDER — CALCIUM GLUCONATE 20 MG/ML
1 INJECTION, SOLUTION INTRAVENOUS
Status: DISCONTINUED | OUTPATIENT
Start: 2024-02-27 | End: 2024-02-27

## 2024-02-27 RX ORDER — MEPERIDINE HYDROCHLORIDE 25 MG/ML
25 INJECTION INTRAMUSCULAR; INTRAVENOUS; SUBCUTANEOUS ONCE
Status: COMPLETED | OUTPATIENT
Start: 2024-02-27 | End: 2024-02-27

## 2024-02-27 RX ORDER — DIPHENHYDRAMINE HYDROCHLORIDE 50 MG/ML
25 INJECTION INTRAMUSCULAR; INTRAVENOUS ONCE
Status: COMPLETED | OUTPATIENT
Start: 2024-02-27 | End: 2024-02-27

## 2024-02-27 RX ORDER — HEPARIN SODIUM 1000 [USP'U]/ML
1000 INJECTION, SOLUTION INTRAVENOUS; SUBCUTANEOUS
Status: DISCONTINUED | OUTPATIENT
Start: 2024-02-27 | End: 2024-03-06 | Stop reason: HOSPADM

## 2024-02-27 RX ADMIN — DIPHENHYDRAMINE HYDROCHLORIDE 25 MG: 50 INJECTION INTRAMUSCULAR; INTRAVENOUS at 03:02

## 2024-02-27 RX ADMIN — DIPHENHYDRAMINE HYDROCHLORIDE 25 MG: 25 CAPSULE ORAL at 11:02

## 2024-02-27 RX ADMIN — MUPIROCIN: 20 OINTMENT TOPICAL at 08:02

## 2024-02-27 RX ADMIN — PANTOPRAZOLE SODIUM 40 MG: 40 TABLET, DELAYED RELEASE ORAL at 02:02

## 2024-02-27 RX ADMIN — METHYLPREDNISOLONE SODIUM SUCCINATE 40 MG: 40 INJECTION, POWDER, FOR SOLUTION INTRAMUSCULAR; INTRAVENOUS at 03:02

## 2024-02-27 RX ADMIN — CEFTRIAXONE 1 G: 1 INJECTION, POWDER, FOR SOLUTION INTRAMUSCULAR; INTRAVENOUS at 08:02

## 2024-02-27 RX ADMIN — MORPHINE SULFATE 2 MG: 4 INJECTION, SOLUTION INTRAMUSCULAR; INTRAVENOUS at 08:02

## 2024-02-27 RX ADMIN — Medication 2 TABLET: at 08:02

## 2024-02-27 RX ADMIN — ONDANSETRON 4 MG: 2 INJECTION INTRAMUSCULAR; INTRAVENOUS at 08:02

## 2024-02-27 RX ADMIN — MEPERIDINE HYDROCHLORIDE 25 MG: 25 INJECTION INTRAMUSCULAR; INTRAVENOUS; SUBCUTANEOUS at 03:02

## 2024-02-27 RX ADMIN — THERA TABS 1 TABLET: TAB at 08:02

## 2024-02-27 RX ADMIN — SODIUM CHLORIDE: 9 INJECTION, SOLUTION INTRAVENOUS at 09:02

## 2024-02-27 RX ADMIN — Medication 2 TABLET: at 02:02

## 2024-02-27 RX ADMIN — FOLIC ACID 1 MG: 1 TABLET ORAL at 02:02

## 2024-02-27 RX ADMIN — FOLIC ACID 1 MG: 1 TABLET ORAL at 08:02

## 2024-02-27 RX ADMIN — IPRATROPIUM BROMIDE AND ALBUTEROL SULFATE 3 ML: .5; 3 SOLUTION RESPIRATORY (INHALATION) at 03:02

## 2024-02-27 RX ADMIN — CALCIUM GLUCONATE 1 G: 20 INJECTION, SOLUTION INTRAVENOUS at 11:02

## 2024-02-27 RX ADMIN — PANTOPRAZOLE SODIUM 40 MG: 40 TABLET, DELAYED RELEASE ORAL at 08:02

## 2024-02-27 RX ADMIN — DIPHENHYDRAMINE HYDROCHLORIDE 25 MG: 25 CAPSULE ORAL at 02:02

## 2024-02-27 RX ADMIN — DIPHENHYDRAMINE HYDROCHLORIDE 25 MG: 25 CAPSULE ORAL at 08:02

## 2024-02-27 NOTE — CONSULTS
[facility] 6[facility] Medical Telemetry  Nephrology  Consult Note    Patient Name: Nicole Brown  MRN: [id number]  Admission Date: 2/26/2024  Hospital Length of Stay: 1 days  Attending Provider: Britney Downey MD   Primary Care Physician: Yaritza Browne FNP  Principal Problem:TTP (thrombotic thrombocytopenic purpura)    Consults  Subjective:     HPI: This is a 48-year-old AAF with TTP diagnosed on 2015 with multiple relapses treated with plasmapheresis. She presented to the ED with complaints of fatigue, nausea, vomiting and diffuse abdominal cramping with dark urine for 3-4 days. She received 1 unit platelets on 2/25 and started Dexamethasone 2/26. Dr Malik with hematology was consulted and recommended plasmapheresis. Nephrology was consulted for plasmapheresis.    Past Medical History:   Diagnosis Date    Anemia     Arthritis     Fibromyalgia     Hypercholesterolemia     Hypertension     Migraines     Personal history of colonic polyps 09/05/2023    colonoscopy/polypectomy    Thrombocytopenia, unspecified     TTP (thrombotic thrombocytopenic purpura)     TTP (thrombotic thrombocytopenic purpura)        Past Surgical History:   Procedure Laterality Date    CARPAL TUNNEL RELEASE      COLONOSCOPY W/ BIOPSIES AND POLYPECTOMY  09/05/2023    Deepak De Luna MD 5 years    Right shouler surgery      SURGICAL REMOVAL OF ENDOMETRIOSIS         Review of patient's allergies indicates:   Allergen Reactions    Nsaids (non-steroidal anti-inflammatory drug) Other (See Comments)     Causes ulcers to bleed.    Ibuprofen     Latex     Meloxicam     Nsaids (non-steroidal anti-inflammatory drug) Nausea Only     Current Facility-Administered Medications   Medication Frequency    0.9%  NaCl infusion (for blood administration) Q24H PRN    0.9%  NaCl infusion (for blood administration) PRN    0.9%  NaCl infusion (for blood administration) Q24H PRN    0.9%  NaCl infusion Continuous    acetaminophen tablet 650 mg  Q4H PRN    aluminum-magnesium hydroxide-simethicone 200-200-20 mg/5 mL suspension 30 mL QID PRN    calcium gluconate 1 g in NS IVPB (premixed) Q1H    calcium-vitamin D3 500 mg-5 mcg (200 unit) per tablet 2 tablet BID    cefTRIAXone (Rocephin) 1 g in dextrose 5 % in water (D5W) 100 mL IVPB (MB+) Q24H    diphenhydrAMINE capsule 25 mg Q6H PRN    folic acid tablet 1 mg Daily    heparin (porcine) injection 1,000 Units PRN    HYDROcodone-acetaminophen 5-325 mg per tablet 1 tablet Q6H PRN    melatonin tablet 6 mg Nightly PRN    morphine injection 2 mg Q4H PRN    multivitamin tablet Daily    mupirocin 2 % ointment BID    ondansetron injection 4 mg Q4H PRN    pantoprazole EC tablet 40 mg Daily    polyethylene glycol packet 17 g BID PRN    predniSONE tablet 109 mg Daily    prochlorperazine injection Soln 5 mg Q6H PRN    senna-docusate 8.6-50 mg per tablet 2 tablet BID PRN    sodium chloride 0.9% flush 10 mL PRN     Family History       Problem Relation (Age of Onset)    Arthritis Mother    Asthma Sister    Depression Mother    Epilepsy Brother    Hypertension Mother    Intellectual disability Brother    Stroke Maternal Grandfather    Thyroid disease Sister          Tobacco Use    Smoking status: Never    Smokeless tobacco: Never   Substance and Sexual Activity    Alcohol use: Not Currently     Comment: Last drink @ 20    Drug use: Never    Sexual activity: Not Currently     Partners: Male     Birth control/protection: Abstinence     Review of Systems   Constitutional:  Negative for chills and fever.   Gastrointestinal:  Positive for abdominal pain, nausea and vomiting.     Objective:     Vital Signs (Most Recent):  Temp: 97.9 °F (36.6 °C) (02/27/24 0728)  Pulse: 73 (02/27/24 0728)  Resp: 20 (02/27/24 0819)  BP: 138/85 (02/27/24 0728)  SpO2: 97 % (02/27/24 0728) Vital Signs (24h Range):  Temp:  [97.5 °F (36.4 °C)-97.9 °F (36.6 °C)] 97.9 °F (36.6 °C)  Pulse:  [69-90] 73  Resp:  [15-20] 20  SpO2:  [97 %-100 %] 97 %  BP:  (114-138)/(67-85) 138/85     Weight: 108.9 kg (240 lb 1.3 oz) (02/26/24 2223)  Body mass index is 38.75 kg/m².  Body surface area is 2.25 meters squared.    No intake/output data recorded.    Physical Exam  Constitutional:       General: She is not in acute distress.     Appearance: She is obese.   HENT:      Head: Atraumatic.      Nose: Nose normal.      Mouth/Throat:      Mouth: Mucous membranes are moist.   Eyes:      Extraocular Movements: Extraocular movements intact.      Conjunctiva/sclera: Conjunctivae normal.   Neck:      Comments: R IJ temporary dialysis catheter   Cardiovascular:      Rate and Rhythm: Normal rate and regular rhythm.      Heart sounds: Normal heart sounds.   Pulmonary:      Breath sounds: Normal breath sounds.   Abdominal:      General: There is no distension.      Palpations: Abdomen is soft.   Musculoskeletal:         General: No swelling.      Cervical back: Neck supple.   Skin:     General: Skin is warm.   Neurological:      General: No focal deficit present.      Mental Status: She is alert and oriented to person, place, and time.         Significant Labs:  BMP:   Recent Labs   Lab 02/27/24  0350      K 3.6   CO2 24   BUN 27.6*   CREATININE 0.89   CALCIUM 8.6   MG 2.30     CBC:   Recent Labs   Lab 02/27/24  0350   WBC 7.37   RBC 4.40   HGB 9.7*   HCT 29.7*   PLT 12*   MCV 67.5*   MCH 22.0*   MCHC 32.7*     Microbiology Results (last 7 days)       ** No results found for the last 168 hours. **          Specimen (24h ago, onward)      None          Recent Labs   Lab 02/25/24  1150   PROTEINUA 4+*   BACTERIA Moderate*   LEUKOCYTESUR Negative   HYALINECASTS 3-5*       Significant Imaging:  X-Ray Chest 1 View [[id number]] Resulted: 02/27/24 0725   Order Status: Completed Updated: 02/27/24 0727   Narrative:     EXAMINATION:  XR CHEST 1 VIEW    CLINICAL HISTORY:  central line placement;    TECHNIQUE:  Single view of the chest    COMPARISON:  02/26/2024    FINDINGS:  Limited evaluation  secondary to positioning.  Right-sided central line projects over the mid SVC.   Impression:       As above.      Electronically signed by: Sathya Muñoz  Date: 02/27/2024  Time: 07:25       Assessment/Plan:     Relapsed Thrombotic thrombocytopenic purpura       Plasmapheresis initiated per hematologist request. Continue daily until directed otherwise per hematology. We will monitor closely with daily Fibrinogen, ionized calcium and LDH    ROSY Turcios  Nephrology  [facility] - 6th Floor Medical Telemetry

## 2024-02-27 NOTE — PROGRESS NOTES
"Patient requested to terminate PLEX due to itching. Pt stated that she does feel "Itchy" while getting treatment in the past. Benadryl 25mg PO was given 25min prior to start of treatment.    Pt started complaining of itching once 2.5L of FFP exchanged. Once Patient got to 3L of exchange, Pt state she wanted to terminate treatment. 3.5 units of FFP was discarded due to treatment being terminated early. Dr Rene Notified of pt symptoms. Nurse notified of Symptoms of itching and c/o nausea. Will try to call MD for Benadryl. Treatment time lasted 110mins.        02/27/24 1339   Vitals   Temp 97.8 °F (36.6 °C)   Temp Source Oral   Pulse 82   Resp 20   BP (!) 142/94   Machine Check   Completed? Yes   Model spectra optia   Pre-Apheresis   Start Time 1124   Time-Out Yes   2 Patient Identifiers Verified Yes   Consents Verified Yes   Orders on Chart Yes   Physician Available Yes   Disposables   Apheresis Kit Lot # [id number]   Apheresis Kit [facility] terumo   Apheresis Kit Exp 07/01/25   Apheresis Fluid/Transfusion   Procedure Type Plasmapheresis   Procedure # 1   Optia Run Results   AC used 512   Remove bag 3568   Replacement Used 2967   Rinse back 126   Fluid balance (mL) 34   Fluid Balance (%) 101   Inlet processed 5121   Plasma volumes exchanged 0.8   Plasma removed 3071   AC in remove bag 475   AC to patient 37   AC used for prime 19   Post-Apheresis   Finish Time 1315   Access Functioned Well   Reaction Itching;Nausea   Tolerance   (terminated early due to itching)   Assessments (Pre/Post)   Level of Consciousness (AVPU) alert   Cognitive   Orientation oriented x 4       "

## 2024-02-27 NOTE — NURSING
Call to Dr. Malik to notify that ordered IV [id number] did not do a lot, patient with hives on all bilateral upper and lower extremeties, back, abdomen and trunk and continues to complain of a lump in her throat with wheezing. New order for IV demerol and solumedrol. Will continued to monitor closely.

## 2024-02-27 NOTE — PROVIDER PROGRESS NOTES - EMERGENCY DEPT.
Encounter Date: 2/25/2024    ED Physician Progress Notes        8:55 PM    At this time pt AAOx3, VS stable. Repeat labs reveal decrease platelets from 10 to 5 after 1 apheresis transfusion. Decadron 40 mg IV and another platelet apheresis ordered.   Noticed also decrease in hemoglobin and slight increase in Bili concerning for some hemolysis.   Pt have been pending for transfer for hematology management, in ED Hold for 40 hrs.   Case discuss with [facility] due to worsening of condition concern for TTP.   Patient will be transfer to [facility], despite their diversion status, due to worsening condition with life threatening event requiring emergent hematology intervention and plasmapheresis.   At this facility we do not have hematology, neither plasmapheresis capability.     never

## 2024-02-27 NOTE — NURSING
Nurses Note -- 4 Eyes      2/27/2024   4:53 AM      Skin assessed during: Admit      [x] No Altered Skin Integrity Present    []Prevention Measures Documented      [] Yes- Altered Skin Integrity Present or Discovered   [] LDA Added if Not in Epic (Describe Wound)   [] New Altered Skin Integrity was Present on Admit and Documented in LDA   [] Wound Image Taken    Wound Care Consulted? No    Attending Nurse:  Vaishali Lemon RN/Staff Member:   Js River LPN

## 2024-02-27 NOTE — NURSING
Patient arrives back from dialysis s/p plasmapheresis with complaints of increased itching and lump in her throat. Patient complains also of mid sternal chest pain. Patient with active expiratory wheeze heard/noted- O2 sat at 98%. Vitals obtained- hives noted to bilateral upper arms and back- red and raised. Call to Dr. Malik and Dr. Samuels to make aware and request intervention. New orders noted.

## 2024-02-27 NOTE — H&P
[facility] Hospital Medicine - H&P Note    Patient Name: Nicole Brown  MRN: [id number]  PCP: Yaritza Browne FNP  Admitting Physician: Britney Downey MD  Admission Class: IP- Inpatient   Date of Service: 02/26/2024  Code status: Full    Chief Complaint   Transfer from [facility] ED for TTP relapse    History of Present Illness   This is a 48-year-old female medical history of anemia of chronic disease, rheumatoid arthritis started azathioprine January 2024, HTN, HLD, migraine with aura, GIAN, morbid obesity, history of thrombotic thrombocytopenic purpura-TTP initially diagnosed in 2015 and treated with therapeutic plasma exchange, steroid and rituximab 3 doses subsequently had her 1st relapsed in 2018 and treated with same regimen and a 2nd relapsed in January 2020 where she presented to [facility] with abdominal pain and fatigue, found to to have relapse (I do not have record of [id number] activity) and treated with plasma exchange and 4 doses of rituximab while inpatient as well as prednisone taper and subsequently discharged on monthly rituximab where she received 3 doses.    Presented to [facility] ED on 02/25/2024 with chief complaint of fatigue, nausea, vomiting and diffuse abdominal cramping and dark urine for for 3-4 days.  On arrival to ED she was noted to be afebrile and hemodynamically stable.  Initial labs notable for WBC 6.36, hemoglobin 12.4, platelets 5, peripheral smear show few schistocytes and few small platelets compatible with history of TTP, INR 1.1, PTT 30.5, fibrinogen more than 400, , haptoglobin <8, indirect hyperbilirubinemia 2.9, urinalysis showed microscopic hematuria.  CT abdomen pelvis with contrast showed nonspecific retroperitoneal edema and mild perinephric stranding.     She received 1 unit of platelets on 02/25/2024, subsequent CBC trend show platelets trending down as well as hemoglobin, received dexamethasone 40 mg IV 02/26/2024 and a 2nd  unit of platelets and subsequently transferred to [facility] for higher level of care.    Upon my evaluation she is resting comfortably, report abdominal pain mainly in the epigastric region feels like burning/cramping, denies hematemesis, melena or hematochezia.  Report dark urine but no burning or painful urination or increased frequency.  Noted to have few petechiae on all her extremities, trunk and palate.  Report no new neurological symptoms she does have migraine headaches but nothing new, note vision changes, no extremity weakness though she report neuropathy symptoms to her hands and feet that has been chronic.    ROS   Except as documented, all other systems reviewed and negative     Past Medical History   Thrombotic thrombocytopenic purpura-TTP   Initial diagnosis in 2015  Treated with plasma exchange, steroid and rituximab x3 doses at [facility]  First relapse in  2018  Treated with plasma exchange, steroid and rituximab at [facility]  Second  relapse January 2020  Presented with abdominal pain and fatigue  Admitted at [facility] received plasma exchange x 3, prednisone taper, rituximab x4 doses while inpatient followed by monthly infusion x3 completed in April 2020  Follows with Hematology Dr. Josselyn Perez  Rheumatoid arthritis (Anti CCP+ , RF +, negative SETH by IFA)  Previously on hydroxychloroquine, discontinued and started azathioprine January 2024  Knee osteoarthritis  Hypertension  Hyperlipidemia  Migraine with aura  GIAN  Morbid obesity    Past Surgical History   Carpal tunnel release  Right shoulder surgery  Laparoscopic Surgical removal of endometriosis  Temporary HD line placement for plasmapheresis x3  Breast biopsy    Social History   Never smoker, no alcohol or illicit drug use  Retired CNA    Family History   Reviewed and negative    Allergies   Nsaids (non-steroidal anti-inflammatory drug), Ibuprofen, Latex, Meloxicam    Home Medications     Prior to Admission medications    Medication Sig  Start Date End Date Taking? Authorizing Provider   albuterol (PROVENTIL) 2.5 mg /3 mL (0.083 %) nebulizer solution USE 1 VIAL IN NEBULIZER EVERY 8 HOURS AS NEEDED 7/25/23   Yaritza Browne FNP   azaTHIOprine (IMURAN) 50 mg Tab Start Imuran 50 mg daily and after 1 week increase dose to 100 mg daily.  Hold for infection or fever. 1/23/24   Keshav Gibson MD   baclofen (LIORESAL) 5 mg Tab tablet Take 1 tablet (5 mg total) by mouth 2 (two) times daily as needed (tension). 10/5/23   Kristina Boykin ANP   cetirizine (ZYRTEC) 10 MG tablet Take 10 mg by mouth once daily. 6/7/23   Provider, Historical   cloNIDine (CATAPRES) 0.1 MG tablet Take 1 tablet by mouth daily as needed. 1/6/23   Provider, Historical   DULERA 100-5 mcg/actuation HFAA Inhale 1 puff into the lungs 2 (two) times daily. 2/8/24   Yaritza Browne FNP   EMGALITY  mg/mL PnIj AS DIRECTED SUBCUTANEOUS MONTHLY 30 DAY(S) 1/11/24   Kristina Boykin ANP   ergocalciferol (ERGOCALCIFEROL) 50,000 unit Cap Take 1 capsule (50,000 Units total) by mouth every 7 days. 5/23/23   Yaritza Browne FNP   ezetimibe (ZETIA) 10 mg tablet Take 1 tablet (10 mg total) by mouth every evening. 2/8/24   Yaritza Browne FNP   fluticasone propionate (FLONASE) 50 mcg/actuation nasal spray 2 sprays (100 mcg total) by Each Nostril route daily as needed for Allergies or Rhinitis. 5/23/23   Yaritza Browne FNP   gabapentin (NEURONTIN) 600 MG tablet Take 600 mg by mouth 2 (two) times daily. 11/20/23   Provider, Historical   isosorbide dinitrate (ISORDIL) 40 MG Tab Take 1 tablet (40 mg total) by mouth once daily. 2/8/24   Yaritza Browne FNP   LINZESS 145 mcg Cap capsule Take 145 mcg by mouth once daily. 7/19/23   Provider, Historical   metoprolol succinate (TOPROL-XL) 100 MG 24 hr tablet Take 1 tablet (100 mg total) by mouth every evening. 10/10/23   Yaritza Browne, BHAVANA   nitroGLYCERIN (NITROSTAT) 0.4 MG SL tablet Place 1 tablet (0.4 mg total) under the tongue every  5 (five) minutes as needed for Chest pain. 11/1/23   Nathalia Cherry, GAYLE   omeprazole (PRILOSEC) 40 MG capsule Take 1 capsule (40 mg total) by mouth once daily. 2/8/24   Yaritza Browne FNP   ondansetron (ZOFRAN) 8 MG tablet TAKE 1 TABLET BY MOUTH EVERY 8 HOURS AS NEEDED FOR NAUSEA AND VOMITING 7/25/23   Yaritza Browne FNP   RESTASIS 0.05 % ophthalmic emulsion Place 1 drop into both eyes 2 (two) times daily. 12/28/23   Provider, Historical   UBRELVY 100 mg tablet Take 1 tablet (100 mg total) by mouth 2 (two) times daily as needed for Migraine. 10/5/23   Kristina Boykin ANP   valsartan (DIOVAN) 80 MG tablet Take 1 tablet (80 mg total) by mouth once daily. 10/10/23 10/9/24  Yaritza Browne FNP   venlafaxine (EFFEXOR-XR) 37.5 MG 24 hr capsule Take 1 capsule (37.5 mg total) by mouth once daily. 10/5/23 10/4/24  Kristina Boykin ANP   zavegepant 10 mg/actuation Spry 10 mg by Nasal route daily as needed (Migraine). 1/11/24   Kristina Boykin ANP        Physical Exam   Vital Signs  Temp:  [97.8 °F (36.6 °C)]   Pulse:  [70-90]   Resp:  [15-20]   BP: (114-137)/(67-85)   SpO2:  [98 %-100 %]    General: Appears comfortable  HEENT: NC/AT, oral petechiae  Neck:  No JVD  Chest: CTABL  CVS: Regular rhythm. Normal S1/S2.  No pedal edema  Abdomen: nondistended, normoactive BS, soft and non-tender.  MSK: No obvious deformity or joint swelling  Skin: Warm and dry, petechia  Neuro: AAOx3, no focal neurological deficit  Psych: Cooperative    Labs     Recent Labs     02/26/24  0709 02/26/24  1916   WBC 8.47 5.88   RBC 5.13 4.41   HGB 11.3* 9.9*   HCT 35.5* 30.4*   MCV 69.2* 68.9*   MCH 22.0* 22.4*   MCHC 31.8* 32.6*   RDW 17.4* 17.5*   PLT 10* 6*     Recent Labs     02/26/24  0138 02/26/24  0158   PROTIME 14.1*  --    INR 1.1  --    PTT 33.6  --    D-DIMER 7.45*  --    *  --    PERIPSMEAREV  --  RBC show microcytosis, hypochromasia, few schistocytes and target cells, increased reticulocytes.  WBC morphology is  within normal limits.  No blasts or significant dysplasia is identified.  Few small platelets are identified.  Compatible with history of TTP. AYALA Soto MD        Recent Labs     02/25/24  1201 02/26/24  0709 02/26/24  1916    141 144   K 3.3* 3.7 3.5   CHLORIDE 110* 108* 109*   CO2 22 23 27   BUN 24.9* 30.5* 30.9*   CREATININE 1.36* 1.21* 1.13*   EGFRNORACEVR 48 55 60   GLUCOSE 108* 122* 110*   CALCIUM 8.6 8.8 8.4   ALBUMIN 3.3* 3.4* 3.3*   GLOBULIN 3.8* 3.6* 3.2   ALKPHOS 117 115 104   ALT 10 14 12   AST 25 28 26   BILITOT 4.1*  4.1* 3.1* 3.2*   BILIDIR 1.2*  --   --    LIPASE 27  --   --      Recent Labs     02/25/24  1426   LACTIC 0.9     Recent Labs     02/25/24  1201   TROPONINI 0.033        Microbiology Results (last 7 days)       ** No results found for the last 168 hours. **           Imaging     X-Ray Chest 1 View  Narrative: EXAMINATION:  XR CHEST 1 VIEW    CLINICAL HISTORY:  thrombocytopenia;    TECHNIQUE:  Single view of the chest    COMPARISON:  09/20/2023    FINDINGS:  No focal opacification, pleural effusion, or pneumothorax.    The cardiomediastinal silhouette is within normal limits.    No acute osseous abnormality.  Impression: No acute cardiopulmonary process.    Electronically signed by: Sathya Muñoz  Date:    02/26/2024  Time:    06:33    Assessment   TTP-Thrombotic thrombocytopenic purpura, 4th relapse  Microangiopathic hemolytic anemia  Acute kidney injury  Questionable UTI  Rheumatoid arthritis on azathioprine    History of HTN, HLD, morbid obesity, GIAN, knee osteoarthritis      Plan   Prednisone 1 mg/kg Q 24 hours  [id number] activity -sent in process/pending  Repeat autoimmune panel (SETH by IFA, C3, C4), hepatitis panel, hepatitis-B core antibody total, and HIV  MRI brain with any neurological change  NS at 100 mL/hour  General surgery consult for temporary dialysis line placement  Nephrology consult start plasma exchange  Hematology consult  Urine culture, ceftriaxone 1 g IV daily  especially given immunocompromised and currently on high-dose steroid.  Calcium and folic acid supplementation  GI prophylaxis PPI p.o. daily  VTE Prophylaxis:  SCDs     Critical care time: 40  minutes  Critical care diagnosis:  TTP, critical thrombocytopenia    Britney Downey MD  Internal Medicine

## 2024-02-27 NOTE — CONSULTS
Hematology/Oncology  Consult Note    Patient Name: Nicole Brown  MRN: [id number]  Admission Date: 2/26/2024  Hospital Length of Stay: 1 days  Attending Provider: Britney Downey MD  Consulting Provider: Julian Malik MD  Principal Problem:TTP (thrombotic thrombocytopenic purpura)    Inpatient consult to Hematology  Consult performed by: Julian Malik MD  Consult ordered by: Britney Downey MD        Subjective:     HPI:  48-year-old black female with a history of TTP initially diagnosed in 2015.  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  She has been followed by a hematologist in [place].  She relocated to [place] to live with her daughter approximately 8 months ago.  She presented to the ER 2/25/24 with low-grade fever, nausea/vomiting and abdominal discomfort.  She denied overt diarrhea.  Laboratory on presentation showed a platelet count of 5000.  Hemoglobin was 12.4 with microcytic, hypochromic indices and WBC 6.4.  LDH was elevated at 711 and haptoglobin was undetectable.  UPT was negative.  CMP showed mild renal insufficiency with a BUN of 24.9 and creatinine 1.36.  Peripheral smear showed microcytic, hypochromic red blood cells, increased reticulocytes and the presence of schistocytes and target cells.  Platelets were markedly decreased.  She was transferred to [facility] for initiation of plasmapheresis.  She received 1 unit of platelets at the outside facility prior to transfer.  On arrival here, she was started on prednisone 1 mg/kg daily and received 1 unit of FFP.      Today (2/27/24): Patient awake and alert this morning.  No headaches or confusion.  She is currently afebrile.  Renal function shows interval improvement.  Her platelet count this morning is 12,000.  She is slightly sore in the right neck following placement of a right IJ dialysis catheter.  Orders for plasma exchange have already been entered by Nephrology.  Dialysis waiting for the  plasma to be thawed.      Medications:  Continuous Infusions:   sodium chloride 0.9%       Scheduled Meds:   calcium-vitamin D3  2 tablet Oral BID    cefTRIAXone (Rocephin) IV (PEDS and ADULTS)  1 g Intravenous Q24H    folic acid  1 mg Oral Daily    multivitamin  1 tablet Oral Daily    mupirocin   Nasal BID    pantoprazole  40 mg Oral Daily    predniSONE  1 mg/kg Oral Daily     PRN Meds:0.9%  NaCl infusion (for blood administration), 0.9%  NaCl infusion (for blood administration), 0.9%  NaCl infusion (for blood administration), acetaminophen, aluminum-magnesium hydroxide-simethicone, diphenhydrAMINE, heparin (porcine), HYDROcodone-acetaminophen, melatonin, morphine, ondansetron, polyethylene glycol, prochlorperazine, senna-docusate 8.6-50 mg, sodium chloride 0.9%     Review of patient's allergies indicates:   Allergen Reactions    Nsaids (non-steroidal anti-inflammatory drug) Other (See Comments)     Causes ulcers to bleed.    Ibuprofen     Latex     Meloxicam     Nsaids (non-steroidal anti-inflammatory drug) Nausea Only        PMHx:  HTN, hyperlipidemia, rheumatoid arthritis, migraine headaches, TTP  PSHx:  Endometrial ablation, left carpal tunnel release, right shoulder surgery  SH:  Lifetime nonsmoker.  Previous alcohol, none since age 20.  Lives in [place] with her daughter.  Provides  in her home.  FH:  PGF had a CVA.  No family history of cancer.      Review of Systems   Constitutional:  Positive for fatigue and fever. Negative for activity change, appetite change and unexpected weight change.   HENT:  Negative for facial swelling, mouth sores, nosebleeds, sore throat and trouble swallowing.    Eyes: Negative.    Respiratory:  Negative for cough, shortness of breath and wheezing.    Cardiovascular:  Negative for chest pain, palpitations and leg swelling.   Gastrointestinal:  Positive for nausea. Negative for abdominal distention, abdominal pain, constipation, diarrhea and vomiting.   Genitourinary:   Negative for dysuria, frequency, hematuria and urgency.   Musculoskeletal:  Positive for arthralgias. Negative for back pain.   Skin:  Negative for pallor and rash.   Neurological:  Positive for dizziness. Negative for seizures, weakness, numbness and headaches.   Hematological:  Negative for adenopathy. Does not bruise/bleed easily.   Psychiatric/Behavioral: Negative.       Objective:     Vital Signs (Most Recent):  Temp: 97.9 °F (36.6 °C) (02/27/24 0728)  Pulse: 73 (02/27/24 0728)  Resp: 18 (02/27/24 0728)  BP: 138/85 (02/27/24 0728)  SpO2: 97 % (02/27/24 0728) Vital Signs (24h Range):  Temp:  [97.5 °F (36.4 °C)-97.9 °F (36.6 °C)] 97.9 °F (36.6 °C)  Pulse:  [69-90] 73  Resp:  [14-20] 18  SpO2:  [97 %-100 %] 97 %  BP: (112-138)/(67-89) 138/85     Weight: 108.9 kg (240 lb 1.3 oz)  Body surface area is 2.25 meters squared.      Physical Exam  Constitutional:       Comments: Mildly obese black female in NAD   HENT:      Head: Normocephalic.      Mouth/Throat:      Mouth: Mucous membranes are moist.      Pharynx: Oropharynx is clear. No posterior oropharyngeal erythema.   Eyes:      General: No scleral icterus.     Extraocular Movements: Extraocular movements intact.      Pupils: Pupils are equal, round, and reactive to light.   Neck:      Comments: Right IJ dialysis catheter  Cardiovascular:      Rate and Rhythm: Normal rate and regular rhythm.      Heart sounds: No murmur heard.  Pulmonary:      Comments: Lungs clear to auscultation  Abdominal:      General: Bowel sounds are normal. There is no distension.      Palpations: Abdomen is soft.      Tenderness: There is no abdominal tenderness.      Comments: Mildly obese.  No palpable masses or organomegaly   Musculoskeletal:         General: No swelling or tenderness. Normal range of motion.      Cervical back: Neck supple. No tenderness.   Lymphadenopathy:      Cervical: No cervical adenopathy.   Skin:     General: Skin is warm and dry.      Findings: No rash.       Comments: No petechiae   Neurological:      General: No focal deficit present.      Mental Status: She is alert and oriented to person, place, and time.      Cranial Nerves: No cranial nerve deficit.      Motor: No weakness.       Significant Labs:   CBC:   Recent Labs   Lab 02/26/24 1916 02/26/24 2218 02/27/24  0350   WBC 5.88 8.88 7.37   HGB 9.9* 10.4* 9.7*   HCT 30.4* 31.6* 29.7*   PLT 6* 24* 12*   , CMP:   Recent Labs   Lab 02/26/24 1916 02/26/24 2218 02/27/24  0350    142 141   K 3.5 3.6 3.6   CO2 27 23 24   BUN 30.9* 29.9* 27.6*   CREATININE 1.13* 1.09* 0.89   CALCIUM 8.4 8.8 8.6   ALBUMIN 3.3* 3.5 3.6   BILITOT 3.2* 3.7* 3.2*   ALKPHOS 104 114 108   AST 26 28 27   ALT 12 13 14      Latest Reference Range & Units 02/27/24 03:50   Lactate Dehydrogenase 125 - 220 U/L 871 (H)      Latest Reference Range & Units 02/27/24 03:50   Haptoglobin 35 - 250 mg/dL <8 (L)       Diagnostic Results:  Chest x-ray reviewed.    Impression/Recommendations:   IMPRESSION  Relapsed thrombotic thrombocytopenic purpura  Microangiopathic hemolytic anemia    RECOMMENDATIONS  Orders in place to start plasmapheresis today to exchange 1 plasma volume (40 cc/kg) with FFP via right IJ dialysis catheter.  DO NOT TRANSFUSE PLATELETS unless approved by Hematology and clinically significant bleeding.  ADAMTS-13 level submitted from [facility].  Results pending.  Continue prednisone 1 mg/kg daily for now.  Follow daily labs.      NEO LUJAN MD  Hematology/Oncology

## 2024-02-27 NOTE — PROCEDURES
"Nicole Brown is a 48 y.o. female patient.    Temp: 97.8 °F (36.6 °C) (02/26/24 2203)  Pulse: 78 (02/26/24 2203)  Resp: 20 (02/26/24 2203)  BP: 137/78 (02/26/24 2203)  SpO2: 99 % (02/26/24 2203)  Weight: 108.9 kg (240 lb 1.3 oz) (02/26/24 2223)  Height: 5' 6" (167.6 cm) (02/26/24 2223)       Hemodialysis Line    Date/Time: 2/27/2024 12:55 AM    Performed by: Kavin Lyn MD  Authorized by: Kavin Lyn MD    Location procedure was performed:  [facility] CRITICAL CARE SURGERY  Pre-operative diagnosis:  TTP  Post-operative diagnosis:  TTP  Consent Done ?:  Yes  Time out complete?: Verified correct patient, procedure, equipment, staff, and site/side    Indications:  Vascular access (Plasmapheresis)  Anesthesia:  Local infiltration  Local anesthetic:  Lidocaine 1% without epinephrine  Anesthetic total (ml):  5  Preparation:  Skin prepped with ChloraPrep  Skin prep agent dried: Skin prep agent completely dried prior to procedure    Sterile barriers: All five maximal sterile barriers used - gloves, gown, cap, mask and large sterile sheet    Hand hygiene: Hand hygiene performed immediately prior to central venous catheter insertion    Location:  Right internal jugular  Catheter type:  Triple lumen  Catheter size:  12 Fr  Inserted Catheter Length (cm):  16  Ultrasound guidance: Yes    Manometry: Yes    Number of attempts:  1  Securement:  Line sutured, chlorhexidine patch, sterile dressing applied and blood return through all ports  Complications: No    Adverse Events:  NoneTermination Site: superior vena cava      Kavin Lyn MD  [employer] General Surgery      2/27/2024    "

## 2024-02-27 NOTE — PROGRESS NOTES
[facility]  Hospital Medicine Progress Note        Chief Complaint: Inpatient Follow-up     HPI:   48-year-old female medical history of anemia of chronic disease, rheumatoid arthritis started azathioprine January 2024, HTN, HLD, migraine with aura, GAIN, morbid obesity, history of thrombotic thrombocytopenic purpura-TTP initially diagnosed in 2015 and treated with therapeutic plasma exchange, steroid and rituximab 3 doses subsequently had her 1st relapsed in 2018 and treated with same regimen and a 2nd relapsed in January 2020 where she presented to [facility] with abdominal pain and fatigue, found to to have relapse (I do not have record of [id number] activity) and treated with plasma exchange and 4 doses of rituximab while inpatient as well as prednisone taper and subsequently discharged on monthly rituximab where she received 3 doses.     Presented to [facility] ED on 02/25/2024 with chief complaint of fatigue, nausea, vomiting and diffuse abdominal cramping and dark urine for for 3-4 days.  On arrival to ED she was noted to be afebrile and hemodynamically stable.  Initial labs notable for WBC 6.36, hemoglobin 12.4, platelets 5, peripheral smear show few schistocytes and few small platelets compatible with history of TTP, INR 1.1, PTT 30.5, fibrinogen more than 400, , haptoglobin <8, indirect hyperbilirubinemia 2.9, urinalysis showed microscopic hematuria.  CT abdomen pelvis with contrast showed nonspecific retroperitoneal edema and mild perinephric stranding.      She received 1 unit of platelets on 02/25/2024, subsequent CBC trend show platelets trending down as well as hemoglobin, received dexamethasone 40 mg IV 02/26/2024 and a 2nd unit of platelets and subsequently transferred to [facility] for higher level of care.     Upon my evaluation she is resting comfortably, report abdominal pain mainly in the epigastric region feels like burning/cramping, denies hematemesis, melena or  hematochezia.  Report dark urine but no burning or painful urination or increased frequency.  Noted to have few petechiae on all her extremities, trunk and palate.  Report no new neurological symptoms she does have migraine headaches but nothing new, note vision changes, no extremity weakness though she report neuropathy symptoms to her hands and feet that has been chronic.    Interval Hx:  Patient was received 1 unit of FFP.  A line has been placed for initiation of plasmapheresis.  We are waiting on Oncology around this morning to initiate.  She was another unit of FFP currently on hold.  Currently comfortable.  Pain is controlled.    Objective/physical exam:  General: Appears comfortable  HEENT: NC/AT, oral petechiae  Neck:  No JVD  Chest: CTABL  CVS: Regular rhythm. Normal S1/S2.  No pedal edema  Abdomen: nondistended, normoactive BS, soft and non-tender.  MSK: No obvious deformity or joint swelling  Skin: Warm and dry, petechia  Neuro: AAOx3, no focal neurological deficit  Psych: Cooperative    VITAL SIGNS: 24 HRS MIN & MAX LAST   Temp  Min: 97.5 °F (36.4 °C)  Max: 97.9 °F (36.6 °C) 97.8 °F (36.6 °C)   BP  Min: 114/67  Max: 142/94 (!) 142/94   Pulse  Min: 69  Max: 86  82   Resp  Min: 16  Max: 20 20   SpO2  Min: 97 %  Max: 100 % 97 %       Recent Labs   Lab 02/26/24  0709 02/26/24 1916 02/26/24 2218 02/27/24  0350   WBC 8.47 5.88 8.88 7.37   RBC 5.13 4.41 4.59 4.40   HGB 11.3* 9.9* 10.4* 9.7*   HCT 35.5* 30.4* 31.6* 29.7*   MCV 69.2* 68.9* 68.8* 67.5*   MCH 22.0* 22.4* 22.7* 22.0*   MCHC 31.8* 32.6* 32.9* 32.7*   RDW 17.4* 17.5* 18.0* 18.2*   PLT 10* 6* 24* 12*   MPV 0.0*  --   --   --        Recent Labs   Lab 02/26/24 1916 02/26/24 2218 02/27/24  0350    142 141   K 3.5 3.6 3.6   CO2 27 23 24   BUN 30.9* 29.9* 27.6*   CREATININE 1.13* 1.09* 0.89   CALCIUM 8.4 8.8 8.6   MG  --   --  2.30   ALBUMIN 3.3* 3.5 3.6   ALKPHOS 104 114 108   ALT 12 13 14   AST 26 28 27   BILITOT 3.2* 3.7* 3.2*           Microbiology Results (last 7 days)       ** No results found for the last 168 hours. **             Radiology:  X-Ray Chest 1 View  Narrative: EXAMINATION:  XR CHEST 1 VIEW    CLINICAL HISTORY:  central line placement;    TECHNIQUE:  Single view of the chest    COMPARISON:  02/26/2024    FINDINGS:  Limited evaluation secondary to positioning.  Right-sided central line projects over the mid SVC.  Impression: As above.    Electronically signed by: Sathya Muñoz  Date:    02/27/2024  Time:    07:25      Scheduled Med:   calcium gluconate IVPB  1 g Intravenous Q1H    calcium-vitamin D3  2 tablet Oral BID    cefTRIAXone (Rocephin) IV (PEDS and ADULTS)  1 g Intravenous Q24H    folic acid  1 mg Oral Daily    multivitamin  1 tablet Oral Daily    mupirocin   Nasal BID    pantoprazole  40 mg Oral Daily    predniSONE  1 mg/kg Oral Daily        Continuous Infusions:   sodium chloride 0.9%          PRN Meds:  0.9%  NaCl infusion (for blood administration), 0.9%  NaCl infusion (for blood administration), 0.9%  NaCl infusion (for blood administration), acetaminophen, aluminum-magnesium hydroxide-simethicone, diphenhydrAMINE, heparin (porcine), HYDROcodone-acetaminophen, melatonin, morphine, ondansetron, polyethylene glycol, prochlorperazine, senna-docusate 8.6-50 mg, sodium chloride 0.9%       Assessment/Plan:   TTP-Thrombotic thrombocytopenic purpura, 4th relapse  Microangiopathic hemolytic anemia  Acute kidney injury  Questionable UTI  Rheumatoid arthritis on azathioprine     History of HTN, HLD, morbid obesity, GIAN, knee osteoarthritis    Patient was started on pulse dose steroids.  Oncology to initiate plasmapheresis for TTP.  Will hold off on further transfusions until oncology has a chance to round.  General surgery he was able to place a temporary Vas-Cath for initiation of plasmapheresis.  Renal consulted as well.  Will continue with empiric antibiotics for now due to immunocompromise state.  Will follow up urine  cultures and adjust antibiotics as needed.  Protonix for GI prophylaxis.    Update 1400  Oncology has made rounds.  Started on plasmapheresis will plan to exchange 1 unit of plasma.  Holding off on further transfusion of platelets.  Continue with pulse dose steroids.  Nephrology following along to 4 actual exchanges.    After completion of exchange this morning she was started having acute chest pain.  Checking a EKG and stat set of enzymes.  Also get a chest x-ray    Critical care diagnosis: TTP requiring plasmapheresis  Critical care interventions: hands on evaluation, review of labs/radiographs/records and discussions with family  Critical care time spent: >32 minutes      Rodri Samuels MD   02/27/2024     All diagnosis and differential diagnosis have been reviewed; assessment and plan has been documented; I have personally reviewed the labs and test results that are presently available; I have reviewed the patients medication list; I have reviewed the consulting providers response and recommendations. I have reviewed or attempted to review medical records based upon their availability    All of the patient's questions have been  addressed and answered. Patient's is agreeable to the above stated plan. I will continue to monitor closely and make adjustments to medical management as needed.  _____________________________________________________________________

## 2024-02-28 LAB
ABO + RH BLD: NORMAL
ALBUMIN SERPL-MCNC: 3 G/DL (ref 3.5–5)
ALBUMIN/GLOB SERPL: 1.2 RATIO (ref 1.1–2)
ALP SERPL-CCNC: 77 UNIT/L (ref 40–150)
ALT SERPL-CCNC: 13 UNIT/L (ref 0–55)
ANA SER QL HEP2 SUBST: NORMAL
AST SERPL-CCNC: 19 UNIT/L (ref 5–34)
BASOPHILS # BLD AUTO: 0.02 X10(3)/MCL
BASOPHILS NFR BLD AUTO: 0.2 %
BILIRUB SERPL-MCNC: 1.2 MG/DL
BLD PROD TYP BPU: NORMAL
BLOOD UNIT EXPIRATION DATE: NORMAL
BLOOD UNIT TYPE CODE: 5100
BLOOD UNIT TYPE CODE: 9500
BUN SERPL-MCNC: 19.7 MG/DL (ref 7–18.7)
CALCIUM SERPL-MCNC: 8.2 MG/DL (ref 8.4–10.2)
CHLORIDE SERPL-SCNC: 110 MMOL/L (ref 98–107)
CO2 SERPL-SCNC: 29 MMOL/L (ref 22–29)
CREAT SERPL-MCNC: 0.91 MG/DL (ref 0.55–1.02)
CROSSMATCH INTERPRETATION: NORMAL
DISPENSE STATUS: NORMAL
EOSINOPHIL # BLD AUTO: 0.02 X10(3)/MCL (ref 0–0.9)
EOSINOPHIL NFR BLD AUTO: 0.2 %
ERYTHROCYTE [DISTWIDTH] IN BLOOD BY AUTOMATED COUNT: 18.2 % (ref 11.5–17)
FIBRINOGEN PPP-MCNC: 273 MG/DL (ref 210–463)
GFR SERPLBLD CREATININE-BSD FMLA CKD-EPI: >60 MLS/MIN/1.73/M2
GLOBULIN SER-MCNC: 2.5 GM/DL (ref 2.4–3.5)
GLUCOSE SERPL-MCNC: 111 MG/DL (ref 74–100)
HCT VFR BLD AUTO: 26.3 % (ref 37–47)
HGB BLD-MCNC: 8.5 G/DL (ref 12–16)
IMM GRANULOCYTES # BLD AUTO: 0.09 X10(3)/MCL (ref 0–0.04)
IMM GRANULOCYTES NFR BLD AUTO: 0.8 %
INR PPP: 1.2
LDH SERPL-CCNC: 418 U/L (ref 125–220)
LYMPHOCYTES # BLD AUTO: 2.19 X10(3)/MCL (ref 0.6–4.6)
LYMPHOCYTES NFR BLD AUTO: 19.1 %
MAGNESIUM SERPL-MCNC: 1.9 MG/DL (ref 1.6–2.6)
MCH RBC QN AUTO: 22.1 PG (ref 27–31)
MCHC RBC AUTO-ENTMCNC: 32.3 G/DL (ref 33–36)
MCV RBC AUTO: 68.3 FL (ref 80–94)
MONOCYTES # BLD AUTO: 0.73 X10(3)/MCL (ref 0.1–1.3)
MONOCYTES NFR BLD AUTO: 6.4 %
NEUTROPHILS # BLD AUTO: 8.43 X10(3)/MCL (ref 2.1–9.2)
NEUTROPHILS NFR BLD AUTO: 73.3 %
NRBC BLD AUTO-RTO: 0.3 %
OHS QRS DURATION: 84 MS
OHS QTC CALCULATION: 475 MS
PATH REV: NORMAL
PLATELET # BLD AUTO: 24 X10(3)/MCL (ref 130–400)
PLATELETS.RETICULATED NFR BLD AUTO: 16.6 % (ref 0.9–11.2)
PMV BLD AUTO: ABNORMAL FL
POC IONIZED CALCIUM: 1.07 MMOL/L (ref 1.12–1.23)
POC TEMPERATURE: 37 °C
POTASSIUM SERPL-SCNC: 3.2 MMOL/L (ref 3.5–5.1)
PROT SERPL-MCNC: 5.5 GM/DL (ref 6.4–8.3)
PROTHROMBIN TIME: 14.9 SECONDS (ref 12.5–14.5)
RBC # BLD AUTO: 3.85 X10(6)/MCL (ref 4.2–5.4)
SODIUM SERPL-SCNC: 146 MMOL/L (ref 136–145)
SPECIMEN SOURCE: ABNORMAL
UNIT NUMBER: NORMAL
WBC # SPEC AUTO: 11.48 X10(3)/MCL (ref 4.5–11.5)

## 2024-02-28 PROCEDURE — 85384 FIBRINOGEN ACTIVITY: CPT | Performed by: INTERNAL MEDICINE

## 2024-02-28 PROCEDURE — P9017 PLASMA 1 DONOR FRZ W/IN 8 HR: HCPCS | Performed by: INTERNAL MEDICINE

## 2024-02-28 PROCEDURE — 99233 SBSQ HOSP IP/OBS HIGH 50: CPT | Mod: ,,, | Performed by: INTERNAL MEDICINE

## 2024-02-28 PROCEDURE — 25000003 PHARM REV CODE 250: Performed by: INTERNAL MEDICINE

## 2024-02-28 PROCEDURE — 63600175 PHARM REV CODE 636 W HCPCS: Performed by: INTERNAL MEDICINE

## 2024-02-28 PROCEDURE — 25000003 PHARM REV CODE 250: Mod: JZ,JG | Performed by: NURSE PRACTITIONER

## 2024-02-28 PROCEDURE — 63600175 PHARM REV CODE 636 W HCPCS: Mod: JZ,JG | Performed by: NURSE PRACTITIONER

## 2024-02-28 PROCEDURE — 99900035 HC TECH TIME PER 15 MIN (STAT)

## 2024-02-28 PROCEDURE — 85025 COMPLETE CBC W/AUTO DIFF WBC: CPT | Performed by: INTERNAL MEDICINE

## 2024-02-28 PROCEDURE — 36600 WITHDRAWAL OF ARTERIAL BLOOD: CPT

## 2024-02-28 PROCEDURE — 36430 TRANSFUSION BLD/BLD COMPNT: CPT

## 2024-02-28 PROCEDURE — 83615 LACTATE (LD) (LDH) ENZYME: CPT | Performed by: INTERNAL MEDICINE

## 2024-02-28 PROCEDURE — 36514 APHERESIS PLASMA: CPT

## 2024-02-28 PROCEDURE — 82803 BLOOD GASES ANY COMBINATION: CPT

## 2024-02-28 PROCEDURE — 80053 COMPREHEN METABOLIC PANEL: CPT | Performed by: INTERNAL MEDICINE

## 2024-02-28 PROCEDURE — P9017 PLASMA 1 DONOR FRZ W/IN 8 HR: HCPCS

## 2024-02-28 PROCEDURE — 21400001 HC TELEMETRY ROOM

## 2024-02-28 PROCEDURE — P9045 ALBUMIN (HUMAN), 5%, 250 ML: HCPCS | Mod: JZ,JG | Performed by: NURSE PRACTITIONER

## 2024-02-28 PROCEDURE — 85610 PROTHROMBIN TIME: CPT | Performed by: INTERNAL MEDICINE

## 2024-02-28 PROCEDURE — 83735 ASSAY OF MAGNESIUM: CPT | Performed by: INTERNAL MEDICINE

## 2024-02-28 RX ORDER — DIPHENHYDRAMINE HYDROCHLORIDE 50 MG/ML
50 INJECTION INTRAMUSCULAR; INTRAVENOUS ONCE
Status: COMPLETED | OUTPATIENT
Start: 2024-02-28 | End: 2024-02-28

## 2024-02-28 RX ORDER — ALBUMIN HUMAN 50 G/1000ML
90 SOLUTION INTRAVENOUS ONCE
Status: COMPLETED | OUTPATIENT
Start: 2024-02-28 | End: 2024-02-28

## 2024-02-28 RX ORDER — HYDROCODONE BITARTRATE AND ACETAMINOPHEN 500; 5 MG/1; MG/1
TABLET ORAL
Status: DISCONTINUED | OUTPATIENT
Start: 2024-02-28 | End: 2024-03-06 | Stop reason: HOSPADM

## 2024-02-28 RX ORDER — CALCIUM GLUCONATE 20 MG/ML
1 INJECTION, SOLUTION INTRAVENOUS
Status: DISPENSED | OUTPATIENT
Start: 2024-02-28 | End: 2024-02-28

## 2024-02-28 RX ORDER — CETIRIZINE HYDROCHLORIDE 10 MG/1
10 TABLET ORAL DAILY
Status: DISCONTINUED | OUTPATIENT
Start: 2024-02-28 | End: 2024-03-06 | Stop reason: HOSPADM

## 2024-02-28 RX ADMIN — LINACLOTIDE 145 MCG: 145 CAPSULE, GELATIN COATED ORAL at 05:02

## 2024-02-28 RX ADMIN — PANTOPRAZOLE SODIUM 40 MG: 40 TABLET, DELAYED RELEASE ORAL at 09:02

## 2024-02-28 RX ADMIN — ALBUMIN (HUMAN) 90 G: 12.5 SOLUTION INTRAVENOUS at 12:02

## 2024-02-28 RX ADMIN — DIPHENHYDRAMINE HYDROCHLORIDE 25 MG: 25 CAPSULE ORAL at 05:02

## 2024-02-28 RX ADMIN — Medication 2 TABLET: at 08:02

## 2024-02-28 RX ADMIN — Medication 2 TABLET: at 09:02

## 2024-02-28 RX ADMIN — THERA TABS 1 TABLET: TAB at 09:02

## 2024-02-28 RX ADMIN — PREDNISONE 109 MG: 1 TABLET ORAL at 05:02

## 2024-02-28 RX ADMIN — CEFTRIAXONE 1 G: 1 INJECTION, POWDER, FOR SOLUTION INTRAMUSCULAR; INTRAVENOUS at 09:02

## 2024-02-28 RX ADMIN — CALCIUM GLUCONATE 1 G: 20 INJECTION, SOLUTION INTRAVENOUS at 01:02

## 2024-02-28 RX ADMIN — DIPHENHYDRAMINE HYDROCHLORIDE 50 MG: 50 INJECTION INTRAMUSCULAR; INTRAVENOUS at 12:02

## 2024-02-28 RX ADMIN — CETIRIZINE HYDROCHLORIDE 10 MG: 10 TABLET, FILM COATED ORAL at 10:02

## 2024-02-28 RX ADMIN — FOLIC ACID 1 MG: 1 TABLET ORAL at 09:02

## 2024-02-28 RX ADMIN — MUPIROCIN: 20 OINTMENT TOPICAL at 08:02

## 2024-02-28 NOTE — PROGRESS NOTES
Hematology/Oncology  Progress Note    Patient Name: Nicole Brown  MRN: [id number]  Admission Date: 2/26/2024  Hospital Length of Stay: 2 days  Attending Provider: Rodri Samuels MD  Consulting Provider: Julian Malik MD  Principal Problem:TTP (thrombotic thrombocytopenic purpura)      Subjective:     HPI:  48-year-old black female with a history of TTP initially diagnosed in 2015.  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  She has been followed by a hematologist in [place].  She relocated to [place] to live with her daughter approximately 8 months ago.  She presented to the ER 2/25/24 with low-grade fever, nausea/vomiting and abdominal discomfort.  She denied overt diarrhea.  Laboratory on presentation showed a platelet count of 5000.  Hemoglobin was 12.4 with microcytic, hypochromic indices and WBC 6.4.  LDH was elevated at 711 and haptoglobin was undetectable.  UPT was negative.  CMP showed mild renal insufficiency with a BUN of 24.9 and creatinine 1.36.  Peripheral smear showed microcytic, hypochromic red blood cells, increased reticulocytes and the presence of schistocytes and target cells.  Platelets were markedly decreased.  She was transferred to [facility] for initiation of plasmapheresis.  She received 1 unit of platelets at the outside facility prior to transfer.  On arrival here, she was started on prednisone 1 mg/kg daily and received 1 unit of FFP.    Hospital Course  2/27/24:  Started daily plasma exchange 1 plasma volume (40 cc/kg) exchange with FFP.  Platelet count 12,000. ADAMTS-13 level drawn 2/26/24 <5%.    Today (2/28/24): Patient had significant hives, pruritus and wheezing after plasma exchange yesterday.  She received additional IV Benadryl and Solu-Medrol with improvement.  This morning, she still has visible urticaria and pruritus.  She remains afebrile.  Platelet count today is 24,000.         Medications:  Continuous Infusions:    sodium chloride 0.9% 100 mL/hr at 02/27/24 2135     Scheduled Meds:   calcium-vitamin D3  2 tablet Oral BID    cefTRIAXone (Rocephin) IV (PEDS and ADULTS)  1 g Intravenous Q24H    diphenhydrAMINE  50 mg Intravenous Once    folic acid  1 mg Oral Daily    linaCLOtide  145 mcg Oral Before breakfast    multivitamin  1 tablet Oral Daily    mupirocin   Nasal BID    pantoprazole  40 mg Oral Daily    predniSONE  1 mg/kg Oral Daily     PRN Meds:0.9%  NaCl infusion (for blood administration), 0.9%  NaCl infusion (for blood administration), acetaminophen, albuterol-ipratropium, aluminum-magnesium hydroxide-simethicone, diphenhydrAMINE, heparin (porcine), HYDROcodone-acetaminophen, melatonin, morphine, ondansetron, polyethylene glycol, prochlorperazine, senna-docusate 8.6-50 mg, sodium chloride 0.9%     Review of patient's allergies indicates:   Allergen Reactions    Nsaids (non-steroidal anti-inflammatory drug) Other (See Comments)     Causes ulcers to bleed.    Ibuprofen     Latex     Meloxicam     Nsaids (non-steroidal anti-inflammatory drug) Nausea Only        PMHx:  HTN, hyperlipidemia, rheumatoid arthritis, migraine headaches, TTP  PSHx:  Endometrial ablation, left carpal tunnel release, right shoulder surgery  SH:  Lifetime nonsmoker.  Previous alcohol, none since age 20.  Lives in [place] with her daughter.  Provides  in her home.  FH:  PGF had a CVA.  No family history of cancer.      Review of Systems   Constitutional:  Positive for fatigue (Improved). Negative for appetite change, fever and unexpected weight change.   HENT:  Negative for facial swelling, mouth sores, nosebleeds, sore throat and trouble swallowing.    Eyes: Negative.    Respiratory:  Negative for cough, shortness of breath and wheezing.    Cardiovascular:  Negative for chest pain, palpitations and leg swelling.   Gastrointestinal:  Negative for abdominal distention, abdominal pain, constipation, diarrhea, nausea and vomiting.    Genitourinary:  Negative for dysuria, frequency, hematuria and urgency.   Musculoskeletal:  Positive for arthralgias. Negative for back pain.   Skin:  Negative for pallor and rash.        +urticaria and pruritus   Neurological:  Negative for dizziness, seizures, weakness, numbness and headaches.   Hematological:  Negative for adenopathy. Does not bruise/bleed easily.   Psychiatric/Behavioral: Negative.       Objective:     Vital Signs (Most Recent):  Temp: 98.2 °F (36.8 °C) (02/28/24 0740)  Pulse: 76 (02/28/24 0740)  Resp: 17 (02/28/24 0740)  BP: (!) 153/95 (02/28/24 0740)  SpO2: 100 % (02/28/24 0740) Vital Signs (24h Range):  Temp:  [97.4 °F (36.3 °C)-98.7 °F (37.1 °C)] 98.2 °F (36.8 °C)  Pulse:  [] 76  Resp:  [16-20] 17  SpO2:  [96 %-100 %] 100 %  BP: (116-170)/(66-98) 153/95     Weight: 108.9 kg (240 lb 1.3 oz)  Body surface area is 2.25 meters squared.      Physical Exam  Constitutional:       Comments: Mildly obese black female in NAD   HENT:      Head: Normocephalic.      Mouth/Throat:      Mouth: Mucous membranes are moist.      Pharynx: Oropharynx is clear. No posterior oropharyngeal erythema.   Eyes:      General: No scleral icterus.     Extraocular Movements: Extraocular movements intact.      Pupils: Pupils are equal, round, and reactive to light.   Neck:      Comments: Right IJ dialysis catheter  Cardiovascular:      Rate and Rhythm: Normal rate and regular rhythm.      Heart sounds: No murmur heard.  Pulmonary:      Comments: Lungs clear to auscultation  Abdominal:      General: Bowel sounds are normal. There is no distension.      Palpations: Abdomen is soft.      Tenderness: There is no abdominal tenderness.      Comments: Mildly obese.  No palpable masses or organomegaly   Musculoskeletal:         General: No swelling or tenderness. Normal range of motion.      Cervical back: Neck supple. No tenderness.   Lymphadenopathy:      Cervical: No cervical adenopathy.   Skin:     General: Skin is  warm and dry.      Findings: No rash.      Comments: Scattered ecchymoses on arms.  Scattered urticaria on arms and legs.  No petechiae.   Neurological:      General: No focal deficit present.      Mental Status: She is alert and oriented to person, place, and time.      Cranial Nerves: No cranial nerve deficit.      Motor: No weakness.       Significant Labs:   CBC:   Recent Labs   Lab 02/26/24 2218 02/27/24  0350 02/28/24  0333   WBC 8.88 7.37 11.48   HGB 10.4* 9.7* 8.5*   HCT 31.6* 29.7* 26.3*   PLT 24* 12* 24*     , CMP:   Recent Labs   Lab 02/26/24 2218 02/27/24  0350 02/28/24  0333    141 146*   K 3.6 3.6 3.2*   CO2 23 24 29   BUN 29.9* 27.6* 19.7*   CREATININE 1.09* 0.89 0.91   CALCIUM 8.8 8.6 8.2*   ALBUMIN 3.5 3.6 3.0*   BILITOT 3.7* 3.2* 1.2   ALKPHOS 114 108 77   AST 28 27 19   ALT 13 14 13           Diagnostic Results:  No new imaging for review    Impression/Recommendations:   IMPRESSION  Relapsed thrombotic thrombocytopenic purpura  Microangiopathic hemolytic anemia    RECOMMENDATIONS  Day 2 plasmapheresis with FFP.  H&H decreased but no indication for transfusion.  Platelet count improved.  DO NOT TRANSFUSE PLATELETS unless approved by Hematology and clinically significant bleeding.  ADAMTS-13 level 2/26/24 <5%.  Continue prednisone 1 mg/kg daily.  Add Zyrtec 10 mg daily.  Monitor daily labs.      NEO LUJAN MD  Hematology/Oncology

## 2024-02-28 NOTE — PROGRESS NOTES
[facility]  Hospital Medicine Progress Note        Chief Complaint: Inpatient Follow-up     HPI:   48-year-old female medical history of anemia of chronic disease, rheumatoid arthritis started azathioprine January 2024, HTN, HLD, migraine with aura, GIAN, morbid obesity, history of thrombotic thrombocytopenic purpura-TTP initially diagnosed in 2015 and treated with therapeutic plasma exchange, steroid and rituximab 3 doses subsequently had her 1st relapsed in 2018 and treated with same regimen and a 2nd relapsed in January 2020 where she presented to [facility] with abdominal pain and fatigue, found to to have relapse (I do not have record of [id number] activity) and treated with plasma exchange and 4 doses of rituximab while inpatient as well as prednisone taper and subsequently discharged on monthly rituximab where she received 3 doses.     Presented to [facility] ED on 02/25/2024 with chief complaint of fatigue, nausea, vomiting and diffuse abdominal cramping and dark urine for for 3-4 days.  On arrival to ED she was noted to be afebrile and hemodynamically stable.  Initial labs notable for WBC 6.36, hemoglobin 12.4, platelets 5, peripheral smear show few schistocytes and few small platelets compatible with history of TTP, INR 1.1, PTT 30.5, fibrinogen more than 400, , haptoglobin <8, indirect hyperbilirubinemia 2.9, urinalysis showed microscopic hematuria.  CT abdomen pelvis with contrast showed nonspecific retroperitoneal edema and mild perinephric stranding.      She received 1 unit of platelets on 02/25/2024, subsequent CBC trend show platelets trending down as well as hemoglobin, received dexamethasone 40 mg IV 02/26/2024 and a 2nd unit of platelets and subsequently transferred to [facility] for higher level of care.     Upon my evaluation she is resting comfortably, report abdominal pain mainly in the epigastric region feels like burning/cramping, denies hematemesis, melena or  hematochezia.  Report dark urine but no burning or painful urination or increased frequency.  Noted to have few petechiae on all her extremities, trunk and palate.  Report no new neurological symptoms she does have migraine headaches but nothing new, note vision changes, no extremity weakness though she report neuropathy symptoms to her hands and feet that has been chronic.     Interval Hx:  Patient had her 1st 1 of plasmapheresis yesterday.  She did have a subsequent allergic-type reaction after it was completed.  Had stridor and itching.  Possible hives.  She was given dose of IV Benadryl and continued on her steroids.  This morning she was feeling much better.  No respiratory complaints.  Remains on room air.  Still some pruritus but overall stable.  Unclear what her reaction was 2.  Possibly FFP unit question you she was ready to proceed with her 2nd run today.  We discussed her labs this morning.  She was in good spirits in hopeful she will be able to go home soon     Objective/physical exam:  General: Appears comfortable  HEENT: NC/AT, oral petechiae  Neck:  No JVD  Chest: CTABL  CVS: Regular rhythm. Normal S1/S2.  No pedal edema  Abdomen: nondistended, normoactive BS, soft and non-tender.  MSK: No obvious deformity or joint swelling  Skin: Warm and dry, petechia  Neuro: AAOx3, no focal neurological deficit  Psych: Cooperative    VITAL SIGNS: 24 HRS MIN & MAX LAST   Temp  Min: 97.4 °F (36.3 °C)  Max: 98.7 °F (37.1 °C) 98 °F (36.7 °C)   BP  Min: 116/66  Max: 170/98 (!) 144/87   Pulse  Min: 73  Max: 103  87   Resp  Min: 16  Max: 20 16   SpO2  Min: 96 %  Max: 99 % 97 %       Recent Labs   Lab 02/26/24  0709 02/26/24  1916 02/26/24  2218 02/27/24  0350 02/28/24  0333   WBC 8.47   < > 8.88 7.37 11.48   RBC 5.13   < > 4.59 4.40 3.85*   HGB 11.3*   < > 10.4* 9.7* 8.5*   HCT 35.5*   < > 31.6* 29.7* 26.3*   MCV 69.2*   < > 68.8* 67.5* 68.3*   MCH 22.0*   < > 22.7* 22.0* 22.1*   MCHC 31.8*   < > 32.9* 32.7* 32.3*   RDW  17.4*   < > 18.0* 18.2* 18.2*   PLT 10*   < > 24* 12* 24*   MPV 0.0*  --   --   --   --     < > = values in this interval not displayed.       Recent Labs   Lab 02/26/24  2218 02/27/24  0350 02/27/24  1448 02/28/24  0333    141  --  146*   K 3.6 3.6  --  3.2*   CO2 23 24  --  29   BUN 29.9* 27.6*  --  19.7*   CREATININE 1.09* 0.89  --  0.91   CALCIUM 8.8 8.6  --  8.2*   MG  --  2.30 1.90 1.90   ALBUMIN 3.5 3.6  --  3.0*   ALKPHOS 114 108  --  77   ALT 13 14  --  13   AST 28 27  --  19   BILITOT 3.7* 3.2*  --  1.2          Microbiology Results (last 7 days)       ** No results found for the last 168 hours. **             Radiology:  X-Ray Chest 1 View  Narrative: EXAMINATION:  XR CHEST 1 VIEW    CPT 52063    CLINICAL HISTORY:  chest pain;    COMPARISON:  February 27, 2024    FINDINGS:  Examination reveals cardiomediastinal silhouette and pleuroparenchymal changes to be essentially unchanged as compared with the previous exam  Impression: No significant change    Electronically signed by: Patrice Swain  Date:    02/27/2024  Time:    14:15  X-Ray Chest 1 View  Narrative: EXAMINATION:  XR CHEST 1 VIEW    CLINICAL HISTORY:  central line placement;    TECHNIQUE:  Single view of the chest    COMPARISON:  02/26/2024    FINDINGS:  Limited evaluation secondary to positioning.  Right-sided central line projects over the mid SVC.  Impression: As above.    Electronically signed by: Sathya Muñoz  Date:    02/27/2024  Time:    07:25      Scheduled Med:   calcium-vitamin D3  2 tablet Oral BID    cefTRIAXone (Rocephin) IV (PEDS and ADULTS)  1 g Intravenous Q24H    diphenhydrAMINE  50 mg Intravenous Once    folic acid  1 mg Oral Daily    linaCLOtide  145 mcg Oral Before breakfast    multivitamin  1 tablet Oral Daily    mupirocin   Nasal BID    pantoprazole  40 mg Oral Daily    predniSONE  1 mg/kg Oral Daily        Continuous Infusions:   sodium chloride 0.9% 100 mL/hr at 02/27/24 2134        PRN Meds:  0.9%  NaCl infusion  (for blood administration), 0.9%  NaCl infusion (for blood administration), 0.9%  NaCl infusion (for blood administration), acetaminophen, albuterol-ipratropium, aluminum-magnesium hydroxide-simethicone, diphenhydrAMINE, heparin (porcine), HYDROcodone-acetaminophen, melatonin, morphine, ondansetron, polyethylene glycol, prochlorperazine, senna-docusate 8.6-50 mg, sodium chloride 0.9%       Assessment/Plan:   TTP-Thrombotic thrombocytopenic purpura, 4th relapse  Microangiopathic hemolytic anemia  Acute kidney injury  Questionable UTI  Rheumatoid arthritis on azathioprine     History of HTN, HLD, morbid obesity, GIAN, knee osteoarthritis    Platelets up to 24 this morning.  LDH down to about 400.  Plan for 2nd run of plasmapheresis today.  Holding all blood products and will follow up further Hematology recommendations.    Nephrology following along for plasmapheresis as well.  She has a temporary Vas-Cath in the right IJ for access.  Vital signs are stable.  Mild hypokalemia.  Will see if we can replace this with plasmapheresis.    Critical care diagnosis: TTP requiring plasmapheresis  Critical care interventions: hands on evaluation, review of labs/radiographs/records and discussions with family  Critical care time spent: >32 minutes          Rodri Samuels MD   02/28/2024     All diagnosis and differential diagnosis have been reviewed; assessment and plan has been documented; I have personally reviewed the labs and test results that are presently available; I have reviewed the patients medication list; I have reviewed the consulting providers response and recommendations. I have reviewed or attempted to review medical records based upon their availability    All of the patient's questions have been  addressed and answered. Patient's is agreeable to the above stated plan. I will continue to monitor closely and make adjustments to medical management as  needed.  _____________________________________________________________________

## 2024-02-28 NOTE — PROGRESS NOTES
[facility] - 6th Floor Medical Telemetry  Nephrology  Progress Note    Patient Name: Nicole Brown  MRN: [id number]  Admission Date: 2/26/2024  Hospital Length of Stay: 2 days  Attending Provider: Rodri Samuels MD   Primary Care Physician: Yaritza Browne FNP  Principal Problem:TTP (thrombotic thrombocytopenic purpura)      Subjective:   HPI: This is a 48-year-old AAF with TTP diagnosed on 2015 with multiple relapses treated with plasmapheresis. She presented to the ED with complaints of fatigue, nausea, vomiting and diffuse abdominal cramping with dark urine for 3-4 days. She received 1 unit platelets on 2/25 and started Dexamethasone 2/26. Dr Malik with hematology was consulted and recommended plasmapheresis. Nephrology was consulted for plasmapheresis.     Interval History:   Patient underwent first PLEX yesterday and had subsequent itching, hives and wheezing. She was treated with Benadryl and Solu-Medrol with improvement in symptoms. Pt is also receiving Prednisone 1mg/kg. Patient has few hives remaining. States she overall feel better than yesterday. She has received Benadryl with prior PLEX per patient with no prior issue.     Review of patient's allergies indicates:   Allergen Reactions    Nsaids (non-steroidal anti-inflammatory drug) Other (See Comments)     Causes ulcers to bleed.    Ibuprofen     Latex     Meloxicam     Nsaids (non-steroidal anti-inflammatory drug) Nausea Only     Current Facility-Administered Medications   Medication Frequency    0.9%  NaCl infusion (for blood administration) Q24H PRN    0.9%  NaCl infusion (for blood administration) Q24H PRN    0.9%  NaCl infusion (for blood administration) Q24H PRN    0.9%  NaCl infusion Continuous    acetaminophen tablet 650 mg Q4H PRN    albumin human 5% bottle 90 g Once    albuterol-ipratropium 2.5 mg-0.5 mg/3 mL nebulizer solution 3 mL Q6H PRN    aluminum-magnesium hydroxide-simethicone 200-200-20 mg/5 mL suspension 30  mL QID PRN    calcium gluconate 1 g in NS IVPB (premixed) Q1H    calcium-vitamin D3 500 mg-5 mcg (200 unit) per tablet 2 tablet BID    cefTRIAXone (Rocephin) 1 g in dextrose 5 % in water (D5W) 100 mL IVPB (MB+) Q24H    cetirizine tablet 10 mg Daily    diphenhydrAMINE capsule 25 mg Q6H PRN    diphenhydrAMINE injection 50 mg Once    folic acid tablet 1 mg Daily    heparin (porcine) injection 1,000 Units PRN    HYDROcodone-acetaminophen 5-325 mg per tablet 1 tablet Q6H PRN    linaCLOtide capsule 145 mcg Before breakfast    melatonin tablet 6 mg Nightly PRN    morphine injection 2 mg Q4H PRN    multivitamin tablet Daily    mupirocin 2 % ointment BID    ondansetron injection 4 mg Q4H PRN    pantoprazole EC tablet 40 mg Daily    polyethylene glycol packet 17 g BID PRN    predniSONE tablet 109 mg Daily    prochlorperazine injection Soln 5 mg Q6H PRN    senna-docusate 8.6-50 mg per tablet 2 tablet BID PRN    sodium chloride 0.9% flush 10 mL PRN       Objective:     Vital Signs (Most Recent):  Temp: 97.8 °F (36.6 °C) (02/28/24 1104)  Pulse: 70 (02/28/24 1104)  Resp: 17 (02/28/24 0740)  BP: 125/80 (02/28/24 1104)  SpO2: 97 % (02/28/24 1104) Vital Signs (24h Range):  Temp:  [97.4 °F (36.3 °C)-98.7 °F (37.1 °C)] 97.8 °F (36.6 °C)  Pulse:  [] 70  Resp:  [16-20] 17  SpO2:  [96 %-100 %] 97 %  BP: (116-170)/(66-98) 125/80     Weight: 108.9 kg (240 lb 1.3 oz) (02/26/24 2223)  Body mass index is 38.75 kg/m².  Body surface area is 2.25 meters squared.    I/O last 3 completed shifts:  In: 4108 [P.O.:720; Blood:3388]  Out: -     Physical Exam  Constitutional:       Appearance: Normal appearance.   HENT:      Head: Normocephalic and atraumatic.      Nose: Nose normal.      Mouth/Throat:      Mouth: Mucous membranes are moist.   Eyes:      Extraocular Movements: Extraocular movements intact.      Conjunctiva/sclera: Conjunctivae normal.   Cardiovascular:      Rate and Rhythm: Normal rate and regular rhythm.      Heart sounds: Normal  heart sounds.   Pulmonary:      Effort: Pulmonary effort is normal.      Breath sounds: Normal breath sounds.   Abdominal:      General: There is no distension.      Palpations: Abdomen is soft.   Musculoskeletal:         General: No swelling.      Cervical back: Neck supple.   Skin:     General: Skin is warm.   Neurological:      Mental Status: She is oriented to person, place, and time.         Significant Labs:sureBMP:   Recent Labs   Lab 02/28/24  0333   *   K 3.2*   CO2 29   BUN 19.7*   CREATININE 0.91   CALCIUM 8.2*   MG 1.90     CBC:   Recent Labs   Lab 02/28/24  0333   WBC 11.48   RBC 3.85*   HGB 8.5*   HCT 26.3*   PLT 24*   MCV 68.3*   MCH 22.1*   MCHC 32.3*     Microbiology Results (last 7 days)       ** No results found for the last 168 hours. **          Specimen (24h ago, onward)      None          Recent Labs   Lab 02/25/24  1150   PROTEINUA 4+*   BACTERIA Moderate*   LEUKOCYTESUR Negative   HYALINECASTS 3-5*       Significant Imaging:  X-Ray Chest 1 View [[id number]] Resulted: 02/27/24 1415   Order Status: Completed Updated: 02/27/24 1417   Narrative:     EXAMINATION:  XR CHEST 1 VIEW    CPT 34602    CLINICAL HISTORY:  chest pain;    COMPARISON:  February 27, 2024    FINDINGS:  Examination reveals cardiomediastinal silhouette and pleuroparenchymal changes to be essentially unchanged as compared with the previous exam   Impression:       No significant change      Electronically signed by: Patrice Swain  Date: 02/27/2024  Time: 14:15       Assessment/Plan:     Relapsed Thrombotic thrombocytopenic purpura         Patient will undergo plasma exchange #2 today with half albumin and half FFP. Continue daily until directed otherwise per hematology. We will monitor closely with daily Fibrinogen, ionized calcium and LDH    ROSY Turcios  Nephrology  [facility] - 6th Floor Medical Telemetry

## 2024-02-28 NOTE — PROGRESS NOTES
Completed 2nd PLEX  3.8L used for exchange.  Albumin 1.9L used at first half of tx.  FFP 1.9L used for remainder of Tx.    Pt was given Benadryl 50mg IVP Prior to start of Plasma Exchange. Pt completed Treatment without issues or complaints of Breathing difficulties or itching.       02/28/24 1417   Vitals   Temp 98 °F (36.7 °C)   Temp Source Oral   Pulse 72   Resp (!) 26   BP (!) 147/95   Device (Oxygen Therapy) room air   Machine Check   Completed? Yes   Model spectra optia   Pre-Apheresis   Start Time 1226   Time-Out Yes   2 Patient Identifiers Verified Yes   Consents Verified Yes   Orders on Chart Yes   Physician Available Yes   Disposables   Apheresis Kit Lot # [id number]   [place]heres[place] [facility] [place]   Apheresis Kit Exp 11/02/23   Apheresis Fluid/Transfusion   Procedure Type Plasmapheresis   Procedure # 2   Optia Run Results   AC used 662   Remove bag 4532   Replacement Used 3762   Rinse back 129   Fluid balance (mL) 18   Fluid Balance (%) 100   Inlet processed 6627   Plasma volumes exchanged 1.0   Plasma removed 3922   AC in remove bag 589   AC to patient 74   AC used for prime 19   Post-Apheresis   Finish Time 1406   Access Functioned Well   Next Treatment Date 02/29/24   Reaction None   Tolerance well

## 2024-02-28 NOTE — PLAN OF CARE
02/28/24 0919   Discharge Assessment   Assessment Type Discharge Planning Assessment   Confirmed/corrected address, phone number and insurance Yes   Source of Information patient   When was your last doctors appointment?   ([facility])   Reason For Admission TTP   People in Home child(danielito), adult   Do you expect to return to your current living situation? Yes   Do you have help at home or someone to help you manage your care at home? Yes   Who are your caregiver(s) and their phone number(s)? Candida/Daughter/[phone]   Current cognitive status: Alert/Oriented   Walking or Climbing Stairs Difficulty yes   Mobility Management Ambulates with a Cane   Dressing/Bathing Difficulty no   Home Accessibility stairs to enter home   Number of Stairs, Main Entrance three   Home Layout Able to live on 1st floor   Equipment Currently Used at Home cane, straight;nebulizer   Readmission within 30 days? No   Do you currently have service(s) that help you manage your care at home? No   Do you take prescription medications? Yes   Do you have prescription coverage? Yes   Do you have any problems affording any of your prescribed medications? No   Who is going to help you get home at discharge? Daughter   How do you get to doctors appointments? car, drives self   Are you on dialysis? No   Do you take coumadin? No   Discharge Plan A Home   Discharge Plan B Home   Discharge Plan discussed with: Adult children;Patient

## 2024-02-29 LAB
ABO + RH BLD: NORMAL
ALBUMIN SERPL-MCNC: 3.2 G/DL (ref 3.5–5)
ALBUMIN/GLOB SERPL: 1.9 RATIO (ref 1.1–2)
ALP SERPL-CCNC: 74 UNIT/L (ref 40–150)
ALT SERPL-CCNC: 11 UNIT/L (ref 0–55)
AST SERPL-CCNC: 14 UNIT/L (ref 5–34)
BASOPHILS # BLD AUTO: 0.03 X10(3)/MCL
BASOPHILS NFR BLD AUTO: 0.3 %
BILIRUB SERPL-MCNC: 0.5 MG/DL
BLD PROD TYP BPU: NORMAL
BLOOD UNIT EXPIRATION DATE: NORMAL
BLOOD UNIT TYPE CODE: 5100
BUN SERPL-MCNC: 13.1 MG/DL (ref 7–18.7)
CALCIUM SERPL-MCNC: 8 MG/DL (ref 8.4–10.2)
CHLORIDE SERPL-SCNC: 111 MMOL/L (ref 98–107)
CO2 SERPL-SCNC: 28 MMOL/L (ref 22–29)
CREAT SERPL-MCNC: 0.85 MG/DL (ref 0.55–1.02)
CROSSMATCH INTERPRETATION: NORMAL
CYCLIC CITRULLINATED PEPTIDE (CCP) (OHS): 16 U/ML
DISPENSE STATUS: NORMAL
EOSINOPHIL # BLD AUTO: 0.11 X10(3)/MCL (ref 0–0.9)
EOSINOPHIL NFR BLD AUTO: 1 %
ERYTHROCYTE [DISTWIDTH] IN BLOOD BY AUTOMATED COUNT: 18.4 % (ref 11.5–17)
FIBRINOGEN PPP-MCNC: 208 MG/DL (ref 210–463)
GFR SERPLBLD CREATININE-BSD FMLA CKD-EPI: >60 MLS/MIN/1.73/M2
GLOBULIN SER-MCNC: 1.7 GM/DL (ref 2.4–3.5)
GLUCOSE SERPL-MCNC: 93 MG/DL (ref 74–100)
HAPTOGLOB SERPL-MCNC: 61 MG/DL (ref 35–250)
HCT VFR BLD AUTO: 25.4 % (ref 37–47)
HGB BLD-MCNC: 8 G/DL (ref 12–16)
IMM GRANULOCYTES # BLD AUTO: 0.45 X10(3)/MCL (ref 0–0.04)
IMM GRANULOCYTES NFR BLD AUTO: 3.9 %
INR PPP: 1.1
LDH SERPL-CCNC: 243 U/L (ref 125–220)
LYMPHOCYTES # BLD AUTO: 3.36 X10(3)/MCL (ref 0.6–4.6)
LYMPHOCYTES NFR BLD AUTO: 29.4 %
MAGNESIUM SERPL-MCNC: 1.7 MG/DL (ref 1.6–2.6)
MCH RBC QN AUTO: 22.1 PG (ref 27–31)
MCHC RBC AUTO-ENTMCNC: 31.5 G/DL (ref 33–36)
MCV RBC AUTO: 70.2 FL (ref 80–94)
MONOCYTES # BLD AUTO: 0.99 X10(3)/MCL (ref 0.1–1.3)
MONOCYTES NFR BLD AUTO: 8.7 %
NEUTROPHILS # BLD AUTO: 6.5 X10(3)/MCL (ref 2.1–9.2)
NEUTROPHILS NFR BLD AUTO: 56.7 %
NRBC BLD AUTO-RTO: 2.3 %
PLATELET # BLD AUTO: 59 X10(3)/MCL (ref 130–400)
PLATELETS.RETICULATED NFR BLD AUTO: 10.7 % (ref 0.9–11.2)
PMV BLD AUTO: ABNORMAL FL
POC IONIZED CALCIUM: 0.92 MMOL/L (ref 1.1–1.2)
POC TEMPERATURE: 37 °C
POTASSIUM SERPL-SCNC: 3 MMOL/L (ref 3.5–5.1)
PROT SERPL-MCNC: 4.9 GM/DL (ref 6.4–8.3)
PROTHROMBIN TIME: 14.2 SECONDS (ref 12.5–14.5)
RBC # BLD AUTO: 3.62 X10(6)/MCL (ref 4.2–5.4)
SODIUM SERPL-SCNC: 144 MMOL/L (ref 136–145)
SPECIMEN SOURCE: ABNORMAL
UNIT NUMBER: NORMAL
WBC # SPEC AUTO: 11.44 X10(3)/MCL (ref 4.5–11.5)

## 2024-02-29 PROCEDURE — 85384 FIBRINOGEN ACTIVITY: CPT | Performed by: INTERNAL MEDICINE

## 2024-02-29 PROCEDURE — 99900035 HC TECH TIME PER 15 MIN (STAT)

## 2024-02-29 PROCEDURE — 25000003 PHARM REV CODE 250: Performed by: INTERNAL MEDICINE

## 2024-02-29 PROCEDURE — 63600175 PHARM REV CODE 636 W HCPCS: Performed by: INTERNAL MEDICINE

## 2024-02-29 PROCEDURE — 83735 ASSAY OF MAGNESIUM: CPT | Performed by: INTERNAL MEDICINE

## 2024-02-29 PROCEDURE — P9045 ALBUMIN (HUMAN), 5%, 250 ML: HCPCS | Mod: JZ,JG | Performed by: INTERNAL MEDICINE

## 2024-02-29 PROCEDURE — 21400001 HC TELEMETRY ROOM

## 2024-02-29 PROCEDURE — 82803 BLOOD GASES ANY COMBINATION: CPT

## 2024-02-29 PROCEDURE — 63600175 PHARM REV CODE 636 W HCPCS: Mod: JZ,JG | Performed by: INTERNAL MEDICINE

## 2024-02-29 PROCEDURE — 36430 TRANSFUSION BLD/BLD COMPNT: CPT

## 2024-02-29 PROCEDURE — 85610 PROTHROMBIN TIME: CPT | Performed by: INTERNAL MEDICINE

## 2024-02-29 PROCEDURE — 83010 ASSAY OF HAPTOGLOBIN QUANT: CPT | Performed by: INTERNAL MEDICINE

## 2024-02-29 PROCEDURE — 36514 APHERESIS PLASMA: CPT

## 2024-02-29 PROCEDURE — 85025 COMPLETE CBC W/AUTO DIFF WBC: CPT | Performed by: INTERNAL MEDICINE

## 2024-02-29 PROCEDURE — 99233 SBSQ HOSP IP/OBS HIGH 50: CPT | Mod: ,,, | Performed by: INTERNAL MEDICINE

## 2024-02-29 PROCEDURE — P9017 PLASMA 1 DONOR FRZ W/IN 8 HR: HCPCS | Performed by: INTERNAL MEDICINE

## 2024-02-29 PROCEDURE — 63600175 PHARM REV CODE 636 W HCPCS: Performed by: NURSE PRACTITIONER

## 2024-02-29 PROCEDURE — 83615 LACTATE (LD) (LDH) ENZYME: CPT | Performed by: INTERNAL MEDICINE

## 2024-02-29 PROCEDURE — 80053 COMPREHEN METABOLIC PANEL: CPT | Performed by: INTERNAL MEDICINE

## 2024-02-29 RX ORDER — MAGNESIUM SULFATE HEPTAHYDRATE 40 MG/ML
2 INJECTION, SOLUTION INTRAVENOUS ONCE
Status: COMPLETED | OUTPATIENT
Start: 2024-02-29 | End: 2024-02-29

## 2024-02-29 RX ORDER — POTASSIUM CHLORIDE 14.9 MG/ML
20 INJECTION INTRAVENOUS ONCE
Status: COMPLETED | OUTPATIENT
Start: 2024-02-29 | End: 2024-02-29

## 2024-02-29 RX ORDER — DIPHENHYDRAMINE HYDROCHLORIDE 50 MG/ML
50 INJECTION INTRAMUSCULAR; INTRAVENOUS
Status: DISPENSED | OUTPATIENT
Start: 2024-02-29 | End: 2024-03-03

## 2024-02-29 RX ORDER — HYDROCODONE BITARTRATE AND ACETAMINOPHEN 500; 5 MG/1; MG/1
TABLET ORAL
Status: DISCONTINUED | OUTPATIENT
Start: 2024-02-29 | End: 2024-03-06 | Stop reason: HOSPADM

## 2024-02-29 RX ORDER — ALBUMIN HUMAN 50 G/1000ML
95 SOLUTION INTRAVENOUS ONCE
Status: COMPLETED | OUTPATIENT
Start: 2024-02-29 | End: 2024-02-29

## 2024-02-29 RX ORDER — SODIUM CHLORIDE 9 MG/ML
INJECTION, SOLUTION INTRAVENOUS
Status: DISCONTINUED | OUTPATIENT
Start: 2024-02-29 | End: 2024-03-06 | Stop reason: HOSPADM

## 2024-02-29 RX ORDER — CALCIUM GLUCONATE 20 MG/ML
3 INJECTION, SOLUTION INTRAVENOUS ONCE
Status: COMPLETED | OUTPATIENT
Start: 2024-02-29 | End: 2024-02-29

## 2024-02-29 RX ADMIN — CALCIUM GLUCONATE 3 G: 20 INJECTION, SOLUTION INTRAVENOUS at 11:02

## 2024-02-29 RX ADMIN — MUPIROCIN: 20 OINTMENT TOPICAL at 09:02

## 2024-02-29 RX ADMIN — ALBUMIN (HUMAN) 95 G: 12.5 SOLUTION INTRAVENOUS at 11:02

## 2024-02-29 RX ADMIN — PREDNISONE 109 MG: 1 TABLET ORAL at 05:02

## 2024-02-29 RX ADMIN — DIPHENHYDRAMINE HYDROCHLORIDE 25 MG: 25 CAPSULE ORAL at 05:02

## 2024-02-29 RX ADMIN — POLYETHYLENE GLYCOL 3350 17 G: 17 POWDER, FOR SOLUTION ORAL at 10:02

## 2024-02-29 RX ADMIN — POTASSIUM CHLORIDE 20 MEQ: 14.9 INJECTION, SOLUTION INTRAVENOUS at 10:02

## 2024-02-29 RX ADMIN — LINACLOTIDE 145 MCG: 145 CAPSULE, GELATIN COATED ORAL at 05:02

## 2024-02-29 RX ADMIN — THERA TABS 1 TABLET: TAB at 10:02

## 2024-02-29 RX ADMIN — Medication 2 TABLET: at 10:02

## 2024-02-29 RX ADMIN — CETIRIZINE HYDROCHLORIDE 10 MG: 10 TABLET, FILM COATED ORAL at 10:02

## 2024-02-29 RX ADMIN — FOLIC ACID 1 MG: 1 TABLET ORAL at 10:02

## 2024-02-29 RX ADMIN — PANTOPRAZOLE SODIUM 40 MG: 40 TABLET, DELAYED RELEASE ORAL at 10:02

## 2024-02-29 RX ADMIN — Medication 2 TABLET: at 08:02

## 2024-02-29 RX ADMIN — DIPHENHYDRAMINE HYDROCHLORIDE 50 MG: 50 INJECTION INTRAMUSCULAR; INTRAVENOUS at 11:02

## 2024-02-29 RX ADMIN — MAGNESIUM SULFATE IN WATER 2 G: 40 INJECTION, SOLUTION INTRAVENOUS at 02:02

## 2024-02-29 RX ADMIN — MUPIROCIN: 20 OINTMENT TOPICAL at 08:02

## 2024-02-29 RX ADMIN — SODIUM CHLORIDE: 9 INJECTION, SOLUTION INTRAVENOUS at 10:02

## 2024-02-29 RX ADMIN — ACETAMINOPHEN 650 MG: 325 TABLET, FILM COATED ORAL at 08:02

## 2024-02-29 RX ADMIN — SODIUM CHLORIDE: 9 INJECTION, SOLUTION INTRAVENOUS at 08:02

## 2024-02-29 NOTE — PROGRESS NOTES
[facility] - 6th Floor Medical Telemetry  Nephrology  Progress Note    Patient Name: Nicole Brown  MRN: [id number]  Admission Date: 2/26/2024  Hospital Length of Stay: 3 days  Attending Provider: Gabriele Aguila MD   Primary Care Physician: Yaritza Browne FNP  Principal Problem:TTP (thrombotic thrombocytopenic purpura)      Subjective:   HPI: This is a 48-year-old AAF with TTP diagnosed on 2015 with multiple relapses treated with plasmapheresis. She presented to the ED with complaints of fatigue, nausea, vomiting and diffuse abdominal cramping with dark urine for 3-4 days. She received 1 unit platelets on 2/25 and started Dexamethasone 2/26. Dr Malik with hematology was consulted and recommended plasmapheresis. Nephrology was consulted for plasmapheresis.     Interval History:   Patient underwent first PLEX Tuesday and had subsequent itching, hives and wheezing. She was treated with Benadryl and Solu-Medrol with improvement in symptoms. She had second exchange yesterday with half and half albumin and FFP with good response.     She is undergoing third consecutive plasma exchange now with no complaints. LHD trending down. Platelets now 59,000 up from 5k.     Review of patient's allergies indicates:   Allergen Reactions    Nsaids (non-steroidal anti-inflammatory drug) Other (See Comments)     Causes ulcers to bleed.    Ibuprofen     Latex     Meloxicam     Nsaids (non-steroidal anti-inflammatory drug) Nausea Only     Current Facility-Administered Medications   Medication Frequency    0.9%  NaCl infusion (for blood administration) Q24H PRN    0.9%  NaCl infusion (for blood administration) Q24H PRN    0.9%  NaCl infusion (for blood administration) Q24H PRN    0.9%  NaCl infusion (for blood administration) Q24H PRN    0.9%  NaCl infusion Continuous    0.9%  NaCl infusion PRN    acetaminophen tablet 650 mg Q4H PRN    albuterol-ipratropium 2.5 mg-0.5 mg/3 mL nebulizer solution 3 mL Q6H PRN     aluminum-magnesium hydroxide-simethicone 200-200-20 mg/5 mL suspension 30 mL QID PRN    calcium-vitamin D3 500 mg-5 mcg (200 unit) per tablet 2 tablet BID    cetirizine tablet 10 mg Daily    diphenhydrAMINE capsule 25 mg Q6H PRN    diphenhydrAMINE injection 50 mg PRN    folic acid tablet 1 mg Daily    heparin (porcine) injection 1,000 Units PRN    HYDROcodone-acetaminophen 5-325 mg per tablet 1 tablet Q6H PRN    linaCLOtide capsule 145 mcg Before breakfast    magnesium sulfate 2g in water 50mL IVPB (premix) Once    melatonin tablet 6 mg Nightly PRN    morphine injection 2 mg Q4H PRN    multivitamin tablet Daily    mupirocin 2 % ointment BID    ondansetron injection 4 mg Q4H PRN    pantoprazole EC tablet 40 mg Daily    polyethylene glycol packet 17 g BID PRN    predniSONE tablet 109 mg Daily    prochlorperazine injection Soln 5 mg Q6H PRN    senna-docusate 8.6-50 mg per tablet 2 tablet BID PRN    sodium chloride 0.9% flush 10 mL PRN       Objective:     Vital Signs (Most Recent):  Temp: 96.8 °F (36 °C) (02/29/24 1200)  Pulse: 73 (02/29/24 1200)  Resp: 18 (02/29/24 1200)  BP: (!) 156/90 (st nurse did her B/P.) (02/29/24 1200)  SpO2: 98 % (02/29/24 1200) Vital Signs (24h Range):  Temp:  [96.8 °F (36 °C)-98.4 °F (36.9 °C)] 96.8 °F (36 °C)  Pulse:  [70-84] 73  Resp:  [16-26] 18  SpO2:  [96 %-98 %] 98 %  BP: (129-156)/(66-95) 156/90     Weight: 108.9 kg (240 lb 1.3 oz) (02/26/24 2223)  Body mass index is 38.75 kg/m².  Body surface area is 2.25 meters squared.    I/O last 3 completed shifts:  In: 2560 [P.O.:720; Blood:1840]  Out: -     Physical Exam  Constitutional:       Appearance: Normal appearance.   HENT:      Head: Normocephalic and atraumatic.      Nose: Nose normal.      Mouth/Throat:      Mouth: Mucous membranes are moist.   Eyes:      Extraocular Movements: Extraocular movements intact.      Conjunctiva/sclera: Conjunctivae normal.   Cardiovascular:      Rate and Rhythm: Normal rate and regular rhythm.      Heart  sounds: Normal heart sounds.   Pulmonary:      Effort: Pulmonary effort is normal.      Breath sounds: Normal breath sounds.   Abdominal:      General: There is no distension.      Palpations: Abdomen is soft.   Musculoskeletal:         General: No swelling.      Cervical back: Neck supple.   Skin:     General: Skin is warm.   Neurological:      Mental Status: She is oriented to person, place, and time.         Significant Labs:sureBMP:   Recent Labs   Lab 02/29/24  0418      K 3.0*   CO2 28   BUN 13.1   CREATININE 0.85   CALCIUM 8.0*   MG 1.70       CBC:   Recent Labs   Lab 02/29/24  0418   WBC 11.44   RBC 3.62*   HGB 8.0*   HCT 25.4*   PLT 59*   MCV 70.2*   MCH 22.1*   MCHC 31.5*       Microbiology Results (last 7 days)       ** No results found for the last 168 hours. **          Specimen (24h ago, onward)      None          Recent Labs   Lab 02/25/24  1150   PROTEINUA 4+*   BACTERIA Moderate*   LEUKOCYTESUR Negative   HYALINECASTS 3-5*         Significant Imaging:  X-Ray Chest 1 View [[id number]] Resulted: 02/27/24 1415   Order Status: Completed Updated: 02/27/24 1417   Narrative:     EXAMINATION:  XR CHEST 1 VIEW    CPT 87769    CLINICAL HISTORY:  chest pain;    COMPARISON:  February 27, 2024    FINDINGS:  Examination reveals cardiomediastinal silhouette and pleuroparenchymal changes to be essentially unchanged as compared with the previous exam   Impression:       No significant change      Electronically signed by: Patrice Swain  Date: 02/27/2024  Time: 14:15       Assessment/Plan:     Relapsed Thrombotic thrombocytopenic purpura         We will plan for plasma exchange #4 tomorrow and continue until directed by Oncology service     ROSY Turcios  Nephrology  [facility] - 6th Floor Medical Telemetry

## 2024-02-29 NOTE — PROGRESS NOTES
Hematology/Oncology  Progress Note    Patient Name: Nicole Brwon  MRN: [id number]  Admission Date: 2/26/2024  Hospital Length of Stay: 3 days  Attending Provider: Gabriele Aguila MD  Consulting Provider: Julian Malik MD  Principal Problem:TTP (thrombotic thrombocytopenic purpura)      Subjective:     HPI:  48-year-old black female with a history of TTP initially diagnosed in 2015.  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  She has been followed by a hematologist in [place].  She relocated to [place] to live with her daughter approximately 8 months ago.  She presented to the ER 2/25/24 with low-grade fever, nausea/vomiting and abdominal discomfort.  She denied overt diarrhea.  Laboratory on presentation showed a platelet count of 5000.  Hemoglobin was 12.4 with microcytic, hypochromic indices and WBC 6.4.  LDH was elevated at 711 and haptoglobin was undetectable.  UPT was negative.  CMP showed mild renal insufficiency with a BUN of 24.9 and creatinine 1.36.  Peripheral smear showed microcytic, hypochromic red blood cells, increased reticulocytes and the presence of schistocytes and target cells.  Platelets were markedly decreased.  She was transferred to [facility] for initiation of plasmapheresis.  She received 1 unit of platelets at the outside facility prior to transfer.  On arrival here, she was started on prednisone 1 mg/kg daily and received 1 unit of FFP.    Hospital Course  2/27/24:  Started daily plasma exchange 1 plasma volume (40 cc/kg) exchange with FFP.  Platelet count 12,000. ADAMTS-13 level drawn 2/26/24 <5%.  2/28/24:  Day 2 plasma exchange.  Change to 1/2 albumin + 1/2 FFP secondary to significant urticaria.    Today (2/29/24): Patient looks and feels significantly better today.  She remains afebrile.  Urticaria resolved.  Pruritus significantly improved.  Platelet count increased to 59,000.  H&H stable, LDH decreasing.  No significant symptoms  attributable to anemia.       Medications:  Continuous Infusions:   sodium chloride 0.9% 100 mL/hr at 02/27/24 2134     Scheduled Meds:   calcium-vitamin D3  2 tablet Oral BID    cefTRIAXone (Rocephin) IV (PEDS and ADULTS)  1 g Intravenous Q24H    cetirizine  10 mg Oral Daily    folic acid  1 mg Oral Daily    linaCLOtide  145 mcg Oral Before breakfast    multivitamin  1 tablet Oral Daily    mupirocin   Nasal BID    pantoprazole  40 mg Oral Daily    predniSONE  1 mg/kg Oral Daily     PRN Meds:0.9%  NaCl infusion (for blood administration), 0.9%  NaCl infusion (for blood administration), 0.9%  NaCl infusion (for blood administration), acetaminophen, albuterol-ipratropium, aluminum-magnesium hydroxide-simethicone, diphenhydrAMINE, heparin (porcine), HYDROcodone-acetaminophen, melatonin, morphine, ondansetron, polyethylene glycol, prochlorperazine, senna-docusate 8.6-50 mg, sodium chloride 0.9%     Review of patient's allergies indicates:   Allergen Reactions    Nsaids (non-steroidal anti-inflammatory drug) Other (See Comments)     Causes ulcers to bleed.    Ibuprofen     Latex     Meloxicam     Nsaids (non-steroidal anti-inflammatory drug) Nausea Only        PMHx:  HTN, hyperlipidemia, rheumatoid arthritis, migraine headaches, TTP  PSHx:  Endometrial ablation, left carpal tunnel release, right shoulder surgery  SH:  Lifetime nonsmoker.  Previous alcohol, none since age 20.  Lives in [place] with her daughter.  Provides  in her home.  FH:  PGF had a CVA.  No family history of cancer.      Review of Systems   Constitutional:  Positive for fatigue (Improved). Negative for appetite change, fever and unexpected weight change.   HENT:  Negative for facial swelling, mouth sores, nosebleeds, sore throat and trouble swallowing.    Eyes: Negative.    Respiratory:  Negative for cough, shortness of breath and wheezing.    Cardiovascular:  Negative for chest pain, palpitations and leg swelling.   Gastrointestinal:   Negative for abdominal pain, constipation, diarrhea and nausea.   Genitourinary:  Negative for dysuria, frequency, hematuria and urgency.   Musculoskeletal:  Positive for arthralgias. Negative for back pain.   Skin:  Negative for pallor and rash.        Urticaria resolved   Neurological:  Negative for dizziness, seizures, weakness, numbness and headaches.   Hematological:  Negative for adenopathy. Does not bruise/bleed easily.   Psychiatric/Behavioral: Negative.       Objective:     Vital Signs (Most Recent):  Temp: 98.4 °F (36.9 °C) (02/29/24 0433)  Pulse: 78 (02/29/24 0433)  Resp: 18 (02/28/24 2014)  BP: 137/77 (02/29/24 0433)  SpO2: 97 % (02/29/24 0433) Vital Signs (24h Range):  Temp:  [97.8 °F (36.6 °C)-98.4 °F (36.9 °C)] 98.4 °F (36.9 °C)  Pulse:  [70-84] 78  Resp:  [16-26] 18  SpO2:  [96 %-100 %] 97 %  BP: (125-153)/(66-95) 137/77     Weight: 108.9 kg (240 lb 1.3 oz)  Body surface area is 2.25 meters squared.      Physical Exam  Constitutional:       Comments: Mildly obese black female in NAD   HENT:      Head: Normocephalic.      Mouth/Throat:      Mouth: Mucous membranes are moist.      Pharynx: Oropharynx is clear. No posterior oropharyngeal erythema.   Eyes:      General: No scleral icterus.     Extraocular Movements: Extraocular movements intact.      Pupils: Pupils are equal, round, and reactive to light.   Neck:      Comments: Right IJ dialysis catheter  Cardiovascular:      Rate and Rhythm: Normal rate and regular rhythm.      Heart sounds: No murmur heard.  Pulmonary:      Comments: Lungs clear to auscultation  Abdominal:      General: Bowel sounds are normal. There is no distension.      Palpations: Abdomen is soft.      Tenderness: There is no abdominal tenderness.      Comments: Mildly obese.  No palpable masses or organomegaly   Musculoskeletal:         General: No swelling or tenderness. Normal range of motion.      Cervical back: Neck supple. No tenderness.   Lymphadenopathy:      Cervical: No  cervical adenopathy.   Skin:     General: Skin is warm and dry.      Findings: No rash.      Comments: Scattered ecchymoses on arms. No petechiae.   Neurological:      General: No focal deficit present.      Mental Status: She is alert and oriented to person, place, and time.      Cranial Nerves: No cranial nerve deficit.      Motor: No weakness.       Significant Labs:   CBC:   Recent Labs   Lab 02/28/24  0333 02/29/24  0418   WBC 11.48 11.44   HGB 8.5* 8.0*   HCT 26.3* 25.4*   PLT 24* 59*     , CMP:   Recent Labs   Lab 02/28/24  0333 02/29/24  0418   * 144   K 3.2* 3.0*   CO2 29 28   BUN 19.7* 13.1   CREATININE 0.91 0.85   CALCIUM 8.2* 8.0*   ALBUMIN 3.0* 3.2*   BILITOT 1.2 0.5   ALKPHOS 77 74   AST 19 14   ALT 13 11                   2/27/24 2/28/24 2/29/24  LDH     778         871         243  FBG     461         273         208    Diagnostic Results:  No new imaging for review    Impression/Recommendations:   IMPRESSION  Relapsed thrombotic thrombocytopenic purpura  Microangiopathic hemolytic anemia  Hypokalemia    RECOMMENDATIONS  Day 3 plasma exchange. 1/2 albumin + 1/2 FFP.  Patient clinically improved and laboratory findings consistent with response to therapy.  H&H stable.  No indications for transfusion.  Platelet count improving.  DO NOT TRANSFUSE PLATELETS unless approved by Hematology.  ADAMTS-13 level 2/26/24 <5%.  Continue prednisone 1 mg/kg daily.  Blood cultures from admission remain negative.  Consider discontinuing IV antibiotics.  Continue daily labs.  I will be out until 3/7/24.  My partners will continue coverage.      NEO LUJAN MD  Hematology/Oncology

## 2024-02-29 NOTE — PROGRESS NOTES
[facility]  Hospital Medicine Progress Note        Chief Complaint: Inpatient Follow-up      HPI:   48-year-old female medical history of anemia of chronic disease, rheumatoid arthritis started azathioprine January 2024, HTN, HLD, migraine with aura, GIAN, morbid obesity, history of thrombotic thrombocytopenic purpura-TTP initially diagnosed in 2015 and treated with therapeutic plasma exchange, steroid and rituximab 3 doses subsequently had her 1st relapsed in 2018 and treated with same regimen and a 2nd relapsed in January 2020 where she presented to [facility] with abdominal pain and fatigue, found to to have relapse (I do not have record of [id number] activity) and treated with plasma exchange and 4 doses of rituximab while inpatient as well as prednisone taper and subsequently discharged on monthly rituximab where she received 3 doses.     Presented to [facility] ED on 02/25/2024 with chief complaint of fatigue, nausea, vomiting and diffuse abdominal cramping and dark urine for for 3-4 days.  On arrival to ED she was noted to be afebrile and hemodynamically stable.  Initial labs notable for WBC 6.36, hemoglobin 12.4, platelets 5, peripheral smear show few schistocytes and few small platelets compatible with history of TTP, INR 1.1, PTT 30.5, fibrinogen more than 400, , haptoglobin <8, indirect hyperbilirubinemia 2.9, urinalysis showed microscopic hematuria.  CT abdomen pelvis with contrast showed nonspecific retroperitoneal edema and mild perinephric stranding.      She received 1 unit of platelets on 02/25/2024, subsequent CBC trend show platelets trending down as well as hemoglobin, received dexamethasone 40 mg IV 02/26/2024 and a 2nd unit of platelets and subsequently transferred to [facility] for higher level of care.     Upon my evaluation she is resting comfortably, report abdominal pain mainly in the epigastric region feels like burning/cramping, denies hematemesis, melena or  hematochezia.  Report dark urine but no burning or painful urination or increased frequency.  Noted to have few petechiae on all her extremities, trunk and palate.  Report no new neurological symptoms she does have migraine headaches but nothing new, note vision changes, no extremity weakness though she report neuropathy symptoms to her hands and feet that has been chronic.     Patient had her 1st 1 of plasmapheresis  on 2/27  She did have a subsequent allergic-type reaction after it was completed.  Had stridor and itching.  Possible hives.  She was given dose of IV Benadryl and continued on her steroids.     Interval Hx:   patient not at bedside -has gone for plasmapheresis  Vitals reviewed and stable   Plt 59, improving   K and mg 3, 1.7   Bld cx neg 72hrs      Objective/physical exam:  Not at bedside     Assessment/Plan:   TTP-Thrombotic thrombocytopenic purpura, 4th relapse  Microangiopathic hemolytic anemia  Acute kidney injury  Questionable UTI  Rheumatoid arthritis on azathioprine     History of HTN, HLD, morbid obesity, GIAN, knee osteoarthritis     Platelets up to 59 this morning.  LDH down to about 243  Plan for 3rd run of plasmapheresis today.  Holding all blood products and will follow up further Hematology recommendations.    Nephrology following along for plasmapheresis as well.  She has a temporary Vas-Cath in the right IJ for access.  IV mg 2g x 1, po k 40 x 2 doses       Critical care diagnosis: TTP requiring plasmapheresis  Critical care interventions: hands on evaluation, review of labs/radiographs/records and discussions with family  Critical care time spent: >32 minutes          VITAL SIGNS: 24 HRS MIN & MAX LAST   Temp  Min: 96.8 °F (36 °C)  Max: 98.4 °F (36.9 °C) 96.8 °F (36 °C)   BP  Min: 129/66  Max: 156/90 (!) 156/90 (st nurse did her B/P.)   Pulse  Min: 70  Max: 84  73   Resp  Min: 16  Max: 26 18   SpO2  Min: 96 %  Max: 98 % 98 %     I have reviewed the following labs:  Recent Labs   Lab  02/26/24  0709 02/26/24  1916 02/27/24  0350 02/28/24 0333 02/29/24 0418   WBC 8.47   < > 7.37 11.48 11.44   RBC 5.13   < > 4.40 3.85* 3.62*   HGB 11.3*   < > 9.7* 8.5* 8.0*   HCT 35.5*   < > 29.7* 26.3* 25.4*   MCV 69.2*   < > 67.5* 68.3* 70.2*   MCH 22.0*   < > 22.0* 22.1* 22.1*   MCHC 31.8*   < > 32.7* 32.3* 31.5*   RDW 17.4*   < > 18.2* 18.2* 18.4*   PLT 10*   < > 12* 24* 59*   MPV 0.0*  --   --   --   --     < > = values in this interval not displayed.     Recent Labs   Lab 02/27/24  0350 02/27/24  1448 02/28/24 0333 02/29/24 0418     --  146* 144   K 3.6  --  3.2* 3.0*   CO2 24  --  29 28   BUN 27.6*  --  19.7* 13.1   CREATININE 0.89  --  0.91 0.85   CALCIUM 8.6  --  8.2* 8.0*   MG 2.30 1.90 1.90 1.70   ALBUMIN 3.6  --  3.0* 3.2*   ALKPHOS 108  --  77 74   ALT 14  --  13 11   AST 27  --  19 14   BILITOT 3.2*  --  1.2 0.5     Microbiology Results (last 7 days)       ** No results found for the last 168 hours. **             See below for Radiology    Scheduled Med:   calcium-vitamin D3  2 tablet Oral BID    cetirizine  10 mg Oral Daily    folic acid  1 mg Oral Daily    linaCLOtide  145 mcg Oral Before breakfast    magnesium sulfate IVPB  2 g Intravenous Once    multivitamin  1 tablet Oral Daily    mupirocin   Nasal BID    pantoprazole  40 mg Oral Daily    predniSONE  1 mg/kg Oral Daily      Continuous Infusions:   sodium chloride 0.9% 100 mL/hr at 02/27/24 2134      PRN Meds:  0.9%  NaCl infusion (for blood administration), 0.9%  NaCl infusion (for blood administration), 0.9%  NaCl infusion (for blood administration), 0.9%  NaCl infusion (for blood administration), sodium chloride 0.9%, acetaminophen, albuterol-ipratropium, aluminum-magnesium hydroxide-simethicone, diphenhydrAMINE, diphenhydrAMINE, heparin (porcine), HYDROcodone-acetaminophen, melatonin, morphine, ondansetron, polyethylene glycol, prochlorperazine, senna-docusate 8.6-50 mg, sodium chloride 0.9%     Assessment/Plan:      VTE  prophylaxis:     Patient condition:  Stable/Fair/Guarded/ Serious/ Critical    Anticipated discharge and Disposition:         All diagnosis and differential diagnosis have been reviewed; assessment and plan has been documented; I have personally reviewed the labs and test results that are presently available; I have reviewed the patients medication list; I have reviewed the consulting providers response and recommendations. I have reviewed or attempted to review medical records based upon their availability    All of the patient's questions have been  addressed and answered. Patient's is agreeable to the above stated plan. I will continue to monitor closely and make adjustments to medical management as needed.  _____________________________________________________________________    Nutrition Status:    Radiology:  I have personally reviewed the following imaging and agree with the radiologist.     X-Ray Chest 1 View  Narrative: EXAMINATION:  XR CHEST 1 VIEW    CPT 90785    CLINICAL HISTORY:  chest pain;    COMPARISON:  February 27, 2024    FINDINGS:  Examination reveals cardiomediastinal silhouette and pleuroparenchymal changes to be essentially unchanged as compared with the previous exam  Impression: No significant change    Electronically signed by: Patrice Swain  Date:    02/27/2024  Time:    14:15  X-Ray Chest 1 View  Narrative: EXAMINATION:  XR CHEST 1 VIEW    CLINICAL HISTORY:  central line placement;    TECHNIQUE:  Single view of the chest    COMPARISON:  02/26/2024    FINDINGS:  Limited evaluation secondary to positioning.  Right-sided central line projects over the mid SVC.  Impression: As above.    Electronically signed by: Sathya Muñoz  Date:    02/27/2024  Time:    07:25      Sharron Woodward MD  Department of Hospital Medicine   [facility]   02/29/2024

## 2024-02-29 NOTE — PROGRESS NOTES
Pt completed 3rd PEX treatment.    1.9L Albumin and then 1.9L FFP used for Exchange.    Benadryl 50mg ivp given prior to PEX treatment.    Tolerated well. No complications. HD cath functioned well.       02/29/24 1348   Vitals   Temp 97.9 °F (36.6 °C)   Temp Source Oral   Pulse 70   Resp 20   BP (!) 156/95   Device (Oxygen Therapy) room air   TBV 5414 mL   Programmed Hct 25 mL   Machine Check   Completed? Yes   Machine Serial Number   ([id number])   Model spectra optia   Pre-Apheresis   Start Time 1130   Time-Out Yes   2 Patient Identifiers Verified Yes   Consents Verified Yes   Orders on Chart Yes   Physician Available Yes   Disposables   Apheresis Kit Lot # [id number]   Apheresis Kit [facility] terumo   Apheresis Kit Exp 07/01/25   Central Line Care   Central Line Dressing Changed Yes   Apheresis Fluid/Transfusion   Procedure Type Plasmapheresis   Procedure # 3   Optia Run Results   AC used 735   Remove bag 4744   Replacement Used 3896   Rinse back 131   Fluid balance (mL) 14   Fluid Balance (%) 100   Inlet processed 7346   Plasma volumes exchanged 1.0   Plasma removed 4113   AC in remove bag 609   AC to patient 125   AC used for prime 19   Post-Apheresis   Finish Time 1340   Access Functioned Well   Next Treatment Date 03/01/24   Reaction None   Tolerance well   Blood Transfusion? Yes   Massive Transfusion Protocol (Units)   FFP 8 Units   Assessments (Pre/Post)   Level of Consciousness (AVPU) alert   Cognitive   Orientation oriented x 4

## 2024-03-01 LAB
ABO + RH BLD: NORMAL
ALBUMIN SERPL-MCNC: 3.6 G/DL (ref 3.5–5)
ALBUMIN/GLOB SERPL: 2.1 RATIO (ref 1.1–2)
ALP SERPL-CCNC: 79 UNIT/L (ref 40–150)
ALT SERPL-CCNC: 12 UNIT/L (ref 0–55)
AST SERPL-CCNC: 15 UNIT/L (ref 5–34)
BACTERIA BLD CULT: NORMAL
BACTERIA BLD CULT: NORMAL
BASOPHILS # BLD AUTO: 0.03 X10(3)/MCL
BASOPHILS NFR BLD AUTO: 0.2 %
BILIRUB SERPL-MCNC: 0.5 MG/DL
BLD PROD TYP BPU: NORMAL
BLOOD UNIT EXPIRATION DATE: NORMAL
BLOOD UNIT TYPE CODE: 5100
BUN SERPL-MCNC: 13.5 MG/DL (ref 7–18.7)
CALCIUM SERPL-MCNC: 8.7 MG/DL (ref 8.4–10.2)
CHLORIDE SERPL-SCNC: 108 MMOL/L (ref 98–107)
CO2 SERPL-SCNC: 29 MMOL/L (ref 22–29)
CREAT SERPL-MCNC: 0.89 MG/DL (ref 0.55–1.02)
CROSSMATCH INTERPRETATION: NORMAL
DISPENSE STATUS: NORMAL
EOSINOPHIL # BLD AUTO: 0.12 X10(3)/MCL (ref 0–0.9)
EOSINOPHIL NFR BLD AUTO: 0.9 %
ERYTHROCYTE [DISTWIDTH] IN BLOOD BY AUTOMATED COUNT: 18.7 % (ref 11.5–17)
FIBRINOGEN PPP-MCNC: 184 MG/DL (ref 210–463)
GFR SERPLBLD CREATININE-BSD FMLA CKD-EPI: >60 MLS/MIN/1.73/M2
GLOBULIN SER-MCNC: 1.7 GM/DL (ref 2.4–3.5)
GLUCOSE SERPL-MCNC: 94 MG/DL (ref 74–100)
HCT VFR BLD AUTO: 28 % (ref 37–47)
HGB BLD-MCNC: 8.6 G/DL (ref 12–16)
IMM GRANULOCYTES # BLD AUTO: 0.58 X10(3)/MCL (ref 0–0.04)
IMM GRANULOCYTES NFR BLD AUTO: 4.2 %
INR PPP: 1.1
LDH SERPL-CCNC: 219 U/L (ref 125–220)
LYMPHOCYTES # BLD AUTO: 3.32 X10(3)/MCL (ref 0.6–4.6)
LYMPHOCYTES NFR BLD AUTO: 24 %
MAGNESIUM SERPL-MCNC: 2 MG/DL (ref 1.6–2.6)
MCH RBC QN AUTO: 22.1 PG (ref 27–31)
MCHC RBC AUTO-ENTMCNC: 30.7 G/DL (ref 33–36)
MCV RBC AUTO: 72 FL (ref 80–94)
MONOCYTES # BLD AUTO: 0.86 X10(3)/MCL (ref 0.1–1.3)
MONOCYTES NFR BLD AUTO: 6.2 %
NEUTROPHILS # BLD AUTO: 8.9 X10(3)/MCL (ref 2.1–9.2)
NEUTROPHILS NFR BLD AUTO: 64.5 %
NRBC BLD AUTO-RTO: 3.6 %
PLATELET # BLD AUTO: 132 X10(3)/MCL (ref 130–400)
PLATELETS.RETICULATED NFR BLD AUTO: 7.1 % (ref 0.9–11.2)
PMV BLD AUTO: ABNORMAL FL
POC IONIZED CALCIUM: 1.07 MMOL/L (ref 1.12–1.23)
POC TEMPERATURE: 37 °C
POTASSIUM SERPL-SCNC: 3 MMOL/L (ref 3.5–5.1)
PROT SERPL-MCNC: 5.3 GM/DL (ref 6.4–8.3)
PROTHROMBIN TIME: 13.9 SECONDS (ref 12.5–14.5)
RBC # BLD AUTO: 3.89 X10(6)/MCL (ref 4.2–5.4)
SODIUM SERPL-SCNC: 143 MMOL/L (ref 136–145)
SPECIMEN SOURCE: ABNORMAL
UNIT NUMBER: NORMAL
WBC # SPEC AUTO: 13.81 X10(3)/MCL (ref 4.5–11.5)

## 2024-03-01 PROCEDURE — 25000003 PHARM REV CODE 250: Performed by: INTERNAL MEDICINE

## 2024-03-01 PROCEDURE — 83615 LACTATE (LD) (LDH) ENZYME: CPT | Performed by: INTERNAL MEDICINE

## 2024-03-01 PROCEDURE — 82803 BLOOD GASES ANY COMBINATION: CPT

## 2024-03-01 PROCEDURE — 85384 FIBRINOGEN ACTIVITY: CPT | Performed by: INTERNAL MEDICINE

## 2024-03-01 PROCEDURE — 21400001 HC TELEMETRY ROOM

## 2024-03-01 PROCEDURE — 63600175 PHARM REV CODE 636 W HCPCS: Mod: JZ,JG | Performed by: INTERNAL MEDICINE

## 2024-03-01 PROCEDURE — P9017 PLASMA 1 DONOR FRZ W/IN 8 HR: HCPCS | Performed by: INTERNAL MEDICINE

## 2024-03-01 PROCEDURE — P9045 ALBUMIN (HUMAN), 5%, 250 ML: HCPCS | Mod: JZ,JG | Performed by: INTERNAL MEDICINE

## 2024-03-01 PROCEDURE — 80053 COMPREHEN METABOLIC PANEL: CPT | Performed by: INTERNAL MEDICINE

## 2024-03-01 PROCEDURE — 99232 SBSQ HOSP IP/OBS MODERATE 35: CPT | Mod: ,,, | Performed by: INTERNAL MEDICINE

## 2024-03-01 PROCEDURE — 36514 APHERESIS PLASMA: CPT

## 2024-03-01 PROCEDURE — 85025 COMPLETE CBC W/AUTO DIFF WBC: CPT | Performed by: INTERNAL MEDICINE

## 2024-03-01 PROCEDURE — 83735 ASSAY OF MAGNESIUM: CPT | Performed by: INTERNAL MEDICINE

## 2024-03-01 PROCEDURE — 63600175 PHARM REV CODE 636 W HCPCS: Performed by: NURSE PRACTITIONER

## 2024-03-01 PROCEDURE — 36430 TRANSFUSION BLD/BLD COMPNT: CPT

## 2024-03-01 PROCEDURE — 25000003 PHARM REV CODE 250: Mod: JZ,JG | Performed by: INTERNAL MEDICINE

## 2024-03-01 PROCEDURE — 85610 PROTHROMBIN TIME: CPT | Performed by: INTERNAL MEDICINE

## 2024-03-01 PROCEDURE — 99900035 HC TECH TIME PER 15 MIN (STAT)

## 2024-03-01 PROCEDURE — 63600175 PHARM REV CODE 636 W HCPCS: Performed by: INTERNAL MEDICINE

## 2024-03-01 RX ORDER — ALBUMIN HUMAN 50 G/1000ML
95 SOLUTION INTRAVENOUS ONCE
Status: COMPLETED | OUTPATIENT
Start: 2024-03-01 | End: 2024-03-01

## 2024-03-01 RX ORDER — CALCIUM GLUCONATE 20 MG/ML
3 INJECTION, SOLUTION INTRAVENOUS ONCE
Status: COMPLETED | OUTPATIENT
Start: 2024-03-01 | End: 2024-03-01

## 2024-03-01 RX ORDER — LACTULOSE 10 G/15ML
10 SOLUTION ORAL 2 TIMES DAILY PRN
Status: DISCONTINUED | OUTPATIENT
Start: 2024-03-01 | End: 2024-03-06 | Stop reason: HOSPADM

## 2024-03-01 RX ORDER — POTASSIUM CHLORIDE 20 MEQ/1
40 TABLET, EXTENDED RELEASE ORAL ONCE
Status: COMPLETED | OUTPATIENT
Start: 2024-03-01 | End: 2024-03-01

## 2024-03-01 RX ORDER — HYDROCODONE BITARTRATE AND ACETAMINOPHEN 500; 5 MG/1; MG/1
TABLET ORAL
Status: DISCONTINUED | OUTPATIENT
Start: 2024-03-01 | End: 2024-03-06 | Stop reason: HOSPADM

## 2024-03-01 RX ADMIN — Medication 2 TABLET: at 08:03

## 2024-03-01 RX ADMIN — MUPIROCIN: 20 OINTMENT TOPICAL at 08:03

## 2024-03-01 RX ADMIN — FOLIC ACID 1 MG: 1 TABLET ORAL at 08:03

## 2024-03-01 RX ADMIN — SENNOSIDES AND DOCUSATE SODIUM 2 TABLET: 8.6; 5 TABLET ORAL at 08:03

## 2024-03-01 RX ADMIN — CALCIUM GLUCONATE 3 G: 20 INJECTION, SOLUTION INTRAVENOUS at 11:03

## 2024-03-01 RX ADMIN — PREDNISONE 109 MG: 1 TABLET ORAL at 08:03

## 2024-03-01 RX ADMIN — CETIRIZINE HYDROCHLORIDE 10 MG: 10 TABLET, FILM COATED ORAL at 08:03

## 2024-03-01 RX ADMIN — THERA TABS 1 TABLET: TAB at 08:03

## 2024-03-01 RX ADMIN — SODIUM CHLORIDE: 9 INJECTION, SOLUTION INTRAVENOUS at 09:03

## 2024-03-01 RX ADMIN — PANTOPRAZOLE SODIUM 40 MG: 40 TABLET, DELAYED RELEASE ORAL at 08:03

## 2024-03-01 RX ADMIN — POLYETHYLENE GLYCOL 3350 17 G: 17 POWDER, FOR SOLUTION ORAL at 08:03

## 2024-03-01 RX ADMIN — DIPHENHYDRAMINE HYDROCHLORIDE 50 MG: 50 INJECTION INTRAMUSCULAR; INTRAVENOUS at 11:03

## 2024-03-01 RX ADMIN — LACTULOSE 10 G: 10 SOLUTION ORAL at 04:03

## 2024-03-01 RX ADMIN — LINACLOTIDE 145 MCG: 145 CAPSULE, GELATIN COATED ORAL at 06:03

## 2024-03-01 RX ADMIN — POTASSIUM CHLORIDE 40 MEQ: 1500 TABLET, EXTENDED RELEASE ORAL at 02:03

## 2024-03-01 RX ADMIN — ALBUMIN (HUMAN) 95 G: 12.5 SOLUTION INTRAVENOUS at 11:03

## 2024-03-01 NOTE — PROGRESS NOTES
[facility] - 6th Floor Medical Telemetry  Nephrology  Progress Note    Patient Name: Nicole Brown  MRN: [id number]  Admission Date: 2/26/2024  Hospital Length of Stay: 4 days  Attending Provider: Gabriele Aguila MD   Primary Care Physician: Yaritza Browne FNP  Principal Problem:TTP (thrombotic thrombocytopenic purpura)      Subjective:   HPI: This is a 48-year-old AAF with TTP diagnosed on 2015 with multiple relapses treated with plasmapheresis. She presented to the ED with complaints of fatigue, nausea, vomiting and diffuse abdominal cramping with dark urine for 3-4 days. She received 1 unit platelets on 2/25 and started Dexamethasone 2/26. Dr Malik with hematology was consulted and recommended plasmapheresis. Nephrology was consulted for plasmapheresis.     Interval History:   Patient underwent first PLEX Tuesday and had subsequent itching, hives and wheezing. She was treated with Benadryl and Solu-Medrol with improvement in symptoms. She had second and third exchange  with half and half albumin and FFP with good response.     Platelet count normalizing. She endorses being fatigued today. Otherwise no new complaints.     Review of patient's allergies indicates:   Allergen Reactions    Nsaids (non-steroidal anti-inflammatory drug) Other (See Comments)     Causes ulcers to bleed.    Ibuprofen     Latex     Meloxicam     Nsaids (non-steroidal anti-inflammatory drug) Nausea Only     Current Facility-Administered Medications   Medication Frequency    0.9%  NaCl infusion (for blood administration) Q24H PRN    0.9%  NaCl infusion (for blood administration) Q24H PRN    0.9%  NaCl infusion (for blood administration) Q24H PRN    0.9%  NaCl infusion (for blood administration) Q24H PRN    0.9%  NaCl infusion (for blood administration) Q24H PRN    0.9%  NaCl infusion Continuous    0.9%  NaCl infusion PRN    acetaminophen tablet 650 mg Q4H PRN    albumin human 5% bottle 95 g Once     albuterol-ipratropium 2.5 mg-0.5 mg/3 mL nebulizer solution 3 mL Q6H PRN    aluminum-magnesium hydroxide-simethicone 200-200-20 mg/5 mL suspension 30 mL QID PRN    calcium gluconate 1 g in NS IVPB (premixed) Once    calcium-vitamin D3 500 mg-5 mcg (200 unit) per tablet 2 tablet BID    cetirizine tablet 10 mg Daily    diphenhydrAMINE capsule 25 mg Q6H PRN    diphenhydrAMINE injection 50 mg PRN    folic acid tablet 1 mg Daily    heparin (porcine) injection 1,000 Units PRN    HYDROcodone-acetaminophen 5-325 mg per tablet 1 tablet Q6H PRN    linaCLOtide capsule 145 mcg Before breakfast    melatonin tablet 6 mg Nightly PRN    morphine injection 2 mg Q4H PRN    multivitamin tablet Daily    mupirocin 2 % ointment BID    ondansetron injection 4 mg Q4H PRN    pantoprazole EC tablet 40 mg Daily    polyethylene glycol packet 17 g BID PRN    predniSONE tablet 109 mg Daily    prochlorperazine injection Soln 5 mg Q6H PRN    senna-docusate 8.6-50 mg per tablet 2 tablet BID PRN    sodium chloride 0.9% flush 10 mL PRN       Objective:     Vital Signs (Most Recent):  Temp: 98.6 °F (37 °C) (03/01/24 0800)  Pulse: 73 (03/01/24 0800)  Resp: 18 (03/01/24 0800)  BP: (!) 160/96 (03/01/24 0800)  SpO2: 100 % (03/01/24 0800) Vital Signs (24h Range):  Temp:  [96.8 °F (36 °C)-98.6 °F (37 °C)] 98.6 °F (37 °C)  Pulse:  [70-95] 73  Resp:  [18-26] 18  SpO2:  [96 %-100 %] 100 %  BP: (136-173)/(86-96) 160/96     Weight: 108.9 kg (240 lb 1.3 oz) (02/26/24 2223)  Body mass index is 38.75 kg/m².  Body surface area is 2.25 meters squared.    I/O last 3 completed shifts:  In: 3841 [P.O.:1920; Blood:1921]  Out: 2800 [Urine:2800]    Physical Exam  Constitutional:       Appearance: Normal appearance.   HENT:      Head: Normocephalic and atraumatic.      Nose: Nose normal.      Mouth/Throat:      Mouth: Mucous membranes are moist.   Eyes:      Extraocular Movements: Extraocular movements intact.      Conjunctiva/sclera: Conjunctivae normal.   Neck:       Comments: R IJ temp dialysis catheter  Cardiovascular:      Rate and Rhythm: Normal rate and regular rhythm.      Heart sounds: Normal heart sounds.   Pulmonary:      Effort: Pulmonary effort is normal.      Breath sounds: Normal breath sounds.   Abdominal:      General: There is no distension.      Palpations: Abdomen is soft.   Musculoskeletal:         General: No swelling.      Cervical back: Neck supple.   Skin:     General: Skin is warm.   Neurological:      Mental Status: She is oriented to person, place, and time.         Significant Labs:sureBMP:   Recent Labs   Lab 03/01/24  0356      K 3.0*   CO2 29   BUN 13.5   CREATININE 0.89   CALCIUM 8.7   MG 2.00       CBC:   Recent Labs   Lab 03/01/24  0404   WBC 13.81*   RBC 3.89*   HGB 8.6*   HCT 28.0*      MCV 72.0*   MCH 22.1*   MCHC 30.7*       Microbiology Results (last 7 days)       ** No results found for the last 168 hours. **          Specimen (24h ago, onward)      None          Recent Labs   Lab 02/25/24  1150   PROTEINUA 4+*   BACTERIA Moderate*   LEUKOCYTESUR Negative   HYALINECASTS 3-5*         Significant Imaging:  X-Ray Chest 1 View [[id number]] Resulted: 02/27/24 1415   Order Status: Completed Updated: 02/27/24 1417   Narrative:     EXAMINATION:  XR CHEST 1 VIEW    CPT 60998    CLINICAL HISTORY:  chest pain;    COMPARISON:  February 27, 2024    FINDINGS:  Examination reveals cardiomediastinal silhouette and pleuroparenchymal changes to be essentially unchanged as compared with the previous exam   Impression:       No significant change      Electronically signed by: Patrice Swain  Date: 02/27/2024  Time: 14:15       Assessment/Plan:     Relapsed Thrombotic thrombocytopenic purpura      She has undergone three plasmapheresis treatments and is pending #4 today under the direction of hematology.     ROSY Turcios  Nephrology  [facility] - 6th Floor Medical Telemetry

## 2024-03-01 NOTE — PROGRESS NOTES
Hematology/Oncology  Progress Note    Patient Name: Nicole Brown  MRN: [id number]  Admission Date: 2/26/2024  Hospital Length of Stay: 4 days  Attending Provider: Gabriele Aguila MD  Consulting Provider: BHAVANA Samaniego  Principal Problem:TTP (thrombotic thrombocytopenic purpura)      Subjective:     HPI:  48-year-old black female with a history of TTP initially diagnosed in 2015.  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  She has been followed by a hematologist in [place].  She relocated to [place] to live with her daughter approximately 8 months ago.  She presented to the ER 2/25/24 with low-grade fever, nausea/vomiting and abdominal discomfort.  She denied overt diarrhea.  Laboratory on presentation showed a platelet count of 5000.  Hemoglobin was 12.4 with microcytic, hypochromic indices and WBC 6.4.  LDH was elevated at 711 and haptoglobin was undetectable.  UPT was negative.  CMP showed mild renal insufficiency with a BUN of 24.9 and creatinine 1.36.  Peripheral smear showed microcytic, hypochromic red blood cells, increased reticulocytes and the presence of schistocytes and target cells.  Platelets were markedly decreased.  She was transferred to [facility] for initiation of plasmapheresis.  She received 1 unit of platelets at the outside facility prior to transfer.  On arrival here, she was started on prednisone 1 mg/kg daily and received 1 unit of FFP.    Hospital Course  2/27/24:  Started daily plasma exchange 1 plasma volume (40 cc/kg) exchange with FFP.  Platelet count 12,000. ADAMTS-13 level drawn 2/26/24 <5%.  2/28/24:  Day 2 plasma exchange.  Change to 1/2 albumin + 1/2 FFP secondary to significant urticaria.  2/29/24: Day 3 plasma exchange.  1/2 albumin + 1/2 FFP.    Today (3/1/24): Patient sleeping but arouses easily.  Feeling good.  No issues with plasma exchange.   No complaints, specifically no headache.  RIJ catheter looks good.   Platelet count increased to 132,000.  H&H stable.  LDH decreasing.  No significant symptoms attributable to anemia.       Medications:  Continuous Infusions:   sodium chloride 0.9% 100 mL/hr at 03/01/24 0906     Scheduled Meds:   albumin human 5%  95 g Intravenous Once    calcium gluconate IVPB  3 g Intravenous Once    calcium-vitamin D3  2 tablet Oral BID    cetirizine  10 mg Oral Daily    folic acid  1 mg Oral Daily    linaCLOtide  145 mcg Oral Before breakfast    multivitamin  1 tablet Oral Daily    mupirocin   Nasal BID    pantoprazole  40 mg Oral Daily    predniSONE  1 mg/kg Oral Daily     PRN Meds:0.9%  NaCl infusion (for blood administration), 0.9%  NaCl infusion (for blood administration), 0.9%  NaCl infusion (for blood administration), 0.9%  NaCl infusion (for blood administration), 0.9%  NaCl infusion (for blood administration), sodium chloride 0.9%, acetaminophen, albuterol-ipratropium, aluminum-magnesium hydroxide-simethicone, diphenhydrAMINE, diphenhydrAMINE, heparin (porcine), HYDROcodone-acetaminophen, melatonin, morphine, ondansetron, polyethylene glycol, prochlorperazine, senna-docusate 8.6-50 mg, sodium chloride 0.9%     Review of patient's allergies indicates:   Allergen Reactions    Nsaids (non-steroidal anti-inflammatory drug) Other (See Comments)     Causes ulcers to bleed.    Ibuprofen     Latex     Meloxicam     Nsaids (non-steroidal anti-inflammatory drug) Nausea Only        PMHx:  HTN, hyperlipidemia, rheumatoid arthritis, migraine headaches, TTP  PSHx:  Endometrial ablation, left carpal tunnel release, right shoulder surgery  SH:  Lifetime nonsmoker.  Previous alcohol, none since age 20.  Lives in [place] with her daughter.  Provides  in her home.  FH:  PGF had a CVA.  No family history of cancer.      Review of Systems   Constitutional:  Positive for fatigue (Improved). Negative for appetite change, fever and unexpected weight change.   HENT:  Negative for facial swelling,  mouth sores, nosebleeds, sore throat and trouble swallowing.    Eyes: Negative.    Respiratory:  Negative for cough, shortness of breath and wheezing.    Cardiovascular:  Negative for chest pain, palpitations and leg swelling.   Gastrointestinal:  Negative for abdominal pain, constipation, diarrhea and nausea.   Genitourinary:  Negative for dysuria, frequency, hematuria and urgency.   Musculoskeletal:  Positive for arthralgias. Negative for back pain.   Skin:  Negative for pallor and rash.        Urticaria resolved   Neurological:  Negative for dizziness, seizures, weakness, numbness and headaches.   Hematological:  Negative for adenopathy. Does not bruise/bleed easily.   Psychiatric/Behavioral: Negative.       Objective:     Vital Signs (Most Recent):  Temp: 98.6 °F (37 °C) (03/01/24 0800)  Pulse: 73 (03/01/24 0800)  Resp: 18 (03/01/24 0800)  BP: (!) 160/96 (03/01/24 0800)  SpO2: 100 % (03/01/24 0800) Vital Signs (24h Range):  Temp:  [96.8 °F (36 °C)-98.6 °F (37 °C)] 98.6 °F (37 °C)  Pulse:  [70-95] 73  Resp:  [18-26] 18  SpO2:  [96 %-100 %] 100 %  BP: (136-173)/(86-96) 160/96     Weight: 108.9 kg (240 lb 1.3 oz)  Body surface area is 2.25 meters squared.      Physical Exam  Constitutional:       Comments: Mildly obese black female in NAD   HENT:      Head: Normocephalic.      Mouth/Throat:      Mouth: Mucous membranes are moist.      Pharynx: Oropharynx is clear. No posterior oropharyngeal erythema.   Eyes:      General: No scleral icterus.     Extraocular Movements: Extraocular movements intact.      Pupils: Pupils are equal, round, and reactive to light.   Neck:      Comments: Right IJ dialysis catheter  Cardiovascular:      Rate and Rhythm: Normal rate and regular rhythm.      Heart sounds: No murmur heard.  Pulmonary:      Comments: Lungs clear to auscultation  Abdominal:      General: Bowel sounds are normal. There is no distension.      Palpations: Abdomen is soft.      Tenderness: There is no abdominal  tenderness.      Comments: Mildly obese.  No palpable masses or organomegaly   Musculoskeletal:         General: No swelling or tenderness. Normal range of motion.      Cervical back: Neck supple. No tenderness.   Lymphadenopathy:      Cervical: No cervical adenopathy.   Skin:     General: Skin is warm and dry.      Findings: No rash.      Comments: Scattered ecchymoses on arms. No petechiae.   Neurological:      General: No focal deficit present.      Mental Status: She is alert and oriented to person, place, and time.      Cranial Nerves: No cranial nerve deficit.      Motor: No weakness.       Significant Labs:   CBC:   Recent Labs   Lab 02/29/24  0418 03/01/24  0404   WBC 11.44 13.81*   HGB 8.0* 8.6*   HCT 25.4* 28.0*   PLT 59* 132     , CMP:   Recent Labs   Lab 02/29/24 0418 03/01/24  0356    143   K 3.0* 3.0*   CO2 28 29   BUN 13.1 13.5   CREATININE 0.85 0.89   CALCIUM 8.0* 8.7   ALBUMIN 3.2* 3.6   BILITOT 0.5 0.5   ALKPHOS 74 79   AST 14 15   ALT 11 12                   2/27/24   2/28/24   2/29/24   3/1/24  LDH     778         871         243         219  FBG     461         273         208         184    Diagnostic Results:  No new imaging for review    Impression/Recommendations:   IMPRESSION  Relapsed thrombotic thrombocytopenic purpura  Microangiopathic hemolytic anemia  Hypokalemia    RECOMMENDATIONS  Day 4 plasma exchange. 1/2 albumin + 1/2 FFP.  Clinical and laboratory parameters continue to improve.  H&H stable.  No indications for transfusion.  DO NOT TRANSFUSE PLATELETS unless approved by Hematology.  ADAMTS-13 level 2/26/24 <5%.  Continue prednisone 1 mg/kg daily.  Case discussed with Dr. Grewal, who is covering in Dr. Malik's absence.  Consider holding plasma exchange over the weekend once platelet count >150,000.    JIMENA Calderon, ANANDP-C

## 2024-03-01 NOTE — PROGRESS NOTES
[facility]  Hospital Medicine Progress Note        Chief Complaint: Inpatient Follow-up     HPI:   48-year-old female medical history of anemia of chronic disease, rheumatoid arthritis started azathioprine January 2024, HTN, HLD, migraine with aura, GIAN, morbid obesity, history of thrombotic thrombocytopenic purpura-TTP initially diagnosed in 2015 and treated with therapeutic plasma exchange, steroid and rituximab 3 doses subsequently had her 1st relapsed in 2018 and treated with same regimen and a 2nd relapsed in January 2020 where she presented to [facility] with abdominal pain and fatigue, found to to have relapse (I do not have record of [id number] activity) and treated with plasma exchange and 4 doses of rituximab while inpatient as well as prednisone taper and subsequently discharged on monthly rituximab where she received 3 doses.     Presented to [facility] ED on 02/25/2024 with chief complaint of fatigue, nausea, vomiting and diffuse abdominal cramping and dark urine for for 3-4 days.  On arrival to ED she was noted to be afebrile and hemodynamically stable.  Initial labs notable for WBC 6.36, hemoglobin 12.4, platelets 5, peripheral smear show few schistocytes and few small platelets compatible with history of TTP, INR 1.1, PTT 30.5, fibrinogen more than 400, , haptoglobin <8, indirect hyperbilirubinemia 2.9, urinalysis showed microscopic hematuria.  CT abdomen pelvis with contrast showed nonspecific retroperitoneal edema and mild perinephric stranding.      She received 1 unit of platelets on 02/25/2024, subsequent CBC trend show platelets trending down as well as hemoglobin, received dexamethasone 40 mg IV 02/26/2024 and a 2nd unit of platelets and subsequently transferred to [facility] for higher level of care.     Upon presentation to our facility, she was resting comfortably, reported abdominal pain mainly in the epigastric region feels like burning/cramping, denies  hematemesis, melena or hematochezia.  Reported dark urine but no burning or painful urination or increased frequency.  Noted to have few petechiae on all her extremities, trunk and palate.  Reported no new neurological symptoms she does have migraine headaches but nothing new, note vision changes, no extremity weakness though she report neuropathy symptoms to her hands and feet that has been chronic.    Heme-Onc evaluated the patient, Patient had her 1st 1 of plasmapheresis  on 2/27  She did have a subsequent allergic-type reaction after it was completed.  Had stridor and itching.  Possible hives.  She was given dose of IV Benadryl and continued on her steroids.  All her symptoms have improved patient has subsequently tolerated her plasma exchange sessions          Interval Hx:   No acute events reported overnight.    Seen  at bedside while patient undergoing plasma exchange in the dialysis room  She was resting comfortably, she voiced no complaints reported she is doing okay denied any chest pain shortness of breath or any new complaints reported tolerating the treatment well reported feeling improved  Discussed her labs from this morning labs from this morning showed platelet count improved to 132 K from 59 K prior      Objective/physical exam:  General: In no acute distress, afebrile  Neck:  Right IJ hd access  Chest:  Unlabored breathing   Heart:  Normal rate  Abdomen: Soft, nontender  MSK: Warm, no lower extremity edema  Neurologic: Alert and oriented   VITAL SIGNS: 24 HRS MIN & MAX LAST   Temp  Min: 96.8 °F (36 °C)  Max: 98.6 °F (37 °C) 98.6 °F (37 °C)   BP  Min: 136/86  Max: 173/94 (!) 160/96   Pulse  Min: 70  Max: 95  73   Resp  Min: 18  Max: 26 18   SpO2  Min: 96 %  Max: 100 % 100 %     I have reviewed the following labs:  Recent Labs   Lab 02/26/24  0709 02/26/24  1916 02/28/24  0333 02/29/24  0418 03/01/24  0404   WBC 8.47   < > 11.48 11.44 13.81*   RBC 5.13   < > 3.85* 3.62* 3.89*   HGB 11.3*   < > 8.5*  8.0* 8.6*   HCT 35.5*   < > 26.3* 25.4* 28.0*   MCV 69.2*   < > 68.3* 70.2* 72.0*   MCH 22.0*   < > 22.1* 22.1* 22.1*   MCHC 31.8*   < > 32.3* 31.5* 30.7*   RDW 17.4*   < > 18.2* 18.4* 18.7*   PLT 10*   < > 24* 59* 132   MPV 0.0*  --   --   --   --     < > = values in this interval not displayed.     Recent Labs   Lab 02/28/24  0333 02/29/24  0418 03/01/24  0356   * 144 143   K 3.2* 3.0* 3.0*   CO2 29 28 29   BUN 19.7* 13.1 13.5   CREATININE 0.91 0.85 0.89   CALCIUM 8.2* 8.0* 8.7   MG 1.90 1.70 2.00   ALBUMIN 3.0* 3.2* 3.6   ALKPHOS 77 74 79   ALT 13 11 12   AST 19 14 15   BILITOT 1.2 0.5 0.5     Microbiology Results (last 7 days)       ** No results found for the last 168 hours. **             See below for Radiology    Scheduled Med:   albumin human 5%  95 g Intravenous Once    calcium gluconate IVPB  3 g Intravenous Once    calcium-vitamin D3  2 tablet Oral BID    cetirizine  10 mg Oral Daily    folic acid  1 mg Oral Daily    linaCLOtide  145 mcg Oral Before breakfast    multivitamin  1 tablet Oral Daily    mupirocin   Nasal BID    pantoprazole  40 mg Oral Daily    predniSONE  1 mg/kg Oral Daily      Continuous Infusions:   sodium chloride 0.9% 100 mL/hr at 03/01/24 0906      PRN Meds:  0.9%  NaCl infusion (for blood administration), 0.9%  NaCl infusion (for blood administration), 0.9%  NaCl infusion (for blood administration), 0.9%  NaCl infusion (for blood administration), 0.9%  NaCl infusion (for blood administration), sodium chloride 0.9%, acetaminophen, albuterol-ipratropium, aluminum-magnesium hydroxide-simethicone, diphenhydrAMINE, diphenhydrAMINE, heparin (porcine), HYDROcodone-acetaminophen, melatonin, morphine, ondansetron, polyethylene glycol, prochlorperazine, senna-docusate 8.6-50 mg, sodium chloride 0.9%     Assessment/Plan:  TTP-Thrombotic thrombocytopenic purpura, 4th relapse  Microangiopathic hemolytic anemia  Acute kidney injury  Asymptomatic bacteriuria-urine cultures neg  Rheumatoid  arthritis on azathioprine     History of HTN, HLD, morbid obesity, GIAN, knee osteoarthritis      Continue to monitor on tele   Labs from this morning with improved platelet count 152 K, hemoglobin 8.6 stable  Fibrinogen  184  Day 4 plasma exchange  Continue plasma exchange  per Hematology, Nephrology teams   Noted inputs from  Hematology, Nephrology   Patient on prednisone 1 milligram/kg body weight  Continue PPI, multivitamin, folic acid  Continue Linzess  Case was discussed with patient's nurse and  on the floor.        Patient condition:  Fair    Anticipated discharge and Disposition:   tbd        _____________________________________________________________________    Nutrition Status:    Radiology:  I have personally reviewed the following imaging and agree with the radiologist.     X-Ray Chest 1 View  Narrative: EXAMINATION:  XR CHEST 1 VIEW    CPT 90292    CLINICAL HISTORY:  chest pain;    COMPARISON:  February 27, 2024    FINDINGS:  Examination reveals cardiomediastinal silhouette and pleuroparenchymal changes to be essentially unchanged as compared with the previous exam  Impression: No significant change    Electronically signed by: Patrice Swain  Date:    02/27/2024  Time:    14:15  X-Ray Chest 1 View  Narrative: EXAMINATION:  XR CHEST 1 VIEW    CLINICAL HISTORY:  central line placement;    TECHNIQUE:  Single view of the chest    COMPARISON:  02/26/2024    FINDINGS:  Limited evaluation secondary to positioning.  Right-sided central line projects over the mid SVC.  Impression: As above.    Electronically signed by: Sathya Muñoz  Date:    02/27/2024  Time:    07:25      Lena Hubbard MD  Department of Hospital Medicine   [facility]   03/01/2024

## 2024-03-01 NOTE — PROGRESS NOTES
Completed Plasma Exchange #4  Tolerated well without issues.  CVC functioned well    Used 1.9L Albumin and 1.9L of Plasma       03/01/24 1315   Machine Check   Completed? Yes   Model spectra optia   Pre-Apheresis   Start Time 1115   Time-Out Yes   2 Patient Identifiers Verified Yes   Consents Verified Yes   Orders on Chart Yes   Physician Available Yes   Apheresis Fluid/Transfusion   Procedure Type Plasmapheresis   Procedure # 4   Optia Run Results   AC used 740   Remove bag 4649   Replacement Used 3805   Rinse back 128   Fluid balance (mL) 19   Fluid Balance (%) 100   Inlet processed 7399   Plasma volumes exchanged 1.0   Plasma removed 4010   AC in remove bag 619   AC to patient 121   AC used for prime 19   Post-Apheresis   Finish Time 1305   Access Functioned Well   Reaction None   Tolerance well   Blood Transfusion? Yes   Massive Transfusion Protocol (Units)   FFP 6 Units   Assessments (Pre/Post)   Level of Consciousness (AVPU) alert   Cognitive   Orientation oriented x 4

## 2024-03-02 LAB
ALBUMIN SERPL-MCNC: 3.5 G/DL (ref 3.5–5)
ALBUMIN/GLOB SERPL: 2.5 RATIO (ref 1.1–2)
ALP SERPL-CCNC: 70 UNIT/L (ref 40–150)
ALT SERPL-CCNC: 15 UNIT/L (ref 0–55)
AST SERPL-CCNC: 15 UNIT/L (ref 5–34)
BASOPHILS # BLD AUTO: 0.03 X10(3)/MCL
BASOPHILS NFR BLD AUTO: 0.2 %
BILIRUB SERPL-MCNC: 0.4 MG/DL
BUN SERPL-MCNC: 17.3 MG/DL (ref 7–18.7)
CALCIUM SERPL-MCNC: 8.6 MG/DL (ref 8.4–10.2)
CHLORIDE SERPL-SCNC: 108 MMOL/L (ref 98–107)
CO2 SERPL-SCNC: 25 MMOL/L (ref 22–29)
CREAT SERPL-MCNC: 0.89 MG/DL (ref 0.55–1.02)
EOSINOPHIL # BLD AUTO: 0.07 X10(3)/MCL (ref 0–0.9)
EOSINOPHIL NFR BLD AUTO: 0.5 %
ERYTHROCYTE [DISTWIDTH] IN BLOOD BY AUTOMATED COUNT: 18.9 % (ref 11.5–17)
FIBRINOGEN PPP-MCNC: 193 MG/DL (ref 210–463)
GFR SERPLBLD CREATININE-BSD FMLA CKD-EPI: >60 MLS/MIN/1.73/M2
GLOBULIN SER-MCNC: 1.4 GM/DL (ref 2.4–3.5)
GLUCOSE SERPL-MCNC: 93 MG/DL (ref 74–100)
HCT VFR BLD AUTO: 28.9 % (ref 37–47)
HGB BLD-MCNC: 9.2 G/DL (ref 12–16)
IMM GRANULOCYTES # BLD AUTO: 0.6 X10(3)/MCL (ref 0–0.04)
IMM GRANULOCYTES NFR BLD AUTO: 4 %
INR PPP: 1.1
LDH SERPL-CCNC: 180 U/L (ref 125–220)
LYMPHOCYTES # BLD AUTO: 2.91 X10(3)/MCL (ref 0.6–4.6)
LYMPHOCYTES NFR BLD AUTO: 19.2 %
MAGNESIUM SERPL-MCNC: 1.9 MG/DL (ref 1.6–2.6)
MCH RBC QN AUTO: 23.1 PG (ref 27–31)
MCHC RBC AUTO-ENTMCNC: 31.8 G/DL (ref 33–36)
MCV RBC AUTO: 72.4 FL (ref 80–94)
MONOCYTES # BLD AUTO: 1.18 X10(3)/MCL (ref 0.1–1.3)
MONOCYTES NFR BLD AUTO: 7.8 %
NEUTROPHILS # BLD AUTO: 10.35 X10(3)/MCL (ref 2.1–9.2)
NEUTROPHILS NFR BLD AUTO: 68.3 %
NRBC BLD AUTO-RTO: 2.5 %
PLATELET # BLD AUTO: 220 X10(3)/MCL (ref 130–400)
PLATELETS.RETICULATED NFR BLD AUTO: 5.7 % (ref 0.9–11.2)
PMV BLD AUTO: ABNORMAL FL
POC IONIZED CALCIUM: 1 MMOL/L (ref 1.12–1.23)
POC TEMPERATURE: 37 °C
POTASSIUM SERPL-SCNC: 3.6 MMOL/L (ref 3.5–5.1)
PROT SERPL-MCNC: 4.9 GM/DL (ref 6.4–8.3)
PROTHROMBIN TIME: 14.3 SECONDS (ref 12.5–14.5)
RBC # BLD AUTO: 3.99 X10(6)/MCL (ref 4.2–5.4)
SODIUM SERPL-SCNC: 140 MMOL/L (ref 136–145)
SPECIMEN SOURCE: ABNORMAL
WBC # SPEC AUTO: 15.14 X10(3)/MCL (ref 4.5–11.5)

## 2024-03-02 PROCEDURE — 25000003 PHARM REV CODE 250: Performed by: INTERNAL MEDICINE

## 2024-03-02 PROCEDURE — 85025 COMPLETE CBC W/AUTO DIFF WBC: CPT | Performed by: INTERNAL MEDICINE

## 2024-03-02 PROCEDURE — 85384 FIBRINOGEN ACTIVITY: CPT | Performed by: INTERNAL MEDICINE

## 2024-03-02 PROCEDURE — 21400001 HC TELEMETRY ROOM

## 2024-03-02 PROCEDURE — 99232 SBSQ HOSP IP/OBS MODERATE 35: CPT | Mod: ,,, | Performed by: INTERNAL MEDICINE

## 2024-03-02 PROCEDURE — 83735 ASSAY OF MAGNESIUM: CPT | Performed by: INTERNAL MEDICINE

## 2024-03-02 PROCEDURE — 83615 LACTATE (LD) (LDH) ENZYME: CPT | Performed by: INTERNAL MEDICINE

## 2024-03-02 PROCEDURE — 63600175 PHARM REV CODE 636 W HCPCS: Performed by: INTERNAL MEDICINE

## 2024-03-02 PROCEDURE — 85610 PROTHROMBIN TIME: CPT | Performed by: INTERNAL MEDICINE

## 2024-03-02 PROCEDURE — 80053 COMPREHEN METABOLIC PANEL: CPT | Performed by: INTERNAL MEDICINE

## 2024-03-02 PROCEDURE — 99900035 HC TECH TIME PER 15 MIN (STAT)

## 2024-03-02 PROCEDURE — 82803 BLOOD GASES ANY COMBINATION: CPT

## 2024-03-02 RX ORDER — METOPROLOL SUCCINATE 50 MG/1
100 TABLET, EXTENDED RELEASE ORAL NIGHTLY
Status: DISCONTINUED | OUTPATIENT
Start: 2024-03-02 | End: 2024-03-06 | Stop reason: HOSPADM

## 2024-03-02 RX ORDER — BISACODYL 10 MG/1
10 SUPPOSITORY RECTAL DAILY PRN
Status: DISCONTINUED | OUTPATIENT
Start: 2024-03-02 | End: 2024-03-06 | Stop reason: HOSPADM

## 2024-03-02 RX ADMIN — THERA TABS 1 TABLET: TAB at 09:03

## 2024-03-02 RX ADMIN — LACTULOSE 10 G: 10 SOLUTION ORAL at 09:03

## 2024-03-02 RX ADMIN — LINACLOTIDE 145 MCG: 145 CAPSULE, GELATIN COATED ORAL at 09:03

## 2024-03-02 RX ADMIN — Medication 2 TABLET: at 08:03

## 2024-03-02 RX ADMIN — PANTOPRAZOLE SODIUM 40 MG: 40 TABLET, DELAYED RELEASE ORAL at 09:03

## 2024-03-02 RX ADMIN — CETIRIZINE HYDROCHLORIDE 10 MG: 10 TABLET, FILM COATED ORAL at 09:03

## 2024-03-02 RX ADMIN — PREDNISONE 109 MG: 1 TABLET ORAL at 09:03

## 2024-03-02 RX ADMIN — Medication 2 TABLET: at 09:03

## 2024-03-02 RX ADMIN — METOPROLOL SUCCINATE 100 MG: 50 TABLET, EXTENDED RELEASE ORAL at 08:03

## 2024-03-02 RX ADMIN — MUPIROCIN: 20 OINTMENT TOPICAL at 09:03

## 2024-03-02 RX ADMIN — FOLIC ACID 1 MG: 1 TABLET ORAL at 09:03

## 2024-03-02 RX ADMIN — BISACODYL 10 MG: 10 SUPPOSITORY RECTAL at 02:03

## 2024-03-02 NOTE — PROGRESS NOTES
Hematology/Oncology  Progress Note    Patient Name: Nicole AGRAWAL  Junior Quiñones  MRN: [id number]  Admission Date: 2/26/2024  Hospital Length of Stay: 5 days  Attending Provider: Lena Hubbard MD  Consulting Provider: Kathleen Perez MD  Principal Problem:TTP (thrombotic thrombocytopenic purpura)      Subjective:     HPI:  48-year-old black female with a history of TTP initially diagnosed in 2015.  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  She has been followed by a hematologist in [place].  She relocated to [place] to live with her daughter approximately 8 months ago.  She presented to the ER 2/25/24 with low-grade fever, nausea/vomiting and abdominal discomfort.  She denied overt diarrhea.  Laboratory on presentation showed a platelet count of 5000.  Hemoglobin was 12.4 with microcytic, hypochromic indices and WBC 6.4.  LDH was elevated at 711 and haptoglobin was undetectable.  UPT was negative.  CMP showed mild renal insufficiency with a BUN of 24.9 and creatinine 1.36.  Peripheral smear showed microcytic, hypochromic red blood cells, increased reticulocytes and the presence of schistocytes and target cells.  Platelets were markedly decreased.  She was transferred to [facility] for initiation of plasmapheresis.  She received 1 unit of platelets at the outside facility prior to transfer.  On arrival here, she was started on prednisone 1 mg/kg daily and received 1 unit of FFP.    Hospital Course  2/27/24:  Started daily plasma exchange 1 plasma volume (40 cc/kg) exchange with FFP.  Platelet count 12,000. ADAMTS-13 level drawn 2/26/24 <5%.  2/28/24:  Day 2 plasma exchange.  Change to 1/2 albumin + 1/2 FFP secondary to significant urticaria.  2/29/24: Day 3 plasma exchange.  1/2 albumin + 1/2 FFP.    (3/1/24): Patient sleeping but arouses easily.  Feeling good.  No issues with plasma exchange.   No complaints, specifically no headache.  RIJ catheter looks good.   Platelet count increased to 132,000.  H&H stable.  LDH decreasing.  No significant symptoms attributable to anemia.     Today 3/2/2024:  Rounds this morning.  Son at bedside.  She reports that she is feeling better.  No fever, chills, sweats.  No chest pain or short of breath.  Status post plasmapheresis  x 4. Today, Platelets 220,000. Fibrinogen 193, . As per renal, Plan is to hold today.     Medications:  Continuous Infusions:   sodium chloride 0.9% 100 mL/hr at 03/01/24 0906     Scheduled Meds:   calcium-vitamin D3  2 tablet Oral BID    cetirizine  10 mg Oral Daily    folic acid  1 mg Oral Daily    linaCLOtide  145 mcg Oral Before breakfast    multivitamin  1 tablet Oral Daily    mupirocin   Nasal BID    pantoprazole  40 mg Oral Daily    predniSONE  1 mg/kg Oral Daily     PRN Meds:0.9%  NaCl infusion (for blood administration), 0.9%  NaCl infusion (for blood administration), 0.9%  NaCl infusion (for blood administration), 0.9%  NaCl infusion (for blood administration), 0.9%  NaCl infusion (for blood administration), sodium chloride 0.9%, acetaminophen, albuterol-ipratropium, aluminum-magnesium hydroxide-simethicone, diphenhydrAMINE, diphenhydrAMINE, heparin (porcine), HYDROcodone-acetaminophen, lactulose 10 gram/15 ml, melatonin, morphine, ondansetron, polyethylene glycol, prochlorperazine, senna-docusate 8.6-50 mg, sodium chloride 0.9%     Review of patient's allergies indicates:   Allergen Reactions    Nsaids (non-steroidal anti-inflammatory drug) Other (See Comments)     Causes ulcers to bleed.    Ibuprofen     Latex     Meloxicam     Nsaids (non-steroidal anti-inflammatory drug) Nausea Only        PMHx:  HTN, hyperlipidemia, rheumatoid arthritis, migraine headaches, TTP  PSHx:  Endometrial ablation, left carpal tunnel release, right shoulder surgery  SH:  Lifetime nonsmoker.  Previous alcohol, none since age 20.  Lives in [place] with her daughter.  Provides  in her home.  FH:  PGF had a  CVA.  No family history of cancer.      Review of Systems   Constitutional:  Positive for fatigue (Improved). Negative for appetite change, fever and unexpected weight change.   HENT:  Negative for facial swelling, mouth sores, nosebleeds, sore throat and trouble swallowing.    Eyes: Negative.    Respiratory:  Negative for cough, shortness of breath and wheezing.    Cardiovascular:  Negative for chest pain, palpitations and leg swelling.   Gastrointestinal:  Negative for abdominal pain, constipation, diarrhea and nausea.   Genitourinary:  Negative for dysuria, frequency, hematuria and urgency.   Musculoskeletal:  Positive for arthralgias. Negative for back pain.   Skin:  Negative for pallor and rash.        Urticaria resolved   Neurological:  Negative for dizziness, seizures, weakness, numbness and headaches.   Hematological:  Negative for adenopathy. Does not bruise/bleed easily.   Psychiatric/Behavioral: Negative.       Objective:     Vital Signs (Most Recent):  Temp: 98.5 °F (36.9 °C) (03/02/24 0732)  Pulse: 76 (03/02/24 0732)  Resp: 18 (03/02/24 0732)  BP: (!) 156/92 (03/02/24 0732)  SpO2: 99 % (03/02/24 0732) Vital Signs (24h Range):  Temp:  [98.1 °F (36.7 °C)-98.5 °F (36.9 °C)] 98.5 °F (36.9 °C)  Pulse:  [71-87] 76  Resp:  [16-18] 18  SpO2:  [97 %-100 %] 99 %  BP: (123-160)/() 156/92     Weight: 108.9 kg (240 lb 1.3 oz)  Body surface area is 2.25 meters squared.      Physical Exam  Constitutional:       Comments: Mildly obese black female in NAD   HENT:      Head: Normocephalic.      Mouth/Throat:      Mouth: Mucous membranes are moist.      Pharynx: Oropharynx is clear. No posterior oropharyngeal erythema.   Eyes:      General: No scleral icterus.     Extraocular Movements: Extraocular movements intact.      Pupils: Pupils are equal, round, and reactive to light.   Neck:      Comments: Right IJ dialysis catheter  Cardiovascular:      Rate and Rhythm: Normal rate and regular rhythm.      Heart sounds: No  murmur heard.  Pulmonary:      Comments: Lungs clear to auscultation  Abdominal:      General: Bowel sounds are normal. There is no distension.      Palpations: Abdomen is soft.      Tenderness: There is no abdominal tenderness.      Comments: Mildly obese.  No palpable masses or organomegaly   Musculoskeletal:         General: No swelling or tenderness. Normal range of motion.      Cervical back: Neck supple. No tenderness.   Lymphadenopathy:      Cervical: No cervical adenopathy.   Skin:     General: Skin is warm and dry.      Findings: No rash.      Comments: Scattered ecchymoses on arms. No petechiae.   Neurological:      General: No focal deficit present.      Mental Status: She is alert and oriented to person, place, and time.      Cranial Nerves: No cranial nerve deficit.      Motor: No weakness.       Significant Labs:   CBC:   Recent Labs   Lab 03/01/24  0404 03/02/24  0427   WBC 13.81* 15.14*   HGB 8.6* 9.2*   HCT 28.0* 28.9*    220     , CMP:   Recent Labs   Lab 03/01/24  0356 03/02/24  0427    140   K 3.0* 3.6   CO2 29 25   BUN 13.5 17.3   CREATININE 0.89 0.89   CALCIUM 8.7 8.6   ALBUMIN 3.6 3.5   BILITOT 0.5 0.4   ALKPHOS 79 70   AST 15 15   ALT 12 15                   2/27/24   2/28/24   2/29/24   3/1/24  LDH     778         871         243         219  FBG     461         273         208         184    Diagnostic Results:  No new imaging for review    Impression/Recommendations:   IMPRESSION  Relapsed thrombotic thrombocytopenic purpura  Microangiopathic hemolytic anemia  Hypokalemia    RECOMMENDATIONS  On 3/1/2024: Day 4 plasma exchange. 1/2 albumin + 1/2 FFP.  Clinical and laboratory parameters continue to improve.  H&H stable.  No indications for transfusion.  DO NOT TRANSFUSE PLATELETS unless approved by Hematology.  ADAMTS-13 level 2/26/24 <5%.  --Platelets 200k today, fibrinogen 193 and      - Agree to hold pheresis today.   Labs daily    Kathleen Grewal  MD  Hematology/Oncology  [facility]

## 2024-03-02 NOTE — PROGRESS NOTES
Inpatient Nutrition Evaluation    Admit Date: 2/26/2024   Total duration of encounter: 5 days   Patient Age: 48 y.o.    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  Microangiopathic hemolytic anemia  Acute kidney injury  Questionable UTI  Rheumatoid arthritis on azathioprine    Relevant Medical History:   HTN, HLD, morbid obesity, GIAN, knee osteoarthritis     Scheduled Medications:  calcium-vitamin D3, 2 tablet, BID  cetirizine, 10 mg, Daily  folic acid, 1 mg, Daily  linaCLOtide, 145 mcg, Before breakfast  multivitamin, 1 tablet, Daily  mupirocin, , BID  pantoprazole, 40 mg, Daily  predniSONE, 1 mg/kg, Daily    Continuous Infusions:  sodium chloride 0.9%, Last Rate: 100 mL/hr at 03/01/24 0906    PRN Medications: 0.9%  NaCl infusion (for blood administration), 0.9%  NaCl infusion (for blood administration), 0.9%  NaCl infusion (for blood administration), 0.9%  NaCl infusion (for blood administration), 0.9%  NaCl infusion (for blood administration), sodium chloride 0.9%, acetaminophen, albuterol-ipratropium, aluminum-magnesium hydroxide-simethicone, diphenhydrAMINE, diphenhydrAMINE, heparin (porcine), HYDROcodone-acetaminophen, lactulose 10 gram/15 ml, melatonin, morphine, ondansetron, polyethylene glycol, prochlorperazine, senna-docusate 8.6-50 mg, sodium chloride 0.9%    Recent Labs   Lab 02/25/24  1201 02/25/24  1247 02/26/24  1916 02/26/24  2218 02/27/24  0350 02/27/24  1448 02/28/24  0333 02/29/24  0418 03/01/24  0356 03/01/24  0404 03/02/24  0427      < > 144 142 141  --  146* 144 143  --  140   K 3.3*   < > 3.5 3.6 3.6  --  3.2* 3.0*  "3.0*  --  3.6   CALCIUM 8.6   < > 8.4 8.8 8.6  --  8.2* 8.0* 8.7  --  8.6   PHOS  --   --   --   --  2.9  --   --   --   --   --   --    MG  --   --   --   --  2.30 1.90 1.90 1.70 2.00  --  1.90   CHLORIDE 110*   < > 109* 110* 109*  --  110* 111* 108*  --  108*   CO2 22   < > 27 23 24  --  29 28 29  --  25   BUN 24.9*   < > 30.9* 29.9* 27.6*  --  19.7* 13.1 13.5  --  17.3   CREATININE 1.36*   < > 1.13* 1.09* 0.89  --  0.91 0.85 0.89  --  0.89   EGFRNORACEVR 48   < > 60 >60 >60  --  >60 >60 >60  --  >60   GLUCOSE 108*   < > 110* 138* 134*  --  111* 93 94  --  93   BILITOT 4.1*  4.1*   < > 3.2* 3.7* 3.2*  --  1.2 0.5 0.5  --  0.4   ALKPHOS 117   < > 104 114 108  --  77 74 79  --  70   ALT 10   < > 12 13 14  --  13 11 12  --  15   AST 25   < > 26 28 27  --  19 14 15  --  15   ALBUMIN 3.3*   < > 3.3* 3.5 3.6  --  3.0* 3.2* 3.6  --  3.5   LIPASE 27  --   --   --   --   --   --   --   --   --   --    WBC  --    < > 5.88 8.88 7.37  --  11.48 11.44  --  13.81* 15.14*   HGB  --    < > 9.9* 10.4* 9.7*  --  8.5* 8.0*  --  8.6* 9.2*   HCT  --    < > 30.4* 31.6* 29.7*  --  26.3* 25.4*  --  28.0* 28.9*    < > = values in this interval not displayed.     Nutrition Orders:  Diet heart healthy      Appetite/Oral Intake: fair/50-75% of meals  Factors Affecting Nutritional Intake: decreased appetite  Food/Anabaptism/Cultural Preferences: none reported  Food Allergies: none reported  Last Bowel Movement: 02/25/24  Wound(s):  Skin intact per EMR    Comments    3/2/24: Pt states that her weight has fluctuated between 148 and 160 over the past year. Pt denies any decreased appetite PTA. Since admit, her appetite had been poor but has increased over the past two days. Noted that pt's last BM was 2/25.     Anthropometrics    Height: 5' 6" (167.6 cm),    Last Weight: 108.9 kg (240 lb 1.3 oz) (02/26/24 2223), Weight Method: Standard Scale  BMI (Calculated): 38.8  BMI Classification: obese grade II (BMI 35-39.9)     Ideal Body Weight (IBW), " Female: 130 lb     % Ideal Body Weight, Female (lb): 184.68 %                             Usual Weight Provided By: patient    Wt Readings from Last 5 Encounters:   02/26/24 108.9 kg (240 lb 1.3 oz)   02/25/24 106 kg (233 lb 11 oz)   02/08/24 112 kg (247 lb)   02/01/24 110.7 kg (244 lb)   01/31/24 111 kg (244 lb 12.8 oz)     Weight Change(s) Since Admission:   Wt Readings from Last 1 Encounters:   02/26/24 2223 108.9 kg (240 lb 1.3 oz)   02/26/24 2218 106 kg (233 lb 11 oz)   Admit Weight: 106 kg (233 lb 11 oz) (02/26/24 2218), Weight Method: Standard Scale    Patient Education     Not applicable.    Nutrition Goals & Monitoring     Dietitian will monitor: food and beverage intake and weight    Nutrition Risk/Follow-Up: low (follow-up in 5-7 days)  Patients assigned 'low nutrition risk' status do not qualify for a full nutritional assessment but will be monitored and re-evaluated in a 5-7 day time period. Please consult if re-evaluation needed sooner.

## 2024-03-02 NOTE — PROGRESS NOTES
[facility]  Hospital Medicine Progress Note        Chief Complaint: Inpatient Follow-up     HPI:   48-year-old female medical history of anemia of chronic disease, rheumatoid arthritis started azathioprine January 2024, HTN, HLD, migraine with aura, GIAN, morbid obesity, history of thrombotic thrombocytopenic purpura-TTP initially diagnosed in 2015 and treated with therapeutic plasma exchange, steroid and rituximab 3 doses subsequently had her 1st relapsed in 2018 and treated with same regimen and a 2nd relapsed in January 2020 where she presented to [facility] with abdominal pain and fatigue, found to to have relapse (I do not have record of [id number] activity) and treated with plasma exchange and 4 doses of rituximab while inpatient as well as prednisone taper and subsequently discharged on monthly rituximab where she received 3 doses.     Presented to [facility] ED on 02/25/2024 with chief complaint of fatigue, nausea, vomiting and diffuse abdominal cramping and dark urine for for 3-4 days.  On arrival to ED she was noted to be afebrile and hemodynamically stable.  Initial labs notable for WBC 6.36, hemoglobin 12.4, platelets 5, peripheral smear show few schistocytes and few small platelets compatible with history of TTP, INR 1.1, PTT 30.5, fibrinogen more than 400, , haptoglobin <8, indirect hyperbilirubinemia 2.9, urinalysis showed microscopic hematuria.  CT abdomen pelvis with contrast showed nonspecific retroperitoneal edema and mild perinephric stranding.      She received 1 unit of platelets on 02/25/2024, subsequent CBC trend show platelets trending down as well as hemoglobin, received dexamethasone 40 mg IV 02/26/2024 and a 2nd unit of platelets and subsequently transferred to [facility] for higher level of care.     Upon presentation to our facility, she was resting comfortably, reported abdominal pain mainly in the epigastric region feels like burning/cramping, denies  hematemesis, melena or hematochezia.  Reported dark urine but no burning or painful urination or increased frequency.  Noted to have few petechiae on all her extremities, trunk and palate.  Reported no new neurological symptoms she does have migraine headaches but nothing new, note vision changes, no extremity weakness though she report neuropathy symptoms to her hands and feet that has been chronic.    Heme-Onc evaluated the patient, Patient had her 1st 1 of plasmapheresis  on 2/27  She did have a subsequent allergic-type reaction after it was completed.  Had stridor and itching.  Possible hives.  She was given dose of IV Benadryl and continued on her steroids.  All her symptoms have improved patient has subsequently tolerated her plasma exchange sessions          Interval Hx:   No acute events reported overnight.  Seen at bedside this morning she reported feeling good, appetite has improved, denied any chest pain shortness on breath fevers chills  She completed her 4th session of plasma exchange yesterday, labs from this morning showed improved platelet count to 220 K pulmonology low 193.   Complained of constipation despite on MiraLax, lactulose, Linzess denied any abdominal pain nausea vomiting        Objective/physical exam:  General: In no acute distress, afebrile  Neck:  Right IJ hd access  Chest:  Unlabored breathing   Heart:  Normal rate  Abdomen: Soft, nontender  MSK: Warm, no lower extremity edema  Neurologic: Alert and oriented   VITAL SIGNS: 24 HRS MIN & MAX LAST   Temp  Min: 98.1 °F (36.7 °C)  Max: 98.5 °F (36.9 °C) 98.5 °F (36.9 °C)   BP  Min: 123/72  Max: 160/100 (!) 156/92   Pulse  Min: 71  Max: 87  76   Resp  Min: 16  Max: 18 18   SpO2  Min: 97 %  Max: 100 % 99 %     I have reviewed the following labs:  Recent Labs   Lab 02/26/24  0709 02/26/24  1916 02/29/24  0418 03/01/24  0404 03/02/24  0427   WBC 8.47   < > 11.44 13.81* 15.14*   RBC 5.13   < > 3.62* 3.89* 3.99*   HGB 11.3*   < > 8.0* 8.6* 9.2*    HCT 35.5*   < > 25.4* 28.0* 28.9*   MCV 69.2*   < > 70.2* 72.0* 72.4*   MCH 22.0*   < > 22.1* 22.1* 23.1*   MCHC 31.8*   < > 31.5* 30.7* 31.8*   RDW 17.4*   < > 18.4* 18.7* 18.9*   PLT 10*   < > 59* 132 220   MPV 0.0*  --   --   --   --     < > = values in this interval not displayed.     Recent Labs   Lab 02/29/24  0418 03/01/24  0356 03/02/24  0427    143 140   K 3.0* 3.0* 3.6   CO2 28 29 25   BUN 13.1 13.5 17.3   CREATININE 0.85 0.89 0.89   CALCIUM 8.0* 8.7 8.6   MG 1.70 2.00 1.90   ALBUMIN 3.2* 3.6 3.5   ALKPHOS 74 79 70   ALT 11 12 15   AST 14 15 15   BILITOT 0.5 0.5 0.4     Microbiology Results (last 7 days)       ** No results found for the last 168 hours. **             See below for Radiology    Scheduled Med:   calcium-vitamin D3  2 tablet Oral BID    cetirizine  10 mg Oral Daily    folic acid  1 mg Oral Daily    linaCLOtide  145 mcg Oral Before breakfast    multivitamin  1 tablet Oral Daily    mupirocin   Nasal BID    pantoprazole  40 mg Oral Daily    predniSONE  1 mg/kg Oral Daily      Continuous Infusions:   sodium chloride 0.9% 100 mL/hr at 03/01/24 0906      PRN Meds:  0.9%  NaCl infusion (for blood administration), 0.9%  NaCl infusion (for blood administration), 0.9%  NaCl infusion (for blood administration), 0.9%  NaCl infusion (for blood administration), 0.9%  NaCl infusion (for blood administration), sodium chloride 0.9%, acetaminophen, albuterol-ipratropium, aluminum-magnesium hydroxide-simethicone, diphenhydrAMINE, diphenhydrAMINE, heparin (porcine), HYDROcodone-acetaminophen, lactulose 10 gram/15 ml, melatonin, morphine, ondansetron, polyethylene glycol, prochlorperazine, senna-docusate 8.6-50 mg, sodium chloride 0.9%     Assessment/Plan:  TTP-Thrombotic thrombocytopenic purpura, 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  Microangiopathic hemolytic anemia  Acute kidney injury  Asymptomatic bacteriuria-urine cultures neg  Rheumatoid arthritis on azathioprine     History of HTN, HLD, morbid obesity, GIAN, knee  osteoarthritis      Continue to monitor on tele   Labs from this morning with improved platelet count 220 K, hemoglobin 9.2 stable  Fibrinogen 193   plasma exchange so far for sessions  Continue plasma exchange  per Hematology, Nephrology teams   Noted inputs from  Hematology, Nephrology   Patient on prednisone 1 milligram/kg body weight  Continue PPI, multivitamin, folic acid  Continue current bowel regimen commands suppository, check abdominal imaging to see stool burden  Home medications noted, patient on metoprolol, valsartan at home, blood pressure noted to be in 150s, add metoprolol  Case was discussed with patient's nurse .  Following labs.      Patient condition:  Fair    Anticipated discharge and Disposition:   tbd        _____________________________________________________________________    Nutrition Status:    Radiology:  I have personally reviewed the following imaging and agree with the radiologist.     X-Ray Chest 1 View  Narrative: EXAMINATION:  XR CHEST 1 VIEW    CPT 24764    CLINICAL HISTORY:  chest pain;    COMPARISON:  February 27, 2024    FINDINGS:  Examination reveals cardiomediastinal silhouette and pleuroparenchymal changes to be essentially unchanged as compared with the previous exam  Impression: No significant change    Electronically signed by: Patrice Swain  Date:    02/27/2024  Time:    14:15  X-Ray Chest 1 View  Narrative: EXAMINATION:  XR CHEST 1 VIEW    CLINICAL HISTORY:  central line placement;    TECHNIQUE:  Single view of the chest    COMPARISON:  02/26/2024    FINDINGS:  Limited evaluation secondary to positioning.  Right-sided central line projects over the mid SVC.  Impression: As above.    Electronically signed by: Sathya Muñoz  Date:    02/27/2024  Time:    07:25      Lena Hubbard MD  Department of Hospital Medicine   [facility]   03/02/2024

## 2024-03-02 NOTE — PROGRESS NOTES
[facility] - 6th Floor Medical Telemetry  Nephrology  Progress Note    Patient Name: Nicole Brown  MRN: [id number]  Admission Date: 2/26/2024  Hospital Length of Stay: 5 days  Attending Provider: Lena Hubbard MD   Primary Care Physician: Yaritza Browne FNP  Principal Problem:TTP (thrombotic thrombocytopenic purpura)      Subjective:   HPI: This is a 48-year-old AAF with TTP diagnosed on 2015 with multiple relapses treated with plasmapheresis. She presented to the ED with complaints of fatigue, nausea, vomiting and diffuse abdominal cramping with dark urine for 3-4 days. She received 1 unit platelets on 2/25 and started Dexamethasone 2/26. Dr Malik with hematology was consulted and recommended plasmapheresis. Nephrology was consulted for plasmapheresis.     Interval History:   Patient underwent first PLEX Tuesday and had subsequent itching, hives and wheezing. She was treated with Benadryl and Solu-Medrol with improvement in symptoms. She had second and third exchange  with half and half albumin and FFP with good response.     Platelet count normalizing. She endorses being fatigued today. Otherwise no new complaints.    3/2/24- underwent PLEX #4 yesterday. No indications for PLEX today with platelets 220,000 and fibrinogen 193. Patient is up in chair on RA. Reports she is feeling better today. Denies SOB, CP, N/V. Says her appetite is improving and she is working on having a BM.    Review of patient's allergies indicates:   Allergen Reactions    Nsaids (non-steroidal anti-inflammatory drug) Other (See Comments)     Causes ulcers to bleed.    Ibuprofen     Latex     Meloxicam     Nsaids (non-steroidal anti-inflammatory drug) Nausea Only     Current Facility-Administered Medications   Medication Frequency    0.9%  NaCl infusion (for blood administration) Q24H PRN    0.9%  NaCl infusion (for blood administration) Q24H PRN    0.9%  NaCl infusion (for blood administration) Q24H PRN     0.9%  NaCl infusion (for blood administration) Q24H PRN    0.9%  NaCl infusion (for blood administration) Q24H PRN    0.9%  NaCl infusion Continuous    0.9%  NaCl infusion PRN    acetaminophen tablet 650 mg Q4H PRN    albuterol-ipratropium 2.5 mg-0.5 mg/3 mL nebulizer solution 3 mL Q6H PRN    aluminum-magnesium hydroxide-simethicone 200-200-20 mg/5 mL suspension 30 mL QID PRN    calcium-vitamin D3 500 mg-5 mcg (200 unit) per tablet 2 tablet BID    cetirizine tablet 10 mg Daily    diphenhydrAMINE capsule 25 mg Q6H PRN    diphenhydrAMINE injection 50 mg PRN    folic acid tablet 1 mg Daily    heparin (porcine) injection 1,000 Units PRN    HYDROcodone-acetaminophen 5-325 mg per tablet 1 tablet Q6H PRN    lactulose 10 gram/15 ml solution 10 g BID PRN    linaCLOtide capsule 145 mcg Before breakfast    melatonin tablet 6 mg Nightly PRN    morphine injection 2 mg Q4H PRN    multivitamin tablet Daily    mupirocin 2 % ointment BID    ondansetron injection 4 mg Q4H PRN    pantoprazole EC tablet 40 mg Daily    polyethylene glycol packet 17 g BID PRN    predniSONE tablet 109 mg Daily    prochlorperazine injection Soln 5 mg Q6H PRN    senna-docusate 8.6-50 mg per tablet 2 tablet BID PRN    sodium chloride 0.9% flush 10 mL PRN       Objective:     Vital Signs (Most Recent):  Temp: 98.8 °F (37.1 °C) (03/02/24 1128)  Pulse: 85 (03/02/24 1128)  Resp: 17 (03/02/24 1128)  BP: (!) 143/90 (03/02/24 1128)  SpO2: 97 % (03/02/24 1128) Vital Signs (24h Range):  Temp:  [98.1 °F (36.7 °C)-98.8 °F (37.1 °C)] 98.8 °F (37.1 °C)  Pulse:  [71-87] 85  Resp:  [16-18] 17  SpO2:  [97 %-100 %] 97 %  BP: (123-156)/(72-96) 143/90     Weight: 108.9 kg (240 lb 1.3 oz) (02/26/24 2223)  Body mass index is 38.75 kg/m².  Body surface area is 2.25 meters squared.    I/O last 3 completed shifts:  In: 4823.9 [P.O.:780; I.V.:2158.9; Blood:1885]  Out: 3100 [Urine:3100]    Physical Exam  Constitutional:       Appearance: Normal appearance.   HENT:      Head:  Normocephalic and atraumatic.      Nose: Nose normal.      Mouth/Throat:      Mouth: Mucous membranes are moist.   Eyes:      Extraocular Movements: Extraocular movements intact.      Conjunctiva/sclera: Conjunctivae normal.   Neck:      Comments: R IJ temp dialysis catheter  Cardiovascular:      Rate and Rhythm: Normal rate and regular rhythm.      Heart sounds: Normal heart sounds.   Pulmonary:      Effort: Pulmonary effort is normal.      Breath sounds: Normal breath sounds.   Abdominal:      General: There is no distension.      Palpations: Abdomen is soft.   Musculoskeletal:         General: No swelling.      Cervical back: Neck supple.   Skin:     General: Skin is warm.   Neurological:      Mental Status: She is oriented to person, place, and time.         Significant Labs:sureBMP:   Recent Labs   Lab 03/02/24  0427      K 3.6   CO2 25   BUN 17.3   CREATININE 0.89   CALCIUM 8.6   MG 1.90       CBC:   Recent Labs   Lab 03/02/24 0427   WBC 15.14*   RBC 3.99*   HGB 9.2*   HCT 28.9*      MCV 72.4*   MCH 23.1*   MCHC 31.8*       Microbiology Results (last 7 days)       ** No results found for the last 168 hours. **          Specimen (24h ago, onward)      None          Recent Labs   Lab 02/25/24  1150   PROTEINUA 4+*   BACTERIA Moderate*   LEUKOCYTESUR Negative   HYALINECASTS 3-5*         Significant Imaging:  X-Ray Chest 1 View [[id number]] Resulted: 02/27/24 1415   Order Status: Completed Updated: 02/27/24 1417   Narrative:     EXAMINATION:  XR CHEST 1 VIEW    CPT 44244    CLINICAL HISTORY:  chest pain;    COMPARISON:  February 27, 2024    FINDINGS:  Examination reveals cardiomediastinal silhouette and pleuroparenchymal changes to be essentially unchanged as compared with the previous exam   Impression:       No significant change      Electronically signed by: Patrice Swain  Date: 02/27/2024  Time: 14:15       Assessment/Plan:     Relapsed Thrombotic thrombocytopenic purpura      She completed  her fourth plasmapheresis treatment yesterday under the guidance of hematology. Plans to hold plasmapheresis today.  Labs in AM.      Patient seen and examined independently  Agree with above assessment and findings  Vitals signs and nursing note reviewed.   Temp:  [98.2 °F (36.8 °C)-98.8 °F (37.1 °C)]   Pulse:  [71-95]   Resp:  [16-18]   BP: (123-167)/()   SpO2:  [93 %-100 %]      General: NAD  HEENT: NC/AT. MM moist  Neck: No JVD, no carotid bruit, right IJ temp dialysis catheter in dressing  Resp: ERIC present. No w/r/c  Cardiac: S1S2 heard, no m/r/g  Abd: Soft, NT, BS present, no organomegaly appreciated, nondistended  Ext: No edema, clubbing, color change.  Neuro: no focal deficits, sensory deficits, AAOx3  Skin: no rash, lesions. dry    Reviewed available labs and imaging  Platelets showed improvement  No indication for plasmapheresis today  Hem/onc following  Reviewed all labs      Components of this note were documented using voice recognition systems; and are subject to errors not corrected at proof reading.  Please contact the author for any clarifications.

## 2024-03-03 LAB
ALBUMIN SERPL-MCNC: 3.6 G/DL (ref 3.5–5)
ALBUMIN/GLOB SERPL: 1.9 RATIO (ref 1.1–2)
ALP SERPL-CCNC: 77 UNIT/L (ref 40–150)
ALT SERPL-CCNC: 24 UNIT/L (ref 0–55)
AST SERPL-CCNC: 22 UNIT/L (ref 5–34)
BASOPHILS # BLD AUTO: 0.02 X10(3)/MCL
BASOPHILS NFR BLD AUTO: 0.1 %
BILIRUB SERPL-MCNC: 0.5 MG/DL
BLOOD GAS SAMPLE TYPE (OHS): ABNORMAL
BUN SERPL-MCNC: 16.8 MG/DL (ref 7–18.7)
CA-I BLD-SCNC: 1.06 MMOL/L (ref 1.12–1.23)
CALCIUM SERPL-MCNC: 8.5 MG/DL (ref 8.4–10.2)
CHLORIDE SERPL-SCNC: 107 MMOL/L (ref 98–107)
CO2 SERPL-SCNC: 30 MMOL/L (ref 22–29)
CREAT SERPL-MCNC: 0.86 MG/DL (ref 0.55–1.02)
DRAWN BY BLOOD GAS (OHS): ABNORMAL
EOSINOPHIL # BLD AUTO: 0.03 X10(3)/MCL (ref 0–0.9)
EOSINOPHIL NFR BLD AUTO: 0.2 %
ERYTHROCYTE [DISTWIDTH] IN BLOOD BY AUTOMATED COUNT: 19.7 % (ref 11.5–17)
FIBRINOGEN PPP-MCNC: 207 MG/DL (ref 210–463)
GFR SERPLBLD CREATININE-BSD FMLA CKD-EPI: >60 MLS/MIN/1.73/M2
GLOBULIN SER-MCNC: 1.9 GM/DL (ref 2.4–3.5)
GLUCOSE SERPL-MCNC: 90 MG/DL (ref 74–100)
HCT VFR BLD AUTO: 30.4 % (ref 37–47)
HGB BLD-MCNC: 9.4 G/DL (ref 12–16)
IMM GRANULOCYTES # BLD AUTO: 0.49 X10(3)/MCL (ref 0–0.04)
IMM GRANULOCYTES NFR BLD AUTO: 3.2 %
INR PPP: 1.1
LDH SERPL-CCNC: 232 U/L (ref 125–220)
LYMPHOCYTES # BLD AUTO: 2.85 X10(3)/MCL (ref 0.6–4.6)
LYMPHOCYTES NFR BLD AUTO: 18.8 %
MAGNESIUM SERPL-MCNC: 2 MG/DL (ref 1.6–2.6)
MCH RBC QN AUTO: 22.7 PG (ref 27–31)
MCHC RBC AUTO-ENTMCNC: 30.9 G/DL (ref 33–36)
MCV RBC AUTO: 73.4 FL (ref 80–94)
MONOCYTES # BLD AUTO: 1.13 X10(3)/MCL (ref 0.1–1.3)
MONOCYTES NFR BLD AUTO: 7.5 %
NEUTROPHILS # BLD AUTO: 10.63 X10(3)/MCL (ref 2.1–9.2)
NEUTROPHILS NFR BLD AUTO: 70.2 %
NRBC BLD AUTO-RTO: 1.4 %
PCO2 BLDA: ABNORMAL MM[HG]
PH BLDA: ABNORMAL [PH]
PLATELET # BLD AUTO: 278 X10(3)/MCL (ref 130–400)
PLATELETS.RETICULATED NFR BLD AUTO: 4.5 % (ref 0.9–11.2)
PMV BLD AUTO: 10 FL (ref 7.4–10.4)
PO2 BLDA: ABNORMAL MM[HG]
POTASSIUM SERPL-SCNC: 3.6 MMOL/L (ref 3.5–5.1)
PROT SERPL-MCNC: 5.5 GM/DL (ref 6.4–8.3)
PROTHROMBIN TIME: 13.8 SECONDS (ref 12.5–14.5)
RBC # BLD AUTO: 4.14 X10(6)/MCL (ref 4.2–5.4)
SODIUM SERPL-SCNC: 141 MMOL/L (ref 136–145)
WBC # SPEC AUTO: 15.15 X10(3)/MCL (ref 4.5–11.5)

## 2024-03-03 PROCEDURE — 85384 FIBRINOGEN ACTIVITY: CPT | Performed by: INTERNAL MEDICINE

## 2024-03-03 PROCEDURE — 99900035 HC TECH TIME PER 15 MIN (STAT)

## 2024-03-03 PROCEDURE — 80053 COMPREHEN METABOLIC PANEL: CPT | Performed by: INTERNAL MEDICINE

## 2024-03-03 PROCEDURE — 63600175 PHARM REV CODE 636 W HCPCS: Performed by: NURSE PRACTITIONER

## 2024-03-03 PROCEDURE — 25000003 PHARM REV CODE 250: Performed by: INTERNAL MEDICINE

## 2024-03-03 PROCEDURE — 85025 COMPLETE CBC W/AUTO DIFF WBC: CPT | Performed by: INTERNAL MEDICINE

## 2024-03-03 PROCEDURE — 99232 SBSQ HOSP IP/OBS MODERATE 35: CPT | Mod: ,,, | Performed by: INTERNAL MEDICINE

## 2024-03-03 PROCEDURE — 63600175 PHARM REV CODE 636 W HCPCS: Performed by: INTERNAL MEDICINE

## 2024-03-03 PROCEDURE — 83735 ASSAY OF MAGNESIUM: CPT | Performed by: INTERNAL MEDICINE

## 2024-03-03 PROCEDURE — 83615 LACTATE (LD) (LDH) ENZYME: CPT | Performed by: INTERNAL MEDICINE

## 2024-03-03 PROCEDURE — 85610 PROTHROMBIN TIME: CPT | Performed by: INTERNAL MEDICINE

## 2024-03-03 PROCEDURE — 21400001 HC TELEMETRY ROOM

## 2024-03-03 PROCEDURE — 82330 ASSAY OF CALCIUM: CPT

## 2024-03-03 RX ORDER — HYDRALAZINE HYDROCHLORIDE 20 MG/ML
10 INJECTION INTRAMUSCULAR; INTRAVENOUS EVERY 4 HOURS PRN
Status: DISCONTINUED | OUTPATIENT
Start: 2024-03-03 | End: 2024-03-06 | Stop reason: HOSPADM

## 2024-03-03 RX ADMIN — THERA TABS 1 TABLET: TAB at 08:03

## 2024-03-03 RX ADMIN — Medication 2 TABLET: at 08:03

## 2024-03-03 RX ADMIN — METOPROLOL SUCCINATE 100 MG: 50 TABLET, EXTENDED RELEASE ORAL at 08:03

## 2024-03-03 RX ADMIN — PREDNISONE 109 MG: 1 TABLET ORAL at 08:03

## 2024-03-03 RX ADMIN — CETIRIZINE HYDROCHLORIDE 10 MG: 10 TABLET, FILM COATED ORAL at 08:03

## 2024-03-03 RX ADMIN — PANTOPRAZOLE SODIUM 40 MG: 40 TABLET, DELAYED RELEASE ORAL at 08:03

## 2024-03-03 RX ADMIN — LINACLOTIDE 145 MCG: 145 CAPSULE, GELATIN COATED ORAL at 05:03

## 2024-03-03 RX ADMIN — POLYETHYLENE GLYCOL 3350 17 G: 17 POWDER, FOR SOLUTION ORAL at 11:03

## 2024-03-03 RX ADMIN — FOLIC ACID 1 MG: 1 TABLET ORAL at 08:03

## 2024-03-03 RX ADMIN — HYDRALAZINE HYDROCHLORIDE 10 MG: 20 INJECTION INTRAMUSCULAR; INTRAVENOUS at 05:03

## 2024-03-03 NOTE — PROGRESS NOTES
[facility]  Hospital Medicine Progress Note        Chief Complaint: Inpatient Follow-up     HPI:   48-year-old female medical history of anemia of chronic disease, rheumatoid arthritis started azathioprine January 2024, HTN, HLD, migraine with aura, GIAN, morbid obesity, history of thrombotic thrombocytopenic purpura-TTP initially diagnosed in 2015 and treated with therapeutic plasma exchange, steroid and rituximab 3 doses subsequently had her 1st relapsed in 2018 and treated with same regimen and a 2nd relapsed in January 2020 where she presented to [facility] with abdominal pain and fatigue, found to to have relapse (I do not have record of [id number] activity) and treated with plasma exchange and 4 doses of rituximab while inpatient as well as prednisone taper and subsequently discharged on monthly rituximab where she received 3 doses.     Presented to [facility] ED on 02/25/2024 with chief complaint of fatigue, nausea, vomiting and diffuse abdominal cramping and dark urine for for 3-4 days.  On arrival to ED she was noted to be afebrile and hemodynamically stable.  Initial labs notable for WBC 6.36, hemoglobin 12.4, platelets 5, peripheral smear show few schistocytes and few small platelets compatible with history of TTP, INR 1.1, PTT 30.5, fibrinogen more than 400, , haptoglobin <8, indirect hyperbilirubinemia 2.9, urinalysis showed microscopic hematuria.  CT abdomen pelvis with contrast showed nonspecific retroperitoneal edema and mild perinephric stranding.      She received 1 unit of platelets on 02/25/2024, subsequent CBC trend show platelets trending down as well as hemoglobin, received dexamethasone 40 mg IV 02/26/2024 and a 2nd unit of platelets and subsequently transferred to [facility] for higher level of care.     Upon presentation to our facility, she was resting comfortably, reported abdominal pain mainly in the epigastric region feels like burning/cramping, denies  hematemesis, melena or hematochezia.  Reported dark urine but no burning or painful urination or increased frequency.  Noted to have few petechiae on all her extremities, trunk and palate.  Reported no new neurological symptoms she does have migraine headaches but nothing new, note vision changes, no extremity weakness though she report neuropathy symptoms to her hands and feet that has been chronic.    Heme-Onc evaluated the patient, Patient had her 1st 1 of plasmapheresis  on 2/27  She did have a subsequent allergic-type reaction after it was completed.  Had stridor and itching.  Possible hives.  She was given dose of IV Benadryl and continued on her steroids.  All her symptoms have improved patient has subsequently tolerated her plasma exchange sessions.    Nephrology and hematology team following, assessing plasmapheresis needs.   patient underwent plasmapheresis x4          Interval Hx:   No acute events reported overnight.  Seen at bedside this morning family member at bedside, patient is sitting in the chair comfortably resting, no complaints reported reported she is feeling okay.  Discussed today's platelets, platelets improved to 278 K, patient in good spirits  Had bowel movements yesterday      Objective/physical exam:  General: In no acute distress, afebrile  Neck:  Right IJ hd access  Chest:  Unlabored breathing   Heart:  Normal rate  Abdomen: Soft, nontender  MSK: Warm, no lower extremity edema  Neurologic: Alert and oriented   VITAL SIGNS: 24 HRS MIN & MAX LAST   Temp  Min: 98.2 °F (36.8 °C)  Max: 98.8 °F (37.1 °C) 98.4 °F (36.9 °C)   BP  Min: 125/73  Max: 167/102 125/73   Pulse  Min: 78  Max: 95  86   Resp  Min: 17  Max: 18 18   SpO2  Min: 93 %  Max: 98 % 97 %     I have reviewed the following labs:  Recent Labs   Lab 02/26/24  0709 02/26/24  1916 03/01/24  0404 03/02/24  0427 03/03/24  0449   WBC 8.47   < > 13.81* 15.14* 15.15*   RBC 5.13   < > 3.89* 3.99* 4.14*   HGB 11.3*   < > 8.6* 9.2* 9.4*   HCT  35.5*   < > 28.0* 28.9* 30.4*   MCV 69.2*   < > 72.0* 72.4* 73.4*   MCH 22.0*   < > 22.1* 23.1* 22.7*   MCHC 31.8*   < > 30.7* 31.8* 30.9*   RDW 17.4*   < > 18.7* 18.9* 19.7*   PLT 10*   < > 132 220 278   MPV 0.0*  --   --   --  10.0    < > = values in this interval not displayed.     Recent Labs   Lab 03/01/24  0356 03/02/24  0427 03/03/24  0449    140 141   K 3.0* 3.6 3.6   CO2 29 25 30*   BUN 13.5 17.3 16.8   CREATININE 0.89 0.89 0.86   CALCIUM 8.7 8.6 8.5   MG 2.00 1.90 2.00   ALBUMIN 3.6 3.5 3.6   ALKPHOS 79 70 77   ALT 12 15 24   AST 15 15 22   BILITOT 0.5 0.4 0.5     Microbiology Results (last 7 days)       ** No results found for the last 168 hours. **             See below for Radiology    Scheduled Med:   calcium-vitamin D3  2 tablet Oral BID    cetirizine  10 mg Oral Daily    folic acid  1 mg Oral Daily    linaCLOtide  145 mcg Oral Before breakfast    metoprolol succinate  100 mg Oral QHS    multivitamin  1 tablet Oral Daily    pantoprazole  40 mg Oral Daily    predniSONE  1 mg/kg Oral Daily      Continuous Infusions:   sodium chloride 0.9% 100 mL/hr at 03/01/24 0906      PRN Meds:  0.9%  NaCl infusion (for blood administration), 0.9%  NaCl infusion (for blood administration), 0.9%  NaCl infusion (for blood administration), 0.9%  NaCl infusion (for blood administration), 0.9%  NaCl infusion (for blood administration), sodium chloride 0.9%, acetaminophen, albuterol-ipratropium, aluminum-magnesium hydroxide-simethicone, bisacodyL, diphenhydrAMINE, diphenhydrAMINE, heparin (porcine), hydrALAZINE, HYDROcodone-acetaminophen, lactulose 10 gram/15 ml, melatonin, morphine, ondansetron, polyethylene glycol, prochlorperazine, senna-docusate 8.6-50 mg, sodium chloride 0.9%     Assessment/Plan:  TTP-Thrombotic thrombocytopenic purpura, 4th relapse  Microangiopathic hemolytic anemia  Acute kidney injury  Asymptomatic bacteriuria-urine cultures neg  Rheumatoid arthritis on azathioprine     History of HTN, HLD,  morbid obesity, GIAN, knee osteoarthritis      Continue to monitor on tele   Labs from this morning with improved platelet count 278 K, hemoglobin 9.4 stable  Fibrinogen 207  Patient had plasma exchange so far 4 sessions  Continue plasma exchange  per Hematology, Nephrology teams   Noted inputs from  Hematology, Nephrology   Patient on prednisone 1 milligram/kg body weight daily day 6  Continue PPI, multivitamin, folic acid  Continue bowel regimen  Blood pressure improved, continue Toprol  Case was discussed with patient's nurse   Follow labs.      Patient condition:  Fair    Anticipated discharge and Disposition:   tbd        _____________________________________________________________________    Nutrition Status:    Radiology:  I have personally reviewed the following imaging and agree with the radiologist.     X-Ray Abdomen AP 1 View  Narrative: EXAMINATION:  XR ABDOMEN AP 1 VIEW    CLINICAL HISTORY:  Constipation;    COMPARISON:  None    FINDINGS:  Three frontal images of the abdomen.  There is a nonspecific, nonobstructive bowel gas pattern.  There is no large stool burden.  No free air is seen.  Impression: No acute radiographic findings.    Electronically signed by: Jorge Maddox  Date:    03/02/2024  Time:    16:31      Lena Hubbard MD  Department of Hospital Medicine   [facility]   03/03/2024

## 2024-03-04 LAB
ABO + RH BLD: NORMAL
ALBUMIN SERPL-MCNC: 3.5 G/DL (ref 3.5–5)
ALBUMIN/GLOB SERPL: 1.9 RATIO (ref 1.1–2)
ALP SERPL-CCNC: 86 UNIT/L (ref 40–150)
ALT SERPL-CCNC: 26 UNIT/L (ref 0–55)
AST SERPL-CCNC: 20 UNIT/L (ref 5–34)
BASOPHILS # BLD AUTO: 0.02 X10(3)/MCL
BASOPHILS NFR BLD AUTO: 0.2 %
BILIRUB SERPL-MCNC: 0.5 MG/DL
BLD PROD TYP BPU: NORMAL
BLOOD UNIT EXPIRATION DATE: NORMAL
BLOOD UNIT TYPE CODE: 5100
BLOOD UNIT TYPE CODE: 9500
BUN SERPL-MCNC: 18.1 MG/DL (ref 7–18.7)
CALCIUM SERPL-MCNC: 8.8 MG/DL (ref 8.4–10.2)
CHLORIDE SERPL-SCNC: 106 MMOL/L (ref 98–107)
CO2 SERPL-SCNC: 30 MMOL/L (ref 22–29)
CREAT SERPL-MCNC: 0.86 MG/DL (ref 0.55–1.02)
CROSSMATCH INTERPRETATION: NORMAL
DISPENSE STATUS: NORMAL
EOSINOPHIL # BLD AUTO: 0.03 X10(3)/MCL (ref 0–0.9)
EOSINOPHIL NFR BLD AUTO: 0.2 %
ERYTHROCYTE [DISTWIDTH] IN BLOOD BY AUTOMATED COUNT: 20.2 % (ref 11.5–17)
FIBRINOGEN PPP-MCNC: 204 MG/DL (ref 210–463)
GFR SERPLBLD CREATININE-BSD FMLA CKD-EPI: >60 MLS/MIN/1.73/M2
GLOBULIN SER-MCNC: 1.8 GM/DL (ref 2.4–3.5)
GLUCOSE SERPL-MCNC: 90 MG/DL (ref 74–100)
GROUP & RH: NORMAL
HCT VFR BLD AUTO: 28.8 % (ref 37–47)
HGB BLD-MCNC: 9 G/DL (ref 12–16)
IMM GRANULOCYTES # BLD AUTO: 0.29 X10(3)/MCL (ref 0–0.04)
IMM GRANULOCYTES NFR BLD AUTO: 2.2 %
INDIRECT COOMBS: NORMAL
INR PPP: 1
LDH SERPL-CCNC: 240 U/L (ref 125–220)
LYMPHOCYTES # BLD AUTO: 2.88 X10(3)/MCL (ref 0.6–4.6)
LYMPHOCYTES NFR BLD AUTO: 22.3 %
MAGNESIUM SERPL-MCNC: 2.2 MG/DL (ref 1.6–2.6)
MCH RBC QN AUTO: 22.6 PG (ref 27–31)
MCHC RBC AUTO-ENTMCNC: 31.3 G/DL (ref 33–36)
MCV RBC AUTO: 72.2 FL (ref 80–94)
MONOCYTES # BLD AUTO: 1.1 X10(3)/MCL (ref 0.1–1.3)
MONOCYTES NFR BLD AUTO: 8.5 %
NEUTROPHILS # BLD AUTO: 8.58 X10(3)/MCL (ref 2.1–9.2)
NEUTROPHILS NFR BLD AUTO: 66.6 %
NRBC BLD AUTO-RTO: 0.6 %
PLATELET # BLD AUTO: 315 X10(3)/MCL (ref 130–400)
PMV BLD AUTO: 9.7 FL (ref 7.4–10.4)
POC IONIZED CALCIUM: 1.12 MMOL/L (ref 1.12–1.23)
POC TEMPERATURE: 37 °C
POTASSIUM SERPL-SCNC: 3.8 MMOL/L (ref 3.5–5.1)
PROT SERPL-MCNC: 5.3 GM/DL (ref 6.4–8.3)
PROTHROMBIN TIME: 13.4 SECONDS (ref 12.5–14.5)
RBC # BLD AUTO: 3.99 X10(6)/MCL (ref 4.2–5.4)
SODIUM SERPL-SCNC: 143 MMOL/L (ref 136–145)
SPECIMEN OUTDATE: NORMAL
SPECIMEN SOURCE: NORMAL
UNIT NUMBER: NORMAL
WBC # SPEC AUTO: 12.9 X10(3)/MCL (ref 4.5–11.5)

## 2024-03-04 PROCEDURE — 99900035 HC TECH TIME PER 15 MIN (STAT)

## 2024-03-04 PROCEDURE — 63600175 PHARM REV CODE 636 W HCPCS: Performed by: NURSE PRACTITIONER

## 2024-03-04 PROCEDURE — 21400001 HC TELEMETRY ROOM

## 2024-03-04 PROCEDURE — 25000003 PHARM REV CODE 250: Performed by: INTERNAL MEDICINE

## 2024-03-04 PROCEDURE — 80053 COMPREHEN METABOLIC PANEL: CPT | Performed by: INTERNAL MEDICINE

## 2024-03-04 PROCEDURE — 85610 PROTHROMBIN TIME: CPT | Performed by: INTERNAL MEDICINE

## 2024-03-04 PROCEDURE — 85384 FIBRINOGEN ACTIVITY: CPT | Performed by: INTERNAL MEDICINE

## 2024-03-04 PROCEDURE — 86901 BLOOD TYPING SEROLOGIC RH(D): CPT | Performed by: INTERNAL MEDICINE

## 2024-03-04 PROCEDURE — 83615 LACTATE (LD) (LDH) ENZYME: CPT | Performed by: INTERNAL MEDICINE

## 2024-03-04 PROCEDURE — 85025 COMPLETE CBC W/AUTO DIFF WBC: CPT | Performed by: INTERNAL MEDICINE

## 2024-03-04 PROCEDURE — 82803 BLOOD GASES ANY COMBINATION: CPT

## 2024-03-04 PROCEDURE — 99232 SBSQ HOSP IP/OBS MODERATE 35: CPT | Mod: ,,, | Performed by: INTERNAL MEDICINE

## 2024-03-04 PROCEDURE — 36514 APHERESIS PLASMA: CPT

## 2024-03-04 PROCEDURE — 83735 ASSAY OF MAGNESIUM: CPT | Performed by: INTERNAL MEDICINE

## 2024-03-04 PROCEDURE — 36430 TRANSFUSION BLD/BLD COMPNT: CPT

## 2024-03-04 PROCEDURE — 82330 ASSAY OF CALCIUM: CPT

## 2024-03-04 PROCEDURE — P9017 PLASMA 1 DONOR FRZ W/IN 8 HR: HCPCS

## 2024-03-04 PROCEDURE — 25000003 PHARM REV CODE 250: Mod: JZ,JG | Performed by: NURSE PRACTITIONER

## 2024-03-04 RX ORDER — HYDROCODONE BITARTRATE AND ACETAMINOPHEN 500; 5 MG/1; MG/1
TABLET ORAL
Status: ACTIVE | OUTPATIENT
Start: 2024-03-04 | End: 2024-03-05

## 2024-03-04 RX ORDER — HYDROCODONE BITARTRATE AND ACETAMINOPHEN 500; 5 MG/1; MG/1
TABLET ORAL
Status: DISCONTINUED | OUTPATIENT
Start: 2024-03-04 | End: 2024-03-06 | Stop reason: HOSPADM

## 2024-03-04 RX ORDER — ALBUMIN HUMAN 50 G/1000ML
95 SOLUTION INTRAVENOUS ONCE
Status: COMPLETED | OUTPATIENT
Start: 2024-03-04 | End: 2024-03-04

## 2024-03-04 RX ORDER — ALBUMIN HUMAN 50 G/1000ML
25 SOLUTION INTRAVENOUS ONCE
Status: CANCELLED | OUTPATIENT
Start: 2024-03-04 | End: 2024-03-04

## 2024-03-04 RX ORDER — DIPHENHYDRAMINE HYDROCHLORIDE 50 MG/ML
50 INJECTION INTRAMUSCULAR; INTRAVENOUS ONCE
Status: COMPLETED | OUTPATIENT
Start: 2024-03-04 | End: 2024-03-04

## 2024-03-04 RX ORDER — CALCIUM GLUCONATE 20 MG/ML
1 INJECTION, SOLUTION INTRAVENOUS
Status: COMPLETED | OUTPATIENT
Start: 2024-03-04 | End: 2024-03-04

## 2024-03-04 RX ORDER — ENOXAPARIN SODIUM 100 MG/ML
40 INJECTION SUBCUTANEOUS EVERY 24 HOURS
Status: DISCONTINUED | OUTPATIENT
Start: 2024-03-04 | End: 2024-03-06 | Stop reason: HOSPADM

## 2024-03-04 RX ADMIN — LINACLOTIDE 145 MCG: 145 CAPSULE, GELATIN COATED ORAL at 06:03

## 2024-03-04 RX ADMIN — Medication 2 TABLET: at 08:03

## 2024-03-04 RX ADMIN — PANTOPRAZOLE SODIUM 40 MG: 40 TABLET, DELAYED RELEASE ORAL at 08:03

## 2024-03-04 RX ADMIN — THERA TABS 1 TABLET: TAB at 08:03

## 2024-03-04 RX ADMIN — METOPROLOL SUCCINATE 100 MG: 50 TABLET, EXTENDED RELEASE ORAL at 08:03

## 2024-03-04 RX ADMIN — CALCIUM GLUCONATE 1 G: 20 INJECTION, SOLUTION INTRAVENOUS at 04:03

## 2024-03-04 RX ADMIN — CALCIUM GLUCONATE 1 G: 20 INJECTION, SOLUTION INTRAVENOUS at 05:03

## 2024-03-04 RX ADMIN — ALBUMIN HUMAN 95 G: 50 SOLUTION INTRAVENOUS at 04:03

## 2024-03-04 RX ADMIN — CETIRIZINE HYDROCHLORIDE 10 MG: 10 TABLET, FILM COATED ORAL at 08:03

## 2024-03-04 RX ADMIN — DIPHENHYDRAMINE HYDROCHLORIDE 50 MG: 50 INJECTION INTRAMUSCULAR; INTRAVENOUS at 04:03

## 2024-03-04 RX ADMIN — PREDNISONE 80 MG: 20 TABLET ORAL at 09:03

## 2024-03-04 RX ADMIN — FOLIC ACID 1 MG: 1 TABLET ORAL at 08:03

## 2024-03-04 NOTE — PROGRESS NOTES
Hematology/Oncology  Progress Note    Patient Name: Nicole Brown  MRN: [id number]  Admission Date: 2/26/2024  Hospital Length of Stay: 7 days  Attending Provider: Willem Palacio MD  Consulting Provider: BHAVANA Samaniego  Principal Problem:TTP (thrombotic thrombocytopenic purpura)      Subjective:     HPI:  48-year-old black female with a history of TTP initially diagnosed in 2015.  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  She has been followed by a hematologist in [place].  She relocated to [place] to live with her daughter approximately 8 months ago.  She presented to the ER 2/25/24 with low-grade fever, nausea/vomiting and abdominal discomfort.  She denied overt diarrhea.  Laboratory on presentation showed a platelet count of 5000.  Hemoglobin was 12.4 with microcytic, hypochromic indices and WBC 6.4.  LDH was elevated at 711 and haptoglobin was undetectable.  UPT was negative.  CMP showed mild renal insufficiency with a BUN of 24.9 and creatinine 1.36.  Peripheral smear showed microcytic, hypochromic red blood cells, increased reticulocytes and the presence of schistocytes and target cells.  Platelets were markedly decreased.  She was transferred to [facility] for initiation of plasmapheresis.  She received 1 unit of platelets at the outside facility prior to transfer.  On arrival here, she was started on prednisone 1 mg/kg daily and received 1 unit of FFP.    Hospital Course  2/27/24:  Started daily plasma exchange 1 plasma volume (40 cc/kg) exchange with FFP.  Platelet count 12,000. ADAMTS-13 level drawn 2/26/24 <5%.  2/28/24:  Day 2 plasma exchange.  Change to 1/2 albumin + 1/2 FFP secondary to significant urticaria.  2/29/24: Day 3 plasma exchange.  1/2 albumin + 1/2 FFP.  03/1/24: Day 4 plasma exchange.  1/2 albumin + 1/2 FFP    (3/4/24):  Patient awake and alert.  Daughter at bedside.  Plasmapheresis was held over the weekend.  Patient is  doing well.  Appetite is improving.  She has some swelling and discomfort in the dorsal aspect of the left hand, from a previous IV.  No significant pruritus, and no headaches or neurologic symptoms.  Platelets 315,000 today.  LDH and fibrinogen stable.  Remains on Prednisone 1 mg/kg.        Medications:  Continuous Infusions:   sodium chloride 0.9% 100 mL/hr at 03/01/24 0906     Scheduled Meds:   calcium-vitamin D3  2 tablet Oral BID    cetirizine  10 mg Oral Daily    folic acid  1 mg Oral Daily    linaCLOtide  145 mcg Oral Before breakfast    metoprolol succinate  100 mg Oral QHS    multivitamin  1 tablet Oral Daily    pantoprazole  40 mg Oral Daily    predniSONE  80 mg Oral Daily     PRN Meds:0.9%  NaCl infusion (for blood administration), 0.9%  NaCl infusion (for blood administration), 0.9%  NaCl infusion (for blood administration), 0.9%  NaCl infusion (for blood administration), 0.9%  NaCl infusion (for blood administration), sodium chloride 0.9%, acetaminophen, albuterol-ipratropium, aluminum-magnesium hydroxide-simethicone, bisacodyL, diphenhydrAMINE, heparin (porcine), hydrALAZINE, HYDROcodone-acetaminophen, lactulose 10 gram/15 ml, melatonin, morphine, ondansetron, polyethylene glycol, prochlorperazine, senna-docusate 8.6-50 mg, sodium chloride 0.9%     Review of patient's allergies indicates:   Allergen Reactions    Nsaids (non-steroidal anti-inflammatory drug) Other (See Comments)     Causes ulcers to bleed.    Ibuprofen     Latex     Meloxicam     Nsaids (non-steroidal anti-inflammatory drug) Nausea Only        PMHx:  HTN, hyperlipidemia, rheumatoid arthritis, migraine headaches, TTP  PSHx:  Endometrial ablation, left carpal tunnel release, right shoulder surgery  SH:  Lifetime nonsmoker.  Previous alcohol, none since age 20.  Lives in [place] with her daughter.  Provides  in her home.  FH:  PGF had a CVA.  No family history of cancer.      Review of Systems   Constitutional:  Positive for  fatigue (Improved). Negative for appetite change, fever and unexpected weight change.   HENT:  Negative for facial swelling, mouth sores, nosebleeds, sore throat and trouble swallowing.    Eyes: Negative.    Respiratory:  Negative for cough, shortness of breath and wheezing.    Cardiovascular:  Negative for chest pain, palpitations and leg swelling.   Gastrointestinal:  Negative for abdominal pain, constipation, diarrhea and nausea.   Genitourinary:  Negative for dysuria, frequency, hematuria and urgency.   Musculoskeletal:  Positive for arthralgias. Negative for back pain.   Skin:  Negative for pallor and rash.        Urticaria resolved   Neurological:  Negative for dizziness, seizures, weakness, numbness and headaches.   Hematological:  Negative for adenopathy. Does not bruise/bleed easily.   Psychiatric/Behavioral: Negative.       Objective:     Vital Signs (Most Recent):  Temp: 98 °F (36.7 °C) (03/04/24 0745)  Pulse: 69 (03/04/24 0745)  Resp: 18 (03/04/24 0745)  BP: (!) 145/84 (03/04/24 0745)  SpO2: 100 % (03/04/24 0745) Vital Signs (24h Range):  Temp:  [97.9 °F (36.6 °C)-98.9 °F (37.2 °C)] 98 °F (36.7 °C)  Pulse:  [69-80] 69  Resp:  [17-18] 18  SpO2:  [97 %-100 %] 100 %  BP: (121-145)/(68-84) 145/84     Weight: 108.9 kg (240 lb 1.3 oz)  Body surface area is 2.25 meters squared.      Physical Exam  Constitutional:       Comments: Mildly obese black female in NAD   HENT:      Head: Normocephalic.      Mouth/Throat:      Mouth: Mucous membranes are moist.      Pharynx: Oropharynx is clear. No posterior oropharyngeal erythema.   Eyes:      General: No scleral icterus.     Extraocular Movements: Extraocular movements intact.      Pupils: Pupils are equal, round, and reactive to light.   Neck:      Comments: Right IJ dialysis catheter  Cardiovascular:      Rate and Rhythm: Normal rate and regular rhythm.      Heart sounds: No murmur heard.  Pulmonary:      Comments: Lungs clear to auscultation  Abdominal:       General: Bowel sounds are normal. There is no distension.      Palpations: Abdomen is soft.      Tenderness: There is no abdominal tenderness.      Comments: Mildly obese.  No palpable masses or organomegaly   Musculoskeletal:         General: No swelling or tenderness. Normal range of motion.      Cervical back: Neck supple. No tenderness.      Comments: Trace swelling of dorsal aspect of left hand   Lymphadenopathy:      Cervical: No cervical adenopathy.   Skin:     General: Skin is warm and dry.      Findings: No rash.      Comments: No petechiae.   Neurological:      General: No focal deficit present.      Mental Status: She is alert and oriented to person, place, and time.      Cranial Nerves: No cranial nerve deficit.      Motor: No weakness.       Significant Labs:   CBC:   Recent Labs   Lab 03/03/24 0449 03/04/24 0457   WBC 15.15* 12.90*   HGB 9.4* 9.0*   HCT 30.4* 28.8*    315      Latest Reference Range & Units 02/27/24 03:50 02/28/24 03:33 02/29/24 04:18 03/01/24 04:04 03/02/24 04:27 03/03/24 04:49   Platelet Count 130 - 400 x10(3)/mcL 12 (LL) 24 (LL) 59 (L) 132 220 278   (LL): Data is critically low  (L): Data is abnormally low     Latest Reference Range & Units 02/28/24 03:33 02/29/24 04:18 03/01/24 03:56 03/02/24 04:27 03/03/24 04:49   Fibrinogen 210 - 463 mg/dL 273 208 (L) 184 (L) 193 (L) 207 (L)   (L): Data is abnormally low     Latest Reference Range & Units 02/27/24 03:50 02/28/24 03:33 02/29/24 04:18 03/01/24 03:56 03/02/24 04:27 03/03/24 04:49   Lactate Dehydrogenase 125 - 220 U/L 871 (H) 418 (H) 243 (H) 219 180 232 (H)   (H): Data is abnormally high  CMP:   Recent Labs   Lab 03/03/24  0449 03/04/24  0457    143   K 3.6 3.8   CO2 30* 30*   BUN 16.8 18.1   CREATININE 0.86 0.86   CALCIUM 8.5 8.8   ALBUMIN 3.6 3.5   BILITOT 0.5 0.5   ALKPHOS 77 86   AST 22 20   ALT 24 26                 2/27/24   2/28/24   2/29/24   3/1/24  3/4/24  LDH     778         871         243        219       240  [facility]     461         273         208        184      2.4    Diagnostic Results:  No new imaging for review    Impression/Recommendations:   IMPRESSION  Relapsed thrombotic thrombocytopenic purpura  Microangiopathic hemolytic anemia    RECOMMENDATIONS  Day 5 plasmapheresis today.  1/2 albumin + 1/2 FFP.  Decrease Prednisone to 80 mg daily.  H&H stable.  No indications for transfusion.  DO NOT TRANSFUSE PLATELETS unless approved by Hematology.  ADAMTS-13 level 2/26/24 <5%.  Repeat tomorrow.  Daily labs while inpatient.  Will continue to wean Prednisone and plasmapheresis, which we can do as an outpatient.    JIMENA Calderon, FNP-C  Oncology/Hematology

## 2024-03-04 NOTE — PROGRESS NOTES
[facility] - 6th Floor Medical Telemetry  Nephrology  Progress Note    Patient Name: Nicole Brown  MRN: [id number]  Admission Date: 2/26/2024  Hospital Length of Stay: 7 days  Attending Provider: Willem Palacio MD   Primary Care Physician: Yaritza Browne FNP  Principal Problem:TTP (thrombotic thrombocytopenic purpura)      Subjective:   HPI: This is a 48-year-old AAF with TTP diagnosed on 2015 with multiple relapses treated with plasmapheresis. She presented to the ED with complaints of fatigue, nausea, vomiting and diffuse abdominal cramping with dark urine for 3-4 days. She received 1 unit platelets on 2/25 and started Dexamethasone 2/26. Dr Malik with hematology was consulted and recommended plasmapheresis. Nephrology was consulted for plasmapheresis.     Interval History:   Patient underwent first PLEX  and had subsequent itching, hives and wheezing. She was treated with Benadryl and Solu-Medrol with improvement in symptoms. She then completed 2nd, 3rd and 4th treatment with half abumin half FFP with no reaction.     This morning she is without complaints sitting in chair.     Review of patient's allergies indicates:   Allergen Reactions    Nsaids (non-steroidal anti-inflammatory drug) Other (See Comments)     Causes ulcers to bleed.    Ibuprofen     Latex     Meloxicam     Nsaids (non-steroidal anti-inflammatory drug) Nausea Only     Current Facility-Administered Medications   Medication Frequency    0.9%  NaCl infusion (for blood administration) Q24H PRN    0.9%  NaCl infusion (for blood administration) Q24H PRN    0.9%  NaCl infusion (for blood administration) Q24H PRN    0.9%  NaCl infusion (for blood administration) Q24H PRN    0.9%  NaCl infusion (for blood administration) Q24H PRN    0.9%  NaCl infusion Continuous    0.9%  NaCl infusion PRN    acetaminophen tablet 650 mg Q4H PRN    albuterol-ipratropium 2.5 mg-0.5 mg/3 mL nebulizer solution 3 mL Q6H PRN     aluminum-magnesium hydroxide-simethicone 200-200-20 mg/5 mL suspension 30 mL QID PRN    bisacodyL suppository 10 mg Daily PRN    calcium-vitamin D3 500 mg-5 mcg (200 unit) per tablet 2 tablet BID    cetirizine tablet 10 mg Daily    diphenhydrAMINE capsule 25 mg Q6H PRN    folic acid tablet 1 mg Daily    heparin (porcine) injection 1,000 Units PRN    hydrALAZINE injection 10 mg Q4H PRN    HYDROcodone-acetaminophen 5-325 mg per tablet 1 tablet Q6H PRN    lactulose 10 gram/15 ml solution 10 g BID PRN    linaCLOtide capsule 145 mcg Before breakfast    melatonin tablet 6 mg Nightly PRN    metoprolol succinate (TOPROL-XL) 24 hr tablet 100 mg QHS    morphine injection 2 mg Q4H PRN    multivitamin tablet Daily    ondansetron injection 4 mg Q4H PRN    pantoprazole EC tablet 40 mg Daily    polyethylene glycol packet 17 g BID PRN    predniSONE tablet 80 mg Daily    prochlorperazine injection Soln 5 mg Q6H PRN    senna-docusate 8.6-50 mg per tablet 2 tablet BID PRN    sodium chloride 0.9% flush 10 mL PRN       Objective:     Vital Signs (Most Recent):  Temp: 98.1 °F (36.7 °C) (03/04/24 1139)  Pulse: 80 (03/04/24 1139)  Resp: 18 (03/04/24 1139)  BP: 138/87 (03/04/24 1139)  SpO2: 98 % (03/04/24 1139) Vital Signs (24h Range):  Temp:  [97.9 °F (36.6 °C)-98.9 °F (37.2 °C)] 98.1 °F (36.7 °C)  Pulse:  [69-80] 80  Resp:  [17-18] 18  SpO2:  [97 %-100 %] 98 %  BP: (121-145)/(68-87) 138/87     Weight: 108.9 kg (240 lb 1.3 oz) (02/26/24 2223)  Body mass index is 38.75 kg/m².  Body surface area is 2.25 meters squared.    I/O last 3 completed shifts:  In: 1680 [P.O.:1680]  Out: 1250 [Urine:1250]    Physical Exam  Constitutional:       Appearance: Normal appearance.   HENT:      Head: Normocephalic and atraumatic.      Nose: Nose normal.      Mouth/Throat:      Mouth: Mucous membranes are moist.   Eyes:      Extraocular Movements: Extraocular movements intact.      Conjunctiva/sclera: Conjunctivae normal.   Neck:      Comments: R IJ temp  "dialysis catheter  Cardiovascular:      Rate and Rhythm: Normal rate and regular rhythm.      Heart sounds: Normal heart sounds.   Pulmonary:      Effort: Pulmonary effort is normal.      Breath sounds: Normal breath sounds.   Abdominal:      General: There is no distension.      Palpations: Abdomen is soft.   Musculoskeletal:         General: No swelling.      Cervical back: Neck supple.   Skin:     General: Skin is warm.   Neurological:      Mental Status: She is oriented to person, place, and time.         Significant Labs:sureBMP:   Recent Labs   Lab 03/04/24  0457      K 3.8   CO2 30*   BUN 18.1   CREATININE 0.86   CALCIUM 8.8   MG 2.20       CBC:   Recent Labs   Lab 03/04/24  0457   WBC 12.90*   RBC 3.99*   HGB 9.0*   HCT 28.8*      MCV 72.2*   MCH 22.6*   MCHC 31.3*       Microbiology Results (last 7 days)       ** No results found for the last 168 hours. **          Specimen (24h ago, onward)      None          No results for input(s): "COLORU", "CLARITYU", "SPECGRAV", "PHUR", "PROTEINUA", "GLUCOSEU", "BILIRUBINCON", "BLOODU", "WBCU", "RBCU", "BACTERIA", "MUCUS", "NITRITE", "LEUKOCYTESUR", "UROBILINOGEN", "HYALINECASTS" in the last 168 hours.      Significant Imaging:  X-Ray Chest 1 View [[id number]] Resulted: 02/27/24 1415   Order Status: Completed Updated: 02/27/24 1417   Narrative:     EXAMINATION:  XR CHEST 1 VIEW    CPT 51957    CLINICAL HISTORY:  chest pain;    COMPARISON:  February 27, 2024    FINDINGS:  Examination reveals cardiomediastinal silhouette and pleuroparenchymal changes to be essentially unchanged as compared with the previous exam   Impression:       No significant change      Electronically signed by: Patrice Swain  Date: 02/27/2024  Time: 14:15       Assessment/Plan:     Relapsed Thrombotic thrombocytopenic purpura       Hematology requesting PLEX #4 today. We will order plasma exchange per recommendation.       "

## 2024-03-04 NOTE — PROGRESS NOTES
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     Presented to [facility] ED on 02/25/2024 with chief complaint of fatigue, nausea, vomiting and diffuse abdominal cramping and dark urine for for 3-4 days.  On arrival to ED she was noted to be afebrile and hemodynamically stable.  Initial labs notable for WBC 6.36, hemoglobin 12.4, platelets 5, peripheral smear show few schistocytes and few small platelets compatible with history of TTP, INR 1.1, PTT 30.5, fibrinogen more than 400, , haptoglobin <8, indirect hyperbilirubinemia 2.9, urinalysis showed microscopic hematuria.  CT abdomen pelvis with contrast showed nonspecific retroperitoneal edema and mild perinephric stranding.      She received 1 unit of platelets on 02/25/2024, subsequent CBC trend show platelets trending down as well as hemoglobin, received dexamethasone 40 mg IV 02/26/2024 and a 2nd unit of platelets and subsequently transferred to [facility] for higher level of care.     Upon presentation to our facility, she was resting comfortably, reported abdominal pain mainly in  the epigastric region feels like burning/cramping, denies hematemesis, melena or hematochezia.  Reported dark urine but no burning or painful urination or increased frequency.  Noted to have few petechiae on all her extremities, trunk and palate.  Reported no new neurological symptoms she does have migraine headaches but nothing new, note no vision changes, no extremity weakness though she report neuropathy symptoms to her hands and feet that has been chronic.     Heme-Onc evaluated the patient and plasmapheresis recommended. Nephrology consulted and Pt had her 1st plasmapheresis  on 2/27  with subsequent allergic-type reaction with  stridor, hives  and itching. She was given IV Benadryl and continued on her steroids with improvement. She then completed 2nd, 3rd and 4th treatment with half abumin half FFP with no reaction. Platelet started to recover and Hgb stabilized. On 3/4/24 WBC was 12K likely due to steroid, Hgb 9.0 and platelet improved to normal 315. Hematology recommended weaning plasmapheresis and steroids     Interval Hx:   Pt is going for day 5 Plasmapheresis today   Prednisone lowered to 80 mg daily   Pt reports red dot rashes on her ext has resolved     Afebrile. Other vitals are stable.   Labs showed WBC 12.9, Hgb 9.0, Plt 315, LDH down to 240    Case was discussed with patient's nurse and  on the floor.    Objective/physical exam:  General: In no acute distress, afebrile  Chest: Clear to auscultation bilaterally  Heart: RRR, +S1, S2, no appreciable murmur  Abdomen: Soft, nontender, BS +  MSK: Warm, no lower extremity edema, no clubbing or cyanosis  Neurologic: Alert and oriented x4, Cranial nerve II-XII intact, Strength 5/5 in all 4 extremities    VITAL SIGNS: 24 HRS MIN & MAX LAST   Temp  Min: 97.9 °F (36.6 °C)  Max: 98.5 °F (36.9 °C) 98.4 °F (36.9 °C)   BP  Min: 121/82  Max: 145/84 131/84   Pulse  Min: 69  Max: 80  75   Resp  Min: 18  Max: 18 18   SpO2  Min: 97 %  Max: 100 % 97 %     I  have reviewed the following labs:  Recent Labs   Lab 03/02/24 0427 03/03/24 0449 03/04/24 0457   WBC 15.14* 15.15* 12.90*   RBC 3.99* 4.14* 3.99*   HGB 9.2* 9.4* 9.0*   HCT 28.9* 30.4* 28.8*   MCV 72.4* 73.4* 72.2*   MCH 23.1* 22.7* 22.6*   MCHC 31.8* 30.9* 31.3*   RDW 18.9* 19.7* 20.2*    278 315   MPV  --  10.0 9.7     Recent Labs   Lab 03/02/24 0427 03/03/24 0449 03/04/24 0457    141 143   K 3.6 3.6 3.8   CO2 25 30* 30*   BUN 17.3 16.8 18.1   CREATININE 0.89 0.86 0.86   CALCIUM 8.6 8.5 8.8   MG 1.90 2.00 2.20   ALBUMIN 3.5 3.6 3.5   ALKPHOS 70 77 86   ALT 15 24 26   AST 15 22 20   BILITOT 0.4 0.5 0.5     Microbiology Results (last 7 days)       ** No results found for the last 168 hours. **             See below for Radiology    Scheduled Med:   albumin human 5%  95 g Intravenous Once    calcium-vitamin D3  2 tablet Oral BID    cetirizine  10 mg Oral Daily    folic acid  1 mg Oral Daily    linaCLOtide  145 mcg Oral Before breakfast    metoprolol succinate  100 mg Oral QHS    multivitamin  1 tablet Oral Daily    pantoprazole  40 mg Oral Daily    predniSONE  80 mg Oral Daily      Continuous Infusions:   sodium chloride 0.9% 100 mL/hr at 03/01/24 0906      PRN Meds:  0.9%  NaCl infusion (for blood administration), 0.9%  NaCl infusion (for blood administration), 0.9%  NaCl infusion (for blood administration), 0.9%  NaCl infusion (for blood administration), 0.9%  NaCl infusion (for blood administration), 0.9%  NaCl infusion (for blood administration), 0.9%  NaCl infusion (for blood administration), sodium chloride 0.9%, acetaminophen, albuterol-ipratropium, aluminum-magnesium hydroxide-simethicone, bisacodyL, diphenhydrAMINE, heparin (porcine), hydrALAZINE, HYDROcodone-acetaminophen, lactulose 10 gram/15 ml, melatonin, morphine, ondansetron, polyethylene glycol, prochlorperazine, senna-docusate 8.6-50 mg, sodium chloride 0.9%     Assessment/Plan:  Relapsed thrombotic thrombocytopenic  purpura  Microangiopathic hemolytic anemia  Acute kidney injury - resolved   Abnormal UA without infection   Leukocytosis , likely due to steroid  Obesity, BMI 38.7    H/O Rheumatoid arthritis on azathioprine . HTN, HLD , GIAN, knee osteoarthritis     Plan-   Going for Day 5 Plasmapheresis today   Clinically improving   Prednisone taper initiated today   Continue PPI, multivitamin, folic acid  Continue bowel regimen  Blood pressure improved, continue Toprol  Further recs form Hematology to follow     Critical care note:  Critical care diagnosis: TTP requiring plasmapheresis   Critical care interventions: Hands-on evaluation, review of labs/radiographs/records and discussion with patient and family if present  Critical care time spent: 35 minutes      VTE prophylaxis: Lovenox     Patient condition:  Fair    Anticipated discharge and Disposition:     Home  with family     All diagnosis and differential diagnosis have been reviewed; assessment and plan has been documented; I have personally reviewed the labs and test results that are presently available; I have reviewed the patients medication list; I have reviewed the consulting providers response and recommendations. I have reviewed or attempted to review medical records based upon their availability    All of the patient's questions have been  addressed and answered. Patient's is agreeable to the above stated plan. I will continue to monitor closely and make adjustments to medical management as needed.  _______________________________________________________________    Willem Palacio MD  Department of Hospital Medicine   [facility]   03/04/2024

## 2024-03-05 LAB
ALBUMIN SERPL-MCNC: 3.5 G/DL (ref 3.5–5)
ALBUMIN/GLOB SERPL: 2.5 RATIO (ref 1.1–2)
ALP SERPL-CCNC: 80 UNIT/L (ref 40–150)
ALT SERPL-CCNC: 19 UNIT/L (ref 0–55)
AST SERPL-CCNC: 15 UNIT/L (ref 5–34)
BASOPHILS # BLD AUTO: 0.03 X10(3)/MCL
BASOPHILS NFR BLD AUTO: 0.2 %
BILIRUB SERPL-MCNC: 0.4 MG/DL
BUN SERPL-MCNC: 19.5 MG/DL (ref 7–18.7)
CALCIUM SERPL-MCNC: 8.2 MG/DL (ref 8.4–10.2)
CHLORIDE SERPL-SCNC: 109 MMOL/L (ref 98–107)
CO2 SERPL-SCNC: 30 MMOL/L (ref 22–29)
CREAT SERPL-MCNC: 0.85 MG/DL (ref 0.55–1.02)
EOSINOPHIL # BLD AUTO: 0.04 X10(3)/MCL (ref 0–0.9)
EOSINOPHIL NFR BLD AUTO: 0.3 %
ERYTHROCYTE [DISTWIDTH] IN BLOOD BY AUTOMATED COUNT: 20.4 % (ref 11.5–17)
FIBRINOGEN PPP-MCNC: 188 MG/DL (ref 210–463)
GFR SERPLBLD CREATININE-BSD FMLA CKD-EPI: >60 MLS/MIN/1.73/M2
GLOBULIN SER-MCNC: 1.4 GM/DL (ref 2.4–3.5)
GLUCOSE SERPL-MCNC: 98 MG/DL (ref 74–100)
HCT VFR BLD AUTO: 30.3 % (ref 37–47)
HGB BLD-MCNC: 9.1 G/DL (ref 12–16)
IMM GRANULOCYTES # BLD AUTO: 0.18 X10(3)/MCL (ref 0–0.04)
IMM GRANULOCYTES NFR BLD AUTO: 1.4 %
INR PPP: 1.2
LDH SERPL-CCNC: 159 U/L (ref 125–220)
LYMPHOCYTES # BLD AUTO: 2.74 X10(3)/MCL (ref 0.6–4.6)
LYMPHOCYTES NFR BLD AUTO: 20.9 %
MAGNESIUM SERPL-MCNC: 2 MG/DL (ref 1.6–2.6)
MCH RBC QN AUTO: 22.5 PG (ref 27–31)
MCHC RBC AUTO-ENTMCNC: 30 G/DL (ref 33–36)
MCV RBC AUTO: 74.8 FL (ref 80–94)
MONOCYTES # BLD AUTO: 1.31 X10(3)/MCL (ref 0.1–1.3)
MONOCYTES NFR BLD AUTO: 10 %
NEUTROPHILS # BLD AUTO: 8.8 X10(3)/MCL (ref 2.1–9.2)
NEUTROPHILS NFR BLD AUTO: 67.2 %
NRBC BLD AUTO-RTO: 0.6 %
PLATELET # BLD AUTO: 335 X10(3)/MCL (ref 130–400)
PMV BLD AUTO: 9.5 FL (ref 7.4–10.4)
POC IONIZED CALCIUM: 1.08 MMOL/L (ref 1.12–1.23)
POC TEMPERATURE: 37 °C
POTASSIUM SERPL-SCNC: 3.8 MMOL/L (ref 3.5–5.1)
PROT SERPL-MCNC: 4.9 GM/DL (ref 6.4–8.3)
PROTHROMBIN TIME: 14.9 SECONDS (ref 12.5–14.5)
RBC # BLD AUTO: 4.05 X10(6)/MCL (ref 4.2–5.4)
SODIUM SERPL-SCNC: 143 MMOL/L (ref 136–145)
SPECIMEN SOURCE: ABNORMAL
WBC # SPEC AUTO: 13.1 X10(3)/MCL (ref 4.5–11.5)

## 2024-03-05 PROCEDURE — 63600175 PHARM REV CODE 636 W HCPCS: Performed by: INTERNAL MEDICINE

## 2024-03-05 PROCEDURE — 25000003 PHARM REV CODE 250: Performed by: INTERNAL MEDICINE

## 2024-03-05 PROCEDURE — 80053 COMPREHEN METABOLIC PANEL: CPT | Performed by: INTERNAL MEDICINE

## 2024-03-05 PROCEDURE — 85384 FIBRINOGEN ACTIVITY: CPT | Performed by: INTERNAL MEDICINE

## 2024-03-05 PROCEDURE — 83615 LACTATE (LD) (LDH) ENZYME: CPT | Performed by: INTERNAL MEDICINE

## 2024-03-05 PROCEDURE — 99232 SBSQ HOSP IP/OBS MODERATE 35: CPT | Mod: ,,, | Performed by: INTERNAL MEDICINE

## 2024-03-05 PROCEDURE — 21400001 HC TELEMETRY ROOM

## 2024-03-05 PROCEDURE — 63600175 PHARM REV CODE 636 W HCPCS: Performed by: NURSE PRACTITIONER

## 2024-03-05 PROCEDURE — 85025 COMPLETE CBC W/AUTO DIFF WBC: CPT | Performed by: INTERNAL MEDICINE

## 2024-03-05 PROCEDURE — 83735 ASSAY OF MAGNESIUM: CPT | Performed by: INTERNAL MEDICINE

## 2024-03-05 PROCEDURE — 85610 PROTHROMBIN TIME: CPT | Performed by: INTERNAL MEDICINE

## 2024-03-05 PROCEDURE — 94761 N-INVAS EAR/PLS OXIMETRY MLT: CPT

## 2024-03-05 PROCEDURE — 85397 CLOTTING FUNCT ACTIVITY: CPT | Performed by: NURSE PRACTITIONER

## 2024-03-05 RX ORDER — EZETIMIBE 10 MG/1
10 TABLET ORAL NIGHTLY
Status: DISCONTINUED | OUTPATIENT
Start: 2024-03-05 | End: 2024-03-06 | Stop reason: HOSPADM

## 2024-03-05 RX ORDER — VALSARTAN 80 MG/1
80 TABLET ORAL DAILY
Status: DISCONTINUED | OUTPATIENT
Start: 2024-03-05 | End: 2024-03-06 | Stop reason: HOSPADM

## 2024-03-05 RX ORDER — GABAPENTIN 300 MG/1
600 CAPSULE ORAL 2 TIMES DAILY
Status: DISCONTINUED | OUTPATIENT
Start: 2024-03-05 | End: 2024-03-06 | Stop reason: HOSPADM

## 2024-03-05 RX ORDER — VENLAFAXINE HYDROCHLORIDE 37.5 MG/1
37.5 CAPSULE, EXTENDED RELEASE ORAL DAILY
Status: DISCONTINUED | OUTPATIENT
Start: 2024-03-05 | End: 2024-03-06 | Stop reason: HOSPADM

## 2024-03-05 RX ORDER — FLUTICASONE FUROATE AND VILANTEROL 100; 25 UG/1; UG/1
1 POWDER RESPIRATORY (INHALATION) DAILY
Status: DISCONTINUED | OUTPATIENT
Start: 2024-03-05 | End: 2024-03-06 | Stop reason: HOSPADM

## 2024-03-05 RX ADMIN — THERA TABS 1 TABLET: TAB at 08:03

## 2024-03-05 RX ADMIN — LINACLOTIDE 145 MCG: 145 CAPSULE, GELATIN COATED ORAL at 05:03

## 2024-03-05 RX ADMIN — VENLAFAXINE HYDROCHLORIDE 37.5 MG: 37.5 CAPSULE, EXTENDED RELEASE ORAL at 11:03

## 2024-03-05 RX ADMIN — GABAPENTIN 600 MG: 300 CAPSULE ORAL at 11:03

## 2024-03-05 RX ADMIN — EZETIMIBE 10 MG: 10 TABLET ORAL at 08:03

## 2024-03-05 RX ADMIN — GABAPENTIN 600 MG: 300 CAPSULE ORAL at 08:03

## 2024-03-05 RX ADMIN — METOPROLOL SUCCINATE 100 MG: 50 TABLET, EXTENDED RELEASE ORAL at 08:03

## 2024-03-05 RX ADMIN — Medication 2 TABLET: at 08:03

## 2024-03-05 RX ADMIN — PREDNISONE 80 MG: 20 TABLET ORAL at 08:03

## 2024-03-05 RX ADMIN — FOLIC ACID 1 MG: 1 TABLET ORAL at 08:03

## 2024-03-05 RX ADMIN — CETIRIZINE HYDROCHLORIDE 10 MG: 10 TABLET, FILM COATED ORAL at 08:03

## 2024-03-05 RX ADMIN — PANTOPRAZOLE SODIUM 40 MG: 40 TABLET, DELAYED RELEASE ORAL at 08:03

## 2024-03-05 RX ADMIN — VALSARTAN 80 MG: 80 TABLET, FILM COATED ORAL at 11:03

## 2024-03-05 RX ADMIN — ENOXAPARIN SODIUM 40 MG: 40 INJECTION SUBCUTANEOUS at 08:03

## 2024-03-05 NOTE — PROGRESS NOTES
03/04/24 1833   Pre-Apheresis   Time-Out Yes   2 Patient Identifiers Verified Yes   Consents Verified Yes   Orders on Chart Yes   Line Verified for Use Yes   Apheresis Fluid/Transfusion   Procedure Type Plasmapheresis   Procedure # 5   Opt[facility] Run Results   AC used 626   Replacement Used 3799   Rinse back 124   Fluid Balance (%) 100   Inlet processed 7374   Plasma volumes exchanged 1.0   Plasma removed 4646   AC to patient 111   Post-Apheresis   Finish Time 1820   Access Functioned Well   Reaction None   Tolerance well

## 2024-03-05 NOTE — PROGRESS NOTES
Hematology/Oncology  Progress Note    Patient Name: Nicole Brown  MRN: [id number]  Admission Date: 2/26/2024  Hospital Length of Stay: 8 days  Attending Provider: Willem Palacio MD  Consulting Provider: Isrrael Sommers MD  Principal Problem:TTP (thrombotic thrombocytopenic purpura)      Subjective:     HPI:  48-year-old black female with a history of TTP initially diagnosed in 2015.  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  She has been followed by a hematologist in [place].  She relocated to [place] to live with her daughter approximately 8 months ago.  She presented to the ER 2/25/24 with low-grade fever, nausea/vomiting and abdominal discomfort.  She denied overt diarrhea.  Laboratory on presentation showed a platelet count of 5000.  Hemoglobin was 12.4 with microcytic, hypochromic indices and WBC 6.4.  LDH was elevated at 711 and haptoglobin was undetectable.  UPT was negative.  CMP showed mild renal insufficiency with a BUN of 24.9 and creatinine 1.36.  Peripheral smear showed microcytic, hypochromic red blood cells, increased reticulocytes and the presence of schistocytes and target cells.  Platelets were markedly decreased.  She was transferred to [facility] for initiation of plasmapheresis.  She received 1 unit of platelets at the outside facility prior to transfer.  On arrival here, she was started on prednisone 1 mg/kg daily and received 1 unit of FFP.    Hospital Course  2/27/24:  Started daily plasma exchange 1 plasma volume (40 cc/kg) exchange with FFP.  Platelet count 12,000. ADAMTS-13 level drawn 2/26/24 <5%.  2/28/24:  Day 2 plasma exchange.  Change to 1/2 albumin + 1/2 FFP secondary to significant urticaria.  2/29/24: Day 3 plasma exchange.  1/2 albumin + 1/2 FFP.  03/1/24: Day 4 plasma exchange.  1/2 albumin + 1/2 FFP  3/4/2024:  Plasma exchange    (3/5/24):  Patient awake and alert.Plasmapheresis was held over the weekend.  Patient is  doing well.  Appetite is improving.  S  No significant pruritus, and no headaches or neurologic symptoms.  Platelets 335,000 today.  LDH normal and fibrinogen 188.  Remains on Prednisone 1 mg/kg.        Medications:  Continuous Infusions:      Scheduled Meds:   calcium-vitamin D3  2 tablet Oral BID    cetirizine  10 mg Oral Daily    enoxparin  40 mg Subcutaneous Q24H (prophylaxis, 1700)    folic acid  1 mg Oral Daily    linaCLOtide  145 mcg Oral Before breakfast    metoprolol succinate  100 mg Oral QHS    multivitamin  1 tablet Oral Daily    pantoprazole  40 mg Oral Daily    predniSONE  80 mg Oral Daily     PRN Meds:0.9%  NaCl infusion (for blood administration), 0.9%  NaCl infusion (for blood administration), 0.9%  NaCl infusion (for blood administration), 0.9%  NaCl infusion (for blood administration), 0.9%  NaCl infusion (for blood administration), 0.9%  NaCl infusion (for blood administration), 0.9%  NaCl infusion (for blood administration), sodium chloride 0.9%, acetaminophen, albuterol-ipratropium, aluminum-magnesium hydroxide-simethicone, bisacodyL, diphenhydrAMINE, heparin (porcine), hydrALAZINE, HYDROcodone-acetaminophen, lactulose 10 gram/15 ml, melatonin, morphine, ondansetron, polyethylene glycol, prochlorperazine, senna-docusate 8.6-50 mg, sodium chloride 0.9%     Review of patient's allergies indicates:   Allergen Reactions    Nsaids (non-steroidal anti-inflammatory drug) Other (See Comments)     Causes ulcers to bleed.    Ibuprofen     Latex     Meloxicam     Nsaids (non-steroidal anti-inflammatory drug) Nausea Only        PMHx:  HTN, hyperlipidemia, rheumatoid arthritis, migraine headaches, TTP  PSHx:  Endometrial ablation, left carpal tunnel release, right shoulder surgery  SH:  Lifetime nonsmoker.  Previous alcohol, none since age 20.  Lives in [place] with her daughter.  Provides  in her home.  FH:  PGF had a CVA.  No family history of cancer.      Review of Systems   Constitutional:   Positive for fatigue (Improved). Negative for appetite change, fever and unexpected weight change.   HENT:  Negative for facial swelling, mouth sores, nosebleeds, sore throat and trouble swallowing.    Eyes: Negative.    Respiratory:  Negative for cough, shortness of breath and wheezing.    Cardiovascular:  Negative for chest pain, palpitations and leg swelling.   Gastrointestinal:  Negative for abdominal pain, constipation, diarrhea and nausea.   Genitourinary:  Negative for dysuria, frequency, hematuria and urgency.   Musculoskeletal:  Positive for arthralgias. Negative for back pain.   Skin:  Negative for pallor and rash.        Urticaria resolved   Neurological:  Negative for dizziness, seizures, weakness, numbness and headaches.   Hematological:  Negative for adenopathy. Does not bruise/bleed easily.   Psychiatric/Behavioral: Negative.       Objective:     Vital Signs (Most Recent):  Temp: 99.4 °F (37.4 °C) (03/04/24 2109)  Pulse: 81 (03/04/24 2109)  Resp: 19 (03/04/24 2109)  BP: (!) 147/87 (03/04/24 2109)  SpO2: 96 % (03/04/24 2109) Vital Signs (24h Range):  Temp:  [97.8 °F (36.6 °C)-99.4 °F (37.4 °C)] 99.4 °F (37.4 °C)  Pulse:  [69-81] 81  Resp:  [18-22] 19  SpO2:  [96 %-100 %] 96 %  BP: (131-147)/(80-87) 147/87     Weight: 108.9 kg (240 lb 1.3 oz)  Body surface area is 2.25 meters squared.      Physical Exam  Constitutional:       Comments: Mildly obese black female in NAD   HENT:      Head: Normocephalic.      Mouth/Throat:      Mouth: Mucous membranes are moist.      Pharynx: Oropharynx is clear. No posterior oropharyngeal erythema.   Eyes:      General: No scleral icterus.     Extraocular Movements: Extraocular movements intact.      Pupils: Pupils are equal, round, and reactive to light.   Neck:      Comments: Right IJ dialysis catheter  Cardiovascular:      Rate and Rhythm: Normal rate and regular rhythm.      Heart sounds: No murmur heard.  Pulmonary:      Comments: Lungs clear to  auscultation  Abdominal:      General: Bowel sounds are normal. There is no distension.      Palpations: Abdomen is soft.      Tenderness: There is no abdominal tenderness.      Comments: Mildly obese.  No palpable masses or organomegaly   Musculoskeletal:         General: No swelling or tenderness. Normal range of motion.      Cervical back: Neck supple. No tenderness.      Comments: Trace swelling of dorsal aspect of left hand   Lymphadenopathy:      Cervical: No cervical adenopathy.   Skin:     General: Skin is warm and dry.      Findings: No rash.      Comments: No petechiae.   Neurological:      General: No focal deficit present.      Mental Status: She is alert and oriented to person, place, and time.      Cranial Nerves: No cranial nerve deficit.      Motor: No weakness.       Significant Labs:   CBC:   Recent Labs   Lab 03/04/24 0457 03/05/24  0350   WBC 12.90* 13.10*   HGB 9.0* 9.1*   HCT 28.8* 30.3*    335        Latest Reference Range & Units 02/27/24 03:50 02/28/24 03:33 02/29/24 04:18 03/01/24 04:04 03/02/24 04:27 03/03/24 04:49   Platelet Count 130 - 400 x10(3)/mcL 12 (LL) 24 (LL) 59 (L) 132 220 278   (LL): Data is critically low  (L): Data is abnormally low     Latest Reference Range & Units 02/28/24 03:33 02/29/24 04:18 03/01/24 03:56 03/02/24 04:27 03/03/24 04:49   Fibrinogen 210 - 463 mg/dL 273 208 (L) 184 (L) 193 (L) 207 (L)   (L): Data is abnormally low     Latest Reference Range & Units 02/27/24 03:50 02/28/24 03:33 02/29/24 04:18 03/01/24 03:56 03/02/24 04:27 03/03/24 04:49   Lactate Dehydrogenase 125 - 220 U/L 871 (H) 418 (H) 243 (H) 219 180 232 (H)   (H): Data is abnormally high  CMP:   Recent Labs   Lab 03/04/24  0457 03/05/24  0350    143   K 3.8 3.8   CO2 30* 30*   BUN 18.1 19.5*   CREATININE 0.86 0.85   CALCIUM 8.8 8.2*   ALBUMIN 3.5 3.5   BILITOT 0.5 0.4   ALKPHOS 86 80   AST 20 15   ALT 26 19                   2/27/24   2/28/24   2/29/24   3/1/24  3/4/24   3/5/24  LDH      778         871         243        219      240         154  [facility]         273         208        184      204          188           Diagnostic Results:  No new imaging for review    Impression/Recommendations:   IMPRESSION  Relapsed thrombotic thrombocytopenic purpura  Microangiopathic hemolytic anemia    RECOMMENDATIONS  Day 5 plasmapheresis  hold today.  1/2 albumin + 1/2 FFP.   Keep Prednisone to 80 mg daily.  H&H stable.  No indications for transfusion.  DO NOT TRANSFUSE PLATELETS unless approved by Hematology.  ADAMTS-13 level 2/26/24 <5%.  Repeat today pending  Daily labs while inpatient.  Will continue to wean  plasmapheresis, which we can do as an outpatient.  Keep prednisone dose the same    This is this patients 4th relapse of TTP          May need longer term measures  Will d/w team the use of Rituxan.    They will notify us today about the use of outpatient plasma exchange  Await labs tomorrow..          Isrrael Sommers MD  Oncology/Hematology

## 2024-03-05 NOTE — PROGRESS NOTES
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     Presented to [facility] ED on 02/25/2024 with chief complaint of fatigue, nausea, vomiting and diffuse abdominal cramping and dark urine for for 3-4 days.  On arrival to ED she was noted to be afebrile and hemodynamically stable.  Initial labs notable for WBC 6.36, hemoglobin 12.4, platelets 5, peripheral smear show few schistocytes and few small platelets compatible with history of TTP, INR 1.1, PTT 30.5, fibrinogen more than 400, , haptoglobin <8, indirect hyperbilirubinemia 2.9, urinalysis showed microscopic hematuria.  CT abdomen pelvis with contrast showed nonspecific retroperitoneal edema and mild perinephric stranding.      She received 1 unit of platelets on 02/25/2024, subsequent CBC trend show platelets trending down as well as hemoglobin, received dexamethasone 40 mg IV 02/26/2024 and a 2nd unit of platelets and subsequently transferred to [facility] for higher level of care.     Upon presentation to our facility, she was resting comfortably, reported abdominal pain mainly  in the epigastric region feels like burning/cramping, denies hematemesis, melena or hematochezia.  Reported dark urine but no burning or painful urination or increased frequency.  Noted to have few petechiae on all her extremities, trunk and palate.  Reported no new neurological symptoms she does have migraine headaches but nothing new, note no vision changes, no extremity weakness though she report neuropathy symptoms to her hands and feet that has been chronic.     Heme-Onc evaluated the patient and plasmapheresis recommended. Nephrology consulted and Pt had her 1st plasmapheresis  on 2/27  with subsequent allergic-type reaction with  stridor, hives  and itching. She was given IV Benadryl and continued on her steroids with improvement. She then completed 2nd, 3rd ,4th and 5th treatment with half abumin half FFP with no reaction. Platelet started to recover and Hgb stabilized. On 3/4/24 WBC was 12K likely due to steroid, Hgb 9.0 and platelet improved to normal 315. Hematology recommended weaning plasmapheresis and steroids . Additional recommendation noted on initiation of Rituxan treatment. We will wait for final decision from Hematology team.     Interval Hx:   No plasmapheresis today per Hematology   Dr. Sommers rounded today and he will discuss with Dr. Malik regarding initiation of Rituxan treatment as well as outpatient plasmapheresis for more definitive plan given frequent relapse     Afebrile. BP noted to be elevated . Home antihypertensives are resumed.  Labs today showed WBC 13, Hgb is stable at 9, Plt 335.     Case was discussed with patient's nurse and  on the floor.    Objective/physical exam:  General: In no acute distress, afebrile  Chest: Clear to auscultation bilaterally  Heart: RRR, +S1, S2, no appreciable murmur  Abdomen: Soft, nontender, BS +  MSK: Warm, no lower extremity edema, no clubbing or cyanosis  Skin- ecchymoses to noel upper ext   Neurologic: Alert and oriented x4, Cranial  nerve II-XII intact, Strength 5/5 in all 4 extremities    VITAL SIGNS: 24 HRS MIN & MAX LAST   Temp  Min: 97.8 °F (36.6 °C)  Max: 99.4 °F (37.4 °C) 98.4 °F (36.9 °C)   BP  Min: 131/84  Max: 153/84 134/83   Pulse  Min: 67  Max: 85  85   Resp  Min: 18  Max: 22 20   SpO2  Min: 96 %  Max: 100 % 98 %     I have reviewed the following labs:  Recent Labs   Lab 03/03/24 0449 03/04/24 0457 03/05/24  0350   WBC 15.15* 12.90* 13.10*   RBC 4.14* 3.99* 4.05*   HGB 9.4* 9.0* 9.1*   HCT 30.4* 28.8* 30.3*   MCV 73.4* 72.2* 74.8*   MCH 22.7* 22.6* 22.5*   MCHC 30.9* 31.3* 30.0*   RDW 19.7* 20.2* 20.4*    315 335   MPV 10.0 9.7 9.5     Recent Labs   Lab 03/03/24 0449 03/04/24 0457 03/05/24  0350    143 143   K 3.6 3.8 3.8   CO2 30* 30* 30*   BUN 16.8 18.1 19.5*   CREATININE 0.86 0.86 0.85   CALCIUM 8.5 8.8 8.2*   MG 2.00 2.20 2.00   ALBUMIN 3.6 3.5 3.5   ALKPHOS 77 86 80   ALT 24 26 19   AST 22 20 15   BILITOT 0.5 0.5 0.4     Microbiology Results (last 7 days)       ** No results found for the last 168 hours. **             See below for Radiology    Scheduled Med:   calcium-vitamin D3  2 tablet Oral BID    cetirizine  10 mg Oral Daily    enoxparin  40 mg Subcutaneous Q24H (prophylaxis, 1700)    ezetimibe  10 mg Oral QHS    fluticasone furoate-vilanteroL  1 puff Inhalation Daily    folic acid  1 mg Oral Daily    gabapentin  600 mg Oral BID    linaCLOtide  145 mcg Oral Before breakfast    metoprolol succinate  100 mg Oral QHS    multivitamin  1 tablet Oral Daily    pantoprazole  40 mg Oral Daily    predniSONE  80 mg Oral Daily    valsartan  80 mg Oral Daily    venlafaxine  37.5 mg Oral Daily      Continuous Infusions:     PRN Meds:  0.9%  NaCl infusion (for blood administration), 0.9%  NaCl infusion (for blood administration), 0.9%  NaCl infusion (for blood administration), 0.9%  NaCl infusion (for blood administration), 0.9%  NaCl infusion (for blood administration), 0.9%  NaCl infusion (for blood administration),  sodium chloride 0.9%, acetaminophen, albuterol-ipratropium, aluminum-magnesium hydroxide-simethicone, bisacodyL, diphenhydrAMINE, heparin (porcine), hydrALAZINE, HYDROcodone-acetaminophen, lactulose 10 gram/15 ml, melatonin, morphine, ondansetron, polyethylene glycol, prochlorperazine, senna-docusate 8.6-50 mg, sodium chloride 0.9%     Assessment/Plan:  Relapsed thrombotic thrombocytopenic purpura  Microangiopathic hemolytic anemia  Acute kidney injury - resolved   Abnormal UA without infection   Leukocytosis , likely due to steroid  Obesity, BMI 38.7     H/O Rheumatoid arthritis on azathioprine . HTN, HLD , GIAN, knee osteoarthritis      Plan-   Clinically improving   No Plasmapheresis today   Prednisone taper initiated and continue 80 mg daily today per Hematology   Further recs form Hematology to follow regarding initiating Rituxan treatment and outpatient plasmapheresis.     Continue PPI, multivitamin, folic acid  Continue bowel regimen  Review home meds and resume as appropriate     VTE prophylaxis: Lovenox     Patient condition:  Fair     Anticipated discharge and Disposition:     Home with family     All diagnosis and differential diagnosis have been reviewed; assessment and plan has been documented; I have personally reviewed the labs and test results that are presently available; I have reviewed the patients medication list; I have reviewed the consulting providers response and recommendations. I have reviewed or attempted to review medical records based upon their availability    All of the patient's questions have been  addressed and answered. Patient's is agreeable to the above stated plan. I will continue to monitor closely and make adjustments to medical management as needed.  ___________________    Willem Palacio MD  Department of Hospital Medicine   [facility]   03/05/2024

## 2024-03-06 ENCOUNTER — TELEPHONE (OUTPATIENT)
Dept: HEMATOLOGY/ONCOLOGY | Facility: CLINIC | Age: 49
End: 2024-03-06
Payer: MEDICAID

## 2024-03-06 VITALS
SYSTOLIC BLOOD PRESSURE: 132 MMHG | HEART RATE: 80 BPM | BODY MASS INDEX: 39.7 KG/M2 | HEIGHT: 66 IN | TEMPERATURE: 98 F | RESPIRATION RATE: 18 BRPM | WEIGHT: 247 LBS | OXYGEN SATURATION: 97 % | DIASTOLIC BLOOD PRESSURE: 80 MMHG

## 2024-03-06 DIAGNOSIS — M31.19 TTP (THROMBOTIC THROMBOCYTOPENIC PURPURA): Primary | ICD-10-CM

## 2024-03-06 LAB
BASOPHILS # BLD AUTO: 0.03 X10(3)/MCL
BASOPHILS NFR BLD AUTO: 0.2 %
EOSINOPHIL # BLD AUTO: 0.05 X10(3)/MCL (ref 0–0.9)
EOSINOPHIL NFR BLD AUTO: 0.3 %
ERYTHROCYTE [DISTWIDTH] IN BLOOD BY AUTOMATED COUNT: 21.2 % (ref 11.5–17)
FIBRINOGEN PPP-MCNC: 236 MG/DL (ref 210–463)
HAV IGM SERPL QL IA: NONREACTIVE
HBV CORE IGM SERPL QL IA: NONREACTIVE
HBV SURFACE AG SERPL QL IA: NONREACTIVE
HCT VFR BLD AUTO: 32.1 % (ref 37–47)
HCV AB SERPL QL IA: NONREACTIVE
HGB BLD-MCNC: 9.9 G/DL (ref 12–16)
IMM GRANULOCYTES # BLD AUTO: 0.18 X10(3)/MCL (ref 0–0.04)
IMM GRANULOCYTES NFR BLD AUTO: 1.2 %
LYMPHOCYTES # BLD AUTO: 3.31 X10(3)/MCL (ref 0.6–4.6)
LYMPHOCYTES NFR BLD AUTO: 21.5 %
MCH RBC QN AUTO: 23 PG (ref 27–31)
MCHC RBC AUTO-ENTMCNC: 30.8 G/DL (ref 33–36)
MCV RBC AUTO: 74.7 FL (ref 80–94)
MONOCYTES # BLD AUTO: 1.24 X10(3)/MCL (ref 0.1–1.3)
MONOCYTES NFR BLD AUTO: 8.1 %
NEUTROPHILS # BLD AUTO: 10.59 X10(3)/MCL (ref 2.1–9.2)
NEUTROPHILS NFR BLD AUTO: 68.7 %
NRBC BLD AUTO-RTO: 0.5 %
PLATELET # BLD AUTO: 379 X10(3)/MCL (ref 130–400)
PMV BLD AUTO: 9.6 FL (ref 7.4–10.4)
RBC # BLD AUTO: 4.3 X10(6)/MCL (ref 4.2–5.4)
WBC # SPEC AUTO: 15.4 X10(3)/MCL (ref 4.5–11.5)

## 2024-03-06 PROCEDURE — 80074 ACUTE HEPATITIS PANEL: CPT | Performed by: NURSE PRACTITIONER

## 2024-03-06 PROCEDURE — 25000242 PHARM REV CODE 250 ALT 637 W/ HCPCS: Performed by: INTERNAL MEDICINE

## 2024-03-06 PROCEDURE — 63600175 PHARM REV CODE 636 W HCPCS: Performed by: NURSE PRACTITIONER

## 2024-03-06 PROCEDURE — 25000003 PHARM REV CODE 250: Performed by: INTERNAL MEDICINE

## 2024-03-06 PROCEDURE — 99232 SBSQ HOSP IP/OBS MODERATE 35: CPT | Mod: ,,, | Performed by: INTERNAL MEDICINE

## 2024-03-06 PROCEDURE — 85384 FIBRINOGEN ACTIVITY: CPT | Performed by: NURSE PRACTITIONER

## 2024-03-06 PROCEDURE — 85025 COMPLETE CBC W/AUTO DIFF WBC: CPT | Performed by: INTERNAL MEDICINE

## 2024-03-06 RX ORDER — HEPARIN 100 UNIT/ML
500 SYRINGE INTRAVENOUS
Status: CANCELLED | OUTPATIENT
Start: 2024-03-07

## 2024-03-06 RX ORDER — MEPERIDINE HYDROCHLORIDE 50 MG/ML
25 INJECTION INTRAMUSCULAR; INTRAVENOUS; SUBCUTANEOUS
Status: CANCELLED | OUTPATIENT
Start: 2024-03-07 | End: 2024-03-08

## 2024-03-06 RX ORDER — FOLIC ACID 1 MG/1
1 TABLET ORAL DAILY
Qty: 30 TABLET | Refills: 2 | Status: SHIPPED | OUTPATIENT
Start: 2024-03-07 | End: 2024-06-05

## 2024-03-06 RX ORDER — DIPHENHYDRAMINE HYDROCHLORIDE 50 MG/ML
50 INJECTION INTRAMUSCULAR; INTRAVENOUS ONCE AS NEEDED
Status: CANCELLED | OUTPATIENT
Start: 2024-03-07

## 2024-03-06 RX ORDER — FAMOTIDINE 10 MG/ML
20 INJECTION INTRAVENOUS
Status: CANCELLED | OUTPATIENT
Start: 2024-03-07

## 2024-03-06 RX ORDER — EPINEPHRINE 0.3 MG/.3ML
0.3 INJECTION SUBCUTANEOUS ONCE AS NEEDED
Status: CANCELLED | OUTPATIENT
Start: 2024-03-07

## 2024-03-06 RX ORDER — ACETAMINOPHEN 325 MG/1
650 TABLET ORAL
Status: CANCELLED | OUTPATIENT
Start: 2024-03-07

## 2024-03-06 RX ORDER — SODIUM CHLORIDE 0.9 % (FLUSH) 0.9 %
10 SYRINGE (ML) INJECTION
Status: CANCELLED | OUTPATIENT
Start: 2024-03-07

## 2024-03-06 RX ORDER — PREDNISONE 20 MG/1
TABLET ORAL
Qty: 45 TABLET | Refills: 0 | Status: SHIPPED | OUTPATIENT
Start: 2024-03-07 | End: 2024-04-02

## 2024-03-06 RX ADMIN — THERA TABS 1 TABLET: TAB at 08:03

## 2024-03-06 RX ADMIN — VALSARTAN 80 MG: 80 TABLET, FILM COATED ORAL at 08:03

## 2024-03-06 RX ADMIN — GABAPENTIN 600 MG: 300 CAPSULE ORAL at 08:03

## 2024-03-06 RX ADMIN — LINACLOTIDE 145 MCG: 145 CAPSULE, GELATIN COATED ORAL at 05:03

## 2024-03-06 RX ADMIN — FLUTICASONE FUROATE AND VILANTEROL TRIFENATATE 1 PUFF: 100; 25 POWDER RESPIRATORY (INHALATION) at 08:03

## 2024-03-06 RX ADMIN — PANTOPRAZOLE SODIUM 40 MG: 40 TABLET, DELAYED RELEASE ORAL at 08:03

## 2024-03-06 RX ADMIN — VENLAFAXINE HYDROCHLORIDE 37.5 MG: 37.5 CAPSULE, EXTENDED RELEASE ORAL at 08:03

## 2024-03-06 RX ADMIN — Medication 2 TABLET: at 08:03

## 2024-03-06 RX ADMIN — FOLIC ACID 1 MG: 1 TABLET ORAL at 08:03

## 2024-03-06 RX ADMIN — PREDNISONE 80 MG: 20 TABLET ORAL at 08:03

## 2024-03-06 RX ADMIN — CETIRIZINE HYDROCHLORIDE 10 MG: 10 TABLET, FILM COATED ORAL at 08:03

## 2024-03-06 NOTE — PROGRESS NOTES
Hematology/Oncology  Progress Note    Patient Name: Nicole Brown  MRN: [id number]  Admission Date: 2/26/2024  Hospital Length of Stay: 9 days  Attending Provider: Willem Palacio MD  Consulting Provider: Isrrael Sommers MD  Principal Problem:TTP (thrombotic thrombocytopenic purpura)      Subjective:     HPI:  48-year-old black female with a history of TTP initially diagnosed in 2015.  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  She has been followed by a hematologist in [place].  She relocated to [place] to live with her daughter approximately 8 months ago.  She presented to the ER 2/25/24 with low-grade fever, nausea/vomiting and abdominal discomfort.  She denied overt diarrhea.  Laboratory on presentation showed a platelet count of 5000.  Hemoglobin was 12.4 with microcytic, hypochromic indices and WBC 6.4.  LDH was elevated at 711 and haptoglobin was undetectable.  UPT was negative.  CMP showed mild renal insufficiency with a BUN of 24.9 and creatinine 1.36.  Peripheral smear showed microcytic, hypochromic red blood cells, increased reticulocytes and the presence of schistocytes and target cells.  Platelets were markedly decreased.  She was transferred to [facility] for initiation of plasmapheresis.  She received 1 unit of platelets at the outside facility prior to transfer.  On arrival here, she was started on prednisone 1 mg/kg daily and received 1 unit of FFP.    Hospital Course  2/27/24:  Started daily plasma exchange 1 plasma volume (40 cc/kg) exchange with FFP.  Platelet count 12,000. ADAMTS-13 level drawn 2/26/24 <5%.  2/28/24:  Day 2 plasma exchange.  Change to 1/2 albumin + 1/2 FFP secondary to significant urticaria.  2/29/24: Day 3 plasma exchange.  1/2 albumin + 1/2 FFP.  03/1/24: Day 4 plasma exchange.  1/2 albumin + 1/2 FFP  3/4/2024:  Plasma exchange    (3/6/24):  Patient awake and alert.6.  Patient is doing well.  Appetite is improving.  S   No significant pruritus, and no headaches or neurologic symptoms.  .  Remains on Prednisone 1 mg/kg.        Medications:  Continuous Infusions:      Scheduled Meds:   calcium-vitamin D3  2 tablet Oral BID    cetirizine  10 mg Oral Daily    enoxparin  40 mg Subcutaneous Q24H (prophylaxis, 1700)    ezetimibe  10 mg Oral QHS    fluticasone furoate-vilanteroL  1 puff Inhalation Daily    folic acid  1 mg Oral Daily    gabapentin  600 mg Oral BID    linaCLOtide  145 mcg Oral Before breakfast    metoprolol succinate  100 mg Oral QHS    multivitamin  1 tablet Oral Daily    pantoprazole  40 mg Oral Daily    predniSONE  80 mg Oral Daily    valsartan  80 mg Oral Daily    venlafaxine  37.5 mg Oral Daily     PRN Meds:0.9%  NaCl infusion (for blood administration), 0.9%  NaCl infusion (for blood administration), 0.9%  NaCl infusion (for blood administration), 0.9%  NaCl infusion (for blood administration), 0.9%  NaCl infusion (for blood administration), 0.9%  NaCl infusion (for blood administration), sodium chloride 0.9%, acetaminophen, albuterol-ipratropium, aluminum-magnesium hydroxide-simethicone, bisacodyL, diphenhydrAMINE, heparin (porcine), hydrALAZINE, HYDROcodone-acetaminophen, lactulose 10 gram/15 ml, melatonin, morphine, ondansetron, polyethylene glycol, prochlorperazine, senna-docusate 8.6-50 mg, sodium chloride 0.9%     Review of patient's allergies indicates:   Allergen Reactions    Nsaids (non-steroidal anti-inflammatory drug) Other (See Comments)     Causes ulcers to bleed.    Ibuprofen     Latex     Meloxicam     Nsaids (non-steroidal anti-inflammatory drug) Nausea Only        PMHx:  HTN, hyperlipidemia, rheumatoid arthritis, migraine headaches, TTP  PSHx:  Endometrial ablation, left carpal tunnel release, right shoulder surgery  SH:  Lifetime nonsmoker.  Previous alcohol, none since age 20.  Lives in [place] with her daughter.  Provides  in her home.  FH:  PGF had a CVA.  No family history of  cancer.      Review of Systems   Constitutional:  Positive for fatigue (Improved). Negative for appetite change, fever and unexpected weight change.   HENT:  Negative for facial swelling, mouth sores, nosebleeds, sore throat and trouble swallowing.    Eyes: Negative.    Respiratory:  Negative for cough, shortness of breath and wheezing.    Cardiovascular:  Negative for chest pain, palpitations and leg swelling.   Gastrointestinal:  Negative for abdominal pain, constipation, diarrhea and nausea.   Genitourinary:  Negative for dysuria, frequency, hematuria and urgency.   Musculoskeletal:  Positive for arthralgias. Negative for back pain.   Skin:  Negative for pallor and rash.        Urticaria resolved   Neurological:  Negative for dizziness, seizures, weakness, numbness and headaches.   Hematological:  Negative for adenopathy. Does not bruise/bleed easily.   Psychiatric/Behavioral: Negative.       Objective:     Vital Signs (Most Recent):  Temp: 98 °F (36.7 °C) (03/06/24 0343)  Pulse: 75 (03/06/24 0400)  Resp: 18 (03/06/24 0400)  BP: 135/77 (03/06/24 0343)  SpO2: 98 % (03/06/24 0400) Vital Signs (24h Range):  Temp:  [97.6 °F (36.4 °C)-98.4 °F (36.9 °C)] 98 °F (36.7 °C)  Pulse:  [67-85] 75  Resp:  [18-20] 18  SpO2:  [98 %-99 %] 98 %  BP: (120-153)/(72-84) 135/77     Weight: 112 kg (247 lb)  Body surface area is 2.28 meters squared.      Physical Exam  Constitutional:       Comments: Mildly obese black female in NAD   HENT:      Head: Normocephalic.      Mouth/Throat:      Mouth: Mucous membranes are moist.      Pharynx: Oropharynx is clear. No posterior oropharyngeal erythema.   Eyes:      General: No scleral icterus.     Extraocular Movements: Extraocular movements intact.      Pupils: Pupils are equal, round, and reactive to light.   Neck:      Comments: Right IJ dialysis catheter  Cardiovascular:      Rate and Rhythm: Normal rate and regular rhythm.      Heart sounds: No murmur heard.  Pulmonary:      Comments:  Lungs clear to auscultation  Abdominal:      General: Bowel sounds are normal. There is no distension.      Palpations: Abdomen is soft.      Tenderness: There is no abdominal tenderness.      Comments: Mildly obese.  No palpable masses or organomegaly   Musculoskeletal:         General: No swelling or tenderness. Normal range of motion.      Cervical back: Neck supple. No tenderness.      Comments: Trace swelling of dorsal aspect of left hand   Lymphadenopathy:      Cervical: No cervical adenopathy.   Skin:     General: Skin is warm and dry.      Findings: No rash.      Comments: No petechiae.   Neurological:      General: No focal deficit present.      Mental Status: She is alert and oriented to person, place, and time.      Cranial Nerves: No cranial nerve deficit.      Motor: No weakness.       Significant Labs:   CBC:   Recent Labs   Lab 03/05/24  0350 03/06/24  0416   WBC 13.10* 15.40*   HGB 9.1* 9.9*   HCT 30.3* 32.1*    379        Latest Reference Range & Units 02/27/24 03:50 02/28/24 03:33 02/29/24 04:18 03/01/24 04:04 03/02/24 04:27 03/03/24 04:49   Platelet Count 130 - 400 x10(3)/mcL 12 (LL) 24 (LL) 59 (L) 132 220 278   (LL): Data is critically low  (L): Data is abnormally low     Latest Reference Range & Units 02/28/24 03:33 02/29/24 04:18 03/01/24 03:56 03/02/24 04:27 03/03/24 04:49   Fibrinogen 210 - 463 mg/dL 273 208 (L) 184 (L) 193 (L) 207 (L)   (L): Data is abnormally low     Latest Reference Range & Units 02/27/24 03:50 02/28/24 03:33 02/29/24 04:18 03/01/24 03:56 03/02/24 04:27 03/03/24 04:49   Lactate Dehydrogenase 125 - 220 U/L 871 (H) 418 (H) 243 (H) 219 180 232 (H)   (H): Data is abnormally high  CMP:   Recent Labs   Lab 03/05/24  0350      K 3.8   CO2 30*   BUN 19.5*   CREATININE 0.85   CALCIUM 8.2*   ALBUMIN 3.5   BILITOT 0.4   ALKPHOS 80   AST 15   ALT 19                   2/27/24   2/28/24   2/29/24   3/1/24  3/4/24   3/5/24     3/6/24  LDH     778         871         243         219      240         154          ---  FBG     461         273         208        184      204          188        236           Diagnostic Results:  No new imaging for review    Impression/Recommendations:   IMPRESSION  Relapsed thrombotic thrombocytopenic purpura  Microangiopathic hemolytic anemia    RECOMMENDATIONS  Day 5 plasmapheresis  hold today.  1/2 albumin + 1/2 FFP.   Keep Prednisone to 80 mg daily.  H&H stable.  No indications for transfusion.  DO NOT TRANSFUSE PLATELETS unless approved by Hematology.  ADAMTS-13 level 2/26/24 <5%.  Repeat today pending  Daily labs while inpatient.  Hold  plasma exchange at this point.      Keep prednisone dose the same    This is this patients 4th relapse of TTP          May need longer term measures  Hepatitis :  Nonreactive hepatitis-B core antibody 02/26/2024  She had a reactive hepatitis-B surface antibody on 09/20/2023 and a negative hepatitis-B surface antigen on 02/27/2024.    We will see if we can arrange outpatient Rituxan for tomorrow.    If we can ,we can talk about discharging this patient today  Continue daily labs CBC, retic, LDH haptoglobin   Await Cobos 13 level, if > 60 recheck in 1 month          Isrrael Sommers MD  Oncology/Hematology    Her Rituxan is set up for tomorrow.    Agree with removal of her line and discharge

## 2024-03-06 NOTE — PROGRESS NOTES
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     Presented to [facility] ED on 02/25/2024 with chief complaint of fatigue, nausea, vomiting and diffuse abdominal cramping and dark urine for for 3-4 days.  On arrival to ED she was noted to be afebrile and hemodynamically stable.  Initial labs notable for WBC 6.36, hemoglobin 12.4, platelets 5, peripheral smear show few schistocytes and few small platelets compatible with history of TTP, INR 1.1, PTT 30.5, fibrinogen more than 400, , haptoglobin <8, indirect hyperbilirubinemia 2.9, urinalysis showed microscopic hematuria.  CT abdomen pelvis with contrast showed nonspecific retroperitoneal edema and mild perinephric stranding.      She received 1 unit of platelets on 02/25/2024, subsequent CBC trend show platelets trending down as well as hemoglobin, received dexamethasone 40 mg IV 02/26/2024 and a 2nd unit of platelets and subsequently transferred to [facility] for higher level of care.     Upon presentation to our facility, she was resting comfortably, reported abdominal pain mainly  in the epigastric region feels like burning/cramping, denies hematemesis, melena or hematochezia.  Reported dark urine but no burning or painful urination or increased frequency.  Noted to have few petechiae on all her extremities, trunk and palate.  Reported no new neurological symptoms she does have migraine headaches but nothing new, note no vision changes, no extremity weakness though she report neuropathy symptoms to her hands and feet that has been chronic.     Heme-Onc evaluated the patient and plasmapheresis recommended. Nephrology consulted and Pt had her 1st plasmapheresis  on 2/27  with subsequent allergic-type reaction with  stridor, hives  and itching. She was given IV Benadryl and continued on her steroids with improvement. She then completed 2nd, 3rd ,4th and 5th treatment with half abumin half FFP with no reaction. Platelet started to recover and Hgb stabilized. On 3/4/24 WBC was 12K likely due to steroid, Hgb 9.0 and platelet improved to normal 315. Hematology recommended weaning plasmapheresis and steroids . Additional recommendation noted on initiation of Rituxan treatment. We will wait for final decision from Hematology team.      Interval Hx:   Patient seen and examined at bedside, no family member at bedside   Patient has no new complaints   Vitals reviewed and stable on room air   Labs reviewed with white cell count of 15.4-on steroids, hemoglobin of 9.9, platelets 379, fibrinogen 236   No plasmapheresis today per Hematology   Dr. Sommers rounded today and he will discuss with Dr. Malik regarding initiation of Rituxan treatment as well as outpatient plasmapheresis for more definitive plan given frequent relapse         Case was discussed with patient's nurse and  on the floor.     Objective/physical exam:  General: In no acute distress, afebrile  Chest: Clear to auscultation bilaterally  Heart: RRR, +S1, S2, no appreciable murmur  Abdomen: Soft, nontender, BS +  MSK: Warm, no lower  extremity edema, no clubbing or cyanosis  Skin- ecchymoses to noel upper ext   Neurologic: Alert and oriented x4, Cranial nerve II-XII intact, Strength 5/5 in all 4 extremities    Assessment/Plan:  Relapsed thrombotic thrombocytopenic purpura  Microangiopathic hemolytic anemia  Acute kidney injury - resolved   Abnormal UA without infection   Leukocytosis , likely due to steroid  Obesity, BMI 38.7     H/O Rheumatoid arthritis on azathioprine . HTN, HLD , GIAN, knee osteoarthritis      Plan-   Clinically improving   No Plasmapheresis today   Prednisone taper initiated and continue 80 mg daily today per Hematology   Further recs form Hematology to follow regarding initiating Rituxan treatment and outpatient plasmapheresis.    Follow-up repeat adamts 13 level, if greater than 60 , to repeat in a month per hematology recs   Discontinue telemetry monitoring     Continue PPI, multivitamin, folic acid  Continue bowel regimen  Review home meds and resume as appropriate      VTE prophylaxis: Lovenox      Patient condition:  Fair      Anticipated discharge and Disposition:     Home with family      All diagnosis and differential diagnosis have been reviewed; assessment and plan has been documented; I have personally reviewed the labs and test results that are presently available; I have reviewed the patients medication list; I have reviewed the consulting providers response and recommendations. I have reviewed or attempted to review medical records based upon their availability     All of the patient's questions have been  addressed and answered. Patient's is agreeable to the above stated plan. I will continue to monitor closely and make adjustments to medical management as needed.    VITAL SIGNS: 24 HRS MIN & MAX LAST   Temp  Min: 97.6 °F (36.4 °C)  Max: 98.4 °F (36.9 °C) 97.9 °F (36.6 °C)   BP  Min: 120/77  Max: 145/86 132/80   Pulse  Min: 67  Max: 85  80   Resp  Min: 18  Max: 20 18   SpO2  Min: 97 %  Max: 98 % 97 %     I have  reviewed the following labs:  Recent Labs   Lab 03/04/24 0457 03/05/24 0350 03/06/24  0416   WBC 12.90* 13.10* 15.40*   RBC 3.99* 4.05* 4.30   HGB 9.0* 9.1* 9.9*   HCT 28.8* 30.3* 32.1*   MCV 72.2* 74.8* 74.7*   MCH 22.6* 22.5* 23.0*   MCHC 31.3* 30.0* 30.8*   RDW 20.2* 20.4* 21.2*    335 379   MPV 9.7 9.5 9.6     Recent Labs   Lab 03/03/24 0449 03/04/24 0457 03/05/24  0350    143 143   K 3.6 3.8 3.8   CO2 30* 30* 30*   BUN 16.8 18.1 19.5*   CREATININE 0.86 0.86 0.85   CALCIUM 8.5 8.8 8.2*   MG 2.00 2.20 2.00   ALBUMIN 3.6 3.5 3.5   ALKPHOS 77 86 80   ALT 24 26 19   AST 22 20 15   BILITOT 0.5 0.5 0.4     Microbiology Results (last 7 days)       ** No results found for the last 168 hours. **             See below for Radiology    Scheduled Med:   calcium-vitamin D3  2 tablet Oral BID    cetirizine  10 mg Oral Daily    enoxparin  40 mg Subcutaneous Q24H (prophylaxis, 1700)    ezetimibe  10 mg Oral QHS    fluticasone furoate-vilanteroL  1 puff Inhalation Daily    folic acid  1 mg Oral Daily    gabapentin  600 mg Oral BID    linaCLOtide  145 mcg Oral Before breakfast    metoprolol succinate  100 mg Oral QHS    multivitamin  1 tablet Oral Daily    pantoprazole  40 mg Oral Daily    predniSONE  80 mg Oral Daily    valsartan  80 mg Oral Daily    venlafaxine  37.5 mg Oral Daily      Continuous Infusions:     PRN Meds:  0.9%  NaCl infusion (for blood administration), 0.9%  NaCl infusion (for blood administration), 0.9%  NaCl infusion (for blood administration), 0.9%  NaCl infusion (for blood administration), 0.9%  NaCl infusion (for blood administration), 0.9%  NaCl infusion (for blood administration), sodium chloride 0.9%, acetaminophen, albuterol-ipratropium, aluminum-magnesium hydroxide-simethicone, bisacodyL, diphenhydrAMINE, heparin (porcine), hydrALAZINE, HYDROcodone-acetaminophen, lactulose 10 gram/15 ml, melatonin, morphine, ondansetron, polyethylene glycol, prochlorperazine, senna-docusate 8.6-50 mg,  sodium chloride 0.9%     Assessment/Plan:      VTE prophylaxis:     Patient condition:  Stable/Fair/Guarded/ Serious/ Critical    Anticipated discharge and Disposition:         All diagnosis and differential diagnosis have been reviewed; assessment and plan has been documented; I have personally reviewed the labs and test results that are presently available; I have reviewed the patients medication list; I have reviewed the consulting providers response and recommendations. I have reviewed or attempted to review medical records based upon their availability    All of the patient's questions have been  addressed and answered. Patient's is agreeable to the above stated plan. I will continue to monitor closely and make adjustments to medical management as needed.  _____________________________________________________________________    Nutrition Status:    Radiology:  I have personally reviewed the following imaging and agree with the radiologist.     X-Ray Abdomen AP 1 View  Narrative: EXAMINATION:  XR ABDOMEN AP 1 VIEW    CLINICAL HISTORY:  Constipation;    COMPARISON:  None    FINDINGS:  Three frontal images of the abdomen.  There is a nonspecific, nonobstructive bowel gas pattern.  There is no large stool burden.  No free air is seen.  Impression: No acute radiographic findings.    Electronically signed by: Jorge Maddox  Date:    03/02/2024  Time:    16:31      Sharron Woodward MD  Department of Hospital Medicine   [facility]   03/06/2024

## 2024-03-06 NOTE — PROGRESS NOTES
[facility] - 6th Floor Medical Telemetry  Nephrology  Progress Note    Patient Name: Nicole Brown  MRN: [id number]  Admission Date: 2/26/2024  Hospital Length of Stay: 9 days  Attending Provider: Willem Palacio MD   Primary Care Physician: Yaritza Browne FNP  Principal Problem:TTP (thrombotic thrombocytopenic purpura)      Subjective:   HPI: This is a 48-year-old AAF with TTP diagnosed on 2015 with multiple relapses treated with plasmapheresis. She presented to the ED with complaints of fatigue, nausea, vomiting and diffuse abdominal cramping with dark urine for 3-4 days. She received 1 unit platelets on 2/25 and started Dexamethasone 2/26. Dr Malik with hematology was consulted and recommended plasmapheresis. Nephrology was consulted for plasmapheresis.     Interval History:   Patient underwent first PLEX  and had subsequent itching, hives and wheezing. She was treated with Benadryl and Solu-Medrol with improvement in symptoms. She then completed 2nd, 3rd and 4th treatment with half abumin half FFP with no reaction.     This morning she is without complaints sitting in chair.     03/06/2024  Sitting up in the recliner and reports that she is feeling real good.  Anxious to know about discharge.  No shortness of breath.  No weakness.  No chest pains.    Review of patient's allergies indicates:   Allergen Reactions    Nsaids (non-steroidal anti-inflammatory drug) Other (See Comments)     Causes ulcers to bleed.    Ibuprofen     Latex     Meloxicam     Nsaids (non-steroidal anti-inflammatory drug) Nausea Only     Current Facility-Administered Medications   Medication Frequency    0.9%  NaCl infusion (for blood administration) Q24H PRN    0.9%  NaCl infusion (for blood administration) Q24H PRN    0.9%  NaCl infusion (for blood administration) Q24H PRN    0.9%  NaCl infusion (for blood administration) Q24H PRN    0.9%  NaCl infusion (for blood administration) Q24H PRN    0.9%  NaCl  infusion (for blood administration) Q24H PRN    0.9%  NaCl infusion PRN    acetaminophen tablet 650 mg Q4H PRN    albuterol-ipratropium 2.5 mg-0.5 mg/3 mL nebulizer solution 3 mL Q6H PRN    aluminum-magnesium hydroxide-simethicone 200-200-20 mg/5 mL suspension 30 mL QID PRN    bisacodyL suppository 10 mg Daily PRN    calcium-vitamin D3 500 mg-5 mcg (200 unit) per tablet 2 tablet BID    cetirizine tablet 10 mg Daily    diphenhydrAMINE capsule 25 mg Q6H PRN    enoxaparin injection 40 mg Q24H (prophylaxis, 1700)    ezetimibe tablet 10 mg QHS    fluticasone furoate-vilanteroL 100-25 mcg/dose diskus inhaler 1 puff Daily    folic acid tablet 1 mg Daily    gabapentin capsule 600 mg BID    heparin (porcine) injection 1,000 Units PRN    hydrALAZINE injection 10 mg Q4H PRN    HYDROcodone-acetaminophen 5-325 mg per tablet 1 tablet Q6H PRN    lactulose 10 gram/15 ml solution 10 g BID PRN    linaCLOtide capsule 145 mcg Before breakfast    melatonin tablet 6 mg Nightly PRN    metoprolol succinate (TOPROL-XL) 24 hr tablet 100 mg QHS    morphine injection 2 mg Q4H PRN    multivitamin tablet Daily    ondansetron injection 4 mg Q4H PRN    pantoprazole EC tablet 40 mg Daily    polyethylene glycol packet 17 g BID PRN    predniSONE tablet 80 mg Daily    prochlorperazine injection Soln 5 mg Q6H PRN    senna-docusate 8.6-50 mg per tablet 2 tablet BID PRN    sodium chloride 0.9% flush 10 mL PRN    valsartan tablet 80 mg Daily    venlafaxine 24 hr capsule 37.5 mg Daily       Objective:     Vital Signs (Most Recent):  Temp: 97.8 °F (36.6 °C) (03/06/24 0759)  Pulse: 69 (03/06/24 0851)  Resp: 18 (03/06/24 0851)  BP: (!) 145/86 (03/06/24 0759)  SpO2: 97 % (03/06/24 0759) Vital Signs (24h Range):  Temp:  [97.6 °F (36.4 °C)-98.4 °F (36.9 °C)] 97.8 °F (36.6 °C)  Pulse:  [67-85] 69  Resp:  [18-20] 18  SpO2:  [97 %-98 %] 97 %  BP: (120-145)/(72-86) 145/86     Weight: 112 kg (247 lb) (03/06/24 0640)  Body mass index is 39.87 kg/m².  Body surface area  is 2.28 meters squared.    I/O last 3 completed shifts:  In: 1200 [P.O.:1200]  Out: 500 [Urine:500]    Physical Exam  Constitutional:       Appearance: Normal appearance.   HENT:      Head: Normocephalic and atraumatic.      Nose: Nose normal.      Mouth/Throat:      Mouth: Mucous membranes are moist.   Eyes:      Extraocular Movements: Extraocular movements intact.      Conjunctiva/sclera: Conjunctivae normal.   Neck:      Comments: R IJ temp dialysis catheter  Cardiovascular:      Rate and Rhythm: Normal rate and regular rhythm.      Heart sounds: Normal heart sounds.   Pulmonary:      Effort: Pulmonary effort is normal.      Breath sounds: Normal breath sounds.   Abdominal:      General: Bowel sounds are normal. There is no distension.      Palpations: Abdomen is soft.   Musculoskeletal:         General: No swelling.      Cervical back: Neck supple.   Skin:     General: Skin is warm.   Neurological:      General: No focal deficit present.      Mental Status: She is oriented to person, place, and time.   Psychiatric:         Mood and Affect: Mood normal.         Behavior: Behavior normal.         Significant Labs:sureBMP:   Recent Labs   Lab 03/05/24  0350      K 3.8   CO2 30*   BUN 19.5*   CREATININE 0.85   CALCIUM 8.2*   MG 2.00       CBC:   Recent Labs   Lab 03/06/24  0416   WBC 15.40*   RBC 4.30   HGB 9.9*   HCT 32.1*      MCV 74.7*   MCH 23.0*   MCHC 30.8*             Significant Imaging:  X-Ray Chest 1 View [[id number]] Resulted: 02/27/24 1415   Order Status: Completed Updated: 02/27/24 1417   Narrative:     EXAMINATION:  XR CHEST 1 VIEW    CPT 84551    CLINICAL HISTORY:  chest pain;    COMPARISON:  February 27, 2024    FINDINGS:  Examination reveals cardiomediastinal silhouette and pleuroparenchymal changes to be essentially unchanged as compared with the previous exam   Impression:       No significant change      Electronically signed by: Patrice Swain  Date: 02/27/2024  Time: 14:15        Assessment/Plan:     Relapsed Thrombotic thrombocytopenic purpura     Recommendations  I reviewed the note of Dr. Sommers .  He plans to hold off plasmapheresis.  So far patient has received 4 plasmapheresis treatments.  He plans to discharge this patient and give her outpatient Rituxan.  Will request nurses to remove right IJ temporary dialysis catheter prior to discharge.  It can be removed even now if there is no future plans for plasmapheresis on this admission.  Will sign off this case.  Please reconsult if needed thank you

## 2024-03-06 NOTE — TELEPHONE ENCOUNTER
I spoke to pharmacy reviewer with patient's insurance. Since the FDA has Rituximab labeled as off use for TTP, it must be reviewed by medical director with 2 studies(which I just faxed). It can not be processed as urgent when sent to Medical Director for review.

## 2024-03-07 ENCOUNTER — INFUSION (OUTPATIENT)
Dept: INFUSION THERAPY | Facility: HOSPITAL | Age: 49
End: 2024-03-07
Attending: NURSE PRACTITIONER
Payer: MEDICAID

## 2024-03-07 VITALS
HEART RATE: 90 BPM | HEIGHT: 66 IN | TEMPERATURE: 98 F | DIASTOLIC BLOOD PRESSURE: 75 MMHG | BODY MASS INDEX: 40.18 KG/M2 | RESPIRATION RATE: 18 BRPM | OXYGEN SATURATION: 97 % | WEIGHT: 250 LBS | SYSTOLIC BLOOD PRESSURE: 125 MMHG

## 2024-03-07 DIAGNOSIS — M31.19 TTP (THROMBOTIC THROMBOCYTOPENIC PURPURA): Primary | ICD-10-CM

## 2024-03-07 LAB
COAGULATION SPECIALIST REVIEW: ABNORMAL
VWF CP ACT/NOR PPP CHRO: 59 %

## 2024-03-07 PROCEDURE — 96376 TX/PRO/DX INJ SAME DRUG ADON: CPT

## 2024-03-07 PROCEDURE — 63600175 PHARM REV CODE 636 W HCPCS: Mod: JZ,JG | Performed by: INTERNAL MEDICINE

## 2024-03-07 PROCEDURE — 96413 CHEMO IV INFUSION 1 HR: CPT

## 2024-03-07 PROCEDURE — 63600175 PHARM REV CODE 636 W HCPCS: Performed by: NURSE PRACTITIONER

## 2024-03-07 PROCEDURE — 96367 TX/PROPH/DG ADDL SEQ IV INF: CPT

## 2024-03-07 PROCEDURE — 25000003 PHARM REV CODE 250: Performed by: INTERNAL MEDICINE

## 2024-03-07 PROCEDURE — 96375 TX/PRO/DX INJ NEW DRUG ADDON: CPT

## 2024-03-07 PROCEDURE — 96415 CHEMO IV INFUSION ADDL HR: CPT

## 2024-03-07 RX ORDER — DIPHENHYDRAMINE HYDROCHLORIDE 50 MG/ML
50 INJECTION INTRAMUSCULAR; INTRAVENOUS ONCE AS NEEDED
Status: COMPLETED | OUTPATIENT
Start: 2024-03-07 | End: 2024-03-07

## 2024-03-07 RX ORDER — DIPHENHYDRAMINE HYDROCHLORIDE 50 MG/ML
50 INJECTION INTRAMUSCULAR; INTRAVENOUS ONCE AS NEEDED
Status: CANCELLED | OUTPATIENT
Start: 2024-03-14

## 2024-03-07 RX ORDER — EPINEPHRINE 0.3 MG/.3ML
0.3 INJECTION SUBCUTANEOUS ONCE AS NEEDED
Status: DISCONTINUED | OUTPATIENT
Start: 2024-03-07 | End: 2024-03-07 | Stop reason: HOSPADM

## 2024-03-07 RX ORDER — SODIUM CHLORIDE 0.9 % (FLUSH) 0.9 %
10 SYRINGE (ML) INJECTION
Status: CANCELLED | OUTPATIENT
Start: 2024-03-14

## 2024-03-07 RX ORDER — SODIUM CHLORIDE 0.9 % (FLUSH) 0.9 %
10 SYRINGE (ML) INJECTION
Status: DISCONTINUED | OUTPATIENT
Start: 2024-03-07 | End: 2024-03-07 | Stop reason: HOSPADM

## 2024-03-07 RX ORDER — ONDANSETRON HYDROCHLORIDE 2 MG/ML
8 INJECTION, SOLUTION INTRAVENOUS
Status: COMPLETED | OUTPATIENT
Start: 2024-03-07 | End: 2024-03-07

## 2024-03-07 RX ORDER — MEPERIDINE HYDROCHLORIDE 50 MG/ML
25 INJECTION INTRAMUSCULAR; INTRAVENOUS; SUBCUTANEOUS
Status: CANCELLED | OUTPATIENT
Start: 2024-03-14 | End: 2024-03-15

## 2024-03-07 RX ORDER — FAMOTIDINE 10 MG/ML
20 INJECTION INTRAVENOUS
Status: COMPLETED | OUTPATIENT
Start: 2024-03-07 | End: 2024-03-07

## 2024-03-07 RX ORDER — ACETAMINOPHEN 325 MG/1
650 TABLET ORAL
Status: CANCELLED | OUTPATIENT
Start: 2024-03-14

## 2024-03-07 RX ORDER — ONDANSETRON HYDROCHLORIDE 2 MG/ML
8 INJECTION, SOLUTION INTRAVENOUS
Status: DISCONTINUED | OUTPATIENT
Start: 2024-03-07 | End: 2024-03-07

## 2024-03-07 RX ORDER — MEPERIDINE HYDROCHLORIDE 25 MG/ML
25 INJECTION INTRAMUSCULAR; INTRAVENOUS; SUBCUTANEOUS
Status: DISCONTINUED | OUTPATIENT
Start: 2024-03-07 | End: 2024-03-07 | Stop reason: HOSPADM

## 2024-03-07 RX ORDER — FAMOTIDINE 10 MG/ML
20 INJECTION INTRAVENOUS
Status: CANCELLED | OUTPATIENT
Start: 2024-03-14

## 2024-03-07 RX ORDER — HEPARIN 100 UNIT/ML
500 SYRINGE INTRAVENOUS
Status: DISCONTINUED | OUTPATIENT
Start: 2024-03-07 | End: 2024-03-07 | Stop reason: HOSPADM

## 2024-03-07 RX ORDER — ONDANSETRON HYDROCHLORIDE 2 MG/ML
8 INJECTION, SOLUTION INTRAVENOUS
Status: CANCELLED
Start: 2024-03-14

## 2024-03-07 RX ORDER — HEPARIN 100 UNIT/ML
500 SYRINGE INTRAVENOUS
Status: CANCELLED | OUTPATIENT
Start: 2024-03-14

## 2024-03-07 RX ORDER — ACETAMINOPHEN 325 MG/1
650 TABLET ORAL
Status: COMPLETED | OUTPATIENT
Start: 2024-03-07 | End: 2024-03-07

## 2024-03-07 RX ORDER — EPINEPHRINE 0.3 MG/.3ML
0.3 INJECTION SUBCUTANEOUS ONCE AS NEEDED
Status: CANCELLED | OUTPATIENT
Start: 2024-03-14

## 2024-03-07 RX ADMIN — ONDANSETRON 8 MG: 2 INJECTION INTRAMUSCULAR; INTRAVENOUS at 10:03

## 2024-03-07 RX ADMIN — HYDROCORTISONE SODIUM SUCCINATE 100 MG: 100 INJECTION, POWDER, FOR SOLUTION INTRAMUSCULAR; INTRAVENOUS at 10:03

## 2024-03-07 RX ADMIN — FAMOTIDINE 20 MG: 10 INJECTION INTRAVENOUS at 08:03

## 2024-03-07 RX ADMIN — SODIUM CHLORIDE 800 MG: 9 INJECTION, SOLUTION INTRAVENOUS at 09:03

## 2024-03-07 RX ADMIN — DIPHENHYDRAMINE HYDROCHLORIDE 25 MG: 50 INJECTION INTRAMUSCULAR; INTRAVENOUS at 10:03

## 2024-03-07 RX ADMIN — ACETAMINOPHEN 650 MG: 325 TABLET ORAL at 08:03

## 2024-03-07 RX ADMIN — SODIUM CHLORIDE: 9 INJECTION, SOLUTION INTRAVENOUS at 08:03

## 2024-03-07 RX ADMIN — DIPHENHYDRAMINE HYDROCHLORIDE 25 MG: 50 INJECTION INTRAMUSCULAR; INTRAVENOUS at 08:03

## 2024-03-07 NOTE — NURSING
Pt arrived to infusion for C1 Rituxan. Pre medications given, Rituxan started at 0910 at 50cc/hour per protocol. At 0940, vitals stable, rate increased to 100cc/hour. At 1010, vitals stable, rate increased to 150cc/hour. At 1028, pt c/o numbness and tingling to face, neck and throat. Rituxan paused, vitals stable, NP notified, NS bolus started and 100mg Solucortef given at 1036. No improvement of symptoms after 21 minutes of onset, 25mg Benadryl given at 1049. Pt c/o nausea and became Hypotensive, 8mg Zofran given at 1056, NS bolus increased, pt placed in Trendelenburg position. Blood pressure recovered. Rituxan started again at 1210, rate of 75cc/hour. Rate increased by 50cc [id number] per protocol, pt tolerated well. Infusion completed at 1539. Pt monitored x 20 minutes post infusion, discharged home in stable condition, educational materials and future appts given.

## 2024-03-08 DIAGNOSIS — M31.19 TTP (THROMBOTIC THROMBOCYTOPENIC PURPURA): Primary | ICD-10-CM

## 2024-03-10 NOTE — DISCHARGE SUMMARY
[facility] Hospital Medicine Discharge Summary    Admit Date: 2/26/2024  Discharge Date and Time: 3/6/24, 11:35 AM  Admitting Physician:  Team  Discharging Physician: Sharron Woodward MD.  Primary Care Physician: Yaritza Browne FNP  Consults: Hematology/Oncology, Hospital Medicine, and Nephrology    Discharge Diagnoses:  Relapsed thrombotic thrombocytopenic purpura  Microangiopathic hemolytic anemia  Acute kidney injury - resolved   Abnormal UA without infection   Leukocytosis , likely due to steroid  Obesity, BMI 38.7     H/O Rheumatoid arthritis on a    Hospital Course:   Chief Complaint: Inpatient Follow-up for  TTP with relapse       HPI per admitting team:   48-year-old female with past medical history of anemia of chronic disease, rheumatoid arthritis started azathioprine January 2024, HTN, HLD, migraine with aura, GIAN, obesity, history of thrombotic thrombocytopenic purpura-TTP with multiple relapse initially diagnosed in 2015 and treated with therapeutic plasma exchange, steroid and rituximab 3 doses subsequently had her 1st relapsed in 2018 and treated with same regimen and a 2nd relapsed in January 2020 where she presented to [facility] with abdominal pain and fatigue, found to to have relapse (I do not have record of [id number] activity) and treated with plasma exchange and 4 doses of rituximab while inpatient as well as prednisone taper and subsequently discharged on monthly rituximab where she received 3 doses.     Presented to [facility] ED on 02/25/2024 with chief complaint of fatigue, nausea, vomiting and diffuse abdominal cramping and dark urine for for 3-4 days.  On arrival to ED she was noted to be afebrile and hemodynamically stable.  Initial labs notable for WBC 6.36, hemoglobin 12.4, platelets 5, peripheral smear show few schistocytes and few small platelets compatible with history of TTP, INR 1.1, PTT 30.5, fibrinogen more than 400, , haptoglobin <8,  indirect hyperbilirubinemia 2.9, urinalysis showed microscopic hematuria.  CT abdomen pelvis with contrast showed nonspecific retroperitoneal edema and mild perinephric stranding.      She received 1 unit of platelets on 02/25/2024, subsequent CBC trend show platelets trending down as well as hemoglobin, received dexamethasone 40 mg IV 02/26/2024 and a 2nd unit of platelets and subsequently transferred to [facility] for higher level of care.     Upon presentation to our facility, she was resting comfortably, reported abdominal pain mainly in the epigastric region feels like burning/cramping, denies hematemesis, melena or hematochezia.  Reported dark urine but no burning or painful urination or increased frequency.  Noted to have few petechiae on all her extremities, trunk and palate.  Reported no new neurological symptoms she does have migraine headaches but nothing new, note no vision changes, no extremity weakness though she report neuropathy symptoms to her hands and feet that has been chronic.     Heme-Onc evaluated the patient and plasmapheresis recommended. Nephrology consulted and Pt had her 1st plasmapheresis  on 2/27  with subsequent allergic-type reaction with  stridor, hives  and itching. She was given IV Benadryl and continued on her steroids with improvement. She then completed 2nd, 3rd ,4th and 5th treatment with half abumin half FFP with no reaction. Platelet started to recover and Hgb stabilized. On 3/4/24 WBC was 12K likely due to steroid, Hgb 9.0 and platelet improved to normal 315. Hematology recommended weaning plasmapheresis and steroids . Additional recommendation noted on initiation of Rituxan treatment. We will wait for final decision from Hematology team.      Interval Hx:   Patient seen and examined at bedside, no family member at bedside   Patient has no new complaints   Vitals reviewed and stable on room air   Labs reviewed with white cell count of 15.4-on steroids, hemoglobin of 9.9,  platelets 379, fibrinogen 236   No plasmapheresis today per Hematology   Dr. Sommers rounded today and he will discuss with Dr. Malik regarding initiation of Rituxan treatment as well as outpatient plasmapheresis for more definitive plan given frequent relapse         Case was discussed with patient's nurse and  on the floor.     Objective/physical exam:  General: In no acute distress, afebrile  Chest: Clear to auscultation bilaterally  Heart: RRR, +S1, S2, no appreciable murmur  Abdomen: Soft, nontender, BS +  MSK: Warm, no lower extremity edema, no clubbing or cyanosis  Skin- ecchymoses to noel upper ext   Neurologic: Alert and oriented x4, Cranial nerve II-XII intact, Strength 5/5 in all 4 extremities     Assessment/Plan:  Relapsed thrombotic thrombocytopenic purpura  Microangiopathic hemolytic anemia  Acute kidney injury - resolved   Abnormal UA without infection   Leukocytosis , likely due to steroid  Obesity, BMI 38.7     H/O Rheumatoid arthritis on azathioprine . HTN, HLD , GIAN, knee osteoarthritis      Plan-   Clinically improving   No Plasmapheresis today   Prednisone taper initiated and continue 80 mg daily today per Hematology   Further recs form Hematology to follow regarding initiating Rituxan treatment and outpatient plasmapheresis.    Follow-up repeat adamts 13 level, if greater than 60 , to repeat in a month per hematology recs   Discontinue telemetry monitoring      Continue PPI, multivitamin, folic acid  Continue bowel regimen  Review home meds and resume as appropriate      Anticipated discharge and Disposition:     Home with family      All diagnosis and differential diagnosis have been reviewed; assessment and plan has been documented; I have personally reviewed the labs and test results that are presently available; I have reviewed the patients medication list; I have reviewed the consulting providers response and recommendations. I have reviewed or attempted to review medical records  based upon their availability     All of the patient's questions have been  addressed and answered. Patient's is agreeable to the above stated plan. I will continue to monitor closely and make adjustments to medical management as needed.      Pt was seen and examined on the day of discharge  Vitals:  VITAL SIGNS: 24 HRS MIN & MAX LAST   No data recorded 97.9 °F (36.6 °C)   No data recorded 132/80   No data recorded  80   No data recorded 18   No data recorded 97 %           Procedures Performed: No admission procedures for hospital encounter.     Significant Diagnostic Studies: See Full reports for all details    Recent Labs   Lab 03/05/24  0350 03/06/24  0416 03/07/24  0804   WBC 13.10* 15.40* 13.50*   RBC 4.05* 4.30 4.18*   HGB 9.1* 9.9* 9.5*   HCT 30.3* 32.1* 31.5*   MCV 74.8* 74.7* 75.4*   MCH 22.5* 23.0* 22.7*   MCHC 30.0* 30.8* 30.2*   RDW 20.4* 21.2* 20.8*    379 366   MPV 9.5 9.6 9.3       Recent Labs   Lab 03/04/24  0457 03/05/24  0350    143   K 3.8 3.8   CO2 30* 30*   BUN 18.1 19.5*   CREATININE 0.86 0.85   CALCIUM 8.8 8.2*   MG 2.20 2.00   ALBUMIN 3.5 3.5   ALKPHOS 86 80   ALT 26 19   AST 20 15   BILITOT 0.5 0.4        Microbiology Results (last 7 days)       ** No results found for the last 168 hours. **             X-Ray Abdomen AP 1 View  Narrative: EXAMINATION:  XR ABDOMEN AP 1 VIEW    CLINICAL HISTORY:  Constipation;    COMPARISON:  None    FINDINGS:  Three frontal images of the abdomen.  There is a nonspecific, nonobstructive bowel gas pattern.  There is no large stool burden.  No free air is seen.  Impression: No acute radiographic findings.    Electronically signed by: Jorge Maddox  Date:    03/02/2024  Time:    16:31         Medication List        START taking these medications      folic acid 1 MG tablet  Commonly known as: FOLVITE  Take 1 tablet (1 mg total) by mouth once daily.     multivitamin Tab  Take 1 tablet by mouth once daily.     predniSONE 20 MG tablet  Commonly known  as: DELTASONE  Take 4 tablets (80 mg total) by mouth once daily for 4 days, THEN 3 tablets (60 mg total) once daily for 4 days, THEN 2 tablets (40 mg total) once daily for 4 days, THEN 1 tablet (20 mg total) once daily for 4 days, THEN 0.5 tablets (10 mg total) once daily for 10 days.  Start taking on: March 7, 2024            CONTINUE taking these medications      albuterol 2.5 mg /3 mL (0.083 %) nebulizer solution  Commonly known as: PROVENTIL  USE 1 VIAL IN NEBULIZER EVERY 8 HOURS AS NEEDED     azaTHIOprine 50 mg Tab  Commonly known as: IMURAN  Start Imuran 50 mg daily and after 1 week increase dose to 100 mg daily.  Hold for infection or fever.     baclofen 5 mg Tab tablet  Commonly known as: LIORESAL  Take 1 tablet (5 mg total) by mouth 2 (two) times daily as needed (tension).     cetirizine 10 MG tablet  Commonly known as: ZYRTEC     cloNIDine 0.1 MG tablet  Commonly known as: CATAPRES     DULERA 100-5 mcg/actuation Hfaa  Generic drug: mometasone-formoterol  Inhale 1 puff into the lungs 2 (two) times daily.     EMGALITY  mg/mL Pnij  Generic drug: galcanezumab-gnlm  AS DIRECTED SUBCUTANEOUS MONTHLY 30 DAY(S)     ergocalciferol 50,000 unit Cap  Commonly known as: ERGOCALCIFEROL  Take 1 capsule (50,000 Units total) by mouth every 7 days.     ezetimibe 10 mg tablet  Commonly known as: ZETIA  Take 1 tablet (10 mg total) by mouth every evening.     fluticasone propionate 50 mcg/actuation nasal spray  Commonly known as: FLONASE  2 sprays (100 mcg total) by Each Nostril route daily as needed for Allergies or Rhinitis.     gabapentin 600 MG tablet  Commonly known as: NEURONTIN     isosorbide dinitrate 40 MG Tab  Commonly known as: ISORDIL  Take 1 tablet (40 mg total) by mouth once daily.     LINZESS 145 mcg Cap capsule  Generic drug: linaCLOtide     metoprolol succinate 100 MG 24 hr tablet  Commonly known as: TOPROL-XL  Take 1 tablet (100 mg total) by mouth every evening.     nitroGLYCERIN 0.4 MG SL  tablet  Commonly known as: NITROSTAT  Place 1 tablet (0.4 mg total) under the tongue every 5 (five) minutes as needed for Chest pain.     omeprazole 40 MG capsule  Commonly known as: PRILOSEC  Take 1 capsule (40 mg total) by mouth once daily.     ondansetron 8 MG tablet  Commonly known as: ZOFRAN  TAKE 1 TABLET BY MOUTH EVERY 8 HOURS AS NEEDED FOR NAUSEA AND VOMITING     RESTASIS 0.05 % ophthalmic emulsion  Generic drug: cycloSPORINE     UBRELVY 100 mg tablet  Generic drug: ubrogepant  Take 1 tablet (100 mg total) by mouth 2 (two) times daily as needed for Migraine.     valsartan 80 MG tablet  Commonly known as: DIOVAN  Take 1 tablet (80 mg total) by mouth once daily.     venlafaxine 37.5 MG 24 hr capsule  Commonly known as: EFFEXOR-XR  Take 1 capsule (37.5 mg total) by mouth once daily.     zavegepant 10 mg/actuation Spry  10 mg by Nasal route daily as needed (Migraine).               Where to Get Your Medications        These medications were sent to [place] [id number]      Phone: [phone]   folic acid 1 MG tablet  multivitamin Tab  predniSONE 20 MG tablet          Explained in detail to the patient about the discharge plan, medications, and follow-up visits. Pt understands and agrees with the treatment plan  Discharge Disposition: Home or Self Care   Discharged Condition: stable  Diet-    Medications Per [facility] med rec  Activities as tolerated   Follow-up Information       INFUSION CHAIR. Go on 3/7/2024.    Why: INFUSION APPT @ 7:30 @ Yaritza [place] FNP Follow up on 3/19/2024.    Specialty: Family Medicine  Why: @11:40a  Contact information:  [address] [place] [address]  [phone]               Julian Malik MD. Schedule an appointment as soon as possible for a visit in 2 week(s).    Specialties: Oncology, Hematology and Oncology  Why: Office will call you with follow up  appt  Contact information:  [address] [address]  [phone]                           For further questions contact hospitalist office    Discharge time 33 minutes    For worsening symptoms, chest pain, shortness of breath, increased abdominal pain, high grade fever, stroke or stroke like symptoms, immediately go to the nearest Emergency Room or call 911 as soon as possible.      Sharron Evans M.D on 3/6/24, . at 11:35 AM.

## 2024-03-11 ENCOUNTER — LAB VISIT (OUTPATIENT)
Dept: LAB | Facility: HOSPITAL | Age: 49
End: 2024-03-11
Attending: NURSE PRACTITIONER
Payer: MEDICAID

## 2024-03-11 DIAGNOSIS — M31.19 TTP (THROMBOTIC THROMBOCYTOPENIC PURPURA): ICD-10-CM

## 2024-03-11 LAB
ALBUMIN SERPL-MCNC: 3.6 G/DL (ref 3.5–5)
ALBUMIN/GLOB SERPL: 1.6 RATIO (ref 1.1–2)
ALP SERPL-CCNC: 91 UNIT/L (ref 40–150)
ALT SERPL-CCNC: 20 UNIT/L (ref 0–55)
AST SERPL-CCNC: 14 UNIT/L (ref 5–34)
BASOPHILS # BLD AUTO: 0.01 X10(3)/MCL
BASOPHILS NFR BLD AUTO: 0.2 %
BILIRUB SERPL-MCNC: 0.8 MG/DL
BUN SERPL-MCNC: 18.5 MG/DL (ref 9.8–20.1)
CALCIUM SERPL-MCNC: 8.5 MG/DL (ref 8.4–10.2)
CHLORIDE SERPL-SCNC: 108 MMOL/L (ref 98–107)
CO2 SERPL-SCNC: 27 MMOL/L (ref 22–29)
CREAT SERPL-MCNC: 0.87 MG/DL (ref 0.55–1.02)
EOSINOPHIL # BLD AUTO: 0.06 X10(3)/MCL (ref 0–0.9)
EOSINOPHIL NFR BLD AUTO: 1.1 %
ERYTHROCYTE [DISTWIDTH] IN BLOOD BY AUTOMATED COUNT: 20.3 % (ref 11.5–17)
GFR SERPLBLD CREATININE-BSD FMLA CKD-EPI: >60 MLS/MIN/1.73/M2
GLOBULIN SER-MCNC: 2.2 GM/DL (ref 2.4–3.5)
GLUCOSE SERPL-MCNC: 93 MG/DL (ref 74–100)
HCT VFR BLD AUTO: 33.7 % (ref 37–47)
HGB BLD-MCNC: 10.2 G/DL (ref 12–16)
IMM GRANULOCYTES # BLD AUTO: 0.02 X10(3)/MCL (ref 0–0.04)
IMM GRANULOCYTES NFR BLD AUTO: 0.4 %
LDH SERPL-CCNC: 253 U/L (ref 125–220)
LYMPHOCYTES # BLD AUTO: 0.85 X10(3)/MCL (ref 0.6–4.6)
LYMPHOCYTES NFR BLD AUTO: 15.8 %
MCH RBC QN AUTO: 22.9 PG (ref 27–31)
MCHC RBC AUTO-ENTMCNC: 30.3 G/DL (ref 33–36)
MCV RBC AUTO: 75.6 FL (ref 80–94)
MONOCYTES # BLD AUTO: 0.7 X10(3)/MCL (ref 0.1–1.3)
MONOCYTES NFR BLD AUTO: 13 %
NEUTROPHILS # BLD AUTO: 3.75 X10(3)/MCL (ref 2.1–9.2)
NEUTROPHILS NFR BLD AUTO: 69.5 %
PATH REV: NORMAL
PLATELET # BLD AUTO: 313 X10(3)/MCL (ref 130–400)
PMV BLD AUTO: 8.4 FL (ref 7.4–10.4)
POTASSIUM SERPL-SCNC: 4.5 MMOL/L (ref 3.5–5.1)
PROT SERPL-MCNC: 5.8 GM/DL (ref 6.4–8.3)
RBC # BLD AUTO: 4.46 X10(6)/MCL (ref 4.2–5.4)
RET# (OHS): 0.17 X10E6/UL (ref 0.02–0.08)
RETICULOCYTE COUNT AUTOMATED (OLG): 3.77 % (ref 1.1–2.1)
SODIUM SERPL-SCNC: 145 MMOL/L (ref 136–145)
WBC # SPEC AUTO: 5.39 X10(3)/MCL (ref 4.5–11.5)

## 2024-03-11 PROCEDURE — 83615 LACTATE (LD) (LDH) ENZYME: CPT

## 2024-03-11 PROCEDURE — 36415 COLL VENOUS BLD VENIPUNCTURE: CPT

## 2024-03-11 PROCEDURE — 80053 COMPREHEN METABOLIC PANEL: CPT

## 2024-03-11 PROCEDURE — 85025 COMPLETE CBC W/AUTO DIFF WBC: CPT

## 2024-03-11 PROCEDURE — 85045 AUTOMATED RETICULOCYTE COUNT: CPT

## 2024-03-11 PROCEDURE — 85397 CLOTTING FUNCT ACTIVITY: CPT

## 2024-03-12 LAB
COAGULATION SPECIALIST REVIEW: ABNORMAL
VWF CP ACT/NOR PPP CHRO: 48 %

## 2024-03-14 ENCOUNTER — INFUSION (OUTPATIENT)
Dept: INFUSION THERAPY | Facility: HOSPITAL | Age: 49
End: 2024-03-14
Attending: NURSE PRACTITIONER
Payer: MEDICAID

## 2024-03-14 ENCOUNTER — OFFICE VISIT (OUTPATIENT)
Dept: HEMATOLOGY/ONCOLOGY | Facility: CLINIC | Age: 49
End: 2024-03-14
Payer: MEDICAID

## 2024-03-14 VITALS
RESPIRATION RATE: 18 BRPM | TEMPERATURE: 98 F | HEIGHT: 66 IN | SYSTOLIC BLOOD PRESSURE: 126 MMHG | WEIGHT: 246 LBS | BODY MASS INDEX: 39.53 KG/M2 | DIASTOLIC BLOOD PRESSURE: 76 MMHG | HEART RATE: 69 BPM

## 2024-03-14 VITALS
HEIGHT: 66 IN | OXYGEN SATURATION: 97 % | SYSTOLIC BLOOD PRESSURE: 131 MMHG | WEIGHT: 246 LBS | DIASTOLIC BLOOD PRESSURE: 85 MMHG | HEART RATE: 76 BPM | RESPIRATION RATE: 16 BRPM | TEMPERATURE: 98 F | BODY MASS INDEX: 39.53 KG/M2

## 2024-03-14 DIAGNOSIS — M31.19 TTP (THROMBOTIC THROMBOCYTOPENIC PURPURA): ICD-10-CM

## 2024-03-14 DIAGNOSIS — K29.70 GASTRITIS, PRESENCE OF BLEEDING UNSPECIFIED, UNSPECIFIED CHRONICITY, UNSPECIFIED GASTRITIS TYPE: Primary | ICD-10-CM

## 2024-03-14 DIAGNOSIS — M31.19 TTP (THROMBOTIC THROMBOCYTOPENIC PURPURA): Primary | ICD-10-CM

## 2024-03-14 PROCEDURE — 96415 CHEMO IV INFUSION ADDL HR: CPT

## 2024-03-14 PROCEDURE — 96375 TX/PRO/DX INJ NEW DRUG ADDON: CPT

## 2024-03-14 PROCEDURE — 25000003 PHARM REV CODE 250: Performed by: INTERNAL MEDICINE

## 2024-03-14 PROCEDURE — 4010F ACE/ARB THERAPY RXD/TAKEN: CPT | Mod: CPTII,,, | Performed by: NURSE PRACTITIONER

## 2024-03-14 PROCEDURE — 99215 OFFICE O/P EST HI 40 MIN: CPT | Mod: PBBFAC | Performed by: NURSE PRACTITIONER

## 2024-03-14 PROCEDURE — 96367 TX/PROPH/DG ADDL SEQ IV INF: CPT

## 2024-03-14 PROCEDURE — 3079F DIAST BP 80-89 MM HG: CPT | Mod: CPTII,,, | Performed by: NURSE PRACTITIONER

## 2024-03-14 PROCEDURE — 1111F DSCHRG MED/CURRENT MED MERGE: CPT | Mod: CPTII,,, | Performed by: NURSE PRACTITIONER

## 2024-03-14 PROCEDURE — 63600175 PHARM REV CODE 636 W HCPCS: Mod: JZ,JG | Performed by: INTERNAL MEDICINE

## 2024-03-14 PROCEDURE — 3075F SYST BP GE 130 - 139MM HG: CPT | Mod: CPTII,,, | Performed by: NURSE PRACTITIONER

## 2024-03-14 PROCEDURE — 1160F RVW MEDS BY RX/DR IN RCRD: CPT | Mod: CPTII,,, | Performed by: NURSE PRACTITIONER

## 2024-03-14 PROCEDURE — 99215 OFFICE O/P EST HI 40 MIN: CPT | Mod: S$PBB,,, | Performed by: NURSE PRACTITIONER

## 2024-03-14 PROCEDURE — 1159F MED LIST DOCD IN RCRD: CPT | Mod: CPTII,,, | Performed by: NURSE PRACTITIONER

## 2024-03-14 PROCEDURE — 96413 CHEMO IV INFUSION 1 HR: CPT

## 2024-03-14 PROCEDURE — 3008F BODY MASS INDEX DOCD: CPT | Mod: CPTII,,, | Performed by: NURSE PRACTITIONER

## 2024-03-14 PROCEDURE — 99999 PR PBB SHADOW E&M-EST. PATIENT-LVL V: CPT | Mod: PBBFAC,,, | Performed by: NURSE PRACTITIONER

## 2024-03-14 RX ORDER — ACETAMINOPHEN 325 MG/1
650 TABLET ORAL
Status: COMPLETED | OUTPATIENT
Start: 2024-03-14 | End: 2024-03-14

## 2024-03-14 RX ORDER — FAMOTIDINE 10 MG/ML
20 INJECTION INTRAVENOUS
Status: COMPLETED | OUTPATIENT
Start: 2024-03-14 | End: 2024-03-14

## 2024-03-14 RX ORDER — SUCRALFATE 1 G/10ML
1 SUSPENSION ORAL 4 TIMES DAILY
Qty: 1200 ML | Refills: 0 | Status: SHIPPED | OUTPATIENT
Start: 2024-03-14 | End: 2024-04-13

## 2024-03-14 RX ORDER — EPINEPHRINE 0.3 MG/.3ML
0.3 INJECTION SUBCUTANEOUS ONCE AS NEEDED
Status: CANCELLED | OUTPATIENT
Start: 2024-03-21

## 2024-03-14 RX ORDER — SODIUM CHLORIDE 0.9 % (FLUSH) 0.9 %
10 SYRINGE (ML) INJECTION
Status: DISCONTINUED | OUTPATIENT
Start: 2024-03-14 | End: 2024-03-14 | Stop reason: HOSPADM

## 2024-03-14 RX ORDER — HEPARIN 100 UNIT/ML
500 SYRINGE INTRAVENOUS
Status: CANCELLED | OUTPATIENT
Start: 2024-03-21

## 2024-03-14 RX ORDER — HEPARIN 100 UNIT/ML
500 SYRINGE INTRAVENOUS
Status: DISCONTINUED | OUTPATIENT
Start: 2024-03-14 | End: 2024-03-14 | Stop reason: HOSPADM

## 2024-03-14 RX ORDER — SODIUM CHLORIDE 0.9 % (FLUSH) 0.9 %
10 SYRINGE (ML) INJECTION
Status: CANCELLED | OUTPATIENT
Start: 2024-03-21

## 2024-03-14 RX ORDER — FAMOTIDINE 10 MG/ML
20 INJECTION INTRAVENOUS
Status: CANCELLED | OUTPATIENT
Start: 2024-03-21

## 2024-03-14 RX ORDER — DIPHENHYDRAMINE HYDROCHLORIDE 50 MG/ML
50 INJECTION INTRAMUSCULAR; INTRAVENOUS ONCE AS NEEDED
Status: CANCELLED | OUTPATIENT
Start: 2024-03-21

## 2024-03-14 RX ORDER — MEPERIDINE HYDROCHLORIDE 50 MG/ML
25 INJECTION INTRAMUSCULAR; INTRAVENOUS; SUBCUTANEOUS
Status: CANCELLED | OUTPATIENT
Start: 2024-03-21 | End: 2024-03-22

## 2024-03-14 RX ORDER — ACETAMINOPHEN 325 MG/1
650 TABLET ORAL
Status: CANCELLED | OUTPATIENT
Start: 2024-03-21

## 2024-03-14 RX ADMIN — SODIUM CHLORIDE 800 MG: 9 INJECTION, SOLUTION INTRAVENOUS at 10:03

## 2024-03-14 RX ADMIN — ACETAMINOPHEN 650 MG: 325 TABLET ORAL at 09:03

## 2024-03-14 RX ADMIN — DIPHENHYDRAMINE HYDROCHLORIDE 25 MG: 50 INJECTION INTRAMUSCULAR; INTRAVENOUS at 09:03

## 2024-03-14 RX ADMIN — FAMOTIDINE 20 MG: 10 INJECTION INTRAVENOUS at 09:03

## 2024-03-14 NOTE — PROGRESS NOTES
Subjective:       Patient ID: Nicole Brown is a 49 y.o. female.    Diagnosis:  Relapsed thrombotic thrombocytopenic purpura  Microangiopathic hemolytic anemia    Current Treatment: Rituxan weekly - week #2, Prednisone 60 mg daily    Chief Complaint: Abdominal pain    Black female with a history of TTP initially diagnosed in 2015.  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  She has been followed by a hematologist in [place].  She relocated to [place] to live with her daughter approximately 8 months ago.  She presented to the ER 2/25/24 with low-grade fever, nausea/vomiting and abdominal discomfort.  She denied overt diarrhea.  Laboratory on presentation showed a platelet count of 5000.  Hemoglobin was 12.4 with microcytic, hypochromic indices and WBC 6.4.  LDH was elevated at 711 and haptoglobin was undetectable.  UPT was negative.  CMP showed mild renal insufficiency with a BUN of 24.9 and creatinine 1.36.  Peripheral smear showed microcytic, hypochromic red blood cells, increased reticulocytes and the presence of schistocytes and target cells.  Platelets were markedly decreased.  She was transferred to [facility] for initiation of plasmapheresis.  She received 1 unit of platelets at the outside facility prior to transfer.  On arrival here, she was started on prednisone 1 mg/kg daily and received 1 unit of FFP.     Hospital Course  2/27/24:  Started daily plasma exchange 1 plasma volume (40 cc/kg) exchange with FFP.  Platelet count 12,000. ADAMTS-13 level drawn 2/26/24 <5%.  2/28/24:  Day 2 plasma exchange.  Change to 1/2 albumin + 1/2 FFP secondary to significant urticaria.  2/29/24: Day 3 plasma exchange.  1/2 albumin + 1/2 FFP.  03/1/24: Day 4 plasma exchange.  1/2 albumin + 1/2 FFP  03/4/2024:  Day 5 plasma exchange.  1/2 albumin + 1/2 FFP  03/6/24: Discharged from the hospital    Outpatient plasma exchange could not be coordinated with the hospital.   Systemic therapy was recommended with Rituxan weekly x4.     Interval History  Kadeem is here today by herself for hospital follow-up visit.  She is ambulatory without assistance.  Following her hospital discharge, systemic treatment was recommended with Rituxan weekly for 4 doses.  As outlined above, outpatient plasmapheresis could not arranged.  She is on a gradual steroid taper with her current dose 60 mg.  She is having abdominal symptoms consistent with gastritis.  She is taking Protonix daily.  She has an upcoming appointment with Gastroenterology.  She also has upcoming appointments with Orthopedics and Rheumatology.  She is currently off of her Imuran.  She is asking if it is safe to resume treatment with Emgality for her chronic migraines.  She is having daily headaches, not necessarily worse since plasmapheresis was stopped.  She has stable anemia and platelet count remains normal.  CBC is being monitored twice weekly.  LDH remains mildly elevated but stable.  CMP, LDH and [id number] is being monitored weekly.      Review of patient's allergies indicates:   Allergen Reactions    Nsaids (non-steroidal anti-inflammatory drug) Other (See Comments)     Causes ulcers to bleed.    Ibuprofen     Latex     Meloxicam     Nsaids (non-steroidal anti-inflammatory drug) Nausea Only      Review of Systems   Constitutional:  Negative for appetite change and unexpected weight change.   HENT:  Negative for mouth sores.    Eyes:  Negative for visual disturbance.   Respiratory:  Positive for shortness of breath. Negative for cough.    Cardiovascular:  Positive for chest pain.   Gastrointestinal:  Positive for abdominal pain. Negative for diarrhea.   Genitourinary:  Negative for frequency.   Musculoskeletal:  Negative for back pain.   Integumentary:  Positive for rash.   Neurological:  Positive for headaches.   Hematological:  Negative for adenopathy.   Psychiatric/Behavioral:  The patient is not nervous/anxious.           PMHx:  HTN, hyperlipidemia, rheumatoid arthritis, migraine headaches, TTP  PSHx:  Endometrial ablation, left carpal tunnel release, right shoulder surgery  SH:  Lifetime nonsmoker.  Previous alcohol, none since age 20.  Lives in [place] with her daughter.  Provides  in her home.  FH:  PGF had a CVA.  No family history of cancer.  Objective:     Vitals:    03/14/24 0816   BP: 131/85   Pulse: 76   Resp: 16   Temp: 98.4 °F (36.9 °C)         Physical Exam  Constitutional:       Appearance: She is obese.   HENT:      Head: Normocephalic.      Nose: Nose normal.      Mouth/Throat:      Mouth: Mucous membranes are moist.      Pharynx: Oropharynx is clear.   Eyes:      Conjunctiva/sclera: Conjunctivae normal.      Pupils: Pupils are equal, round, and reactive to light.   Cardiovascular:      Rate and Rhythm: Normal rate and regular rhythm.   Pulmonary:      Effort: Pulmonary effort is normal.      Breath sounds: Normal breath sounds.   Abdominal:      General: Bowel sounds are normal.      Palpations: Abdomen is soft.      Tenderness: There is abdominal tenderness.   Musculoskeletal:         General: No swelling. Normal range of motion.      Cervical back: Normal range of motion.   Lymphadenopathy:      Cervical: No cervical adenopathy.   Skin:     General: Skin is warm and dry.      Findings: No rash.   Neurological:      General: No focal deficit present.      Mental Status: She is alert and oriented to person, place, and time.      Sensory: Sensory deficit (fingertip neuropathy, grade 1, pre-existing) present.   Psychiatric:         Mood and Affect: Mood normal.         Behavior: Behavior normal.       ECOG SCORE    2 - Capable of all selfcare but unable to carry out any work activities, active > 50% of hours            Recent Results (from the past 336 hour(s))   Comprehensive Metabolic Panel    Collection Time: 03/01/24  3:56 AM   Result Value Ref Range    Sodium Level 143 136 - 145 mmol/L    Potassium  Level 3.0 (L) 3.5 - 5.1 mmol/L    Chloride 108 (H) 98 - 107 mmol/L    Carbon Dioxide 29 22 - 29 mmol/L    Glucose Level 94 74 - 100 mg/dL    Blood Urea Nitrogen 13.5 7.0 - 18.7 mg/dL    Creatinine 0.89 0.55 - 1.02 mg/dL    Calcium Level Total 8.7 8.4 - 10.2 mg/dL    Protein Total 5.3 (L) 6.4 - 8.3 gm/dL    Albumin Level 3.6 3.5 - 5.0 g/dL    Globulin 1.7 (L) 2.4 - 3.5 gm/dL    Albumin/Globulin Ratio 2.1 (H) 1.1 - 2.0 ratio    Bilirubin Total 0.5 <=1.5 mg/dL    Alkaline Phosphatase 79 40 - 150 unit/L    Alanine Aminotransferase 12 0 - 55 unit/L    Aspartate Aminotransferase 15 5 - 34 unit/L    eGFR >60 mls/min/1.73/m2   Fibrinogen    Collection Time: 03/01/24  3:56 AM   Result Value Ref Range    Fibrinogen 184 (L) 210 - 463 mg/dL   Lactate Dehydrogenase    Collection Time: 03/01/24  3:56 AM   Result Value Ref Range    Lactate Dehydrogenase 219 125 - 220 U/L   Magnesium    Collection Time: 03/01/24  3:56 AM   Result Value Ref Range    Magnesium Level 2.00 1.60 - 2.60 mg/dL   Protime-INR    Collection Time: 03/01/24  3:56 AM   Result Value Ref Range    PT 13.9 12.5 - 14.5 seconds    INR 1.1 <=1.3   CBC with Differential    Collection Time: 03/01/24  4:04 AM   Result Value Ref Range    WBC 13.81 (H) 4.50 - 11.50 x10(3)/mcL    RBC 3.89 (L) 4.20 - 5.40 x10(6)/mcL    Hgb 8.6 (L) 12.0 - 16.0 g/dL    Hct 28.0 (L) 37.0 - 47.0 %    MCV 72.0 (L) 80.0 - 94.0 fL    MCH 22.1 (L) 27.0 - 31.0 pg    MCHC 30.7 (L) 33.0 - 36.0 g/dL    RDW 18.7 (H) 11.5 - 17.0 %    Platelet 132 130 - 400 x10(3)/mcL    MPV      Neut % 64.5 %    Lymph % 24.0 %    Mono % 6.2 %    Eos % 0.9 %    Basophil % 0.2 %    Lymph # 3.32 0.6 - 4.6 x10(3)/mcL    Neut # 8.90 2.1 - 9.2 x10(3)/mcL    Mono # 0.86 0.1 - 1.3 x10(3)/mcL    Eos # 0.12 0 - 0.9 x10(3)/mcL    Baso # 0.03 <=0.2 x10(3)/mcL    IG# 0.58 (H) 0 - 0.04 x10(3)/mcL    IG% 4.2 %    NRBC% 3.6 %    IPF 7.1 0.9 - 11.2 %   POCT ARTERIAL BLOOD GAS    Collection Time: 03/01/24  5:36 AM   Result Value Ref Range     POC Ionized Calcium 1.07 (A) 1.12 - 1.23 mmol/l    POC Temp 37.0 °C    Specimen source Arterial sample    Prepare Fresh Frozen Plasma 8 Units    Collection Time: 03/01/24  9:17 AM   Result Value Ref Range    UNIT NUMBER [id number]     UNIT ABO/RH O POS     DISPENSE STATUS Transfused     Unit Expiration [id number]     Product Code [id number]     Unit Blood Type Code 5100     CROSSMATCH INTERPRETATION Not required     UNIT NUMBER [id number]     UNIT ABO/RH O POS     DISPENSE STATUS Transfused     Unit Expiration [id number]     Product Code H8168D38     Unit Blood Type Code 5100     CROSSMATCH INTERPRETATION Not required     UNIT NUMBER [id number]     UNIT ABO/RH O POS     DISPENSE STATUS Transfused     Unit Expiration [id number]     Product Code W5255Z76     Unit Blood Type Code 5100     CROSSMATCH INTERPRETATION Not required     UNIT NUMBER [id number]     UNIT ABO/RH O POS     DISPENSE STATUS Transfused     Unit Expiration [id number]     Product Code Y3680Q96     Unit Blood Type Code 5100     CROSSMATCH INTERPRETATION Not required     UNIT NUMBER [id number]     UNIT ABO/RH O POS     DISPENSE STATUS Transfused     Unit Expiration [id number]     Product Code B0992N56     Unit Blood Type Code 5100     CROSSMATCH INTERPRETATION Not required     UNIT NUMBER [id number]     UNIT ABO/RH O POS     DISPENSE STATUS Transfused     Unit Expiration [id number]     Product Code E3966K41     Unit Blood Type Code 5100     CROSSMATCH INTERPRETATION Not required    Comprehensive Metabolic Panel    Collection Time: 03/02/24  4:27 AM   Result Value Ref Range    Sodium Level 140 136 - 145 mmol/L    Potassium Level 3.6 3.5 - 5.1 mmol/L    Chloride 108 (H) 98 - 107 mmol/L    Carbon Dioxide 25 22 - 29 mmol/L    Glucose Level 93 74 - 100 mg/dL    Blood Urea Nitrogen 17.3 7.0 - 18.7 mg/dL    Creatinine 0.89 0.55 - 1.02 mg/dL    Calcium Level Total 8.6 8.4 - 10.2 mg/dL    Protein Total 4.9 (L) 6.4 - 8.3 gm/dL     Albumin Level 3.5 3.5 - 5.0 g/dL    Globulin 1.4 (L) 2.4 - 3.5 gm/dL    Albumin/Globulin Ratio 2.5 (H) 1.1 - 2.0 ratio    Bilirubin Total 0.4 <=1.5 mg/dL    Alkaline Phosphatase 70 40 - 150 unit/L    Alanine Aminotransferase 15 0 - 55 unit/L    Aspartate Aminotransferase 15 5 - 34 unit/L    eGFR >60 mls/min/1.73/m2   Fibrinogen    Collection Time: 03/02/24  4:27 AM   Result Value Ref Range    Fibrinogen 193 (L) 210 - 463 mg/dL   Lactate Dehydrogenase    Collection Time: 03/02/24  4:27 AM   Result Value Ref Range    Lactate Dehydrogenase 180 125 - 220 U/L   Magnesium    Collection Time: 03/02/24  4:27 AM   Result Value Ref Range    Magnesium Level 1.90 1.60 - 2.60 mg/dL   Protime-INR    Collection Time: 03/02/24  4:27 AM   Result Value Ref Range    PT 14.3 12.5 - 14.5 seconds    INR 1.1 <=1.3   CBC with Differential    Collection Time: 03/02/24  4:27 AM   Result Value Ref Range    WBC 15.14 (H) 4.50 - 11.50 x10(3)/mcL    RBC 3.99 (L) 4.20 - 5.40 x10(6)/mcL    Hgb 9.2 (L) 12.0 - 16.0 g/dL    Hct 28.9 (L) 37.0 - 47.0 %    MCV 72.4 (L) 80.0 - 94.0 fL    MCH 23.1 (L) 27.0 - 31.0 pg    MCHC 31.8 (L) 33.0 - 36.0 g/dL    RDW 18.9 (H) 11.5 - 17.0 %    Platelet 220 130 - 400 x10(3)/mcL    MPV      Neut % 68.3 %    Lymph % 19.2 %    Mono % 7.8 %    Eos % 0.5 %    Basophil % 0.2 %    Lymph # 2.91 0.6 - 4.6 x10(3)/mcL    Neut # 10.35 (H) 2.1 - 9.2 x10(3)/mcL    Mono # 1.18 0.1 - 1.3 x10(3)/mcL    Eos # 0.07 0 - 0.9 x10(3)/mcL    Baso # 0.03 <=0.2 x10(3)/mcL    IG# 0.60 (H) 0 - 0.04 x10(3)/mcL    IG% 4.0 %    NRBC% 2.5 %    IPF 5.7 0.9 - 11.2 %   POCT ARTERIAL BLOOD GAS    Collection Time: 03/02/24  4:28 AM   Result Value Ref Range    POC Ionized Calcium 1.00 (A) 1.12 - 1.23 mmol/l    POC Temp 37.0 °C    Specimen source Arterial sample    Comprehensive Metabolic Panel    Collection Time: 03/03/24  4:49 AM   Result Value Ref Range    Sodium Level 141 136 - 145 mmol/L    Potassium Level 3.6 3.5 - 5.1 mmol/L    Chloride 107 98 - 107  mmol/L    Carbon Dioxide 30 (H) 22 - 29 mmol/L    Glucose Level 90 74 - 100 mg/dL    Blood Urea Nitrogen 16.8 7.0 - 18.7 mg/dL    Creatinine 0.86 0.55 - 1.02 mg/dL    Calcium Level Total 8.5 8.4 - 10.2 mg/dL    Protein Total 5.5 (L) 6.4 - 8.3 gm/dL    Albumin Level 3.6 3.5 - 5.0 g/dL    Globulin 1.9 (L) 2.4 - 3.5 gm/dL    Albumin/Globulin Ratio 1.9 1.1 - 2.0 ratio    Bilirubin Total 0.5 <=1.5 mg/dL    Alkaline Phosphatase 77 40 - 150 unit/L    Alanine Aminotransferase 24 0 - 55 unit/L    Aspartate Aminotransferase 22 5 - 34 unit/L    eGFR >60 mls/min/1.73/m2   Fibrinogen    Collection Time: 03/03/24  4:49 AM   Result Value Ref Range    Fibrinogen 207 (L) 210 - 463 mg/dL   Lactate Dehydrogenase    Collection Time: 03/03/24  4:49 AM   Result Value Ref Range    Lactate Dehydrogenase 232 (H) 125 - 220 U/L   Magnesium    Collection Time: 03/03/24  4:49 AM   Result Value Ref Range    Magnesium Level 2.00 1.60 - 2.60 mg/dL   Protime-INR    Collection Time: 03/03/24  4:49 AM   Result Value Ref Range    PT 13.8 12.5 - 14.5 seconds    INR 1.1 <=1.3   CBC with Differential    Collection Time: 03/03/24  4:49 AM   Result Value Ref Range    WBC 15.15 (H) 4.50 - 11.50 x10(3)/mcL    RBC 4.14 (L) 4.20 - 5.40 x10(6)/mcL    Hgb 9.4 (L) 12.0 - 16.0 g/dL    Hct 30.4 (L) 37.0 - 47.0 %    MCV 73.4 (L) 80.0 - 94.0 fL    MCH 22.7 (L) 27.0 - 31.0 pg    MCHC 30.9 (L) 33.0 - 36.0 g/dL    RDW 19.7 (H) 11.5 - 17.0 %    Platelet 278 130 - 400 x10(3)/mcL    MPV 10.0 7.4 - 10.4 fL    Neut % 70.2 %    Lymph % 18.8 %    Mono % 7.5 %    Eos % 0.2 %    Basophil % 0.1 %    Lymph # 2.85 0.6 - 4.6 x10(3)/mcL    Neut # 10.63 (H) 2.1 - 9.2 x10(3)/mcL    Mono # 1.13 0.1 - 1.3 x10(3)/mcL    Eos # 0.03 0 - 0.9 x10(3)/mcL    Baso # 0.02 <=0.2 x10(3)/mcL    IG# 0.49 (H) 0 - 0.04 x10(3)/mcL    IG% 3.2 %    NRBC% 1.4 %    IPF 4.5 0.9 - 11.2 %   RT Blood Gas    Collection Time: 03/03/24  6:39 AM   Result Value Ref Range    Sample Type Arterial Blood     Drawn by  lab     pH, Blood gas      pCO2, Blood gas      pO2, Blood gas      Calcium Level Ionized 1.06 (L) 1.12 - 1.23 mmol/L   Comprehensive Metabolic Panel    Collection Time: 03/04/24  4:57 AM   Result Value Ref Range    Sodium Level 143 136 - 145 mmol/L    Potassium Level 3.8 3.5 - 5.1 mmol/L    Chloride 106 98 - 107 mmol/L    Carbon Dioxide 30 (H) 22 - 29 mmol/L    Glucose Level 90 74 - 100 mg/dL    Blood Urea Nitrogen 18.1 7.0 - 18.7 mg/dL    Creatinine 0.86 0.55 - 1.02 mg/dL    Calcium Level Total 8.8 8.4 - 10.2 mg/dL    Protein Total 5.3 (L) 6.4 - 8.3 gm/dL    Albumin Level 3.5 3.5 - 5.0 g/dL    Globulin 1.8 (L) 2.4 - 3.5 gm/dL    Albumin/Globulin Ratio 1.9 1.1 - 2.0 ratio    Bilirubin Total 0.5 <=1.5 mg/dL    Alkaline Phosphatase 86 40 - 150 unit/L    Alanine Aminotransferase 26 0 - 55 unit/L    Aspartate Aminotransferase 20 5 - 34 unit/L    eGFR >60 mls/min/1.73/m2   Fibrinogen    Collection Time: 03/04/24  4:57 AM   Result Value Ref Range    Fibrinogen 204 (L) 210 - 463 mg/dL   Lactate Dehydrogenase    Collection Time: 03/04/24  4:57 AM   Result Value Ref Range    Lactate Dehydrogenase 240 (H) 125 - 220 U/L   Magnesium    Collection Time: 03/04/24  4:57 AM   Result Value Ref Range    Magnesium Level 2.20 1.60 - 2.60 mg/dL   Protime-INR    Collection Time: 03/04/24  4:57 AM   Result Value Ref Range    PT 13.4 12.5 - 14.5 seconds    INR 1.0 <=1.3   CBC with Differential    Collection Time: 03/04/24  4:57 AM   Result Value Ref Range    WBC 12.90 (H) 4.50 - 11.50 x10(3)/mcL    RBC 3.99 (L) 4.20 - 5.40 x10(6)/mcL    Hgb 9.0 (L) 12.0 - 16.0 g/dL    Hct 28.8 (L) 37.0 - 47.0 %    MCV 72.2 (L) 80.0 - 94.0 fL    MCH 22.6 (L) 27.0 - 31.0 pg    MCHC 31.3 (L) 33.0 - 36.0 g/dL    RDW 20.2 (H) 11.5 - 17.0 %    Platelet 315 130 - 400 x10(3)/mcL    MPV 9.7 7.4 - 10.4 fL    Neut % 66.6 %    Lymph % 22.3 %    Mono % 8.5 %    Eos % 0.2 %    Basophil % 0.2 %    Lymph # 2.88 0.6 - 4.6 x10(3)/mcL    Neut # 8.58 2.1 - 9.2 x10(3)/mcL     Mono # 1.10 0.1 - 1.3 x10(3)/mcL    Eos # 0.03 0 - 0.9 x10(3)/mcL    Baso # 0.02 <=0.2 x10(3)/mcL    IG# 0.29 (H) 0 - 0.04 x10(3)/mcL    IG% 2.2 %    NRBC% 0.6 %   POCT ARTERIAL BLOOD GAS    Collection Time: 03/04/24  5:10 AM   Result Value Ref Range    POC Ionized Calcium 1.12 1.12 - 1.23 mmol/l    POC Temp 37.0 °C    Specimen source Arterial sample    Prepare Fresh Frozen Plasma 8 Units    Collection Time: 03/04/24 12:22 PM   Result Value Ref Range    UNIT NUMBER [id number]     UNIT ABO/RH O POS     DISPENSE STATUS Transfused     Unit Expiration [id number]     Product Code B4818P18     Unit Blood Type Code 5100     CROSSMATCH INTERPRETATION Not required     UNIT NUMBER [id number]     UNIT ABO/RH O POS     DISPENSE STATUS Transfused     Unit Expiration [id number]     Product Code K1654I45     Unit Blood Type Code 5100     CROSSMATCH INTERPRETATION Not required     UNIT NUMBER [id number]     UNIT ABO/RH O POS     DISPENSE STATUS Transfused     Unit Expiration [id number]     Product Code P8625L79     Unit Blood Type Code 5100     CROSSMATCH INTERPRETATION Not required     UNIT NUMBER [id number]     UNIT ABO/RH O POS     DISPENSE STATUS Transfused     Unit Expiration [id number]     Product Code W4685G76     Unit Blood Type Code 5100     CROSSMATCH INTERPRETATION Not required     UNIT NUMBER [id number]     UNIT ABO/RH O POS     DISPENSE STATUS Transfused     Unit Expiration [id number]     Product Code X4872P67     Unit Blood Type Code 5100     CROSSMATCH INTERPRETATION Not required     UNIT NUMBER [id number]     UNIT ABO/RH O POS     DISPENSE STATUS Transfused     Unit Expiration 202403092359     Product Code C3603T42     Unit Blood Type Code 5100     CROSSMATCH INTERPRETATION Not required     UNIT NUMBER [id number]     UNIT ABO/RH O NEG     DISPENSE STATUS Transfused     Unit Expiration 202403092359     Product Code Y7121J20     Unit Blood Type Code 9500     CROSSMATCH INTERPRETATION Not  required    Type & Screen    Collection Time: 03/04/24 12:22 PM   Result Value Ref Range    Group & Rh O POS     Indirect Kym GEL NEG     Specimen Outdate 03/07/2024 23:59    Comprehensive Metabolic Panel    Collection Time: 03/05/24  3:50 AM   Result Value Ref Range    Sodium Level 143 136 - 145 mmol/L    Potassium Level 3.8 3.5 - 5.1 mmol/L    Chloride 109 (H) 98 - 107 mmol/L    Carbon Dioxide 30 (H) 22 - 29 mmol/L    Glucose Level 98 74 - 100 mg/dL    Blood Urea Nitrogen 19.5 (H) 7.0 - 18.7 mg/dL    Creatinine 0.85 0.55 - 1.02 mg/dL    Calcium Level Total 8.2 (L) 8.4 - 10.2 mg/dL    Protein Total 4.9 (L) 6.4 - 8.3 gm/dL    Albumin Level 3.5 3.5 - 5.0 g/dL    Globulin 1.4 (L) 2.4 - 3.5 gm/dL    Albumin/Globulin Ratio 2.5 (H) 1.1 - 2.0 ratio    Bilirubin Total 0.4 <=1.5 mg/dL    Alkaline Phosphatase 80 40 - 150 unit/L    Alanine Aminotransferase 19 0 - 55 unit/L    Aspartate Aminotransferase 15 5 - 34 unit/L    eGFR >60 mls/min/1.73/m2   Fibrinogen    Collection Time: 03/05/24  3:50 AM   Result Value Ref Range    Fibrinogen 188 (L) 210 - 463 mg/dL   Lactate Dehydrogenase    Collection Time: 03/05/24  3:50 AM   Result Value Ref Range    Lactate Dehydrogenase 159 125 - 220 U/L   Magnesium    Collection Time: 03/05/24  3:50 AM   Result Value Ref Range    Magnesium Level 2.00 1.60 - 2.60 mg/dL   Protime-INR    Collection Time: 03/05/24  3:50 AM   Result Value Ref Range    PT 14.9 (H) 12.5 - 14.5 seconds    INR 1.2 <=1.3   [id number] Evaluation    Collection Time: 03/05/24  3:50 AM   Result Value Ref Range    [id number] Activity Assay 59 (L) >/=70 %    [id number] Interpretation SEE COMMENTS    CBC with Differential    Collection Time: 03/05/24  3:50 AM   Result Value Ref Range    WBC 13.10 (H) 4.50 - 11.50 x10(3)/mcL    RBC 4.05 (L) 4.20 - 5.40 x10(6)/mcL    Hgb 9.1 (L) 12.0 - 16.0 g/dL    Hct 30.3 (L) 37.0 - 47.0 %    MCV 74.8 (L) 80.0 - 94.0 fL    MCH 22.5 (L) 27.0 - 31.0 pg    MCHC 30.0 (L) 33.0 - 36.0 g/dL    RDW 20.4  (H) 11.5 - 17.0 %    Platelet 335 130 - 400 x10(3)/mcL    MPV 9.5 7.4 - 10.4 fL    Neut % 67.2 %    Lymph % 20.9 %    Mono % 10.0 %    Eos % 0.3 %    Basophil % 0.2 %    Lymph # 2.74 0.6 - 4.6 x10(3)/mcL    Neut # 8.80 2.1 - 9.2 x10(3)/mcL    Mono # 1.31 (H) 0.1 - 1.3 x10(3)/mcL    Eos # 0.04 0 - 0.9 x10(3)/mcL    Baso # 0.03 <=0.2 x10(3)/mcL    IG# 0.18 (H) 0 - 0.04 x10(3)/mcL    IG% 1.4 %    NRBC% 0.6 %   POCT ARTERIAL BLOOD GAS    Collection Time: 03/05/24  3:56 AM   Result Value Ref Range    POC Ionized Calcium 1.08 (A) 1.12 - 1.23 mmol/l    POC Temp 37.0 °C    Specimen source Arterial sample    Fibrinogen    Collection Time: 03/06/24  4:16 AM   Result Value Ref Range    Fibrinogen 236 210 - 463 mg/dL   Hepatitis Panel, Acute    Collection Time: 03/06/24  4:16 AM   Result Value Ref Range    Hepatitis A IgM Nonreactive Nonreactive    Hepatitis B Core IgM Nonreactive Nonreactive    Hepatitis B Surface Antigen Nonreactive Nonreactive    Hep C Ab Interp Nonreactive Nonreactive   CBC with Differential    Collection Time: 03/06/24  4:16 AM   Result Value Ref Range    WBC 15.40 (H) 4.50 - 11.50 x10(3)/mcL    RBC 4.30 4.20 - 5.40 x10(6)/mcL    Hgb 9.9 (L) 12.0 - 16.0 g/dL    Hct 32.1 (L) 37.0 - 47.0 %    MCV 74.7 (L) 80.0 - 94.0 fL    MCH 23.0 (L) 27.0 - 31.0 pg    MCHC 30.8 (L) 33.0 - 36.0 g/dL    RDW 21.2 (H) 11.5 - 17.0 %    Platelet 379 130 - 400 x10(3)/mcL    MPV 9.6 7.4 - 10.4 fL    Neut % 68.7 %    Lymph % 21.5 %    Mono % 8.1 %    Eos % 0.3 %    Basophil % 0.2 %    Lymph # 3.31 0.6 - 4.6 x10(3)/mcL    Neut # 10.59 (H) 2.1 - 9.2 x10(3)/mcL    Mono # 1.24 0.1 - 1.3 x10(3)/mcL    Eos # 0.05 0 - 0.9 x10(3)/mcL    Baso # 0.03 <=0.2 x10(3)/mcL    IG# 0.18 (H) 0 - 0.04 x10(3)/mcL    IG% 1.2 %    NRBC% 0.5 %   Pathologist Interpretation    Collection Time: 03/06/24  4:16 AM   Result Value Ref Range    Pathology Review       Presence of hepatitis B surface antibody is indicative of recovery phase of hepatitis B infection  or previous hepatitis B immunization (September 2023).    Silvio Rosa M.D.      B-HCG    Collection Time: 03/07/24  7:24 AM   Result Value Ref Range    Beta Human Chorionic Gonadotropin Quantitative <2.42 <=5.00 mIU/mL   Lactate Dehydrogenase    Collection Time: 03/07/24  7:24 AM   Result Value Ref Range    Lactate Dehydrogenase 220 125 - 220 U/L   CBC with Differential    Collection Time: 03/07/24  8:04 AM   Result Value Ref Range    WBC 13.50 (H) 4.50 - 11.50 x10(3)/mcL    RBC 4.18 (L) 4.20 - 5.40 x10(6)/mcL    Hgb 9.5 (L) 12.0 - 16.0 g/dL    Hct 31.5 (L) 37.0 - 47.0 %    MCV 75.4 (L) 80.0 - 94.0 fL    MCH 22.7 (L) 27.0 - 31.0 pg    MCHC 30.2 (L) 33.0 - 36.0 g/dL    RDW 20.8 (H) 11.5 - 17.0 %    Platelet 366 130 - 400 x10(3)/mcL    MPV 9.3 7.4 - 10.4 fL    Neut % 62.0 %    Lymph % 28.7 %    Mono % 8.1 %    Eos % 0.4 %    Basophil % 0.1 %    Lymph # 3.88 0.6 - 4.6 x10(3)/mcL    Neut # 8.36 2.1 - 9.2 x10(3)/mcL    Mono # 1.10 0.1 - 1.3 x10(3)/mcL    Eos # 0.05 0 - 0.9 x10(3)/mcL    Baso # 0.02 <=0.2 x10(3)/mcL    IG# 0.09 (H) 0 - 0.04 x10(3)/mcL    IG% 0.7 %   Reticulocytes    Collection Time: 03/07/24  8:15 AM   Result Value Ref Range    Retic Cnt Auto 5.94 (H) 1.1 - 2.1 %    RET# 0.2368 (H) 0.016 - 0.078 x10e6/uL   Reticulocytes    Collection Time: 03/11/24  7:35 AM   Result Value Ref Range    Retic Cnt Auto 3.77 (H) 1.1 - 2.1 %    RET# 0.1666 (H) 0.016 - 0.078 x10e6/uL   Comprehensive Metabolic Panel    Collection Time: 03/11/24  7:35 AM   Result Value Ref Range    Sodium Level 145 136 - 145 mmol/L    Potassium Level 4.5 3.5 - 5.1 mmol/L    Chloride 108 (H) 98 - 107 mmol/L    Carbon Dioxide 27 22 - 29 mmol/L    Glucose Level 93 74 - 100 mg/dL    Blood Urea Nitrogen 18.5 9.8 - 20.1 mg/dL    Creatinine 0.87 0.55 - 1.02 mg/dL    Calcium Level Total 8.5 8.4 - 10.2 mg/dL    Protein Total 5.8 (L) 6.4 - 8.3 gm/dL    Albumin Level 3.6 3.5 - 5.0 g/dL    Globulin 2.2 (L) 2.4 - 3.5 gm/dL    Albumin/Globulin Ratio 1.6  1.1 - 2.0 ratio    Bilirubin Total 0.8 <=1.5 mg/dL    Alkaline Phosphatase 91 40 - 150 unit/L    Alanine Aminotransferase 20 0 - 55 unit/L    Aspartate Aminotransferase 14 5 - 34 unit/L    eGFR >60 mls/min/1.73/m2   Lactate Dehydrogenase    Collection Time: 03/11/24  7:35 AM   Result Value Ref Range    Lactate Dehydrogenase 253 (H) 125 - 220 U/L   [id number] Evaluation    Collection Time: 03/11/24  7:35 AM   Result Value Ref Range    [id number] Activity Assay 48 (L) >/=70 %    [id number] Interpretation SEE COMMENTS    CBC with Differential    Collection Time: 03/11/24  7:35 AM   Result Value Ref Range    WBC 5.39 4.50 - 11.50 x10(3)/mcL    RBC 4.46 4.20 - 5.40 x10(6)/mcL    Hgb 10.2 (L) 12.0 - 16.0 g/dL    Hct 33.7 (L) 37.0 - 47.0 %    MCV 75.6 (L) 80.0 - 94.0 fL    MCH 22.9 (L) 27.0 - 31.0 pg    MCHC 30.3 (L) 33.0 - 36.0 g/dL    RDW 20.3 (H) 11.5 - 17.0 %    Platelet 313 130 - 400 x10(3)/mcL    MPV 8.4 7.4 - 10.4 fL    Neut % 69.5 %    Lymph % 15.8 %    Mono % 13.0 %    Eos % 1.1 %    Basophil % 0.2 %    Lymph # 0.85 0.6 - 4.6 x10(3)/mcL    Neut # 3.75 2.1 - 9.2 x10(3)/mcL    Mono # 0.70 0.1 - 1.3 x10(3)/mcL    Eos # 0.06 0 - 0.9 x10(3)/mcL    Baso # 0.01 <=0.2 x10(3)/mcL    IG# 0.02 0 - 0.04 x10(3)/mcL    IG% 0.4 %   Comprehensive Metabolic Panel    Collection Time: 03/14/24  7:19 AM   Result Value Ref Range    Sodium Level 143 136 - 145 mmol/L    Potassium Level 4.1 3.5 - 5.1 mmol/L    Chloride 111 (H) 98 - 107 mmol/L    Carbon Dioxide 25 22 - 29 mmol/L    Glucose Level 89 74 - 100 mg/dL    Blood Urea Nitrogen 13.1 9.8 - 20.1 mg/dL    Creatinine 0.91 0.55 - 1.02 mg/dL    Calcium Level Total 8.5 8.4 - 10.2 mg/dL    Protein Total 5.9 (L) 6.4 - 8.3 gm/dL    Albumin Level 3.5 3.5 - 5.0 g/dL    Globulin 2.4 2.4 - 3.5 gm/dL    Albumin/Globulin Ratio 1.5 1.1 - 2.0 ratio    Bilirubin Total 0.5 <=1.5 mg/dL    Alkaline Phosphatase 98 40 - 150 unit/L    Alanine Aminotransferase 13 0 - 55 unit/L    Aspartate Aminotransferase 12 5  - 34 unit/L    eGFR >60 mls/min/1.73/m2   Lactate Dehydrogenase    Collection Time: 03/14/24  7:19 AM   Result Value Ref Range    Lactate Dehydrogenase 284 (H) 125 - 220 U/L   CBC with Differential    Collection Time: 03/14/24  7:19 AM   Result Value Ref Range    WBC 4.43 (L) 4.50 - 11.50 x10(3)/mcL    RBC 4.47 4.20 - 5.40 x10(6)/mcL    Hgb 10.2 (L) 12.0 - 16.0 g/dL    Hct 33.9 (L) 37.0 - 47.0 %    MCV 75.8 (L) 80.0 - 94.0 fL    MCH 22.8 (L) 27.0 - 31.0 pg    MCHC 30.1 (L) 33.0 - 36.0 g/dL    RDW 20.1 (H) 11.5 - 17.0 %    Platelet 302 130 - 400 x10(3)/mcL    MPV 8.7 7.4 - 10.4 fL    Neut % 63.7 %    Lymph % 19.9 %    Mono % 13.5 %    Eos % 2.0 %    Basophil % 0.7 %    Lymph # 0.88 0.6 - 4.6 x10(3)/mcL    Neut # 2.82 2.1 - 9.2 x10(3)/mcL    Mono # 0.60 0.1 - 1.3 x10(3)/mcL    Eos # 0.09 0 - 0.9 x10(3)/mcL    Baso # 0.03 <=0.2 x10(3)/mcL    IG# 0.01 0 - 0.04 x10(3)/mcL    IG% 0.2 %       Assessment:   TTP  Microangiopathic hemolytic anemia  RA  Chronic migraines  Steroid induced gastritis        Plan:   Continue Rituxan weekly.  Week #2 today.  Continue to taper steroids by 10-20 mg per week as tolerated.  Carafate 1g TID.  GI evaluation upcoming.  Continue PPI.  Okay to resume Emgality.  Weekly CMP, LDH, [id number].    CBC twice weekly.  Follow-up in 2 weeks, or sooner if needed.  Outside records reviewed in preparation for the visit and in order to prepare and compose this chart.    All questions answered to the satisfaction of the patient.    JIMENA CHUNG, FNP-C  Hematology/Oncology  [facility]

## 2024-03-14 NOTE — PATIENT INSTRUCTIONS
Carafate suspension to  at [facility].  Take 3 times daily, about 30 min before meals

## 2024-03-18 ENCOUNTER — LAB VISIT (OUTPATIENT)
Dept: LAB | Facility: HOSPITAL | Age: 49
End: 2024-03-18
Attending: INTERNAL MEDICINE
Payer: MEDICAID

## 2024-03-18 DIAGNOSIS — M31.19 TTP (THROMBOTIC THROMBOCYTOPENIC PURPURA): ICD-10-CM

## 2024-03-18 LAB
ALBUMIN SERPL-MCNC: 3.7 G/DL (ref 3.5–5)
ALBUMIN/GLOB SERPL: 1.5 RATIO (ref 1.1–2)
ALP SERPL-CCNC: 106 UNIT/L (ref 40–150)
ALT SERPL-CCNC: 9 UNIT/L (ref 0–55)
AST SERPL-CCNC: 11 UNIT/L (ref 5–34)
BASOPHILS # BLD AUTO: 0.02 X10(3)/MCL
BASOPHILS NFR BLD AUTO: 0.5 %
BILIRUB SERPL-MCNC: 0.8 MG/DL
BUN SERPL-MCNC: 12.7 MG/DL (ref 9.8–20.1)
CALCIUM SERPL-MCNC: 8.8 MG/DL (ref 8.4–10.2)
CHLORIDE SERPL-SCNC: 109 MMOL/L (ref 98–107)
CO2 SERPL-SCNC: 23 MMOL/L (ref 22–29)
CREAT SERPL-MCNC: 0.8 MG/DL (ref 0.55–1.02)
EOSINOPHIL # BLD AUTO: 0.08 X10(3)/MCL (ref 0–0.9)
EOSINOPHIL NFR BLD AUTO: 2.1 %
ERYTHROCYTE [DISTWIDTH] IN BLOOD BY AUTOMATED COUNT: 19.4 % (ref 11.5–17)
GFR SERPLBLD CREATININE-BSD FMLA CKD-EPI: >60 MLS/MIN/1.73/M2
GLOBULIN SER-MCNC: 2.5 GM/DL (ref 2.4–3.5)
GLUCOSE SERPL-MCNC: 86 MG/DL (ref 74–100)
HCT VFR BLD AUTO: 36.2 % (ref 37–47)
HGB BLD-MCNC: 11.2 G/DL (ref 12–16)
IMM GRANULOCYTES # BLD AUTO: 0.01 X10(3)/MCL (ref 0–0.04)
IMM GRANULOCYTES NFR BLD AUTO: 0.3 %
LDH SERPL-CCNC: 193 U/L (ref 125–220)
LYMPHOCYTES # BLD AUTO: 0.88 X10(3)/MCL (ref 0.6–4.6)
LYMPHOCYTES NFR BLD AUTO: 23.2 %
MCH RBC QN AUTO: 22.9 PG (ref 27–31)
MCHC RBC AUTO-ENTMCNC: 30.9 G/DL (ref 33–36)
MCV RBC AUTO: 74 FL (ref 80–94)
MONOCYTES # BLD AUTO: 0.45 X10(3)/MCL (ref 0.1–1.3)
MONOCYTES NFR BLD AUTO: 11.9 %
NEUTROPHILS # BLD AUTO: 2.35 X10(3)/MCL (ref 2.1–9.2)
NEUTROPHILS NFR BLD AUTO: 62 %
PLATELET # BLD AUTO: 289 X10(3)/MCL (ref 130–400)
PMV BLD AUTO: 9.1 FL (ref 7.4–10.4)
POTASSIUM SERPL-SCNC: 3.7 MMOL/L (ref 3.5–5.1)
PROT SERPL-MCNC: 6.2 GM/DL (ref 6.4–8.3)
RBC # BLD AUTO: 4.89 X10(6)/MCL (ref 4.2–5.4)
SODIUM SERPL-SCNC: 141 MMOL/L (ref 136–145)
WBC # SPEC AUTO: 3.79 X10(3)/MCL (ref 4.5–11.5)

## 2024-03-18 PROCEDURE — 85397 CLOTTING FUNCT ACTIVITY: CPT

## 2024-03-18 PROCEDURE — 36415 COLL VENOUS BLD VENIPUNCTURE: CPT

## 2024-03-18 PROCEDURE — 80053 COMPREHEN METABOLIC PANEL: CPT

## 2024-03-18 PROCEDURE — 85025 COMPLETE CBC W/AUTO DIFF WBC: CPT

## 2024-03-18 PROCEDURE — 83615 LACTATE (LD) (LDH) ENZYME: CPT

## 2024-03-19 ENCOUNTER — OFFICE VISIT (OUTPATIENT)
Dept: FAMILY MEDICINE | Facility: CLINIC | Age: 49
End: 2024-03-19
Payer: MEDICAID

## 2024-03-19 VITALS
DIASTOLIC BLOOD PRESSURE: 88 MMHG | HEIGHT: 66 IN | RESPIRATION RATE: 18 BRPM | WEIGHT: 247 LBS | HEART RATE: 87 BPM | BODY MASS INDEX: 39.7 KG/M2 | TEMPERATURE: 98 F | OXYGEN SATURATION: 99 % | SYSTOLIC BLOOD PRESSURE: 130 MMHG

## 2024-03-19 DIAGNOSIS — R06.02 SHORTNESS OF BREATH: Primary | ICD-10-CM

## 2024-03-19 DIAGNOSIS — M31.19 TTP (THROMBOTIC THROMBOCYTOPENIC PURPURA): ICD-10-CM

## 2024-03-19 LAB
COAGULATION SPECIALIST REVIEW: ABNORMAL
VWF CP ACT/NOR PPP CHRO: 64 %

## 2024-03-19 PROCEDURE — 1159F MED LIST DOCD IN RCRD: CPT | Mod: CPTII,,, | Performed by: NURSE PRACTITIONER

## 2024-03-19 PROCEDURE — 1111F DSCHRG MED/CURRENT MED MERGE: CPT | Mod: CPTII,,, | Performed by: NURSE PRACTITIONER

## 2024-03-19 PROCEDURE — 3079F DIAST BP 80-89 MM HG: CPT | Mod: CPTII,,, | Performed by: NURSE PRACTITIONER

## 2024-03-19 PROCEDURE — 3075F SYST BP GE 130 - 139MM HG: CPT | Mod: CPTII,,, | Performed by: NURSE PRACTITIONER

## 2024-03-19 PROCEDURE — 1160F RVW MEDS BY RX/DR IN RCRD: CPT | Mod: CPTII,,, | Performed by: NURSE PRACTITIONER

## 2024-03-19 PROCEDURE — 99215 OFFICE O/P EST HI 40 MIN: CPT | Mod: PBBFAC | Performed by: NURSE PRACTITIONER

## 2024-03-19 PROCEDURE — 4010F ACE/ARB THERAPY RXD/TAKEN: CPT | Mod: CPTII,,, | Performed by: NURSE PRACTITIONER

## 2024-03-19 PROCEDURE — 3008F BODY MASS INDEX DOCD: CPT | Mod: CPTII,,, | Performed by: NURSE PRACTITIONER

## 2024-03-19 PROCEDURE — 99214 OFFICE O/P EST MOD 30 MIN: CPT | Mod: S$PBB,,, | Performed by: NURSE PRACTITIONER

## 2024-03-19 RX ORDER — TERCONAZOLE 4 MG/G
1 CREAM VAGINAL NIGHTLY
COMMUNITY
Start: 2024-02-08 | End: 2024-04-19

## 2024-03-19 RX ORDER — METRONIDAZOLE 7.5 MG/G
1 GEL VAGINAL NIGHTLY
COMMUNITY
Start: 2024-02-08 | End: 2024-03-19 | Stop reason: ALTCHOICE

## 2024-03-19 NOTE — PROGRESS NOTES
Patient Name: Nicole Brown   : 1975  MRN: [id number]     SUBJECTIVE DATA:    CHIEF COMPLAINT:   Nicole Brown is a 49 y.o. 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    Chart reviewed at bedside with patient:  She presented to the ER 24 with low-grade fever, nausea/vomiting and abdominal discomfort.  She denied overt diarrhea.  Laboratory on presentation showed a platelet count of 5000.  Hemoglobin was 12.4 with microcytic, hypochromic indices and WBC 6.4.  LDH was elevated at 711 and haptoglobin was undetectable.  UPT was negative.  CMP showed mild renal insufficiency with a BUN of 24.9 and creatinine 1.36.  Peripheral smear showed microcytic, hypochromic red blood cells, increased reticulocytes and the presence of schistocytes and target cells.  Platelets were markedly decreased.  She was transferred to [facility] for initiation of plasmapheresis.  She received 1 unit of platelets at the outside facility prior to transfer.   Hospital Course  24:  Started daily plasma exchange 1 plasma volume (40 cc/kg) exchange with FFP.  Platelet count 12,000. ADAMTS-13 level drawn 24 <5%.  24:  Day 2 plasma exchange.  Change to 1/2 albumin + 1/2 FFP secondary to significant urticaria.  24: Day 3 plasma exchange.  1/2 albumin + 1/2 FFP.  24: Day 4 plasma exchange.  1/2 albumin + 1/2 FFP  2024:  Day 5 plasma exchange.  1/2 albumin + 1/2 FFP  24: Discharged from the hospital     Systemic therapy was recommended with Rituxan weekly x4.  Last infusion will be next Thursday.    Curently on prednisone Taper.   Patient tolerating treatment .  Patient only report change in taste.  Discussed with patient possibly from medications.  We will continue to monitor.      Short of breath:  Patient was referred to complete  "pulmonary function test by her rheumatologist.  Patient was not able to complete because order was missing.  Previously patient state chronic cough since her COVID-19 infection from 2 years ago.  Patient state she had pulmonary function test that was completed years back by her previous PCP before moving to [place].  Patient was prescribed Proventil nebs as needed for cough and Dulera 100-5 mcg 1 puff twice a day.  Patient state she is benefitting from treatment.  Patient requesting pulmonary function test because of symptoms as well her rheumatologist had referred her to complete testing but for unknown reason the Order went missing.          ALLERGIES:   Review of patient's allergies indicates:   Allergen Reactions    Nsaids (non-steroidal anti-inflammatory drug) Other (See Comments)     Causes ulcers to bleed.    Ibuprofen     Latex     Meloxicam     Nsaids (non-steroidal anti-inflammatory drug) Nausea Only         ROS:  Review of Systems   Respiratory:  Positive for shortness of breath.    All other systems reviewed and are negative.        OBJECTIVE DATA:  Vital signs  Vitals:    03/19/24 1027   BP: 130/88   Pulse: 87   Resp: 18   Temp: 97.9 °F (36.6 °C)   TempSrc: Oral   SpO2: 99%   Weight: 112 kg (247 lb)   Height: 5' 6" (1.676 m)      Body mass index is 39.87 kg/m².    PHYSICAL EXAM:   Physical Exam  Vitals and nursing note reviewed.   Constitutional:       General: She is awake. She is not in acute distress.     Appearance: Normal appearance. She is well-developed and well-groomed. She is obese. She is not ill-appearing, toxic-appearing or diaphoretic.   HENT:      Head: Normocephalic and atraumatic.      Right Ear: Tympanic membrane, ear canal and external ear normal.      Left Ear: Tympanic membrane, ear canal and external ear normal.      Nose: Nose normal.      Mouth/Throat:      Mouth: Mucous membranes are moist.      Pharynx: Oropharynx is clear. Uvula midline.   Eyes:      General: Lids are normal. " No scleral icterus.     Extraocular Movements: Extraocular movements intact.      Conjunctiva/sclera: Conjunctivae normal.      Pupils: Pupils are equal, round, and reactive to light.   Neck:      Trachea: Trachea and phonation normal.   Cardiovascular:      Rate and Rhythm: Normal rate and regular rhythm.      Pulses: Normal pulses.           Radial pulses are 2+ on the right side and 2+ on the left side.      Heart sounds: Normal heart sounds. No murmur heard.  Pulmonary:      Effort: Pulmonary effort is normal.      Breath sounds: Normal breath sounds and air entry. No wheezing or rhonchi.   Abdominal:      Palpations: Abdomen is soft.      Tenderness: There is no abdominal tenderness.   Musculoskeletal:         General: Normal range of motion.      Cervical back: Normal range of motion and neck supple. No rigidity or tenderness.   Lymphadenopathy:      Cervical: No cervical adenopathy.   Skin:     General: Skin is warm and dry.      Capillary Refill: Capillary refill takes less than 2 seconds.      Findings: No rash.   Neurological:      General: No focal deficit present.      Mental Status: She is alert and oriented to person, place, and time. Mental status is at baseline.      GCS: GCS eye subscore is 4. GCS verbal subscore is 5. GCS motor subscore is 6.      Cranial Nerves: No cranial nerve deficit.      Sensory: No sensory deficit.      Motor: No weakness.      Coordination: Coordination normal.      Gait: Gait normal.   Psychiatric:         Attention and Perception: Attention and perception normal.         Mood and Affect: Mood and affect normal.         Speech: Speech normal.         Behavior: Behavior normal. Behavior is cooperative.         Thought Content: Thought content normal.         Cognition and Memory: Cognition and memory normal.         Judgment: Judgment normal.          ASSESSMENT/PLAN:  1. Shortness of breath  Assessment & Plan:   Patient was referred to complete pulmonary function test by  her rheumatologist.  Patient was not able to complete because order was missing.  Previously patient state chronic cough since her COVID-19 infection from 2 years ago.  Patient state she had pulmonary function test that was completed years back by her previous PCP before moving to [place].  Patient was prescribed Proventil nebs as needed for cough and Dulera 100-5 mcg 1 puff twice a day.  Patient state she is benefitting from treatment.  Patient requesting pulmonary function test because of symptoms as well her rheumatologist had referred her to complete testing but for unknown reason the Order went missing.    Orders:  -     Complete PFT w/ bronchodilator; Future    2. TTP (thrombotic thrombocytopenic purpura)  Assessment & Plan:  Continue Rituxan weekly x4.  Last infusion will be next Thursday.    Continue prednisone Taper as directed:   80 mg, Daily Starting Thu 3/7/2024, For 4 days, THEN 60 mg, Daily Starting Mon 3/11/2024, For 4 days, THEN 40 mg, Daily Starting Fri 3/15/2024, For 4 days, THEN 20 mg, Daily Starting Tue 3/19/2024, For 4 days, THEN 10 mg, Daily Starting Sat 3/23/2024, For 10 days   Patient tolerating treatment .  Patient only report change in taste.  Discussed with patient possibly from medications.  We will continue to monitor.             RESULTS:  Recent Results (from the past 1008 hour(s))   Iron and TIBC    Collection Time: 02/08/24  1:45 PM   Result Value Ref Range    Iron Binding Capacity Unsaturated 207 70 - 310 ug/dL    Iron Level 49 (L) 50 - 170 ug/dL    Transferrin 235 180 - 382 mg/dL    Iron Binding Capacity Total 256 250 - 450 ug/dL    Iron Saturation 19 (L) 20 - 50 %   Ferritin    Collection Time: 02/08/24  1:45 PM   Result Value Ref Range    Ferritin Level 72.08 4.63 - 204.00 ng/mL   Vitamin B12    Collection Time: 02/08/24  1:45 PM   Result Value Ref Range    Vitamin B12 Level 607 213 - 816 pg/mL   Folate    Collection Time: 02/08/24  1:45 PM   Result Value Ref Range    Folate  Level 7.8 7.0 - 31.4 ng/mL   Reticulocytes    Collection Time: 02/08/24  1:45 PM   Result Value Ref Range    Retic Cnt Auto 1.62 1.1 - 2.1 %    RET# 0.0938 (H) 0.016 - 0.078 x10e6/uL   Urinalysis, Reflex to Urine Culture    Collection Time: 02/25/24 11:50 AM    Specimen: Urine, Clean Catch   Result Value Ref Range    Color, UA Dark-Orange (A) Yellow, Light-Yellow, Dark Yellow, Abby, Straw    Appearance, UA Turbid (A) Clear    Specific Gravity, UA 1.029 1.005 - 1.030    pH, UA 6.0 5.0 - 8.5    Protein, UA 4+ (A) Negative    Glucose, UA Trace (A) Negative, Normal    Ketones, UA Negative Negative    Blood, UA 3+ (A) Negative    Bilirubin, UA 1+ (A) Negative    Nitrites, UA Negative Negative    Leukocyte Esterase, UA Negative Negative    WBC, UA 11-20 (A) None Seen, 0-2, 3-5, 0-5 /HPF    Bacteria, UA Moderate (A) None Seen /HPF    Squamous Epithelial Cells, UA Moderate (A) None Seen /HPF    Renal Epithelial Cells, UA Trace (A) None Seen /HPF    Mucous, UA Many (A) None Seen /LPF    Hyaline Casts, UA 3-5 (A) None Seen /lpf    RBC, UA 21-50 (A) None Seen, 0-2, 3-5, 0-5 /HPF   Urine culture    Collection Time: 02/25/24 11:50 AM    Specimen: Urine, Clean Catch   Result Value Ref Range    Urine Culture Less than 10,000 colonies/ml Proteus mirabilis (A)    Comprehensive metabolic panel    Collection Time: 02/25/24 12:01 PM   Result Value Ref Range    Sodium Level 142 136 - 145 mmol/L    Potassium Level 3.3 (L) 3.5 - 5.1 mmol/L    Chloride 110 (H) 98 - 107 mmol/L    Carbon Dioxide 22 22 - 29 mmol/L    Glucose Level 108 (H) 74 - 100 mg/dL    Blood Urea Nitrogen 24.9 (H) 7.0 - 18.7 mg/dL    Creatinine 1.36 (H) 0.55 - 1.02 mg/dL    Calcium Level Total 8.6 8.4 - 10.2 mg/dL    Protein Total 7.1 6.4 - 8.3 gm/dL    Albumin Level 3.3 (L) 3.5 - 5.0 g/dL    Globulin 3.8 (H) 2.4 - 3.5 gm/dL    Albumin/Globulin Ratio 0.9 (L) 1.1 - 2.0 ratio    Bilirubin Total 4.1 (H) <=1.5 mg/dL    Alkaline Phosphatase 117 40 - 150 unit/L    Alanine  Aminotransferase 10 0 - 55 unit/L    Aspartate Aminotransferase 25 5 - 34 unit/L    eGFR 48 mls/min/1.73/m2   Troponin I    Collection Time: 02/25/24 12:01 PM   Result Value Ref Range    Troponin-I 0.033 0.000 - 0.045 ng/mL   Lipase    Collection Time: 02/25/24 12:01 PM   Result Value Ref Range    Lipase Level 27 <=60 U/L   Bilirubin, Total and Direct    Collection Time: 02/25/24 12:01 PM   Result Value Ref Range    Bilirubin Total 4.1 (H) <=1.5 mg/dL    Bilirubin Direct 1.2 (H) 0.0 - <0.5 mg/dL    Bilirubin Indirect 2.90 (H) 0.00 - 0.80 mg/dL   Lactate Dehydrogenase    Collection Time: 02/25/24 12:01 PM   Result Value Ref Range    Lactate Dehydrogenase 711 (H) 125 - 220 U/L   ABORH RETYPE    Collection Time: 02/25/24 12:01 PM   Result Value Ref Range    ABORH Retype O POS    EKG 12-lead    Collection Time: 02/25/24 12:05 PM   Result Value Ref Range    QRS Duration 82 ms    [facility] QTC Calculation 455 ms   POCT urine pregnancy    Collection Time: 02/25/24 12:07 PM   Result Value Ref Range    POC Preg Test, Ur Negative Negative     Acceptable Yes    Light Blue Top Hold    Collection Time: 02/25/24 12:20 PM   Result Value Ref Range    Extra Tube Hold for add-ons.    Gold Top Hold    Collection Time: 02/25/24 12:20 PM   Result Value Ref Range    Extra Tube Hold for add-ons.    CBC with Differential    Collection Time: 02/25/24 12:47 PM   Result Value Ref Range    WBC 6.36 4.50 - 11.50 x10(3)/mcL    RBC 5.55 (H) 4.20 - 5.40 x10(6)/mcL    Hgb 12.4 12.0 - 16.0 g/dL    Hct 38.1 37.0 - 47.0 %    MCV 68.6 (L) 80.0 - 94.0 fL    MCH 22.3 (L) 27.0 - 31.0 pg    MCHC 32.5 (L) 33.0 - 36.0 g/dL    RDW 17.0 11.5 - 17.0 %    Platelet 5 (LL) 130 - 400 x10(3)/mcL    MPV      IPF 3.5 0.9 - 11.2 %    Neut % 59.7 %    Lymph % 22.0 %    Mono % 15.9 %    Eos % 1.4 %    Basophil % 0.5 %    Lymph # 1.40 0.6 - 4.6 x10(3)/mcL    Neut # 3.80 2.1 - 9.2 x10(3)/mcL    Mono # 1.01 0.1 - 1.3 x10(3)/mcL    Eos # 0.09 0 - 0.9 x10(3)/mcL     Baso # 0.03 <=0.2 x10(3)/mcL    IG# 0.03 0 - 0.04 x10(3)/mcL    IG% 0.5 %    NRBC% 0.3 %   Blood Smear Microscopic Exam    Collection Time: 02/25/24 12:47 PM   Result Value Ref Range    Anisocytosis Slight (A) (none)    Poikilocytosis Slight (A) (none)    Platelets Decreased (A) Normal, Adequate   Prepare Platelets 1 Dose    Collection Time: 02/25/24  1:12 PM   Result Value Ref Range    UNIT NUMBER [id number]     UNIT ABO/RH O POS     DISPENSE STATUS Transfused     Unit Expiration [id number]     Product Code C3023X64     Unit Blood Type Code 5100     CROSSMATCH INTERPRETATION Not required    Type & Screen    Collection Time: 02/25/24  1:25 PM   Result Value Ref Range    Group & Rh O POS     Indirect Kym GEL NEG     Specimen Outdate 02/28/2024 23:59    Prepare Platelets    Collection Time: 02/25/24  1:25 PM   Result Value Ref Range    UNIT NUMBER [id number]     UNIT ABO/RH A POS     DISPENSE STATUS Transfused     Unit Expiration [id number]     Product Code H1105Q47     Unit Blood Type Code 6200     CROSSMATCH INTERPRETATION Not required    Prepare Platelets 1 Dose    Collection Time: 02/25/24  1:25 PM   Result Value Ref Range    UNIT NUMBER [id number]     UNIT ABO/RH B POS     DISPENSE STATUS Released     Unit Expiration [id number]     Product Code U3048I46     Unit Blood Type Code 7300     CROSSMATCH INTERPRETATION Not required    Fibrinogen    Collection Time: 02/25/24  1:51 PM   Result Value Ref Range    Fibrinogen 573 (H) 210 - 463 mg/dL   Protime-INR    Collection Time: 02/25/24  1:52 PM   Result Value Ref Range    PT 14.0 11.4 - 14.0 seconds    INR 1.1 <=1.3   Blood culture #1 **CANNOT BE ORDERED STAT**    Collection Time: 02/25/24  2:25 PM    Specimen: Antecubital, Left; Blood   Result Value Ref Range    CULTURE, BLOOD ([facility]) No Growth at 5 days    Lactic acid, plasma    Collection Time: 02/25/24  2:26 PM   Result Value Ref Range    Lactic Acid Level 0.9 0.5 - 2.2 mmol/L   Blood culture #2  **CANNOT BE ORDERED STAT**    Collection Time: 02/25/24  2:26 PM    Specimen: Antecubital, Right; Blood   Result Value Ref Range    CULTURE, BLOOD ([facility]) No Growth at 5 days    Haptoglobin    Collection Time: 02/26/24  1:38 AM   Result Value Ref Range    Haptoglobin <8 (L) 35 - 250 mg/dL   Lactate Dehydrogenase    Collection Time: 02/26/24  1:38 AM   Result Value Ref Range    Lactate Dehydrogenase 778 (H) 125 - 220 U/L   D dimer, quantitative    Collection Time: 02/26/24  1:38 AM   Result Value Ref Range    D-Dimer 7.45 (H) 0.00 - 0.50 ug/mL FEU   Fibrinogen    Collection Time: 02/26/24  1:38 AM   Result Value Ref Range    Fibrinogen 507 (H) 210 - 463 mg/dL   Protime-INR    Collection Time: 02/26/24  1:38 AM   Result Value Ref Range    PT 14.1 (H) 11.4 - 14.0 seconds    INR 1.1 <=1.3   APTT    Collection Time: 02/26/24  1:38 AM   Result Value Ref Range    PTT 33.6 23.2 - 33.7 seconds   CBC with Differential    Collection Time: 02/26/24  1:38 AM   Result Value Ref Range    WBC 8.35 4.50 - 11.50 x10(3)/mcL    RBC 5.23 4.20 - 5.40 x10(6)/mcL    Hgb 11.5 (L) 12.0 - 16.0 g/dL    Hct 36.0 (L) 37.0 - 47.0 %    MCV 68.8 (L) 80.0 - 94.0 fL    MCH 22.0 (L) 27.0 - 31.0 pg    MCHC 31.9 (L) 33.0 - 36.0 g/dL    RDW 17.2 (H) 11.5 - 17.0 %    Platelet 10 (LL) 130 - 400 x10(3)/mcL    MPV      IPF 3.4 0.9 - 11.2 %    Neut % 86.1 %    Lymph % 9.2 %    Mono % 3.6 %    Eos % 0.5 %    Basophil % 0.2 %    Lymph # 0.77 0.6 - 4.6 x10(3)/mcL    Neut # 7.19 2.1 - 9.2 x10(3)/mcL    Mono # 0.30 0.1 - 1.3 x10(3)/mcL    Eos # 0.04 0 - 0.9 x10(3)/mcL    Baso # 0.02 <=0.2 x10(3)/mcL    IG# 0.03 0 - 0.04 x10(3)/mcL    IG% 0.4 %    NRBC% 0.0 %   COVID-19 Rapid Screening    Collection Time: 02/26/24  1:39 AM   Result Value Ref Range    SARS COV-2 MOLECULAR Negative Negative   Light Green Top Hold    Collection Time: 02/26/24  1:47 AM   Result Value Ref Range    Extra Tube Hold for add-ons.    Lavender Top Hold    Collection Time: 02/26/24  1:47 AM    Result Value Ref Range    Extra Tube Hold for add-ons.    Path Review, Peripheral Smear    Collection Time: 02/26/24  1:58 AM   Result Value Ref Range    Peripheral Smear Evaluation       RBC show microcytosis, hypochromasia, few schistocytes and target cells, increased reticulocytes.  WBC morphology is within normal limits.  No blasts or significant dysplasia is identified.  Few small platelets are identified.  Compatible with history of TTP. AYALA Soto MD     [id number] Evaluation    Collection Time: 02/26/24  5:15 AM   Result Value Ref Range    AICYAD99 Activity Assay <5 (L) >/=70 %    GLWLPY19 Interpretation SEE COMMENTS    IYYVIA21 Inhibitor Screen    Collection Time: 02/26/24  5:15 AM   Result Value Ref Range    ZJCCEW75 Inhibitor Screen Positive (H) Negative   ZKXTBP65 [place] Titer    Collection Time: 02/26/24  5:15 AM   Result Value Ref Range    RAQNRH08 Inhibitor [place] Titer 2.8 (H) <0.4   Light Blue Top Hold    Collection Time: 02/26/24  5:19 AM   Result Value Ref Range    Extra Tube Hold for add-ons.    Light Green Top Hold    Collection Time: 02/26/24  5:19 AM   Result Value Ref Range    Extra Tube Hold for add-ons.    Lavender Top Hold    Collection Time: 02/26/24  5:19 AM   Result Value Ref Range    Extra Tube Hold for add-ons.    Gold Top Hold    Collection Time: 02/26/24  5:19 AM   Result Value Ref Range    Extra Tube Hold for add-ons.    Comprehensive Metabolic Panel    Collection Time: 02/26/24  7:09 AM   Result Value Ref Range    Sodium Level 141 136 - 145 mmol/L    Potassium Level 3.7 3.5 - 5.1 mmol/L    Chloride 108 (H) 98 - 107 mmol/L    Carbon Dioxide 23 22 - 29 mmol/L    Glucose Level 122 (H) 74 - 100 mg/dL    Blood Urea Nitrogen 30.5 (H) 7.0 - 18.7 mg/dL    Creatinine 1.21 (H) 0.55 - 1.02 mg/dL    Calcium Level Total 8.8 8.4 - 10.2 mg/dL    Protein Total 7.0 6.4 - 8.3 gm/dL    Albumin Level 3.4 (L) 3.5 - 5.0 g/dL    Globulin 3.6 (H) 2.4 - 3.5 gm/dL    Albumin/Globulin Ratio 0.9 (L) 1.1  - 2.0 ratio    Bilirubin Total 3.1 (H) <=1.5 mg/dL    Alkaline Phosphatase 115 40 - 150 unit/L    Alanine Aminotransferase 14 0 - 55 unit/L    Aspartate Aminotransferase 28 5 - 34 unit/L    eGFR 55 mls/min/1.73/m2   CBC with Differential    Collection Time: 02/26/24  7:09 AM   Result Value Ref Range    WBC 8.47 4.50 - 11.50 x10(3)/mcL    RBC 5.13 4.20 - 5.40 x10(6)/mcL    Hgb 11.3 (L) 12.0 - 16.0 g/dL    Hct 35.5 (L) 37.0 - 47.0 %    MCV 69.2 (L) 80.0 - 94.0 fL    MCH 22.0 (L) 27.0 - 31.0 pg    MCHC 31.8 (L) 33.0 - 36.0 g/dL    RDW 17.4 (H) 11.5 - 17.0 %    Platelet 10 (LL) 130 - 400 x10(3)/mcL    MPV 0.0 (L) 7.4 - 10.4 fL    IPF 3.3 0.9 - 11.2 %    Neut % 85.8 %    Lymph % 9.4 %    Mono % 4.0 %    Eos % 0.2 %    Basophil % 0.2 %    Lymph # 0.80 0.6 - 4.6 x10(3)/mcL    Neut # 7.26 2.1 - 9.2 x10(3)/mcL    Mono # 0.34 0.1 - 1.3 x10(3)/mcL    Eos # 0.02 0 - 0.9 x10(3)/mcL    Baso # 0.02 <=0.2 x10(3)/mcL    IG# 0.03 0 - 0.04 x10(3)/mcL    IG% 0.4 %    NRBC% 0.2 %   Blood Smear Microscopic Exam    Collection Time: 02/26/24  7:09 AM   Result Value Ref Range    RBC Morph Normal Normal    Polychromasia Slight (A) (none)    Platelets Decreased (A) Normal, Adequate   CBC with Differential    Collection Time: 02/26/24  7:16 PM   Result Value Ref Range    WBC 5.88 4.50 - 11.50 x10(3)/mcL    RBC 4.41 4.20 - 5.40 x10(6)/mcL    Hgb 9.9 (L) 12.0 - 16.0 g/dL    Hct 30.4 (L) 37.0 - 47.0 %    MCV 68.9 (L) 80.0 - 94.0 fL    MCH 22.4 (L) 27.0 - 31.0 pg    MCHC 32.6 (L) 33.0 - 36.0 g/dL    RDW 17.5 (H) 11.5 - 17.0 %    Platelet 6 (LL) 130 - 400 x10(3)/mcL    MPV      IPF 4.1 0.9 - 11.2 %    Neut % 68.7 %    Lymph % 19.4 %    Mono % 11.1 %    Eos % 0.3 %    Basophil % 0.2 %    Lymph # 1.14 0.6 - 4.6 x10(3)/mcL    Neut # 4.04 2.1 - 9.2 x10(3)/mcL    Mono # 0.65 0.1 - 1.3 x10(3)/mcL    Eos # 0.02 0 - 0.9 x10(3)/mcL    Baso # 0.01 <=0.2 x10(3)/mcL    IG# 0.02 0 - 0.04 x10(3)/mcL    IG% 0.3 %    NRBC% 0.3 %   Comprehensive metabolic panel     Collection Time: 02/26/24  7:16 PM   Result Value Ref Range    Sodium Level 144 136 - 145 mmol/L    Potassium Level 3.5 3.5 - 5.1 mmol/L    Chloride 109 (H) 98 - 107 mmol/L    Carbon Dioxide 27 22 - 29 mmol/L    Glucose Level 110 (H) 74 - 100 mg/dL    Blood Urea Nitrogen 30.9 (H) 7.0 - 18.7 mg/dL    Creatinine 1.13 (H) 0.55 - 1.02 mg/dL    Calcium Level Total 8.4 8.4 - 10.2 mg/dL    Protein Total 6.5 6.4 - 8.3 gm/dL    Albumin Level 3.3 (L) 3.5 - 5.0 g/dL    Globulin 3.2 2.4 - 3.5 gm/dL    Albumin/Globulin Ratio 1.0 (L) 1.1 - 2.0 ratio    Bilirubin Total 3.2 (H) <=1.5 mg/dL    Alkaline Phosphatase 104 40 - 150 unit/L    Alanine Aminotransferase 12 0 - 55 unit/L    Aspartate Aminotransferase 26 5 - 34 unit/L    eGFR 60 mls/min/1.73/m2   Blood Smear Microscopic Exam    Collection Time: 02/26/24  7:16 PM   Result Value Ref Range    RBC Morph Abnormal (A) Normal    Anisocytosis 1+ (A) (none)    Hypochromasia 1+ (A) (none)    Microcytosis 1+ (A) (none)    Platelets Decreased (A) Normal, Adequate   APTT    Collection Time: 02/26/24 10:18 PM   Result Value Ref Range    PTT 30.5 23.2 - 33.7 seconds   Protime-INR    Collection Time: 02/26/24 10:18 PM   Result Value Ref Range    PT 14.0 12.5 - 14.5 seconds    INR 1.1 <=1.3   Fibrinogen    Collection Time: 02/26/24 10:18 PM   Result Value Ref Range    Fibrinogen 461 210 - 463 mg/dL   Type & Screen    Collection Time: 02/26/24 10:18 PM   Result Value Ref Range    Group & Rh O POS     Indirect Kym GEL NEG     Specimen Outdate 02/29/2024 23:59    Comprehensive Metabolic Panel    Collection Time: 02/26/24 10:18 PM   Result Value Ref Range    Sodium Level 142 136 - 145 mmol/L    Potassium Level 3.6 3.5 - 5.1 mmol/L    Chloride 110 (H) 98 - 107 mmol/L    Carbon Dioxide 23 22 - 29 mmol/L    Glucose Level 138 (H) 74 - 100 mg/dL    Blood Urea Nitrogen 29.9 (H) 7.0 - 18.7 mg/dL    Creatinine 1.09 (H) 0.55 - 1.02 mg/dL    Calcium Level Total 8.8 8.4 - 10.2 mg/dL    Protein Total 7.1  6.4 - 8.3 gm/dL    Albumin Level 3.5 3.5 - 5.0 g/dL    Globulin 3.6 (H) 2.4 - 3.5 gm/dL    Albumin/Globulin Ratio 1.0 (L) 1.1 - 2.0 ratio    Bilirubin Total 3.7 (H) <=1.5 mg/dL    Alkaline Phosphatase 114 40 - 150 unit/L    Alanine Aminotransferase 13 0 - 55 unit/L    Aspartate Aminotransferase 28 5 - 34 unit/L    eGFR >60 mls/min/1.73/m2   Hepatitis Panel, Acute    Collection Time: 02/26/24 10:18 PM   Result Value Ref Range    Hepatitis A IgM Nonreactive Nonreactive    Hepatitis B Core IgM Nonreactive Nonreactive    Hepatitis B Surface Antigen Nonreactive Nonreactive    Hep C Ab Interp Nonreactive Nonreactive   HIV 1/2 Ag/Ab (4th Gen)    Collection Time: 02/26/24 10:18 PM   Result Value Ref Range    HIV Nonreactive Nonreactive   Hepatitis B Core Antibody, Total    Collection Time: 02/26/24 10:18 PM   Result Value Ref Range    Hepatitis B Core Antibody Nonreactive Nonreactive   CBC with Differential    Collection Time: 02/26/24 10:18 PM   Result Value Ref Range    WBC 8.88 4.50 - 11.50 x10(3)/mcL    RBC 4.59 4.20 - 5.40 x10(6)/mcL    Hgb 10.4 (L) 12.0 - 16.0 g/dL    Hct 31.6 (L) 37.0 - 47.0 %    MCV 68.8 (L) 80.0 - 94.0 fL    MCH 22.7 (L) 27.0 - 31.0 pg    MCHC 32.9 (L) 33.0 - 36.0 g/dL    RDW 18.0 (H) 11.5 - 17.0 %    Platelet 24 (LL) 130 - 400 x10(3)/mcL    MPV      Neut % 88.4 %    Lymph % 6.8 %    Mono % 3.6 %    Eos % 0.1 %    Basophil % 0.2 %    Lymph # 0.60 0.6 - 4.6 x10(3)/mcL    Neut # 7.85 2.1 - 9.2 x10(3)/mcL    Mono # 0.32 0.1 - 1.3 x10(3)/mcL    Eos # 0.01 0 - 0.9 x10(3)/mcL    Baso # 0.02 <=0.2 x10(3)/mcL    IG# 0.08 (H) 0 - 0.04 x10(3)/mcL    IG% 0.9 %    NRBC% 0.5 %    IPF 4.3 0.9 - 11.2 %   Blood Smear Microscopic Exam    Collection Time: 02/26/24 10:18 PM   Result Value Ref Range    RBC Morph Abnormal (A) Normal    Anisocytosis 1+ (A) (none)    Macrocytosis 1+ (A) (none)    Microcytosis 1+ (A) (none)    Poikilocytosis 1+ (A) (none)    Schistocytes 1+ (A) (none)    Platelets Decreased (A) Normal,  Adequate   Path Review, Peripheral Smear    Collection Time: 02/26/24 10:18 PM   Result Value Ref Range    Peripheral Smear Evaluation       - Microcytic, hypochromic anemia with mildly increased schistocytes (5/10 hpf).  - Thrombocytopenia.    Impression: The patient's history of TTP is noted from the electronic medical record. Smear findings are suggestive of this disorder. Correlation with ADAMTS-13 is recommended for correlation.    Silvio Rosa M.D.    Prepare Fresh Frozen Plasma 2 Units    Collection Time: 02/26/24 10:18 PM   Result Value Ref Range    UNIT NUMBER [id number]     UNIT ABO/RH O POS     DISPENSE STATUS Released     Unit Expiration [id number]     Product Code [id number]     Unit Blood Type Code 5100     CROSSMATCH INTERPRETATION Not required     UNIT NUMBER [id number]     UNIT ABO/RH O POS     DISPENSE STATUS Transfused     Unit Expiration [id number]     Product Code M7374A48     Unit Blood Type Code 5100     CROSSMATCH INTERPRETATION Not required    Prepare Fresh Frozen Plasma 10 Units    Collection Time: 02/26/24 10:18 PM   Result Value Ref Range    UNIT NUMBER [id number]     UNIT ABO/RH O POS     DISPENSE STATUS Transfused     Unit Expiration [id number]     Product Code [id number]     Unit Blood Type Code 5100     CROSSMATCH INTERPRETATION Not required     UNIT NUMBER [id number]     UNIT ABO/RH O POS     DISPENSE STATUS Transfused     Unit Expiration [id number]     Product Code Q7956V89     Unit Blood Type Code 5100     CROSSMATCH INTERPRETATION Not required     UNIT NUMBER [id number]     UNIT ABO/RH O POS     DISPENSE STATUS Transfused     Unit Expiration [id number]     Product Code V1153C62     Unit Blood Type Code 5100     CROSSMATCH INTERPRETATION Not required     UNIT NUMBER [id number]     UNIT ABO/RH O POS     DISPENSE STATUS Transfused     Unit Expiration [id number]     Product Code E8877O91     Unit Blood Type Code 5100     CROSSMATCH INTERPRETATION Not  required     UNIT NUMBER [id number]     UNIT ABO/RH O POS     DISPENSE STATUS Transfused     Unit Expiration 202403032359     Product Code U9749R01     Unit Blood Type Code 5100     CROSSMATCH INTERPRETATION Not required     UNIT NUMBER [id number]     UNIT ABO/RH O NEG     DISPENSE STATUS Transfused     Unit Expiration [id number]     Product Code F5837O33     Unit Blood Type Code 9500     CROSSMATCH INTERPRETATION Not required     UNIT NUMBER [id number]     UNIT ABO/RH O POS     DISPENSE STATUS Transfused     Unit Expiration 202403032359     Product Code K7407W34     Unit Blood Type Code 5100     CROSSMATCH INTERPRETATION Not required     UNIT NUMBER [id number]     UNIT ABO/RH O NEG     DISPENSE STATUS Transfused     Unit Expiration 202403032359     Product Code H6398V65     Unit Blood Type Code 9500     CROSSMATCH INTERPRETATION Not required     UNIT NUMBER [id number]     UNIT ABO/RH O POS     DISPENSE STATUS Transfused     Unit Expiration 202403032359     Product Code M6206B22     Unit Blood Type Code 5100     CROSSMATCH INTERPRETATION Not required     UNIT NUMBER [id number]     UNIT ABO/RH O POS     DISPENSE STATUS Transfused     Unit Expiration 202403032359     Product Code T7595X58     Unit Blood Type Code 5100     CROSSMATCH INTERPRETATION Not required     UNIT NUMBER [id number]     UNIT ABO/RH O POS     DISPENSE STATUS Transfused     Unit Expiration 202403032359     Product Code P8166C26     Unit Blood Type Code 5100     CROSSMATCH INTERPRETATION Not required     UNIT NUMBER [id number]     UNIT ABO/RH O POS     DISPENSE STATUS Transfused     Unit Expiration 202403032359     Product Code P7522JC0     Unit Blood Type Code 5100     CROSSMATCH INTERPRETATION Not required     UNIT NUMBER [id number]     UNIT ABO/RH O POS     DISPENSE STATUS Released     Unit Expiration 202403032359     Product Code W8890U49     Unit Blood Type Code 5100     CROSSMATCH INTERPRETATION Not required     Pathologist Interpretation    Collection Time: 02/26/24 10:18 PM   Result Value Ref Range    Pathology Review       Presence of hepatitis B surface antibody is indicative of recovery phase of hepatitis B infection or previous hepatitis B immunization (September 2023).     Silvio Rosa M.D.     Prepare Fresh Frozen Plasma 8 Units    Collection Time: 02/26/24 10:18 PM   Result Value Ref Range    UNIT NUMBER [id number]     UNIT ABO/RH O POS     DISPENSE STATUS Transfused     Unit Expiration [id number]     Product Code V3400S82     Unit Blood Type Code 5100     CROSSMATCH INTERPRETATION Not required     UNIT NUMBER [id number]     UNIT ABO/RH O POS     DISPENSE STATUS Transfused     Unit Expiration 202403042359     Product Code G2549Z38     Unit Blood Type Code 5100     CROSSMATCH INTERPRETATION Not required     UNIT NUMBER [id number]     UNIT ABO/RH O POS     DISPENSE STATUS Transfused     Unit Expiration 202403042359     Product Code R8873X06     Unit Blood Type Code 5100     CROSSMATCH INTERPRETATION Not required     UNIT NUMBER [id number]     UNIT ABO/RH O POS     DISPENSE STATUS Transfused     Unit Expiration 202403042359     Product Code C4116W74     Unit Blood Type Code 5100     CROSSMATCH INTERPRETATION Not required     UNIT NUMBER [id number]     UNIT ABO/RH O NEG     DISPENSE STATUS Transfused     Unit Expiration 202403042359     Product Code C4406G34     Unit Blood Type Code 9500     CROSSMATCH INTERPRETATION Not required    Prepare Fresh Frozen Plasma 8 Units    Collection Time: 02/26/24 10:18 PM   Result Value Ref Range    UNIT NUMBER [id number]     UNIT ABO/RH O POS     DISPENSE STATUS Transfused     Unit Expiration 202403052359     Product Code D9185P71     Unit Blood Type Code 5100     CROSSMATCH INTERPRETATION Not required     UNIT NUMBER [id number]     UNIT ABO/RH O POS     DISPENSE STATUS Transfused     Unit Expiration 202403052359     Product Code V2779C23     Unit Blood  Type Code 5100     CROSSMATCH INTERPRETATION Not required     UNIT NUMBER [id number]     UNIT ABO/RH O POS     DISPENSE STATUS Transfused     Unit Expiration 202403052359     Product Code N5812O83     Unit Blood Type Code 5100     CROSSMATCH INTERPRETATION Not required     UNIT NUMBER [id number]     UNIT ABO/RH O POS     DISPENSE STATUS Transfused     Unit Expiration 202403052359     Product Code Y5225M00     Unit Blood Type Code 5100     CROSSMATCH INTERPRETATION Not required     UNIT NUMBER [id number]     UNIT ABO/RH O POS     DISPENSE STATUS Transfused     Unit Expiration 202403052359     Product Code Z9243GV2     Unit Blood Type Code 5100     CROSSMATCH INTERPRETATION Not required     UNIT NUMBER [id number]     UNIT ABO/RH O POS     DISPENSE STATUS Transfused     Unit Expiration 202403052359     Product Code M1398G52     Unit Blood Type Code 5100     CROSSMATCH INTERPRETATION Not required     UNIT NUMBER [id number]     UNIT ABO/RH O POS     DISPENSE STATUS Transfused     Unit Expiration 202403052359     Product Code X1422KB8     Unit Blood Type Code 5100     CROSSMATCH INTERPRETATION Not required     UNIT NUMBER [id number]     UNIT ABO/RH O POS     DISPENSE STATUS Transfused     Unit Expiration 202403052359     Product Code D9436G84     Unit Blood Type Code 5100     CROSSMATCH INTERPRETATION Not required    RHEUMATOID FACTOR IGA    Collection Time: 02/26/24 10:26 PM   Result Value Ref Range    RA IgA Quant 14 (H) <14 IU/mL   RHEUMATOID FACTOR IGM    Collection Time: 02/26/24 10:26 PM   Result Value Ref Range    RA IgM Quant 12 (H) <3.5 IU/mL   Rheumatoid Quantitative    Collection Time: 02/26/24 10:26 PM   Result Value Ref Range    Rheumatoid Factor Quantitative 21 <=30 IU/mL   CYCLIC CITRULLINATED PEPTIDE (CCP) ANTIBODY    Collection Time: 02/26/24 10:26 PM   Result Value Ref Range    CCP Quant 16 (H) <7 U/mL   ANTINUCLEAR ANTIBODIES COMPREHENSIVE PANEL    Collection Time: 02/26/24 10:27 PM    Result Value Ref Range    SETH Screen Negative Negative    DSDNA Ab Quant <0.6 <10.0 IU/mL    U1RNP Ab Quant 1.4 <5 U/mL    RNP70 Ab Quant <0.3 <7 U/mL    SSA(Ro) Ab Quant 0.9 <7 U/mL    SSB(La) Ab Quant <0.4 <7 U/mL    Centromere Ab Quant <0.4 <7 U/mL    SCL-70S Ab Quant <0.6 <7 U/mL    HIEU-1 Ab Quant <0.3 <7 U/mL    Navarro DP IgG Quant <0.7 <7 U/mL   SETH IgG by IFA    Collection Time: 02/26/24 10:27 PM   Result Value Ref Range    Antinuclear Ab, HEp-2 Substrate <1:80 (Negative) <1:80 (Negative)   C3 Complement    Collection Time: 02/26/24 10:27 PM   Result Value Ref Range    C3 Complement 151 80 - 173 mg/dL   C4 Complement    Collection Time: 02/26/24 10:27 PM   Result Value Ref Range    C4 Complement 34.0 13.0 - 46.0 mg/dL   Iron and TIBC    Collection Time: 02/26/24 10:27 PM   Result Value Ref Range    Iron Binding Capacity Unsaturated 47 (L) 70 - 310 ug/dL    Iron Level 206 (H) 50 - 170 ug/dL    Transferrin 216 180 - 382 mg/dL    Iron Binding Capacity Total 253 250 - 450 ug/dL    Iron Saturation 81 (H) 20 - 50 %   Ferritin    Collection Time: 02/26/24 10:27 PM   Result Value Ref Range    Ferritin Level 1,040.57 (H) 4.63 - 204.00 ng/mL   Vitamin B12    Collection Time: 02/26/24 10:27 PM   Result Value Ref Range    Vitamin B12 Level 524 213 - 816 pg/mL   Folate    Collection Time: 02/26/24 10:27 PM   Result Value Ref Range    Folate Level 9.0 7.0 - 31.4 ng/mL   Comprehensive Metabolic Panel    Collection Time: 02/27/24  3:50 AM   Result Value Ref Range    Sodium Level 141 136 - 145 mmol/L    Potassium Level 3.6 3.5 - 5.1 mmol/L    Chloride 109 (H) 98 - 107 mmol/L    Carbon Dioxide 24 22 - 29 mmol/L    Glucose Level 134 (H) 74 - 100 mg/dL    Blood Urea Nitrogen 27.6 (H) 7.0 - 18.7 mg/dL    Creatinine 0.89 0.55 - 1.02 mg/dL    Calcium Level Total 8.6 8.4 - 10.2 mg/dL    Protein Total 6.5 6.4 - 8.3 gm/dL    Albumin Level 3.6 3.5 - 5.0 g/dL    Globulin 2.9 2.4 - 3.5 gm/dL    Albumin/Globulin Ratio 1.2 1.1 - 2.0 ratio     Bilirubin Total 3.2 (H) <=1.5 mg/dL    Alkaline Phosphatase 108 40 - 150 unit/L    Alanine Aminotransferase 14 0 - 55 unit/L    Aspartate Aminotransferase 27 5 - 34 unit/L    eGFR >60 mls/min/1.73/m2   Magnesium    Collection Time: 02/27/24  3:50 AM   Result Value Ref Range    Magnesium Level 2.30 1.60 - 2.60 mg/dL   Phosphorus    Collection Time: 02/27/24  3:50 AM   Result Value Ref Range    Phosphorus Level 2.9 2.3 - 4.7 mg/dL   Haptoglobin    Collection Time: 02/27/24  3:50 AM   Result Value Ref Range    Haptoglobin <8 (L) 35 - 250 mg/dL   Lactate Dehydrogenase    Collection Time: 02/27/24  3:50 AM   Result Value Ref Range    Lactate Dehydrogenase 871 (H) 125 - 220 U/L   CBC with Differential    Collection Time: 02/27/24  3:50 AM   Result Value Ref Range    WBC 7.37 4.50 - 11.50 x10(3)/mcL    RBC 4.40 4.20 - 5.40 x10(6)/mcL    Hgb 9.7 (L) 12.0 - 16.0 g/dL    Hct 29.7 (L) 37.0 - 47.0 %    MCV 67.5 (L) 80.0 - 94.0 fL    MCH 22.0 (L) 27.0 - 31.0 pg    MCHC 32.7 (L) 33.0 - 36.0 g/dL    RDW 18.2 (H) 11.5 - 17.0 %    Platelet 12 (LL) 130 - 400 x10(3)/mcL    MPV      Neut % 87.0 %    Lymph % 9.1 %    Mono % 2.7 %    Eos % 0.0 %    Basophil % 0.3 %    Lymph # 0.67 0.6 - 4.6 x10(3)/mcL    Neut # 6.41 2.1 - 9.2 x10(3)/mcL    Mono # 0.20 0.1 - 1.3 x10(3)/mcL    Eos # 0.00 0 - 0.9 x10(3)/mcL    Baso # 0.02 <=0.2 x10(3)/mcL    IG# 0.07 (H) 0 - 0.04 x10(3)/mcL    IG% 0.9 %    NRBC% 0.4 %    IPF 3.8 0.9 - 11.2 %   Hepatitis B Surface Antigen    Collection Time: 02/27/24  3:50 AM   Result Value Ref Range    Hepatitis B Surface Antigen Nonreactive Nonreactive   EKG 12-lead    Collection Time: 02/27/24  2:16 PM   Result Value Ref Range    QRS Duration 84 ms    [facility] QTC Calculation 475 ms   Troponin I    Collection Time: 02/27/24  2:48 PM   Result Value Ref Range    Troponin-I 0.039 0.000 - 0.045 ng/mL   Magnesium    Collection Time: 02/27/24  2:48 PM   Result Value Ref Range    Magnesium Level 1.90 1.60 - 2.60 mg/dL   CK     Collection Time: 02/27/24  2:48 PM   Result Value Ref Range    Creatine Kinase 245 (H) 29 - 168 U/L   Comprehensive Metabolic Panel    Collection Time: 02/28/24  3:33 AM   Result Value Ref Range    Sodium Level 146 (H) 136 - 145 mmol/L    Potassium Level 3.2 (L) 3.5 - 5.1 mmol/L    Chloride 110 (H) 98 - 107 mmol/L    Carbon Dioxide 29 22 - 29 mmol/L    Glucose Level 111 (H) 74 - 100 mg/dL    Blood Urea Nitrogen 19.7 (H) 7.0 - 18.7 mg/dL    Creatinine 0.91 0.55 - 1.02 mg/dL    Calcium Level Total 8.2 (L) 8.4 - 10.2 mg/dL    Protein Total 5.5 (L) 6.4 - 8.3 gm/dL    Albumin Level 3.0 (L) 3.5 - 5.0 g/dL    Globulin 2.5 2.4 - 3.5 gm/dL    Albumin/Globulin Ratio 1.2 1.1 - 2.0 ratio    Bilirubin Total 1.2 <=1.5 mg/dL    Alkaline Phosphatase 77 40 - 150 unit/L    Alanine Aminotransferase 13 0 - 55 unit/L    Aspartate Aminotransferase 19 5 - 34 unit/L    eGFR >60 mls/min/1.73/m2   Fibrinogen    Collection Time: 02/28/24  3:33 AM   Result Value Ref Range    Fibrinogen 273 210 - 463 mg/dL   Lactate Dehydrogenase    Collection Time: 02/28/24  3:33 AM   Result Value Ref Range    Lactate Dehydrogenase 418 (H) 125 - 220 U/L   Magnesium    Collection Time: 02/28/24  3:33 AM   Result Value Ref Range    Magnesium Level 1.90 1.60 - 2.60 mg/dL   Protime-INR    Collection Time: 02/28/24  3:33 AM   Result Value Ref Range    PT 14.9 (H) 12.5 - 14.5 seconds    INR 1.2 <=1.3   CBC with Differential    Collection Time: 02/28/24  3:33 AM   Result Value Ref Range    WBC 11.48 4.50 - 11.50 x10(3)/mcL    RBC 3.85 (L) 4.20 - 5.40 x10(6)/mcL    Hgb 8.5 (L) 12.0 - 16.0 g/dL    Hct 26.3 (L) 37.0 - 47.0 %    MCV 68.3 (L) 80.0 - 94.0 fL    MCH 22.1 (L) 27.0 - 31.0 pg    MCHC 32.3 (L) 33.0 - 36.0 g/dL    RDW 18.2 (H) 11.5 - 17.0 %    Platelet 24 (LL) 130 - 400 x10(3)/mcL    MPV      Neut % 73.3 %    Lymph % 19.1 %    Mono % 6.4 %    Eos % 0.2 %    Basophil % 0.2 %    Lymph # 2.19 0.6 - 4.6 x10(3)/mcL    Neut # 8.43 2.1 - 9.2 x10(3)/mcL    Mono # 0.73  0.1 - 1.3 x10(3)/mcL    Eos # 0.02 0 - 0.9 x10(3)/mcL    Baso # 0.02 <=0.2 x10(3)/mcL    IG# 0.09 (H) 0 - 0.04 x10(3)/mcL    IG% 0.8 %    NRBC% 0.3 %    IPF 16.6 (H) 0.9 - 11.2 %   POCT ARTERIAL BLOOD GAS    Collection Time: 02/28/24  6:35 AM   Result Value Ref Range    POC Ionized Calcium 1.07 (A) 1.12 - 1.23 mmol/l    POC Temp 37.0 °C    Specimen source Arterial sample    Comprehensive Metabolic Panel    Collection Time: 02/29/24  4:18 AM   Result Value Ref Range    Sodium Level 144 136 - 145 mmol/L    Potassium Level 3.0 (L) 3.5 - 5.1 mmol/L    Chloride 111 (H) 98 - 107 mmol/L    Carbon Dioxide 28 22 - 29 mmol/L    Glucose Level 93 74 - 100 mg/dL    Blood Urea Nitrogen 13.1 7.0 - 18.7 mg/dL    Creatinine 0.85 0.55 - 1.02 mg/dL    Calcium Level Total 8.0 (L) 8.4 - 10.2 mg/dL    Protein Total 4.9 (L) 6.4 - 8.3 gm/dL    Albumin Level 3.2 (L) 3.5 - 5.0 g/dL    Globulin 1.7 (L) 2.4 - 3.5 gm/dL    Albumin/Globulin Ratio 1.9 1.1 - 2.0 ratio    Bilirubin Total 0.5 <=1.5 mg/dL    Alkaline Phosphatase 74 40 - 150 unit/L    Alanine Aminotransferase 11 0 - 55 unit/L    Aspartate Aminotransferase 14 5 - 34 unit/L    eGFR >60 mls/min/1.73/m2   Fibrinogen    Collection Time: 02/29/24  4:18 AM   Result Value Ref Range    Fibrinogen 208 (L) 210 - 463 mg/dL   Lactate Dehydrogenase    Collection Time: 02/29/24  4:18 AM   Result Value Ref Range    Lactate Dehydrogenase 243 (H) 125 - 220 U/L   Magnesium    Collection Time: 02/29/24  4:18 AM   Result Value Ref Range    Magnesium Level 1.70 1.60 - 2.60 mg/dL   Protime-INR    Collection Time: 02/29/24  4:18 AM   Result Value Ref Range    PT 14.2 12.5 - 14.5 seconds    INR 1.1 <=1.3   CBC with Differential    Collection Time: 02/29/24  4:18 AM   Result Value Ref Range    WBC 11.44 4.50 - 11.50 x10(3)/mcL    RBC 3.62 (L) 4.20 - 5.40 x10(6)/mcL    Hgb 8.0 (L) 12.0 - 16.0 g/dL    Hct 25.4 (L) 37.0 - 47.0 %    MCV 70.2 (L) 80.0 - 94.0 fL    MCH 22.1 (L) 27.0 - 31.0 pg    MCHC 31.5 (L) 33.0 -  36.0 g/dL    RDW 18.4 (H) 11.5 - 17.0 %    Platelet 59 (L) 130 - 400 x10(3)/mcL    MPV      Neut % 56.7 %    Lymph % 29.4 %    Mono % 8.7 %    Eos % 1.0 %    Basophil % 0.3 %    Lymph # 3.36 0.6 - 4.6 x10(3)/mcL    Neut # 6.50 2.1 - 9.2 x10(3)/mcL    Mono # 0.99 0.1 - 1.3 x10(3)/mcL    Eos # 0.11 0 - 0.9 x10(3)/mcL    Baso # 0.03 <=0.2 x10(3)/mcL    IG# 0.45 (H) 0 - 0.04 x10(3)/mcL    IG% 3.9 %    NRBC% 2.3 %    IPF 10.7 0.9 - 11.2 %   Haptoglobin    Collection Time: 02/29/24  4:18 AM   Result Value Ref Range    Haptoglobin 61 35 - 250 mg/dL   POCT ARTERIAL BLOOD GAS    Collection Time: 02/29/24  4:25 AM   Result Value Ref Range    POC Ionized Calcium 0.92 (A) 1.1 - 1.2 mmol/l    POC Temp 37.0 °C    Specimen source Arterial sample    Comprehensive Metabolic Panel    Collection Time: 03/01/24  3:56 AM   Result Value Ref Range    Sodium Level 143 136 - 145 mmol/L    Potassium Level 3.0 (L) 3.5 - 5.1 mmol/L    Chloride 108 (H) 98 - 107 mmol/L    Carbon Dioxide 29 22 - 29 mmol/L    Glucose Level 94 74 - 100 mg/dL    Blood Urea Nitrogen 13.5 7.0 - 18.7 mg/dL    Creatinine 0.89 0.55 - 1.02 mg/dL    Calcium Level Total 8.7 8.4 - 10.2 mg/dL    Protein Total 5.3 (L) 6.4 - 8.3 gm/dL    Albumin Level 3.6 3.5 - 5.0 g/dL    Globulin 1.7 (L) 2.4 - 3.5 gm/dL    Albumin/Globulin Ratio 2.1 (H) 1.1 - 2.0 ratio    Bilirubin Total 0.5 <=1.5 mg/dL    Alkaline Phosphatase 79 40 - 150 unit/L    Alanine Aminotransferase 12 0 - 55 unit/L    Aspartate Aminotransferase 15 5 - 34 unit/L    eGFR >60 mls/min/1.73/m2   Fibrinogen    Collection Time: 03/01/24  3:56 AM   Result Value Ref Range    Fibrinogen 184 (L) 210 - 463 mg/dL   Lactate Dehydrogenase    Collection Time: 03/01/24  3:56 AM   Result Value Ref Range    Lactate Dehydrogenase 219 125 - 220 U/L   Magnesium    Collection Time: 03/01/24  3:56 AM   Result Value Ref Range    Magnesium Level 2.00 1.60 - 2.60 mg/dL   Protime-INR    Collection Time: 03/01/24  3:56 AM   Result Value Ref Range     PT 13.9 12.5 - 14.5 seconds    INR 1.1 <=1.3   CBC with Differential    Collection Time: 03/01/24  4:04 AM   Result Value Ref Range    WBC 13.81 (H) 4.50 - 11.50 x10(3)/mcL    RBC 3.89 (L) 4.20 - 5.40 x10(6)/mcL    Hgb 8.6 (L) 12.0 - 16.0 g/dL    Hct 28.0 (L) 37.0 - 47.0 %    MCV 72.0 (L) 80.0 - 94.0 fL    MCH 22.1 (L) 27.0 - 31.0 pg    MCHC 30.7 (L) 33.0 - 36.0 g/dL    RDW 18.7 (H) 11.5 - 17.0 %    Platelet 132 130 - 400 x10(3)/mcL    MPV      Neut % 64.5 %    Lymph % 24.0 %    Mono % 6.2 %    Eos % 0.9 %    Basophil % 0.2 %    Lymph # 3.32 0.6 - 4.6 x10(3)/mcL    Neut # 8.90 2.1 - 9.2 x10(3)/mcL    Mono # 0.86 0.1 - 1.3 x10(3)/mcL    Eos # 0.12 0 - 0.9 x10(3)/mcL    Baso # 0.03 <=0.2 x10(3)/mcL    IG# 0.58 (H) 0 - 0.04 x10(3)/mcL    IG% 4.2 %    NRBC% 3.6 %    IPF 7.1 0.9 - 11.2 %   POCT ARTERIAL BLOOD GAS    Collection Time: 03/01/24  5:36 AM   Result Value Ref Range    POC Ionized Calcium 1.07 (A) 1.12 - 1.23 mmol/l    POC Temp 37.0 °C    Specimen source Arterial sample    Prepare Fresh Frozen Plasma 8 Units    Collection Time: 03/01/24  9:17 AM   Result Value Ref Range    UNIT NUMBER [id number]     UNIT ABO/RH O POS     DISPENSE STATUS Transfused     Unit Expiration [id number]     Product Code R1703D75     Unit Blood Type Code 5100     CROSSMATCH INTERPRETATION Not required     UNIT NUMBER [id number]     UNIT ABO/RH O POS     DISPENSE STATUS Transfused     Unit Expiration [id number]     Product Code O6272B24     Unit Blood Type Code 5100     CROSSMATCH INTERPRETATION Not required     UNIT NUMBER [id number]     UNIT ABO/RH O POS     DISPENSE STATUS Transfused     Unit Expiration 202403062359     Product Code W3506M92     Unit Blood Type Code 5100     CROSSMATCH INTERPRETATION Not required     UNIT NUMBER [id number]     UNIT ABO/RH O POS     DISPENSE STATUS Transfused     Unit Expiration [id number]     Product Code H8803I07     Unit Blood Type Code 5100     CROSSMATCH INTERPRETATION Not  required     UNIT NUMBER [id number]     UNIT ABO/RH O POS     DISPENSE STATUS Transfused     Unit Expiration [id number]     Product Code L0562W26     Unit Blood Type Code 5100     CROSSMATCH INTERPRETATION Not required     UNIT NUMBER [id number]     UNIT ABO/RH O POS     DISPENSE STATUS Transfused     Unit Expiration [id number]     Product Code E9925P33     Unit Blood Type Code 5100     CROSSMATCH INTERPRETATION Not required    Comprehensive Metabolic Panel    Collection Time: 03/02/24  4:27 AM   Result Value Ref Range    Sodium Level 140 136 - 145 mmol/L    Potassium Level 3.6 3.5 - 5.1 mmol/L    Chloride 108 (H) 98 - 107 mmol/L    Carbon Dioxide 25 22 - 29 mmol/L    Glucose Level 93 74 - 100 mg/dL    Blood Urea Nitrogen 17.3 7.0 - 18.7 mg/dL    Creatinine 0.89 0.55 - 1.02 mg/dL    Calcium Level Total 8.6 8.4 - 10.2 mg/dL    Protein Total 4.9 (L) 6.4 - 8.3 gm/dL    Albumin Level 3.5 3.5 - 5.0 g/dL    Globulin 1.4 (L) 2.4 - 3.5 gm/dL    Albumin/Globulin Ratio 2.5 (H) 1.1 - 2.0 ratio    Bilirubin Total 0.4 <=1.5 mg/dL    Alkaline Phosphatase 70 40 - 150 unit/L    Alanine Aminotransferase 15 0 - 55 unit/L    Aspartate Aminotransferase 15 5 - 34 unit/L    eGFR >60 mls/min/1.73/m2   Fibrinogen    Collection Time: 03/02/24  4:27 AM   Result Value Ref Range    Fibrinogen 193 (L) 210 - 463 mg/dL   Lactate Dehydrogenase    Collection Time: 03/02/24  4:27 AM   Result Value Ref Range    Lactate Dehydrogenase 180 125 - 220 U/L   Magnesium    Collection Time: 03/02/24  4:27 AM   Result Value Ref Range    Magnesium Level 1.90 1.60 - 2.60 mg/dL   Protime-INR    Collection Time: 03/02/24  4:27 AM   Result Value Ref Range    PT 14.3 12.5 - 14.5 seconds    INR 1.1 <=1.3   CBC with Differential    Collection Time: 03/02/24  4:27 AM   Result Value Ref Range    WBC 15.14 (H) 4.50 - 11.50 x10(3)/mcL    RBC 3.99 (L) 4.20 - 5.40 x10(6)/mcL    Hgb 9.2 (L) 12.0 - 16.0 g/dL    Hct 28.9 (L) 37.0 - 47.0 %    MCV 72.4 (L) 80.0 - 94.0  fL    MCH 23.1 (L) 27.0 - 31.0 pg    MCHC 31.8 (L) 33.0 - 36.0 g/dL    RDW 18.9 (H) 11.5 - 17.0 %    Platelet 220 130 - 400 x10(3)/mcL    MPV      Neut % 68.3 %    Lymph % 19.2 %    Mono % 7.8 %    Eos % 0.5 %    Basophil % 0.2 %    Lymph # 2.91 0.6 - 4.6 x10(3)/mcL    Neut # 10.35 (H) 2.1 - 9.2 x10(3)/mcL    Mono # 1.18 0.1 - 1.3 x10(3)/mcL    Eos # 0.07 0 - 0.9 x10(3)/mcL    Baso # 0.03 <=0.2 x10(3)/mcL    IG# 0.60 (H) 0 - 0.04 x10(3)/mcL    IG% 4.0 %    NRBC% 2.5 %    IPF 5.7 0.9 - 11.2 %   POCT ARTERIAL BLOOD GAS    Collection Time: 03/02/24  4:28 AM   Result Value Ref Range    POC Ionized Calcium 1.00 (A) 1.12 - 1.23 mmol/l    POC Temp 37.0 °C    Specimen source Arterial sample    Comprehensive Metabolic Panel    Collection Time: 03/03/24  4:49 AM   Result Value Ref Range    Sodium Level 141 136 - 145 mmol/L    Potassium Level 3.6 3.5 - 5.1 mmol/L    Chloride 107 98 - 107 mmol/L    Carbon Dioxide 30 (H) 22 - 29 mmol/L    Glucose Level 90 74 - 100 mg/dL    Blood Urea Nitrogen 16.8 7.0 - 18.7 mg/dL    Creatinine 0.86 0.55 - 1.02 mg/dL    Calcium Level Total 8.5 8.4 - 10.2 mg/dL    Protein Total 5.5 (L) 6.4 - 8.3 gm/dL    Albumin Level 3.6 3.5 - 5.0 g/dL    Globulin 1.9 (L) 2.4 - 3.5 gm/dL    Albumin/Globulin Ratio 1.9 1.1 - 2.0 ratio    Bilirubin Total 0.5 <=1.5 mg/dL    Alkaline Phosphatase 77 40 - 150 unit/L    Alanine Aminotransferase 24 0 - 55 unit/L    Aspartate Aminotransferase 22 5 - 34 unit/L    eGFR >60 mls/min/1.73/m2   Fibrinogen    Collection Time: 03/03/24  4:49 AM   Result Value Ref Range    Fibrinogen 207 (L) 210 - 463 mg/dL   Lactate Dehydrogenase    Collection Time: 03/03/24  4:49 AM   Result Value Ref Range    Lactate Dehydrogenase 232 (H) 125 - 220 U/L   Magnesium    Collection Time: 03/03/24  4:49 AM   Result Value Ref Range    Magnesium Level 2.00 1.60 - 2.60 mg/dL   Protime-INR    Collection Time: 03/03/24  4:49 AM   Result Value Ref Range    PT 13.8 12.5 - 14.5 seconds    INR 1.1 <=1.3    CBC with Differential    Collection Time: 03/03/24  4:49 AM   Result Value Ref Range    WBC 15.15 (H) 4.50 - 11.50 x10(3)/mcL    RBC 4.14 (L) 4.20 - 5.40 x10(6)/mcL    Hgb 9.4 (L) 12.0 - 16.0 g/dL    Hct 30.4 (L) 37.0 - 47.0 %    MCV 73.4 (L) 80.0 - 94.0 fL    MCH 22.7 (L) 27.0 - 31.0 pg    MCHC 30.9 (L) 33.0 - 36.0 g/dL    RDW 19.7 (H) 11.5 - 17.0 %    Platelet 278 130 - 400 x10(3)/mcL    MPV 10.0 7.4 - 10.4 fL    Neut % 70.2 %    Lymph % 18.8 %    Mono % 7.5 %    Eos % 0.2 %    Basophil % 0.1 %    Lymph # 2.85 0.6 - 4.6 x10(3)/mcL    Neut # 10.63 (H) 2.1 - 9.2 x10(3)/mcL    Mono # 1.13 0.1 - 1.3 x10(3)/mcL    Eos # 0.03 0 - 0.9 x10(3)/mcL    Baso # 0.02 <=0.2 x10(3)/mcL    IG# 0.49 (H) 0 - 0.04 x10(3)/mcL    IG% 3.2 %    NRBC% 1.4 %    IPF 4.5 0.9 - 11.2 %   RT Blood Gas    Collection Time: 03/03/24  6:39 AM   Result Value Ref Range    Sample Type Arterial Blood     Drawn by lab     pH, Blood gas      pCO2, Blood gas      pO2, Blood gas      Calcium Level Ionized 1.06 (L) 1.12 - 1.23 mmol/L   Comprehensive Metabolic Panel    Collection Time: 03/04/24  4:57 AM   Result Value Ref Range    Sodium Level 143 136 - 145 mmol/L    Potassium Level 3.8 3.5 - 5.1 mmol/L    Chloride 106 98 - 107 mmol/L    Carbon Dioxide 30 (H) 22 - 29 mmol/L    Glucose Level 90 74 - 100 mg/dL    Blood Urea Nitrogen 18.1 7.0 - 18.7 mg/dL    Creatinine 0.86 0.55 - 1.02 mg/dL    Calcium Level Total 8.8 8.4 - 10.2 mg/dL    Protein Total 5.3 (L) 6.4 - 8.3 gm/dL    Albumin Level 3.5 3.5 - 5.0 g/dL    Globulin 1.8 (L) 2.4 - 3.5 gm/dL    Albumin/Globulin Ratio 1.9 1.1 - 2.0 ratio    Bilirubin Total 0.5 <=1.5 mg/dL    Alkaline Phosphatase 86 40 - 150 unit/L    Alanine Aminotransferase 26 0 - 55 unit/L    Aspartate Aminotransferase 20 5 - 34 unit/L    eGFR >60 mls/min/1.73/m2   Fibrinogen    Collection Time: 03/04/24  4:57 AM   Result Value Ref Range    Fibrinogen 204 (L) 210 - 463 mg/dL   Lactate Dehydrogenase    Collection Time: 03/04/24  4:57 AM    Result Value Ref Range    Lactate Dehydrogenase 240 (H) 125 - 220 U/L   Magnesium    Collection Time: 03/04/24  4:57 AM   Result Value Ref Range    Magnesium Level 2.20 1.60 - 2.60 mg/dL   Protime-INR    Collection Time: 03/04/24  4:57 AM   Result Value Ref Range    PT 13.4 12.5 - 14.5 seconds    INR 1.0 <=1.3   CBC with Differential    Collection Time: 03/04/24  4:57 AM   Result Value Ref Range    WBC 12.90 (H) 4.50 - 11.50 x10(3)/mcL    RBC 3.99 (L) 4.20 - 5.40 x10(6)/mcL    Hgb 9.0 (L) 12.0 - 16.0 g/dL    Hct 28.8 (L) 37.0 - 47.0 %    MCV 72.2 (L) 80.0 - 94.0 fL    MCH 22.6 (L) 27.0 - 31.0 pg    MCHC 31.3 (L) 33.0 - 36.0 g/dL    RDW 20.2 (H) 11.5 - 17.0 %    Platelet 315 130 - 400 x10(3)/mcL    MPV 9.7 7.4 - 10.4 fL    Neut % 66.6 %    Lymph % 22.3 %    Mono % 8.5 %    Eos % 0.2 %    Basophil % 0.2 %    Lymph # 2.88 0.6 - 4.6 x10(3)/mcL    Neut # 8.58 2.1 - 9.2 x10(3)/mcL    Mono # 1.10 0.1 - 1.3 x10(3)/mcL    Eos # 0.03 0 - 0.9 x10(3)/mcL    Baso # 0.02 <=0.2 x10(3)/mcL    IG# 0.29 (H) 0 - 0.04 x10(3)/mcL    IG% 2.2 %    NRBC% 0.6 %   POCT ARTERIAL BLOOD GAS    Collection Time: 03/04/24  5:10 AM   Result Value Ref Range    POC Ionized Calcium 1.12 1.12 - 1.23 mmol/l    POC Temp 37.0 °C    Specimen source Arterial sample    Prepare Fresh Frozen Plasma 8 Units    Collection Time: 03/04/24 12:22 PM   Result Value Ref Range    UNIT NUMBER [id number]     UNIT ABO/RH O POS     DISPENSE STATUS Transfused     Unit Expiration [id number]     Product Code Q6211G13     Unit Blood Type Code 5100     CROSSMATCH INTERPRETATION Not required     UNIT NUMBER [id number]     UNIT ABO/RH O POS     DISPENSE STATUS Transfused     Unit Expiration [id number]     Product Code P1443Z90     Unit Blood Type Code 5100     CROSSMATCH INTERPRETATION Not required     UNIT NUMBER [id number]     UNIT ABO/RH O POS     DISPENSE STATUS Transfused     Unit Expiration [id number]     Product Code L8707N61     Unit Blood Type Code 5100      CROSSMATCH INTERPRETATION Not required     UNIT NUMBER [id number]     UNIT ABO/RH O POS     DISPENSE STATUS Transfused     Unit Expiration [id number]     Product Code Y2022B58     Unit Blood Type Code 5100     CROSSMATCH INTERPRETATION Not required     UNIT NUMBER [id number]     UNIT ABO/RH O POS     DISPENSE STATUS Transfused     Unit Expiration [id number]     Product Code O9104C90     Unit Blood Type Code 5100     CROSSMATCH INTERPRETATION Not required     UNIT NUMBER [id number]     UNIT ABO/RH O POS     DISPENSE STATUS Transfused     Unit Expiration [id number]     Product Code T2011J84     Unit Blood Type Code 5100     CROSSMATCH INTERPRETATION Not required     UNIT NUMBER [id number]     UNIT ABO/RH O NEG     DISPENSE STATUS Transfused     Unit Expiration [id number]     Product Code R4477T58     Unit Blood Type Code 9500     CROSSMATCH INTERPRETATION Not required    Type & Screen    Collection Time: 03/04/24 12:22 PM   Result Value Ref Range    Group & Rh O POS     Indirect Kym GEL NEG     Specimen Outdate 03/07/2024 23:59    Comprehensive Metabolic Panel    Collection Time: 03/05/24  3:50 AM   Result Value Ref Range    Sodium Level 143 136 - 145 mmol/L    Potassium Level 3.8 3.5 - 5.1 mmol/L    Chloride 109 (H) 98 - 107 mmol/L    Carbon Dioxide 30 (H) 22 - 29 mmol/L    Glucose Level 98 74 - 100 mg/dL    Blood Urea Nitrogen 19.5 (H) 7.0 - 18.7 mg/dL    Creatinine 0.85 0.55 - 1.02 mg/dL    Calcium Level Total 8.2 (L) 8.4 - 10.2 mg/dL    Protein Total 4.9 (L) 6.4 - 8.3 gm/dL    Albumin Level 3.5 3.5 - 5.0 g/dL    Globulin 1.4 (L) 2.4 - 3.5 gm/dL    Albumin/Globulin Ratio 2.5 (H) 1.1 - 2.0 ratio    Bilirubin Total 0.4 <=1.5 mg/dL    Alkaline Phosphatase 80 40 - 150 unit/L    Alanine Aminotransferase 19 0 - 55 unit/L    Aspartate Aminotransferase 15 5 - 34 unit/L    eGFR >60 mls/min/1.73/m2   Fibrinogen    Collection Time: 03/05/24  3:50 AM   Result Value Ref Range    Fibrinogen 188 (L) 210  - 463 mg/dL   Lactate Dehydrogenase    Collection Time: 03/05/24  3:50 AM   Result Value Ref Range    Lactate Dehydrogenase 159 125 - 220 U/L   Magnesium    Collection Time: 03/05/24  3:50 AM   Result Value Ref Range    Magnesium Level 2.00 1.60 - 2.60 mg/dL   Protime-INR    Collection Time: 03/05/24  3:50 AM   Result Value Ref Range    PT 14.9 (H) 12.5 - 14.5 seconds    INR 1.2 <=1.3   [id number] Evaluation    Collection Time: 03/05/24  3:50 AM   Result Value Ref Range    ZHMAXF26 Activity Assay 59 (L) >/=70 %    [id number] Interpretation SEE COMMENTS    CBC with Differential    Collection Time: 03/05/24  3:50 AM   Result Value Ref Range    WBC 13.10 (H) 4.50 - 11.50 x10(3)/mcL    RBC 4.05 (L) 4.20 - 5.40 x10(6)/mcL    Hgb 9.1 (L) 12.0 - 16.0 g/dL    Hct 30.3 (L) 37.0 - 47.0 %    MCV 74.8 (L) 80.0 - 94.0 fL    MCH 22.5 (L) 27.0 - 31.0 pg    MCHC 30.0 (L) 33.0 - 36.0 g/dL    RDW 20.4 (H) 11.5 - 17.0 %    Platelet 335 130 - 400 x10(3)/mcL    MPV 9.5 7.4 - 10.4 fL    Neut % 67.2 %    Lymph % 20.9 %    Mono % 10.0 %    Eos % 0.3 %    Basophil % 0.2 %    Lymph # 2.74 0.6 - 4.6 x10(3)/mcL    Neut # 8.80 2.1 - 9.2 x10(3)/mcL    Mono # 1.31 (H) 0.1 - 1.3 x10(3)/mcL    Eos # 0.04 0 - 0.9 x10(3)/mcL    Baso # 0.03 <=0.2 x10(3)/mcL    IG# 0.18 (H) 0 - 0.04 x10(3)/mcL    IG% 1.4 %    NRBC% 0.6 %     *Note: Due to a large number of results and/or encounters for the requested time period, some results have not been displayed. A complete set of results can be found in Results Review.         Follow Up:  Keep follow-up appointment in May for wellness.    I spent a total of 30 minutes on the day of the visit.This includes face to face time and non-face to face time preparing to see the patient (eg, review of tests), obtaining and/or reviewing separately obtained history, documenting clinical information in the electronic or other health record, independently interpreting results and communicating results to the  patient/family/caregiver, or care coordinator.       Previous medical history/lab work/radiology reviewed and considered during medical management decisions.   Medication list reviewed and medication reconciliation performed.  Patient was provided  and care about his/her current diagnosis (es) and medications including risk/benefit and side effects/adverse events, over the counter medication uses/doses, home self-care and contact precautions,  and red flags and indications for when to seek immediate medical attention.   Patient was advised to continue compliance with current medication list and medical recommendations.  Patient dvised continued compliance with recommended eating habits/ diets for medical conditions and exercise 150 minutes/ week (if possible) for medical condition (s).  Educational handouts and instructions on selected disease management in AVS (After Visit Summary).    All of the patient's questions were answered to patient's satisfaction.   The patient was receptive, expressed verbal understanding and agreement the above plan.        This note was created with the assistance of a voice recognition software or phone dictation. There may be transcription errors as a result of using this technology however minimal. Effort has been made to assure accuracy of transcription but any obvious errors or omissions should be clarified with the author of the document

## 2024-03-19 NOTE — ASSESSMENT & PLAN NOTE
Patient was referred to complete pulmonary function test by her rheumatologist.  Patient was not able to complete because order was missing.  Previously patient state chronic cough since her COVID-19 infection from 2 years ago.  Patient state she had pulmonary function test that was completed years back by her previous PCP before moving to [place].  Patient was prescribed Proventil nebs as needed for cough and Dulera 100-5 mcg 1 puff twice a day.  Patient state she is benefitting from treatment.  Patient requesting pulmonary function test because of symptoms as well her rheumatologist had referred her to complete testing but for unknown reason the Order went missing.

## 2024-03-19 NOTE — ASSESSMENT & PLAN NOTE
Continue Rituxan weekly x4.  Last infusion will be next Thursday.    Continue prednisone Taper as directed:   80 mg, Daily Starting Thu 3/7/2024, For 4 days, THEN 60 mg, Daily Starting Mon 3/11/2024, For 4 days, THEN 40 mg, Daily Starting Fri 3/15/2024, For 4 days, THEN 20 mg, Daily Starting Tue 3/19/2024, For 4 days, THEN 10 mg, Daily Starting Sat 3/23/2024, For 10 days   Patient tolerating treatment .  Patient only report change in taste.  Discussed with patient possibly from medications.  We will continue to monitor.

## 2024-03-21 ENCOUNTER — INFUSION (OUTPATIENT)
Dept: INFUSION THERAPY | Facility: HOSPITAL | Age: 49
End: 2024-03-21
Attending: NURSE PRACTITIONER
Payer: MEDICAID

## 2024-03-21 VITALS — DIASTOLIC BLOOD PRESSURE: 96 MMHG | SYSTOLIC BLOOD PRESSURE: 145 MMHG | HEART RATE: 78 BPM | TEMPERATURE: 97 F

## 2024-03-21 DIAGNOSIS — M31.19 TTP (THROMBOTIC THROMBOCYTOPENIC PURPURA): Primary | ICD-10-CM

## 2024-03-21 PROCEDURE — 96375 TX/PRO/DX INJ NEW DRUG ADDON: CPT

## 2024-03-21 PROCEDURE — 25000003 PHARM REV CODE 250: Performed by: INTERNAL MEDICINE

## 2024-03-21 PROCEDURE — 63600175 PHARM REV CODE 636 W HCPCS: Performed by: INTERNAL MEDICINE

## 2024-03-21 PROCEDURE — 96415 CHEMO IV INFUSION ADDL HR: CPT

## 2024-03-21 PROCEDURE — 96413 CHEMO IV INFUSION 1 HR: CPT

## 2024-03-21 RX ORDER — EPINEPHRINE 0.3 MG/.3ML
0.3 INJECTION SUBCUTANEOUS ONCE AS NEEDED
Status: CANCELLED | OUTPATIENT
Start: 2024-03-28

## 2024-03-21 RX ORDER — ACETAMINOPHEN 325 MG/1
650 TABLET ORAL
Status: COMPLETED | OUTPATIENT
Start: 2024-03-21 | End: 2024-03-21

## 2024-03-21 RX ORDER — FAMOTIDINE 10 MG/ML
20 INJECTION INTRAVENOUS
Status: COMPLETED | OUTPATIENT
Start: 2024-03-21 | End: 2024-03-21

## 2024-03-21 RX ORDER — MEPERIDINE HYDROCHLORIDE 25 MG/ML
25 INJECTION INTRAMUSCULAR; INTRAVENOUS; SUBCUTANEOUS
Status: DISCONTINUED | OUTPATIENT
Start: 2024-03-21 | End: 2024-03-21 | Stop reason: HOSPADM

## 2024-03-21 RX ORDER — SODIUM CHLORIDE 0.9 % (FLUSH) 0.9 %
10 SYRINGE (ML) INJECTION
Status: DISCONTINUED | OUTPATIENT
Start: 2024-03-21 | End: 2024-03-21 | Stop reason: HOSPADM

## 2024-03-21 RX ORDER — ACETAMINOPHEN 325 MG/1
650 TABLET ORAL
Status: CANCELLED | OUTPATIENT
Start: 2024-03-28

## 2024-03-21 RX ORDER — EPINEPHRINE 0.3 MG/.3ML
0.3 INJECTION SUBCUTANEOUS ONCE AS NEEDED
Status: DISCONTINUED | OUTPATIENT
Start: 2024-03-21 | End: 2024-03-21 | Stop reason: HOSPADM

## 2024-03-21 RX ORDER — DIPHENHYDRAMINE HYDROCHLORIDE 50 MG/ML
50 INJECTION INTRAMUSCULAR; INTRAVENOUS ONCE AS NEEDED
Status: DISCONTINUED | OUTPATIENT
Start: 2024-03-21 | End: 2024-03-21 | Stop reason: HOSPADM

## 2024-03-21 RX ORDER — DIPHENHYDRAMINE HYDROCHLORIDE 50 MG/ML
50 INJECTION INTRAMUSCULAR; INTRAVENOUS ONCE AS NEEDED
Status: CANCELLED | OUTPATIENT
Start: 2024-03-28

## 2024-03-21 RX ORDER — HEPARIN 100 UNIT/ML
500 SYRINGE INTRAVENOUS
Status: CANCELLED | OUTPATIENT
Start: 2024-03-28

## 2024-03-21 RX ORDER — HEPARIN 100 UNIT/ML
500 SYRINGE INTRAVENOUS
Status: DISCONTINUED | OUTPATIENT
Start: 2024-03-21 | End: 2024-03-21 | Stop reason: HOSPADM

## 2024-03-21 RX ORDER — FAMOTIDINE 10 MG/ML
20 INJECTION INTRAVENOUS
Status: CANCELLED | OUTPATIENT
Start: 2024-03-28

## 2024-03-21 RX ORDER — MEPERIDINE HYDROCHLORIDE 50 MG/ML
25 INJECTION INTRAMUSCULAR; INTRAVENOUS; SUBCUTANEOUS
Status: CANCELLED | OUTPATIENT
Start: 2024-03-28 | End: 2024-03-29

## 2024-03-21 RX ORDER — SODIUM CHLORIDE 0.9 % (FLUSH) 0.9 %
10 SYRINGE (ML) INJECTION
Status: CANCELLED | OUTPATIENT
Start: 2024-03-28

## 2024-03-21 RX ADMIN — ACETAMINOPHEN 650 MG: 325 TABLET ORAL at 09:03

## 2024-03-21 RX ADMIN — FAMOTIDINE 20 MG: 10 INJECTION INTRAVENOUS at 09:03

## 2024-03-21 RX ADMIN — DIPHENHYDRAMINE HYDROCHLORIDE 25 MG: 50 INJECTION INTRAMUSCULAR; INTRAVENOUS at 09:03

## 2024-03-21 RX ADMIN — SODIUM CHLORIDE 800 MG: 9 INJECTION, SOLUTION INTRAVENOUS at 10:03

## 2024-03-25 ENCOUNTER — LAB VISIT (OUTPATIENT)
Dept: LAB | Facility: HOSPITAL | Age: 49
End: 2024-03-25
Attending: INTERNAL MEDICINE
Payer: MEDICAID

## 2024-03-25 DIAGNOSIS — M31.19 TTP (THROMBOTIC THROMBOCYTOPENIC PURPURA): ICD-10-CM

## 2024-03-25 DIAGNOSIS — K29.70 GASTRITIS, PRESENCE OF BLEEDING UNSPECIFIED, UNSPECIFIED CHRONICITY, UNSPECIFIED GASTRITIS TYPE: ICD-10-CM

## 2024-03-25 LAB
ALBUMIN SERPL-MCNC: 3.7 G/DL (ref 3.5–5)
ALBUMIN/GLOB SERPL: 1.3 RATIO (ref 1.1–2)
ALP SERPL-CCNC: 114 UNIT/L (ref 40–150)
ALT SERPL-CCNC: 8 UNIT/L (ref 0–55)
AST SERPL-CCNC: 11 UNIT/L (ref 5–34)
BASOPHILS # BLD AUTO: 0.02 X10(3)/MCL
BASOPHILS NFR BLD AUTO: 0.4 %
BILIRUB SERPL-MCNC: 0.4 MG/DL
BUN SERPL-MCNC: 15.3 MG/DL (ref 9.8–20.1)
CALCIUM SERPL-MCNC: 9 MG/DL (ref 8.4–10.2)
CHLORIDE SERPL-SCNC: 108 MMOL/L (ref 98–107)
CO2 SERPL-SCNC: 26 MMOL/L (ref 22–29)
CREAT SERPL-MCNC: 0.85 MG/DL (ref 0.55–1.02)
EOSINOPHIL # BLD AUTO: 0.09 X10(3)/MCL (ref 0–0.9)
EOSINOPHIL NFR BLD AUTO: 2 %
ERYTHROCYTE [DISTWIDTH] IN BLOOD BY AUTOMATED COUNT: 17.9 % (ref 11.5–17)
GFR SERPLBLD CREATININE-BSD FMLA CKD-EPI: >60 MLS/MIN/1.73/M2
GLOBULIN SER-MCNC: 2.8 GM/DL (ref 2.4–3.5)
GLUCOSE SERPL-MCNC: 96 MG/DL (ref 74–100)
HCT VFR BLD AUTO: 38.9 % (ref 37–47)
HGB BLD-MCNC: 11.9 G/DL (ref 12–16)
IMM GRANULOCYTES # BLD AUTO: 0.02 X10(3)/MCL (ref 0–0.04)
IMM GRANULOCYTES NFR BLD AUTO: 0.4 %
LDH SERPL-CCNC: 164 U/L (ref 125–220)
LYMPHOCYTES # BLD AUTO: 1.3 X10(3)/MCL (ref 0.6–4.6)
LYMPHOCYTES NFR BLD AUTO: 28.5 %
MCH RBC QN AUTO: 22.9 PG (ref 27–31)
MCHC RBC AUTO-ENTMCNC: 30.6 G/DL (ref 33–36)
MCV RBC AUTO: 74.8 FL (ref 80–94)
MONOCYTES # BLD AUTO: 0.59 X10(3)/MCL (ref 0.1–1.3)
MONOCYTES NFR BLD AUTO: 12.9 %
NEUTROPHILS # BLD AUTO: 2.54 X10(3)/MCL (ref 2.1–9.2)
NEUTROPHILS NFR BLD AUTO: 55.8 %
PLATELET # BLD AUTO: 268 X10(3)/MCL (ref 130–400)
PMV BLD AUTO: 9.6 FL (ref 7.4–10.4)
POTASSIUM SERPL-SCNC: 3.6 MMOL/L (ref 3.5–5.1)
PROT SERPL-MCNC: 6.5 GM/DL (ref 6.4–8.3)
RBC # BLD AUTO: 5.2 X10(6)/MCL (ref 4.2–5.4)
SODIUM SERPL-SCNC: 143 MMOL/L (ref 136–145)
WBC # SPEC AUTO: 4.56 X10(3)/MCL (ref 4.5–11.5)

## 2024-03-25 PROCEDURE — 85025 COMPLETE CBC W/AUTO DIFF WBC: CPT

## 2024-03-25 PROCEDURE — 36415 COLL VENOUS BLD VENIPUNCTURE: CPT

## 2024-03-25 PROCEDURE — 85397 CLOTTING FUNCT ACTIVITY: CPT

## 2024-03-25 PROCEDURE — 83615 LACTATE (LD) (LDH) ENZYME: CPT

## 2024-03-25 PROCEDURE — 80053 COMPREHEN METABOLIC PANEL: CPT

## 2024-03-26 LAB
COAGULATION SPECIALIST REVIEW: NORMAL
VWF CP ACT/NOR PPP CHRO: 89 %

## 2024-03-27 ENCOUNTER — HOSPITAL ENCOUNTER (OUTPATIENT)
Dept: PULMONOLOGY | Facility: HOSPITAL | Age: 49
Discharge: HOME | End: 2024-03-27
Attending: NURSE PRACTITIONER
Payer: MEDICAID

## 2024-03-27 ENCOUNTER — OFFICE VISIT (OUTPATIENT)
Dept: ORTHOPEDICS | Facility: CLINIC | Age: 49
End: 2024-03-27
Payer: MEDICAID

## 2024-03-27 VITALS
HEART RATE: 78 BPM | WEIGHT: 244.94 LBS | HEIGHT: 66 IN | DIASTOLIC BLOOD PRESSURE: 87 MMHG | BODY MASS INDEX: 39.36 KG/M2 | SYSTOLIC BLOOD PRESSURE: 136 MMHG | TEMPERATURE: 98 F

## 2024-03-27 DIAGNOSIS — R06.02 SHORTNESS OF BREATH: ICD-10-CM

## 2024-03-27 DIAGNOSIS — M25.512 ARTHRALGIA OF SHOULDER REGION, LEFT: Primary | ICD-10-CM

## 2024-03-27 DIAGNOSIS — M75.102 ROTATOR CUFF SYNDROME, LEFT: ICD-10-CM

## 2024-03-27 LAB
FEF 25 75 LLN: 1.23
FEF 25 75 PRE REF: 142.1 %
FEF 25 75 REF: 2.52
FEV1 FVC LLN: 70
FEV1 FVC PRE REF: 118.8 %
FEV1 FVC REF: 81
FEV1 LLN: 1.92
FEV1 PRE REF: 88 %
FEV1 REF: 2.54
FVC LLN: 2.42
FVC PRE REF: 73.7 %
FVC REF: 3.17
PEF LLN: 4.39
PEF PRE REF: 64 %
PEF REF: 6.52
PRE FEF 25 75: 3.57 L/S (ref 1.23–4.24)
PRE FET 100: 1.08 SEC
PRE FEV1 FVC: 95.88 % (ref 69.91–89.85)
PRE FEV1: 2.24 L (ref 1.92–3.14)
PRE FVC: 2.33 L (ref 2.42–3.95)
PRE PEF: 4.17 L/S (ref 4.39–8.65)

## 2024-03-27 PROCEDURE — 1111F DSCHRG MED/CURRENT MED MERGE: CPT | Mod: CPTII,,, | Performed by: NURSE PRACTITIONER

## 2024-03-27 PROCEDURE — 99215 OFFICE O/P EST HI 40 MIN: CPT | Mod: S$PBB,,, | Performed by: NURSE PRACTITIONER

## 2024-03-27 PROCEDURE — 1160F RVW MEDS BY RX/DR IN RCRD: CPT | Mod: CPTII,,, | Performed by: NURSE PRACTITIONER

## 2024-03-27 PROCEDURE — 1159F MED LIST DOCD IN RCRD: CPT | Mod: CPTII,,, | Performed by: NURSE PRACTITIONER

## 2024-03-27 PROCEDURE — 4010F ACE/ARB THERAPY RXD/TAKEN: CPT | Mod: CPTII,,, | Performed by: NURSE PRACTITIONER

## 2024-03-27 PROCEDURE — 3008F BODY MASS INDEX DOCD: CPT | Mod: CPTII,,, | Performed by: NURSE PRACTITIONER

## 2024-03-27 PROCEDURE — 3075F SYST BP GE 130 - 139MM HG: CPT | Mod: CPTII,,, | Performed by: NURSE PRACTITIONER

## 2024-03-27 PROCEDURE — 99215 OFFICE O/P EST HI 40 MIN: CPT | Mod: PBBFAC | Performed by: NURSE PRACTITIONER

## 2024-03-27 PROCEDURE — 3079F DIAST BP 80-89 MM HG: CPT | Mod: CPTII,,, | Performed by: NURSE PRACTITIONER

## 2024-03-27 NOTE — PROGRESS NOTES
"  Subjective:   PATIENT ID: Nicole AGRAWAL  Junior Quiñones is a 49 y.o. female. Non-smoker. Employment HX: , currently self employed.    Seen [facility] ortho for same DX since n/a.  Seen for left knee pain since 2023   CHIEF COMPLAINT: Shoulder Pain of the Left Shoulder (Here with Lt Shoulder pain. Denies injury. Pain Level seven today)    HPI:    Left sharp and "locks"  anterior shoulder pain. Right dominate  Injury: no history of significant injuries or previous surgeries  Onset: several years ago fluctuates   Modifying Factors: increased with overhead movement, worse with activity, improved with rest, increased with lifting, increased when lying on affected shoulder, and increased pain at PM  Associated Symptoms: popping, decreased  strength, decreased ROM, and difficulty sleeping s/t pain  Activity: sedentary with light activity and pain moderately interferes with ADLs .   Previous Treatments: HEP with TheraBand without symptom relief.   PMH: + RA  Family History: unknown     NOTE: Existing patient with new compliant referred for left shoulder pain with minimal conservative treatments.  Symptoms affecting ADLs.     Current Outpatient Medications:     albuterol (PROVENTIL) 2.5 mg /3 mL (0.083 %) nebulizer solution, USE 1 VIAL IN NEBULIZER EVERY 8 HOURS AS NEEDED, Disp: 1 each, Rfl: 3    azaTHIOprine (IMURAN) 50 mg Tab, Start Imuran 50 mg daily and after 1 week increase dose to 100 mg daily.  Hold for infection or fever., Disp: 60 tablet, Rfl: 3    baclofen (LIORESAL) 5 mg Tab tablet, Take 1 tablet (5 mg total) by mouth 2 (two) times daily as needed (tension)., Disp: 30 tablet, Rfl: 2    cetirizine (ZYRTEC) 10 MG tablet, Take 10 mg by mouth once daily., Disp: , Rfl:     cloNIDine (CATAPRES) 0.1 MG tablet, Take 1 tablet by mouth daily as needed., Disp: , Rfl:     DULERA 100-5 mcg/actuation HFAA, Inhale 1 puff into the lungs 2 (two) times daily., Disp: 8.8 g, Rfl: 5    EMGALITY  mg/mL PnIj, AS DIRECTED " SUBCUTANEOUS MONTHLY 30 DAY(S), Disp: 1 each, Rfl: 11    ergocalciferol (ERGOCALCIFEROL) 50,000 unit Cap, Take 1 capsule (50,000 Units total) by mouth every 7 days., Disp: 12 capsule, Rfl: 3    ezetimibe (ZETIA) 10 mg tablet, Take 1 tablet (10 mg total) by mouth every evening., Disp: 90 tablet, Rfl: 1    fluticasone propionate (FLONASE) 50 mcg/actuation nasal spray, 2 sprays (100 mcg total) by Each Nostril route daily as needed for Allergies or Rhinitis., Disp: 18.2 mL, Rfl: 1    folic acid (FOLVITE) 1 MG tablet, Take 1 tablet (1 mg total) by mouth once daily., Disp: 30 tablet, Rfl: 2    gabapentin (NEURONTIN) 600 MG tablet, Take 600 mg by mouth 2 (two) times daily., Disp: , Rfl:     isosorbide dinitrate (ISORDIL) 40 MG Tab, Take 1 tablet (40 mg total) by mouth once daily., Disp: 90 tablet, Rfl: 3    LINZESS 145 mcg Cap capsule, Take 145 mcg by mouth once daily., Disp: , Rfl:     metoprolol succinate (TOPROL-XL) 100 MG 24 hr tablet, Take 1 tablet (100 mg total) by mouth every evening., Disp: 90 tablet, Rfl: 3    multivitamin Tab, Take 1 tablet by mouth once daily., Disp: 30 tablet, Rfl: 2    nitroGLYCERIN (NITROSTAT) 0.4 MG SL tablet, Place 1 tablet (0.4 mg total) under the tongue every 5 (five) minutes as needed for Chest pain., Disp: 25 tablet, Rfl: 4    omeprazole (PRILOSEC) 40 MG capsule, Take 1 capsule (40 mg total) by mouth once daily., Disp: 90 capsule, Rfl: 1    ondansetron (ZOFRAN) 8 MG tablet, TAKE 1 TABLET BY MOUTH EVERY 8 HOURS AS NEEDED FOR NAUSEA AND VOMITING, Disp: 9 tablet, Rfl: 3    predniSONE (DELTASONE) 20 MG tablet, Take 4 tablets (80 mg total) by mouth once daily for 4 days, THEN 3 tablets (60 mg total) once daily for 4 days, THEN 2 tablets (40 mg total) once daily for 4 days, THEN 1 tablet (20 mg total) once daily for 4 days, THEN 0.5 tablets (10 mg total) once daily for 10 days., Disp: 45 tablet, Rfl: 0    RESTASIS 0.05 % ophthalmic emulsion, Place 1 drop into both eyes 2 (two) times daily.,  "Disp: , Rfl:     sucralfate (CARAFATE) 100 mg/mL suspension, Take 10 mLs (1 g total) by mouth 4 (four) times daily., Disp: 1200 mL, Rfl: 0    terconazole (TERAZOL 7) 0.4 % Crea, Place 1 applicator vaginally every evening., Disp: , Rfl:     UBRELVY 100 mg tablet, Take 1 tablet (100 mg total) by mouth 2 (two) times daily as needed for Migraine., Disp: 16 tablet, Rfl: 2    valsartan (DIOVAN) 80 MG tablet, Take 1 tablet (80 mg total) by mouth once daily., Disp: 90 tablet, Rfl: 3    venlafaxine (EFFEXOR-XR) 37.5 MG 24 hr capsule, Take 1 capsule (37.5 mg total) by mouth once daily., Disp: 30 capsule, Rfl: 11    zavegepant 10 mg/actuation Spry, 10 mg by Nasal route daily as needed (Migraine)., Disp: 1 each, Rfl: 5  Review of patient's allergies indicates:   Allergen Reactions    Nsaids (non-steroidal anti-inflammatory drug) Other (See Comments)     Causes ulcers to bleed.    Ibuprofen     Latex     Meloxicam     Nsaids (non-steroidal anti-inflammatory drug) Nausea Only     Hemoglobin A1c   Date Value Ref Range Status   05/23/2023 5.3 <=7.0 % Final      Body mass index is 39.53 kg/m².   Vitals:    03/27/24 1339 03/27/24 1340 03/27/24 1343   BP:  (!) 138/94 136/87   Pulse:  78    Temp:  98 °F (36.7 °C)    TempSrc:  Oral    Weight: 111.1 kg (244 lb 14.9 oz)     Height: 5' 6" (1.676 m)     PainSc:    7       REVIEW OF SYSTEMS:  A ten-point review of systems was performed and is negative, except as mentioned above   Objective:   MSK Shoulder Exam  General:  no apparent distress, no pain indicators,  obesity  Inspection: poor posture with rounded upper back noted  LEFT SHOULDER RIGHT SHOULDER   no soft tissue swelling, no guarding/ self imposed immobilization of shoulder, no winged scapula, no erythema, no contusion,  no masses, no scars, no clavicle deformity, no shoulder dislocation deformity no soft tissue swelling, no guarding/ self imposed immobilization of shoulder, no erythema, no contusion,  no masses, no scars, no " clavicle deformity, no shoulder dislocation deformity     Palpation:     LEFT SHOULDER RIGHT SHOULDER   normal temperature, no deconditioning noted,, mild tenderness of AC joint, bicipital groove, GH joint, and supraspinatus, no tenderness noted of suprasternal notch/ sternoclavicular joint, clavicle, coracoid process, infraspinatus, teres minor , trapezius, rhomboids, scapula spine, bicep, and cervical spine, no  crepitus with ROM, no palpable hypersensitive nodule/ trigger point   normal temperature, no deconditioning noted, no tenderness noted of global shoulder, no  crepitus with ROM, no palpable hypersensitive nodule/ trigger point        ROM Active Flexion / Extension  LEFT SHOULDER RIGHT SHOULDER   Abduction 160  Horizontal Adduction 45  Posterior Extension 45  Forward Flexion 130  Internal Rotation sacrum  External Rotation 45 Abduction 180  Horizontal Adduction 45  Posterior Extension 45  Forward Flexion 180  Internal Rotation L-spine  External Rotation 60     Strength   LEFT SHOULDER RIGHT SHOULDER   Flexion 4 / 5   Extension 5 / 5  Abductors 5 / 5   Adduction 5 / 5  External Rotation 4 / 5  Internal Rotation 4 / 5  Scapular Elevation 5 / 5  Scapular Protraction 5 / 5 Flexion 5 / 5   Extension 5 / 5  Abductors 5 / 5   Adduction 5 / 5  External Rotation 5 / 5  Internal Rotation 5 / 5  Scapular Elevation 5 / 5  Scapular Protraction 5 / 5     Special Testing:         Bicep Tendon L+ L-- R+ R-- Not Tested    Hook Test [] [x] [] [x] []    Bharat Sign [] [x] [] [x] []    Rotator Cuff         Full Can [x] [] [] [x] []    Empty Can [x] [] [] [x] []    Lift Off  [x] [] [] [x] []    Belly Press [x] [] [] [x] []    Hornblower [] [x] [] [x] []    Drop Arm [] [x] [] [x] []    AC Joint         Cross Arm Adduction [x] [] [] [x] []    Impingement         Hawkin's [x] [] [] [x] []    Painful Arc  [x] [] [] [x] []    Painful Arc 170-180 [x] [] [] [x] []    Instability         Shoulder Appreh. [] [x] [] [x] []     Labral         Douglas's [] [x] [] [x] []       Cervical ROM Pain negative  Neurovascular: Intact to light touch  Neuro/ Psych: Awake, alert, oriented, normal mood and affect  Lymphatic: No LAD  Skin/ Soft Tissue: no rash, skin intact   Assessment:   IMAGING: X-ray 3 views of left shoulder dated 9/20/23 reviewed and independently interpreted by me.  Discussed with patient. Noted no acute, DJD noted.    XR SHOULDER COMPLETE 2 OR MORE VIEWS LEFT 9/20/23  CLINICAL HISTORY:  Rheumatoid arthritis with rheumatoid factor of multiple sites without organ or systems involvement  COMPARISON:  None.  FINDINGS:  No acute displaced fractures or dislocations.  There is some narrowing of the acromioclavicular joint degenerative changes with marginal osteophytes and narrowing identified of the inferior aspect of the glenohumeral joint articular spaces otherwise preserved with smooth articular surfaces  No blastic or lytic lesions.  Soft tissues within normal limits.  Impression:  Degenerative changes.    EMR REVIEW: completed with noted Referral documentation reviewed    DIAGNOSIS:  1. Arthralgia of shoulder region, left    2. Rotator cuff syndrome, left    3. BMI 39.0-39.9,adult       Plan:      Ongoing education about DX and treatment recommendations including conservative treatments of daily HEP with TheraBand, muscle strengthening, adequate vit D/C, glucosamine 1500 mg/day and daily acetaminophen 1000 mg 3 times/ day if able to tolerate.    Treatment plan: Mild exacerbation of chronic Left Rotator Cuff Syndrome and Shoulder Arthritis  Failed conservative treatments including: OTC pain relievers. Today recommending HEP with Thera Band provided.  If inadequate at f/u will consider CSI.  Patient reports multiple co-morbid conditions which consume her time, including chemotherapy and multiple doctors appointment/ hospital stays.  Patient does not desire to pursue treatments at this time r/t shoulder due to needing to focus on  other treatments.  Will call and RTC when ready to move forward.  In the meantime I educated patient on HEP in order to improve stiffness and strength.   Procedure: CSI discussed as treatment options in future if conservative treatments fail.   RX Medications: continue medications as RX per PCP .  RTC: PRN if symptoms worsen or return       Leah Menard-Neumann FNP [facility]  Procedure Note:   None  Time Based Billing   Total Time Spent with Patient: 40 minutes .  Visit Start Time: 1350  10 minutes spent prior to visit reviewing EMR, prior labs and x-rays.  20 minutes spent in visit with patient face-to-face time completing exam, obtaining history, educating on DX and treatment plan.  10 minutes spent after visit completing EMR documentation.   Visit End Time: 1430    Please be aware that this note has been generated with the assistance of [employer] voice-to-text.  Please excuse any spelling or grammatical errors.

## 2024-03-27 NOTE — PROGRESS NOTES
Testing discontinued after 10 incomplete and unacceptable spirometry loops and results, which is the maximum limit allowed. Several attempts made with different methods. Patient unable to exhale longer than 2 seconds. Patient also stated that she had esophageal dilation procedure 3-.

## 2024-03-28 ENCOUNTER — OFFICE VISIT (OUTPATIENT)
Dept: HEMATOLOGY/ONCOLOGY | Facility: CLINIC | Age: 49
End: 2024-03-28
Payer: MEDICAID

## 2024-03-28 ENCOUNTER — INFUSION (OUTPATIENT)
Dept: INFUSION THERAPY | Facility: HOSPITAL | Age: 49
End: 2024-03-28
Attending: NURSE PRACTITIONER
Payer: MEDICAID

## 2024-03-28 VITALS
DIASTOLIC BLOOD PRESSURE: 94 MMHG | OXYGEN SATURATION: 98 % | HEART RATE: 72 BPM | RESPIRATION RATE: 15 BRPM | SYSTOLIC BLOOD PRESSURE: 137 MMHG | TEMPERATURE: 98 F | HEIGHT: 66 IN | WEIGHT: 245.63 LBS | BODY MASS INDEX: 39.48 KG/M2

## 2024-03-28 VITALS
SYSTOLIC BLOOD PRESSURE: 162 MMHG | BODY MASS INDEX: 39.38 KG/M2 | WEIGHT: 244 LBS | DIASTOLIC BLOOD PRESSURE: 99 MMHG | OXYGEN SATURATION: 100 % | HEART RATE: 72 BPM | TEMPERATURE: 98 F

## 2024-03-28 DIAGNOSIS — M31.19 TTP (THROMBOTIC THROMBOCYTOPENIC PURPURA): Primary | ICD-10-CM

## 2024-03-28 PROCEDURE — 1159F MED LIST DOCD IN RCRD: CPT | Mod: CPTII,,, | Performed by: NURSE PRACTITIONER

## 2024-03-28 PROCEDURE — 96375 TX/PRO/DX INJ NEW DRUG ADDON: CPT

## 2024-03-28 PROCEDURE — 4010F ACE/ARB THERAPY RXD/TAKEN: CPT | Mod: CPTII,,, | Performed by: NURSE PRACTITIONER

## 2024-03-28 PROCEDURE — 1111F DSCHRG MED/CURRENT MED MERGE: CPT | Mod: CPTII,,, | Performed by: NURSE PRACTITIONER

## 2024-03-28 PROCEDURE — 25000003 PHARM REV CODE 250: Performed by: INTERNAL MEDICINE

## 2024-03-28 PROCEDURE — 96415 CHEMO IV INFUSION ADDL HR: CPT

## 2024-03-28 PROCEDURE — 1160F RVW MEDS BY RX/DR IN RCRD: CPT | Mod: CPTII,,, | Performed by: NURSE PRACTITIONER

## 2024-03-28 PROCEDURE — 99999 PR PBB SHADOW E&M-EST. PATIENT-LVL V: CPT | Mod: PBBFAC,,, | Performed by: NURSE PRACTITIONER

## 2024-03-28 PROCEDURE — 96367 TX/PROPH/DG ADDL SEQ IV INF: CPT

## 2024-03-28 PROCEDURE — 99215 OFFICE O/P EST HI 40 MIN: CPT | Mod: PBBFAC | Performed by: NURSE PRACTITIONER

## 2024-03-28 PROCEDURE — 99214 OFFICE O/P EST MOD 30 MIN: CPT | Mod: S$PBB,,, | Performed by: NURSE PRACTITIONER

## 2024-03-28 PROCEDURE — 3008F BODY MASS INDEX DOCD: CPT | Mod: CPTII,,, | Performed by: NURSE PRACTITIONER

## 2024-03-28 PROCEDURE — 3075F SYST BP GE 130 - 139MM HG: CPT | Mod: CPTII,,, | Performed by: NURSE PRACTITIONER

## 2024-03-28 PROCEDURE — 63600175 PHARM REV CODE 636 W HCPCS: Performed by: INTERNAL MEDICINE

## 2024-03-28 PROCEDURE — 3080F DIAST BP >= 90 MM HG: CPT | Mod: CPTII,,, | Performed by: NURSE PRACTITIONER

## 2024-03-28 PROCEDURE — 96413 CHEMO IV INFUSION 1 HR: CPT

## 2024-03-28 RX ORDER — SODIUM CHLORIDE 0.9 % (FLUSH) 0.9 %
10 SYRINGE (ML) INJECTION
Status: DISCONTINUED | OUTPATIENT
Start: 2024-03-28 | End: 2024-03-28 | Stop reason: HOSPADM

## 2024-03-28 RX ORDER — EPINEPHRINE 0.3 MG/.3ML
0.3 INJECTION SUBCUTANEOUS ONCE AS NEEDED
OUTPATIENT
Start: 2024-03-28

## 2024-03-28 RX ORDER — SODIUM CHLORIDE 0.9 % (FLUSH) 0.9 %
10 SYRINGE (ML) INJECTION
Status: CANCELLED | OUTPATIENT
Start: 2024-03-28

## 2024-03-28 RX ORDER — MEPERIDINE HYDROCHLORIDE 50 MG/ML
25 INJECTION INTRAMUSCULAR; INTRAVENOUS; SUBCUTANEOUS
OUTPATIENT
Start: 2024-03-28 | End: 2024-03-29

## 2024-03-28 RX ORDER — ACETAMINOPHEN 325 MG/1
650 TABLET ORAL
Status: CANCELLED | OUTPATIENT
Start: 2024-03-28

## 2024-03-28 RX ORDER — FAMOTIDINE 10 MG/ML
20 INJECTION INTRAVENOUS
Status: COMPLETED | OUTPATIENT
Start: 2024-03-28 | End: 2024-03-28

## 2024-03-28 RX ORDER — DIPHENHYDRAMINE HYDROCHLORIDE 50 MG/ML
50 INJECTION INTRAMUSCULAR; INTRAVENOUS ONCE AS NEEDED
OUTPATIENT
Start: 2024-03-28

## 2024-03-28 RX ORDER — FAMOTIDINE 10 MG/ML
20 INJECTION INTRAVENOUS
Status: CANCELLED | OUTPATIENT
Start: 2024-03-28

## 2024-03-28 RX ORDER — HEPARIN 100 UNIT/ML
500 SYRINGE INTRAVENOUS
Status: DISCONTINUED | OUTPATIENT
Start: 2024-03-28 | End: 2024-03-28 | Stop reason: HOSPADM

## 2024-03-28 RX ORDER — ACETAMINOPHEN 325 MG/1
650 TABLET ORAL
Status: COMPLETED | OUTPATIENT
Start: 2024-03-28 | End: 2024-03-28

## 2024-03-28 RX ORDER — HEPARIN 100 UNIT/ML
500 SYRINGE INTRAVENOUS
Status: CANCELLED | OUTPATIENT
Start: 2024-03-28

## 2024-03-28 RX ADMIN — DIPHENHYDRAMINE HYDROCHLORIDE 25 MG: 50 INJECTION INTRAMUSCULAR; INTRAVENOUS at 09:03

## 2024-03-28 RX ADMIN — FAMOTIDINE 20 MG: 10 INJECTION INTRAVENOUS at 09:03

## 2024-03-28 RX ADMIN — ACETAMINOPHEN 650 MG: 325 TABLET ORAL at 09:03

## 2024-03-28 NOTE — PROGRESS NOTES
"Subjective:       Patient ID: Nicolemary Brown is a 49 y.o. female.    Diagnosis:  Relapsed thrombotic thrombocytopenic purpura  Microangiopathic hemolytic anemia    Current Treatment: Rituxan weekly - week #4, Prednisone 10 mg daily    Chief Complaint: "I am doing okay"    Black female with a history of TTP initially diagnosed in 2015.  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  She has been followed by a hematologist in [place].  She relocated to [place] to live with her daughter approximately 8 months ago.  She presented to the ER 2/25/24 with low-grade fever, nausea/vomiting and abdominal discomfort.  She denied overt diarrhea.  Laboratory on presentation showed a platelet count of 5000.  Hemoglobin was 12.4 with microcytic, hypochromic indices and WBC 6.4.  LDH was elevated at 711 and haptoglobin was undetectable.  UPT was negative.  CMP showed mild renal insufficiency with a BUN of 24.9 and creatinine 1.36.  Peripheral smear showed microcytic, hypochromic red blood cells, increased reticulocytes and the presence of schistocytes and target cells.  Platelets were markedly decreased.  She was transferred to [facility] for initiation of plasmapheresis.  She received 1 unit of platelets at the outside facility prior to transfer.  On arrival here, she was started on prednisone 1 mg/kg daily and received 1 unit of FFP.     Hospital Course  2/27/24:  Started daily plasma exchange 1 plasma volume (40 cc/kg) exchange with FFP.  Platelet count 12,000. ADAMTS-13 level drawn 2/26/24 <5%.  2/28/24:  Day 2 plasma exchange.  Change to 1/2 albumin + 1/2 FFP secondary to significant urticaria.  2/29/24: Day 3 plasma exchange.  1/2 albumin + 1/2 FFP.  03/1/24: Day 4 plasma exchange.  1/2 albumin + 1/2 FFP  03/4/2024:  Day 5 plasma exchange.  1/2 albumin + 1/2 FFP  03/6/24: Discharged from the hospital    Outpatient plasma exchange could not be coordinated with the hospital.  " Systemic therapy was recommended with Rituxan weekly x4.     Interval History  Kadeem is here today by herself for a two week on treatment follow-up visit.  She will receive her fourth and final weekly Rituxan infusion today.  She felt poorly after the first treatment, but has done better with subsequent infusions.  She is back on Emgality for her chronic migraines.  As previously mentioned, she is not on systemic treatment for her RA, but has an upcoming appointment with Rheumatology on 4/19/24.  She is tapering her Prednisone as instructed.  Her dose is currently 10 mg daily, which she will continue until 4/3/24, the discontinue.  She did have an EGD last week with dilatation.  There is no operative report on record for review.  She is still taking Carafate and her abdominal pain has improved.  She is set to follow-up with GI for formal results review and recommendations.  Laboratory has been monitored twice weekly.  Her anemia has improved and platelet count has remained normal.  LDH and ADAMTS-13 have normalized.    Review of patient's allergies indicates:   Allergen Reactions    Nsaids (non-steroidal anti-inflammatory drug) Other (See Comments)     Causes ulcers to bleed.    Ibuprofen     Latex     Meloxicam     Nsaids (non-steroidal anti-inflammatory drug) Nausea Only      Review of Systems   Constitutional:  Negative for appetite change and unexpected weight change.   HENT:  Negative for mouth sores.    Eyes:  Negative for visual disturbance.   Respiratory:  Positive for shortness of breath. Negative for cough.    Cardiovascular:  Positive for chest pain.   Gastrointestinal:  Positive for abdominal pain (improved). Negative for diarrhea.   Genitourinary:  Negative for frequency.   Musculoskeletal:  Negative for back pain.   Integumentary:  Positive for rash.   Neurological:  Positive for headaches.   Hematological:  Negative for adenopathy.   Psychiatric/Behavioral:  The patient is not nervous/anxious.           PMHx:  HTN, hyperlipidemia, rheumatoid arthritis, migraine headaches, TTP  PSHx:  Endometrial ablation, left carpal tunnel release, right shoulder surgery  SH:  Lifetime nonsmoker.  Previous alcohol, none since age 20.  Lives in [place] with her daughter.  Provides  in her home.  FH:  PGF had a CVA.  No family history of cancer.  Objective:     Vitals:    03/28/24 0824   BP: (!) 137/94   Pulse: 72   Resp: 15   Temp: 98.1 °F (36.7 °C)           Physical Exam  Constitutional:       Appearance: She is obese.   HENT:      Head: Normocephalic.      Nose: Nose normal.      Mouth/Throat:      Mouth: Mucous membranes are moist.      Pharynx: Oropharynx is clear.   Eyes:      Conjunctiva/sclera: Conjunctivae normal.      Pupils: Pupils are equal, round, and reactive to light.   Cardiovascular:      Rate and Rhythm: Normal rate and regular rhythm.   Pulmonary:      Effort: Pulmonary effort is normal.      Breath sounds: Normal breath sounds.   Abdominal:      General: Bowel sounds are normal.      Palpations: Abdomen is soft.      Tenderness: There is abdominal tenderness.   Musculoskeletal:         General: No swelling. Normal range of motion.      Cervical back: Normal range of motion.   Lymphadenopathy:      Cervical: No cervical adenopathy.   Skin:     General: Skin is warm and dry.      Findings: No rash.   Neurological:      General: No focal deficit present.      Mental Status: She is alert and oriented to person, place, and time.      Sensory: Sensory deficit (fingertip neuropathy, grade 1, pre-existing) present.   Psychiatric:         Mood and Affect: Mood normal.         Behavior: Behavior normal.       ECOG SCORE    1 - Restricted in strenuous activity-ambulatory and able to carry out work of a light nature            Recent Results (from the past 336 hour(s))   Comprehensive Metabolic Panel    Collection Time: 03/18/24 12:09 PM   Result Value Ref Range    Sodium Level 141 136 - 145 mmol/L     Potassium Level 3.7 3.5 - 5.1 mmol/L    Chloride 109 (H) 98 - 107 mmol/L    Carbon Dioxide 23 22 - 29 mmol/L    Glucose Level 86 74 - 100 mg/dL    Blood Urea Nitrogen 12.7 9.8 - 20.1 mg/dL    Creatinine 0.80 0.55 - 1.02 mg/dL    Calcium Level Total 8.8 8.4 - 10.2 mg/dL    Protein Total 6.2 (L) 6.4 - 8.3 gm/dL    Albumin Level 3.7 3.5 - 5.0 g/dL    Globulin 2.5 2.4 - 3.5 gm/dL    Albumin/Globulin Ratio 1.5 1.1 - 2.0 ratio    Bilirubin Total 0.8 <=1.5 mg/dL    Alkaline Phosphatase 106 40 - 150 unit/L    Alanine Aminotransferase 9 0 - 55 unit/L    Aspartate Aminotransferase 11 5 - 34 unit/L    eGFR >60 mls/min/1.73/m2   Lactate Dehydrogenase    Collection Time: 03/18/24 12:09 PM   Result Value Ref Range    Lactate Dehydrogenase 193 125 - 220 U/L   [id number] Evaluation    Collection Time: 03/18/24 12:09 PM   Result Value Ref Range    OUNNIL07 Activity Assay 64 (L) >/=70 %    [id number] Interpretation SEE COMMENTS    CBC with Differential    Collection Time: 03/18/24 12:09 PM   Result Value Ref Range    WBC 3.79 (L) 4.50 - 11.50 x10(3)/mcL    RBC 4.89 4.20 - 5.40 x10(6)/mcL    Hgb 11.2 (L) 12.0 - 16.0 g/dL    Hct 36.2 (L) 37.0 - 47.0 %    MCV 74.0 (L) 80.0 - 94.0 fL    MCH 22.9 (L) 27.0 - 31.0 pg    MCHC 30.9 (L) 33.0 - 36.0 g/dL    RDW 19.4 (H) 11.5 - 17.0 %    Platelet 289 130 - 400 x10(3)/mcL    MPV 9.1 7.4 - 10.4 fL    Neut % 62.0 %    Lymph % 23.2 %    Mono % 11.9 %    Eos % 2.1 %    Basophil % 0.5 %    Lymph # 0.88 0.6 - 4.6 x10(3)/mcL    Neut # 2.35 2.1 - 9.2 x10(3)/mcL    Mono # 0.45 0.1 - 1.3 x10(3)/mcL    Eos # 0.08 0 - 0.9 x10(3)/mcL    Baso # 0.02 <=0.2 x10(3)/mcL    IG# 0.01 0 - 0.04 x10(3)/mcL    IG% 0.3 %   Comprehensive Metabolic Panel    Collection Time: 03/21/24  9:11 AM   Result Value Ref Range    Sodium Level 142 136 - 145 mmol/L    Potassium Level 4.1 3.5 - 5.1 mmol/L    Chloride 111 (H) 98 - 107 mmol/L    Carbon Dioxide 25 22 - 29 mmol/L    Glucose Level 87 74 - 100 mg/dL    Blood Urea Nitrogen 12.4  9.8 - 20.1 mg/dL    Creatinine 0.88 0.55 - 1.02 mg/dL    Calcium Level Total 8.7 8.4 - 10.2 mg/dL    Protein Total 6.1 (L) 6.4 - 8.3 gm/dL    Albumin Level 3.5 3.5 - 5.0 g/dL    Globulin 2.6 2.4 - 3.5 gm/dL    Albumin/Globulin Ratio 1.3 1.1 - 2.0 ratio    Bilirubin Total 0.4 <=1.5 mg/dL    Alkaline Phosphatase 112 40 - 150 unit/L    Alanine Aminotransferase 9 0 - 55 unit/L    Aspartate Aminotransferase 11 5 - 34 unit/L    eGFR >60 mls/min/1.73/m2   Lactate Dehydrogenase    Collection Time: 03/21/24  9:11 AM   Result Value Ref Range    Lactate Dehydrogenase 191 125 - 220 U/L   [id number] Evaluation    Collection Time: 03/21/24  9:11 AM   Result Value Ref Range    EMDGWJ70 Activity Assay 74 >/=70 %    [id number] Interpretation SEE COMMENTS    CBC with Differential    Collection Time: 03/21/24  9:11 AM   Result Value Ref Range    WBC 3.77 (L) 4.50 - 11.50 x10(3)/mcL    RBC 5.02 4.20 - 5.40 x10(6)/mcL    Hgb 11.5 (L) 12.0 - 16.0 g/dL    Hct 38.1 37.0 - 47.0 %    MCV 75.9 (L) 80.0 - 94.0 fL    MCH 22.9 (L) 27.0 - 31.0 pg    MCHC 30.2 (L) 33.0 - 36.0 g/dL    RDW 19.1 (H) 11.5 - 17.0 %    Platelet 243 130 - 400 x10(3)/mcL    MPV 9.3 7.4 - 10.4 fL    Neut % 61.5 %    Lymph % 22.8 %    Mono % 11.7 %    Eos % 2.9 %    Basophil % 0.8 %    Lymph # 0.86 0.6 - 4.6 x10(3)/mcL    Neut # 2.32 2.1 - 9.2 x10(3)/mcL    Mono # 0.44 0.1 - 1.3 x10(3)/mcL    Eos # 0.11 0 - 0.9 x10(3)/mcL    Baso # 0.03 <=0.2 x10(3)/mcL    IG# 0.01 0 - 0.04 x10(3)/mcL    IG% 0.3 %   Comprehensive Metabolic Panel    Collection Time: 03/25/24  7:09 AM   Result Value Ref Range    Sodium Level 143 136 - 145 mmol/L    Potassium Level 3.6 3.5 - 5.1 mmol/L    Chloride 108 (H) 98 - 107 mmol/L    Carbon Dioxide 26 22 - 29 mmol/L    Glucose Level 96 74 - 100 mg/dL    Blood Urea Nitrogen 15.3 9.8 - 20.1 mg/dL    Creatinine 0.85 0.55 - 1.02 mg/dL    Calcium Level Total 9.0 8.4 - 10.2 mg/dL    Protein Total 6.5 6.4 - 8.3 gm/dL    Albumin Level 3.7 3.5 - 5.0 g/dL    Globulin  2.8 2.4 - 3.5 gm/dL    Albumin/Globulin Ratio 1.3 1.1 - 2.0 ratio    Bilirubin Total 0.4 <=1.5 mg/dL    Alkaline Phosphatase 114 40 - 150 unit/L    Alanine Aminotransferase 8 0 - 55 unit/L    Aspartate Aminotransferase 11 5 - 34 unit/L    eGFR >60 mls/min/1.73/m2   Lactate Dehydrogenase    Collection Time: 03/25/24  7:09 AM   Result Value Ref Range    Lactate Dehydrogenase 164 125 - 220 U/L   [id number] Evaluation    Collection Time: 03/25/24  7:09 AM   Result Value Ref Range    ERNQVT11 Activity Assay 89 >/=70 %    [id number] Interpretation SEE COMMENTS    CBC with Differential    Collection Time: 03/25/24  7:09 AM   Result Value Ref Range    WBC 4.56 4.50 - 11.50 x10(3)/mcL    RBC 5.20 4.20 - 5.40 x10(6)/mcL    Hgb 11.9 (L) 12.0 - 16.0 g/dL    Hct 38.9 37.0 - 47.0 %    MCV 74.8 (L) 80.0 - 94.0 fL    MCH 22.9 (L) 27.0 - 31.0 pg    MCHC 30.6 (L) 33.0 - 36.0 g/dL    RDW 17.9 (H) 11.5 - 17.0 %    Platelet 268 130 - 400 x10(3)/mcL    MPV 9.6 7.4 - 10.4 fL    Neut % 55.8 %    Lymph % 28.5 %    Mono % 12.9 %    Eos % 2.0 %    Basophil % 0.4 %    Lymph # 1.30 0.6 - 4.6 x10(3)/mcL    Neut # 2.54 2.1 - 9.2 x10(3)/mcL    Mono # 0.59 0.1 - 1.3 x10(3)/mcL    Eos # 0.09 0 - 0.9 x10(3)/mcL    Baso # 0.02 <=0.2 x10(3)/mcL    IG# 0.02 0 - 0.04 x10(3)/mcL    IG% 0.4 %   Complete PFT w/ bronchodilator    Collection Time: 03/27/24 10:51 PM   Result Value Ref Range    Pre FVC 2.33 (L) 2.42 - 3.95 L    Pre FEV1 2.24 1.92 - 3.14 L    Pre FEV1 FVC 95.88 (H) 69.91 - 89.85 %    Pre FEF 25 75 3.57 1.23 - 4.24 L/s    Pre PEF 4.17 (L) 4.39 - 8.65 L/s    Pre  1.08 sec    FVC Ref 3.17     FVC LLN 2.42     FVC Pre Ref 73.7 %    FEV1 Ref 2.54     FEV1 LLN 1.92     FEV1 Pre Ref 88.0 %    FEV1 FVC Ref 81     FEV1 FVC LLN 70     FEV1 FVC Pre Ref 118.8 %    FEF 25 75 Ref 2.52     FEF 25 75 LLN 1.23     FEF 25 75 Pre Ref 142.1 %    PEF Ref 6.52     PEF LLN 4.39     PEF Pre Ref 64.0 %   CBC with Differential    Collection Time: 03/28/24  7:10 AM    Result Value Ref Range    WBC 4.26 (L) 4.50 - 11.50 x10(3)/mcL    RBC 5.34 4.20 - 5.40 x10(6)/mcL    Hgb 12.3 12.0 - 16.0 g/dL    Hct 40.3 37.0 - 47.0 %    MCV 75.5 (L) 80.0 - 94.0 fL    MCH 23.0 (L) 27.0 - 31.0 pg    MCHC 30.5 (L) 33.0 - 36.0 g/dL    RDW 17.0 11.5 - 17.0 %    Platelet 268 130 - 400 x10(3)/mcL    MPV 9.2 7.4 - 10.4 fL    Neut % 60.2 %    Lymph % 24.6 %    Mono % 11.5 %    Eos % 2.8 %    Basophil % 0.7 %    Lymph # 1.05 0.6 - 4.6 x10(3)/mcL    Neut # 2.56 2.1 - 9.2 x10(3)/mcL    Mono # 0.49 0.1 - 1.3 x10(3)/mcL    Eos # 0.12 0 - 0.9 x10(3)/mcL    Baso # 0.03 <=0.2 x10(3)/mcL    IG# 0.01 0 - 0.04 x10(3)/mcL    IG% 0.2 %       Assessment:   TTP  Microangiopathic hemolytic anemia  RA  Chronic migraines  Steroid induced gastritis        Plan:   Continue Rituxan weekly.  Week #4 today.  Continue steroid taper as tolerated.  Patient has a steroid taper that she is following and set to complete on 4/3/24.  Continue Carafate 1g TID and PPI daily until upcoming GI follow-up appointment.  Decrease laboratory monitoring to once weekly to include CBC, CMP, and LDH.    RTC 4 weeks for posttreatment follow-up.  Continue weekly laboratory monitoring until this visit.  Patient to call or contact me via the portal with questions or concerns in the interim.    All questions answered to the satisfaction of the patient.    IJMENA CHUNG, FNP-C  Hematology/Oncology  [facility]

## 2024-04-01 ENCOUNTER — PROCEDURE VISIT (OUTPATIENT)
Dept: SLEEP MEDICINE | Facility: HOSPITAL | Age: 49
End: 2024-04-01
Payer: MEDICAID

## 2024-04-01 DIAGNOSIS — G47.33 OSA (OBSTRUCTIVE SLEEP APNEA): ICD-10-CM

## 2024-04-01 PROCEDURE — 95810 POLYSOM 6/> YRS 4/> PARAM: CPT

## 2024-04-01 RX ORDER — GABAPENTIN 600 MG/1
600 TABLET ORAL 2 TIMES DAILY
Qty: 60 TABLET | Refills: 2 | OUTPATIENT
Start: 2024-04-01

## 2024-04-01 NOTE — TELEPHONE ENCOUNTER
I am not sure who refilled this patient medication in the past.  Patient need to read the name on the bottle of the medication and sent to prescriber for refill.  Thanks

## 2024-04-03 ENCOUNTER — LAB VISIT (OUTPATIENT)
Dept: LAB | Facility: HOSPITAL | Age: 49
End: 2024-04-03
Attending: NURSE PRACTITIONER
Payer: MEDICAID

## 2024-04-03 DIAGNOSIS — K29.70 GASTRITIS, PRESENCE OF BLEEDING UNSPECIFIED, UNSPECIFIED CHRONICITY, UNSPECIFIED GASTRITIS TYPE: ICD-10-CM

## 2024-04-03 DIAGNOSIS — M31.19 TTP (THROMBOTIC THROMBOCYTOPENIC PURPURA): ICD-10-CM

## 2024-04-03 LAB
ALBUMIN SERPL-MCNC: 3.7 G/DL (ref 3.5–5)
ALBUMIN/GLOB SERPL: 1.3 RATIO (ref 1.1–2)
ALP SERPL-CCNC: 131 UNIT/L (ref 40–150)
ALT SERPL-CCNC: 8 UNIT/L (ref 0–55)
AST SERPL-CCNC: 12 UNIT/L (ref 5–34)
BASOPHILS # BLD AUTO: 0.02 X10(3)/MCL
BASOPHILS NFR BLD AUTO: 0.4 %
BILIRUB SERPL-MCNC: 0.3 MG/DL
BUN SERPL-MCNC: 18.4 MG/DL (ref 9.8–20.1)
CALCIUM SERPL-MCNC: 9.6 MG/DL (ref 8.4–10.2)
CHLORIDE SERPL-SCNC: 110 MMOL/L (ref 98–107)
CO2 SERPL-SCNC: 26 MMOL/L (ref 22–29)
CREAT SERPL-MCNC: 0.92 MG/DL (ref 0.55–1.02)
EOSINOPHIL # BLD AUTO: 0.06 X10(3)/MCL (ref 0–0.9)
EOSINOPHIL NFR BLD AUTO: 1.3 %
ERYTHROCYTE [DISTWIDTH] IN BLOOD BY AUTOMATED COUNT: 16.3 % (ref 11.5–17)
GFR SERPLBLD CREATININE-BSD FMLA CKD-EPI: >60 MLS/MIN/1.73/M2
GLOBULIN SER-MCNC: 2.9 GM/DL (ref 2.4–3.5)
GLUCOSE SERPL-MCNC: 89 MG/DL (ref 74–100)
HCT VFR BLD AUTO: 40.5 % (ref 37–47)
HGB BLD-MCNC: 12.3 G/DL (ref 12–16)
IMM GRANULOCYTES # BLD AUTO: 0.01 X10(3)/MCL (ref 0–0.04)
IMM GRANULOCYTES NFR BLD AUTO: 0.2 %
LDH SERPL-CCNC: 212 U/L (ref 125–220)
LYMPHOCYTES # BLD AUTO: 1.04 X10(3)/MCL (ref 0.6–4.6)
LYMPHOCYTES NFR BLD AUTO: 22.3 %
MCH RBC QN AUTO: 22.9 PG (ref 27–31)
MCHC RBC AUTO-ENTMCNC: 30.4 G/DL (ref 33–36)
MCV RBC AUTO: 75.3 FL (ref 80–94)
MONOCYTES # BLD AUTO: 0.76 X10(3)/MCL (ref 0.1–1.3)
MONOCYTES NFR BLD AUTO: 16.3 %
NEUTROPHILS # BLD AUTO: 2.78 X10(3)/MCL (ref 2.1–9.2)
NEUTROPHILS NFR BLD AUTO: 59.5 %
PLATELET # BLD AUTO: 352 X10(3)/MCL (ref 130–400)
PMV BLD AUTO: 9.6 FL (ref 7.4–10.4)
POTASSIUM SERPL-SCNC: 4.3 MMOL/L (ref 3.5–5.1)
PROT SERPL-MCNC: 6.6 GM/DL (ref 6.4–8.3)
RBC # BLD AUTO: 5.38 X10(6)/MCL (ref 4.2–5.4)
SODIUM SERPL-SCNC: 144 MMOL/L (ref 136–145)
WBC # SPEC AUTO: 4.67 X10(3)/MCL (ref 4.5–11.5)

## 2024-04-03 PROCEDURE — 80053 COMPREHEN METABOLIC PANEL: CPT

## 2024-04-03 PROCEDURE — 85025 COMPLETE CBC W/AUTO DIFF WBC: CPT

## 2024-04-03 PROCEDURE — 36415 COLL VENOUS BLD VENIPUNCTURE: CPT

## 2024-04-03 PROCEDURE — 83615 LACTATE (LD) (LDH) ENZYME: CPT

## 2024-04-10 ENCOUNTER — LAB VISIT (OUTPATIENT)
Dept: LAB | Facility: HOSPITAL | Age: 49
End: 2024-04-10
Attending: NURSE PRACTITIONER
Payer: MEDICAID

## 2024-04-10 DIAGNOSIS — M31.19 TTP (THROMBOTIC THROMBOCYTOPENIC PURPURA): ICD-10-CM

## 2024-04-10 DIAGNOSIS — K29.70 GASTRITIS, PRESENCE OF BLEEDING UNSPECIFIED, UNSPECIFIED CHRONICITY, UNSPECIFIED GASTRITIS TYPE: ICD-10-CM

## 2024-04-10 LAB
ALBUMIN SERPL-MCNC: 3.9 G/DL (ref 3.5–5)
ALBUMIN/GLOB SERPL: 1.2 RATIO (ref 1.1–2)
ALP SERPL-CCNC: 127 UNIT/L (ref 40–150)
ALT SERPL-CCNC: 11 UNIT/L (ref 0–55)
AST SERPL-CCNC: 15 UNIT/L (ref 5–34)
BASOPHILS # BLD AUTO: 0.02 X10(3)/MCL
BASOPHILS NFR BLD AUTO: 0.5 %
BILIRUB SERPL-MCNC: 0.4 MG/DL
BUN SERPL-MCNC: 15.4 MG/DL (ref 9.8–20.1)
CALCIUM SERPL-MCNC: 9.5 MG/DL (ref 8.4–10.2)
CHLORIDE SERPL-SCNC: 107 MMOL/L (ref 98–107)
CO2 SERPL-SCNC: 27 MMOL/L (ref 22–29)
CREAT SERPL-MCNC: 0.91 MG/DL (ref 0.55–1.02)
EOSINOPHIL # BLD AUTO: 0.09 X10(3)/MCL (ref 0–0.9)
EOSINOPHIL NFR BLD AUTO: 2.3 %
ERYTHROCYTE [DISTWIDTH] IN BLOOD BY AUTOMATED COUNT: 15.8 % (ref 11.5–17)
GFR SERPLBLD CREATININE-BSD FMLA CKD-EPI: >60 MLS/MIN/1.73/M2
GLOBULIN SER-MCNC: 3.2 GM/DL (ref 2.4–3.5)
GLUCOSE SERPL-MCNC: 83 MG/DL (ref 74–100)
HCT VFR BLD AUTO: 43.3 % (ref 37–47)
HGB BLD-MCNC: 13.1 G/DL (ref 12–16)
IMM GRANULOCYTES # BLD AUTO: 0.01 X10(3)/MCL (ref 0–0.04)
IMM GRANULOCYTES NFR BLD AUTO: 0.3 %
LDH SERPL-CCNC: 243 U/L (ref 125–220)
LYMPHOCYTES # BLD AUTO: 1.13 X10(3)/MCL (ref 0.6–4.6)
LYMPHOCYTES NFR BLD AUTO: 28.7 %
MCH RBC QN AUTO: 22.5 PG (ref 27–31)
MCHC RBC AUTO-ENTMCNC: 30.3 G/DL (ref 33–36)
MCV RBC AUTO: 74.4 FL (ref 80–94)
MONOCYTES # BLD AUTO: 0.61 X10(3)/MCL (ref 0.1–1.3)
MONOCYTES NFR BLD AUTO: 15.5 %
NEUTROPHILS # BLD AUTO: 2.08 X10(3)/MCL (ref 2.1–9.2)
NEUTROPHILS NFR BLD AUTO: 52.7 %
PLATELET # BLD AUTO: 357 X10(3)/MCL (ref 130–400)
PMV BLD AUTO: 10.3 FL (ref 7.4–10.4)
POTASSIUM SERPL-SCNC: 4.9 MMOL/L (ref 3.5–5.1)
PROT SERPL-MCNC: 7.1 GM/DL (ref 6.4–8.3)
RBC # BLD AUTO: 5.82 X10(6)/MCL (ref 4.2–5.4)
SODIUM SERPL-SCNC: 144 MMOL/L (ref 136–145)
WBC # SPEC AUTO: 3.94 X10(3)/MCL (ref 4.5–11.5)

## 2024-04-10 PROCEDURE — 85025 COMPLETE CBC W/AUTO DIFF WBC: CPT

## 2024-04-10 PROCEDURE — 80053 COMPREHEN METABOLIC PANEL: CPT

## 2024-04-10 PROCEDURE — 36415 COLL VENOUS BLD VENIPUNCTURE: CPT

## 2024-04-10 PROCEDURE — 83615 LACTATE (LD) (LDH) ENZYME: CPT

## 2024-04-17 ENCOUNTER — LAB VISIT (OUTPATIENT)
Dept: LAB | Facility: HOSPITAL | Age: 49
End: 2024-04-17
Attending: NURSE PRACTITIONER
Payer: MEDICAID

## 2024-04-17 DIAGNOSIS — M31.19 TTP (THROMBOTIC THROMBOCYTOPENIC PURPURA): ICD-10-CM

## 2024-04-17 DIAGNOSIS — K29.70 GASTRITIS, PRESENCE OF BLEEDING UNSPECIFIED, UNSPECIFIED CHRONICITY, UNSPECIFIED GASTRITIS TYPE: ICD-10-CM

## 2024-04-17 LAB
ALBUMIN SERPL-MCNC: 3.7 G/DL (ref 3.5–5)
ALBUMIN/GLOB SERPL: 1.3 RATIO (ref 1.1–2)
ALP SERPL-CCNC: 122 UNIT/L (ref 40–150)
ALT SERPL-CCNC: 7 UNIT/L (ref 0–55)
AST SERPL-CCNC: 13 UNIT/L (ref 5–34)
BASOPHILS # BLD AUTO: 0.02 X10(3)/MCL
BASOPHILS NFR BLD AUTO: 0.5 %
BILIRUB SERPL-MCNC: 0.5 MG/DL
BUN SERPL-MCNC: 11.5 MG/DL (ref 9.8–20.1)
CALCIUM SERPL-MCNC: 9.3 MG/DL (ref 8.4–10.2)
CHLORIDE SERPL-SCNC: 108 MMOL/L (ref 98–107)
CO2 SERPL-SCNC: 27 MMOL/L (ref 22–29)
CREAT SERPL-MCNC: 0.76 MG/DL (ref 0.55–1.02)
EOSINOPHIL # BLD AUTO: 0.08 X10(3)/MCL (ref 0–0.9)
EOSINOPHIL NFR BLD AUTO: 2 %
ERYTHROCYTE [DISTWIDTH] IN BLOOD BY AUTOMATED COUNT: 15.6 % (ref 11.5–17)
GFR SERPLBLD CREATININE-BSD FMLA CKD-EPI: >60 MLS/MIN/1.73/M2
GLOBULIN SER-MCNC: 2.9 GM/DL (ref 2.4–3.5)
GLUCOSE SERPL-MCNC: 79 MG/DL (ref 74–100)
HCT VFR BLD AUTO: 40.8 % (ref 37–47)
HGB BLD-MCNC: 12.7 G/DL (ref 12–16)
IMM GRANULOCYTES # BLD AUTO: 0 X10(3)/MCL (ref 0–0.04)
IMM GRANULOCYTES NFR BLD AUTO: 0 %
LDH SERPL-CCNC: 195 U/L (ref 125–220)
LYMPHOCYTES # BLD AUTO: 1.2 X10(3)/MCL (ref 0.6–4.6)
LYMPHOCYTES NFR BLD AUTO: 29.4 %
MCH RBC QN AUTO: 22.8 PG (ref 27–31)
MCHC RBC AUTO-ENTMCNC: 31.1 G/DL (ref 33–36)
MCV RBC AUTO: 73.1 FL (ref 80–94)
MONOCYTES # BLD AUTO: 0.64 X10(3)/MCL (ref 0.1–1.3)
MONOCYTES NFR BLD AUTO: 15.7 %
NEUTROPHILS # BLD AUTO: 2.14 X10(3)/MCL (ref 2.1–9.2)
NEUTROPHILS NFR BLD AUTO: 52.4 %
PLATELET # BLD AUTO: 300 X10(3)/MCL (ref 130–400)
PMV BLD AUTO: 9.7 FL (ref 7.4–10.4)
POTASSIUM SERPL-SCNC: 4 MMOL/L (ref 3.5–5.1)
PROT SERPL-MCNC: 6.6 GM/DL (ref 6.4–8.3)
RBC # BLD AUTO: 5.58 X10(6)/MCL (ref 4.2–5.4)
SODIUM SERPL-SCNC: 143 MMOL/L (ref 136–145)
WBC # SPEC AUTO: 4.08 X10(3)/MCL (ref 4.5–11.5)

## 2024-04-17 PROCEDURE — 36415 COLL VENOUS BLD VENIPUNCTURE: CPT

## 2024-04-17 PROCEDURE — 83615 LACTATE (LD) (LDH) ENZYME: CPT

## 2024-04-17 PROCEDURE — 85025 COMPLETE CBC W/AUTO DIFF WBC: CPT

## 2024-04-17 PROCEDURE — 80053 COMPREHEN METABOLIC PANEL: CPT

## 2024-04-17 NOTE — PROGRESS NOTES
Patient ID: [id number]     Chief Complaint: Rheumatoid arthritis involving multiple sites with positive (Pt stated can not bend pointer finger  on lt hand generalized joint pain kaitlin. Hands and fingers)      HPI:     Nicole AGRAWAL Rodrick Quiñones is a 49 y.o. female here today for follow-up of rheumatoid arthritis.     Previously seen by Dr Marcial, she was diagnosed with RA and was started on Plaquenil and MTX. She had brain fog with those meds along with fatigue and GI upset, stopped them and did not go back for follow up. She also saw Dr Martinez in the past few times, did not give her any medication.     Patient was started on Plaquenil last visit. Since then patient states she has noticed minimal improvement. She still reports morning pain and stiffness, primarily in her wrists and knees. Pain is usually 8/10 and improves by the afternoon. Denies any significant swelling. She also notes pain in her bilateral shoulders and lower back though not as significant. Has history of right torn rotator cuff in the past.    She does note that she has had two recent episodes in which her left hand and forearm would lock up and she'd become unable to move them for 30-40 minutes. Also had one fall getting out of bed because her leg gave out but no injuries sustained.     April 2024: Here today for follow up. She has been off of her Plaquenil and Imuran- stopped Plaquenil after last apt, thought med was d/c and started Imuran until 2/25/24 as she had a recent relapse of TTP. She initially presented with abd pain, nausea and vomiting- had CT abd, lipase checked to r/o pancreatitis and ok. 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  She received [facility]FP at [facility] and has a follow up with Dr Malik next week (Admitted 2/27-3/6/2024). She also had a UGI and had her esophagus stretched (per patient report) and was also dx with a  Hiatal Hernia- will be seeing Dr. Block (f/u May 14 for repeat EGD). She was also dx with mild GIAN- now on CPAP.   She does have occ swelling to right hand that comes and goes, no red/warm joints. She has also had increase in pain to her left 2nd digit since her last visit- has been present but has worsened- states it is very painful to bend finger, she reports her finger getting stuck at times-  no red/warm/swelling to the finger. She does report morning stiffness lasting roughly 15-20 minutes. She also reports swollen lymph node under right axillary area, very tender to touch, she reports been present off and on for a few weeks.  She has not mentioned this to any of her other providers. She denies any fever/chills.     PMH: TTP, OA, Chronic Migraines, GIAN    Cardiology- [facility]  Dr. Malik- Hematology     Denies history of fevers, rashes, photosensitivity, oral or nasal ulcers, h/o MI, stroke, seizures, h/o PE or DVT, Raynaud's phenomenon, uveitis, malignancies.   Family history of autoimmune disease: none.   Pregnancies: 6 Miscarriages: none  Smoking: nonsmoker.     Social History     Tobacco Use   Smoking Status Never   Smokeless Tobacco Never          -------------------------------------    Anemia    Arthritis    Fibromyalgia    Hypercholesterolemia    Hypertension    Migraines    Personal history of colonic polyps    colonoscopy/polypectomy    Thrombocytopenia, unspecified    TTP (thrombotic thrombocytopenic purpura)    TTP (thrombotic thrombocytopenic purpura)        Past Surgical History:   Procedure Laterality Date    CARPAL TUNNEL RELEASE      COLONOSCOPY W/ BIOPSIES AND POLYPECTOMY  09/05/2023    Deepak De Luna MD 5 years    ESOPHAGOGASTRODUODENOSCOPY  04/2024    Right shouler surgery      SURGICAL REMOVAL OF ENDOMETRIOSIS         Review of patient's allergies indicates:   Allergen Reactions    Nsaids (non-steroidal anti-inflammatory drug) Other (See Comments)     Causes ulcers to bleed.     Ibuprofen     Latex     Meloxicam     Nsaids (non-steroidal anti-inflammatory drug) Nausea Only       Current Outpatient Medications   Medication Sig Dispense Refill    albuterol (PROVENTIL) 2.5 mg /3 mL (0.083 %) nebulizer solution USE 1 VIAL IN NEBULIZER EVERY 8 HOURS AS NEEDED 1 each 3    cetirizine (ZYRTEC) 10 MG tablet Take 10 mg by mouth once daily.      cloNIDine (CATAPRES) 0.1 MG tablet Take 1 tablet (0.1 mg total) by mouth daily as needed. 30 tablet 1    DULERA 100-5 mcg/actuation HFAA Inhale 1 puff into the lungs 2 (two) times daily. 8.8 g 5    EMGALITY  mg/mL PnIj AS DIRECTED SUBCUTANEOUS MONTHLY 30 DAY(S) 1 each 11    ergocalciferol (ERGOCALCIFEROL) 50,000 unit Cap Take 1 capsule (50,000 Units total) by mouth every 7 days. 12 capsule 3    ezetimibe (ZETIA) 10 mg tablet Take 1 tablet (10 mg total) by mouth every evening. 90 tablet 1    fluticasone propionate (FLONASE) 50 mcg/actuation nasal spray 2 sprays (100 mcg total) by Each Nostril route daily as needed for Allergies or Rhinitis. 18.2 mL 1    folic acid (FOLVITE) 1 MG tablet Take 1 tablet (1 mg total) by mouth once daily. 30 tablet 2    gabapentin (NEURONTIN) 600 MG tablet Take 600 mg by mouth 2 (two) times daily.      isosorbide dinitrate (ISORDIL) 40 MG Tab Take 1 tablet (40 mg total) by mouth once daily. 90 tablet 3    LINZESS 145 mcg Cap capsule Take 145 mcg by mouth once daily.      magnesium oxide (MAG-OX) 400 mg (241.3 mg magnesium) tablet Take 1 tablet (400 mg total) by mouth once daily. 30 tablet 11    metoprolol succinate (TOPROL-XL) 100 MG 24 hr tablet Take 1 tablet (100 mg total) by mouth every evening. 90 tablet 3    multivitamin Tab Take 1 tablet by mouth once daily. 30 tablet 2    nitroGLYCERIN (NITROSTAT) 0.4 MG SL tablet Place 1 tablet (0.4 mg total) under the tongue every 5 (five) minutes as needed for Chest pain. 25 tablet 4    omeprazole (PRILOSEC) 40 MG capsule Take 1 capsule (40 mg total) by mouth once daily. 90 capsule 1     ondansetron (ZOFRAN) 8 MG tablet TAKE 1 TABLET BY MOUTH EVERY 8 HOURS AS NEEDED FOR NAUSEA AND VOMITING 9 tablet 3    pantoprazole (PROTONIX) 40 MG tablet Take 40 mg by mouth every morning.      RESTASIS 0.05 % ophthalmic emulsion Place 1 drop into both eyes 2 (two) times daily.      UBRELVY 100 mg tablet Take 1 tablet (100 mg total) by mouth 2 (two) times daily as needed for Migraine. 16 tablet 2    valsartan (DIOVAN) 80 MG tablet Take 1 tablet (80 mg total) by mouth once daily. 90 tablet 3    venlafaxine (EFFEXOR-XR) 37.5 MG 24 hr capsule Take 1 capsule (37.5 mg total) by mouth once daily. 30 capsule 11    zavegepant 10 mg/actuation Spry 10 mg by Nasal route daily as needed (Migraine). 1 each 5    azaTHIOprine (IMURAN) 50 mg Tab Start Imuran 50 mg daily and after 1 week increase dose to 100 mg daily.  Hold for infection or fever. (Patient not taking: Reported on 4/18/2024) 60 tablet 3    baclofen (LIORESAL) 5 mg Tab tablet Take 1 tablet (5 mg total) by mouth 2 (two) times daily as needed (tension). 30 tablet 2     No current facility-administered medications for this visit.       Social History     Socioeconomic History    Marital status:    Tobacco Use    Smoking status: Never    Smokeless tobacco: Never   Substance and Sexual Activity    Alcohol use: Not Currently     Comment: Last drink @ 20    Drug use: Never    Sexual activity: Not Currently     Partners: Male     Birth control/protection: Abstinence   Social History Narrative    ** Merged History Encounter **          Social Determinants of Health     Financial Resource Strain: Low Risk  (4/19/2023)    Overall Financial Resource Strain (CARDIA)     Difficulty of Paying Living Expenses: Not very hard   Food Insecurity: Food Insecurity Present (4/19/2023)    Hunger Vital Sign     Worried About Running Out of Food in the Last Year: Sometimes true     Ran Out of Food in the Last Year: Never true   Transportation Needs: No Transportation Needs  (4/19/2023)    PRAPARE - Transportation     Lack of Transportation (Medical): No     Lack of Transportation (Non-Medical): No   Physical Activity: Inactive (4/19/2023)    Exercise Vital Sign     Days of Exercise per Week: 0 days     Minutes of Exercise per Session: 0 min   Stress: Stress Concern Present (4/19/2023)    [employer] [place] [employer] - Occupational Stress Questionnaire     Feeling of Stress : To some extent   Social Connections: Moderately Integrated (4/19/2023)    Social Connection and Isolation Panel [NHANES]     Frequency of Communication with Friends and Family: Three times a week     Frequency of Social Gatherings with Friends and Family: Twice a week     Attends [employer] Services: 1 to 4 times per year     Active Member of Clubs or Organizations: Yes     Attends Club or Organization Meetings: 1 to 4 times per year     Marital Status: Never    Housing Stability: Low Risk  (4/19/2023)    Housing Stability Vital Sign     Unable to Pay for Housing in the Last Year: No     Number of Places Lived in the Last Year: 2     Unstable Housing in the Last Year: No        Family History   Problem Relation Name Age of Onset    Hypertension Mother Mom     Arthritis Mother Mom     Depression Mother Mom     Thyroid disease Sister Sis     Asthma Sister Sis     Intellectual disability Brother Bro     Epilepsy Brother Bro     Stroke Maternal Grandfather Kid         Immunization History   Administered Date(s) Administered    DTP 1974, 1974, 06/09/1975, 06/14/1976, 05/09/1979    Hepatitis B, Adult 03/20/2018    Influenza 11/07/2008    Influenza (FLUBLOK) - Quadrivalent - Recombinant - PF *Preferred* (egg allergy) 10/30/2019    Influenza - Quadrivalent - PF *Preferred* (6 months and older) 10/30/2019, 11/16/2020, 04/19/2023, 09/19/2023    Influenza - Trivalent (ADULT) 11/07/2008    Influenza - Trivalent - PF (ADULT) 01/09/2013, 01/07/2014    MMR 03/20/2018    Measles / Rubella 08/11/1975     OPV 1974, 1974, 06/14/1976, 05/09/1979, 06/09/1995    PPD Test 09/30/2013    Pneumococcal Conjugate - 20 Valent 04/19/2023    Pneumococcal Polysaccharide - 23 Valent 03/04/2020    Td (ADULT) 03/27/1989, 01/25/2006    Tdap 01/07/2014, 03/20/2018       Patient Care Team:  Yaritza Browne FNP as PCP - General (Family Medicine)  Joy Huff MD (Inactive) (Family Medicine)  Loretta Noble LPN as Care Coordinator  Deepak Block MD as Consulting Physician (Gastroenterology)     Subjective:     Constitutional:  Denies chills. Denies fever. Denies night sweats. Denies weight loss.   Ophthalmology: Denies blurred vision. Admits dry eyes- uses Restasis and helps. Denies eye pain. Denies Itching and redness.   ENT: Denies oral ulcers. Denies epistaxis. Denies dry mouth. Admits swollen glands to her left axillary area that come and go for the past few months- painful.   Endocrine: Denies diabetes. Denies thyroid Problems.   Respiratory: Denies cough. Denies shortness of breath. Admits shortness of breath with exertion at times, no worsening of symptoms-stable. Denies hemoptysis.   Cardiovascular: Admits chest pain at rest. Denies chest pain with exertion. Denies palpitations.    Gastrointestinal: Denies abdominal pain. Denies diarrhea. Denies nausea. Denies vomiting. Denies hematemesis or hematochezia. Denies heartburn.  Genitourinary: Denies blood in urine.  Musculoskeletal: See HPI for details  Integumentary: Denies rash. Denies photosensitivity.   Peripheral Vascular: Denies Ulcers of hands and/or feet. Denies Cold extremities.   Neurologic: Denies dizziness. Admits headache.  Denies loss of strength. Admits numbness or tingling.   Psychiatric: Admits depression. Admits anxiety- currently stable. Denies suicidal/homicidal ideations.      Objective:     BP (!) 148/89 (BP Location: Right arm, Patient Position: Sitting, BP Method: Large (Manual))   Pulse 72   Temp 97.2 °F (36.2 °C) (Oral)    "Resp 20   Ht 5' 6" (1.676 m)   Wt 112.8 kg (248 lb 10.9 oz)   SpO2 99%   BMI 40.14 kg/m²     Physical Exam    General Appearance: alert, pleasant, in no acute distress.  Skin: Skin color, texture, turgor normal. No rashes or lesions. +tender node noted to right axillary- no warmth, mobile, very tender to touch.   Eyes:  extraocular movement intact (EOMI), pupils equal, round, reactive to light and accommodation, conjunctiva clear.  ENT: No oral or nasal ulcers.  Neck:  Neck supple. No adenopathy.   Lungs: CTA throughout without crackles, rhonchi, or wheezes.   Heart: RRR w/o murmurs.  No edema. 2+ DP pulse.  Neuro: Alert, oriented, CN II-XII GI, sensory and motor innervation intact.  Musculoskeletal: No overt synovitis on exam.  No tenderness to bilateral MCPs. No PIP or DIP tenderness. Left 2nd finger tender throughout entire digit, no swelling, no cords or nodules palpated, FROM noted but reports painful. FROM to bilateral wrist/elbows right shoulder, left shoulder okay.  Right knee mildly tender, left knee nontender to palpation.  No swelling or warmth in bilateral knees, FROM with mild crepitus noted bilaterally. No tenderness with MTP squeeze.  Psych: Alert, oriented, normal eye contact.    Labs:   2021: SETH negative by ZULMA.  Anti CCP 6.9, low titer, normal less than 3. RF negative.     4/2022: SETH negative. RF negative. C3, C4 normal. CCP 34(<19).     4/19/23: WBC 4.4. ANC ok. ESR 47. CRP 11.9. CPK normal. RF 26(<14), CCP 69, high titer.     5/2023: CBC ok. CMP ok. TSH normal. 1+ protein and no blood in urine.     5/2023: SETH direct positive.     09/20/2023: hepatitis-B surface antibody reactive.  Hepatitis-B surface antigen and core antibody negative.  Negative hepatitis-C and HIV.  CMP okay.  CRP 10.1, ESR 43.  CBC okay. Quantiferon tb negative.    10/9/23:  C3 and C4 normal.  Negative SSA, SSB, thyroglobulin, TPO, Navarro, RNP, Scl 70.  DsDNA negative.  SETH negative.    01/18/2024: [facility] okay trace protein " blood in urine.  CMP okay.  WBC count 4.32, differential okay, ANC 2.5.  TPMT genotype normal.    2024: CBC okay.  CMP okay.    2024 CBC RBC 5.58, platelets 300, CMP Cl+ 108, otherwise ok. LDH ok.     Imagin/2023:  X-ray of cervical spine showed small multilevel marginal osteophytes and mild facet arthropathy.  Alignment is preserved.    23:  X-ray of left knee showed tricompartmental DJD changes worse in medial compartment, no effusion.    2023: chest x-ray showed lungs are clear.  DJD of bilateral acromioclavicular joints and glenohumeral joint. Mild DJD of bilateral feet, calcaneal spurring on left.  Mild DJD of right knee.  DJD of bilateral hands in PIP, DIPs and bilateral 1st CMC joints.  2023:  X-ray of bilateral shoulder showed glenohumeral joint and AC joint arthritis.  No acute fracture or dislocation.    2023 ECHO:  EF 60%.     2024 CT Abd/Pelvis: Impression: Nonspecific retroperitoneal edema.  Correlate with lipase levels to exclude pancreatitis.  Correlate with urinalysis to exclude urinary tract infection.  Otherwise consider duodenitis    2024 CXRay: Impression: No acute cardiopulmonary process.    Assessment:       ICD-10-CM ICD-9-CM   1. Rheumatoid arthritis involving multiple sites with positive rheumatoid factor  M05.79 714.0   2. Primary osteoarthritis involving multiple joints  M15.9 715.98   3. Primary hypertension  I10 401.9   4. BMI 39.0-39.9,adult  Z68.39 V85.39   5. Drug-induced immunodeficiency  D84.821 279.3    Z79.899 E947.9   6. High risk medication use  Z79.899 V58.69   7. Trigger finger, left index finger  M65.322 727.03   8. Lymphadenopathy, axillary  R59.0 785.6   9. TTP (thrombotic thrombocytopenic purpura)  M31.19 446.6            Plan:     1. Rheumatoid arthritis involving multiple sites with positive rheumatoid factor  Low titer rheumatoid factor and a high titer anti CCP.  No overt synovitis on exam.  She has arthralgias and joint  pain likely multifactorial from both RA and Osteoarthritis.  She continues with significant pain despite being started on Plaquenil 200 mg b.i.d. TMPT genotype normal and started azathioprine in January 2024, tolerated well however has been off since February 25, 2024 as she was admitted with a TTP relapse. She has been off both Azathioprine and Plaquenil since this time.   She states she has been seen by Ophthalmology but unclear if all the correct testing has been performed.- reports they are aware she is taking Plaquenil- will attempt to request records. Today on exam, no active synovitis noted,  left 2nd digit tender throughout entire digit, no swelling, no cords or nodules palpated, FROM noted but with pain.  - Continue to hold meds at this time as no overt synovitis noted, just completed 4 weekly doses of Rituxan with Oncology for TTP relapse.   - Advised to call for any red/warm/swollen joints for re-assessment if occurs prior to follow up apt.   - Infection w/u negative 9/2023  - CXRay 2/24 ok.   - PFTs ordered at last visit but was not able to complete (see below).     - SETH negative by immunofluorescence, she does not have Lupus, no further workup needed all ENAs negative, complements normal.      2. Osteoarthritis  -Osteoarthritis of cervical spine on x-rays.  DJD of bilateral knees and shoulders.  Recommend follow-up with Orthopedics.  Referral to physical therapy had been sent. Can use Tylenol PRN. Used to work as CNA and lifted and pushed patients, likely the reason for severe Osteoarthritis.  Had issues with rotator cuff on the right and shoulder arthroscopy in the past.      3. Primary hypertension  - Slightly elevated today, enc to monitor at home and  continue to follow up with PCP. Low Na+ diet      4. Obesity (BMI 40)  - Discussed watching her diet and recommend weight loss.  Weight loss will help with knee pain.     5. High Risk Med use/Drug Induced Immunodeficency   -Persons with rheumatoid  arthritis, lupus, psoriatic arthritis and other autoimmune diseases are at increased risk of cardiovascular disease including heart attack and stroke. We recommend that all patients with these conditions have annual health maintenance exams including lipid measurements, blood pressure measurements, and smoking cessation counseling when applicable at their primary care provider's office.   - Advised to stay up-to-date on age appropriate vaccinations and malignancy screening, including yearly skin exams.    -Mammogram UTD, has not had a PAP in a few years- needs to schedule follow up , Colonoscopy 2022- repeat in 3 years.   -Continued lab monitoring.      6. Right Axillary LN  - Will send for U/S for further evaluation  - Enc to follow up with PCP if any changes or worsening of symptoms     7. TTP  - Followed by Dr. Malik at [facility]- 4th relapse 2/25/2024- completed 4 weeks of Rituxan      8. SOB  - Previously seen by PCP last month and PFTs reordered. She reports her previous PCP had her on    Proventil nebs as needed for cough and Dulera 100-5 mcg 1 puff twice a day.  Patient states she is benefitting from treatment.   - PFTs on 3/27/2024 FVC 73.7%, FEV 88%, FEV1/%- test was d/c after 10 incomplete and unacceptable results. Several attempts- unable to exhale for >2 seconds- she does report having an esophogeal dilation the day before and unable to complete  - Will reorder in the future, SOB stable with no worsening of symptoms at this time  - Recent Cxray 2/2024 ok.      9. Left index Trigger Finger  - Will obtain Xray today  -  Left 2nd index finger tender throughout entire digit, no swelling, no cords or nodules palpated, FROM noted but with pain. No triggering noted on exam but reported by patient  - Will also place referral to Ortho for eval         Follow up in about 3 months (around 7/19/2024) for NP Follow Up. In addition to their scheduled follow up, the patient has also been instructed to follow up on as  needed basis.     Total time spent with patient and documentation is 60 minutes. All questions were answered to patient's satisfaction and patient verbalized understanding. This includes face to face time and non-face to face time preparing to see the patient (eg, review of tests), obtaining and/or reviewing separately obtained history, documenting clinical information in the electronic or other health record, independently interpreting results and communicating results to the patient/family/caregiver, or care coordinator.

## 2024-04-18 ENCOUNTER — OFFICE VISIT (OUTPATIENT)
Dept: NEUROLOGY | Facility: CLINIC | Age: 49
End: 2024-04-18
Payer: MEDICAID

## 2024-04-18 VITALS
WEIGHT: 246.25 LBS | HEART RATE: 77 BPM | OXYGEN SATURATION: 99 % | BODY MASS INDEX: 39.58 KG/M2 | DIASTOLIC BLOOD PRESSURE: 90 MMHG | HEIGHT: 66 IN | SYSTOLIC BLOOD PRESSURE: 128 MMHG

## 2024-04-18 DIAGNOSIS — G43.E09 CHRONIC MIGRAINE WITH AURA WITHOUT STATUS MIGRAINOSUS, NOT INTRACTABLE: Primary | ICD-10-CM

## 2024-04-18 DIAGNOSIS — I10 PRIMARY HYPERTENSION: Primary | ICD-10-CM

## 2024-04-18 DIAGNOSIS — F48.9 TENSION: ICD-10-CM

## 2024-04-18 DIAGNOSIS — G43.E01 CHRONIC MIGRAINE WITH AURA AND WITH STATUS MIGRAINOSUS, NOT INTRACTABLE: ICD-10-CM

## 2024-04-18 PROCEDURE — 4010F ACE/ARB THERAPY RXD/TAKEN: CPT | Mod: CPTII,,, | Performed by: NURSE PRACTITIONER

## 2024-04-18 PROCEDURE — 3074F SYST BP LT 130 MM HG: CPT | Mod: CPTII,,, | Performed by: NURSE PRACTITIONER

## 2024-04-18 PROCEDURE — 3080F DIAST BP >= 90 MM HG: CPT | Mod: CPTII,,, | Performed by: NURSE PRACTITIONER

## 2024-04-18 PROCEDURE — 1159F MED LIST DOCD IN RCRD: CPT | Mod: CPTII,,, | Performed by: NURSE PRACTITIONER

## 2024-04-18 PROCEDURE — 3008F BODY MASS INDEX DOCD: CPT | Mod: CPTII,,, | Performed by: NURSE PRACTITIONER

## 2024-04-18 PROCEDURE — 99214 OFFICE O/P EST MOD 30 MIN: CPT | Mod: S$PBB,,, | Performed by: NURSE PRACTITIONER

## 2024-04-18 PROCEDURE — G2211 COMPLEX E/M VISIT ADD ON: HCPCS | Mod: S$PBB,,, | Performed by: NURSE PRACTITIONER

## 2024-04-18 PROCEDURE — 99215 OFFICE O/P EST HI 40 MIN: CPT | Mod: PBBFAC | Performed by: NURSE PRACTITIONER

## 2024-04-18 PROCEDURE — 1160F RVW MEDS BY RX/DR IN RCRD: CPT | Mod: CPTII,,, | Performed by: NURSE PRACTITIONER

## 2024-04-18 RX ORDER — LANOLIN ALCOHOL/MO/W.PET/CERES
400 CREAM (GRAM) TOPICAL DAILY
Qty: 30 TABLET | Refills: 11 | Status: SHIPPED | OUTPATIENT
Start: 2024-04-18 | End: 2024-06-03 | Stop reason: SDUPTHER

## 2024-04-18 RX ORDER — PANTOPRAZOLE SODIUM 40 MG/1
40 TABLET, DELAYED RELEASE ORAL EVERY MORNING
COMMUNITY
Start: 2024-03-26

## 2024-04-18 RX ORDER — GALCANEZUMAB 120 MG/ML
INJECTION, SOLUTION SUBCUTANEOUS
Qty: 1 EACH | Refills: 11 | Status: SHIPPED | OUTPATIENT
Start: 2024-04-18 | End: 2024-06-03 | Stop reason: SDUPTHER

## 2024-04-18 RX ORDER — CLONIDINE HYDROCHLORIDE 0.1 MG/1
0.1 TABLET ORAL DAILY PRN
Qty: 30 TABLET | Refills: 1 | Status: SHIPPED | OUTPATIENT
Start: 2024-04-18 | End: 2024-06-03 | Stop reason: SDUPTHER

## 2024-04-18 RX ORDER — BUTALBITAL, ACETAMINOPHEN AND CAFFEINE 50; 325; 40 MG/1; MG/1; MG/1
1 TABLET ORAL EVERY 6 HOURS PRN
COMMUNITY
Start: 2024-03-27 | End: 2024-04-18

## 2024-04-18 RX ORDER — BACLOFEN 5 MG/1
5 TABLET ORAL 2 TIMES DAILY PRN
Qty: 30 TABLET | Refills: 2 | Status: SHIPPED | OUTPATIENT
Start: 2024-04-18 | End: 2024-06-03 | Stop reason: SDUPTHER

## 2024-04-18 NOTE — PROGRESS NOTES
[facility] Follow Up Office Visit Note    Initial Visit Date: 10/5/2023  Last Visit Date: 1/11/2024  Current Visit Date:  04/18/2024    Chief Complaint:     Chief Complaint   Patient presents with    Migraine     Patient reports no change since last visit, c/o a bad migraine yesterday, has improved today, but still 7/10     History of Present Illness:      This is 49 y.o. female with history of HTN, insomnia, acute neck pain, migraine, Vitamin D deficiency, who was referred headache disorder. During initial visit, venlafaxine 37.5mg Qday, MagOx 400mg daily, Riboflavin 400mg daily, baclofen PRN, Zavspret, and Ubrelvy were continued. Emgality was restarted    Today, Pt states she had a relapse of thrombotic thrombocytopenia purpura which required hospitalization for treatment, Rituxan and steroids. Emgality somewhat effective. Last migraine yesterday. Zavspret continues to be effective as abortive therapy. Recently tested for GIAN and requires use of CPAP. Has only received continues to awaken with HA 15 days/month. Zavspret and Ubrelvy effective, but must catch at onset. Has been told she snores at night. Has been tested for GIAN in past and was told she had mild case and did not require machine. Seems HA stem from shoulder and neck pain. Taking Baclofen PRN. Continues w/MagOx and Riboflavin daily. Has been out of Emaglity for last month.    Age of Onset : 14 YO    Headache Description: Starts to R trapezius and radiates up neck to R side of head, to forehead and across to other side, on a bad day must lay in a dark room, severe in nature. Worsened w/activity. Unresponsive to Ubrelvy. May last from 1 hour to 3-4 days. May resolved after a nap, not always. Accompanied by photophobia/phonophobia, nausea, flashes of lights to both eyes, numbness to her face.    Frequency: 15 headache days per month.     Provocation Factors: odors,     Risk Factors  - Family history of headache disorder: Yes daughter  - History of  focal CNS lesions: No  - History of CNS infections: No  - History head trauma: No  - History of underlying mood disorder: Yes diagnosed w/major depressive d/o  - History of sleep disorder: Yes mild GIAN; but not qualified for machine  - Recreational drug use: No  - Tobacco use: No  - Alcohol use: No  - Weight fluctuation: No  - Isotretinoin or Tetracycline use:  No  - Family planning and contraceptive use: No    Medications:     Current Prophylactic  Gabapentin 600mg PO BID (4/26/2023 - present) ineffective for HA  Toprol XL 100mg nightly (1/17/2023 - present) ineffective for HA  Venlafaxine 37.5mg PO Qday (10/5/2023 - present) effective for mood  Emgality 120mg SQ monthly (1/11/2024 - present)    Current Abortive  Baclofen 5mg PO PRN - (10/5/2023 - present)  Zavzpret 10mg nasal spray - (10/19/2023 - present) effective  Ubrelvy 100mg PO BID (4/10/2023 - current)- only effective for mild-mod migraine  Fioricet PRN (6/27/2023 - present)     Prior Prophylactic  Amitriptyline 25 mg nightly - brain fog/over sedated  Emgality 120mg/ml SQ Qmonth (2/13/2023 - 4/10/2023)  Olmestartan/HCTZ (1/17/2023 - 7/16/2023) ineffective for HA  Sertraline 25mg Qday (3/9/2023 - 6/19/2023) ineffective for HA  Valsartan 80mg PO Qday (6/27/2023 - 11/20/2023) ineffective for HA  Verapamil ER 180mg daily (3/4/2023 - 7/4/2023) ineffective for HA    Prior Abortive  Nurtec - ineffective  Imitrex - ineffective  Rizatriptan - ineffective    Fioricet PRN - ineffective  Tizanidine 4mg PRN (2/22/2023 - 5/24/2023)- worsened migraine  Ondansetron (7/25/2023 - 9/11/2023)    Devices:     - VNS:  - TNS  - TMS:     Procedures:     - Botox:  - PSG block:   - Occipital nerve block:     Labs:     Results for orders placed or performed in visit on 04/17/24   Comprehensive Metabolic Panel   Result Value Ref Range    Sodium Level 143 136 - 145 mmol/L    Potassium Level 4.0 3.5 - 5.1 mmol/L    Chloride 108 (H) 98 - 107 mmol/L    Carbon Dioxide 27 22 - 29 mmol/L     Glucose Level 79 74 - 100 mg/dL    Blood Urea Nitrogen 11.5 9.8 - 20.1 mg/dL    Creatinine 0.76 0.55 - 1.02 mg/dL    Calcium Level Total 9.3 8.4 - 10.2 mg/dL    Protein Total 6.6 6.4 - 8.3 gm/dL    Albumin Level 3.7 3.5 - 5.0 g/dL    Globulin 2.9 2.4 - 3.5 gm/dL    Albumin/Globulin Ratio 1.3 1.1 - 2.0 ratio    Bilirubin Total 0.5 <=1.5 mg/dL    Alkaline Phosphatase 122 40 - 150 unit/L    Alanine Aminotransferase 7 0 - 55 unit/L    Aspartate Aminotransferase 13 5 - 34 unit/L    eGFR >60 mls/min/1.73/m2   Lactate Dehydrogenase   Result Value Ref Range    Lactate Dehydrogenase 195 125 - 220 U/L   CBC with Differential   Result Value Ref Range    WBC 4.08 (L) 4.50 - 11.50 x10(3)/mcL    RBC 5.58 (H) 4.20 - 5.40 x10(6)/mcL    Hgb 12.7 12.0 - 16.0 g/dL    Hct 40.8 37.0 - 47.0 %    MCV 73.1 (L) 80.0 - 94.0 fL    MCH 22.8 (L) 27.0 - 31.0 pg    MCHC 31.1 (L) 33.0 - 36.0 g/dL    RDW 15.6 11.5 - 17.0 %    Platelet 300 130 - 400 x10(3)/mcL    MPV 9.7 7.4 - 10.4 fL    Neut % 52.4 %    Lymph % 29.4 %    Mono % 15.7 %    Eos % 2.0 %    Basophil % 0.5 %    Lymph # 1.20 0.6 - 4.6 x10(3)/mcL    Neut # 2.14 2.1 - 9.2 x10(3)/mcL    Mono # 0.64 0.1 - 1.3 x10(3)/mcL    Eos # 0.08 0 - 0.9 x10(3)/mcL    Baso # 0.02 <=0.2 x10(3)/mcL    IG# 0.00 0 - 0.04 x10(3)/mcL    IG% 0.0 %       Studies:     - MRI Brain:   - MRA Head w/o Joaquin:   - MRV Head w/o Joaquin:   - NCHCT:  - Lumbar Puncture:    Review of Systems:     ROS  As per HPI; all other systems negative  Physical Exams:     Vitals:    04/18/24 1049   BP: (!) 128/90   Pulse:      Physical Exam  HENT:      Head: Normocephalic.      Right Ear: External ear normal.      Left Ear: External ear normal.      Nose: Nose normal.      Mouth/Throat:      Mouth: Mucous membranes are moist.   Eyes:      Pupils: Pupils are equal, round, and reactive to light.   Cardiovascular:      Rate and Rhythm: Normal rate.      Pulses: Normal pulses.   Pulmonary:      Effort: Pulmonary effort is normal.   Abdominal:       General: There is no distension.      Tenderness: There is no guarding.      Comments: round   Musculoskeletal:         General: Normal range of motion.      Cervical back: Normal range of motion.   Skin:     General: Skin is warm and dry.      Capillary Refill: Capillary refill takes less than 2 seconds.      Findings: No lesion or rash.   Neurological:      General: No focal deficit present.      Mental Status: She is alert.   Psychiatric:         Mood and Affect: Mood normal.     Comprehensive Neurological Exam:  Mental Status: Alert Oriented to Self, Date, and Place. Naming, repetition, reading, and writing wnl. Comprehension wnl. No dysarthria.   CN II - XII: ATIYA, No APD, VA grossly intact to finger counting at 6 ft, VFFC, No ptosis OU, EOMI without nystagmus LT/Temp symmetric in CN V1-3 distribution, Hearing grossly intact, Face Symmetric, Tongue and Uvula midline, Trapezius symmetric bilateral.   Motor: tone and bulk wnl throughout, no abnormal involuntary or voluntary movements, 5/5 to confrontation, Fine finger movements wnl b/l, No pronator drift.   Sensory: LT, Proprioception, Vibration, PP, Temp symmetric. No sensory simultagnosia.   Reflexes: 2+ throughout, plantar reflexes downward bilateral.   Cerebellar: FNF wnl b/l, RAHM wnl b/l  Romberg: Negative  Gait: normal, bilat arm swing intact    Assessment:     This is 49 y.o. female with history of HTN, insomnia, acute neck pain, migraine, Vitamin D deficiency, who was referred headache disorder. Pt experiencing 15 migraine w/aura HA days per month. Has tried and failed multiple abortive therapies, current regimen not very effective. Emgality for preventative, Zavzpret for abortive. Has tried and failed sumatriptan and rizatriptan. This is not a medication overuse HA. Currently attending college for a degree in psychology. Has been dx with GIAN and currently awaiting, but states [facility] would not cover a machine. Emgality somewhat effective as preventative  therapy. Zavzpret more effective as abortive therapy.     Problem List Items Addressed This Visit          Neuro    Chronic migraine with aura without status migrainosus, not intractable - Primary       Plan:     [] c/w Emgality 120mg SQ Q month - requires PA  [] c/w venlafaxine 37.5mg nightly  [] c/w Baclofen 5 mg nightly for muscle tension - take consistently  [] c/w Zavzpret intranasal once at onset of migraine; PA required  [] c/w Ubrelvy 100mg PO BID PRN at onset of migraine for mild-moderate migraine  [] Limit water intake to approximately 120 oz/day  [] c/w MagOx 400mg daily  [] c/w riboflavin (Vit B2) 400mg daily  [] start exercise program 30 min daily x 5 days weekly for overall health and wellness  [] call office for migraine > 24 hrs and failed abortive therapy; will call in headache cocktail    RTC 4 mths - TM okay    Headache education provided: good sleep hygiene and 7 hours of sleep per night, stress management, medication overuse education provided. Using more 3 OTC per week may worsen headaches, high intensity interval training has shown to reduce headache frequency. Low carb, high protein has shown to reduce headache frequency. Patient is instructed in keep headache diary.     I have explained the treatment plan, diagnosis, and prognosis to patient. All questions are answered to the best of my knowledge.     Face to face time 30 minutes, including documentation, chart review, counseling, education, review of test results, relevant medical records, and coordination of care.     04/18/2024

## 2024-04-18 NOTE — TELEPHONE ENCOUNTER
Patient called stating she needs a refill on her Clonidine 0.1mg. States she only takes when top or bottom number reaches a certain elevation. Not sure who was the last prescribing doctor because she only takes PRN. Reading was 146/98. Was told if top number is over 160 or bottom near or over 100 to take her Clonidine.

## 2024-04-19 ENCOUNTER — OFFICE VISIT (OUTPATIENT)
Dept: RHEUMATOLOGY | Facility: CLINIC | Age: 49
End: 2024-04-19
Payer: MEDICAID

## 2024-04-19 VITALS
HEIGHT: 66 IN | OXYGEN SATURATION: 99 % | WEIGHT: 248.69 LBS | DIASTOLIC BLOOD PRESSURE: 89 MMHG | HEART RATE: 72 BPM | SYSTOLIC BLOOD PRESSURE: 148 MMHG | RESPIRATION RATE: 20 BRPM | TEMPERATURE: 97 F | BODY MASS INDEX: 39.97 KG/M2

## 2024-04-19 DIAGNOSIS — M65.322 TRIGGER FINGER, LEFT INDEX FINGER: ICD-10-CM

## 2024-04-19 DIAGNOSIS — M15.9 PRIMARY OSTEOARTHRITIS INVOLVING MULTIPLE JOINTS: ICD-10-CM

## 2024-04-19 DIAGNOSIS — I10 PRIMARY HYPERTENSION: ICD-10-CM

## 2024-04-19 DIAGNOSIS — Z79.899 HIGH RISK MEDICATION USE: ICD-10-CM

## 2024-04-19 DIAGNOSIS — M05.79 RHEUMATOID ARTHRITIS INVOLVING MULTIPLE SITES WITH POSITIVE RHEUMATOID FACTOR: Primary | ICD-10-CM

## 2024-04-19 DIAGNOSIS — R59.0 LYMPHADENOPATHY, AXILLARY: ICD-10-CM

## 2024-04-19 DIAGNOSIS — Z79.899 DRUG-INDUCED IMMUNODEFICIENCY: ICD-10-CM

## 2024-04-19 DIAGNOSIS — M31.19 TTP (THROMBOTIC THROMBOCYTOPENIC PURPURA): ICD-10-CM

## 2024-04-19 DIAGNOSIS — D84.821 DRUG-INDUCED IMMUNODEFICIENCY: ICD-10-CM

## 2024-04-19 PROCEDURE — 3008F BODY MASS INDEX DOCD: CPT | Mod: CPTII,,, | Performed by: NURSE PRACTITIONER

## 2024-04-19 PROCEDURE — 4010F ACE/ARB THERAPY RXD/TAKEN: CPT | Mod: CPTII,,, | Performed by: NURSE PRACTITIONER

## 2024-04-19 PROCEDURE — 99215 OFFICE O/P EST HI 40 MIN: CPT | Mod: PBBFAC | Performed by: NURSE PRACTITIONER

## 2024-04-19 PROCEDURE — 3075F SYST BP GE 130 - 139MM HG: CPT | Mod: CPTII,,, | Performed by: NURSE PRACTITIONER

## 2024-04-19 PROCEDURE — 99215 OFFICE O/P EST HI 40 MIN: CPT | Mod: S$PBB,,, | Performed by: NURSE PRACTITIONER

## 2024-04-19 PROCEDURE — 3079F DIAST BP 80-89 MM HG: CPT | Mod: CPTII,,, | Performed by: NURSE PRACTITIONER

## 2024-04-19 PROCEDURE — 1159F MED LIST DOCD IN RCRD: CPT | Mod: CPTII,,, | Performed by: NURSE PRACTITIONER

## 2024-04-19 PROCEDURE — 1160F RVW MEDS BY RX/DR IN RCRD: CPT | Mod: CPTII,,, | Performed by: NURSE PRACTITIONER

## 2024-04-22 ENCOUNTER — HOSPITAL ENCOUNTER (OUTPATIENT)
Dept: RADIOLOGY | Facility: HOSPITAL | Age: 49
Discharge: HOME | End: 2024-04-22
Attending: NURSE PRACTITIONER
Payer: MEDICAID

## 2024-04-22 DIAGNOSIS — M65.322 TRIGGER FINGER, LEFT INDEX FINGER: ICD-10-CM

## 2024-04-22 DIAGNOSIS — M05.79 RHEUMATOID ARTHRITIS INVOLVING MULTIPLE SITES WITH POSITIVE RHEUMATOID FACTOR: ICD-10-CM

## 2024-04-22 PROCEDURE — 73130 X-RAY EXAM OF HAND: CPT | Mod: TC,LT

## 2024-04-24 ENCOUNTER — TELEPHONE (OUTPATIENT)
Dept: RHEUMATOLOGY | Facility: CLINIC | Age: 49
End: 2024-04-24
Payer: MEDICAID

## 2024-04-24 NOTE — TELEPHONE ENCOUNTER
Spoke to pt informed of message pt verbalized understanding                    ----- Message from BHAVANA Duncan sent at 4/24/2024  8:19 AM CDT -----  Please review the following Xray results with Ms Rivera Xray of her left hand did show some degenerative changes, no erosions noted- I did place a referral to Ortho/sports med for evaluation of trigger finger-she should hear from someone to schedule an apt in the next 2-3 weeks- if she does not hear from anyone, please have her reach out so we can follow up on the status. Please let her know I did review her recent hospital admit with Dr. Gibson- please have her continue to remain off of meds at this time- the recent infusions she received for her TTP will also help with joint pain from RA  (not the OA), I did move up her apt to see her sooner, however, if she should develop any red/warm/swollen joints sooner than this, please have her call for a sooner apt so we can get her in for evaluation. She should also be hearing from Radiology for u/s of her right axillary area, if she does not hear from someone in the next please have her notify the office- if symptoms change/worsen, she should follow up with her PCP sooner.

## 2024-04-29 NOTE — PROGRESS NOTES
Subjective:       Patient ID: Nicole Brown is a 49 y.o. female.    Chief Complaint:    I feel better    Diagnosis: Relapsed thrombotic thrombocytopenic purpura                    Microangiopathic hemolytic anemia                    Rheumatoid arthritis    Treatment History  Plasma exchange 2/27-24-3/04/24  Steroid taper (completed 4/03/24)  Rituximab x 4 (completed 3/21/24)    Current Treatment: Observation    Clinical History:  Black female with a history of TTP initially diagnosed in 2015.  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  She has been followed by a hematologist in [place].  She relocated to [place] to live with her daughter approximately 8 months ago.  She presented to the ER 2/25/24 with low-grade fever, nausea/vomiting and abdominal discomfort.  She denied overt diarrhea.  Laboratory on presentation showed a platelet count of 5000.  Hemoglobin was 12.4 with microcytic, hypochromic indices and WBC 6.4.  LDH was elevated at 711 and haptoglobin was undetectable.  UPT was negative.  CMP showed mild renal insufficiency with a BUN of 24.9 and creatinine 1.36.  Peripheral smear showed microcytic, hypochromic red blood cells, increased reticulocytes and the presence of schistocytes and target cells.  Platelets were markedly decreased.  She was transferred to [facility] for initiation of plasmapheresis.  She received 1 unit of platelets at the outside facility prior to transfer.  On arrival here, she was started on prednisone 1 mg/kg daily and received 1 unit of FFP.     Hospital Course  2/27/24:  Started daily plasma exchange 1 plasma volume (40 cc/kg) exchange with FFP.  Platelet count 12,000. ADAMTS-13 level drawn 2/26/24 <5%.  2/28/24:  Day 2 plasma exchange.  Change to 1/2 albumin + 1/2 FFP secondary to significant urticaria.  2/29/24: Day 3 plasma exchange.  1/2 albumin + 1/2 FFP.  03/1/24: Day 4 plasma exchange.  1/2 albumin + 1/2 FFP  03/4/24:   Day 5 plasma exchange.  1/2 albumin + 1/2 FFP  03/6/24: Discharged from the hospital    Outpatient plasma exchange could not be coordinated with the hospital.  Systemic therapy was recommended with Rituxan weekly x4. She completed 4 weeks of treatment 3/21/24.  Prednisone was tapered and discontinued by 4/3/24.     Interval History    She returns to the office today by herself for a 4 week follow-up visit.  She is still undergoing laboratory monitoring every 2 weeks.  Her CBC has been stable with no evidence of recurrent anemia or thrombocytopenia.  She underwent an outpatient EGD for dysphagia and dilatation was performed.  She is scheduled for a 2nd dilatation in May.  She denies any fever, chills, night sweats or weight loss.  Her azathioprine is on hold by Rheumatology secondary to treatment with rituximab.  Her CBC today is stable.       Review of Systems   Constitutional:  Negative for appetite change, fatigue, fever and unexpected weight change.   HENT:  Positive for trouble swallowing (Mild, improved). Negative for mouth sores and sore throat.    Eyes: Negative.    Respiratory:  Negative for cough and shortness of breath.    Cardiovascular:  Negative for chest pain, palpitations and leg swelling.   Gastrointestinal:  Negative for abdominal distention, abdominal pain, constipation, diarrhea and nausea.   Genitourinary:  Negative for dysuria, frequency and urgency.   Musculoskeletal:  Positive for arthralgias. Negative for back pain.   Integumentary:  Negative for pallor and rash.   Neurological:  Negative for dizziness, weakness, numbness and headaches.   Hematological:  Negative for adenopathy. Does not bruise/bleed easily.   Psychiatric/Behavioral: Negative.         PMHx:  HTN, hyperlipidemia, rheumatoid arthritis, migraine headaches, TTP  PSHx:  Endometrial ablation, left carpal tunnel release, right shoulder surgery  SH:  Lifetime nonsmoker.  Previous alcohol, none since age 20.  Lives in [place] with  her daughter.  Provides  in her home.  FH:  PGF had a CVA.  No family history of cancer.    Objective:     Vitals:    04/30/24 0937   BP: (!) 148/95   Pulse: 70   Resp: 15   Temp: 98.2 °F (36.8 °C)       Physical Exam  Constitutional:       Comments: Morbidly obese black female in NAD   HENT:      Head: Normocephalic.      Mouth/Throat:      Mouth: Mucous membranes are moist.      Pharynx: Oropharynx is clear.   Eyes:      General: No scleral icterus.     Conjunctiva/sclera: Conjunctivae normal.      Pupils: Pupils are equal, round, and reactive to light.   Cardiovascular:      Rate and Rhythm: Normal rate and regular rhythm.      Heart sounds: No murmur heard.  Pulmonary:      Comments: Lungs clear to auscultation  Abdominal:      General: Bowel sounds are normal. There is no distension.      Palpations: Abdomen is soft. There is no mass.      Tenderness: There is no abdominal tenderness.   Musculoskeletal:         General: No swelling or tenderness. Normal range of motion.      Cervical back: Neck supple. No tenderness.   Skin:     General: Skin is warm and dry.      Findings: No rash.   Neurological:      General: No focal deficit present.      Mental Status: She is alert and oriented to person, place, and time.      Sensory: Sensory deficit (fingertip neuropathy, grade 1, pre-existing) present.       ECOG SCORE    0 - Fully active-able to carry on all pre-disease performance without restriction          LABORATORY  Recent Results (from the past 336 hour(s))   Comprehensive Metabolic Panel    Collection Time: 04/17/24 10:45 AM   Result Value Ref Range    Sodium Level 143 136 - 145 mmol/L    Potassium Level 4.0 3.5 - 5.1 mmol/L    Chloride 108 (H) 98 - 107 mmol/L    Carbon Dioxide 27 22 - 29 mmol/L    Glucose Level 79 74 - 100 mg/dL    Blood Urea Nitrogen 11.5 9.8 - 20.1 mg/dL    Creatinine 0.76 0.55 - 1.02 mg/dL    Calcium Level Total 9.3 8.4 - 10.2 mg/dL    Protein Total 6.6 6.4 - 8.3 gm/dL    Albumin  Level 3.7 3.5 - 5.0 g/dL    Globulin 2.9 2.4 - 3.5 gm/dL    Albumin/Globulin Ratio 1.3 1.1 - 2.0 ratio    Bilirubin Total 0.5 <=1.5 mg/dL    Alkaline Phosphatase 122 40 - 150 unit/L    Alanine Aminotransferase 7 0 - 55 unit/L    Aspartate Aminotransferase 13 5 - 34 unit/L    eGFR >60 mls/min/1.73/m2   Lactate Dehydrogenase    Collection Time: 04/17/24 10:45 AM   Result Value Ref Range    Lactate Dehydrogenase 195 125 - 220 U/L   CBC with Differential    Collection Time: 04/17/24 10:45 AM   Result Value Ref Range    WBC 4.08 (L) 4.50 - 11.50 x10(3)/mcL    RBC 5.58 (H) 4.20 - 5.40 x10(6)/mcL    Hgb 12.7 12.0 - 16.0 g/dL    Hct 40.8 37.0 - 47.0 %    MCV 73.1 (L) 80.0 - 94.0 fL    MCH 22.8 (L) 27.0 - 31.0 pg    MCHC 31.1 (L) 33.0 - 36.0 g/dL    RDW 15.6 11.5 - 17.0 %    Platelet 300 130 - 400 x10(3)/mcL    MPV 9.7 7.4 - 10.4 fL    Neut % 52.4 %    Lymph % 29.4 %    Mono % 15.7 %    Eos % 2.0 %    Basophil % 0.5 %    Lymph # 1.20 0.6 - 4.6 x10(3)/mcL    Neut # 2.14 2.1 - 9.2 x10(3)/mcL    Mono # 0.64 0.1 - 1.3 x10(3)/mcL    Eos # 0.08 0 - 0.9 x10(3)/mcL    Baso # 0.02 <=0.2 x10(3)/mcL    IG# 0.00 0 - 0.04 x10(3)/mcL    IG% 0.0 %   CBC with Differential    Collection Time: 04/30/24  9:33 AM   Result Value Ref Range    WBC 3.35 (L) 4.50 - 11.50 x10(3)/mcL    RBC 5.66 (H) 4.20 - 5.40 x10(6)/mcL    Hgb 12.6 12.0 - 16.0 g/dL    Hct 40.4 37.0 - 47.0 %    MCV 71.4 (L) 80.0 - 94.0 fL    MCH 22.3 (L) 27.0 - 31.0 pg    MCHC 31.2 (L) 33.0 - 36.0 g/dL    RDW 14.9 11.5 - 17.0 %    Platelet 251 130 - 400 x10(3)/mcL    MPV 10.2 7.4 - 10.4 fL    Neut % 48.1 %    Lymph % 30.4 %    Mono % 17.6 %    Eos % 3.3 %    Basophil % 0.6 %    Lymph # 1.02 0.6 - 4.6 x10(3)/mcL    Neut # 1.61 (L) 2.1 - 9.2 x10(3)/mcL    Mono # 0.59 0.1 - 1.3 x10(3)/mcL    Eos # 0.11 0 - 0.9 x10(3)/mcL    Baso # 0.02 <=0.2 x10(3)/mcL    IG# 0.00 0 - 0.04 x10(3)/mcL    IG% 0.0 %       Assessment:   TTP -  in clinical remission  Microangiopathic hemolytic  anemia  Rheumatoid arthritis      Plan:   Her CBC is stable with no evidence of recurrent anemia or thrombocytopenia.  CMP and LDH level drawn today are pending.  I will notify her of any abnormal findings.  Continue laboratory monitoring every 2 weeks.  RTC in 6 weeks for a follow-up visit and clinical exam.      NEO LUJAN MD    Other Physicians  Dr. Yaritza Block

## 2024-04-30 ENCOUNTER — LAB VISIT (OUTPATIENT)
Dept: LAB | Facility: HOSPITAL | Age: 49
End: 2024-04-30
Attending: INTERNAL MEDICINE
Payer: MEDICAID

## 2024-04-30 ENCOUNTER — OFFICE VISIT (OUTPATIENT)
Dept: HEMATOLOGY/ONCOLOGY | Facility: CLINIC | Age: 49
End: 2024-04-30
Payer: MEDICAID

## 2024-04-30 VITALS
OXYGEN SATURATION: 98 % | HEART RATE: 70 BPM | WEIGHT: 249.13 LBS | SYSTOLIC BLOOD PRESSURE: 148 MMHG | HEIGHT: 66 IN | TEMPERATURE: 98 F | BODY MASS INDEX: 40.04 KG/M2 | DIASTOLIC BLOOD PRESSURE: 95 MMHG | RESPIRATION RATE: 15 BRPM

## 2024-04-30 DIAGNOSIS — M31.19 TTP (THROMBOTIC THROMBOCYTOPENIC PURPURA): Primary | ICD-10-CM

## 2024-04-30 DIAGNOSIS — K29.70 GASTRITIS, PRESENCE OF BLEEDING UNSPECIFIED, UNSPECIFIED CHRONICITY, UNSPECIFIED GASTRITIS TYPE: ICD-10-CM

## 2024-04-30 DIAGNOSIS — M31.19 TTP (THROMBOTIC THROMBOCYTOPENIC PURPURA): ICD-10-CM

## 2024-04-30 LAB
ALBUMIN SERPL-MCNC: 3.6 G/DL (ref 3.5–5)
ALBUMIN/GLOB SERPL: 1.3 RATIO (ref 1.1–2)
ALP SERPL-CCNC: 146 UNIT/L (ref 40–150)
ALT SERPL-CCNC: 17 UNIT/L (ref 0–55)
AST SERPL-CCNC: 20 UNIT/L (ref 5–34)
BASOPHILS # BLD AUTO: 0.02 X10(3)/MCL
BASOPHILS NFR BLD AUTO: 0.6 %
BILIRUB SERPL-MCNC: 0.5 MG/DL
BUN SERPL-MCNC: 10.2 MG/DL (ref 9.8–20.1)
CALCIUM SERPL-MCNC: 8.5 MG/DL (ref 8.4–10.2)
CHLORIDE SERPL-SCNC: 110 MMOL/L (ref 98–107)
CO2 SERPL-SCNC: 24 MMOL/L (ref 22–29)
CREAT SERPL-MCNC: 0.78 MG/DL (ref 0.55–1.02)
EOSINOPHIL # BLD AUTO: 0.11 X10(3)/MCL (ref 0–0.9)
EOSINOPHIL NFR BLD AUTO: 3.3 %
ERYTHROCYTE [DISTWIDTH] IN BLOOD BY AUTOMATED COUNT: 14.9 % (ref 11.5–17)
GFR SERPLBLD CREATININE-BSD FMLA CKD-EPI: >60 MLS/MIN/1.73/M2
GLOBULIN SER-MCNC: 2.8 GM/DL (ref 2.4–3.5)
GLUCOSE SERPL-MCNC: 87 MG/DL (ref 74–100)
HCT VFR BLD AUTO: 40.4 % (ref 37–47)
HGB BLD-MCNC: 12.6 G/DL (ref 12–16)
IMM GRANULOCYTES # BLD AUTO: 0 X10(3)/MCL (ref 0–0.04)
IMM GRANULOCYTES NFR BLD AUTO: 0 %
LDH SERPL-CCNC: 171 U/L (ref 125–220)
LYMPHOCYTES # BLD AUTO: 1.02 X10(3)/MCL (ref 0.6–4.6)
LYMPHOCYTES NFR BLD AUTO: 30.4 %
MCH RBC QN AUTO: 22.3 PG (ref 27–31)
MCHC RBC AUTO-ENTMCNC: 31.2 G/DL (ref 33–36)
MCV RBC AUTO: 71.4 FL (ref 80–94)
MONOCYTES # BLD AUTO: 0.59 X10(3)/MCL (ref 0.1–1.3)
MONOCYTES NFR BLD AUTO: 17.6 %
NEUTROPHILS # BLD AUTO: 1.61 X10(3)/MCL (ref 2.1–9.2)
NEUTROPHILS NFR BLD AUTO: 48.1 %
PLATELET # BLD AUTO: 251 X10(3)/MCL (ref 130–400)
PMV BLD AUTO: 10.2 FL (ref 7.4–10.4)
POTASSIUM SERPL-SCNC: 4.1 MMOL/L (ref 3.5–5.1)
PROT SERPL-MCNC: 6.4 GM/DL (ref 6.4–8.3)
RBC # BLD AUTO: 5.66 X10(6)/MCL (ref 4.2–5.4)
SODIUM SERPL-SCNC: 143 MMOL/L (ref 136–145)
WBC # SPEC AUTO: 3.35 X10(3)/MCL (ref 4.5–11.5)

## 2024-04-30 PROCEDURE — 99215 OFFICE O/P EST HI 40 MIN: CPT | Mod: PBBFAC | Performed by: INTERNAL MEDICINE

## 2024-04-30 PROCEDURE — 3077F SYST BP >= 140 MM HG: CPT | Mod: CPTII,,, | Performed by: INTERNAL MEDICINE

## 2024-04-30 PROCEDURE — 4010F ACE/ARB THERAPY RXD/TAKEN: CPT | Mod: CPTII,,, | Performed by: INTERNAL MEDICINE

## 2024-04-30 PROCEDURE — 85025 COMPLETE CBC W/AUTO DIFF WBC: CPT

## 2024-04-30 PROCEDURE — 83615 LACTATE (LD) (LDH) ENZYME: CPT

## 2024-04-30 PROCEDURE — 3008F BODY MASS INDEX DOCD: CPT | Mod: CPTII,,, | Performed by: INTERNAL MEDICINE

## 2024-04-30 PROCEDURE — 1160F RVW MEDS BY RX/DR IN RCRD: CPT | Mod: CPTII,,, | Performed by: INTERNAL MEDICINE

## 2024-04-30 PROCEDURE — 80053 COMPREHEN METABOLIC PANEL: CPT

## 2024-04-30 PROCEDURE — 99214 OFFICE O/P EST MOD 30 MIN: CPT | Mod: S$PBB,,, | Performed by: INTERNAL MEDICINE

## 2024-04-30 PROCEDURE — 3080F DIAST BP >= 90 MM HG: CPT | Mod: CPTII,,, | Performed by: INTERNAL MEDICINE

## 2024-04-30 PROCEDURE — 1159F MED LIST DOCD IN RCRD: CPT | Mod: CPTII,,, | Performed by: INTERNAL MEDICINE

## 2024-04-30 PROCEDURE — 36415 COLL VENOUS BLD VENIPUNCTURE: CPT

## 2024-04-30 PROCEDURE — 99999 PR PBB SHADOW E&M-EST. PATIENT-LVL V: CPT | Mod: PBBFAC,,, | Performed by: INTERNAL MEDICINE

## 2024-05-13 ENCOUNTER — OFFICE VISIT (OUTPATIENT)
Dept: URGENT CARE | Facility: CLINIC | Age: 49
End: 2024-05-13
Payer: MEDICAID

## 2024-05-13 VITALS
RESPIRATION RATE: 20 BRPM | BODY MASS INDEX: 40.24 KG/M2 | DIASTOLIC BLOOD PRESSURE: 87 MMHG | WEIGHT: 250.38 LBS | TEMPERATURE: 99 F | OXYGEN SATURATION: 99 % | HEIGHT: 66 IN | SYSTOLIC BLOOD PRESSURE: 135 MMHG | HEART RATE: 85 BPM

## 2024-05-13 DIAGNOSIS — J06.9 UPPER RESPIRATORY TRACT INFECTION, UNSPECIFIED TYPE: Primary | ICD-10-CM

## 2024-05-13 PROCEDURE — 99215 OFFICE O/P EST HI 40 MIN: CPT | Mod: PBBFAC | Performed by: FAMILY MEDICINE

## 2024-05-13 PROCEDURE — 99213 OFFICE O/P EST LOW 20 MIN: CPT | Mod: S$PBB,,, | Performed by: FAMILY MEDICINE

## 2024-05-13 RX ORDER — PROMETHAZINE HYDROCHLORIDE AND DEXTROMETHORPHAN HYDROBROMIDE 6.25; 15 MG/5ML; MG/5ML
5 SYRUP ORAL
Qty: 120 ML | Refills: 0 | Status: SHIPPED | OUTPATIENT
Start: 2024-05-13

## 2024-05-13 NOTE — PROGRESS NOTES
"Subjective:       Patient ID: Nicole Brown is a 49 y.o. female.    Vitals:  height is 5' 6" (1.676 m) and weight is 113.6 kg (250 lb 6.4 oz). Her oral temperature is 98.5 °F (36.9 °C). Her blood pressure is 135/87 and her pulse is 85. Her respiration is 20 and oxygen saturation is 99%.     Chief Complaint: Cough (48 y/o female presents to urgent care with c/o cough, congestion, sore throat , sneezing and sinus pressure started since Wednesday )    Patient with several days of symptoms.  No fever.  History of TTP    Cough  Associated symptoms include myalgias, nasal congestion, postnasal drip, rhinorrhea and a sore throat. Pertinent negatives include no chest pain, fever, hemoptysis, rash, shortness of breath or wheezing.         Constitution: Negative for fever.   HENT:  Positive for postnasal drip and sore throat.    Cardiovascular:  Negative for chest pain.   Respiratory:  Positive for cough. Negative for bloody sputum, shortness of breath and wheezing.    Musculoskeletal:  Positive for muscle ache.   Skin:  Negative for rash.       Objective:   Physical Exam   Constitutional: She appears well-developed.  Non-toxic appearance. She does not appear ill. No distress.   HENT:   Head: Atraumatic.   Nose: No purulent discharge. Right sinus exhibits no maxillary sinus tenderness and no frontal sinus tenderness. Left sinus exhibits no maxillary sinus tenderness and no frontal sinus tenderness.   Mouth/Throat: Uvula is midline. Posterior oropharyngeal erythema (faint, diffuse) present. No oropharyngeal exudate.   Eyes: Right eye exhibits no discharge. Left eye exhibits no discharge. Extraocular movement intact   Neck: Neck supple. No neck rigidity present.   Cardiovascular: Regular rhythm.   Pulmonary/Chest: Effort normal and breath sounds normal. No respiratory distress. She has no wheezes. She has no rales.   Lymphadenopathy:     She has no cervical adenopathy.   Neurological: She is alert.   Skin: Skin is " warm, dry and not diaphoretic.   Psychiatric: Her behavior is normal.   Nursing note and vitals reviewed.        Assessment:     1. Upper respiratory tract infection, unspecified type          Plan:   Advised lots of fluids.  Caution with medications secondary to TTP.  Phenergan DM with side effect precautions.  Discussed signs and symptoms that should prompt a return visit.  ER precautions.    Upper respiratory tract infection, unspecified type  -     promethazine-dextromethorphan (PROMETHAZINE-DM) 6.25-15 mg/5 mL Syrp; Take 5 mLs by mouth every 6 to 8 hours as needed (cough). May cause sedation.  Do not drive or operate heavy machinery.  Dispense: 120 mL; Refill: 0        Please note: This chart was completed via voice to text dictation. It may contain typographical/word recognition errors. If there are any questions, please contact the provider for final clarification.

## 2024-05-14 ENCOUNTER — LAB VISIT (OUTPATIENT)
Dept: LAB | Facility: HOSPITAL | Age: 49
End: 2024-05-14
Attending: INTERNAL MEDICINE
Payer: MEDICAID

## 2024-05-14 DIAGNOSIS — M31.19 TTP (THROMBOTIC THROMBOCYTOPENIC PURPURA): ICD-10-CM

## 2024-05-14 LAB
ALBUMIN SERPL-MCNC: 3.5 G/DL (ref 3.5–5)
ALBUMIN/GLOB SERPL: 1.1 RATIO (ref 1.1–2)
ALP SERPL-CCNC: 132 UNIT/L (ref 40–150)
ALT SERPL-CCNC: 7 UNIT/L (ref 0–55)
AST SERPL-CCNC: 13 UNIT/L (ref 5–34)
BASOPHILS # BLD AUTO: 0.03 X10(3)/MCL
BASOPHILS NFR BLD AUTO: 0.7 %
BILIRUB SERPL-MCNC: 0.3 MG/DL
BUN SERPL-MCNC: 11.9 MG/DL (ref 9.8–20.1)
CALCIUM SERPL-MCNC: 8.9 MG/DL (ref 8.4–10.2)
CHLORIDE SERPL-SCNC: 108 MMOL/L (ref 98–107)
CO2 SERPL-SCNC: 28 MMOL/L (ref 22–29)
CREAT SERPL-MCNC: 0.83 MG/DL (ref 0.55–1.02)
EOSINOPHIL # BLD AUTO: 0.19 X10(3)/MCL (ref 0–0.9)
EOSINOPHIL NFR BLD AUTO: 4.2 %
ERYTHROCYTE [DISTWIDTH] IN BLOOD BY AUTOMATED COUNT: 14.3 % (ref 11.5–17)
GFR SERPLBLD CREATININE-BSD FMLA CKD-EPI: >60 ML/MIN/1.73/M2
GLOBULIN SER-MCNC: 3.1 GM/DL (ref 2.4–3.5)
GLUCOSE SERPL-MCNC: 90 MG/DL (ref 74–100)
HCT VFR BLD AUTO: 42.6 % (ref 37–47)
HGB BLD-MCNC: 13.3 G/DL (ref 12–16)
IMM GRANULOCYTES # BLD AUTO: 0 X10(3)/MCL (ref 0–0.04)
IMM GRANULOCYTES NFR BLD AUTO: 0 %
LDH SERPL-CCNC: 172 U/L (ref 125–220)
LYMPHOCYTES # BLD AUTO: 1.05 X10(3)/MCL (ref 0.6–4.6)
LYMPHOCYTES NFR BLD AUTO: 23.2 %
MCH RBC QN AUTO: 22 PG (ref 27–31)
MCHC RBC AUTO-ENTMCNC: 31.2 G/DL (ref 33–36)
MCV RBC AUTO: 70.4 FL (ref 80–94)
MONOCYTES # BLD AUTO: 0.81 X10(3)/MCL (ref 0.1–1.3)
MONOCYTES NFR BLD AUTO: 17.9 %
NEUTROPHILS # BLD AUTO: 2.45 X10(3)/MCL (ref 2.1–9.2)
NEUTROPHILS NFR BLD AUTO: 54 %
PLATELET # BLD AUTO: 285 X10(3)/MCL (ref 130–400)
PMV BLD AUTO: 10.1 FL (ref 7.4–10.4)
POTASSIUM SERPL-SCNC: 4.4 MMOL/L (ref 3.5–5.1)
PROT SERPL-MCNC: 6.6 GM/DL (ref 6.4–8.3)
RBC # BLD AUTO: 6.05 X10(6)/MCL (ref 4.2–5.4)
SODIUM SERPL-SCNC: 140 MMOL/L (ref 136–145)
WBC # SPEC AUTO: 4.53 X10(3)/MCL (ref 4.5–11.5)

## 2024-05-14 PROCEDURE — 85025 COMPLETE CBC W/AUTO DIFF WBC: CPT

## 2024-05-14 PROCEDURE — 36415 COLL VENOUS BLD VENIPUNCTURE: CPT

## 2024-05-14 PROCEDURE — 83615 LACTATE (LD) (LDH) ENZYME: CPT

## 2024-05-14 PROCEDURE — 80053 COMPREHEN METABOLIC PANEL: CPT

## 2024-05-24 ENCOUNTER — OFFICE VISIT (OUTPATIENT)
Dept: FAMILY MEDICINE | Facility: CLINIC | Age: 49
End: 2024-05-24
Payer: MEDICAID

## 2024-05-24 VITALS
RESPIRATION RATE: 18 BRPM | SYSTOLIC BLOOD PRESSURE: 124 MMHG | HEART RATE: 88 BPM | BODY MASS INDEX: 40.5 KG/M2 | TEMPERATURE: 98 F | WEIGHT: 252 LBS | OXYGEN SATURATION: 100 % | HEIGHT: 66 IN | DIASTOLIC BLOOD PRESSURE: 87 MMHG

## 2024-05-24 DIAGNOSIS — Z00.00 ANNUAL PHYSICAL EXAM: Primary | ICD-10-CM

## 2024-05-24 DIAGNOSIS — Z12.31 ENCOUNTER FOR SCREENING MAMMOGRAM FOR BREAST CANCER: ICD-10-CM

## 2024-05-24 LAB
BACTERIA #/AREA URNS AUTO: ABNORMAL /HPF
BILIRUB UR QL STRIP.AUTO: NEGATIVE
CLARITY UR: CLEAR
COLOR UR AUTO: ABNORMAL
GLUCOSE UR QL STRIP: NORMAL
HGB UR QL STRIP: NEGATIVE
HYALINE CASTS #/AREA URNS LPF: ABNORMAL /LPF
KETONES UR QL STRIP: NEGATIVE
LEUKOCYTE ESTERASE UR QL STRIP: NEGATIVE
MUCOUS THREADS URNS QL MICRO: ABNORMAL /LPF
NITRITE UR QL STRIP: NEGATIVE
PH UR STRIP: 5.5 [PH]
PROT UR QL STRIP: NEGATIVE
RBC #/AREA URNS AUTO: ABNORMAL /HPF
SP GR UR STRIP.AUTO: 1.02 (ref 1–1.03)
SQUAMOUS #/AREA URNS LPF: ABNORMAL /HPF
TSH SERPL-ACNC: 2.49 UIU/ML (ref 0.35–4.94)
UROBILINOGEN UR STRIP-ACNC: NORMAL
WBC #/AREA URNS AUTO: ABNORMAL /HPF

## 2024-05-24 PROCEDURE — 84443 ASSAY THYROID STIM HORMONE: CPT | Performed by: NURSE PRACTITIONER

## 2024-05-24 PROCEDURE — 99214 OFFICE O/P EST MOD 30 MIN: CPT | Mod: PBBFAC | Performed by: NURSE PRACTITIONER

## 2024-05-24 PROCEDURE — 1159F MED LIST DOCD IN RCRD: CPT | Mod: CPTII,,, | Performed by: NURSE PRACTITIONER

## 2024-05-24 PROCEDURE — 1160F RVW MEDS BY RX/DR IN RCRD: CPT | Mod: CPTII,,, | Performed by: NURSE PRACTITIONER

## 2024-05-24 PROCEDURE — 3079F DIAST BP 80-89 MM HG: CPT | Mod: CPTII,,, | Performed by: NURSE PRACTITIONER

## 2024-05-24 PROCEDURE — 4010F ACE/ARB THERAPY RXD/TAKEN: CPT | Mod: CPTII,,, | Performed by: NURSE PRACTITIONER

## 2024-05-24 PROCEDURE — 36415 COLL VENOUS BLD VENIPUNCTURE: CPT | Performed by: NURSE PRACTITIONER

## 2024-05-24 PROCEDURE — 3074F SYST BP LT 130 MM HG: CPT | Mod: CPTII,,, | Performed by: NURSE PRACTITIONER

## 2024-05-24 PROCEDURE — 81001 URINALYSIS AUTO W/SCOPE: CPT | Performed by: NURSE PRACTITIONER

## 2024-05-24 PROCEDURE — 99396 PREV VISIT EST AGE 40-64: CPT | Mod: S$PBB,,, | Performed by: NURSE PRACTITIONER

## 2024-05-24 PROCEDURE — 3008F BODY MASS INDEX DOCD: CPT | Mod: CPTII,,, | Performed by: NURSE PRACTITIONER

## 2024-05-24 NOTE — PROGRESS NOTES
PLEASE CALL PATIENTS WITH RESULTS,   Urinalysis no sign of infection, no protein, no glucose, no blood  Thyroid function test within normal range

## 2024-05-24 NOTE — PROGRESS NOTES
Patient Name: Nicole Quiñones   : 1975  MRN: [id number]     SUBJECTIVE DATA:    CHIEF COMPLAINT:   Nicole Quiñones is a 49 y.o. female who presents to clinic today with Annual Exam and Medication Refill        HPI:  49-year-old female presents to the clinic to complete her yearly wellness exam.   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  She has been followed by a hematologist in [place].  She relocated to [place] to live with her daughter approximately 8 months ago.  She presented to the ER 24 with low-grade fever, nausea/vomiting and abdominal discomfort.  She denied overt diarrhea.  Laboratory on presentation showed a platelet count of 5000.  Hemoglobin was 12.4 with microcytic, hypochromic indices and WBC 6.4.  LDH was elevated at 711 and haptoglobin was undetectable.  UPT was negative.  CMP showed mild renal insufficiency with a BUN of 24.9 and creatinine 1.36.  Peripheral smear showed microcytic, hypochromic red blood cells, increased reticulocytes and the presence of schistocytes and target cells.  Platelets were markedly decreased.  She was transferred to [facility] for initiation of plasmapheresis.  She received 1 unit of platelets at the outside facility prior to transfer.  On arrival here, she was started on prednisone 1 mg/kg daily and received 1 unit of FFP.     Hospital Course  24:  Started daily plasma exchange 1 plasma volume (40 cc/kg) exchange with FFP.  Platelet count 12,000. ADAMTS-13 level drawn 24 <5%.  24:  Day 2 plasma exchange.  Change to 1/2 albumin + 1/2 FFP secondary to significant urticaria.  24: Day 3 plasma exchange.  1/2 albumin + 1/2 FFP.  24: Day 4 plasma exchange.  1/2 albumin + 1/2 FFP  24:  Day 5 plasma exchange.  1/2 albumin + 1/2 FFP  24: Discharged from the hospital     Outpatient plasma exchange could  not be coordinated with the hospital.  Systemic therapy was recommended with Rituxan weekly x4. She completed 4 weeks of treatment 3/21/24.  Prednisone was tapered and discontinued by 4/3/24.     Interval History    She returns to the office today by herself for a 4 week follow-up visit.  She is still undergoing laboratory monitoring every 2 weeks.  Her CBC has been stable with no evidence of recurrent anemia or thrombocytopenia.  She underwent an outpatient EGD for dysphagia and dilatation was performed.  She is scheduled for a 2nd dilatation in May.  She denies any fever, chills, night sweats or weight loss.  Her azathioprine is on hold by Rheumatology secondary to treatment with rituximab.  Her CBC today is stable.    Social Determinants of Health     Tobacco Use: Low Risk  (5/24/2024)    Patient History     Smoking Tobacco Use: Never     Smokeless Tobacco Use: Never     Passive Exposure: Never   Alcohol Use: Not At Risk (4/19/2023)    AUDIT-C     Frequency of Alcohol Consumption: Never     Average Number of Drinks: Patient does not drink     Frequency of Binge Drinking: Never   Financial Resource Strain: Low Risk  (4/19/2023)    Overall Financial Resource Strain (CARDIA)     Difficulty of Paying Living Expenses: Not very hard   Food Insecurity: Food Insecurity Present (4/19/2023)    Hunger Vital Sign     Worried About Running Out of Food in the Last Year: Sometimes true     Ran Out of Food in the Last Year: Never true   Transportation Needs: No Transportation Needs (4/19/2023)    PRAPARE - Transportation     Lack of Transportation (Medical): No     Lack of Transportation (Non-Medical): No   Physical Activity: Inactive (4/19/2023)    Exercise Vital Sign     Days of Exercise per Week: 0 days     Minutes of Exercise per Session: 0 min   Stress: Stress Concern Present (4/19/2023)    [employer] - Occupational Stress Questionnaire     Feeling of Stress : To some extent   Housing  Stability: Low Risk  (4/19/2023)    Housing Stability Vital Sign     Unable to Pay for Housing in the Last Year: No     Number of Places Lived in the Last Year: 2     Unstable Housing in the Last Year: No   Depression: Low Risk  (4/30/2024)    Depression     Last PHQ-4: Flowsheet Data: 0   Utilities: Not At Risk (5/24/2024)    C Utilities     Threatened with loss of utilities: No   Health Literacy: Adequate Health Literacy (5/24/2024)     Health Literacy     Frequency of need for help with medical instructions: Never   Social Isolation: Socially Integrated (5/24/2024)    Social Isolation     Social Isolation: 1     Last menstrual cycle:  Postmenopausal  Car safety:  Seat belt used all the time.  Water:  Stay hydrated with fluids, drink 6-8 cups of water daily.  Exercise: exercise 30 minutes a day up to 5 days a week.  Stay active.  Lose weight.  Nutrition:  Follow low-cholesterol, low-fat, low-salt diet.  Eat fresh fruits and vegetables.  Keep in mind portion size.  Dental:  Patient does not follow-up with a dentist yearly.  Dentist list provided for patient to call and schedule  Ophthalmology:  Patient does follow-up with ophthalmologist yearly.  Cervical cancer screening exam:  Up-to-date, next cervical cancer screening April 14, 2028   Immunizations:  Up-to-date  Lab work drawn today will notify of test results when they become available.     Patient denies chest pain, shortness of breath, dyspnea on exertion, palpitations, peripheral edema, abdominal pain, nausea, vomiting, diarrhea, constipation, fatigue, fever, chills, dysuria,  hematuria, no dark stools or bloody stools.                    ALLERGIES:   Review of patient's allergies indicates:   Allergen Reactions    Nsaids (non-steroidal anti-inflammatory drug) Other (See Comments)     Causes ulcers to bleed.    Ibuprofen     Latex     Meloxicam     Nsaids (non-steroidal anti-inflammatory drug) Nausea Only         ROS:  Review of Systems   All other  "systems reviewed and are negative.        OBJECTIVE DATA:  Vital signs  Vitals:    05/24/24 0732   BP: 124/87   Pulse: 88   Resp: 18   Temp: 98 °F (36.7 °C)   TempSrc: Oral   SpO2: 100%   Weight: 114.3 kg (252 lb)   Height: 5' 6" (1.676 m)      Body mass index is 40.67 kg/m².    PHYSICAL EXAM:   Physical Exam  Vitals and nursing note reviewed.   Constitutional:       General: She is awake. She is not in acute distress.     Appearance: Normal appearance. She is well-developed and well-groomed. She is morbidly obese. She is not ill-appearing, toxic-appearing or diaphoretic.   HENT:      Head: Normocephalic and atraumatic.      Right Ear: Tympanic membrane, ear canal and external ear normal.      Left Ear: Tympanic membrane, ear canal and external ear normal.      Nose: Nose normal.      Mouth/Throat:      Lips: Pink.      Mouth: Mucous membranes are moist.      Dentition: Abnormal dentition.      Tongue: No lesions. Tongue does not deviate from midline.      Palate: No mass and lesions.      Pharynx: Oropharynx is clear. Uvula midline.      Comments: Dentist list provided for patient to call and schedule.  Eyes:      General: Lids are normal. Gaze aligned appropriately. No scleral icterus.     Extraocular Movements: Extraocular movements intact.      Conjunctiva/sclera: Conjunctivae normal.      Pupils: Pupils are equal, round, and reactive to light.   Neck:      Thyroid: No thyroid mass, thyromegaly or thyroid tenderness.      Trachea: Trachea and phonation normal.   Cardiovascular:      Rate and Rhythm: Normal rate and regular rhythm.      Pulses: Normal pulses.           Carotid pulses are 2+ on the right side and 2+ on the left side.       Radial pulses are 2+ on the right side and 2+ on the left side.        Femoral pulses are 2+ on the right side and 2+ on the left side.       Dorsalis pedis pulses are 2+ on the right side and 2+ on the left side.        Posterior tibial pulses are 2+ on the right side and 2+ on " the left side.      Heart sounds: Normal heart sounds.   Pulmonary:      Effort: Pulmonary effort is normal.      Breath sounds: Normal breath sounds and air entry.   Abdominal:      General: Bowel sounds are normal. There is no distension.      Palpations: Abdomen is soft. There is no mass.      Tenderness: There is no abdominal tenderness. There is no right CVA tenderness, left CVA tenderness, guarding or rebound.   Genitourinary:     Comments: Physical exam deferred, keep appointments with gynecology as directed.  Denies any urinary or bowel habit changes.  Musculoskeletal:         General: Normal range of motion.      Cervical back: Full passive range of motion without pain, normal range of motion and neck supple.      Right lower leg: No edema.      Left lower leg: No edema.   Lymphadenopathy:      Cervical: No cervical adenopathy.   Skin:     General: Skin is warm and dry.      Capillary Refill: Capillary refill takes less than 2 seconds.      Findings: No rash.   Neurological:      General: No focal deficit present.      Mental Status: She is alert and oriented to person, place, and time. Mental status is at baseline.      GCS: GCS eye subscore is 4. GCS verbal subscore is 5. GCS motor subscore is 6.      Cranial Nerves: Cranial nerves 2-12 are intact. No cranial nerve deficit.      Sensory: Sensation is intact. No sensory deficit.      Motor: Motor function is intact. No weakness.      Coordination: Coordination is intact. Coordination normal.      Gait: Gait is intact. Gait normal.      Deep Tendon Reflexes: Reflexes normal.   Psychiatric:         Attention and Perception: Attention and perception normal.         Mood and Affect: Mood and affect normal.         Speech: Speech normal.         Behavior: Behavior normal. Behavior is cooperative.         Thought Content: Thought content normal.         Cognition and Memory: Cognition and memory normal.         Judgment: Judgment normal.           ASSESSMENT/PLAN:  1. Annual physical exam  -     TSH  -     Urinalysis, Reflex to Urine Culture    2. Encounter for screening mammogram for breast cancer  -     Mammo Digital Screening Bilat w/ [facility]; Future; Expected date: 05/24/2024           RESULTS:  Recent Results (from the past 1008 hour(s))   Comprehensive Metabolic Panel    Collection Time: 04/17/24 10:45 AM   Result Value Ref Range    Sodium 143 136 - 145 mmol/L    Potassium 4.0 3.5 - 5.1 mmol/L    Chloride 108 (H) 98 - 107 mmol/L    CO2 27 22 - 29 mmol/L    Glucose 79 74 - 100 mg/dL    Blood Urea Nitrogen 11.5 9.8 - 20.1 mg/dL    Creatinine 0.76 0.55 - 1.02 mg/dL    Calcium 9.3 8.4 - 10.2 mg/dL    Protein Total 6.6 6.4 - 8.3 gm/dL    Albumin 3.7 3.5 - 5.0 g/dL    Globulin 2.9 2.4 - 3.5 gm/dL    Albumin/Globulin Ratio 1.3 1.1 - 2.0 ratio    Bilirubin Total 0.5 <=1.5 mg/dL     40 - 150 unit/L    ALT 7 0 - 55 unit/L    AST 13 5 - 34 unit/L    eGFR >60 mls/min/1.73/m2   Lactate Dehydrogenase    Collection Time: 04/17/24 10:45 AM   Result Value Ref Range    Lactate Dehydrogenase 195 125 - 220 U/L   CBC with Differential    Collection Time: 04/17/24 10:45 AM   Result Value Ref Range    WBC 4.08 (L) 4.50 - 11.50 x10(3)/mcL    RBC 5.58 (H) 4.20 - 5.40 x10(6)/mcL    Hgb 12.7 12.0 - 16.0 g/dL    Hct 40.8 37.0 - 47.0 %    MCV 73.1 (L) 80.0 - 94.0 fL    MCH 22.8 (L) 27.0 - 31.0 pg    MCHC 31.1 (L) 33.0 - 36.0 g/dL    RDW 15.6 11.5 - 17.0 %    Platelet 300 130 - 400 x10(3)/mcL    MPV 9.7 7.4 - 10.4 fL    Neut % 52.4 %    Lymph % 29.4 %    Mono % 15.7 %    Eos % 2.0 %    Basophil % 0.5 %    Lymph # 1.20 0.6 - 4.6 x10(3)/mcL    Neut # 2.14 2.1 - 9.2 x10(3)/mcL    Mono # 0.64 0.1 - 1.3 x10(3)/mcL    Eos # 0.08 0 - 0.9 x10(3)/mcL    Baso # 0.02 <=0.2 x10(3)/mcL    IG# 0.00 0 - 0.04 x10(3)/mcL    IG% 0.0 %   Comprehensive Metabolic Panel    Collection Time: 04/30/24  9:33 AM   Result Value Ref Range    Sodium 143 136 - 145 mmol/L    Potassium 4.1 3.5 - 5.1 mmol/L     Chloride 110 (H) 98 - 107 mmol/L    CO2 24 22 - 29 mmol/L    Glucose 87 74 - 100 mg/dL    Blood Urea Nitrogen 10.2 9.8 - 20.1 mg/dL    Creatinine 0.78 0.55 - 1.02 mg/dL    Calcium 8.5 8.4 - 10.2 mg/dL    Protein Total 6.4 6.4 - 8.3 gm/dL    Albumin 3.6 3.5 - 5.0 g/dL    Globulin 2.8 2.4 - 3.5 gm/dL    Albumin/Globulin Ratio 1.3 1.1 - 2.0 ratio    Bilirubin Total 0.5 <=1.5 mg/dL     40 - 150 unit/L    ALT 17 0 - 55 unit/L    AST 20 5 - 34 unit/L    eGFR >60 mls/min/1.73/m2   Lactate Dehydrogenase    Collection Time: 04/30/24  9:33 AM   Result Value Ref Range    Lactate Dehydrogenase 171 125 - 220 U/L   CBC with Differential    Collection Time: 04/30/24  9:33 AM   Result Value Ref Range    WBC 3.35 (L) 4.50 - 11.50 x10(3)/mcL    RBC 5.66 (H) 4.20 - 5.40 x10(6)/mcL    Hgb 12.6 12.0 - 16.0 g/dL    Hct 40.4 37.0 - 47.0 %    MCV 71.4 (L) 80.0 - 94.0 fL    MCH 22.3 (L) 27.0 - 31.0 pg    MCHC 31.2 (L) 33.0 - 36.0 g/dL    RDW 14.9 11.5 - 17.0 %    Platelet 251 130 - 400 x10(3)/mcL    MPV 10.2 7.4 - 10.4 fL    Neut % 48.1 %    Lymph % 30.4 %    Mono % 17.6 %    Eos % 3.3 %    Basophil % 0.6 %    Lymph # 1.02 0.6 - 4.6 x10(3)/mcL    Neut # 1.61 (L) 2.1 - 9.2 x10(3)/mcL    Mono # 0.59 0.1 - 1.3 x10(3)/mcL    Eos # 0.11 0 - 0.9 x10(3)/mcL    Baso # 0.02 <=0.2 x10(3)/mcL    IG# 0.00 0 - 0.04 x10(3)/mcL    IG% 0.0 %   Comprehensive Metabolic Panel    Collection Time: 05/14/24 11:03 AM   Result Value Ref Range    Sodium 140 136 - 145 mmol/L    Potassium 4.4 3.5 - 5.1 mmol/L    Chloride 108 (H) 98 - 107 mmol/L    CO2 28 22 - 29 mmol/L    Glucose 90 74 - 100 mg/dL    Blood Urea Nitrogen 11.9 9.8 - 20.1 mg/dL    Creatinine 0.83 0.55 - 1.02 mg/dL    Calcium 8.9 8.4 - 10.2 mg/dL    Protein Total 6.6 6.4 - 8.3 gm/dL    Albumin 3.5 3.5 - 5.0 g/dL    Globulin 3.1 2.4 - 3.5 gm/dL    Albumin/Globulin Ratio 1.1 1.1 - 2.0 ratio    Bilirubin Total 0.3 <=1.5 mg/dL     40 - 150 unit/L    ALT 7 0 - 55 unit/L    AST 13 5 - 34  unit/L    eGFR >60 mL/min/1.73/m2   Lactate Dehydrogenase    Collection Time: 05/14/24 11:03 AM   Result Value Ref Range    Lactate Dehydrogenase 172 125 - 220 U/L   CBC with Differential    Collection Time: 05/14/24 11:03 AM   Result Value Ref Range    WBC 4.53 4.50 - 11.50 x10(3)/mcL    RBC 6.05 (H) 4.20 - 5.40 x10(6)/mcL    Hgb 13.3 12.0 - 16.0 g/dL    Hct 42.6 37.0 - 47.0 %    MCV 70.4 (L) 80.0 - 94.0 fL    MCH 22.0 (L) 27.0 - 31.0 pg    MCHC 31.2 (L) 33.0 - 36.0 g/dL    RDW 14.3 11.5 - 17.0 %    Platelet 285 130 - 400 x10(3)/mcL    MPV 10.1 7.4 - 10.4 fL    Neut % 54.0 %    Lymph % 23.2 %    Mono % 17.9 %    Eos % 4.2 %    Basophil % 0.7 %    Lymph # 1.05 0.6 - 4.6 x10(3)/mcL    Neut # 2.45 2.1 - 9.2 x10(3)/mcL    Mono # 0.81 0.1 - 1.3 x10(3)/mcL    Eos # 0.19 0 - 0.9 x10(3)/mcL    Baso # 0.03 <=0.2 x10(3)/mcL    IG# 0.00 0 - 0.04 x10(3)/mcL    IG% 0.0 %         Follow Up:  Follow up in about 1 year (around 5/26/2025).      Previous medical history/lab work/radiology reviewed and considered during medical management decisions.   Medication list reviewed and medication reconciliation performed.  Patient was provided  and care about his/her current diagnosis (es) and medications including risk/benefit and side effects/adverse events, over the counter medication uses/doses, home self-care and contact precautions,  and red flags and indications for when to seek immediate medical attention.   Patient was advised to continue compliance with current medication list and medical recommendations.  Patient dvised continued compliance with recommended eating habits/ diets for medical conditions and exercise 150 minutes/ week (if possible) for medical condition (s).  Educational handouts and instructions on selected disease management in AVS (After Visit Summary).    All of the patient's questions were answered to patient's satisfaction.   The patient was receptive, expressed verbal understanding and agreement the  above plan.          This note was created with the assistance of a voice recognition software or phone dictation. There may be transcription errors as a result of using this technology however minimal. Effort has been made to assure accuracy of transcription but any obvious errors or omissions should be clarified with the author of the document

## 2024-05-24 NOTE — PATIENT INSTRUCTIONS
Samuel Rivera,     If you are due for any health screening(s) below please notify me so we can arrange them to be ordered and scheduled. Most healthy patients at your age complete them, but you are free to accept or refuse.     If you can't do it, I'll definitely understand. If you can, I'd certainly appreciate it!    Tests to Keep You Healthy    Mammogram: ORDERED BUT NOT SCHEDULED  Colon Cancer Screening: Met on 9/5/2023  Cervical Cancer Screening: Met on 4/14/2023  Last Blood Pressure <= 139/89 (5/24/2024): Yes      Schedule your breast cancer screening today     Breast cancer is the second most common cancer in women,  and the second leading cause of death from cancer. Mammograms can detect breast cancer early, which significantly increases the chances of curing the cancer.       Our records indicate that you may be overdue for breast cancer screening. Cancer screenings save lives, so schedule yours today to stay healthy.     If you recently had a mammogram performed outside of [facility], please let your Health care team know so that they can update your health record.

## 2024-05-27 ENCOUNTER — TELEPHONE (OUTPATIENT)
Dept: FAMILY MEDICINE | Facility: CLINIC | Age: 49
End: 2024-05-27
Payer: MEDICAID

## 2024-05-27 NOTE — TELEPHONE ENCOUNTER
Attempted to call patient no answer left voicemail.    ---- Message from BHAVANA Nguyen sent at 5/24/2024  3:55 PM CDT -----  PLEASE CALL PATIENTS WITH RESULTS,   Urinalysis no sign of infection, no protein, no glucose, no blood  Thyroid function test within normal range

## 2024-05-27 NOTE — TELEPHONE ENCOUNTER
Informed patient of lab results patient voiced understanding.    ----- Message from BHAVANA Nguyen sent at 5/24/2024  3:55 PM CDT -----  PLEASE CALL PATIENTS WITH RESULTS,   Urinalysis no sign of infection, no protein, no glucose, no blood  Thyroid function test within normal range

## 2024-05-28 ENCOUNTER — LAB VISIT (OUTPATIENT)
Dept: LAB | Facility: HOSPITAL | Age: 49
End: 2024-05-28
Attending: INTERNAL MEDICINE
Payer: MEDICAID

## 2024-05-28 DIAGNOSIS — M31.19 TTP (THROMBOTIC THROMBOCYTOPENIC PURPURA): ICD-10-CM

## 2024-05-28 LAB
ALBUMIN SERPL-MCNC: 3.7 G/DL (ref 3.5–5)
ALBUMIN/GLOB SERPL: 1.2 RATIO (ref 1.1–2)
ALP SERPL-CCNC: 121 UNIT/L (ref 40–150)
ALT SERPL-CCNC: 11 UNIT/L (ref 0–55)
ANION GAP SERPL CALC-SCNC: 5 MEQ/L
AST SERPL-CCNC: 13 UNIT/L (ref 5–34)
BASOPHILS # BLD AUTO: 0.03 X10(3)/MCL
BASOPHILS NFR BLD AUTO: 0.9 %
BILIRUB SERPL-MCNC: 0.5 MG/DL
BUN SERPL-MCNC: 13.6 MG/DL (ref 9.8–20.1)
CALCIUM SERPL-MCNC: 8.6 MG/DL (ref 8.4–10.2)
CHLORIDE SERPL-SCNC: 110 MMOL/L (ref 98–107)
CO2 SERPL-SCNC: 26 MMOL/L (ref 22–29)
CREAT SERPL-MCNC: 0.81 MG/DL (ref 0.55–1.02)
CREAT/UREA NIT SERPL: 17
EOSINOPHIL # BLD AUTO: 0.11 X10(3)/MCL (ref 0–0.9)
EOSINOPHIL NFR BLD AUTO: 3.2 %
ERYTHROCYTE [DISTWIDTH] IN BLOOD BY AUTOMATED COUNT: 14.6 % (ref 11.5–17)
GFR SERPLBLD CREATININE-BSD FMLA CKD-EPI: >60 ML/MIN/1.73/M2
GLOBULIN SER-MCNC: 3 GM/DL (ref 2.4–3.5)
GLUCOSE SERPL-MCNC: 85 MG/DL (ref 74–100)
HCT VFR BLD AUTO: 42 % (ref 37–47)
HGB BLD-MCNC: 12.9 G/DL (ref 12–16)
IMM GRANULOCYTES # BLD AUTO: 0.01 X10(3)/MCL (ref 0–0.04)
IMM GRANULOCYTES NFR BLD AUTO: 0.3 %
LDH SERPL-CCNC: 201 U/L (ref 125–220)
LYMPHOCYTES # BLD AUTO: 0.82 X10(3)/MCL (ref 0.6–4.6)
LYMPHOCYTES NFR BLD AUTO: 23.8 %
MCH RBC QN AUTO: 21.7 PG (ref 27–31)
MCHC RBC AUTO-ENTMCNC: 30.7 G/DL (ref 33–36)
MCV RBC AUTO: 70.7 FL (ref 80–94)
MONOCYTES # BLD AUTO: 0.65 X10(3)/MCL (ref 0.1–1.3)
MONOCYTES NFR BLD AUTO: 18.8 %
NEUTROPHILS # BLD AUTO: 1.83 X10(3)/MCL (ref 2.1–9.2)
NEUTROPHILS NFR BLD AUTO: 53 %
PLATELET # BLD AUTO: 311 X10(3)/MCL (ref 130–400)
PMV BLD AUTO: 10.3 FL (ref 7.4–10.4)
POTASSIUM SERPL-SCNC: 4 MMOL/L (ref 3.5–5.1)
PROT SERPL-MCNC: 6.7 GM/DL (ref 6.4–8.3)
RBC # BLD AUTO: 5.94 X10(6)/MCL (ref 4.2–5.4)
SODIUM SERPL-SCNC: 141 MMOL/L (ref 136–145)
WBC # SPEC AUTO: 3.45 X10(3)/MCL (ref 4.5–11.5)

## 2024-05-28 PROCEDURE — 36415 COLL VENOUS BLD VENIPUNCTURE: CPT

## 2024-05-28 PROCEDURE — 85025 COMPLETE CBC W/AUTO DIFF WBC: CPT

## 2024-05-28 PROCEDURE — 83615 LACTATE (LD) (LDH) ENZYME: CPT

## 2024-05-28 PROCEDURE — 80053 COMPREHEN METABOLIC PANEL: CPT

## 2024-06-03 DIAGNOSIS — Z76.0 ENCOUNTER FOR MEDICATION REFILL: ICD-10-CM

## 2024-06-03 DIAGNOSIS — G43.E09 CHRONIC MIGRAINE WITH AURA WITHOUT STATUS MIGRAINOSUS, NOT INTRACTABLE: ICD-10-CM

## 2024-06-03 DIAGNOSIS — F48.9 TENSION: ICD-10-CM

## 2024-06-03 DIAGNOSIS — Z76.89 ENCOUNTER TO ESTABLISH CARE: ICD-10-CM

## 2024-06-03 DIAGNOSIS — R06.02 SHORTNESS OF BREATH: ICD-10-CM

## 2024-06-03 DIAGNOSIS — R05.3 CHRONIC COUGH: ICD-10-CM

## 2024-06-03 DIAGNOSIS — J02.9 SORE THROAT: ICD-10-CM

## 2024-06-03 DIAGNOSIS — E78.49 OTHER HYPERLIPIDEMIA: ICD-10-CM

## 2024-06-03 DIAGNOSIS — I10 PRIMARY HYPERTENSION: ICD-10-CM

## 2024-06-03 DIAGNOSIS — G43.E01 CHRONIC MIGRAINE WITH AURA AND WITH STATUS MIGRAINOSUS, NOT INTRACTABLE: ICD-10-CM

## 2024-06-03 RX ORDER — UBROGEPANT 100 MG/1
100 TABLET ORAL 2 TIMES DAILY PRN
Qty: 16 TABLET | Refills: 2 | Status: SHIPPED | OUTPATIENT
Start: 2024-06-03

## 2024-06-03 RX ORDER — LANOLIN ALCOHOL/MO/W.PET/CERES
400 CREAM (GRAM) TOPICAL DAILY
Qty: 30 TABLET | Refills: 11 | Status: SHIPPED | OUTPATIENT
Start: 2024-06-03

## 2024-06-03 RX ORDER — ONDANSETRON HYDROCHLORIDE 8 MG/1
TABLET, FILM COATED ORAL
Qty: 9 TABLET | Refills: 3 | OUTPATIENT
Start: 2024-06-03

## 2024-06-03 RX ORDER — VENLAFAXINE HYDROCHLORIDE 37.5 MG/1
37.5 CAPSULE, EXTENDED RELEASE ORAL DAILY
Qty: 30 CAPSULE | Refills: 11 | Status: SHIPPED | OUTPATIENT
Start: 2024-06-03 | End: 2025-06-03

## 2024-06-03 RX ORDER — MOMETASONE FUROATE AND FORMOTEROL FUMARATE DIHYDRATE 100; 5 UG/1; UG/1
1 AEROSOL RESPIRATORY (INHALATION) 2 TIMES DAILY
Qty: 8.8 G | Refills: 5 | Status: SHIPPED | OUTPATIENT
Start: 2024-06-03

## 2024-06-03 RX ORDER — FLUTICASONE PROPIONATE 50 MCG
2 SPRAY, SUSPENSION (ML) NASAL DAILY PRN
Qty: 18.2 ML | Refills: 1 | Status: SHIPPED | OUTPATIENT
Start: 2024-06-03

## 2024-06-03 RX ORDER — ERGOCALCIFEROL 1.25 MG/1
50000 CAPSULE ORAL
Qty: 12 CAPSULE | Refills: 3 | Status: SHIPPED | OUTPATIENT
Start: 2024-06-03

## 2024-06-03 RX ORDER — ALBUTEROL SULFATE 0.83 MG/ML
SOLUTION RESPIRATORY (INHALATION)
Qty: 1 EACH | Refills: 3 | OUTPATIENT
Start: 2024-06-03

## 2024-06-03 RX ORDER — BACLOFEN 5 MG/1
5 TABLET ORAL 2 TIMES DAILY PRN
Qty: 30 TABLET | Refills: 2 | Status: SHIPPED | OUTPATIENT
Start: 2024-06-03

## 2024-06-03 RX ORDER — VALSARTAN 80 MG/1
80 TABLET ORAL DAILY
Qty: 90 TABLET | Refills: 3 | Status: SHIPPED | OUTPATIENT
Start: 2024-06-03 | End: 2024-06-03 | Stop reason: SDUPTHER

## 2024-06-03 RX ORDER — GALCANEZUMAB 120 MG/ML
INJECTION, SOLUTION SUBCUTANEOUS
Qty: 1 EACH | Refills: 11 | Status: SHIPPED | OUTPATIENT
Start: 2024-06-03

## 2024-06-03 RX ORDER — METOPROLOL SUCCINATE 100 MG/1
100 TABLET, EXTENDED RELEASE ORAL NIGHTLY
Qty: 90 TABLET | Refills: 3 | Status: SHIPPED | OUTPATIENT
Start: 2024-06-03 | End: 2024-06-03 | Stop reason: SDUPTHER

## 2024-06-03 NOTE — PROGRESS NOTES
Subjective:       Patient ID: Nicole Quiñones is a 49 y.o. female.    Chief Complaint:    My medications are being adjusted    Diagnosis: Relapsed thrombotic thrombocytopenic purpura                    Microangiopathic hemolytic anemia                    Rheumatoid arthritis    Treatment History  Plasma exchange 2/27-24-3/04/24  Steroid taper (completed 4/03/24)  Rituximab x 4 (completed 3/21/24)    Current Treatment: Observation    Clinical History:  Black female with a history of TTP initially diagnosed in 2015.  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  She has been followed by a hematologist in [place].  She relocated to [place] to live with her daughter approximately 8 months ago.  She presented to the ER 2/25/24 with low-grade fever, nausea/vomiting and abdominal discomfort.  She denied overt diarrhea.  Laboratory on presentation showed a platelet count of 5000.  Hemoglobin was 12.4 with microcytic, hypochromic indices and WBC 6.4.  LDH was elevated at 711 and haptoglobin was undetectable.  UPT was negative.  CMP showed mild renal insufficiency with a BUN of 24.9 and creatinine 1.36.  Peripheral smear showed microcytic, hypochromic red blood cells, increased reticulocytes and the presence of schistocytes and target cells.  Platelets were markedly decreased.  She was transferred to [facility] for initiation of plasmapheresis.  She received 1 unit of platelets at the outside facility prior to transfer.  On arrival here, she was started on prednisone 1 mg/kg daily and received 1 unit of FFP.     Hospital Course  2/27/24:  Started daily plasma exchange 1 plasma volume (40 cc/kg) exchange with FFP.  Platelet count 12,000. ADAMTS-13 level drawn 2/26/24 <5%.  2/28/24:  Day 2 plasma exchange.  Change to 1/2 albumin + 1/2 FFP secondary to significant urticaria.  2/29/24: Day 3 plasma exchange.  1/2 albumin + 1/2 FFP.  03/1/24: Day 4 plasma exchange.  1/2 albumin +  "1/2 FFP  03/4/24:  Day 5 plasma exchange.  1/2 albumin + 1/2 FFP  03/6/24: Discharged from the hospital    Outpatient plasma exchange could not be coordinated with the hospital.  Systemic therapy was recommended with Rituxan weekly x4. She completed 4 weeks of treatment 3/21/24.  Prednisone was tapered and discontinued by 4/3/24.     Interval History  She returns to clinic today by herself for a 6 week follow-up visit.  Her platelet count has remained normal since completing her 4 weeks of Rituxan therapy 3/21/24.  She has been on every 2 week laboratory monitoring.  Her anemia shows full recovery.  She has persistent microcytic, hypochromic red cell indices.  She has been told in the past that she was a carrier," but she does not know the specifics.  Her home medications are being adjusted by Cardiology and primary care.  She reports a good energy level.  Her chronic joint symptoms are stable.  She denies any fever, chills, night sweats or weight loss.     Review of Systems   Constitutional:  Negative for appetite change, fatigue, fever and unexpected weight change.   HENT:  Negative for mouth sores, sore throat and trouble swallowing.    Eyes: Negative.    Respiratory:  Negative for cough and shortness of breath.    Cardiovascular:  Negative for chest pain, palpitations and leg swelling.   Gastrointestinal:  Negative for abdominal distention, abdominal pain, constipation, diarrhea and nausea.   Genitourinary:  Negative for dysuria, frequency and urgency.   Musculoskeletal:  Positive for arthralgias. Negative for back pain.   Integumentary:  Negative for pallor and rash.   Neurological:  Negative for dizziness, weakness, numbness and headaches.   Hematological:  Negative for adenopathy. Does not bruise/bleed easily.   Psychiatric/Behavioral: Negative.         PMHx:  HTN, hyperlipidemia, rheumatoid arthritis, migraine headaches, TTP  PSHx:  Endometrial ablation, left carpal tunnel release, right shoulder " surgery  SH:  Lifetime nonsmoker.  Previous alcohol, none since age 20.  Lives in [place] with her daughter.  Provides  in her home.  FH:  PGF had a CVA.  No family history of cancer.    Objective:     Vitals:    06/11/24 0954   BP: (!) 142/93   Pulse:    Resp:    Temp:      Physical Exam  Constitutional:       Comments: Morbidly obese black female in NAD   HENT:      Head: Normocephalic.      Mouth/Throat:      Mouth: Mucous membranes are moist.      Pharynx: Oropharynx is clear.   Eyes:      General: No scleral icterus.     Extraocular Movements: Extraocular movements intact.      Pupils: Pupils are equal, round, and reactive to light.   Cardiovascular:      Rate and Rhythm: Normal rate and regular rhythm.      Heart sounds: No murmur heard.  Pulmonary:      Comments: Lungs clear to auscultation  Abdominal:      General: Bowel sounds are normal. There is no distension.      Palpations: Abdomen is soft. There is no mass.      Tenderness: There is no abdominal tenderness.   Musculoskeletal:         General: No swelling or tenderness. Normal range of motion.      Cervical back: Neck supple. No tenderness.   Skin:     General: Skin is warm and dry.      Findings: No rash.   Neurological:      General: No focal deficit present.      Mental Status: She is alert and oriented to person, place, and time.      Sensory: Sensory deficit (fingertip neuropathy, grade 1, pre-existing) present.          LABORATORY  Recent Results (from the past 336 hour(s))   Comprehensive Metabolic Panel    Collection Time: 05/28/24 11:02 AM   Result Value Ref Range    Sodium 141 136 - 145 mmol/L    Potassium 4.0 3.5 - 5.1 mmol/L    Chloride 110 (H) 98 - 107 mmol/L    CO2 26 22 - 29 mmol/L    Glucose 85 74 - 100 mg/dL    Blood Urea Nitrogen 13.6 9.8 - 20.1 mg/dL    Creatinine 0.81 0.55 - 1.02 mg/dL    Calcium 8.6 8.4 - 10.2 mg/dL    Protein Total 6.7 6.4 - 8.3 gm/dL    Albumin 3.7 3.5 - 5.0 g/dL    Globulin 3.0 2.4 - 3.5 gm/dL     Albumin/Globulin Ratio 1.2 1.1 - 2.0 ratio    Bilirubin Total 0.5 <=1.5 mg/dL     40 - 150 unit/L    ALT 11 0 - 55 unit/L    AST 13 5 - 34 unit/L    eGFR >60 mL/min/1.73/m2    Anion Gap 5.0 mEq/L    BUN/Creatinine Ratio 17    Lactate Dehydrogenase    Collection Time: 05/28/24 11:02 AM   Result Value Ref Range    Lactate Dehydrogenase 201 125 - 220 U/L   CBC with Differential    Collection Time: 05/28/24 11:02 AM   Result Value Ref Range    WBC 3.45 (L) 4.50 - 11.50 x10(3)/mcL    RBC 5.94 (H) 4.20 - 5.40 x10(6)/mcL    Hgb 12.9 12.0 - 16.0 g/dL    Hct 42.0 37.0 - 47.0 %    MCV 70.7 (L) 80.0 - 94.0 fL    MCH 21.7 (L) 27.0 - 31.0 pg    MCHC 30.7 (L) 33.0 - 36.0 g/dL    RDW 14.6 11.5 - 17.0 %    Platelet 311 130 - 400 x10(3)/mcL    MPV 10.3 7.4 - 10.4 fL    Neut % 53.0 %    Lymph % 23.8 %    Mono % 18.8 %    Eos % 3.2 %    Basophil % 0.9 %    Lymph # 0.82 0.6 - 4.6 x10(3)/mcL    Neut # 1.83 (L) 2.1 - 9.2 x10(3)/mcL    Mono # 0.65 0.1 - 1.3 x10(3)/mcL    Eos # 0.11 0 - 0.9 x10(3)/mcL    Baso # 0.03 <=0.2 x10(3)/mcL    IG# 0.01 0 - 0.04 x10(3)/mcL    IG% 0.3 %   CBC with Differential    Collection Time: 06/11/24  9:47 AM   Result Value Ref Range    WBC 4.19 (L) 4.50 - 11.50 x10(3)/mcL    RBC 6.09 (H) 4.20 - 5.40 x10(6)/mcL    Hgb 13.4 12.0 - 16.0 g/dL    Hct 42.8 37.0 - 47.0 %    MCV 70.3 (L) 80.0 - 94.0 fL    MCH 22.0 (L) 27.0 - 31.0 pg    MCHC 31.3 (L) 33.0 - 36.0 g/dL    RDW 14.9 11.5 - 17.0 %    Platelet 294 130 - 400 x10(3)/mcL    MPV 9.8 7.4 - 10.4 fL    Neut % 60.9 %    Lymph % 22.0 %    Mono % 13.6 %    Eos % 2.6 %    Basophil % 0.7 %    Lymph # 0.92 0.6 - 4.6 x10(3)/mcL    Neut # 2.55 2.1 - 9.2 x10(3)/mcL    Mono # 0.57 0.1 - 1.3 x10(3)/mcL    Eos # 0.11 0 - 0.9 x10(3)/mcL    Baso # 0.03 <=0.2 x10(3)/mcL    IG# 0.01 0 - 0.04 x10(3)/mcL    IG% 0.2 %       Assessment:   TTP -  in clinical remission  Microangiopathic hemolytic anemia - resolved  Rheumatoid arthritis      Plan:   Her platelet count remains  normal and her anemia shows complete recovery.  We will submit a hemoglobin electrophoresis due to her persistent microcytic, hypochromic red cell indices to rule out underlying hemoglobinopathy or thalassemia.  Repeat CBC in 3 weeks.  RTC in 6 weeks for a follow-up visit and clinical exam with repeat laboratory.      NEO ULJAN MD    Other Physicians  Dr. Yaritza Block

## 2024-06-04 RX ORDER — ISOSORBIDE DINITRATE 40 MG/1
40 TABLET ORAL DAILY
Qty: 90 TABLET | Refills: 3 | Status: SHIPPED | OUTPATIENT
Start: 2024-06-04

## 2024-06-04 RX ORDER — METOPROLOL SUCCINATE 100 MG/1
100 TABLET, EXTENDED RELEASE ORAL NIGHTLY
Qty: 90 TABLET | Refills: 3 | Status: SHIPPED | OUTPATIENT
Start: 2024-06-04 | End: 2024-06-07 | Stop reason: SDUPTHER

## 2024-06-04 RX ORDER — EZETIMIBE 10 MG/1
10 TABLET ORAL NIGHTLY
Qty: 90 TABLET | Refills: 3 | Status: SHIPPED | OUTPATIENT
Start: 2024-06-04

## 2024-06-04 RX ORDER — VALSARTAN 80 MG/1
80 TABLET ORAL DAILY
Qty: 90 TABLET | Refills: 3 | Status: SHIPPED | OUTPATIENT
Start: 2024-06-04 | End: 2025-06-04

## 2024-06-04 RX ORDER — NITROGLYCERIN 0.4 MG/1
0.4 TABLET SUBLINGUAL EVERY 5 MIN PRN
Qty: 25 TABLET | Refills: 4 | Status: SHIPPED | OUTPATIENT
Start: 2024-06-04

## 2024-06-04 RX ORDER — CLONIDINE HYDROCHLORIDE 0.1 MG/1
0.1 TABLET ORAL DAILY PRN
Qty: 90 TABLET | Refills: 3 | Status: SHIPPED | OUTPATIENT
Start: 2024-06-04

## 2024-06-05 ENCOUNTER — HOSPITAL ENCOUNTER (OUTPATIENT)
Dept: RADIOLOGY | Facility: HOSPITAL | Age: 49
Discharge: HOME | End: 2024-06-05
Attending: NURSE PRACTITIONER
Payer: MEDICAID

## 2024-06-05 DIAGNOSIS — Z12.31 ENCOUNTER FOR SCREENING MAMMOGRAM FOR BREAST CANCER: ICD-10-CM

## 2024-06-05 PROCEDURE — 77067 SCR MAMMO BI INCL CAD: CPT | Mod: TC

## 2024-06-05 PROCEDURE — 77063 BREAST TOMOSYNTHESIS BI: CPT | Mod: 26,,, | Performed by: RADIOLOGY

## 2024-06-05 PROCEDURE — 77067 SCR MAMMO BI INCL CAD: CPT | Mod: 26,,, | Performed by: RADIOLOGY

## 2024-06-07 ENCOUNTER — OFFICE VISIT (OUTPATIENT)
Dept: CARDIOLOGY | Facility: CLINIC | Age: 49
End: 2024-06-07
Payer: MEDICAID

## 2024-06-07 VITALS
TEMPERATURE: 98 F | WEIGHT: 246.69 LBS | BODY MASS INDEX: 39.65 KG/M2 | DIASTOLIC BLOOD PRESSURE: 95 MMHG | RESPIRATION RATE: 18 BRPM | HEART RATE: 72 BPM | OXYGEN SATURATION: 95 % | SYSTOLIC BLOOD PRESSURE: 135 MMHG | HEIGHT: 66 IN

## 2024-06-07 DIAGNOSIS — R06.09 DYSPNEA ON EXERTION: ICD-10-CM

## 2024-06-07 DIAGNOSIS — R00.2 PALPITATIONS: ICD-10-CM

## 2024-06-07 DIAGNOSIS — Z76.89 ENCOUNTER TO ESTABLISH CARE: ICD-10-CM

## 2024-06-07 DIAGNOSIS — E78.49 OTHER HYPERLIPIDEMIA: ICD-10-CM

## 2024-06-07 DIAGNOSIS — I1A.0 RESISTANT HYPERTENSION: Primary | ICD-10-CM

## 2024-06-07 DIAGNOSIS — I10 PRIMARY HYPERTENSION: ICD-10-CM

## 2024-06-07 DIAGNOSIS — R07.9 CHEST PAIN, UNSPECIFIED TYPE: ICD-10-CM

## 2024-06-07 PROCEDURE — 3080F DIAST BP >= 90 MM HG: CPT | Mod: CPTII,,,

## 2024-06-07 PROCEDURE — 3008F BODY MASS INDEX DOCD: CPT | Mod: CPTII,,,

## 2024-06-07 PROCEDURE — 1159F MED LIST DOCD IN RCRD: CPT | Mod: CPTII,,,

## 2024-06-07 PROCEDURE — 3075F SYST BP GE 130 - 139MM HG: CPT | Mod: CPTII,,,

## 2024-06-07 PROCEDURE — 99215 OFFICE O/P EST HI 40 MIN: CPT | Mod: PBBFAC

## 2024-06-07 PROCEDURE — 4010F ACE/ARB THERAPY RXD/TAKEN: CPT | Mod: CPTII,,,

## 2024-06-07 PROCEDURE — 99214 OFFICE O/P EST MOD 30 MIN: CPT | Mod: S$PBB,,,

## 2024-06-07 PROCEDURE — 1160F RVW MEDS BY RX/DR IN RCRD: CPT | Mod: CPTII,,,

## 2024-06-07 RX ORDER — METOPROLOL SUCCINATE 100 MG/1
100 TABLET, EXTENDED RELEASE ORAL NIGHTLY
Qty: 90 TABLET | Refills: 3 | Status: SHIPPED | OUTPATIENT
Start: 2024-06-07

## 2024-06-07 NOTE — PROGRESS NOTES
CHIEF COMPLAINT:   Chief Complaint   Patient presents with    Follow-up     4 mos f/u denies cardiac targets                                                  HPI:  Nicole Quiñones 49 y.o. female with past medical history of hypertension, GERD, obesity, migraines, chronic pain, and rheumatoid arthritis presents to [facility]iology [facility] for follow up and ongoing care. At initial visit she had several cardiac complaints.  She complained of chest pain, associated left-sided numbness, palpitations, SOB/ORTEGA, and bend apnea.  Echocardiogram completed December 2023 revealed EF of 50%, grade 1 diastolic dysfunction, no significant valvular or structural abnormalities.  See full report below.  48 hour Holter monitor completed December 2023 revealed no significant arrhythmia, no pauses, underlying rhythm was sinus, very rare PVCs and PACs.  See full report below.  She did complete a exercise stress test in November 2023, however the patient was unable to exercise for a full 5 minutes and the test was considered intermediate risk.  Subsequently she completed a dobutamine stress echo which revealed no new regional wall motion abnormalities, overall the echo was negative for ischemia or infarction.  See full report below.  Since last office visit, patient completed sleep study which yielded a diagnosis for sleep apnea.  She was since started CPAP in his benefitting from use.    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  In February of 2024 she presented to ED with fever, nausea/vomiting, abdominal discomfort.  At that time she was found to be with significant thrombocytopenia with a platelet count of 5000.  Extensive hematology workup at time revealed several other significant lab abnormalities and ultimately patient was transferred to [facility] for initiation of plasmapheresis.  During her  hospitalization she received several treatments of plasma exchange with albumin.  Ultimately she was discharged from the hospital but was told not to restart any of her home medications.  Since her discharge from the hospital at the beginning of March 2024, patient has been off all of her cardiac medications.  She states that recently she has been having difficulty with controlling her blood pressure and she was noticed an ideations.    Today the patient states that she feels stable from a cardiac standpoint.  She states that overall she has been feeling more tense and occasionally experiences some chest tightness.  She states that this typically occurs when her blood pressure is elevated.  Per patient, yesterday her blood pressure got up to the 150s over 100s.  She also endorses an increase in palpitations stating that they occur daily.  Previously well controlled.  She continues to have some degree of SOB/ORTEGA with moderate to high levels of exertion.  Otherwise she denies any further cardiac complaints such as lightheadedness, dizziness, PND, orthopnea, syncope, peripheral edema, or claudication symptoms.  She states that she is adjusting to the CPAP and tries to wear it every night for maximum benefit.  She states that she has not been exercising much since her hospital discharge, but she plans to start working with physical therapy soon.  She continues to use cane for ambulation assistance due to instability and increased falling in the past.  She reports following a mostly heart healthy diet.  She denies any tobacco or other illicit drug use.                                                                                                                                                                                                                                                                                                     CARDIAC TESTING:  Dobutamine Stress Echo 12.27.23    Stress Protocol: The test was  stopped because the end of the protocol was reached.    Baseline ECG: The Baseline ECG reveals sinus rhythm.    Stress ECG: There are no arrhythmias during stress.    Post-stress Echo: The left ventricle systolic function is normal.  Baseline Ejection Fraction:  60%  The Ejection Fraction improved on the stress images  No new regional wall motion abnormality noted  The stress echo is negative for ischemia     Echo 12.22.23    Left Ventricle: The left ventricle is normal in size. Normal wall thickness. There is low normal systolic function. Biplane (2D) method of discs ejection fraction is 50%. Grade I diastolic dysfunction.    Right Ventricle: Normal right ventricular cavity size. Systolic function is normal.    Left Atrium: Left atrium is mildly dilated.    IVC/SVC: Normal venous pressure at 3 mmHg.    48 Hour Holter Monitor 12.5.23  The predominant rhythm is sinus.  Underlying rhythm:   Sinus  Arrythmia:  No significant arrhythmia noted   Pauses:  No significant pause noted  Symptoms:  None reported   Events:  No patient marked events      Exercise EKG Stress 11.20.23    The patient reported no chest pain during the stress test.    There were no arrhythmias during stress.    The patient exercised for 3 minutes 29 seconds on a Mitul protocol, corresponding to a functional capacity of 6 METS, achieving a peak heart rate of 148 bpm, which is 91 % of the age predicted maximum heart rate.  Patient reached target heart rate  Minutes exercised:  3 min 29 sec  Adjusted ST deviation:  0 mm   Angina:  no angina (0)    [facility] treadmill score (Min - ST - Index):  3.  Patient's Score:  less than 5 but greater than -10   The patient's risk for cardiovascular events  is INTERMEDIATE (based on Gresham Score)   If clinically indicated consider further evaluation     Patient Active Problem List   Diagnosis    Rheumatoid arthritis    Chondromalacia of patellofemoral joint, left    Chondromalacia of patellofemoral joint, right    Primary  hypertension    Bilateral chronic knee pain    Encounter for immunization    Encounter for colorectal cancer screening    Encounter for screening mammogram for breast cancer    Encounter to establish care    Gastroesophageal reflux disease without esophagitis    Sore throat    Chronic pain of left knee    Tinea pedis of left foot    Encounter for medication refill    Primary osteoarthritis of left knee    BMI 39.0-39.9,adult    Acute neck pain    Chronic migraine with aura without status migrainosus, not intractable    Insomnia    Encounter for vaccination    Internal derangement of knee joint, left    Palpitations    Hyperlipidemia    Shortness of breath    Chest pain    Dyspnea on exertion    Resistant hypertension    Class 2 severe obesity with serious comorbidity and body mass index (BMI) of 38.0 to 38.9 in adult    Arthralgia of shoulder region, left    Low mean corpuscular volume (MCV)    Chronic cough    TTP (thrombotic thrombocytopenic purpura)    Gastritis    Rotator cuff syndrome, left    Annual physical exam     Past Surgical History:   Procedure Laterality Date    CARPAL TUNNEL RELEASE      COLONOSCOPY W/ BIOPSIES AND POLYPECTOMY  09/05/2023    Deepak De Luna MD 5 years    ESOPHAGOGASTRODUODENOSCOPY  04/2024    Right shouler surgery      SURGICAL REMOVAL OF ENDOMETRIOSIS       Social History     Socioeconomic History    Marital status:     Highest education level: Associate degree: occupational, technical, or vocational program   Occupational History    Occupation: unemployed   Tobacco Use    Smoking status: Never     Passive exposure: Never    Smokeless tobacco: Never   Substance and Sexual Activity    Alcohol use: Not Currently     Comment: Last drink @ 20    Drug use: Never    Sexual activity: Not Currently     Partners: Male     Birth control/protection: Abstinence   Social History Narrative    ** Merged History Encounter **          Social Determinants of Health     Financial Resource Strain:  Low Risk  (4/19/2023)    Overall Financial Resource Strain (CARDIA)     Difficulty of Paying Living Expenses: Not very hard   Food Insecurity: Food Insecurity Present (4/19/2023)    Hunger Vital Sign     Worried About Running Out of Food in the Last Year: Sometimes true     Ran Out of Food in the Last Year: Never true   Transportation Needs: No Transportation Needs (4/19/2023)    PRAPARE - Transportation     Lack of Transportation (Medical): No     Lack of Transportation (Non-Medical): No   Physical Activity: Inactive (4/19/2023)    Exercise Vital Sign     Days of Exercise per Week: 0 days     Minutes of Exercise per Session: 0 min   Stress: Stress Concern Present (4/19/2023)    [employer] - Occupational Stress Questionnaire     Feeling of Stress : To some extent   Housing Stability: Low Risk  (4/19/2023)    Housing Stability Vital Sign     Unable to Pay for Housing in the Last Year: No     Number of Places Lived in the Last Year: 2     Unstable Housing in the Last Year: No        Family History   Problem Relation Name Age of Onset    Hypertension Mother Mom     Arthritis Mother Mom     Depression Mother Mom     Thyroid disease Sister Sis     Asthma Sister Sis     Intellectual disability Brother Bro     Epilepsy Brother Bro     Stroke Maternal Grandfather Kid      Review of patient's allergies indicates:   Allergen Reactions    Nsaids (non-steroidal anti-inflammatory drug) Other (See Comments)     Causes ulcers to bleed.    Ibuprofen     Latex     Meloxicam     Nsaids (non-steroidal anti-inflammatory drug) Nausea Only         ROS:  Review of Systems   Constitutional:  Positive for malaise/fatigue.   HENT: Negative.     Eyes: Negative.    Respiratory:  Positive for shortness of breath.    Cardiovascular:  Positive for chest pain and palpitations. Negative for orthopnea, claudication, leg swelling and PND.   Gastrointestinal: Negative.    Genitourinary: Negative.    Musculoskeletal:   "Positive for joint pain.   Skin: Negative.    Neurological:  Positive for weakness. Negative for dizziness.   Endo/Heme/Allergies: Negative.    Psychiatric/Behavioral: Negative.                                                                                                                                                                                  Negative except as stated in the history of present illness. See HPI for details.    PHYSICAL EXAM:  Visit Vitals  BP (!) 135/95 (BP Location: Right arm, Patient Position: Sitting, BP Method: Small (Automatic))   Pulse 72   Temp 98.4 °F (36.9 °C)   Resp 18   Ht 5' 6.04" (1.677 m)   Wt 111.9 kg (246 lb 11.1 oz)   SpO2 95%   BMI 39.77 kg/m²       Physical Exam  HENT:      Head: Normocephalic.      Nose: Nose normal.      Mouth/Throat:      Mouth: Mucous membranes are moist.   Eyes:      Extraocular Movements: Extraocular movements intact.      Pupils: Pupils are equal, round, and reactive to light.   Neck:      Vascular: No carotid bruit.   Cardiovascular:      Rate and Rhythm: Normal rate and regular rhythm.      Pulses: Normal pulses.      Heart sounds: Normal heart sounds. No murmur heard.  Pulmonary:      Effort: Pulmonary effort is normal.      Breath sounds: Normal breath sounds.   Abdominal:      General: There is no distension.      Palpations: Abdomen is soft.   Musculoskeletal:         General: Normal range of motion.      Cervical back: Normal range of motion.      Right lower leg: No edema.      Left lower leg: No edema.   Skin:     General: Skin is warm.   Neurological:      General: No focal deficit present.      Mental Status: She is alert.   Psychiatric:         Mood and Affect: Mood normal.         Current Outpatient Medications   Medication Instructions    albuterol (PROVENTIL) 2.5 mg /3 mL (0.083 %) nebulizer solution USE 1 VIAL IN NEBULIZER EVERY 8 HOURS AS NEEDED    baclofen (LIORESAL) 5 mg, Oral, 2 times daily PRN    cetirizine (ZYRTEC) 10 mg, " Daily    cloNIDine (CATAPRES) 0.1 mg, Oral, Daily PRN    DULERA 100-5 mcg/actuation HFAA 1 puff, Inhalation, 2 times daily    EMGALITY  mg/mL PnIj AS DIRECTED SUBCUTANEOUS MONTHLY 30 DAY(S)    ergocalciferol (ERGOCALCIFEROL) 50,000 Units, Oral, Every 7 days    ezetimibe (ZETIA) 10 mg, Oral, Nightly    fluticasone propionate (FLONASE) 100 mcg, Each Nostril, Daily PRN    folic acid (FOLVITE) 1 mg, Oral, Daily    gabapentin (NEURONTIN) 600 mg, 2 times daily    isosorbide dinitrate (ISORDIL) 40 mg, Oral, Daily    LINZESS 145 mcg, Daily    magnesium oxide (MAG-OX) 400 mg, Oral, Daily    metoprolol succinate (TOPROL-XL) 100 mg, Oral, Nightly    nitroGLYCERIN (NITROSTAT) 0.4 mg, Sublingual, Every 5 min PRN    ondansetron (ZOFRAN) 8 MG tablet TAKE 1 TABLET BY MOUTH EVERY 8 HOURS AS NEEDED FOR NAUSEA AND VOMITING    pantoprazole (PROTONIX) 40 mg, Every morning    promethazine-dextromethorphan (PROMETHAZINE-DM) 6.25-15 mg/5 mL Syrp 5 mLs, Oral, Every 6-8 hours PRN, May cause sedation.  Do not drive or operate heavy machinery.    RESTASIS 0.05 % ophthalmic emulsion 1 drop, 2 times daily    UBRELVY 100 mg, Oral, 2 times daily PRN    valsartan (DIOVAN) 80 mg, Oral, Daily    venlafaxine (EFFEXOR-XR) 37.5 mg, Oral, Daily    zavegepant 10 mg, Nasal, Daily PRN        All medications, laboratory studies, cardiac diagnostic imaging reviewed.     Lab Results   Component Value Date    .00 02/01/2024    .00 (H) 05/23/2023    TRIG 71 02/01/2024    TRIG 87 05/23/2023    CREATININE 0.81 05/28/2024    MG 2.00 03/05/2024    K 4.0 05/28/2024        ASSESSMENT/PLAN:    Primary Hypertension  - BP above goal today- 135/95 on repeat   - All anti HTN medications have been on hold since March 2024  - Will restart Metoprolol Succinate 100 MG daily today   - NV in 2-3 weeks for BP/symptom check  - Will continue to add other medications as able to   - Diagnosed with GIAN on recent sleep study, CPAP re-started  - Counseled on the  importance of following a low-sodium, heart healthy diet and exercise as tolerated  - Recommend walking 10 miles per week     Hyperlipidemia  - , HDL 48, total cholesterol 201, triglycerides 71 per labs February 1, 2024.  Improved from prior  - On Zetia 10 MG daily   - Patient would like to continue with Zetia for now and hold off on statin  - Counseled on importance of following a low cholesterol, low-fat diet and exercise as tolerated  - ASCVD Risk 18.8%, high intensity statin recommended     Chest Pain  Shortness of Breath/ORTEGA  - Mostly resolved.  She endorses occasional SOB/ORTEGA that she reports occurs with over exertion or moderate to high exertional activities  - Echo revealed EF of 50%, grade 1 diastolic dysfunction, no significant valvular or structural abnormalities.  See full report above.  - Exercise stress test revealed intermediate risk for cardiac events based off of Gresham score, due to patient being not able to complete 5 minutes of exercise.  Subsequently, patient underwent stress echocardiogram which revealed no evidence of ischemia or infarction, there were no new wall motion abnormalities.  See full report above.  - Will continue to monitor for now and focus on risk factor management for CAD/CHF   - Counseled on importance of following a heart healthy diet and exercise as tolerated   - No indication for further testing at this time    Palpitations  - Endorses increase in palpitations since being off of BB. She states that they occur on a daily basis   - She denies any excessive caffeine use in fact, she states that she recently greatly reduced her caffeine consumption  - She denies any symptoms of lightheadedness, dizziness, or syncope that accompanied the palpitations   - 48 hour Holter monitor revealed underlying rhythm to be sinus, no significant arrhythmias noted, no significant pauses noted, average heart rate was 81 beats per minute  - Will re-start Metoprolol Succinate 100 MG daily      Obesity   - Counseled on the importance of diet and exercise for weight loss and overall cardiac risk reduction  - Encouraged walking 10 miles per day    GIAN   - Diagnosed with GIAN on recent sleep study  - Started wearing CPAP in his benefitting from use.  She states that she has had some difficulty adjusting to the mask but it is compliant    TTP  - Management per Heme    Management of other problems per PCP/other specialties.      Restart Metoprolol Succinate 100 MG daily  Nurse visit in 2-3 weeks for BP/symptom check  Follow up in Cardiology in 3 months or sooner if needed  Follow up with PCP as directed

## 2024-06-07 NOTE — PATIENT INSTRUCTIONS
Restart Metoprolol Succinate 100 MG daily  Nurse visit in 2-3 weeks for BP/symptom check  Follow up in Cardiology in 3 months or sooner if needed  Follow up with PCP as directed   ED precautions

## 2024-06-11 ENCOUNTER — OFFICE VISIT (OUTPATIENT)
Dept: HEMATOLOGY/ONCOLOGY | Facility: CLINIC | Age: 49
End: 2024-06-11
Payer: MEDICAID

## 2024-06-11 ENCOUNTER — LAB VISIT (OUTPATIENT)
Dept: LAB | Facility: HOSPITAL | Age: 49
End: 2024-06-11
Attending: INTERNAL MEDICINE
Payer: MEDICAID

## 2024-06-11 VITALS
OXYGEN SATURATION: 97 % | WEIGHT: 251 LBS | RESPIRATION RATE: 15 BRPM | BODY MASS INDEX: 40.34 KG/M2 | SYSTOLIC BLOOD PRESSURE: 142 MMHG | HEIGHT: 66 IN | TEMPERATURE: 99 F | HEART RATE: 70 BPM | DIASTOLIC BLOOD PRESSURE: 93 MMHG

## 2024-06-11 DIAGNOSIS — M31.19 TTP (THROMBOTIC THROMBOCYTOPENIC PURPURA): ICD-10-CM

## 2024-06-11 DIAGNOSIS — D75.1 ERYTHROCYTOSIS: ICD-10-CM

## 2024-06-11 DIAGNOSIS — M31.19 TTP (THROMBOTIC THROMBOCYTOPENIC PURPURA): Primary | ICD-10-CM

## 2024-06-11 LAB
ALBUMIN SERPL-MCNC: 3.7 G/DL (ref 3.5–5)
ALBUMIN/GLOB SERPL: 1.2 RATIO (ref 1.1–2)
ALP SERPL-CCNC: 145 UNIT/L (ref 40–150)
ALT SERPL-CCNC: 9 UNIT/L (ref 0–55)
ANION GAP SERPL CALC-SCNC: 10 MEQ/L
AST SERPL-CCNC: 15 UNIT/L (ref 5–34)
BASOPHILS # BLD AUTO: 0.03 X10(3)/MCL
BASOPHILS NFR BLD AUTO: 0.7 %
BILIRUB SERPL-MCNC: 0.4 MG/DL
BUN SERPL-MCNC: 15.5 MG/DL (ref 9.8–20.1)
CALCIUM SERPL-MCNC: 9.4 MG/DL (ref 8.4–10.2)
CHLORIDE SERPL-SCNC: 108 MMOL/L (ref 98–107)
CO2 SERPL-SCNC: 26 MMOL/L (ref 22–29)
CREAT SERPL-MCNC: 1.1 MG/DL (ref 0.55–1.02)
CREAT/UREA NIT SERPL: 14
EOSINOPHIL # BLD AUTO: 0.11 X10(3)/MCL (ref 0–0.9)
EOSINOPHIL NFR BLD AUTO: 2.6 %
ERYTHROCYTE [DISTWIDTH] IN BLOOD BY AUTOMATED COUNT: 14.9 % (ref 11.5–17)
GFR SERPLBLD CREATININE-BSD FMLA CKD-EPI: >60 ML/MIN/1.73/M2
GLOBULIN SER-MCNC: 3.2 GM/DL (ref 2.4–3.5)
GLUCOSE SERPL-MCNC: 87 MG/DL (ref 74–100)
HCT VFR BLD AUTO: 42.8 % (ref 37–47)
HGB BLD-MCNC: 13.4 G/DL (ref 12–16)
IMM GRANULOCYTES # BLD AUTO: 0.01 X10(3)/MCL (ref 0–0.04)
IMM GRANULOCYTES NFR BLD AUTO: 0.2 %
LDH SERPL-CCNC: 245 U/L (ref 125–220)
LYMPHOCYTES # BLD AUTO: 0.92 X10(3)/MCL (ref 0.6–4.6)
LYMPHOCYTES NFR BLD AUTO: 22 %
MCH RBC QN AUTO: 22 PG (ref 27–31)
MCHC RBC AUTO-ENTMCNC: 31.3 G/DL (ref 33–36)
MCV RBC AUTO: 70.3 FL (ref 80–94)
MONOCYTES # BLD AUTO: 0.57 X10(3)/MCL (ref 0.1–1.3)
MONOCYTES NFR BLD AUTO: 13.6 %
NEUTROPHILS # BLD AUTO: 2.55 X10(3)/MCL (ref 2.1–9.2)
NEUTROPHILS NFR BLD AUTO: 60.9 %
PLATELET # BLD AUTO: 294 X10(3)/MCL (ref 130–400)
PMV BLD AUTO: 9.8 FL (ref 7.4–10.4)
POTASSIUM SERPL-SCNC: 4.2 MMOL/L (ref 3.5–5.1)
PROT SERPL-MCNC: 6.9 GM/DL (ref 6.4–8.3)
RBC # BLD AUTO: 6.09 X10(6)/MCL (ref 4.2–5.4)
SODIUM SERPL-SCNC: 144 MMOL/L (ref 136–145)
WBC # SPEC AUTO: 4.19 X10(3)/MCL (ref 4.5–11.5)

## 2024-06-11 PROCEDURE — 36415 COLL VENOUS BLD VENIPUNCTURE: CPT

## 2024-06-11 PROCEDURE — 80053 COMPREHEN METABOLIC PANEL: CPT

## 2024-06-11 PROCEDURE — 99214 OFFICE O/P EST MOD 30 MIN: CPT | Mod: S$PBB,,, | Performed by: INTERNAL MEDICINE

## 2024-06-11 PROCEDURE — 3077F SYST BP >= 140 MM HG: CPT | Mod: CPTII,,, | Performed by: INTERNAL MEDICINE

## 2024-06-11 PROCEDURE — 83615 LACTATE (LD) (LDH) ENZYME: CPT

## 2024-06-11 PROCEDURE — 3008F BODY MASS INDEX DOCD: CPT | Mod: CPTII,,, | Performed by: INTERNAL MEDICINE

## 2024-06-11 PROCEDURE — 99215 OFFICE O/P EST HI 40 MIN: CPT | Mod: PBBFAC | Performed by: INTERNAL MEDICINE

## 2024-06-11 PROCEDURE — 99999 PR PBB SHADOW E&M-EST. PATIENT-LVL V: CPT | Mod: PBBFAC,,, | Performed by: INTERNAL MEDICINE

## 2024-06-11 PROCEDURE — 3080F DIAST BP >= 90 MM HG: CPT | Mod: CPTII,,, | Performed by: INTERNAL MEDICINE

## 2024-06-11 PROCEDURE — 1160F RVW MEDS BY RX/DR IN RCRD: CPT | Mod: CPTII,,, | Performed by: INTERNAL MEDICINE

## 2024-06-11 PROCEDURE — 84165 PROTEIN E-PHORESIS SERUM: CPT

## 2024-06-11 PROCEDURE — 85025 COMPLETE CBC W/AUTO DIFF WBC: CPT

## 2024-06-11 PROCEDURE — 1159F MED LIST DOCD IN RCRD: CPT | Mod: CPTII,,, | Performed by: INTERNAL MEDICINE

## 2024-06-11 PROCEDURE — 4010F ACE/ARB THERAPY RXD/TAKEN: CPT | Mod: CPTII,,, | Performed by: INTERNAL MEDICINE

## 2024-06-12 LAB
ALBUMIN % SPEP (OHS): 49.79 (ref 48.1–59.5)
ALBUMIN SERPL-MCNC: 3.4 G/DL (ref 3.5–5)
ALBUMIN/GLOB SERPL: 1 RATIO (ref 1.1–2)
ALPHA 1 GLOB (OHS): 0.24 GM/DL (ref 0–0.4)
ALPHA 1 GLOB% (OHS): 3.41 (ref 2.3–4.9)
ALPHA 2 GLOB % (OHS): 12.16 (ref 6.9–13)
ALPHA 2 GLOB (OHS): 0.84 GM/DL (ref 0.4–1)
BETA GLOB (OHS): 1.53 GM/DL (ref 0.7–1.3)
BETA GLOB% (OHS): 22.21 (ref 13.8–19.7)
GAMMA GLOBULIN % (OHS): 12.43 (ref 10.1–21.9)
GAMMA GLOBULIN (OHS): 0.86 GM/DL (ref 0.4–1.8)
GLOBULIN SER-MCNC: 3.5 GM/DL (ref 2.4–3.5)
M SPIKE % (OHS): ABNORMAL
M SPIKE (OHS): ABNORMAL
PATH REV: NORMAL
PROT SERPL-MCNC: 6.9 GM/DL (ref 6.4–8.3)

## 2024-07-02 ENCOUNTER — LAB VISIT (OUTPATIENT)
Dept: LAB | Facility: HOSPITAL | Age: 49
End: 2024-07-02
Attending: INTERNAL MEDICINE
Payer: MEDICAID

## 2024-07-02 DIAGNOSIS — M31.19 TTP (THROMBOTIC THROMBOCYTOPENIC PURPURA): ICD-10-CM

## 2024-07-02 LAB
BASOPHILS # BLD AUTO: 0.03 X10(3)/MCL
BASOPHILS NFR BLD AUTO: 0.7 %
EOSINOPHIL # BLD AUTO: 0.12 X10(3)/MCL (ref 0–0.9)
EOSINOPHIL NFR BLD AUTO: 2.7 %
ERYTHROCYTE [DISTWIDTH] IN BLOOD BY AUTOMATED COUNT: 16 % (ref 11.5–17)
HCT VFR BLD AUTO: 42.6 % (ref 37–47)
HGB BLD-MCNC: 13.2 G/DL (ref 12–16)
IMM GRANULOCYTES # BLD AUTO: 0.01 X10(3)/MCL (ref 0–0.04)
IMM GRANULOCYTES NFR BLD AUTO: 0.2 %
LYMPHOCYTES # BLD AUTO: 0.93 X10(3)/MCL (ref 0.6–4.6)
LYMPHOCYTES NFR BLD AUTO: 21.2 %
MCH RBC QN AUTO: 21.4 PG (ref 27–31)
MCHC RBC AUTO-ENTMCNC: 31 G/DL (ref 33–36)
MCV RBC AUTO: 69.2 FL (ref 80–94)
MONOCYTES # BLD AUTO: 0.6 X10(3)/MCL (ref 0.1–1.3)
MONOCYTES NFR BLD AUTO: 13.7 %
NEUTROPHILS # BLD AUTO: 2.69 X10(3)/MCL (ref 2.1–9.2)
NEUTROPHILS NFR BLD AUTO: 61.5 %
PLATELET # BLD AUTO: 270 X10(3)/MCL (ref 130–400)
PMV BLD AUTO: 9.1 FL (ref 7.4–10.4)
RBC # BLD AUTO: 6.16 X10(6)/MCL (ref 4.2–5.4)
WBC # BLD AUTO: 4.38 X10(3)/MCL (ref 4.5–11.5)

## 2024-07-02 PROCEDURE — 36415 COLL VENOUS BLD VENIPUNCTURE: CPT

## 2024-07-02 PROCEDURE — 85025 COMPLETE CBC W/AUTO DIFF WBC: CPT

## 2024-07-15 ENCOUNTER — HOSPITAL ENCOUNTER (OUTPATIENT)
Dept: RADIOLOGY | Facility: HOSPITAL | Age: 49
Discharge: HOME | End: 2024-07-15
Attending: NURSE PRACTITIONER
Payer: MEDICAID

## 2024-07-15 DIAGNOSIS — R92.8 ABNORMAL MAMMOGRAM: ICD-10-CM

## 2024-07-15 PROCEDURE — 77061 BREAST TOMOSYNTHESIS UNI: CPT | Mod: 26,LT,, | Performed by: RADIOLOGY

## 2024-07-15 PROCEDURE — 77065 DX MAMMO INCL CAD UNI: CPT | Mod: 26,LT,, | Performed by: RADIOLOGY

## 2024-07-15 PROCEDURE — 76642 ULTRASOUND BREAST LIMITED: CPT | Mod: 26,LT,, | Performed by: RADIOLOGY

## 2024-07-15 PROCEDURE — 77061 BREAST TOMOSYNTHESIS UNI: CPT | Mod: TC,LT

## 2024-07-15 PROCEDURE — 76642 ULTRASOUND BREAST LIMITED: CPT | Mod: TC,LT

## 2024-07-16 DIAGNOSIS — D64.9 ANEMIA, UNSPECIFIED TYPE: Primary | ICD-10-CM

## 2024-07-16 NOTE — PROGRESS NOTES
Subjective:       Patient ID: Nicole Quiñones is a 49 y.o. female.    Chief Complaint:   Chronic joint pain    Diagnosis: Relapsed thrombotic thrombocytopenic purpura                    Microangiopathic hemolytic anemia                    Rheumatoid arthritis    Treatment History  Plasma exchange 2/27-24-3/04/24  Steroid taper (completed 4/03/24)  Rituximab x 4 (completed 3/21/24)    Current Treatment: Observation    Clinical History:  Black female with a history of TTP initially diagnosed in 2015.  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  She has been followed by a hematologist in [place].  She relocated to [place] to live with her daughter approximately 8 months ago.  She presented to the ER 2/25/24 with low-grade fever, nausea/vomiting and abdominal discomfort.  She denied overt diarrhea.  Laboratory on presentation showed a platelet count of 5000.  Hemoglobin was 12.4 with microcytic, hypochromic indices and WBC 6.4.  LDH was elevated at 711 and haptoglobin was undetectable.  UPT was negative.  CMP showed mild renal insufficiency with a BUN of 24.9 and creatinine 1.36.  Peripheral smear showed microcytic, hypochromic red blood cells, increased reticulocytes and the presence of schistocytes and target cells.  Platelets were markedly decreased.  She was transferred to [facility] for initiation of plasmapheresis.  She received 1 unit of platelets at the outside facility prior to transfer.  On arrival here, she was started on prednisone 1 mg/kg daily and received 1 unit of FFP.     Hospital Course  2/27/24:  Started daily plasma exchange 1 plasma volume (40 cc/kg) exchange with FFP.  Platelet count 12,000. ADAMTS-13 level drawn 2/26/24 <5%.  2/28/24:  Day 2 plasma exchange.  Change to 1/2 albumin + 1/2 FFP secondary to significant urticaria.  2/29/24: Day 3 plasma exchange.  1/2 albumin + 1/2 FFP.  03/1/24: Day 4 plasma exchange.  1/2 albumin + 1/2 FFP  03/4/24:   Day 5 plasma exchange.  1/2 albumin + 1/2 FFP  03/6/24: Discharged from the hospital    Outpatient plasma exchange could not be coordinated with the hospital.  Systemic therapy was recommended with Rituxan weekly x4. She completed 4 weeks of treatment 3/21/24.  Prednisone was tapered and discontinued by 4/3/24.  She has remained stable on follow-up without evidence of relapse.     Interval History  She returns to the office today by herself for a 6 week follow-up visit.  She continues to feel well except for her chronic joint pain.  She has not currently on any treatment for her rheumatoid arthritis.  She has an appointment with Rheumatology later this week.  She continues to have a good energy level.  No fever, chills, night sweats or weight loss.  No abnormal bruising or bleeding.  Her platelet count remains normal.  She has very minimal anemia with microcytic, hypochromic red cell indices.  Hemoglobin electrophoresis was submitted today for further evaluation.       Review of Systems   Constitutional:  Negative for appetite change, fatigue, fever and unexpected weight change.   HENT:  Negative for mouth sores, sore throat and trouble swallowing.    Eyes: Negative.    Respiratory:  Negative for cough and shortness of breath.    Cardiovascular:  Negative for chest pain, palpitations and leg swelling.   Gastrointestinal:  Negative for abdominal distention, abdominal pain, constipation, diarrhea and nausea.   Genitourinary:  Negative for dysuria, frequency and urgency.   Musculoskeletal:  Positive for arthralgias. Negative for back pain.   Integumentary:  Negative for pallor and rash.   Neurological:  Negative for dizziness, weakness, numbness and headaches.   Hematological:  Negative for adenopathy. Does not bruise/bleed easily.   Psychiatric/Behavioral: Negative.         PMHx:  HTN, hyperlipidemia, rheumatoid arthritis, migraine headaches, TTP  PSHx:  Endometrial ablation, left carpal tunnel release, right  shoulder surgery  SH:  Lifetime nonsmoker.  Previous alcohol, none since age 20.  Lives in [place] with her daughter.  Provides  in her home.  FH:  PGF had a CVA.  No family history of cancer.    Objective:     Vitals:    07/23/24 0923   BP: (!) 144/88   Pulse: 78   Resp: 15       Physical Exam  Constitutional:       Comments: Morbidly obese black female in NAD   HENT:      Head: Normocephalic.      Mouth/Throat:      Mouth: Mucous membranes are moist.      Pharynx: Oropharynx is clear.   Eyes:      General: No scleral icterus.     Extraocular Movements: Extraocular movements intact.      Pupils: Pupils are equal, round, and reactive to light.   Cardiovascular:      Rate and Rhythm: Normal rate and regular rhythm.      Heart sounds: No murmur heard.  Pulmonary:      Comments: Lungs clear to auscultation  Abdominal:      General: Bowel sounds are normal. There is no distension.      Palpations: Abdomen is soft. There is no mass.      Tenderness: There is no abdominal tenderness.   Musculoskeletal:         General: No swelling or tenderness. Normal range of motion.      Cervical back: Neck supple. No tenderness.   Skin:     General: Skin is warm and dry.      Findings: No rash.   Neurological:      General: No focal deficit present.      Mental Status: She is alert and oriented to person, place, and time.      Sensory: Sensory deficit (fingertip neuropathy, grade 1, pre-existing) present.          LABORATORY  Recent Results (from the past 336 hour(s))   Comprehensive Metabolic Panel    Collection Time: 07/23/24  9:19 AM   Result Value Ref Range    Sodium 142 136 - 145 mmol/L    Potassium 3.9 3.5 - 5.1 mmol/L    Chloride 110 (H) 98 - 107 mmol/L    CO2 28 22 - 29 mmol/L    Glucose 90 74 - 100 mg/dL    Blood Urea Nitrogen 17.3 9.8 - 20.1 mg/dL    Creatinine 0.84 0.55 - 1.02 mg/dL    Calcium 9.2 8.4 - 10.2 mg/dL    Protein Total 6.1 (L) 6.4 - 8.3 gm/dL    Albumin 3.4 (L) 3.5 - 5.0 g/dL    Globulin 2.7 2.4 -  3.5 gm/dL    Albumin/Globulin Ratio 1.3 1.1 - 2.0 ratio    Bilirubin Total 0.5 <=1.5 mg/dL     40 - 150 unit/L    ALT 9 0 - 55 unit/L    AST 12 5 - 34 unit/L    eGFR >60 mL/min/1.73/m2    Anion Gap 4.0 mEq/L    BUN/Creatinine Ratio 21    Lactate Dehydrogenase    Collection Time: 07/23/24  9:19 AM   Result Value Ref Range    Lactate Dehydrogenase 143 125 - 220 U/L   CBC with Differential    Collection Time: 07/23/24  9:19 AM   Result Value Ref Range    WBC 4.86 4.50 - 11.50 x10(3)/mcL    RBC 5.96 (H) 4.20 - 5.40 x10(6)/mcL    Hgb 12.8 12.0 - 16.0 g/dL    Hct 40.6 37.0 - 47.0 %    MCV 68.1 (L) 80.0 - 94.0 fL    MCH 21.5 (L) 27.0 - 31.0 pg    MCHC 31.5 (L) 33.0 - 36.0 g/dL    RDW 17.6 (H) 11.5 - 17.0 %    Platelet 251 130 - 400 x10(3)/mcL    MPV 9.3 7.4 - 10.4 fL    Neut % 57.2 %    Lymph % 20.4 %    Mono % 18.9 %    Eos % 2.9 %    Basophil % 0.4 %    Lymph # 0.99 0.6 - 4.6 x10(3)/mcL    Neut # 2.78 2.1 - 9.2 x10(3)/mcL    Mono # 0.92 0.1 - 1.3 x10(3)/mcL    Eos # 0.14 0 - 0.9 x10(3)/mcL    Baso # 0.02 <=0.2 x10(3)/mcL    IG# 0.01 0 - 0.04 x10(3)/mcL    IG% 0.2 %         Assessment:   TTP -  in clinical remission  Microangiopathic hemolytic anemia - resolved  Rheumatoid arthritis      Plan:   She has no clinical or laboratory findings suspicious for recurrence of her TTP at this time.  Hemoglobin electrophoresis submitted today to evaluate for an underlying hemoglobinopathy or thalassemia.    Continue close outpatient monitoring.  Repeat CBC in 3 weeks.    RTC in 6 weeks for a follow-up office visit.      NEO LUJAN MD    Other Physicians  Dr. Yaritza Block

## 2024-07-17 ENCOUNTER — CLINICAL SUPPORT (OUTPATIENT)
Dept: CARDIOLOGY | Facility: CLINIC | Age: 49
End: 2024-07-17
Payer: MEDICAID

## 2024-07-17 VITALS
HEART RATE: 72 BPM | RESPIRATION RATE: 24 BRPM | SYSTOLIC BLOOD PRESSURE: 150 MMHG | HEIGHT: 66 IN | WEIGHT: 252 LBS | BODY MASS INDEX: 40.5 KG/M2 | TEMPERATURE: 98 F | DIASTOLIC BLOOD PRESSURE: 97 MMHG | OXYGEN SATURATION: 99 %

## 2024-07-17 DIAGNOSIS — I10 PRIMARY HYPERTENSION: ICD-10-CM

## 2024-07-17 DIAGNOSIS — Z76.0 ENCOUNTER FOR MEDICATION REFILL: ICD-10-CM

## 2024-07-17 DIAGNOSIS — I10 PRIMARY HYPERTENSION: Primary | ICD-10-CM

## 2024-07-17 PROCEDURE — 99211 OFF/OP EST MAY X REQ PHY/QHP: CPT | Mod: S$PBB,,,

## 2024-07-17 PROCEDURE — 99215 OFFICE O/P EST HI 40 MIN: CPT | Mod: PBBFAC

## 2024-07-17 RX ORDER — ISOSORBIDE DINITRATE 40 MG/1
40 TABLET ORAL DAILY
Qty: 90 TABLET | Refills: 3 | Status: SHIPPED | OUTPATIENT
Start: 2024-07-17

## 2024-07-17 NOTE — PROGRESS NOTES
Patient saw for nurse visit for bp and hr check. She is currently only taking metoprolol. C/o chest pain at times, described as sharp that comes and goes. Home bp log noted mostly elevated readings.  She has a medical history of hypertension, GERD, obesity, migraines, chronic pain, TTP and rheumatoid arthritis. First BP in clinic today 150/97 hr 72. Repeat denis 148/90. Per BRET Nunes pt advised to restart isosorbide dinitrate 40 mg po daily, monitor bp, follow up nurse visit, low na diet and ed precautions given. Understanding  verbalized.

## 2024-07-22 DIAGNOSIS — D64.9 ANEMIA, UNSPECIFIED TYPE: Primary | ICD-10-CM

## 2024-07-23 ENCOUNTER — OFFICE VISIT (OUTPATIENT)
Dept: HEMATOLOGY/ONCOLOGY | Facility: CLINIC | Age: 49
End: 2024-07-23
Payer: MEDICAID

## 2024-07-23 ENCOUNTER — LAB VISIT (OUTPATIENT)
Dept: LAB | Facility: HOSPITAL | Age: 49
End: 2024-07-23
Attending: INTERNAL MEDICINE
Payer: MEDICAID

## 2024-07-23 VITALS
OXYGEN SATURATION: 97 % | SYSTOLIC BLOOD PRESSURE: 144 MMHG | DIASTOLIC BLOOD PRESSURE: 88 MMHG | HEIGHT: 66 IN | HEART RATE: 78 BPM | RESPIRATION RATE: 15 BRPM | WEIGHT: 257.63 LBS | BODY MASS INDEX: 41.4 KG/M2

## 2024-07-23 DIAGNOSIS — M31.19 TTP (THROMBOTIC THROMBOCYTOPENIC PURPURA): Primary | ICD-10-CM

## 2024-07-23 DIAGNOSIS — M31.19 TTP (THROMBOTIC THROMBOCYTOPENIC PURPURA): ICD-10-CM

## 2024-07-23 DIAGNOSIS — D64.9 ANEMIA, UNSPECIFIED TYPE: ICD-10-CM

## 2024-07-23 LAB
ALBUMIN SERPL-MCNC: 3.4 G/DL (ref 3.5–5)
ALBUMIN/GLOB SERPL: 1.3 RATIO (ref 1.1–2)
ALP SERPL-CCNC: 132 UNIT/L (ref 40–150)
ALT SERPL-CCNC: 9 UNIT/L (ref 0–55)
ANION GAP SERPL CALC-SCNC: 4 MEQ/L
AST SERPL-CCNC: 12 UNIT/L (ref 5–34)
BASOPHILS # BLD AUTO: 0.02 X10(3)/MCL
BASOPHILS NFR BLD AUTO: 0.4 %
BILIRUB SERPL-MCNC: 0.5 MG/DL
BUN SERPL-MCNC: 17.3 MG/DL (ref 9.8–20.1)
CALCIUM SERPL-MCNC: 9.2 MG/DL (ref 8.4–10.2)
CHLORIDE SERPL-SCNC: 110 MMOL/L (ref 98–107)
CO2 SERPL-SCNC: 28 MMOL/L (ref 22–29)
CREAT SERPL-MCNC: 0.84 MG/DL (ref 0.55–1.02)
CREAT/UREA NIT SERPL: 21
EOSINOPHIL # BLD AUTO: 0.14 X10(3)/MCL (ref 0–0.9)
EOSINOPHIL NFR BLD AUTO: 2.9 %
ERYTHROCYTE [DISTWIDTH] IN BLOOD BY AUTOMATED COUNT: 17.6 % (ref 11.5–17)
GFR SERPLBLD CREATININE-BSD FMLA CKD-EPI: >60 ML/MIN/1.73/M2
GLOBULIN SER-MCNC: 2.7 GM/DL (ref 2.4–3.5)
GLUCOSE SERPL-MCNC: 90 MG/DL (ref 74–100)
HCT VFR BLD AUTO: 40.6 % (ref 37–47)
HGB BLD-MCNC: 12.8 G/DL (ref 12–16)
IMM GRANULOCYTES # BLD AUTO: 0.01 X10(3)/MCL (ref 0–0.04)
IMM GRANULOCYTES NFR BLD AUTO: 0.2 %
LDH SERPL-CCNC: 143 U/L (ref 125–220)
LYMPHOCYTES # BLD AUTO: 0.99 X10(3)/MCL (ref 0.6–4.6)
LYMPHOCYTES NFR BLD AUTO: 20.4 %
MCH RBC QN AUTO: 21.5 PG (ref 27–31)
MCHC RBC AUTO-ENTMCNC: 31.5 G/DL (ref 33–36)
MCV RBC AUTO: 68.1 FL (ref 80–94)
MONOCYTES # BLD AUTO: 0.92 X10(3)/MCL (ref 0.1–1.3)
MONOCYTES NFR BLD AUTO: 18.9 %
NEUTROPHILS # BLD AUTO: 2.78 X10(3)/MCL (ref 2.1–9.2)
NEUTROPHILS NFR BLD AUTO: 57.2 %
PLATELET # BLD AUTO: 251 X10(3)/MCL (ref 130–400)
PMV BLD AUTO: 9.3 FL (ref 7.4–10.4)
POTASSIUM SERPL-SCNC: 3.9 MMOL/L (ref 3.5–5.1)
PROT SERPL-MCNC: 6.1 GM/DL (ref 6.4–8.3)
RBC # BLD AUTO: 5.96 X10(6)/MCL (ref 4.2–5.4)
SODIUM SERPL-SCNC: 142 MMOL/L (ref 136–145)
WBC # BLD AUTO: 4.86 X10(3)/MCL (ref 4.5–11.5)

## 2024-07-23 PROCEDURE — 85025 COMPLETE CBC W/AUTO DIFF WBC: CPT

## 2024-07-23 PROCEDURE — 36415 COLL VENOUS BLD VENIPUNCTURE: CPT | Mod: 91

## 2024-07-23 PROCEDURE — 1160F RVW MEDS BY RX/DR IN RCRD: CPT | Mod: CPTII,,, | Performed by: INTERNAL MEDICINE

## 2024-07-23 PROCEDURE — 83020 HEMOGLOBIN ELECTROPHORESIS: CPT

## 2024-07-23 PROCEDURE — 83615 LACTATE (LD) (LDH) ENZYME: CPT

## 2024-07-23 PROCEDURE — 99999 PR PBB SHADOW E&M-EST. PATIENT-LVL V: CPT | Mod: PBBFAC,,, | Performed by: INTERNAL MEDICINE

## 2024-07-23 PROCEDURE — 80053 COMPREHEN METABOLIC PANEL: CPT

## 2024-07-23 PROCEDURE — 3079F DIAST BP 80-89 MM HG: CPT | Mod: CPTII,,, | Performed by: INTERNAL MEDICINE

## 2024-07-23 PROCEDURE — 99213 OFFICE O/P EST LOW 20 MIN: CPT | Mod: S$PBB,,, | Performed by: INTERNAL MEDICINE

## 2024-07-23 PROCEDURE — 99215 OFFICE O/P EST HI 40 MIN: CPT | Mod: PBBFAC | Performed by: INTERNAL MEDICINE

## 2024-07-23 PROCEDURE — 3008F BODY MASS INDEX DOCD: CPT | Mod: CPTII,,, | Performed by: INTERNAL MEDICINE

## 2024-07-23 PROCEDURE — 1159F MED LIST DOCD IN RCRD: CPT | Mod: CPTII,,, | Performed by: INTERNAL MEDICINE

## 2024-07-23 PROCEDURE — 4010F ACE/ARB THERAPY RXD/TAKEN: CPT | Mod: CPTII,,, | Performed by: INTERNAL MEDICINE

## 2024-07-23 PROCEDURE — 3077F SYST BP >= 140 MM HG: CPT | Mod: CPTII,,, | Performed by: INTERNAL MEDICINE

## 2024-07-23 NOTE — PROGRESS NOTES
Patient ID: [id number]     Chief Complaint: Rheumatoid Arthritis (Pt stated having generalized joint pain)      HPI:     Nicole Quiñones is a 49 y.o. female here today for follow-up of rheumatoid arthritis.     Previously seen by Dr Marcial, she was diagnosed with RA and was started on Plaquenil and MTX. She had brain fog with those meds along with fatigue and GI upset, stopped them and did not go back for follow up. She also saw Dr Martinez in the past few times, did not give her any medication.     Patient was started on Plaquenil last visit. Since then patient states she has noticed minimal improvement. She still reports morning pain and stiffness, primarily in her wrists and knees. Pain is usually 8/10 and improves by the afternoon. Denies any significant swelling. She also notes pain in her bilateral shoulders and lower back though not as significant. Has history of right torn rotator cuff in the past.    She does note that she has had two recent episodes in which her left hand and forearm would lock up and she'd become unable to move them for 30-40 minutes. Also had one fall getting out of bed because her leg gave out but no injuries sustained.     April 2024: Here today for follow up. She has been off of her Plaquenil and Imuran- stopped Plaquenil after last apt, thought med was d/c and started Imuran until 2/25/24 as she had a recent relapse of TTP. She initially presented with abd pain, nausea and vomiting- had CT abd, lipase checked to r/o pancreatitis and ok. 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  She received FFP at [facility] and has a follow up with Dr Malik next week (Admitted 2/27-3/6/2024). She also had a UGI and had her esophagus stretched (per patient report) and was also dx with a Hiatal Hernia- will be seeing Dr. Block (f/u May 14 for repeat EGD). She was also dx with  mild GIAN- now on CPAP.   She does have occ swelling to right hand that comes and goes, no red/warm joints. She has also had increase in pain to her left 2nd digit since her last visit- has been present but has worsened- states it is very painful to bend finger, she reports her finger getting stuck at times-  no red/warm/swelling to the finger. She does report morning stiffness lasting roughly 15-20 minutes. She also reports swollen lymph node under right axillary area, very tender to touch, she reports been present off and on for a few weeks.  She has not mentioned this to any of her other providers. She denies any fever/chills.     July 2024: Here today for follow up. She reports since her last visit, she states her whole body hurts- starts in her shoulders and goes down her arm, having a burning sensation in her elbows, hands have been swelling, fingers triggers and having a hard time making fist, she is also having knee pain, states ankles are painful and have also been swollen. She has made diet changes, monitoring sodium, trying to walk in yard but painful. Morning stiffness has increased and lasting 30 minutes- 1 hour- states has been difficult to walk without pain, ambulating with a cane today. She reports gelling after sitting for 15 minutes. She reports movement does help with pain at times, but not always. She has not been taking anything OTC or topicals to help at this time (unsure what she could take). She does report relief of joint pain after her Rituxan infusions. Has upcoming apt with GI for esophageal dilation, will stretch - she is having some dysphagia- worse when sleeping, reports fluids will come up and cause her to choke, no vomiting- burning sensation.     Seen by Dr. Malik June 2024- completed Rituxan therapy 3/21/24.  She has been on every 2 week laboratory monitoring.  Her anemia shows full recovery.  She has persistent microcytic, hypochromic red cell indices.  She has been told in the past  "that she was a carrier," but she does not know the specifics.  Her home medications are being adjusted by Cardiology and primary care.  She reports a good energy level.  Her chronic joint symptoms are stable.  She denies any fever, chills, night sweats or weight loss. Platelets and anemia show complete recovery     Denies any recent illness or hospitalizations since last visit.     PMH: TTP, OA, Chronic Migraines, GIAN    Cardiology- [facility]  Dr. Malik/KADEEM Hines, NP- Hematology     Denies history of fevers, rashes, photosensitivity, oral or nasal ulcers, h/o MI, stroke, seizures, h/o PE or DVT, Raynaud's phenomenon, uveitis, malignancies.   Family history of autoimmune disease: none.   Pregnancies: 6 Miscarriages: none  Smoking: nonsmoker.     Social History     Tobacco Use   Smoking Status Never    Passive exposure: Never   Smokeless Tobacco Never          -------------------------------------    Anemia    Arthritis    Fibromyalgia    Hypercholesterolemia    Hypertension    Migraines    Personal history of colonic polyps    colonoscopy/polypectomy    Thrombocytopenia, unspecified    TTP (thrombotic thrombocytopenic purpura)    TTP (thrombotic thrombocytopenic purpura)        Past Surgical History:   Procedure Laterality Date    CARPAL TUNNEL RELEASE      COLONOSCOPY W/ BIOPSIES AND POLYPECTOMY  09/05/2023    Deepak De Luna MD 5 years    ESOPHAGOGASTRODUODENOSCOPY  04/2024    Right shouler surgery      SURGICAL REMOVAL OF ENDOMETRIOSIS         Review of patient's allergies indicates:   Allergen Reactions    Nsaids (non-steroidal anti-inflammatory drug) Other (See Comments)     Causes ulcers to bleed.    Ibuprofen     Latex     Meloxicam     Nsaids (non-steroidal anti-inflammatory drug) Nausea Only       Outpatient Medications Marked as Taking for the 7/25/24 encounter (Office Visit) with Emperatriz Scott FNP   Medication Sig Dispense Refill    albuterol (PROVENTIL) 2.5 mg /3 mL (0.083 %) " nebulizer solution USE 1 VIAL IN NEBULIZER EVERY 8 HOURS AS NEEDED 1 each 3    baclofen (LIORESAL) 5 mg Tab tablet Take 1 tablet (5 mg total) by mouth 2 (two) times daily as needed (tension). 30 tablet 2    cetirizine (ZYRTEC) 10 MG tablet Take 10 mg by mouth once daily.      cloNIDine (CATAPRES) 0.1 MG tablet Take 1 tablet (0.1 mg total) by mouth daily as needed. 90 tablet 3    DULERA 100-5 mcg/actuation HFAA Inhale 1 puff into the lungs 2 (two) times daily. 8.8 g 5    EMGALITY  mg/mL PnIj AS DIRECTED SUBCUTANEOUS MONTHLY 30 DAY(S) 1 each 11    ergocalciferol (ERGOCALCIFEROL) 50,000 unit Cap Take 1 capsule (50,000 Units total) by mouth every 7 days. 12 capsule 3    ezetimibe (ZETIA) 10 mg tablet Take 1 tablet (10 mg total) by mouth every evening. 90 tablet 3    fluticasone propionate (FLONASE) 50 mcg/actuation nasal spray 2 sprays (100 mcg total) by Each Nostril route daily as needed for Allergies or Rhinitis. 18.2 mL 1    folic acid (FOLVITE) 1 MG tablet Take 1 tablet (1 mg total) by mouth once daily. 30 tablet 2    gabapentin (NEURONTIN) 600 MG tablet Take 600 mg by mouth 2 (two) times daily.      isosorbide dinitrate (ISORDIL) 40 MG Tab Take 1 tablet (40 mg total) by mouth once daily. 90 tablet 3    LINZESS 145 mcg Cap capsule Take 145 mcg by mouth once daily.      magnesium oxide (MAG-OX) 400 mg (241.3 mg magnesium) tablet Take 1 tablet (400 mg total) by mouth once daily. 30 tablet 11    metoprolol succinate (TOPROL-XL) 100 MG 24 hr tablet Take 1 tablet (100 mg total) by mouth every evening. 90 tablet 3    nitroGLYCERIN (NITROSTAT) 0.4 MG SL tablet Place 1 tablet (0.4 mg total) under the tongue every 5 (five) minutes as needed for Chest pain. 25 tablet 4    ondansetron (ZOFRAN) 8 MG tablet TAKE 1 TABLET BY MOUTH EVERY 8 HOURS AS NEEDED FOR NAUSEA AND VOMITING 9 tablet 3    pantoprazole (PROTONIX) 40 MG tablet Take 40 mg by mouth every morning.      RESTASIS 0.05 % ophthalmic emulsion Place 1 drop into  both eyes 2 (two) times daily.      UBRELVY 100 mg tablet Take 1 tablet (100 mg total) by mouth 2 (two) times daily as needed for Migraine. 16 tablet 2    valsartan (DIOVAN) 80 MG tablet Take 1 tablet (80 mg total) by mouth once daily. 90 tablet 3    venlafaxine (EFFEXOR-XR) 37.5 MG 24 hr capsule Take 1 capsule (37.5 mg total) by mouth once daily. 30 capsule 11    zavegepant 10 mg/actuation Spry 10 mg by Nasal route daily as needed (Migraine). 1 each 5       Social History     Socioeconomic History    Marital status:     Highest education level: Associate degree: occupational, technical, or vocational program   Occupational History    Occupation: unemployed   Tobacco Use    Smoking status: Never     Passive exposure: Never    Smokeless tobacco: Never   Substance and Sexual Activity    Alcohol use: Not Currently     Comment: Last drink @ 20    Drug use: Never    Sexual activity: Not Currently     Partners: Male     Birth control/protection: Abstinence   Social History Narrative    ** Merged History Encounter **          Social Determinants of Health     Financial Resource Strain: Low Risk  (4/19/2023)    Overall Financial Resource Strain (CARDIA)     Difficulty of Paying Living Expenses: Not very hard   Food Insecurity: Food Insecurity Present (4/19/2023)    Hunger Vital Sign     Worried About Running Out of Food in the Last Year: Sometimes true     Ran Out of Food in the Last Year: Never true   Transportation Needs: No Transportation Needs (4/19/2023)    PRAPARE - Transportation     Lack of Transportation (Medical): No     Lack of Transportation (Non-Medical): No   Physical Activity: Inactive (4/19/2023)    Exercise Vital Sign     Days of Exercise per Week: 0 days     Minutes of Exercise per Session: 0 min   Stress: Stress Concern Present (4/19/2023)    [employer] - Occupational Stress Questionnaire     Feeling of Stress : To some extent   Housing Stability: Low Risk   (4/19/2023)    Housing Stability Vital Sign     Unable to Pay for Housing in the Last Year: No     Number of Places Lived in the Last Year: 2     Unstable Housing in the Last Year: No        Family History   Problem Relation Name Age of Onset    Hypertension Mother Mom     Arthritis Mother Mom     Depression Mother Mom     Thyroid disease Sister Sis     Asthma Sister Sis     Intellectual disability Brother Bro     Epilepsy Brother Bro     Stroke Maternal Grandfather Kid         Immunization History   Administered Date(s) Administered    DTP 1974, 1974, 06/09/1975, 06/14/1976, 05/09/1979    Hepatitis B, Adult 03/20/2018    Influenza 11/07/2008    Influenza (FLUBLOK) - Quadrivalent - Recombinant - PF *Preferred* (egg allergy) 10/30/2019    Influenza - Quadrivalent - PF *Preferred* (6 months and older) 10/30/2019, 11/16/2020, 04/19/2023, 09/19/2023    Influenza - Trivalent (ADULT) 11/07/2008    Influenza - Trivalent - PF (ADULT) 01/09/2013, 01/07/2014    MMR 03/20/2018    Measles / Rubella 08/11/1975    OPV 1974, 1974, 06/14/1976, 05/09/1979, 06/09/1995    PPD Test 09/30/2013    Pneumococcal Conjugate - 20 Valent 04/19/2023    Pneumococcal Polysaccharide - 23 Valent 03/04/2020    Td (ADULT) 03/27/1989, 01/25/2006    Tdap 01/07/2014, 03/20/2018       Patient Care Team:  Yaritza Browne FNP as PCP - General (Family Medicine)  Joy Huff MD (Inactive) (Family Medicine)  Loretta Noble LPN as Care Coordinator  Deepak Block MD as Consulting Physician (Gastroenterology)     Subjective:     Constitutional:  Denies chills. Denies fever. Denies night sweats. Denies weight loss.   Ophthalmology: Denies blurred vision. Admits dry eyes- uses Restasis and helps. Denies eye pain. Denies Itching and redness.   ENT: Denies oral ulcers. Denies epistaxis. Denies dry mouth. Admits swollen glands to her left axillary area that come and go for the past few months- painful- today has resolved.  "  Endocrine: Denies diabetes. Denies thyroid Problems.   Respiratory: Denies cough. Denies shortness of breath. Admits shortness of breath with exertion at times, no worsening of symptoms-stable. Denies hemoptysis.   Cardiovascular: Admits chest pain at rest- followed by Cardio, meds just adjusted. Denies chest pain with exertion. Denies palpitations.    Gastrointestinal: Denies abdominal pain. Denies diarrhea. Denies nausea. Denies vomiting. Denies hematemesis or hematochezia. Denies heartburn.  Genitourinary: Denies blood in urine.  Musculoskeletal: See HPI for details  Integumentary: Denies rash. Denies photosensitivity.   Peripheral Vascular: Denies Ulcers of hands and/or feet. Denies Cold extremities.   Neurologic: Denies dizziness. Admits headache. Denies loss of strength. Admits numbness or tingling.   Psychiatric: Admits depression and anxiety- currently stable. Denies suicidal/homicidal ideations.      Objective:     /89 (BP Location: Left arm, Patient Position: Sitting, BP Method: Large (Automatic))   Pulse 66   Temp 97.8 °F (36.6 °C) (Oral)   Resp 20   Ht 5' 6" (1.676 m)   Wt 117.3 kg (258 lb 9.6 oz)   SpO2 99%   BMI 41.74 kg/m²     Physical Exam    General Appearance: alert, pleasant, in no acute distress.  Skin: Skin color, texture, turgor normal. No rashes or lesions. +tender node noted to right axillary- no warmth, mobile, very tender to touch.   Eyes:  extraocular movement intact (EOMI), pupils equal, round, reactive to light and accommodation, conjunctiva clear.  ENT: No oral or nasal ulcers.  Neck:  Neck supple. No adenopathy.   Lungs: CTA throughout without crackles, rhonchi, or wheezes.   Heart: RRR w/o murmurs.  No edema. 2+ DP pulse.  Neuro: Alert, oriented, CN II-XII GI, sensory and motor innervation intact.  Musculoskeletal: Mild tenderness to several PIPs, no pain to MCPs or DIPs. Left 2nd finger tender throughout entire digit, no swelling, no cords or nodules palpated, FROM " noted but reports painful. FROM to bilateral wrist/elbows right shoulder, left shoulder FROM noted but with pain. Bilateral knees with no pain to palpation, pain with ROM to left.  No swelling or warmth in bilateral knees, mild crepitus noted bilaterally. No tenderness to bilateral ankles and  with MTP squeeze.  Psych: Alert, oriented, normal eye contact.    Labs:   : SETH negative by ZULMA.  Anti CCP 6.9, low titer, normal less than 3. RF negative.     2022: SETH negative. RF negative. C3, C4 normal. CCP 34(<19).     23: WBC 4.4. ANC ok. ESR 47. CRP 11.9. CPK normal. RF 26(<14), CCP 69, high titer.     2023: CBC ok. CMP ok. TSH normal. 1+ protein and no blood in urine.     2023: SETH direct positive.     2023: hepatitis-B surface antibody reactive.  Hepatitis-B surface antigen and core antibody negative.  Negative hepatitis-C and HIV.  CMP okay.  CRP 10.1, ESR 43.  CBC okay. Quantiferon tb negative.    10/9/23:  C3 and C4 normal.  Negative SSA, SSB, thyroglobulin, TPO, Navarro, RNP, Scl 70.  DsDNA negative.  SETH negative.    2024: UPC okay trace protein blood in urine.  CMP okay.  WBC count 4.32, differential okay, ANC 2.5.  TPMT genotype normal.    2024: CBC okay.  CMP okay.    2024 CBC RBC 5.58, platelets 300, CMP Cl+ 108, otherwise ok. LDH ok.     2024 CBC RBC 5.96 MCV 68, platelets now 251, CMP Cl+ 110, otherwise ok.  ok,    Imagin/2023:  X-ray of cervical spine showed small multilevel marginal osteophytes and mild facet arthropathy.  Alignment is preserved.    23:  X-ray of left knee showed tricompartmental DJD changes worse in medial compartment, no effusion.    2023: chest x-ray showed lungs are clear.  DJD of bilateral acromioclavicular joints and glenohumeral joint. Mild DJD of bilateral feet, calcaneal spurring on left.  Mild DJD of right knee.  DJD of bilateral hands in PIP, DIPs and bilateral 1st CMC joints.  2023:  X-ray of bilateral  shoulder showed glenohumeral joint and AC joint arthritis.  No acute fracture or dislocation.    12/22/2023 ECHO:  EF 60%.     2/25/2024 CT Abd/Pelvis: Impression: Nonspecific retroperitoneal edema.  Correlate with lipase levels to exclude pancreatitis.  Correlate with urinalysis to exclude urinary tract infection.  Otherwise consider duodenitis    2/26/2024 CXRay: Impression: No acute cardiopulmonary process.    4/22/2024 Left Hand Xray: Impression: Degenerative changes, no acute fractures are seen  Assessment:       ICD-10-CM ICD-9-CM   1. Rheumatoid arthritis involving multiple sites with positive rheumatoid factor  M05.79 714.0   2. Primary osteoarthritis involving multiple joints  M15.9 715.98   3. Primary hypertension  I10 401.9   4. Drug-induced immunodeficiency  D84.821 279.3    Z79.899 E947.9   5. High risk medication use  Z79.899 V58.69   6. BMI 39.0-39.9,adult  Z68.39 V85.39   7. Lymphadenopathy, axillary  R59.0 785.6   8. TTP (thrombotic thrombocytopenic purpura)  M31.19 446.6   9. SOB (shortness of breath)  R06.02 786.05   10. Trigger finger, left index finger  M65.322 727.03       Plan:     1. Rheumatoid arthritis involving multiple sites with positive rheumatoid factor  Low titer rheumatoid factor and a high titer anti CCP.  No overt synovitis on exam. She has arthralgias and joint pain likely multifactorial from both RA and Osteoarthritis.  She continues with significant pain despite being started on Plaquenil 200 mg b.i.d. TMPT genotype normal and started azathioprine in January 2024, tolerated well however has been off since February 25, 2024 as she was admitted with a TTP relapse. She has been off both Azathioprine and Plaquenil since this time.   She states she has been seen by Ophthalmology but unclear if all the correct testing has been performed.- reports they are aware she is taking Plaquenil- have attempted to request records. Today on exam, mild synovitis noted.  - Completed 4 weekly doses  of Rituxan with Oncology for TTP relapse- last dose March 28, 2024- too soon to start and other Biologics at this time, reviewed with Dr. Gibson, we will proceed with MTX, also cleared with Dr. Malik/KADEEM Hines NP- Oncology (she has taken orally in the past and did have GI issues- willing to try injections- will start Otrexup 15 mg subq q7 days weeks folic acid 1 mg po qd)- s/e reviewed as below  - Infection w/u negative 9/2023  - CXRay 2/24 ok.   - PFTs ordered at last visit but was not able to complete- enc to reach out to schedule to have completed   - SETH negative by immunofluorescence, she does not have Lupus, no further workup needed all ENAs negative, complements normal.     - Discussed risks and benefits of Methotrexate (MTX) in detail. MTX is an immunosuppressant medication.  When used in high doses (such as in cancer), it may have many side effects.  The doses that are used in rheumatological disorders are much lower.  Side effects were explained to the patient, including kidney and liver damage, bone marrow suppression, increased infection risk, mouth sores, hair loss, nausea, GI symptoms.   Start Methotrexate at 10 mg per week- after 2 weeks increase dose to 15 mg po qweek.  Start folic 1mg daily to prevent some of the side effects of methotrexate.  Labwork: CBC and a CMP should be checked at baseline, monthly for the first few months and every 3 months afterwards.  Check a Hepatitis Panel negative 9/2023 and a Chest X-Ray ok 2/2024.  Patient to call with any concerns and adverse effects of MTX.  - Methotrexate is also teratogenic, so birth control is needed while on this medication if applicable.  Counselled on limiting alcohol use to 1-2 drinks/week while on methotrexate given potential liver toxicity.  - Will need labs in 4weeks- having lab work completed q3 weeks with Dr. Malik- will monitor these results    LMP May 2024- not sexually- cycles are very sporadic as she is perimenopausal- reviewed risks  vs benefits- was previously taken without contraception due to no sexual activity- after risk reviewed at length she does wish to pursue      2. Osteoarthritis  - Osteoarthritis of cervical spine on x-rays.  DJD of bilateral knees and shoulders.   Referral to physical therapy had been sent. Can use Tylenol PRN. Used to work as CNA and lifted and pushed patients, likely the reason for severe Osteoarthritis.  Had issues with rotator cuff on the right and shoulder arthroscopy in the past.   - Enc to continue to follow up with Ortho- last seen in March, conservative treatment and possible CSI in the future if no relief, enc to call for follow up      3. Primary hypertension  - At goal today, enc to monitor at home and  continue to follow up with PCP. Low Na+ diet      4. Obesity (BMI 40)  - Discussed watching her diet and recommend weight loss.  Weight loss will help with knee pain.     5. High Risk Med use/Drug Induced Immunodeficency   -Persons with rheumatoid arthritis, lupus, psoriatic arthritis and other autoimmune diseases are at increased risk of cardiovascular disease including heart attack and stroke. We recommend that all patients with these conditions have annual health maintenance exams including lipid measurements, blood pressure measurements, and smoking cessation counseling when applicable at their primary care provider's office.   - Advised to stay up-to-date on age appropriate vaccinations and malignancy screening, including yearly skin exams.    -Mammogram UTD, has not had a PAP in a few years- needs to schedule follow up , Colonoscopy 2022- repeat in 3 years.   -Continued lab monitoring.      6. Right Axillary LN  - Will send for U/S for further evaluation- ordered April 2024 but never completed- she reports no one ever called, at this time has resolved- will continue to monitor- if returns advised to follow up with PCP      7. TTP  - Followed by Dr. Malik at [facility]- 4th relapse 2/25/2024- completed 4  weeks of Rituxan- last dose 3/28/2024- continues to do labs q3 weeks with Dr. Malik- monitoring closely - most recently lab completed 2 days ago and reviewed- platelets remain normal     8. SOB  - Previously seen by PCP last month and PFTs reordered. She reports her previous PCP had her on    Proventil nebs as needed for cough and Dulera 100-5 mcg 1 puff twice a day.  Patient states she is benefitting from treatment.   - PFTs on 3/27/2024 FVC 73.7%, FEV 88%, FEV1/%- test was d/c after 10 incomplete and unacceptable results. Several attempts- unable to exhale for >2 seconds- she does report having an esophogeal dilation the day before and unable to complete  - Reordered at last visit but has not completed, enc to call and schedule, SOB stable with no worsening of symptoms at this time  - Recent Cxray 2/2024 ok.      9. Left index Trigger Finger  - 4/22/2024 Left Hand Xray: Impression: Degenerative changes, no acute fractures are seen  -  Left 2nd index finger tender throughout entire digit, no swelling, no cords or nodules palpated, FROM noted but with pain. No triggering noted on exam but reported by patient  - Last seen by Ortho March 2024- no follow up noted, enc to call and schedule          No follow-ups on file. In addition to their scheduled follow up, the patient has also been instructed to follow up on as needed basis.     Total time spent with patient and documentation is 40 minutes. All questions were answered to patient's satisfaction and patient verbalized understanding. This includes face to face time and non-face to face time preparing to see the patient (eg, review of tests), obtaining and/or reviewing separately obtained history, documenting clinical information in the electronic or other health record, independently interpreting results and communicating results to the patient/family/caregiver, or care coordinator.

## 2024-07-25 ENCOUNTER — OFFICE VISIT (OUTPATIENT)
Dept: RHEUMATOLOGY | Facility: CLINIC | Age: 49
End: 2024-07-25
Payer: MEDICAID

## 2024-07-25 VITALS
DIASTOLIC BLOOD PRESSURE: 89 MMHG | RESPIRATION RATE: 20 BRPM | SYSTOLIC BLOOD PRESSURE: 130 MMHG | WEIGHT: 258.63 LBS | OXYGEN SATURATION: 99 % | BODY MASS INDEX: 41.56 KG/M2 | HEIGHT: 66 IN | HEART RATE: 66 BPM | TEMPERATURE: 98 F

## 2024-07-25 DIAGNOSIS — R06.02 SOB (SHORTNESS OF BREATH): ICD-10-CM

## 2024-07-25 DIAGNOSIS — R59.0 LYMPHADENOPATHY, AXILLARY: ICD-10-CM

## 2024-07-25 DIAGNOSIS — M31.19 TTP (THROMBOTIC THROMBOCYTOPENIC PURPURA): ICD-10-CM

## 2024-07-25 DIAGNOSIS — M15.9 PRIMARY OSTEOARTHRITIS INVOLVING MULTIPLE JOINTS: ICD-10-CM

## 2024-07-25 DIAGNOSIS — Z79.899 DRUG-INDUCED IMMUNODEFICIENCY: ICD-10-CM

## 2024-07-25 DIAGNOSIS — D84.821 DRUG-INDUCED IMMUNODEFICIENCY: ICD-10-CM

## 2024-07-25 DIAGNOSIS — M05.79 RHEUMATOID ARTHRITIS INVOLVING MULTIPLE SITES WITH POSITIVE RHEUMATOID FACTOR: Primary | ICD-10-CM

## 2024-07-25 DIAGNOSIS — M65.322 TRIGGER FINGER, LEFT INDEX FINGER: ICD-10-CM

## 2024-07-25 DIAGNOSIS — I10 PRIMARY HYPERTENSION: ICD-10-CM

## 2024-07-25 DIAGNOSIS — Z79.899 HIGH RISK MEDICATION USE: ICD-10-CM

## 2024-07-25 LAB
HGB A MFR BLD ELPH: 97.7 % (ref 95.8–98)
HGB A2 MFR BLD ELPH: 2.3 % (ref 2–3.3)
HGB F MFR BLD ELPH: 0 % (ref 0–0.9)
HGB FRACT BLD ELPH-IMP: NORMAL
M HPLC HB VARIANT, B: NORMAL

## 2024-07-25 PROCEDURE — 3079F DIAST BP 80-89 MM HG: CPT | Mod: CPTII,,, | Performed by: NURSE PRACTITIONER

## 2024-07-25 PROCEDURE — 3075F SYST BP GE 130 - 139MM HG: CPT | Mod: CPTII,,, | Performed by: NURSE PRACTITIONER

## 2024-07-25 PROCEDURE — 1160F RVW MEDS BY RX/DR IN RCRD: CPT | Mod: CPTII,,, | Performed by: NURSE PRACTITIONER

## 2024-07-25 PROCEDURE — 99215 OFFICE O/P EST HI 40 MIN: CPT | Mod: PBBFAC | Performed by: NURSE PRACTITIONER

## 2024-07-25 PROCEDURE — 99215 OFFICE O/P EST HI 40 MIN: CPT | Mod: S$PBB,,, | Performed by: NURSE PRACTITIONER

## 2024-07-25 PROCEDURE — 3008F BODY MASS INDEX DOCD: CPT | Mod: CPTII,,, | Performed by: NURSE PRACTITIONER

## 2024-07-25 PROCEDURE — 4010F ACE/ARB THERAPY RXD/TAKEN: CPT | Mod: CPTII,,, | Performed by: NURSE PRACTITIONER

## 2024-07-25 PROCEDURE — 1159F MED LIST DOCD IN RCRD: CPT | Mod: CPTII,,, | Performed by: NURSE PRACTITIONER

## 2024-07-25 RX ORDER — PREDNISONE 5 MG/1
TABLET ORAL
Qty: 30 TABLET | Refills: 0 | Status: SHIPPED | OUTPATIENT
Start: 2024-07-25

## 2024-07-26 RX ORDER — METHOTREXATE 15 MG/.4ML
15 INJECTION, SOLUTION SUBCUTANEOUS
Qty: 4 EACH | Refills: 2 | Status: SHIPPED | OUTPATIENT
Start: 2024-07-26

## 2024-07-26 RX ORDER — FOLIC ACID 1 MG/1
1 TABLET ORAL DAILY
Qty: 30 TABLET | Refills: 5 | Status: SHIPPED | OUTPATIENT
Start: 2024-07-26 | End: 2025-01-22

## 2024-07-29 ENCOUNTER — TELEPHONE (OUTPATIENT)
Dept: RHEUMATOLOGY | Facility: CLINIC | Age: 49
End: 2024-07-29
Payer: MEDICAID

## 2024-07-29 NOTE — TELEPHONE ENCOUNTER
----- Message from Sebastian Bourne LPN sent at 7/26/2024 10:42 AM CDT -----  Regarding: reminder of lab work  Reminder of lab work for the week of August 12

## 2024-08-02 ENCOUNTER — TELEPHONE (OUTPATIENT)
Dept: RHEUMATOLOGY | Facility: CLINIC | Age: 49
End: 2024-08-02
Payer: MEDICAID

## 2024-08-02 NOTE — TELEPHONE ENCOUNTER
Informed patient that Otrexup is ready at [facility]. Arranged to be counseled at the pharmacy when patient picks up medication

## 2024-08-02 NOTE — TELEPHONE ENCOUNTER
Spoke to pt informed of message. Pt verbalized understanding              ----- Message from BHAVNAA Duncan sent at 8/1/2024  3:11 PM CDT -----  Otrexup has been approved! Please remind her of repeat lab in 4 weeks once she starts- orders already placed

## 2024-08-19 ENCOUNTER — TELEPHONE (OUTPATIENT)
Dept: RHEUMATOLOGY | Facility: CLINIC | Age: 49
End: 2024-08-19
Payer: MEDICAID

## 2024-08-19 ENCOUNTER — LAB VISIT (OUTPATIENT)
Dept: LAB | Facility: HOSPITAL | Age: 49
End: 2024-08-19
Attending: INTERNAL MEDICINE
Payer: MEDICAID

## 2024-08-19 DIAGNOSIS — M05.79 RHEUMATOID ARTHRITIS INVOLVING MULTIPLE SITES WITH POSITIVE RHEUMATOID FACTOR: ICD-10-CM

## 2024-08-19 DIAGNOSIS — M31.19 TTP (THROMBOTIC THROMBOCYTOPENIC PURPURA): ICD-10-CM

## 2024-08-19 LAB
BASOPHILS # BLD AUTO: 0.04 X10(3)/MCL
BASOPHILS NFR BLD AUTO: 1 %
CRP SERPL-MCNC: 7.6 MG/L
EOSINOPHIL # BLD AUTO: 0.17 X10(3)/MCL (ref 0–0.9)
EOSINOPHIL NFR BLD AUTO: 4.1 %
ERYTHROCYTE [DISTWIDTH] IN BLOOD BY AUTOMATED COUNT: 18.4 % (ref 11.5–17)
ERYTHROCYTE [SEDIMENTATION RATE] IN BLOOD: 19 MM/HR (ref 0–20)
HCT VFR BLD AUTO: 42.2 % (ref 37–47)
HGB BLD-MCNC: 13.1 G/DL (ref 12–16)
IMM GRANULOCYTES # BLD AUTO: 0.01 X10(3)/MCL (ref 0–0.04)
IMM GRANULOCYTES NFR BLD AUTO: 0.2 %
LYMPHOCYTES # BLD AUTO: 0.93 X10(3)/MCL (ref 0.6–4.6)
LYMPHOCYTES NFR BLD AUTO: 22.3 %
MCH RBC QN AUTO: 21.5 PG (ref 27–31)
MCHC RBC AUTO-ENTMCNC: 31 G/DL (ref 33–36)
MCV RBC AUTO: 69.3 FL (ref 80–94)
MONOCYTES # BLD AUTO: 0.67 X10(3)/MCL (ref 0.1–1.3)
MONOCYTES NFR BLD AUTO: 16.1 %
NEUTROPHILS # BLD AUTO: 2.35 X10(3)/MCL (ref 2.1–9.2)
NEUTROPHILS NFR BLD AUTO: 56.3 %
PLATELET # BLD AUTO: 279 X10(3)/MCL (ref 130–400)
PMV BLD AUTO: 9.7 FL (ref 7.4–10.4)
RBC # BLD AUTO: 6.09 X10(6)/MCL (ref 4.2–5.4)
WBC # BLD AUTO: 4.17 X10(3)/MCL (ref 4.5–11.5)

## 2024-08-19 PROCEDURE — 85652 RBC SED RATE AUTOMATED: CPT

## 2024-08-19 PROCEDURE — 85025 COMPLETE CBC W/AUTO DIFF WBC: CPT

## 2024-08-19 PROCEDURE — 36415 COLL VENOUS BLD VENIPUNCTURE: CPT

## 2024-08-19 PROCEDURE — 86140 C-REACTIVE PROTEIN: CPT

## 2024-08-19 NOTE — TELEPHONE ENCOUNTER
----- Message from BHAVANA Duncan sent at 8/19/2024 10:05 AM CDT -----  Please advise the patient that inflammatory markers collected today are clinically acceptable at this time, we will continue to monitor and repeat at follow up visit. She will repeat her CBC and CMP 4 weeks after starting MTX- orders already placed as she is getting lab with Hematology q3 weeks at this time

## 2024-08-20 NOTE — TELEPHONE ENCOUNTER
I attempt to call pt no answer left a  detail voicemail message  tat her prescription is ready to be  and co pay  0

## 2024-08-20 NOTE — TELEPHONE ENCOUNTER
SPOKE WITH PT SHE IS NOT GETTING ANY ASSISTANCE PT STATED SHE HAS TO COME OUT POCKET 40.00 TO 50.00. MAYBE TRAM CAN HELP HER .

## 2024-08-20 NOTE — TELEPHONE ENCOUNTER
SPOKE TO PT INFORMED OF MESSAGE. PT STATED HAS NOT STARTED MTX YET HAS TO PAY FOR IT AND DOESN'T HAVE THE FUNDS JUST YET. instructed WHEN SHE DOES  START THE MTX TO DO LAB WORK 4 WEEK AFTER STARTING                  ----- Message from BHAVANA Duncan sent at 8/19/2024 10:05 AM CDT -----  Please advise the patient that inflammatory markers collected today are clinically acceptable at this time, we will continue to monitor and repeat at follow up visit. She will repeat her CBC and CMP 4 weeks after starting MTX- orders already placed as she is getting lab with Hematology q3 weeks at this time

## 2024-08-20 NOTE — TELEPHONE ENCOUNTER
Counseled patient on Otrexup at [facility]. Advised her to take it with folic acid and do labs follow up in 4 weeks after starting Otrexup

## 2024-08-21 ENCOUNTER — OFFICE VISIT (OUTPATIENT)
Dept: NEUROLOGY | Facility: CLINIC | Age: 49
End: 2024-08-21
Payer: MEDICAID

## 2024-08-21 VITALS
WEIGHT: 254.44 LBS | SYSTOLIC BLOOD PRESSURE: 118 MMHG | OXYGEN SATURATION: 97 % | BODY MASS INDEX: 40.89 KG/M2 | DIASTOLIC BLOOD PRESSURE: 79 MMHG | RESPIRATION RATE: 18 BRPM | HEIGHT: 66 IN | TEMPERATURE: 98 F | HEART RATE: 95 BPM

## 2024-08-21 DIAGNOSIS — G43.E01 CHRONIC MIGRAINE WITH AURA AND WITH STATUS MIGRAINOSUS, NOT INTRACTABLE: ICD-10-CM

## 2024-08-21 DIAGNOSIS — G43.E09 CHRONIC MIGRAINE WITH AURA WITHOUT STATUS MIGRAINOSUS, NOT INTRACTABLE: Primary | ICD-10-CM

## 2024-08-21 DIAGNOSIS — Z76.0 ENCOUNTER FOR MEDICATION REFILL: ICD-10-CM

## 2024-08-21 DIAGNOSIS — E66.01 CLASS 3 SEVERE OBESITY DUE TO EXCESS CALORIES WITH SERIOUS COMORBIDITY AND BODY MASS INDEX (BMI) OF 40.0 TO 44.9 IN ADULT: ICD-10-CM

## 2024-08-21 PROBLEM — E66.813 CLASS 3 SEVERE OBESITY DUE TO EXCESS CALORIES WITH SERIOUS COMORBIDITY AND BODY MASS INDEX (BMI) OF 40.0 TO 44.9 IN ADULT: Status: ACTIVE | Noted: 2024-01-11

## 2024-08-21 PROCEDURE — 3008F BODY MASS INDEX DOCD: CPT | Mod: CPTII,,, | Performed by: NURSE PRACTITIONER

## 2024-08-21 PROCEDURE — 4010F ACE/ARB THERAPY RXD/TAKEN: CPT | Mod: CPTII,,, | Performed by: NURSE PRACTITIONER

## 2024-08-21 PROCEDURE — 1159F MED LIST DOCD IN RCRD: CPT | Mod: CPTII,,, | Performed by: NURSE PRACTITIONER

## 2024-08-21 PROCEDURE — 99215 OFFICE O/P EST HI 40 MIN: CPT | Mod: PBBFAC | Performed by: NURSE PRACTITIONER

## 2024-08-21 PROCEDURE — 3074F SYST BP LT 130 MM HG: CPT | Mod: CPTII,,, | Performed by: NURSE PRACTITIONER

## 2024-08-21 PROCEDURE — 3078F DIAST BP <80 MM HG: CPT | Mod: CPTII,,, | Performed by: NURSE PRACTITIONER

## 2024-08-21 PROCEDURE — 99214 OFFICE O/P EST MOD 30 MIN: CPT | Mod: S$PBB,,, | Performed by: NURSE PRACTITIONER

## 2024-08-21 PROCEDURE — 1160F RVW MEDS BY RX/DR IN RCRD: CPT | Mod: CPTII,,, | Performed by: NURSE PRACTITIONER

## 2024-08-21 RX ORDER — VENLAFAXINE HYDROCHLORIDE 37.5 MG/1
37.5 CAPSULE, EXTENDED RELEASE ORAL DAILY
Qty: 30 CAPSULE | Refills: 11 | Status: SHIPPED | OUTPATIENT
Start: 2024-08-21 | End: 2025-08-21

## 2024-08-21 RX ORDER — GALCANEZUMAB 120 MG/ML
INJECTION, SOLUTION SUBCUTANEOUS
Qty: 1 EACH | Refills: 11 | Status: SHIPPED | OUTPATIENT
Start: 2024-08-21

## 2024-08-21 RX ORDER — DEXAMETHASONE 4 MG/1
4 TABLET ORAL EVERY 12 HOURS
Qty: 10 TABLET | Refills: 0 | Status: SHIPPED | OUTPATIENT
Start: 2024-08-21 | End: 2024-08-26

## 2024-08-21 RX ORDER — UBROGEPANT 100 MG/1
100 TABLET ORAL 2 TIMES DAILY PRN
Qty: 16 TABLET | Refills: 4 | Status: SHIPPED | OUTPATIENT
Start: 2024-08-21

## 2024-08-21 RX ORDER — ONDANSETRON HYDROCHLORIDE 8 MG/1
8 TABLET, FILM COATED ORAL 2 TIMES DAILY PRN
Qty: 30 TABLET | Refills: 1 | Status: SHIPPED | OUTPATIENT
Start: 2024-08-21

## 2024-08-21 NOTE — PROGRESS NOTES
[facility] Neurology Follow Up Office Visit Note    Initial Visit Date: 10/5/2023  Last Visit Date:  4/182024  Current Visit Date:  08/21/2024    Chief Complaint:     Chief Complaint   Patient presents with    Migraine     Day 3 of this migraine-7/10 pain level; 1 last week; July had daily migraine; stress related     History of Present Illness:      This is 49 y.o. female with history of HTN, insomnia, acute neck pain, migraine, Vitamin D deficiency, RA followed by rheumatology, who was referred headache disorder. During initial visit, venlafaxine 37.5mg Qday, MagOx 400mg daily, Riboflavin 400mg daily, baclofen PRN, Zavspret, and Ubrelvy were continued. Emgality was restarted    Today, Pt states she has been off of several of her medications since DC from hospital several months ago. Migraines have increased to several times per week. Currently has migraine x 3 days. Unsure of medication list. Has had migraine for last 3 days. Does not like nasal spray due to drip down back of throat. Ubrelvy not taken at onset of migraine, so not very effective.    Recently tested for GIAN and requires use of CPAP. Has only received continues to awaken with HA 15 days/month. Zavspret and Ubrelvy effective, but must catch at onset. Seems A stem from shoulder and neck pain. Taking Baclofen PRN. Continues w/MagOx and Riboflavin daily.     Age of Onset : 16 YO    Headache Description: Starts to R trapezius and radiates up neck to R side of head, to forehead and across to other side, on a bad day must lay in a dark room, severe in nature. Worsened w/activity. Unresponsive to Ubrelvy. May last from 1 hour to 3-4 days. May resolved after a nap, not always. Accompanied by photophobia/phonophobia, nausea, flashes of lights to both eyes, numbness to her face.    Frequency: 15 headache days per month.     Provocation Factors: odors,     Risk Factors  - Family history of headache disorder: Yes daughter  - History of focal CNS lesions: No  - History  of CNS infections: No  - History head trauma: No  - History of underlying mood disorder: Yes diagnosed w/major depressive d/o  - History of sleep disorder: Yes mild GIAN; but not qualified for machine  - Recreational drug use: No  - Tobacco use: No  - Alcohol use: No  - Weight fluctuation: No  - Isotretinoin or Tetracycline use:  No  - Family planning and contraceptive use: No    Medications:     Current Prophylactic  Gabapentin 600mg PO BID (4/26/2023 - present) ineffective for HA  Toprol XL 100mg nightly (1/17/2023 - present) ineffective for HA  Venlafaxine 37.5mg PO Qday (10/5/2023 - present) effective for mood - not currently taking  Emgality 120mg SQ monthly (1/11/2024 - present) - not currently taking    Current Abortive  Baclofen 5mg PO PRN - (10/5/2023 - present)  Zavzpret 10mg nasal spray - (10/19/2023 - present) effective  Ubrelvy 100mg PO BID (4/10/2023 - current)- only effective for mild-mod migraine    Prior Prophylactic  Amitriptyline 25 mg nightly - brain fog/over sedated  Emgality 120mg/ml SQ Qmonth (2/13/2023 - 4/10/2023)  Olmestartan/HCTZ (1/17/2023 - 7/16/2023) ineffective for HA  Sertraline 25mg Qday (3/9/2023 - 6/19/2023) ineffective for HA  Valsartan 80mg PO Qday (6/27/2023 - 11/20/2023) ineffective for HA  Verapamil ER 180mg daily (3/4/2023 - 7/4/2023) ineffective for HA    Prior Abortive  Nurtec - ineffective  Imitrex - ineffective  Rizatriptan - ineffective  Fioricet PRN (6/27/2023 - ?)   Fioricet PRN - ineffective  Tizanidine 4mg PRN (2/22/2023 - 5/24/2023)- worsened migraine  Ondansetron (7/25/2023 - 9/11/2023)    Devices:     - VNS:  - TNS  - TMS:     Procedures:     - Botox:  - PSG block:   - Occipital nerve block:     Labs:     Results for orders placed or performed in visit on 08/19/24   CBC with Differential   Result Value Ref Range    WBC 4.17 (L) 4.50 - 11.50 x10(3)/mcL    RBC 6.09 (H) 4.20 - 5.40 x10(6)/mcL    Hgb 13.1 12.0 - 16.0 g/dL    Hct 42.2 37.0 - 47.0 %    MCV 69.3 (L) 80.0  - 94.0 fL    MCH 21.5 (L) 27.0 - 31.0 pg    MCHC 31.0 (L) 33.0 - 36.0 g/dL    RDW 18.4 (H) 11.5 - 17.0 %    Platelet 279 130 - 400 x10(3)/mcL    MPV 9.7 7.4 - 10.4 fL    Neut % 56.3 %    Lymph % 22.3 %    Mono % 16.1 %    Eos % 4.1 %    Basophil % 1.0 %    Lymph # 0.93 0.6 - 4.6 x10(3)/mcL    Neut # 2.35 2.1 - 9.2 x10(3)/mcL    Mono # 0.67 0.1 - 1.3 x10(3)/mcL    Eos # 0.17 0 - 0.9 x10(3)/mcL    Baso # 0.04 <=0.2 x10(3)/mcL    IG# 0.01 0 - 0.04 x10(3)/mcL    IG% 0.2 %   C-Reactive Protein   Result Value Ref Range    CRP 7.60 (H) <5.00 mg/L   Sedimentation rate   Result Value Ref Range    Sed Rate 19 0 - 20 mm/hr       Studies:     - MRI Brain:   - MRA Head w/o Joaquin:   - MRV Head w/o Joaquin:   - NCHCT:  - Lumbar Puncture:    Review of Systems:     ROS  As per HPI; all other systems negative  Physical Exams:     Vitals:    08/21/24 0957   BP: 118/79   Pulse: 95   Resp: 18   Temp: 98 °F (36.7 °C)     Physical Exam  HENT:      Head: Normocephalic.      Right Ear: External ear normal.      Left Ear: External ear normal.      Nose: Nose normal.      Mouth/Throat:      Mouth: Mucous membranes are moist.   Eyes:      Pupils: Pupils are equal, round, and reactive to light.   Cardiovascular:      Rate and Rhythm: Normal rate.      Pulses: Normal pulses.   Pulmonary:      Effort: Pulmonary effort is normal.   Abdominal:      General: There is no distension.      Tenderness: There is no guarding.      Comments: round   Musculoskeletal:         General: Normal range of motion.      Cervical back: Normal range of motion.   Skin:     General: Skin is warm and dry.      Capillary Refill: Capillary refill takes less than 2 seconds.      Findings: No lesion or rash.   Neurological:      General: No focal deficit present.      Mental Status: She is alert.   Psychiatric:         Mood and Affect: Mood normal.     Comprehensive Neurological Exam:  Mental Status: Alert Oriented to Self, Date, and Place. Naming, repetition, reading, and  writing wnl. Comprehension wnl. No dysarthria.   CN II - XII: ATIYA, No APD, VA grossly intact to finger counting at 6 ft, VFFC, No ptosis OU, EOMI without nystagmus LT/Temp symmetric in CN V1-3 distribution, Hearing grossly intact, Face Symmetric, Tongue and Uvula midline, Trapezius symmetric bilateral.   Motor: tone and bulk wnl throughout, no abnormal involuntary or voluntary movements, 5/5 to confrontation, Fine finger movements wnl b/l, No pronator drift.   Sensory: LT, Proprioception, Vibration, PP, Temp symmetric. No sensory simultagnosia.   Reflexes: 2+ throughout, plantar reflexes downward bilateral.   Cerebellar: FNF wnl b/l, RAHM wnl b/l  Romberg: Negative  Gait: antalgic, utilizing cane    Assessment:     This is 49 y.o. female with history of HTN, insomnia, acute neck pain, migraine, Vitamin D deficiency, who was referred headache disorder. Pt experiencing 15 migraine w/aura HA days per month. Has tried and failed multiple abortive therapies, current regimen not very effective. Emgality for preventative, Ubrelvy for abortive. Has tried and failed sumatriptan and rizatriptan. This is not a medication overuse HA. Has been dx with GIAN not currently utilizing machine. Has been off Emgality for several months. Migraines increased in frequency. Ubrelvy effective as abortive therapy, but must take at onset. Very unsure of medication list and what she takes medication for. Reviewed migraine medication and how they are to be taken    Problem List Items Addressed This Visit          Neuro    Chronic migraine with aura without status migrainosus, not intractable - Primary    Relevant Medications    EMGALITY  mg/mL PnIj    UBRELVY 100 mg tablet    venlafaxine (EFFEXOR-XR) 37.5 MG 24 hr capsule       Endocrine    Class 3 severe obesity due to excess calories with serious comorbidity and body mass index (BMI) of 40.0 to 44.9 in adult       Other    Encounter for medication refill    Relevant Medications     ondansetron (ZOFRAN) 8 MG tablet     Other Visit Diagnoses       Chronic migraine with aura and with status migrainosus, not intractable        Relevant Medications    UBRELVY 100 mg tablet    venlafaxine (EFFEXOR-XR) 37.5 MG 24 hr capsule          Plan:     [] restart Emgality 120mg SQ Q month - requires PA  [] restart venlafaxine 37.5mg nightly  [] c/w Baclofen 5 mg nightly for muscle tension - take consistently  [] hold Zavzpret intranasal once at onset of migraine; PA required  [] c/w Ubrelvy 100mg PO BID PRN at onset of migraine for mild-moderate migraine  [] Limit water intake to approximately 120 oz/day  [] c/w MagOx 400mg daily  [] c/w riboflavin (Vit B2) 400mg daily  [] rx for dexamethasone 4mg PO BID x 5 days; states she is not taking deltasone at this time  [] start exercise program 30 min daily x 5 days weekly for overall health and wellness  [] call office for migraine > 24 hrs and failed abortive therapy; will call in headache cocktail    RTC 4 mths - TM ok    Headache education provided: good sleep hygiene and 7 hours of sleep per night, stress management, medication overuse education provided. Using more 3 OTC per week may worsen headaches, high intensity interval training has shown to reduce headache frequency. Low carb, high protein has shown to reduce headache frequency. Patient is instructed in keep headache diary.     I have explained the treatment plan, diagnosis, and prognosis to patient. All questions are answered to the best of my knowledge.     Face to face time 30 minutes, including documentation, chart review, counseling, education, review of test results, relevant medical records, and coordination of care.     Kristina Boykin, NP-C  General Neurology    08/21/2024

## 2024-08-26 PROBLEM — Z00.00 ANNUAL PHYSICAL EXAM: Status: RESOLVED | Noted: 2024-05-24 | Resolved: 2024-08-26

## 2024-08-26 NOTE — TELEPHONE ENCOUNTER
Laura Faxed over the PA request to [employer] at [phone].    Patient attended Phase 2 Cardiac Rehab Exercise Session. Further documentation will be scanned into the medical record upon discharge.

## 2024-09-11 NOTE — PROGRESS NOTES
Subjective:       Patient ID: Nicole Quiñones is a 49 y.o. female.    Chief Complaint:  Fatigue    Diagnosis: Relapsed thrombotic thrombocytopenic purpura                    Microangiopathic hemolytic anemia                    Rheumatoid arthritis    Treatment History  Plasma exchange 2/27-24-3/04/24  Steroid taper (completed 4/03/24)  Rituximab x 4 (completed 3/21/24)    Current Treatment: Observation    Clinical History:  Black female with a history of TTP initially diagnosed in 2015.  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  She has been followed by a hematologist in [place].  She relocated to [place] to live with her daughter approximately 8 months ago.  She presented to the ER 2/25/24 with low-grade fever, nausea/vomiting and abdominal discomfort.  She denied overt diarrhea.  Laboratory on presentation showed a platelet count of 5000.  Hemoglobin was 12.4 with microcytic, hypochromic indices and WBC 6.4.  LDH was elevated at 711 and haptoglobin was undetectable.  UPT was negative.  CMP showed mild renal insufficiency with a BUN of 24.9 and creatinine 1.36.  Peripheral smear showed microcytic, hypochromic red blood cells, increased reticulocytes and the presence of schistocytes and target cells.  Platelets were markedly decreased.  She was transferred to [facility] for initiation of plasmapheresis.  She received 1 unit of platelets at the outside facility prior to transfer.  On arrival here, she was started on prednisone 1 mg/kg daily and received 1 unit of FFP.     Hospital Course  2/27/24:  Started daily plasma exchange 1 plasma volume (40 cc/kg) exchange with FFP.  Platelet count 12,000. ADAMTS-13 level drawn 2/26/24 <5%.  2/28/24:  Day 2 plasma exchange.  Change to 1/2 albumin + 1/2 FFP secondary to significant urticaria.  2/29/24: Day 3 plasma exchange.  1/2 albumin + 1/2 FFP.  03/1/24: Day 4 plasma exchange.  1/2 albumin + 1/2 FFP  03/4/24:  Day 5  plasma exchange.  1/2 albumin + 1/2 FFP  03/6/24: Discharged from the hospital    Outpatient plasma exchange could not be coordinated with the hospital.  Systemic therapy was recommended with Rituxan weekly x4. She completed 4 weeks of treatment 3/21/24.  Prednisone was tapered and discontinued by 4/3/24.  She has remained stable on follow-up without evidence of relapse.     Interval History  Nicole is here today by herself for a six week hematology follow-up visit.  She is fatigued but doing well overall.  She has not been sleeping well.  She has a hard time getting comfortable due to back pain.  She is back on MTX under the direction of Rheumatology.  She is tolerating treatment well.  She is being considered for hysterectomy due to an enlarged uterus.  She is still having menstrual cycles, although irregular.  Her last cycles 5/24.  Laboratory shows persistent hypochromic, microcytic red cell indices.  Platelet count is normal.  Hemoglobin electrophoresis 7/23/24 was unremarkable.  Results discussed today with patient     Review of Systems   Constitutional:  Negative for appetite change, fatigue, fever and unexpected weight change.   HENT:  Negative for mouth sores, sore throat and trouble swallowing.    Eyes: Negative.  Negative for visual disturbance.   Respiratory:  Negative for cough and shortness of breath.    Cardiovascular:  Negative for chest pain, palpitations and leg swelling.   Gastrointestinal:  Negative for abdominal distention, abdominal pain, constipation, diarrhea and nausea.   Genitourinary:  Negative for dysuria, frequency and urgency.   Musculoskeletal:  Positive for arthralgias. Negative for back pain.   Integumentary:  Negative for pallor and rash.   Neurological:  Negative for dizziness, weakness, numbness and headaches.   Hematological:  Negative for adenopathy. Does not bruise/bleed easily.   Psychiatric/Behavioral: Negative.         PMHx:  HTN, hyperlipidemia, rheumatoid arthritis,  migraine headaches, TTP  PSHx:  Endometrial ablation, left carpal tunnel release, right shoulder surgery  SH:  Lifetime nonsmoker.  Previous alcohol, none since age 20.  Lives in [place] with her daughter.  Provides  in her home.  FH:  PGF had a CVA.  No family history of cancer.    Objective:     Vitals:    09/13/24 1002   BP: (!) 144/96   Pulse: 75   Resp: 15   Temp: 97.8 °F (36.6 °C)         Physical Exam  Constitutional:       Comments: Morbidly obese black female in NAD   HENT:      Head: Normocephalic.      Mouth/Throat:      Mouth: Mucous membranes are moist.      Pharynx: Oropharynx is clear.   Eyes:      General: No scleral icterus.     Extraocular Movements: Extraocular movements intact.      Pupils: Pupils are equal, round, and reactive to light.   Cardiovascular:      Rate and Rhythm: Normal rate and regular rhythm.      Heart sounds: No murmur heard.  Pulmonary:      Comments: Lungs clear to auscultation  Abdominal:      General: Bowel sounds are normal. There is no distension.      Palpations: Abdomen is soft. There is no mass.      Tenderness: There is no abdominal tenderness.   Musculoskeletal:         General: No swelling or tenderness. Normal range of motion.      Cervical back: Neck supple. No tenderness.   Skin:     General: Skin is warm and dry.      Findings: No rash.   Neurological:      General: No focal deficit present.      Mental Status: She is alert and oriented to person, place, and time.      Sensory: Sensory deficit (fingertip neuropathy, grade 1, pre-existing) present.          LABORATORY  Recent Results (from the past 336 hour(s))   CBC with Differential    Collection Time: 09/13/24 10:30 AM   Result Value Ref Range    WBC 4.79 4.50 - 11.50 x10(3)/mcL    RBC 6.13 (H) 4.20 - 5.40 x10(6)/mcL    Hgb 13.4 12.0 - 16.0 g/dL    Hct 43.1 37.0 - 47.0 %    MCV 70.3 (L) 80.0 - 94.0 fL    MCH 21.9 (L) 27.0 - 31.0 pg    MCHC 31.1 (L) 33.0 - 36.0 g/dL    RDW 17.3 (H) 11.5 - 17.0 %     Platelet 276 130 - 400 x10(3)/mcL    MPV 10.2 7.4 - 10.4 fL    Neut % 61.0 %    Lymph % 19.6 %    Mono % 14.6 %    Eos % 4.0 %    Basophil % 0.6 %    Lymph # 0.94 0.6 - 4.6 x10(3)/mcL    Neut # 2.92 2.1 - 9.2 x10(3)/mcL    Mono # 0.70 0.1 - 1.3 x10(3)/mcL    Eos # 0.19 0 - 0.9 x10(3)/mcL    Baso # 0.03 <=0.2 x10(3)/mcL    IG# 0.01 0 - 0.04 x10(3)/mcL    IG% 0.2 %           Assessment:   TTP -  in clinical remission  Microangiopathic hemolytic anemia - resolved  Rheumatoid arthritis      Plan:   Patient is nearly 6 months out from completing treatment with Rituxan.  She has no evidence of recurrent TTP.  Rheumatology is monitoring lab studies monthly for her MTX treatment.  Hemoglobin electrophoresis was unremarkable.  RTC 2 months for follow-up with same day CBC.  She will require hematologic clearance for hysterectomy when she is ready to move forward with that.  All questions answered to the satisfaction of the patient.    JIMENA CHUNG, FNP-C  [facility]     Other Physicians  Dr. Yaritza Block

## 2024-09-13 ENCOUNTER — LAB VISIT (OUTPATIENT)
Dept: LAB | Facility: HOSPITAL | Age: 49
End: 2024-09-13
Attending: INTERNAL MEDICINE
Payer: MEDICAID

## 2024-09-13 ENCOUNTER — OFFICE VISIT (OUTPATIENT)
Dept: HEMATOLOGY/ONCOLOGY | Facility: CLINIC | Age: 49
End: 2024-09-13
Payer: MEDICAID

## 2024-09-13 VITALS
OXYGEN SATURATION: 97 % | TEMPERATURE: 98 F | BODY MASS INDEX: 43.05 KG/M2 | DIASTOLIC BLOOD PRESSURE: 96 MMHG | WEIGHT: 258.38 LBS | HEIGHT: 65 IN | HEART RATE: 75 BPM | SYSTOLIC BLOOD PRESSURE: 144 MMHG | RESPIRATION RATE: 15 BRPM

## 2024-09-13 DIAGNOSIS — M31.19 TTP (THROMBOTIC THROMBOCYTOPENIC PURPURA): ICD-10-CM

## 2024-09-13 DIAGNOSIS — M31.19 TTP (THROMBOTIC THROMBOCYTOPENIC PURPURA): Primary | ICD-10-CM

## 2024-09-13 DIAGNOSIS — K29.70 GASTRITIS, PRESENCE OF BLEEDING UNSPECIFIED, UNSPECIFIED CHRONICITY, UNSPECIFIED GASTRITIS TYPE: ICD-10-CM

## 2024-09-13 LAB
BASOPHILS # BLD AUTO: 0.03 X10(3)/MCL
BASOPHILS NFR BLD AUTO: 0.6 %
EOSINOPHIL # BLD AUTO: 0.19 X10(3)/MCL (ref 0–0.9)
EOSINOPHIL NFR BLD AUTO: 4 %
ERYTHROCYTE [DISTWIDTH] IN BLOOD BY AUTOMATED COUNT: 17.3 % (ref 11.5–17)
HCT VFR BLD AUTO: 43.1 % (ref 37–47)
HGB BLD-MCNC: 13.4 G/DL (ref 12–16)
IMM GRANULOCYTES # BLD AUTO: 0.01 X10(3)/MCL (ref 0–0.04)
IMM GRANULOCYTES NFR BLD AUTO: 0.2 %
LYMPHOCYTES # BLD AUTO: 0.94 X10(3)/MCL (ref 0.6–4.6)
LYMPHOCYTES NFR BLD AUTO: 19.6 %
MCH RBC QN AUTO: 21.9 PG (ref 27–31)
MCHC RBC AUTO-ENTMCNC: 31.1 G/DL (ref 33–36)
MCV RBC AUTO: 70.3 FL (ref 80–94)
MONOCYTES # BLD AUTO: 0.7 X10(3)/MCL (ref 0.1–1.3)
MONOCYTES NFR BLD AUTO: 14.6 %
NEUTROPHILS # BLD AUTO: 2.92 X10(3)/MCL (ref 2.1–9.2)
NEUTROPHILS NFR BLD AUTO: 61 %
PLATELET # BLD AUTO: 276 X10(3)/MCL (ref 130–400)
PMV BLD AUTO: 10.2 FL (ref 7.4–10.4)
RBC # BLD AUTO: 6.13 X10(6)/MCL (ref 4.2–5.4)
WBC # BLD AUTO: 4.79 X10(3)/MCL (ref 4.5–11.5)

## 2024-09-13 PROCEDURE — 99215 OFFICE O/P EST HI 40 MIN: CPT | Mod: PBBFAC | Performed by: NURSE PRACTITIONER

## 2024-09-13 PROCEDURE — 99999 PR PBB SHADOW E&M-EST. PATIENT-LVL V: CPT | Mod: PBBFAC,,, | Performed by: NURSE PRACTITIONER

## 2024-09-13 PROCEDURE — 36415 COLL VENOUS BLD VENIPUNCTURE: CPT

## 2024-09-13 PROCEDURE — 85025 COMPLETE CBC W/AUTO DIFF WBC: CPT

## 2024-09-16 NOTE — PROGRESS NOTES
Addendum 9.18.24  Spoke with Heme-Onc, they are okay with restarting valsartan.  Notified patient and prescription sent to pharmacy.  All questions were answered.  Patient voiced understanding with plan.    CHIEF COMPLAINT:   No chief complaint on file.                                                 HPI:  Nicole Quiñones 49 y.o. female with past medical history of hypertension, GERD, obesity, migraines, chronic pain, and rheumatoid arthritis presents to Cardiology Clinic for follow up and ongoing care. At initial visit she had several cardiac complaints.  She complained of chest pain, associated left-sided numbness, palpitations, SOB/ORTEGA, and bend apnea.  Echocardiogram completed December 2023 revealed EF of 50%, grade 1 diastolic dysfunction, no significant valvular or structural abnormalities.  See full report below.  48 hour Holter monitor completed December 2023 revealed no significant arrhythmia, no pauses, underlying rhythm was sinus, very rare PVCs and PACs.  See full report below.  She did complete a exercise stress test in November 2023, however the patient was unable to exercise for a full 5 minutes and the test was considered intermediate risk.  Subsequently she completed a dobutamine stress echo which revealed no new regional wall motion abnormalities, overall the echo was negative for ischemia or infarction.  See full report below.  She was diagnosed with sleep apnea in the last 6 months, but has not been wearing CPAP as she was unable to tolerate it.    Today the patient states that she feels stable from a cardiac standpoint.  She stated that approximately 1 week ago she experienced and very sharp chest pain that was like a punch.  She stated that she had a lot going on at that time.  Chest pain was nonexertional and lasted for about 1 minute.  She stated that this felt different than her prior episodes of chest pain.  Patient states that 1 of her providers, she could not remember who,  told her to limit her nitroglycerin use, so she has not been using it.  She states that she has had some other very minimal episodes of chest pain otherwise, but states that it was nothing very concerning.  She continues to have palpitations that occur nightly, but states that they are stable and not very bothersome.  She experiences some degree of SOB/ORTEGA with moderate to high levels of exertion.  Per chart review, it appears as though she was having a pulmonary workup per PCP.  Otherwise she denies any lightheadedness, dizziness, syncope, lower extremity edema, or claudication symptoms.  Today she was endorsing some left lower extremity pain, stating that her leg is pain full to touch, even light touch.  This does not appear to be cardiac in nature.  As stated above, she was previously prescribed CPAP, but states that she has not been wearing.  She reports some physical activity but denies much formal exercise.  She reports compliance with all her current medications and is slowly being restarted on her home meds following her hospitalization earlier this year.  She denies any tobacco or other illicit drug use.    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  In February of 2024 she presented to ED with fever, nausea/vomiting, abdominal discomfort.  At that time she was found to be with significant thrombocytopenia with a platelet count of 5000.  Extensive hematology workup at time revealed several other significant lab abnormalities and ultimately patient was transferred to [facility] for initiation of plasmapheresis.  During her hospitalization she received several treatments of plasma exchange with albumin.  Ultimately she was discharged from the hospital but was told not to restart any of her home medications.  Since her discharge from the hospital at the beginning of March 2024,  patient has been off all of her cardiac medications.  She states that recently she has been having difficulty with controlling her blood pressure and she was noticed an ideations.                                                                                                                                                                                                                                                                                    CARDIAC TESTING:  Dobutamine Stress Echo 12.27.23    Stress Protocol: The test was stopped because the end of the protocol was reached.    Baseline ECG: The Baseline ECG reveals sinus rhythm.    Stress ECG: There are no arrhythmias during stress.    Post-stress Echo: The left ventricle systolic function is normal.  Baseline Ejection Fraction:  60%  The Ejection Fraction improved on the stress images  No new regional wall motion abnormality noted  The stress echo is negative for ischemia     Echo 12.22.23    Left Ventricle: The left ventricle is normal in size. Normal wall thickness. There is low normal systolic function. Biplane (2D) method of discs ejection fraction is 50%. Grade I diastolic dysfunction.    Right Ventricle: Normal right ventricular cavity size. Systolic function is normal.    Left Atrium: Left atrium is mildly dilated.    IVC/SVC: Normal venous pressure at 3 mmHg.    48 Hour Holter Monitor 12.5.23  The predominant rhythm is sinus.  Underlying rhythm:   Sinus  Arrythmia:  No significant arrhythmia noted   Pauses:  No significant pause noted  Symptoms:  None reported   Events:  No patient marked events      Exercise EKG Stress 11.20.23    The patient reported no chest pain during the stress test.    There were no arrhythmias during stress.    The patient exercised for 3 minutes 29 seconds on a Mitul protocol, corresponding to a functional capacity of 6 METS, achieving a peak heart rate of 148 bpm, which is 91 % of the age predicted maximum heart  rate.  Patient reached target heart rate  Minutes exercised:  3 min 29 sec  Adjusted ST deviation:  0 mm   Angina:  no angina (0)    [facility] treadmill score (Min - ST - Index):  3.  Patient's Score:  less than 5 but greater than -10   The patient's risk for cardiovascular events  is INTERMEDIATE (based on Gresham Score)   If clinically indicated consider further evaluation     Patient Active Problem List   Diagnosis    Rheumatoid arthritis    Chondromalacia of patellofemoral joint, left    Chondromalacia of patellofemoral joint, right    Primary hypertension    Bilateral chronic knee pain    Encounter for immunization    Encounter for colorectal cancer screening    Encounter for screening mammogram for breast cancer    Encounter to establish care    Gastroesophageal reflux disease without esophagitis    Sore throat    Chronic pain of left knee    Tinea pedis of left foot    Encounter for medication refill    Primary osteoarthritis of left knee    BMI 39.0-39.9,adult    Acute neck pain    Chronic migraine with aura without status migrainosus, not intractable    Insomnia    Encounter for vaccination    Internal derangement of knee joint, left    Palpitations    Hyperlipidemia    Shortness of breath    Chest pain    Dyspnea on exertion    Resistant hypertension    Class 3 severe obesity due to excess calories with serious comorbidity and body mass index (BMI) of 40.0 to 44.9 in adult    Arthralgia of shoulder region, left    Low mean corpuscular volume (MCV)    Chronic cough    TTP (thrombotic thrombocytopenic purpura)    Gastritis    Rotator cuff syndrome, left     Past Surgical History:   Procedure Laterality Date    CARPAL TUNNEL RELEASE      COLONOSCOPY W/ BIOPSIES AND POLYPECTOMY  09/05/2023    Deepak De Luna MD 5 years    ESOPHAGOGASTRODUODENOSCOPY  04/2024    Right shouler surgery      SURGICAL REMOVAL OF ENDOMETRIOSIS       Social History     Socioeconomic History    Marital status:     Highest education  level: Associate degree: occupational, technical, or vocational program   Occupational History    Occupation: unemployed   Tobacco Use    Smoking status: Never     Passive exposure: Never    Smokeless tobacco: Never   Substance and Sexual Activity    Alcohol use: Not Currently     Comment: Last drink @ 20    Drug use: Never    Sexual activity: Not Currently     Partners: Male     Birth control/protection: Abstinence   Social History Narrative    ** Merged History Encounter **          Social Determinants of Health     Financial Resource Strain: Low Risk  (4/19/2023)    Overall Financial Resource Strain (CARDIA)     Difficulty of Paying Living Expenses: Not very hard   Food Insecurity: Food Insecurity Present (4/19/2023)    Hunger Vital Sign     Worried About Running Out of Food in the Last Year: Sometimes true     Ran Out of Food in the Last Year: Never true   Transportation Needs: No Transportation Needs (4/19/2023)    PRAPARE - Transportation     Lack of Transportation (Medical): No     Lack of Transportation (Non-Medical): No   Physical Activity: Inactive (4/19/2023)    Exercise Vital Sign     Days of Exercise per Week: 0 days     Minutes of Exercise per Session: 0 min   Stress: Stress Concern Present (4/19/2023)    [employer] - Occupational Stress Questionnaire     Feeling of Stress : To some extent   Housing Stability: Low Risk  (4/19/2023)    Housing Stability Vital Sign     Unable to Pay for Housing in the Last Year: No     Number of Places Lived in the Last Year: 2     Unstable Housing in the Last Year: No        Family History   Problem Relation Name Age of Onset    Hypertension Mother Mom     Arthritis Mother Mom     Depression Mother Mom     Thyroid disease Sister Sis     Asthma Sister Sis     Intellectual disability Brother Bro     Epilepsy Brother Bro     Stroke Maternal Grandfather Kid      Review of patient's allergies indicates:   Allergen Reactions    Nsaids  (non-steroidal anti-inflammatory drug) Other (See Comments)     Causes ulcers to bleed.    Ibuprofen     Latex     Meloxicam     Nsaids (non-steroidal anti-inflammatory drug) Nausea Only         ROS:  Review of Systems   Constitutional:  Positive for malaise/fatigue.   HENT: Negative.     Eyes: Negative.    Respiratory:  Positive for shortness of breath.    Cardiovascular:  Positive for chest pain and palpitations. Negative for orthopnea, claudication, leg swelling and PND.   Gastrointestinal: Negative.    Genitourinary: Negative.    Musculoskeletal:  Positive for joint pain and myalgias.   Skin: Negative.    Neurological:  Positive for weakness. Negative for dizziness.   Endo/Heme/Allergies: Negative.    Psychiatric/Behavioral: Negative.                                                                                                                                                                                  Negative except as stated in the history of present illness. See HPI for details.    PHYSICAL EXAM:  There were no vitals taken for this visit.      Physical Exam  Constitutional:       Appearance: Normal appearance. She is obese. She is not ill-appearing.   HENT:      Head: Normocephalic.      Nose: Nose normal.      Mouth/Throat:      Mouth: Mucous membranes are moist.   Eyes:      Extraocular Movements: Extraocular movements intact.      Pupils: Pupils are equal, round, and reactive to light.   Neck:      Vascular: No carotid bruit.   Cardiovascular:      Rate and Rhythm: Normal rate and regular rhythm.      Pulses: Normal pulses.      Heart sounds: Normal heart sounds. No murmur heard.  Pulmonary:      Effort: Pulmonary effort is normal.      Breath sounds: Normal breath sounds.   Abdominal:      General: There is no distension.      Palpations: Abdomen is soft.   Musculoskeletal:         General: Normal range of motion.      Cervical back: Normal range of motion.      Right lower leg: No edema.       Left lower leg: No edema.   Skin:     General: Skin is warm.   Neurological:      General: No focal deficit present.      Mental Status: She is alert.   Psychiatric:         Mood and Affect: Mood normal.         Current Outpatient Medications   Medication Instructions    albuterol (PROVENTIL) 2.5 mg /3 mL (0.083 %) nebulizer solution USE 1 VIAL IN NEBULIZER EVERY 8 HOURS AS NEEDED    baclofen (LIORESAL) 5 mg, Oral, 2 times daily PRN    cetirizine (ZYRTEC) 10 mg, Oral, Daily    cloNIDine (CATAPRES) 0.1 mg, Oral, Daily PRN    DULERA 100-5 mcg/actuation HFAA 1 puff, Inhalation, 2 times daily    EMGALITY  mg/mL PnIj AS DIRECTED SUBCUTANEOUS MONTHLY 30 DAY(S)    ergocalciferol (ERGOCALCIFEROL) 50,000 Units, Oral, Every 7 days    ezetimibe (ZETIA) 10 mg, Oral, Nightly    fluticasone propionate (FLONASE) 100 mcg, Each Nostril, Daily PRN    folic acid (FOLVITE) 1 mg, Oral, Daily, After food    gabapentin (NEURONTIN) 600 mg, Oral, 2 times daily    isosorbide dinitrate (ISORDIL) 40 mg, Oral, Daily    LINZESS 145 mcg, Oral, Daily    magnesium oxide (MAG-OX) 400 mg, Oral, Daily    metoprolol succinate (TOPROL-XL) 100 mg, Oral, Nightly    multivitamin with minerals tablet 1 tablet, Oral, Daily    nitroGLYCERIN (NITROSTAT) 0.4 mg, Sublingual, Every 5 min PRN    ondansetron (ZOFRAN) 8 mg, Oral, 2 times daily PRN, TAKE 1 TABLET BY MOUTH EVERY 8 HOURS AS NEEDED FOR NAUSEA AND VOMITING    OTREXUP (PF) 15 mg, Subcutaneous, Every 7 days, Hold for fever or infections.    pantoprazole (PROTONIX) 40 mg, Oral, Every morning    RESTASIS 0.05 % ophthalmic emulsion 1 drop, Both Eyes, 2 times daily    UBRELVY 100 mg, Oral, 2 times daily PRN    valsartan (DIOVAN) 80 mg, Oral, Daily    venlafaxine (EFFEXOR-XR) 37.5 mg, Oral, Daily        All medications, laboratory studies, cardiac diagnostic imaging reviewed.     Lab Results   Component Value Date    .00 02/01/2024    .00 (H) 05/23/2023    TRIG 71 02/01/2024    TRIG 87  "05/23/2023    CREATININE 0.84 07/23/2024    MG 2.00 03/05/2024    K 3.9 07/23/2024        ASSESSMENT/PLAN:    Primary Hypertension  - BP above goal today  - All anti HTN medications were on hold following hospitalization in March 2024  - Has since re-started Metoprolol Succinate 100 MG daily today and Isosorbide Dinitrate 40 MG   - Would like to restart Valsartan- will check to make sure okay with Heme-Onc- will notify patient once they respond  - Will continue to add other medications as able to   - Diagnosed with GIAN on recent sleep study, CPAP was ordered for patient, but she states that she was unable to tolerate it  - Counseled on the importance of following a low-sodium, heart healthy diet and exercise as tolerated  - patient will log BP at home and call clinic with readings  - Recommend walking 10 miles per week     Hyperlipidemia  - , HDL 48, total cholesterol 201, triglycerides 71 per labs February 1, 2024.  Improved from prior  - On Zetia 10 MG daily   - Patient would like to continue with Zetia for now and hold off on statin  - Counseled on importance of following a low cholesterol, low-fat diet and exercise as tolerated  - ASCVD Risk 18.8%, high intensity statin recommended     Chest Pain  Shortness of Breath/ORTEGA  - Reports one episode of significant chest pain that occurred last week while resting. She states that it was a "very sharp pain that felt like a punch she stated that it lasted for about 1 minute long.  At that time she was resting, but states that had a lot going on  - No exertional chest pain-chest discomfort typically occurs when stressed  - She continues to endorse occasional SOB/ORTEGA that she reports occurs with over exertion or moderate to high exertional activities  - Will continue to monitor closely   - Echo revealed EF of 50%, grade 1 diastolic dysfunction, no significant valvular or structural abnormalities.  See full report above.  - Exercise stress test revealed " intermediate risk for cardiac events based off of Gresham score, due to patient being not able to complete 5 minutes of exercise.  Subsequently, patient underwent stress echocardiogram which revealed no evidence of ischemia or infarction, there were no new wall motion abnormalities.  See full report above.  - Will continue to monitor for now and focus on risk factor management for CAD/CHF   - Counseled on importance of following a heart healthy diet and exercise as tolerated   - No indication for further testing at this time    Palpitations  - Continues to have palpitations at nighttime, states that they are stable with medication  - Describes as heart fluttering, occur while laying down   - She denies any excessive caffeine use in fact, she states that she recently greatly reduced her caffeine consumption  - She denies any symptoms of lightheadedness, dizziness, or syncope that accompanied the palpitations   - 48 hour Holter monitor revealed underlying rhythm to be sinus, no significant arrhythmias noted, no significant pauses noted, average heart rate was 81 beats per minute  - Continue Metoprolol Succinate 100 MG daily     Obesity   - Counseled on the importance of diet and exercise for weight loss and overall cardiac risk reduction  - Encouraged walking 10 miles per day    GIAN   - Diagnosed with GIAN on recent sleep study  - Has not been compliant with CPAP  - Counseled on the importance of compliance with CPAP in the detrimental effects of untreated sleep apnea    TTP  - Management per Heme    Management of other problems per PCP/other specialties.    Follow up in cardiology clinic in 4 months or sooner if needed   Follow up with PCP as directed   Please notify clinic if any new concerns or any change in symptoms   Strict ED precautions given

## 2024-09-17 ENCOUNTER — TELEPHONE (OUTPATIENT)
Dept: RHEUMATOLOGY | Facility: CLINIC | Age: 49
End: 2024-09-17
Payer: MEDICAID

## 2024-09-17 DIAGNOSIS — D84.821 DRUG-INDUCED IMMUNODEFICIENCY: ICD-10-CM

## 2024-09-17 DIAGNOSIS — M05.79 RHEUMATOID ARTHRITIS INVOLVING MULTIPLE SITES WITH POSITIVE RHEUMATOID FACTOR: Primary | ICD-10-CM

## 2024-09-17 DIAGNOSIS — Z79.899 HIGH RISK MEDICATION USE: ICD-10-CM

## 2024-09-17 DIAGNOSIS — Z79.899 DRUG-INDUCED IMMUNODEFICIENCY: ICD-10-CM

## 2024-09-18 ENCOUNTER — OFFICE VISIT (OUTPATIENT)
Dept: CARDIOLOGY | Facility: CLINIC | Age: 49
End: 2024-09-18
Payer: MEDICAID

## 2024-09-18 ENCOUNTER — LAB VISIT (OUTPATIENT)
Dept: LAB | Facility: HOSPITAL | Age: 49
End: 2024-09-18
Attending: NURSE PRACTITIONER
Payer: MEDICAID

## 2024-09-18 ENCOUNTER — TELEPHONE (OUTPATIENT)
Dept: RHEUMATOLOGY | Facility: CLINIC | Age: 49
End: 2024-09-18
Payer: MEDICAID

## 2024-09-18 VITALS
SYSTOLIC BLOOD PRESSURE: 146 MMHG | WEIGHT: 257.06 LBS | HEART RATE: 62 BPM | OXYGEN SATURATION: 99 % | HEIGHT: 66 IN | RESPIRATION RATE: 18 BRPM | DIASTOLIC BLOOD PRESSURE: 103 MMHG | TEMPERATURE: 98 F | BODY MASS INDEX: 41.31 KG/M2

## 2024-09-18 DIAGNOSIS — M31.19 TTP (THROMBOTIC THROMBOCYTOPENIC PURPURA): ICD-10-CM

## 2024-09-18 DIAGNOSIS — M05.79 RHEUMATOID ARTHRITIS INVOLVING MULTIPLE SITES WITH POSITIVE RHEUMATOID FACTOR: ICD-10-CM

## 2024-09-18 DIAGNOSIS — K29.70 GASTRITIS, PRESENCE OF BLEEDING UNSPECIFIED, UNSPECIFIED CHRONICITY, UNSPECIFIED GASTRITIS TYPE: ICD-10-CM

## 2024-09-18 DIAGNOSIS — R00.2 PALPITATIONS: ICD-10-CM

## 2024-09-18 DIAGNOSIS — D84.821 DRUG-INDUCED IMMUNODEFICIENCY: ICD-10-CM

## 2024-09-18 DIAGNOSIS — Z79.899 DRUG-INDUCED IMMUNODEFICIENCY: ICD-10-CM

## 2024-09-18 DIAGNOSIS — Z79.899 HIGH RISK MEDICATION USE: ICD-10-CM

## 2024-09-18 DIAGNOSIS — E78.49 OTHER HYPERLIPIDEMIA: ICD-10-CM

## 2024-09-18 DIAGNOSIS — R06.09 DYSPNEA ON EXERTION: ICD-10-CM

## 2024-09-18 DIAGNOSIS — R07.9 CHEST PAIN, UNSPECIFIED TYPE: ICD-10-CM

## 2024-09-18 DIAGNOSIS — I10 PRIMARY HYPERTENSION: ICD-10-CM

## 2024-09-18 DIAGNOSIS — I1A.0 RESISTANT HYPERTENSION: Primary | ICD-10-CM

## 2024-09-18 LAB
ALBUMIN SERPL-MCNC: 3.7 G/DL (ref 3.5–5)
ALBUMIN/GLOB SERPL: 1 RATIO (ref 1.1–2)
ALP SERPL-CCNC: 143 UNIT/L (ref 40–150)
ALT SERPL-CCNC: 12 UNIT/L (ref 0–55)
ANION GAP SERPL CALC-SCNC: 5 MEQ/L
AST SERPL-CCNC: 12 UNIT/L (ref 5–34)
BASOPHILS # BLD AUTO: 0.07 X10(3)/MCL
BASOPHILS NFR BLD AUTO: 1.3 %
BILIRUB SERPL-MCNC: 0.5 MG/DL
BUN SERPL-MCNC: 17 MG/DL (ref 9.8–20.1)
CALCIUM SERPL-MCNC: 9.9 MG/DL (ref 8.4–10.2)
CHLORIDE SERPL-SCNC: 108 MMOL/L (ref 98–107)
CO2 SERPL-SCNC: 29 MMOL/L (ref 22–29)
CREAT SERPL-MCNC: 0.95 MG/DL (ref 0.55–1.02)
CREAT/UREA NIT SERPL: 18
EOSINOPHIL # BLD AUTO: 0.24 X10(3)/MCL (ref 0–0.9)
EOSINOPHIL NFR BLD AUTO: 4.5 %
ERYTHROCYTE [DISTWIDTH] IN BLOOD BY AUTOMATED COUNT: 17 % (ref 11.5–17)
GFR SERPLBLD CREATININE-BSD FMLA CKD-EPI: >60 ML/MIN/1.73/M2
GLOBULIN SER-MCNC: 3.6 GM/DL (ref 2.4–3.5)
GLUCOSE SERPL-MCNC: 100 MG/DL (ref 74–100)
HCT VFR BLD AUTO: 45.4 % (ref 37–47)
HGB BLD-MCNC: 14.3 G/DL (ref 12–16)
IMM GRANULOCYTES # BLD AUTO: 0.01 X10(3)/MCL (ref 0–0.04)
IMM GRANULOCYTES NFR BLD AUTO: 0.2 %
LYMPHOCYTES # BLD AUTO: 1.18 X10(3)/MCL (ref 0.6–4.6)
LYMPHOCYTES NFR BLD AUTO: 22 %
MCH RBC QN AUTO: 22.2 PG (ref 27–31)
MCHC RBC AUTO-ENTMCNC: 31.5 G/DL (ref 33–36)
MCV RBC AUTO: 70.5 FL (ref 80–94)
MONOCYTES # BLD AUTO: 0.82 X10(3)/MCL (ref 0.1–1.3)
MONOCYTES NFR BLD AUTO: 15.3 %
NEUTROPHILS # BLD AUTO: 3.04 X10(3)/MCL (ref 2.1–9.2)
NEUTROPHILS NFR BLD AUTO: 56.7 %
NRBC BLD AUTO-RTO: 0 %
PLATELET # BLD AUTO: 322 X10(3)/MCL (ref 130–400)
PMV BLD AUTO: 10.5 FL (ref 7.4–10.4)
POTASSIUM SERPL-SCNC: 4.6 MMOL/L (ref 3.5–5.1)
PROT SERPL-MCNC: 7.3 GM/DL (ref 6.4–8.3)
RBC # BLD AUTO: 6.44 X10(6)/MCL (ref 4.2–5.4)
SODIUM SERPL-SCNC: 142 MMOL/L (ref 136–145)
WBC # BLD AUTO: 5.36 X10(3)/MCL (ref 4.5–11.5)

## 2024-09-18 PROCEDURE — 36415 COLL VENOUS BLD VENIPUNCTURE: CPT

## 2024-09-18 PROCEDURE — 85025 COMPLETE CBC W/AUTO DIFF WBC: CPT

## 2024-09-18 PROCEDURE — 99214 OFFICE O/P EST MOD 30 MIN: CPT | Mod: S$PBB,,,

## 2024-09-18 PROCEDURE — 99215 OFFICE O/P EST HI 40 MIN: CPT | Mod: PBBFAC

## 2024-09-18 PROCEDURE — 80053 COMPREHEN METABOLIC PANEL: CPT

## 2024-09-18 PROCEDURE — 3008F BODY MASS INDEX DOCD: CPT | Mod: CPTII,,,

## 2024-09-18 PROCEDURE — 1160F RVW MEDS BY RX/DR IN RCRD: CPT | Mod: CPTII,,,

## 2024-09-18 PROCEDURE — 4010F ACE/ARB THERAPY RXD/TAKEN: CPT | Mod: CPTII,,,

## 2024-09-18 PROCEDURE — 3077F SYST BP >= 140 MM HG: CPT | Mod: CPTII,,,

## 2024-09-18 PROCEDURE — 1159F MED LIST DOCD IN RCRD: CPT | Mod: CPTII,,,

## 2024-09-18 PROCEDURE — 3080F DIAST BP >= 90 MM HG: CPT | Mod: CPTII,,,

## 2024-09-18 RX ORDER — VALSARTAN 80 MG/1
80 TABLET ORAL DAILY
Qty: 90 TABLET | Refills: 3 | Status: SHIPPED | OUTPATIENT
Start: 2024-09-18 | End: 2025-09-18

## 2024-09-18 NOTE — PATIENT INSTRUCTIONS
Follow up in cardiology clinic in 4 months or sooner if needed   Follow up with PCP as directed   Please notify clinic if any new concerns or any change in symptoms   Strict ED precautions given

## 2024-09-18 NOTE — TELEPHONE ENCOUNTER
SPOKE TO PT INFORMED TP OF MESSAGE. PT VERBALIZED UNDERSTANDING               ----- Message from BHAVANA Duncan sent at 9/18/2024 12:25 PM CDT -----  Please advise the patient that all lab are noted to be clinically acceptable, we will continue to monitor at future lab draws

## 2024-09-19 ENCOUNTER — TELEPHONE (OUTPATIENT)
Dept: HEMATOLOGY/ONCOLOGY | Facility: CLINIC | Age: 49
End: 2024-09-19
Payer: MEDICAID

## 2024-09-19 NOTE — LETTER
September 19, 2024        Nicole Quiñones  [address] [address]             [facility]  [address], [address]  [place] [address]  Phone: [phone]   Patient: Nicole Quiñones   MR Number: [id number]   YOB: 1975   Date of Visit: 9/13/2024     Dear Dr. Marcia Parry,    Ms. Quiñones is currently being followed in my office for her history of thrombotic thrombocytopenic purpera. She was last seen for a visit on 9/13/2024 and is currently in clinical remission of her disease. She is cleared from a hematologic standpoint for her planned laparoscopic hysterectomy. I have enclosed her last CBC for your records. Please contact my office should you have any questions.    Sincerely,        Dr. Julian carrion

## 2024-09-19 NOTE — TELEPHONE ENCOUNTER
----- Message from BHAVANA Samaniego sent at 9/18/2024  1:02 PM CDT -----  Regarding: Surgical Clearance  Hematology clearance was requested by Dr. ALEX Parry for a lap hysterectomy.  I did review labs/case with Dr. Malik and he gave verbal clearance.    Betty

## 2024-10-02 ENCOUNTER — TELEPHONE (OUTPATIENT)
Dept: FAMILY MEDICINE | Facility: CLINIC | Age: 49
End: 2024-10-02

## 2024-10-24 ENCOUNTER — LAB VISIT (OUTPATIENT)
Dept: LAB | Facility: HOSPITAL | Age: 49
End: 2024-10-24
Attending: INTERNAL MEDICINE
Payer: MEDICAID

## 2024-10-24 ENCOUNTER — OFFICE VISIT (OUTPATIENT)
Dept: RHEUMATOLOGY | Facility: CLINIC | Age: 49
End: 2024-10-24
Payer: MEDICAID

## 2024-10-24 ENCOUNTER — TELEPHONE (OUTPATIENT)
Dept: RHEUMATOLOGY | Facility: CLINIC | Age: 49
End: 2024-10-24
Payer: MEDICAID

## 2024-10-24 VITALS
SYSTOLIC BLOOD PRESSURE: 130 MMHG | DIASTOLIC BLOOD PRESSURE: 85 MMHG | OXYGEN SATURATION: 98 % | RESPIRATION RATE: 20 BRPM | WEIGHT: 259 LBS | TEMPERATURE: 98 F | HEART RATE: 68 BPM | HEIGHT: 66 IN | BODY MASS INDEX: 41.62 KG/M2

## 2024-10-24 DIAGNOSIS — M31.19 TTP (THROMBOTIC THROMBOCYTOPENIC PURPURA): ICD-10-CM

## 2024-10-24 DIAGNOSIS — M05.79 RHEUMATOID ARTHRITIS INVOLVING MULTIPLE SITES WITH POSITIVE RHEUMATOID FACTOR: ICD-10-CM

## 2024-10-24 DIAGNOSIS — D84.821 DRUG-INDUCED IMMUNODEFICIENCY: ICD-10-CM

## 2024-10-24 DIAGNOSIS — M15.0 PRIMARY OSTEOARTHRITIS INVOLVING MULTIPLE JOINTS: ICD-10-CM

## 2024-10-24 DIAGNOSIS — Z79.899 HIGH RISK MEDICATION USE: ICD-10-CM

## 2024-10-24 DIAGNOSIS — M05.79 RHEUMATOID ARTHRITIS INVOLVING MULTIPLE SITES WITH POSITIVE RHEUMATOID FACTOR: Primary | ICD-10-CM

## 2024-10-24 DIAGNOSIS — Z79.899 DRUG-INDUCED IMMUNODEFICIENCY: ICD-10-CM

## 2024-10-24 DIAGNOSIS — I10 PRIMARY HYPERTENSION: ICD-10-CM

## 2024-10-24 LAB
ALBUMIN SERPL-MCNC: 3.7 G/DL (ref 3.5–5)
ALBUMIN/GLOB SERPL: 1.1 RATIO (ref 1.1–2)
ALP SERPL-CCNC: 129 UNIT/L (ref 40–150)
ALT SERPL-CCNC: 11 UNIT/L (ref 0–55)
ANION GAP SERPL CALC-SCNC: 5 MEQ/L
AST SERPL-CCNC: 13 UNIT/L (ref 5–34)
BASOPHILS # BLD AUTO: 0.04 X10(3)/MCL
BASOPHILS NFR BLD AUTO: 0.9 %
BILIRUB SERPL-MCNC: 0.6 MG/DL
BUN SERPL-MCNC: 17.5 MG/DL (ref 9.8–20.1)
CALCIUM SERPL-MCNC: 10.2 MG/DL (ref 8.4–10.2)
CHLORIDE SERPL-SCNC: 107 MMOL/L (ref 98–107)
CO2 SERPL-SCNC: 29 MMOL/L (ref 22–29)
CREAT SERPL-MCNC: 1.03 MG/DL (ref 0.55–1.02)
CREAT/UREA NIT SERPL: 17
CRP SERPL-MCNC: 9.8 MG/L
EOSINOPHIL # BLD AUTO: 0.15 X10(3)/MCL (ref 0–0.9)
EOSINOPHIL NFR BLD AUTO: 3.3 %
ERYTHROCYTE [DISTWIDTH] IN BLOOD BY AUTOMATED COUNT: 15.2 % (ref 11.5–17)
ERYTHROCYTE [SEDIMENTATION RATE] IN BLOOD: 19 MM/HR (ref 0–20)
GFR SERPLBLD CREATININE-BSD FMLA CKD-EPI: >60 ML/MIN/1.73/M2
GLOBULIN SER-MCNC: 3.4 GM/DL (ref 2.4–3.5)
GLUCOSE SERPL-MCNC: 97 MG/DL (ref 74–100)
HCT VFR BLD AUTO: 45.2 % (ref 37–47)
HGB BLD-MCNC: 14.3 G/DL (ref 12–16)
IMM GRANULOCYTES # BLD AUTO: 0.01 X10(3)/MCL (ref 0–0.04)
IMM GRANULOCYTES NFR BLD AUTO: 0.2 %
LYMPHOCYTES # BLD AUTO: 1.28 X10(3)/MCL (ref 0.6–4.6)
LYMPHOCYTES NFR BLD AUTO: 28 %
MCH RBC QN AUTO: 22.1 PG (ref 27–31)
MCHC RBC AUTO-ENTMCNC: 31.6 G/DL (ref 33–36)
MCV RBC AUTO: 70 FL (ref 80–94)
MONOCYTES # BLD AUTO: 0.64 X10(3)/MCL (ref 0.1–1.3)
MONOCYTES NFR BLD AUTO: 14 %
NEUTROPHILS # BLD AUTO: 2.45 X10(3)/MCL (ref 2.1–9.2)
NEUTROPHILS NFR BLD AUTO: 53.6 %
NRBC BLD AUTO-RTO: 0 %
PLATELET # BLD AUTO: 300 X10(3)/MCL (ref 130–400)
PMV BLD AUTO: 9.9 FL (ref 7.4–10.4)
POTASSIUM SERPL-SCNC: 4.2 MMOL/L (ref 3.5–5.1)
PROT SERPL-MCNC: 7.1 GM/DL (ref 6.4–8.3)
RBC # BLD AUTO: 6.46 X10(6)/MCL (ref 4.2–5.4)
SODIUM SERPL-SCNC: 141 MMOL/L (ref 136–145)
WBC # BLD AUTO: 4.57 X10(3)/MCL (ref 4.5–11.5)

## 2024-10-24 PROCEDURE — 3075F SYST BP GE 130 - 139MM HG: CPT | Mod: CPTII,,, | Performed by: INTERNAL MEDICINE

## 2024-10-24 PROCEDURE — 3008F BODY MASS INDEX DOCD: CPT | Mod: CPTII,,, | Performed by: INTERNAL MEDICINE

## 2024-10-24 PROCEDURE — 99215 OFFICE O/P EST HI 40 MIN: CPT | Mod: PBBFAC | Performed by: INTERNAL MEDICINE

## 2024-10-24 PROCEDURE — 99215 OFFICE O/P EST HI 40 MIN: CPT | Mod: S$PBB,,, | Performed by: INTERNAL MEDICINE

## 2024-10-24 PROCEDURE — 86140 C-REACTIVE PROTEIN: CPT

## 2024-10-24 PROCEDURE — 4010F ACE/ARB THERAPY RXD/TAKEN: CPT | Mod: CPTII,,, | Performed by: INTERNAL MEDICINE

## 2024-10-24 PROCEDURE — G2211 COMPLEX E/M VISIT ADD ON: HCPCS | Mod: S$PBB,,, | Performed by: INTERNAL MEDICINE

## 2024-10-24 PROCEDURE — 3079F DIAST BP 80-89 MM HG: CPT | Mod: CPTII,,, | Performed by: INTERNAL MEDICINE

## 2024-10-24 PROCEDURE — 1159F MED LIST DOCD IN RCRD: CPT | Mod: CPTII,,, | Performed by: INTERNAL MEDICINE

## 2024-10-24 PROCEDURE — 80053 COMPREHEN METABOLIC PANEL: CPT

## 2024-10-24 PROCEDURE — 85025 COMPLETE CBC W/AUTO DIFF WBC: CPT

## 2024-10-24 PROCEDURE — 36415 COLL VENOUS BLD VENIPUNCTURE: CPT

## 2024-10-24 PROCEDURE — 85652 RBC SED RATE AUTOMATED: CPT

## 2024-10-24 RX ORDER — GLUCOSAM/CHONDRO/HERB 149/HYAL 750-100 MG
TABLET ORAL
COMMUNITY
Start: 2024-09-23

## 2024-10-24 RX ORDER — METHOTREXATE 20 MG/.4ML
20 INJECTION, SOLUTION SUBCUTANEOUS
Qty: 4 EACH | Refills: 4 | Status: SHIPPED | OUTPATIENT
Start: 2024-10-24

## 2024-10-24 NOTE — TELEPHONE ENCOUNTER
Increasing the dose of Otrexup dose to 20 mg per week. Might need PA, please follow up with pharmacy to make sure medication is dispensed correctly.

## 2024-10-24 NOTE — PROGRESS NOTES
Patient ID: [id number]     Chief Complaint: Follow-up (Dx Rheumatoid arthritis involving multiple sites with positive rheumatoid factor/Pain Denies/Medications Reports compliance/Skin Abnormalities denies/)      HPI:     Nicole Quiñones is a 49 y.o. female here today for follow-up of rheumatoid arthritis.     Previously seen by Dr Marcial, she was diagnosed with RA and was started on Plaquenil and MTX. She had brain fog with those meds along with fatigue and GI upset, stopped them and did not go back for follow up. She also saw Dr Martinez in the past few times, did not give her any medication.   She had recurrent episodes of TTP, etiology unclear, workup for lupus negative multiple times. First episode of TTP in 2015, she was hospitalized and was treated with plasmapheresis and rituximab. Other episodes are in 2019, 2022 and last one in 2/2024. Every single episode she was treated plasmaphoresis and rituximab, she responds well to treatment. She has relatives with lupus, no one else have TTP.     She is here for follow up today.   She had started subcu methotrexate 15 mg per week after last visit, joint pain has improved with it.  Currently taking folic acid 1 mg daily.  Continues to have intermittent joint pain in bilateral hands and knees.  Admits swelling in hands sometimes.  Morning stiffness for an hour.  She was on Plaquenil in the past, discontinued it.  After for 4th relapse of TTP Plaquenil and Imuran were discontinued.  Walks every other day for 1-2 miles.   She also had a UGI and had her esophagus stretched (per patient report) and was also dx with a Hiatal Hernia- will be seeing Dr. Block (f/u May 14 for repeat EGD). She was also dx with mild GIAN- now on CPAP.    Follows with Dr Malik.   Follows with cardiology here.  Denies any recent illness or hospitalizations since last visit.     PMH: TTP, OA, Chronic Migraines, GINA    Cardiology- [facility]  Dr. Malik/KADEEM Hines, NP- Hematology      Denies history of fevers, rashes, photosensitivity, oral or nasal ulcers, h/o MI, stroke, seizures, h/o PE or DVT, Raynaud's phenomenon, uveitis, malignancies.   Family history of autoimmune disease: none.   Pregnancies: 6 Miscarriages: none  Smoking: nonsmoker.     Social History     Tobacco Use   Smoking Status Never    Passive exposure: Never   Smokeless Tobacco Never          -------------------------------------    Anemia    Arthritis    Fibromyalgia    Hypercholesterolemia    Hypertension    Migraines    Personal history of colonic polyps    colonoscopy/polypectomy    Thrombocytopenia, unspecified    TTP (thrombotic thrombocytopenic purpura)    TTP (thrombotic thrombocytopenic purpura)        Past Surgical History:   Procedure Laterality Date    CARPAL TUNNEL RELEASE      COLONOSCOPY W/ BIOPSIES AND POLYPECTOMY  09/05/2023    Deepak De Luna MD 5 years    ESOPHAGOGASTRODUODENOSCOPY  04/2024    Right shouler surgery      SURGICAL REMOVAL OF ENDOMETRIOSIS         Review of patient's allergies indicates:   Allergen Reactions    Nsaids (non-steroidal anti-inflammatory drug) Other (See Comments)     Causes ulcers to bleed.    Ibuprofen     Latex     Meloxicam     Nsaids (non-steroidal anti-inflammatory drug) Nausea Only       Outpatient Medications Marked as Taking for the 10/24/24 encounter (Office Visit) with Keshav Gibson MD   Medication Sig Dispense Refill    albuterol (PROVENTIL) 2.5 mg /3 mL (0.083 %) nebulizer solution USE 1 VIAL IN NEBULIZER EVERY 8 HOURS AS NEEDED 1 each 3    baclofen (LIORESAL) 5 mg Tab tablet Take 1 tablet (5 mg total) by mouth 2 (two) times daily as needed (tension). 30 tablet 2    cetirizine (ZYRTEC) 10 MG tablet Take 10 mg by mouth once daily.      cloNIDine (CATAPRES) 0.1 MG tablet Take 1 tablet (0.1 mg total) by mouth daily as needed. 90 tablet 3    DULERA 100-5 mcg/actuation HFAA Inhale 1 puff into the lungs 2 (two) times daily. 8.8 g 5    EMGALITY  mg/mL PnIj AS  DIRECTED SUBCUTANEOUS MONTHLY 30 DAY(S) 1 each 11    ergocalciferol (ERGOCALCIFEROL) 50,000 unit Cap Take 1 capsule (50,000 Units total) by mouth every 7 days. 12 capsule 3    ezetimibe (ZETIA) 10 mg tablet Take 1 tablet (10 mg total) by mouth every evening. 90 tablet 3    fluticasone propionate (FLONASE) 50 mcg/actuation nasal spray 2 sprays (100 mcg total) by Each Nostril route daily as needed for Allergies or Rhinitis. 18.2 mL 1    folic acid (FOLVITE) 1 MG tablet Take 1 tablet (1 mg total) by mouth once daily. After food 30 tablet 5    gabapentin (NEURONTIN) 600 MG tablet Take 600 mg by mouth 2 (two) times daily.      isosorbide dinitrate (ISORDIL) 40 MG Tab Take 1 tablet (40 mg total) by mouth once daily. 90 tablet 3    LINZESS 145 mcg Cap capsule Take 145 mcg by mouth once daily.      magnesium oxide (MAG-OX) 400 mg (241.3 mg magnesium) tablet Take 1 tablet (400 mg total) by mouth once daily. 30 tablet 11    metoprolol succinate (TOPROL-XL) 100 MG 24 hr tablet Take 1 tablet (100 mg total) by mouth every evening. 90 tablet 3    multivitamin with minerals tablet Take 1 tablet by mouth once daily.      nitroGLYCERIN (NITROSTAT) 0.4 MG SL tablet Place 1 tablet (0.4 mg total) under the tongue every 5 (five) minutes as needed for Chest pain. 25 tablet 4    omega 3-dha-epa-fish oil (FISH OIL) 1,000 mg (120 mg-180 mg) Cap       ondansetron (ZOFRAN) 8 MG tablet Take 1 tablet (8 mg total) by mouth 2 (two) times daily as needed for Nausea. TAKE 1 TABLET BY MOUTH EVERY 8 HOURS AS NEEDED FOR NAUSEA AND VOMITING 30 tablet 1    pantoprazole (PROTONIX) 40 MG tablet Take 40 mg by mouth every morning.      RESTASIS 0.05 % ophthalmic emulsion Place 1 drop into both eyes 2 (two) times daily.      UBRELVY 100 mg tablet Take 1 tablet (100 mg total) by mouth 2 (two) times daily as needed for Migraine. 16 tablet 4    valsartan (DIOVAN) 80 MG tablet Take 1 tablet (80 mg total) by mouth once daily. 90 tablet 3    venlafaxine  (EFFEXOR-XR) 37.5 MG 24 hr capsule Take 1 capsule (37.5 mg total) by mouth once daily. 30 capsule 11    [DISCONTINUED] methotrexate, PF, (OTREXUP, PF,) 15 mg/0.4 mL AtIn Inject 0.4 mLs (15 mg total) into the skin every 7 days. Hold for fever or infections. 4 each 2       Social History     Socioeconomic History    Marital status:     Highest education level: Associate degree: occupational, technical, or vocational program   Occupational History    Occupation: unemployed   Tobacco Use    Smoking status: Never     Passive exposure: Never    Smokeless tobacco: Never   Substance and Sexual Activity    Alcohol use: Not Currently     Comment: Last drink @ 20    Drug use: Never    Sexual activity: Not Currently     Partners: Male     Birth control/protection: Abstinence   Social History Narrative    ** Merged History Encounter **          Social Drivers of Health     Financial Resource Strain: Low Risk  (4/19/2023)    Overall Financial Resource Strain (CARDIA)     Difficulty of Paying Living Expenses: Not very hard   Food Insecurity: Food Insecurity Present (4/19/2023)    Hunger Vital Sign     Worried About Running Out of Food in the Last Year: Sometimes true     Ran Out of Food in the Last Year: Never true   Transportation Needs: No Transportation Needs (4/19/2023)    PRAPARE - Transportation     Lack of Transportation (Medical): No     Lack of Transportation (Non-Medical): No   Physical Activity: Inactive (4/19/2023)    Exercise Vital Sign     Days of Exercise per Week: 0 days     Minutes of Exercise per Session: 0 min   Stress: Stress Concern Present (4/19/2023)    Citizen of the [place] of Occupational Health - Occupational Stress Questionnaire     Feeling of Stress : To some extent   Housing Stability: Low Risk  (4/19/2023)    Housing Stability Vital Sign     Unable to Pay for Housing in the Last Year: No     Number of Places Lived in the Last Year: 2     Unstable Housing in the Last Year: No        Family History    Problem Relation Name Age of Onset    Hypertension Mother Mom     Arthritis Mother Mom     Depression Mother Mom     Thyroid disease Sister Sis     Asthma Sister Sis     Intellectual disability Brother Bro     Epilepsy Brother Bro     Stroke Maternal Grandfather Kid         Immunization History   Administered Date(s) Administered    DTP 1974, 1974, 06/09/1975, 06/14/1976, 05/09/1979    Hepatitis B, Adult 03/20/2018    Influenza 11/07/2008    Influenza (FLUBLOK) - Quadrivalent - Recombinant - PF *Preferred* (egg allergy) 10/30/2019    Influenza - Quadrivalent - PF *Preferred* (6 months and older) 10/30/2019, 11/16/2020, 04/19/2023, 09/19/2023    Influenza - Trivalent - Afluria, Fluzone MDV 11/07/2008    Influenza - Trivalent - Fluarix, Flulaval, Fluzone, Afluria - PF 01/09/2013, 01/07/2014    MMR 03/20/2018    Measles / Rubella 08/11/1975    OPV 1974, 1974, 06/14/1976, 05/09/1979, 06/09/1995    PPD Test 09/30/2013    Pneumococcal Conjugate - 20 Valent 04/19/2023    Pneumococcal Polysaccharide - 23 Valent 03/04/2020    Td (ADULT) 03/27/1989, 01/25/2006    Tdap 01/07/2014, 03/20/2018       Patient Care Team:  Yaritza Browne FNP as PCP - General (Family Medicine)  Joy Huff MD (Inactive) (Family Medicine)  Loretta Noble LPN as Care Coordinator  Deepak Block MD as Consulting Physician (Gastroenterology)     Subjective:     Constitutional:  Denies chills. Denies fever. Denies night sweats. Denies weight loss.   Ophthalmology: Denies blurred vision. Admits dry eyes- uses Restasis and helps. Denies eye pain. Denies Itching and redness.   ENT: Denies oral ulcers. Denies epistaxis. Denies dry mouth. Admits swollen glands to her left axillary area that come and go for the past few months- painful- today has resolved.   Endocrine: Denies diabetes. Denies thyroid Problems.   Respiratory: Denies cough. Denies shortness of breath. Admits shortness of breath with exertion at times, no  "worsening of symptoms-stable. Denies hemoptysis.   Cardiovascular: Admits chest pain at rest- followed by Cardio, meds just adjusted. Denies chest pain with exertion. Denies palpitations.    Gastrointestinal: Denies abdominal pain. Denies diarrhea. Denies nausea. Denies vomiting. Denies hematemesis or hematochezia. Denies heartburn.  Genitourinary: Denies blood in urine.  Musculoskeletal: See HPI for details  Integumentary: Denies rash. Denies photosensitivity.   Peripheral Vascular: Denies Ulcers of hands and/or feet. Denies Cold extremities.   Neurologic: Denies dizziness. Admits headache. Denies loss of strength. Admits numbness or tingling.   Psychiatric: Admits depression and anxiety- currently stable. Denies suicidal/homicidal ideations.      Objective:     /85 (BP Location: Right arm, Patient Position: Sitting)   Pulse 68   Temp 98.4 °F (36.9 °C) (Oral)   Resp 20   Ht 5' 6" (1.676 m)   Wt 117.5 kg (259 lb)   SpO2 98%   BMI 41.80 kg/m²     Physical Exam    General Appearance: alert, pleasant, in no acute distress.  Skin: Skin color, texture, turgor normal. No rashes or lesions. +tender node noted to right axillary- no warmth, mobile, very tender to touch.   Eyes:  extraocular movement intact (EOMI), pupils equal, round, reactive to light and accommodation, conjunctiva clear.  ENT: No oral or nasal ulcers.  Neck:  Neck supple. No adenopathy.   Lungs: CTA throughout without crackles, rhonchi, or wheezes.   Heart: RRR w/o murmurs.  No edema. 2+ DP pulse.  Neuro: Alert, oriented, CN II-XII GI, sensory and motor innervation intact.  Musculoskeletal: Mild tenderness to several PIPs, no pain to MCPs or DIPs. Left 2nd finger tender throughout entire digit, no swelling, no cords or nodules palpated, FROM noted but reports painful. Tenderness and mild swelling of right wrist, FROM elbows right shoulder, left shoulder FROM noted but with pain. Bilateral knees with no pain to palpation, pain with ROM to left. "  No swelling or warmth in bilateral knees, mild crepitus noted bilaterally. No tenderness to bilateral ankles and  with MTP squeeze.  Psych: Alert, oriented, normal eye contact.    Labs:   : SETH negative by ZULMA.  Anti CCP 6.9, low titer, normal less than 3. RF negative.   2022: SETH negative. RF negative. C3, C4 normal. CCP 34(<19).   23: WBC 4.4. ANC ok. ESR 47. CRP 11.9. CPK normal. RF 26(<14), CCP 69, high titer.   2023: CBC ok. CMP ok. TSH normal. 1+ protein and no blood in urine.   2023: SETH direct positive.   2023: hepatitis-B surface antibody reactive.  Hepatitis-B surface antigen and core antibody negative.  Negative hepatitis-C and HIV.  CMP okay.  CRP 10.1, ESR 43.  CBC okay. Quantiferon tb negative.  10/9/23:  C3 and C4 normal.  Negative SSA, SSB, thyroglobulin, TPO, Navarro, RNP, Scl 70.  DsDNA negative.  SETH negative.  2024: UPC okay trace protein blood in urine.  CMP okay.  WBC count 4.32, differential okay, ANC 2.5.  TPMT genotype normal.  2024: CBC okay.  CMP okay.   24: SETH negative by IFA.  Negative dsDNA, U1 RNP, SSA, SSB, centromere, Scl 70, Chely 1, Smith.   ADAMTS-13 level drawn 24 <5%. Platelets dropped to 5 on 24.   2024 CBC RBC 5.58, platelets 300, CMP Cl+ 108, otherwise ok. LDH ok.   2024 CBC RBC 5.96 MCV 68, platelets now 251, CMP Cl+ 110, otherwise ok.  ok.  2024 CRP 7.60 (<5), ESR 19 (<20)  24: CBC ok. Platelet counts are ok. CMP ok.     Imagin/2023:  X-ray of cervical spine showed small multilevel marginal osteophytes and mild facet arthropathy.  Alignment is preserved.    23:  X-ray of left knee showed tricompartmental DJD changes worse in medial compartment, no effusion.    2023: chest x-ray showed lungs are clear.  DJD of bilateral acromioclavicular joints and glenohumeral joint. Mild DJD of bilateral feet, calcaneal spurring on left.  Mild DJD of right knee.  DJD of bilateral hands in PIP, DIPs and  bilateral 1st CMC joints.  9/20/2023:  X-ray of bilateral shoulder showed glenohumeral joint and AC joint arthritis.  No acute fracture or dislocation.    12/22/2023 ECHO:  EF 60%.     2/25/2024 CT Abd/Pelvis: Impression: Nonspecific retroperitoneal edema.  Correlate with lipase levels to exclude pancreatitis.  Correlate with urinalysis to exclude urinary tract infection.  Otherwise consider duodenitis    2/26/2024 CXRay: Impression: No acute cardiopulmonary process.    4/22/2024 Left Hand Xray: Impression: Degenerative changes, no acute fractures are seen      Assessment:       ICD-10-CM ICD-9-CM   1. Rheumatoid arthritis involving multiple sites with positive rheumatoid factor  M05.79 714.0   2. Drug-induced immunodeficiency  D84.821 279.3    Z79.899 E947.9   3. TTP (thrombotic thrombocytopenic purpura)  M31.19 446.6   4. Primary hypertension  I10 401.9   5. Primary osteoarthritis involving multiple joints  M15.0 715.98   6. High risk medication use  Z79.899 V58.69         Plan:     1. Rheumatoid arthritis involving multiple sites with positive rheumatoid factor  Low titer rheumatoid factor and a high titer anti CCP.  She has arthralgias and joint pain likely multifactorial from both RA and Osteoarthritis.  Plaquenil and Imuran discontinued after episode of TTP in 2/2024.   - Mild synovitis on exam, started SQ MTX. Did not tolerate oral MTX in the past.   - Completed 4 weekly doses of Rituxan with Oncology for TTP relapse- last dose March 28, 2024- too soon to start and other Biologics at this time  - mild synovitis in right wrist on exam today.  Increase methotrexate dose to 20 mg per week.  Continue with subcu methotrexate once a week with folic acid 1 mg daily.  - labs today and labs in 4 weeks.  - Infection w/u negative 9/2023.  - she is not on birth control, currently in perimenopausal period, cycles are irregular.  She is aware she should not get pregnant on methotrexate.     2. Osteoarthritis  -  Osteoarthritis of cervical spine on x-rays.  DJD of bilateral knees and shoulders.   Referral to physical therapy had been sent. Can use Tylenol PRN. Used to work as CNA and lifted and pushed patients, likely the reason for severe Osteoarthritis.  Had issues with rotator cuff on the right and shoulder arthroscopy in the past.   - Enc to continue to follow up with Ortho- last seen in March, conservative treatment and possible CSI in the future if no relief, enc to call for follow up      3. Primary hypertension  - At goal today, enc to monitor at home and  continue to follow up with PCP. Low Na+ diet      4. Obesity (BMI 40)  - Discussed watching her diet and recommend weight loss.  Weight loss will help with knee pain.     5. High Risk Med use/Drug Induced Immunodeficency   -Persons with rheumatoid arthritis, lupus, psoriatic arthritis and other autoimmune diseases are at increased risk of cardiovascular disease including heart attack and stroke. We recommend that all patients with these conditions have annual health maintenance exams including lipid measurements, blood pressure measurements, and smoking cessation counseling when applicable at their primary care provider's office.   - Advised to stay up-to-date on age appropriate vaccinations and malignancy screening, including yearly skin exams.       6. TTP: Eiology unclear, SETH and TINA's negative 3 times in the past.   She had recurrent episodes of TTP. First episode of TTP in 2015, she was hospitalized and was treated with plasmapheresis and rituximab. Other episodes are in 2019, 2022 and last one in 2/2024. Every single episode she was treated plasmaphoresis and rituximab, she responds well to treatment. She has relatives with lupus, no one else have TTP.   - Followed by Dr. Malik at [facility]- 4th relapse 2/25/2024- completed 4 weeks of Rituxan- last dose 3/28/2024.  - Continue follow up with hematology and will monitor CBC's.      7. SOB  - PFTs on 3/27/2024 FVC  73.7%, FEV 88%, FEV1/%- test was d/c after 10 incomplete and unacceptable results. Several attempts- unable to exhale for >2 seconds- she does report having an esophogeal dilation the day before and unable to complete  - SOB stable.  - Recent Cxray 2/2024 ok.          Follow up in about 6 months (around 4/24/2025). In addition to their scheduled follow up, the patient has also been instructed to follow up on as needed basis.     Total time spent with patient and documentation is 40 minutes. All questions were answered to patient's satisfaction and patient verbalized understanding.

## 2024-11-12 DIAGNOSIS — Z76.89 ENCOUNTER TO ESTABLISH CARE: ICD-10-CM

## 2024-11-12 DIAGNOSIS — Z76.0 ENCOUNTER FOR MEDICATION REFILL: ICD-10-CM

## 2024-11-12 DIAGNOSIS — F48.9 TENSION: ICD-10-CM

## 2024-11-12 DIAGNOSIS — I10 PRIMARY HYPERTENSION: ICD-10-CM

## 2024-11-12 DIAGNOSIS — G43.E09 CHRONIC MIGRAINE WITH AURA WITHOUT STATUS MIGRAINOSUS, NOT INTRACTABLE: ICD-10-CM

## 2024-11-12 DIAGNOSIS — M05.79 RHEUMATOID ARTHRITIS INVOLVING MULTIPLE SITES WITH POSITIVE RHEUMATOID FACTOR: ICD-10-CM

## 2024-11-12 DIAGNOSIS — G43.E01 CHRONIC MIGRAINE WITH AURA AND WITH STATUS MIGRAINOSUS, NOT INTRACTABLE: ICD-10-CM

## 2024-11-13 RX ORDER — VALSARTAN 80 MG/1
80 TABLET ORAL DAILY
Qty: 90 TABLET | Refills: 3 | Status: SHIPPED | OUTPATIENT
Start: 2024-11-13 | End: 2025-11-13

## 2024-11-13 RX ORDER — BACLOFEN 5 MG/1
5 TABLET ORAL 2 TIMES DAILY PRN
Qty: 30 TABLET | Refills: 0 | Status: SHIPPED | OUTPATIENT
Start: 2024-11-13

## 2024-11-13 RX ORDER — GALCANEZUMAB 120 MG/ML
INJECTION, SOLUTION SUBCUTANEOUS
Qty: 1 EACH | Refills: 0 | Status: SHIPPED | OUTPATIENT
Start: 2024-11-13

## 2024-11-13 RX ORDER — VENLAFAXINE HYDROCHLORIDE 37.5 MG/1
37.5 CAPSULE, EXTENDED RELEASE ORAL DAILY
Qty: 30 CAPSULE | Refills: 0 | Status: SHIPPED | OUTPATIENT
Start: 2024-11-13 | End: 2025-11-13

## 2024-11-13 RX ORDER — METOPROLOL SUCCINATE 100 MG/1
100 TABLET, EXTENDED RELEASE ORAL NIGHTLY
Qty: 90 TABLET | Refills: 3 | Status: SHIPPED | OUTPATIENT
Start: 2024-11-13

## 2024-11-13 RX ORDER — FOLIC ACID 1 MG/1
1 TABLET ORAL DAILY
Qty: 30 TABLET | Refills: 5 | Status: SHIPPED | OUTPATIENT
Start: 2024-11-13 | End: 2025-05-12

## 2024-11-13 RX ORDER — ERGOCALCIFEROL 1.25 MG/1
50000 CAPSULE ORAL
Qty: 12 CAPSULE | Refills: 3 | Status: SHIPPED | OUTPATIENT
Start: 2024-11-13

## 2024-11-13 RX ORDER — LANOLIN ALCOHOL/MO/W.PET/CERES
400 CREAM (GRAM) TOPICAL DAILY
Qty: 30 TABLET | Refills: 0 | Status: SHIPPED | OUTPATIENT
Start: 2024-11-13

## 2024-11-13 NOTE — PROGRESS NOTES
"Subjective:       Patient ID: Nicole Quiñones is a 49 y.o. female.    Chief Complaint:  "Bruise on my chest"    Diagnosis: Relapsed thrombotic thrombocytopenic purpura                    Microangiopathic hemolytic anemia                    Rheumatoid arthritis    Treatment History  Plasma exchange 2/27-24-3/04/24  Steroid taper (completed 4/03/24)  Rituximab x 4 (completed 3/21/24)    Current Treatment: Observation    Clinical History:  Black female with a history of TTP initially diagnosed in 2015.  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  She has been followed by a hematologist in [place].  She relocated to [place] to live with her daughter approximately 8 months ago.  She presented to the ER 2/25/24 with low-grade fever, nausea/vomiting and abdominal discomfort.  She denied overt diarrhea.  Laboratory on presentation showed a platelet count of 5000.  Hemoglobin was 12.4 with microcytic, hypochromic indices and WBC 6.4.  LDH was elevated at 711 and haptoglobin was undetectable.  UPT was negative.  CMP showed mild renal insufficiency with a BUN of 24.9 and creatinine 1.36.  Peripheral smear showed microcytic, hypochromic red blood cells, increased reticulocytes and the presence of schistocytes and target cells.  Platelets were markedly decreased.  She was transferred to [facility] for initiation of plasmapheresis.  She received 1 unit of platelets at the outside facility prior to transfer.  On arrival here, she was started on prednisone 1 mg/kg daily and received 1 unit of FFP.     Hospital Course  2/27/24:  Started daily plasma exchange 1 plasma volume (40 cc/kg) exchange with FFP.  Platelet count 12,000. ADAMTS-13 level drawn 2/26/24 <5%.  2/28/24:  Day 2 plasma exchange.  Change to 1/2 albumin + 1/2 FFP secondary to significant urticaria.  2/29/24: Day 3 plasma exchange.  1/2 albumin + 1/2 FFP.  03/1/24: Day 4 plasma exchange.  1/2 albumin + 1/2 " FFP  03/4/24:  Day 5 plasma exchange.  1/2 albumin + 1/2 FFP  03/6/24: Discharged from the hospital    Outpatient plasma exchange could not be coordinated with the hospital.  Systemic therapy was recommended with Rituxan weekly x4. She completed 4 weeks of treatment 3/21/24.  Prednisone was tapered and discontinued by 4/3/24.  She has remained stable on follow-up without evidence of relapse.     Interval History  Nicole is here for a two month hematology follow-up visit.  She is doing okay.  She denies any recent illnesses.  She is still awaiting hysterectomy due to an enlarged uterus.  LMP was 5/24.  She is no longer on MTX.  She decided to stop taking it because she did not find it effective.  She has complaints of a large bruise on her chest, just above the right breast.  She denies any injury or new medications.  Platelet count in the office today is 277,000.  Results discussed with patient.     Review of Systems   Constitutional:  Negative for appetite change, fatigue, fever and unexpected weight change.   HENT:  Negative for mouth sores, sore throat and trouble swallowing.    Eyes: Negative.  Negative for visual disturbance.   Respiratory:  Negative for cough and shortness of breath.    Cardiovascular:  Negative for chest pain, palpitations and leg swelling.   Gastrointestinal:  Negative for abdominal distention, abdominal pain, constipation, diarrhea and nausea.   Genitourinary:  Negative for dysuria, frequency and urgency.   Musculoskeletal:  Positive for arthralgias. Negative for back pain.   Integumentary:  Negative for pallor and rash.   Neurological:  Negative for dizziness, weakness, numbness and headaches.   Hematological:  Negative for adenopathy. Bruises/bleeds easily.   Psychiatric/Behavioral: Negative.  The patient is not nervous/anxious.        PMHx:  HTN, hyperlipidemia, rheumatoid arthritis, migraine headaches, TTP  PSHx:  Endometrial ablation, left carpal tunnel release, right shoulder  surgery  SH:  Lifetime nonsmoker.  Previous alcohol, none since age 20.  Lives in [place] with her daughter.  Provides  in her home.  FH:  PGF had a CVA.  No family history of cancer.    Objective:     Vitals:    11/15/24 0930   BP: (!) 145/96   Pulse: 71   Resp: 18   Temp: 97.8 °F (36.6 °C)           Physical Exam  Constitutional:       Comments: Morbidly obese black female in NAD   HENT:      Head: Normocephalic.      Mouth/Throat:      Mouth: Mucous membranes are moist.      Pharynx: Oropharynx is clear.   Eyes:      General: No scleral icterus.     Extraocular Movements: Extraocular movements intact.      Pupils: Pupils are equal, round, and reactive to light.   Cardiovascular:      Rate and Rhythm: Normal rate and regular rhythm.      Heart sounds: No murmur heard.  Pulmonary:      Comments: Lungs clear to auscultation  Abdominal:      General: Bowel sounds are normal. There is no distension.      Palpations: Abdomen is soft. There is no mass.      Tenderness: There is no abdominal tenderness.   Musculoskeletal:         General: No swelling or tenderness. Normal range of motion.      Cervical back: Neck supple. No tenderness.   Skin:     General: Skin is warm and dry.      Findings: Bruising present. No rash.      Comments: Resolving bruise, right chest above breast   Neurological:      General: No focal deficit present.      Mental Status: She is alert and oriented to person, place, and time.      Sensory: Sensory deficit (fingertip neuropathy, grade 1, pre-existing) present.          LABORATORY  Recent Results (from the past 2 weeks)   CBC with Differential    Collection Time: 11/15/24  9:17 AM   Result Value Ref Range    WBC 4.69 4.50 - 11.50 x10(3)/mcL    RBC 6.39 (H) 4.20 - 5.40 x10(6)/mcL    Hgb 14.0 12.0 - 16.0 g/dL    Hct 45.3 37.0 - 47.0 %    MCV 70.9 (L) 80.0 - 94.0 fL    MCH 21.9 (L) 27.0 - 31.0 pg    MCHC 30.9 (L) 33.0 - 36.0 g/dL    RDW 15.9 11.5 - 17.0 %    Platelet 277 130 - 400  x10(3)/mcL    MPV 10.0 7.4 - 10.4 fL    Neut % 59.4 %    Lymph % 21.1 %    Mono % 13.4 %    Eos % 5.5 %    Basophil % 0.4 %    Lymph # 0.99 0.6 - 4.6 x10(3)/mcL    Neut # 2.78 2.1 - 9.2 x10(3)/mcL    Mono # 0.63 0.1 - 1.3 x10(3)/mcL    Eos # 0.26 0 - 0.9 x10(3)/mcL    Baso # 0.02 <=0.2 x10(3)/mcL    IG# 0.01 0 - 0.04 x10(3)/mcL    IG% 0.2 %             Assessment:   TTP -  in clinical remission  Microangiopathic hemolytic anemia - resolved  Rheumatoid arthritis    Plan:   Platelet count remains normal following treatment with Rituxan.  Etiology of bruise on chest unclear at this time.  Will monitor.  Hematologic clearance was provided for her to proceed with hysterectomy.  Given the stability of her CBC, will follow-up in 4 months.  She will be one year out from completing treatment with Rituxan at that time.  All questions answered to the satisfaction of the patient.    JIMENA CHUNG, FNP-C  [facility]     Other Physicians  MOSHE Mireles [address] Dr.

## 2024-11-15 ENCOUNTER — OFFICE VISIT (OUTPATIENT)
Dept: HEMATOLOGY/ONCOLOGY | Facility: CLINIC | Age: 49
End: 2024-11-15
Payer: MEDICAID

## 2024-11-15 ENCOUNTER — LAB VISIT (OUTPATIENT)
Dept: LAB | Facility: HOSPITAL | Age: 49
End: 2024-11-15
Attending: INTERNAL MEDICINE
Payer: MEDICAID

## 2024-11-15 VITALS
RESPIRATION RATE: 18 BRPM | TEMPERATURE: 98 F | BODY MASS INDEX: 41.71 KG/M2 | DIASTOLIC BLOOD PRESSURE: 96 MMHG | HEIGHT: 66 IN | WEIGHT: 259.5 LBS | SYSTOLIC BLOOD PRESSURE: 145 MMHG | HEART RATE: 71 BPM

## 2024-11-15 DIAGNOSIS — M31.19 TTP (THROMBOTIC THROMBOCYTOPENIC PURPURA): ICD-10-CM

## 2024-11-15 DIAGNOSIS — M31.19 TTP (THROMBOTIC THROMBOCYTOPENIC PURPURA): Primary | ICD-10-CM

## 2024-11-15 LAB
BASOPHILS # BLD AUTO: 0.02 X10(3)/MCL
BASOPHILS NFR BLD AUTO: 0.4 %
EOSINOPHIL # BLD AUTO: 0.26 X10(3)/MCL (ref 0–0.9)
EOSINOPHIL NFR BLD AUTO: 5.5 %
ERYTHROCYTE [DISTWIDTH] IN BLOOD BY AUTOMATED COUNT: 15.9 % (ref 11.5–17)
HCT VFR BLD AUTO: 45.3 % (ref 37–47)
HGB BLD-MCNC: 14 G/DL (ref 12–16)
IMM GRANULOCYTES # BLD AUTO: 0.01 X10(3)/MCL (ref 0–0.04)
IMM GRANULOCYTES NFR BLD AUTO: 0.2 %
LYMPHOCYTES # BLD AUTO: 0.99 X10(3)/MCL (ref 0.6–4.6)
LYMPHOCYTES NFR BLD AUTO: 21.1 %
MCH RBC QN AUTO: 21.9 PG (ref 27–31)
MCHC RBC AUTO-ENTMCNC: 30.9 G/DL (ref 33–36)
MCV RBC AUTO: 70.9 FL (ref 80–94)
MONOCYTES # BLD AUTO: 0.63 X10(3)/MCL (ref 0.1–1.3)
MONOCYTES NFR BLD AUTO: 13.4 %
NEUTROPHILS # BLD AUTO: 2.78 X10(3)/MCL (ref 2.1–9.2)
NEUTROPHILS NFR BLD AUTO: 59.4 %
PLATELET # BLD AUTO: 277 X10(3)/MCL (ref 130–400)
PMV BLD AUTO: 10 FL (ref 7.4–10.4)
RBC # BLD AUTO: 6.39 X10(6)/MCL (ref 4.2–5.4)
WBC # BLD AUTO: 4.69 X10(3)/MCL (ref 4.5–11.5)

## 2024-11-15 PROCEDURE — 36415 COLL VENOUS BLD VENIPUNCTURE: CPT

## 2024-11-15 PROCEDURE — 99999 PR PBB SHADOW E&M-EST. PATIENT-LVL V: CPT | Mod: PBBFAC,,, | Performed by: NURSE PRACTITIONER

## 2024-11-15 PROCEDURE — 99215 OFFICE O/P EST HI 40 MIN: CPT | Mod: PBBFAC | Performed by: NURSE PRACTITIONER

## 2024-11-15 PROCEDURE — 85025 COMPLETE CBC W/AUTO DIFF WBC: CPT

## 2024-11-15 RX ORDER — BUTYROSPERMUM PARKII(SHEA BUTTER), SIMMONDSIA CHINENSIS (JOJOBA) SEED OIL, ALOE BARBADENSIS LEAF EXTRACT .01; 1; 3.5 G/100G; G/100G; G/100G
250 LIQUID TOPICAL DAILY
COMMUNITY

## 2024-12-23 ENCOUNTER — TELEPHONE (OUTPATIENT)
Dept: RHEUMATOLOGY | Facility: CLINIC | Age: 49
End: 2024-12-23
Payer: MEDICAID

## 2024-12-23 NOTE — TELEPHONE ENCOUNTER
I ATTEMPT TO CALL PT NO ANSWER LEFT A VOICEMAIL MESSAGE TO CALL CLINIC BACK regarding lab work due

## 2025-01-07 DIAGNOSIS — Z76.89 ENCOUNTER TO ESTABLISH CARE: ICD-10-CM

## 2025-01-07 DIAGNOSIS — G43.E01 CHRONIC MIGRAINE WITH AURA AND WITH STATUS MIGRAINOSUS, NOT INTRACTABLE: ICD-10-CM

## 2025-01-07 DIAGNOSIS — G43.E09 CHRONIC MIGRAINE WITH AURA WITHOUT STATUS MIGRAINOSUS, NOT INTRACTABLE: ICD-10-CM

## 2025-01-07 DIAGNOSIS — F48.9 TENSION: ICD-10-CM

## 2025-01-07 DIAGNOSIS — R06.02 SHORTNESS OF BREATH: ICD-10-CM

## 2025-01-07 DIAGNOSIS — I10 PRIMARY HYPERTENSION: ICD-10-CM

## 2025-01-07 DIAGNOSIS — Z76.0 ENCOUNTER FOR MEDICATION REFILL: ICD-10-CM

## 2025-01-07 DIAGNOSIS — J02.9 SORE THROAT: ICD-10-CM

## 2025-01-07 DIAGNOSIS — R05.3 CHRONIC COUGH: ICD-10-CM

## 2025-01-07 DIAGNOSIS — E78.49 OTHER HYPERLIPIDEMIA: ICD-10-CM

## 2025-01-07 RX ORDER — VENLAFAXINE HYDROCHLORIDE 37.5 MG/1
37.5 CAPSULE, EXTENDED RELEASE ORAL DAILY
Qty: 30 CAPSULE | Refills: 0 | Status: SHIPPED | OUTPATIENT
Start: 2025-01-07 | End: 2026-01-07

## 2025-01-07 RX ORDER — FLUTICASONE PROPIONATE 50 MCG
2 SPRAY, SUSPENSION (ML) NASAL DAILY PRN
Qty: 18.2 ML | Refills: 3 | Status: SHIPPED | OUTPATIENT
Start: 2025-01-07 | End: 2026-01-07

## 2025-01-07 RX ORDER — ONDANSETRON HYDROCHLORIDE 8 MG/1
8 TABLET, FILM COATED ORAL 2 TIMES DAILY PRN
Qty: 30 TABLET | Refills: 1 | Status: SHIPPED | OUTPATIENT
Start: 2025-01-07

## 2025-01-07 RX ORDER — BACLOFEN 5 MG/1
5 TABLET ORAL 2 TIMES DAILY PRN
Qty: 30 TABLET | Refills: 0 | Status: SHIPPED | OUTPATIENT
Start: 2025-01-07

## 2025-01-07 RX ORDER — LANOLIN ALCOHOL/MO/W.PET/CERES
400 CREAM (GRAM) TOPICAL DAILY
Qty: 30 TABLET | Refills: 0 | Status: SHIPPED | OUTPATIENT
Start: 2025-01-07

## 2025-01-07 RX ORDER — METOPROLOL SUCCINATE 100 MG/1
100 TABLET, EXTENDED RELEASE ORAL NIGHTLY
Qty: 90 TABLET | Refills: 3 | Status: SHIPPED | OUTPATIENT
Start: 2025-01-07

## 2025-01-07 RX ORDER — UBROGEPANT 100 MG/1
100 TABLET ORAL 2 TIMES DAILY PRN
Qty: 16 TABLET | Refills: 4 | Status: SHIPPED | OUTPATIENT
Start: 2025-01-07

## 2025-01-07 RX ORDER — ERGOCALCIFEROL 1.25 MG/1
50000 CAPSULE ORAL
Qty: 12 CAPSULE | Refills: 3 | Status: SHIPPED | OUTPATIENT
Start: 2025-01-07 | End: 2026-01-07

## 2025-01-07 RX ORDER — GALCANEZUMAB 120 MG/ML
INJECTION, SOLUTION SUBCUTANEOUS
Qty: 1 EACH | Refills: 0 | Status: SHIPPED | OUTPATIENT
Start: 2025-01-07

## 2025-01-07 RX ORDER — MOMETASONE FUROATE AND FORMOTEROL FUMARATE DIHYDRATE 100; 5 UG/1; UG/1
1 AEROSOL RESPIRATORY (INHALATION) 2 TIMES DAILY
Qty: 8.8 G | Refills: 5 | Status: SHIPPED | OUTPATIENT
Start: 2025-01-07 | End: 2026-01-07

## 2025-01-07 RX ORDER — ISOSORBIDE DINITRATE 40 MG/1
40 TABLET ORAL DAILY
Qty: 90 TABLET | Refills: 3 | Status: SHIPPED | OUTPATIENT
Start: 2025-01-07

## 2025-01-07 RX ORDER — NITROGLYCERIN 0.4 MG/1
0.4 TABLET SUBLINGUAL EVERY 5 MIN PRN
Qty: 25 TABLET | Refills: 4 | Status: SHIPPED | OUTPATIENT
Start: 2025-01-07

## 2025-01-07 RX ORDER — EZETIMIBE 10 MG/1
10 TABLET ORAL NIGHTLY
Qty: 90 TABLET | Refills: 3 | Status: SHIPPED | OUTPATIENT
Start: 2025-01-07

## 2025-01-07 NOTE — TELEPHONE ENCOUNTER
nitroGLYCERIN (NITROSTAT) 0.4 MG SL tablet [Nathalia Cherry, PA-C]         ezetimibe (ZETIA) 10 mg tablet [JOSÉ MIGUEL McdonaldC]         isosorbide dinitrate (ISORDIL) 40 MG Tab [Nathalia Cherry, PA-C]         metoprolol succinate (TOPROL-XL) 100 MG 24 hr tablet [JOSÉ MIGUEL McdonaldC]

## 2025-01-23 NOTE — PROGRESS NOTES
Patient ID: [id number]     Chief Complaint: Rheumatoid arthritis involving multiple sites with positive (Pt stated having a burning sensation to lt arm  times weeks. Stated having lower back pain)      HPI:     Nicole Quiñones is a 49 y.o. female here today for follow-up of rheumatoid arthritis.     Previously seen by Dr Marcial, she was diagnosed with RA and was started on Plaquenil and MTX. She had brain fog with those meds along with fatigue and GI upset, stopped them and did not go back for follow up. She also saw Dr Martinez in the past few times, did not give her any medication.   She had recurrent episodes of TTP, etiology unclear, workup for lupus negative multiple times. First episode of TTP in 2015, she was hospitalized and was treated with plasmapheresis and rituximab. Other episodes are in 2019, 2022 and last one in 2/2024. Every single episode she was treated plasmaphoresis and rituximab, she responds well to treatment. She has relatives with lupus, no one else has TTP.     She is here for follow up today.   She had started subcu methotrexate 15 mg per week after last visit, joint pain has improved with it.  Currently taking folic acid 1 mg daily.  Continues to have intermittent joint pain in bilateral hands and knees.  Admits swelling in hands sometimes.  Morning stiffness for an hour.  She was on Plaquenil in the past, discontinued it.  After for 4th relapse of TTP Plaquenil and Imuran were discontinued.  Walks every other day for 1-2 miles.   She also had a UGI and had her esophagus stretched (per patient report) and was also dx with a Hiatal Hernia- will be seeing Dr. Block (f/u May 14 for repeat EGD). She was also dx with mild GIAN- now on CPAP.     January 2025: Here today for follow up. She has not been taking MTX as she is concerned with hair loss, appears she last filled 8/20/24 and took x 4 weeks and states started having hair loss so has stopped and not resumed- she did not notify the  "office that she stopped meds (started in July 2024- however stating not sure how long she took- very unclear on reporting history of compliance). She reports she has been having burning in her left hand that has continued. States had some swelling to her right thumb and feet off and on- uses brace to help with swelling, does not help much with pain- will take Tylenol to help with pain when needed. She denies any red/warm joints. Morning stiffness lasting several hours- pain worse when she is having to lift, kaitlin grandkids- states pain is "neutral" with movement- unsure if it gets better or worse. Numbness and tingling has been ongoing since her CTR- off and on- shoots from shoulder into her elbow and hand- left side is the worse. Pain is worse in the evening- still has not called to follow up with Ortho. Also has some left shoulder pain at times that comes and goes- recalls going to PT in the past.  She is not using CPAP at this time, waiting new machine at this time- states had to repeat sleep study and waiting to hear from apt (at home sleep study 12/18 and 12/19- states these need to be repeated, states results were "inconclusive").  She has made some dietary changes and has continued to walk to help with weight loss, congratulated her on this and enc her to keep up the good work, has also limited intake of Cokes! Continues to follow with Cardiology and Hematology - no recent issues with TTP- last dose of Rituximab March 2024.    Denies any recent illness or hospitalizations since last visit.     PMH: TTP, OA, Chronic Migraines, GIAN    Cardiology- [facility]  Dr. Malik/KADEEM Hines, NP- Hematology     Denies history of fevers, rashes, photosensitivity, oral or nasal ulcers, h/o MI, stroke, seizures, h/o PE or DVT, Raynaud's phenomenon, uveitis, malignancies.   Family history of autoimmune disease: none.   Pregnancies: 6 Miscarriages: none  Smoking: nonsmoker.     Social History     Tobacco Use   Smoking " Status Never    Passive exposure: Never   Smokeless Tobacco Never          -------------------------------------    Anemia    Arthritis    Fibromyalgia    Hypercholesterolemia    Hypertension    Migraines    Personal history of colonic polyps    colonoscopy/polypectomy    Thrombocytopenia, unspecified    TTP (thrombotic thrombocytopenic purpura)    TTP (thrombotic thrombocytopenic purpura)        Past Surgical History:   Procedure Laterality Date    CARPAL TUNNEL RELEASE      COLONOSCOPY W/ BIOPSIES AND POLYPECTOMY  09/05/2023    Deepak De Luna MD 5 years    ESOPHAGOGASTRODUODENOSCOPY  04/2024    Right shouler surgery      SURGICAL REMOVAL OF ENDOMETRIOSIS         Review of patient's allergies indicates:   Allergen Reactions    Nsaids (non-steroidal anti-inflammatory drug) Other (See Comments)     Causes ulcers to bleed.    Ibuprofen     Latex     Meloxicam     Nsaids (non-steroidal anti-inflammatory drug) Nausea Only       Outpatient Medications Marked as Taking for the 1/27/25 encounter (Office Visit) with Emperatriz Scott FNP   Medication Sig Dispense Refill    albuterol (PROVENTIL) 2.5 mg /3 mL (0.083 %) nebulizer solution USE 1 VIAL IN NEBULIZER EVERY 8 HOURS AS NEEDED 1 each 3    baclofen (LIORESAL) 5 mg Tab tablet Take 1 tablet (5 mg total) by mouth 2 (two) times daily as needed (tension). 30 tablet 0    cetirizine (ZYRTEC) 10 MG tablet Take 10 mg by mouth once daily.      cloNIDine (CATAPRES) 0.1 MG tablet Take 1 tablet (0.1 mg total) by mouth daily as needed. 90 tablet 3    DULERA 100-5 mcg/actuation HFAA Inhale 1 puff into the lungs 2 (two) times daily. 8.8 g 5    EMGALITY  mg/mL PnIj AS DIRECTED SUBCUTANEOUS MONTHLY 30 DAY(S) 1 each 0    ergocalciferol (ERGOCALCIFEROL) 50,000 unit Cap Take 1 capsule (50,000 Units total) by mouth every 7 days. 12 capsule 3    ezetimibe (ZETIA) 10 mg tablet Take 1 tablet (10 mg total) by mouth every evening. 90 tablet 3    fluticasone propionate (FLONASE)  50 mcg/actuation nasal spray 2 sprays (100 mcg total) by Each Nostril route daily as needed for Allergies or Rhinitis. 18.2 mL 3    folic acid (FOLVITE) 1 MG tablet Take 1 tablet (1 mg total) by mouth once daily. After food 30 tablet 5    gabapentin (NEURONTIN) 600 MG tablet Take 600 mg by mouth 2 (two) times daily.      isosorbide dinitrate (ISORDIL) 40 MG Tab Take 1 tablet (40 mg total) by mouth once daily. 90 tablet 3    magnesium oxide (MAG-OX) 400 mg (241.3 mg magnesium) tablet Take 1 tablet (400 mg total) by mouth once daily. 30 tablet 0    omega 3-dha-epa-fish oil (FISH OIL) 1,000 mg (120 mg-180 mg) Cap       ondansetron (ZOFRAN) 8 MG tablet Take 1 tablet (8 mg total) by mouth 2 (two) times daily as needed for Nausea. TAKE 1 TABLET BY MOUTH EVERY 8 HOURS AS NEEDED FOR NAUSEA AND VOMITING 30 tablet 1    pantoprazole (PROTONIX) 40 MG tablet Take 40 mg by mouth every morning.      RESTASIS 0.05 % ophthalmic emulsion Place 1 drop into both eyes 2 (two) times daily.      UBRELVY 100 mg tablet Take 1 tablet (100 mg total) by mouth 2 (two) times daily as needed for Migraine. 16 tablet 4    venlafaxine (EFFEXOR-XR) 37.5 MG 24 hr capsule Take 1 capsule (37.5 mg total) by mouth once daily. 30 capsule 0       Social History     Socioeconomic History    Marital status:     Highest education level: Associate degree: occupational, technical, or vocational program   Occupational History    Occupation: unemployed   Tobacco Use    Smoking status: Never     Passive exposure: Never    Smokeless tobacco: Never   Substance and Sexual Activity    Alcohol use: Not Currently     Comment: Last drink @ 20    Drug use: Never    Sexual activity: Not Currently     Partners: Male     Birth control/protection: Abstinence   Social History Narrative    ** Merged History Encounter **          Social Drivers of Health     Financial Resource Strain: Low Risk  (4/19/2023)    Overall Financial Resource Strain (CARDIA)     Difficulty of  Paying Living Expenses: Not very hard   Food Insecurity: Food Insecurity Present (4/19/2023)    Hunger Vital Sign     Worried About Running Out of Food in the Last Year: Sometimes true     Ran Out of Food in the Last Year: Never true   Transportation Needs: No Transportation Needs (4/19/2023)    PRAPARE - Transportation     Lack of Transportation (Medical): No     Lack of Transportation (Non-Medical): No   Physical Activity: Inactive (4/19/2023)    Exercise Vital Sign     Days of Exercise per Week: 0 days     Minutes of Exercise per Session: 0 min   Stress: Stress Concern Present (4/19/2023)    [employer] - Occupational Stress Questionnaire     Feeling of Stress : To some extent   Housing Stability: Low Risk  (4/19/2023)    Housing Stability Vital Sign     Unable to Pay for Housing in the Last Year: No     Number of Places Lived in the Last Year: 2     Unstable Housing in the Last Year: No        Family History   Problem Relation Name Age of Onset    Hypertension Mother Mom     Arthritis Mother Mom     Depression Mother Mom     Thyroid disease Sister Sis     Asthma Sister Sis     Intellectual disability Brother Bro     Epilepsy Brother Bro     Stroke Maternal Grandfather Kid         Immunization History   Administered Date(s) Administered    DTP 1974, 1974, 06/09/1975, 06/14/1976, 05/09/1979    Hepatitis B, Adult 03/20/2018    Influenza 11/07/2008    Influenza (FLUBLOK) - Quadrivalent - Recombinant - PF *Preferred* (egg allergy) 10/30/2019    Influenza - Quadrivalent - PF *Preferred* (6 months and older) 10/30/2019, 11/16/2020, 04/19/2023, 09/19/2023    Influenza - Trivalent - Afluria, Fluzone MDV 11/07/2008    Influenza - Trivalent - Fluarix, Flulaval, Fluzone, Afluria - PF 01/09/2013, 01/07/2014    MMR 03/20/2018    Measles / Rubella 08/11/1975    OPV 1974, 1974, 06/14/1976, 05/09/1979, 06/09/1995    PPD Test 09/30/2013    Pneumococcal Conjugate - 20 Valent  "04/19/2023    Pneumococcal Polysaccharide - 23 Valent 03/04/2020    Td (ADULT) 03/27/1989, 01/25/2006    Tdap 01/07/2014, 03/20/2018       Patient Care Team:  Zari Jett NP as PCP - General (Family Medicine)  Joy Huff MD (Inactive) (Family Medicine)  Loretta Noble LPN as Care Coordinator  Deepak Block MD as Consulting Physician (Gastroenterology)     Subjective:     Constitutional:  Denies chills. Denies fever. Denies night sweats. Denies weight loss.   Ophthalmology: Denies blurred vision. Admits dry eyes- uses Restasis and helps. Denies eye pain. Denies Itching and redness.   ENT: Denies oral ulcers. Denies epistaxis. Denies dry mouth. Admits swollen glands to her left axillary area that come and go for the past few months- painful- today has resolved- states comes and goes.   Endocrine: Denies diabetes. Denies thyroid Problems.   Respiratory: Denies cough. Denies shortness of breath. Admits shortness of breath with exertion at times, no worsening of symptoms-stable. Denies hemoptysis.   Cardiovascular: Admits chest pain at rest- followed by Cardio, meds just adjusted. Denies chest pain with exertion. Denies palpitations.    Gastrointestinal: Denies abdominal pain. Denies diarrhea. Denies nausea. Denies vomiting. Denies hematemesis or hematochezia. Denies heartburn.  Genitourinary: Denies blood in urine.  Musculoskeletal: See HPI for details  Integumentary: Denies rash. Denies photosensitivity.   Peripheral Vascular: Denies Ulcers of hands and/or feet. Denies Cold extremities.   Neurologic: Denies dizziness. Admits headache. Denies loss of strength. Admits numbness or tingling- to left hand.   Psychiatric: Admits depression and anxiety- currently stable, followed by PCP. Denies suicidal/homicidal ideations.      Objective:     /79 (BP Location: Left arm, Patient Position: Sitting)   Pulse 77   Temp 97.5 °F (36.4 °C) (Oral)   Resp 20   Ht 5' 6" (1.676 m)   Wt 116 kg (255 lb " 12.8 oz)   SpO2 95%   BMI 41.29 kg/m²     Physical Exam    General Appearance: alert, pleasant, in no acute distress.  Skin: Skin color, texture, turgor normal. No rashes or lesions. +tender node noted to right axillary- no warmth, mobile, very tender to touch.   Eyes:  extraocular movement intact (EOMI), pupils equal, round, reactive to light and accommodation, conjunctiva clear.  ENT: No oral or nasal ulcers.  Neck:  Neck supple. No adenopathy.   Lungs: CTA throughout without crackles, rhonchi, or wheezes.   Heart: RRR w/o murmurs.  No edema. 2+ DP pulse.  Neuro: Alert, oriented, CN II-XII GI, sensory and motor innervation intact.  Musculoskeletal: Mild tenderness to several PIPs- today on exam no tenderness, no pain to MCPs or DIPs. Left 2nd finger tender throughout entire digit, no swelling, no cords or nodules palpated- no tenderness noted on exam today, FROM noted but reports painful. Tenderness and mild swelling of right wrist- no tenderness on palpation today, FROM elbows right shoulder, left shoulder FROM noted but with pain. Bilateral knees with no pain to palpation, pain with ROM to left- no pain today, +crepitus.  No swelling or warmth in bilateral knees. No tenderness to bilateral ankles and  with MTP squeeze.  Psych: Alert, oriented, normal eye contact.    Labs:   2021: SETH negative by ZULMA.  Anti CCP 6.9, low titer, normal less than 3. RF negative.   4/2022: SETH negative. RF negative. C3, C4 normal. CCP 34(<19).   4/19/23: WBC 4.4. ANC ok. ESR 47. CRP 11.9. CPK normal. RF 26(<14), CCP 69, high titer.   5/2023: CBC ok. CMP ok. TSH normal. 1+ protein and no blood in urine.   5/2023: SETH direct positive.   09/20/2023: hepatitis-B surface antibody reactive.  Hepatitis-B surface antigen and core antibody negative.  Negative hepatitis-C and HIV.  CMP okay.  CRP 10.1, ESR 43.  CBC okay. Quantiferon tb negative.  10/9/23:  C3 and C4 normal.  Negative SSA, SSB, thyroglobulin, TPO, Navarro, RNP, Scl 70.  DsDNA  negative.  SETH negative.  2024: UPC okay trace protein blood in urine.  CMP okay.  WBC count 4.32, differential okay, ANC 2.5.  TPMT genotype normal.  2024: CBC okay.  CMP okay.   24: SETH negative by IFA.  Negative dsDNA, U1 RNP, SSA, SSB, centromere, Scl 70, Chely 1, Smith.   ADAMTS-13 level drawn 24 <5%. Platelets dropped to 5 on 24.   2024 CBC RBC 5.58, platelets 300, CMP Cl+ 108, otherwise ok. LDH ok.   2024 CBC RBC 5.96 MCV 68, platelets now 251, CMP Cl+ 110, otherwise ok.  ok.  2024 CRP 7.60 (<5), ESR 19 (<20)  24: CBC ok. Platelet counts are ok. CMP ok.   10/24/24:  CRP 9.8.  Creatinine 1.03, GFR greater than 60.  CBC okay.  Platelets normal.  Hemoglobin normal.  ESR normal.  11/15/2024 CBC RBC 6.39- stable    Imagin/2023:  X-ray of cervical spine showed small multilevel marginal osteophytes and mild facet arthropathy.  Alignment is preserved.    23:  X-ray of left knee showed tricompartmental DJD changes worse in medial compartment, no effusion.    2023: chest x-ray showed lungs are clear.  DJD of bilateral acromioclavicular joints and glenohumeral joint. Mild DJD of bilateral feet, calcaneal spurring on left.  Mild DJD of right knee.  DJD of bilateral hands in PIP, DIPs and bilateral 1st CMC joints.  2023:  X-ray of bilateral shoulder showed glenohumeral joint and AC joint arthritis.  No acute fracture or dislocation.    2023 ECHO:  EF 60%.     2024 CT Abd/Pelvis: Impression: Nonspecific retroperitoneal edema.  Correlate with lipase levels to exclude pancreatitis.  Correlate with urinalysis to exclude urinary tract infection.  Otherwise consider duodenitis    2024 CXRay: Impression: No acute cardiopulmonary process.    2024 Left Hand Xray: Impression: Degenerative changes, no acute fractures are seen      Assessment:       ICD-10-CM ICD-9-CM   1. Rheumatoid arthritis involving multiple sites with positive  rheumatoid factor  M05.79 714.0   2. Primary osteoarthritis involving multiple joints  M15.0 715.98   3. Primary hypertension  I10 401.9   4. BMI 39.0-39.9,adult  Z68.39 V85.39   5. Drug-induced immunodeficiency  D84.821 279.3    Z79.899 E947.9   6. High risk medication use  Z79.899 V58.69   7. TTP (thrombotic thrombocytopenic purpura)  M31.19 446.6   8. SOB (shortness of breath)  R06.02 786.05         Plan:     1. Rheumatoid arthritis involving multiple sites with positive rheumatoid factor  Low titer rheumatoid factor and a high titer anti CCP.  She has arthralgias and joint pain likely multifactorial from both RA and Osteoarthritis.  Plaquenil and Imuran discontinued after episode of TTP in 2/2024.   Mild synovitis on exam, started SQ MTX July 2024, unclear on how long she actually took. Did not tolerate oral MTX in the past. She reports she has been feeling ok off of MTX- would like to continue to remain off at this time. Today on exam, no active synovitis.   - Completed 4 weekly doses of Rituxan with Oncology for TTP relapse- last dose March 28, 2024- too soon to start and other Biologics at this time   - Wishes to remain off of MTX at this time, she will call if she develops any red/warm/swollen joints  - No labs needed at this time as she has been off of meds- last lab in October and November ok  - Infection w/u negative 9/2023  - CXRay ok 2/2024  - She is not on birth control, currently in perimenopausal period, cycles are irregular.  She is aware she should not get pregnant on MTX- currently off at this time.     2. Osteoarthritis  - Osteoarthritis of cervical spine on x-rays.  DJD of bilateral knees and shoulders.   Referral to physical therapy had been sent. Can use Tylenol PRN. Used to work as CNA and lifted and pushed patients, likely the reason for severe Osteoarthritis.  Had issues with rotator cuff on the right and shoulder arthroscopy in the past. Now complaining of left shoulder pain that comes and  goes with numbness and tingling that has continued to shoot into her left hand after CTR- she was advised to f/u with Ortho and plans to call today to schedule  - Enc to continue to follow up with Ortho- last seen in March, conservative treatment and possible CSI in the future if no relief, enc to call for follow up - still has not done so but plans to call      3. Primary hypertension  - At goal today, enc to monitor at home and  continue to follow up with PCP. Low Na+ diet      4. Obesity (BMI 41)  - Discussed watching her diet and recommend weight loss.  Weight loss will help with knee pain. Enc to keep up the good work with diet modifications and exercise as tolerated     5. High Risk Med use/Drug Induced Immunodeficency   -Persons with rheumatoid arthritis, lupus, psoriatic arthritis and other autoimmune diseases are at increased risk of cardiovascular disease including heart attack and stroke. We recommend that all patients with these conditions have annual health maintenance exams including lipid measurements, blood pressure measurements, and smoking cessation counseling when applicable at their primary care provider's office.   - Advised to stay up-to-date on age appropriate vaccinations and malignancy screening, including yearly skin exams.    - Reports UTD with Mammogram and Paps- Colonoscopy UTD with GI Dr. Block   - Continued lab monitoring.      6. TTP  - Eiology unclear, SETH and TINA's negative 3 times in the past.   She had recurrent episodes of TTP. First episode of TTP in 2015, she was hospitalized and was treated with plasmapheresis and rituximab. Other episodes are in 2019, 2022 and last one in 2/2024. Every single episode she was treated plasmaphoresis and rituximab, she responds well to treatment. She has relatives with lupus, no one else have TTP.   - Followed by Dr. Malik at [facility]- 4th relapse 2/25/2024- completed 4 weeks of Rituxan- last dose 3/28/2024.  - Continue follow up with Hematology  and will monitor CBC's.      7. SOB  - PFTs on 3/27/2024 FVC 73.7%, FEV 88%, FEV1/%- test was d/c after 10 incomplete and unacceptable results. Several attempts- unable to exhale for >2 seconds- she does report having an esophogeal dilation the day before and unable to complete  - SOB stable.  - Recent Cxray 2/2024 ok.   - Also following with Cardiology          No follow-ups on file. In addition to their scheduled follow up, the patient has also been instructed to follow up on as needed basis.     Total time spent with patient and documentation is 35 minutes. All questions were answered to patient's satisfaction and patient verbalized understanding. This includes face to face time and non-face to face time preparing to see the patient (eg, review of tests), obtaining and/or reviewing separately obtained history, documenting clinical information in the electronic or other health record, independently interpreting results and communicating results to the patient/family/caregiver, or care coordinator.

## 2025-01-27 ENCOUNTER — OFFICE VISIT (OUTPATIENT)
Dept: RHEUMATOLOGY | Facility: CLINIC | Age: 50
End: 2025-01-27
Payer: MEDICAID

## 2025-01-27 VITALS
SYSTOLIC BLOOD PRESSURE: 122 MMHG | OXYGEN SATURATION: 95 % | BODY MASS INDEX: 41.11 KG/M2 | RESPIRATION RATE: 20 BRPM | HEART RATE: 77 BPM | HEIGHT: 66 IN | WEIGHT: 255.81 LBS | DIASTOLIC BLOOD PRESSURE: 79 MMHG | TEMPERATURE: 98 F

## 2025-01-27 DIAGNOSIS — D84.821 DRUG-INDUCED IMMUNODEFICIENCY: ICD-10-CM

## 2025-01-27 DIAGNOSIS — Z79.899 DRUG-INDUCED IMMUNODEFICIENCY: ICD-10-CM

## 2025-01-27 DIAGNOSIS — Z79.899 HIGH RISK MEDICATION USE: ICD-10-CM

## 2025-01-27 DIAGNOSIS — R06.02 SOB (SHORTNESS OF BREATH): ICD-10-CM

## 2025-01-27 DIAGNOSIS — M05.79 RHEUMATOID ARTHRITIS INVOLVING MULTIPLE SITES WITH POSITIVE RHEUMATOID FACTOR: Primary | ICD-10-CM

## 2025-01-27 DIAGNOSIS — I10 PRIMARY HYPERTENSION: ICD-10-CM

## 2025-01-27 DIAGNOSIS — M31.19 TTP (THROMBOTIC THROMBOCYTOPENIC PURPURA): ICD-10-CM

## 2025-01-27 DIAGNOSIS — M15.0 PRIMARY OSTEOARTHRITIS INVOLVING MULTIPLE JOINTS: ICD-10-CM

## 2025-01-27 PROCEDURE — 3008F BODY MASS INDEX DOCD: CPT | Mod: CPTII,,, | Performed by: NURSE PRACTITIONER

## 2025-01-27 PROCEDURE — 3074F SYST BP LT 130 MM HG: CPT | Mod: CPTII,,, | Performed by: NURSE PRACTITIONER

## 2025-01-27 PROCEDURE — 3078F DIAST BP <80 MM HG: CPT | Mod: CPTII,,, | Performed by: NURSE PRACTITIONER

## 2025-01-27 PROCEDURE — 99215 OFFICE O/P EST HI 40 MIN: CPT | Mod: PBBFAC | Performed by: NURSE PRACTITIONER

## 2025-01-27 PROCEDURE — 99214 OFFICE O/P EST MOD 30 MIN: CPT | Mod: S$PBB,,, | Performed by: NURSE PRACTITIONER

## 2025-01-27 PROCEDURE — 1159F MED LIST DOCD IN RCRD: CPT | Mod: CPTII,,, | Performed by: NURSE PRACTITIONER

## 2025-01-27 PROCEDURE — 1160F RVW MEDS BY RX/DR IN RCRD: CPT | Mod: CPTII,,, | Performed by: NURSE PRACTITIONER

## 2025-01-31 ENCOUNTER — HOSPITAL ENCOUNTER (EMERGENCY)
Facility: HOSPITAL | Age: 50
Discharge: HOME | End: 2025-01-31
Attending: EMERGENCY MEDICINE
Payer: MEDICAID

## 2025-01-31 VITALS
RESPIRATION RATE: 18 BRPM | TEMPERATURE: 98 F | HEART RATE: 77 BPM | WEIGHT: 252 LBS | HEIGHT: 66 IN | OXYGEN SATURATION: 96 % | BODY MASS INDEX: 40.5 KG/M2 | SYSTOLIC BLOOD PRESSURE: 138 MMHG | DIASTOLIC BLOOD PRESSURE: 89 MMHG

## 2025-01-31 DIAGNOSIS — G43.701 CHRONIC MIGRAINE WITHOUT AURA WITH STATUS MIGRAINOSUS, NOT INTRACTABLE: Primary | ICD-10-CM

## 2025-01-31 LAB
ALBUMIN SERPL-MCNC: 3.8 G/DL (ref 3.5–5)
ALBUMIN/GLOB SERPL: 1.3 RATIO (ref 1.1–2)
ALP SERPL-CCNC: 137 UNIT/L (ref 40–150)
ALT SERPL-CCNC: 12 UNIT/L (ref 0–55)
ANION GAP SERPL CALC-SCNC: 9 MEQ/L
AST SERPL-CCNC: 13 UNIT/L (ref 5–34)
BASOPHILS # BLD AUTO: 0.04 X10(3)/MCL
BASOPHILS NFR BLD AUTO: 0.7 %
BILIRUB SERPL-MCNC: 0.6 MG/DL
BUN SERPL-MCNC: 17.9 MG/DL (ref 9.8–20.1)
CALCIUM SERPL-MCNC: 9.2 MG/DL (ref 8.4–10.2)
CHLORIDE SERPL-SCNC: 107 MMOL/L (ref 98–107)
CO2 SERPL-SCNC: 26 MMOL/L (ref 22–29)
CREAT SERPL-MCNC: 0.92 MG/DL (ref 0.55–1.02)
CREAT/UREA NIT SERPL: 19
EOSINOPHIL # BLD AUTO: 0.21 X10(3)/MCL (ref 0–0.9)
EOSINOPHIL NFR BLD AUTO: 3.5 %
ERYTHROCYTE [DISTWIDTH] IN BLOOD BY AUTOMATED COUNT: 17.3 % (ref 11.5–17)
GFR SERPLBLD CREATININE-BSD FMLA CKD-EPI: >60 ML/MIN/1.73/M2
GLOBULIN SER-MCNC: 3 GM/DL (ref 2.4–3.5)
GLUCOSE SERPL-MCNC: 99 MG/DL (ref 74–100)
HCT VFR BLD AUTO: 43.9 % (ref 37–47)
HGB BLD-MCNC: 13.4 G/DL (ref 12–16)
IMM GRANULOCYTES # BLD AUTO: 0.02 X10(3)/MCL (ref 0–0.04)
IMM GRANULOCYTES NFR BLD AUTO: 0.3 %
LYMPHOCYTES # BLD AUTO: 1.2 X10(3)/MCL (ref 0.6–4.6)
LYMPHOCYTES NFR BLD AUTO: 19.8 %
MCH RBC QN AUTO: 21.5 PG (ref 27–31)
MCHC RBC AUTO-ENTMCNC: 30.5 G/DL (ref 33–36)
MCV RBC AUTO: 70.4 FL (ref 80–94)
MONOCYTES # BLD AUTO: 0.8 X10(3)/MCL (ref 0.1–1.3)
MONOCYTES NFR BLD AUTO: 13.2 %
NEUTROPHILS # BLD AUTO: 3.78 X10(3)/MCL (ref 2.1–9.2)
NEUTROPHILS NFR BLD AUTO: 62.5 %
NRBC BLD AUTO-RTO: 0 %
PLATELET # BLD AUTO: 288 X10(3)/MCL (ref 130–400)
PMV BLD AUTO: 9.9 FL (ref 7.4–10.4)
POTASSIUM SERPL-SCNC: 4.1 MMOL/L (ref 3.5–5.1)
PROT SERPL-MCNC: 6.8 GM/DL (ref 6.4–8.3)
RBC # BLD AUTO: 6.24 X10(6)/MCL (ref 4.2–5.4)
SODIUM SERPL-SCNC: 142 MMOL/L (ref 136–145)
WBC # BLD AUTO: 6.05 X10(3)/MCL (ref 4.5–11.5)

## 2025-01-31 PROCEDURE — 85025 COMPLETE CBC W/AUTO DIFF WBC: CPT | Performed by: PHYSICIAN ASSISTANT

## 2025-01-31 PROCEDURE — 96375 TX/PRO/DX INJ NEW DRUG ADDON: CPT

## 2025-01-31 PROCEDURE — 80053 COMPREHEN METABOLIC PANEL: CPT | Performed by: PHYSICIAN ASSISTANT

## 2025-01-31 PROCEDURE — 25000003 PHARM REV CODE 250: Performed by: PHYSICIAN ASSISTANT

## 2025-01-31 PROCEDURE — 96361 HYDRATE IV INFUSION ADD-ON: CPT

## 2025-01-31 PROCEDURE — 63600175 PHARM REV CODE 636 W HCPCS: Performed by: PHYSICIAN ASSISTANT

## 2025-01-31 PROCEDURE — 99284 EMERGENCY DEPT VISIT MOD MDM: CPT | Mod: 25

## 2025-01-31 PROCEDURE — 96374 THER/PROPH/DIAG INJ IV PUSH: CPT

## 2025-01-31 RX ORDER — METOCLOPRAMIDE HYDROCHLORIDE 5 MG/ML
10 INJECTION INTRAMUSCULAR; INTRAVENOUS
Status: COMPLETED | OUTPATIENT
Start: 2025-01-31 | End: 2025-01-31

## 2025-01-31 RX ORDER — METOCLOPRAMIDE 10 MG/1
10 TABLET ORAL EVERY 6 HOURS
Qty: 20 TABLET | Refills: 0 | Status: SHIPPED | OUTPATIENT
Start: 2025-01-31

## 2025-01-31 RX ORDER — DIPHENHYDRAMINE HYDROCHLORIDE 50 MG/ML
25 INJECTION INTRAMUSCULAR; INTRAVENOUS
Status: COMPLETED | OUTPATIENT
Start: 2025-01-31 | End: 2025-01-31

## 2025-01-31 RX ORDER — SODIUM CHLORIDE 9 MG/ML
1000 INJECTION, SOLUTION INTRAVENOUS
Status: COMPLETED | OUTPATIENT
Start: 2025-01-31 | End: 2025-01-31

## 2025-01-31 RX ADMIN — DIPHENHYDRAMINE HYDROCHLORIDE 25 MG: 50 INJECTION INTRAMUSCULAR; INTRAVENOUS at 06:01

## 2025-01-31 RX ADMIN — METOCLOPRAMIDE 10 MG: 5 INJECTION, SOLUTION INTRAMUSCULAR; INTRAVENOUS at 03:01

## 2025-01-31 RX ADMIN — SODIUM CHLORIDE 1000 ML: 9 INJECTION, SOLUTION INTRAVENOUS at 06:01

## 2025-01-31 NOTE — FIRST PROVIDER EVALUATION
Medical screening examination initiated.  I have conducted a focused provider triage encounter, findings are as follows:    Brief history of present illness:  49yoAAF with headache/migraine that started this am. Denies vomiting, abdominal pain, fever, chest pain, chills, or shortness of breath.        There were no vitals filed for this visit.    Pertinent physical exam:  tearful.GCS 15.     Brief workup plan:  labs    Preliminary workup initiated; this workup will be continued and followed by the physician or advanced practice provider that is assigned to the patient when roomed.

## 2025-02-01 NOTE — ED PROVIDER NOTES
Encounter Date: 1/31/2025       History     Chief Complaint   Patient presents with    Headache     Headache & n/v that started this morning. Took tylenol with no relief. Denies cough/fever/sore throat. Hx migraines     Patient presents with:  Headache: Headache & n/v that started this morning. Took tylenol with no relief. Denies cough/fever/sore throat. Hx migraines        The history is provided by the patient.   Headache   This is a chronic problem. The current episode started yesterday. The problem has been waxing and waning. The pain is located in the Bilateral and temporal region. The pain does not radiate. The pain quality is similar to prior headaches. The quality of the pain is described as aching, pulsating, sharp and throbbing. Pertinent negatives include no abnormal behavior, back pain, coughing, fever, muscle aches, nausea, sore throat, tingling or weakness. Nothing aggravates the symptoms. She has tried nothing for the symptoms. Her past medical history is significant for cluster headaches.     Review of patient's allergies indicates:   Allergen Reactions    Nsaids (non-steroidal anti-inflammatory drug) Other (See Comments)     Causes ulcers to bleed.    Ibuprofen     Latex     Meloxicam     Nsaids (non-steroidal anti-inflammatory drug) Nausea Only     Past Medical History:   Diagnosis Date    Anemia     Arthritis     Fibromyalgia     Hypercholesterolemia     Hypertension     Migraines     Personal history of colonic polyps 09/05/2023    colonoscopy/polypectomy    Thrombocytopenia, unspecified     TTP (thrombotic thrombocytopenic purpura)     TTP (thrombotic thrombocytopenic purpura)      Past Surgical History:   Procedure Laterality Date    CARPAL TUNNEL RELEASE      COLONOSCOPY W/ BIOPSIES AND POLYPECTOMY  09/05/2023    Deepak De Luna MD 5 years    ESOPHAGOGASTRODUODENOSCOPY  04/2024    Right shouler surgery      SURGICAL REMOVAL OF ENDOMETRIOSIS       Family History   Problem Relation Name Age of  Onset    Hypertension Mother Mom     Arthritis Mother Mom     Depression Mother Mom     Thyroid disease Sister Sis     Asthma Sister Sis     Intellectual disability Brother Bro     Epilepsy Brother Bro     Stroke Maternal Grandfather Kid      Social History     Tobacco Use    Smoking status: Never     Passive exposure: Never    Smokeless tobacco: Never   Substance Use Topics    Alcohol use: Not Currently     Comment: Last drink @ 20    Drug use: Never     Review of Systems   Constitutional:  Negative for fever.   HENT:  Negative for sore throat.    Respiratory:  Negative for cough and shortness of breath.    Cardiovascular:  Negative for chest pain.   Gastrointestinal:  Negative for nausea.   Genitourinary:  Negative for dysuria.   Musculoskeletal:  Negative for back pain.   Skin:  Negative for rash.   Neurological:  Positive for headaches. Negative for tingling and weakness.   Hematological:  Does not bruise/bleed easily.       Physical Exam     Initial Vitals [01/31/25 1458]   BP Pulse Resp Temp SpO2   (!) 149/96 93 16 97.8 °F (36.6 °C) 99 %      MAP       --         Physical Exam    Vitals reviewed.  Constitutional: She appears well-developed.   Cardiovascular:  Normal rate.           Musculoskeletal:      Cervical back: Normal.      Thoracic back: Normal.      Lumbar back: No spasms or tenderness. Negative right straight leg raise test and negative left straight leg raise test.     Neurological: She is alert and oriented to person, place, and time. She has normal strength. No cranial nerve deficit or sensory deficit. GCS eye subscore is 4. GCS verbal subscore is 5. GCS motor subscore is 6.   Skin: Skin is warm. Capillary refill takes less than 2 seconds.   Psychiatric: She has a normal mood and affect. Her behavior is normal. Thought content normal.         ED Course   Procedures  Labs Reviewed   CBC WITH DIFFERENTIAL - Abnormal       Result Value    WBC 6.05      RBC 6.24 (*)     Hgb 13.4      Hct 43.9      MCV  70.4 (*)     MCH 21.5 (*)     MCHC 30.5 (*)     RDW 17.3 (*)     Platelet 288      MPV 9.9      Neut % 62.5      Lymph % 19.8      Mono % 13.2      Eos % 3.5      Basophil % 0.7      Imm Grans % 0.3      Neut # 3.78      Lymph # 1.20      Mono # 0.80      Eos # 0.21      Baso # 0.04      Imm Gran # 0.02      NRBC% 0.0     CBC W/ AUTO DIFFERENTIAL    Narrative:     The following orders were created for panel order CBC auto differential.  Procedure                               Abnormality         Status                     ---------                               -----------         ------                     CBC with Differential[[id number]]       Abnormal            Final result                 Please view results for these tests on the individual orders.   COMPREHENSIVE METABOLIC PANEL    Sodium 142      Potassium 4.1      Chloride 107      CO2 26      Glucose 99      Blood Urea Nitrogen 17.9      Creatinine 0.92      Calcium 9.2      Protein Total 6.8      Albumin 3.8      Globulin 3.0      Albumin/Globulin Ratio 1.3      Bilirubin Total 0.6            ALT 12      AST 13      eGFR >60      Anion Gap 9.0      BUN/Creatinine Ratio 19     PREGNANCY TEST, URINE RAPID          Imaging Results    None          Medications   0.9% NaCl infusion (1,000 mLs Intravenous New Bag 1/31/25 1839)   metoclopramide injection 10 mg (10 mg Intravenous Given 1/31/25 1500)   diphenhydrAMINE injection 25 mg (25 mg Intravenous Given 1/31/25 1839)     Medical Decision Making  This is a 49-year-old female who presents to the ED with migraines.  She does have a history of migraine and she was scheduled to get a shot with her neurologist for for the chronicity of her migraines but secondary to this no she had to postpone her appointment.  Five days ago she started with headache she has been taking over-the-counter anti-inflammatories and tylenol and she does not feel that he is helping.     Amount and/or Complexity of Data  Reviewed  Discussion of management or test interpretation with external provider(s): Patient feels better after IV and treatment plan.  Our plan is going to work with the Neurology next week to get back to her treatment care plan.  She denies blurry vision denies nausea vomiting chest pain shortness of breath.                     Judging by the patient's chief complaint and pertinent history, the patient has the following possible differential diagnoses, including but not limited to the following.  Some of these are deemed to be lower likelihood and some more likely based on my physical exam and history combined with possible lab work and/or imaging studies.   Please see the pertinent studies, and refer to the HPI.  Some of these diagnoses will take further evaluation to fully rule out, perhaps as an outpatient and the patient was encouraged to follow up when discharged for more comprehensive evaluation.    Headache, migraine, tension headache, chronic headaches.  Electrolytes imbalance.           The patient is resting comfortably in no acute distress.  She is hemodynamically stable and is without objective evidence for acute process requiring urgent intervention or hospitalization. I provided counseling to patient with regard to condition, the treatment plan, specific conditions for return, and the importance of follow up. Detailed written and verbal instructions provided to patient and she expressed a verbal understanding of the discharge instructions and overall management plan. Reiterated the importance of medication administration and safety and advised patient to follow up with primary care provider in 3-5 days or sooner if needed.  Answered questions at this time. The patient is stable for discharge.              Clinical Impression:  Final diagnoses:  [G43.701] Chronic migraine without aura with status migrainosus, not intractable (Primary)          ED Disposition Condition    Discharge Stable          ED  Prescriptions       Medication Sig Dispense Start Date End Date Auth. Provider    metoclopramide HCl (REGLAN) 10 MG tablet Take 1 tablet (10 mg total) by mouth every 6 (six) hours. 20 tablet 1/31/2025 -- Kathy Montelongo PA          Follow-up Information    None          Kathy Montelongo PA  01/31/25 2184

## 2025-02-04 ENCOUNTER — TELEPHONE (OUTPATIENT)
Dept: NEUROLOGY | Facility: CLINIC | Age: 50
End: 2025-02-04
Payer: MEDICAID

## 2025-02-04 DIAGNOSIS — G43.E09 CHRONIC MIGRAINE WITH AURA WITHOUT STATUS MIGRAINOSUS, NOT INTRACTABLE: ICD-10-CM

## 2025-02-04 RX ORDER — GALCANEZUMAB 120 MG/ML
INJECTION, SOLUTION SUBCUTANEOUS
Qty: 1 EACH | Refills: 0 | Status: SHIPPED | OUTPATIENT
Start: 2025-02-04 | End: 2025-02-05 | Stop reason: SDUPTHER

## 2025-02-04 NOTE — TELEPHONE ENCOUNTER
----- Message from Nanda sent at 2/4/2025  2:48 PM CST -----  Regarding: migraine  Pt called to report that she went to ED on 1/31 for bad migraine. Pt states that she was not given a cocktail and still suffering with migraine. Offered sooner appt for tomorrow as pt had missed appt on 12/20.

## 2025-02-04 NOTE — TELEPHONE ENCOUNTER
Patient notified and verbalized understanding of attending appropriate on tomorrow for necessary medication adjustments.

## 2025-02-05 ENCOUNTER — OFFICE VISIT (OUTPATIENT)
Dept: NEUROLOGY | Facility: CLINIC | Age: 50
End: 2025-02-05
Payer: MEDICAID

## 2025-02-05 VITALS
WEIGHT: 252.38 LBS | RESPIRATION RATE: 18 BRPM | HEART RATE: 92 BPM | OXYGEN SATURATION: 99 % | TEMPERATURE: 98 F | HEIGHT: 66 IN | SYSTOLIC BLOOD PRESSURE: 129 MMHG | BODY MASS INDEX: 40.56 KG/M2 | DIASTOLIC BLOOD PRESSURE: 87 MMHG

## 2025-02-05 DIAGNOSIS — G47.00 INSOMNIA, UNSPECIFIED TYPE: ICD-10-CM

## 2025-02-05 DIAGNOSIS — E66.01 CLASS 3 SEVERE OBESITY DUE TO EXCESS CALORIES WITH SERIOUS COMORBIDITY AND BODY MASS INDEX (BMI) OF 40.0 TO 44.9 IN ADULT: ICD-10-CM

## 2025-02-05 DIAGNOSIS — G43.E01 CHRONIC MIGRAINE WITH AURA AND WITH STATUS MIGRAINOSUS, NOT INTRACTABLE: ICD-10-CM

## 2025-02-05 DIAGNOSIS — E66.813 CLASS 3 SEVERE OBESITY DUE TO EXCESS CALORIES WITH SERIOUS COMORBIDITY AND BODY MASS INDEX (BMI) OF 40.0 TO 44.9 IN ADULT: ICD-10-CM

## 2025-02-05 DIAGNOSIS — G43.E09 CHRONIC MIGRAINE WITH AURA WITHOUT STATUS MIGRAINOSUS, NOT INTRACTABLE: Primary | ICD-10-CM

## 2025-02-05 PROCEDURE — 99215 OFFICE O/P EST HI 40 MIN: CPT | Mod: PBBFAC | Performed by: NURSE PRACTITIONER

## 2025-02-05 PROCEDURE — 3074F SYST BP LT 130 MM HG: CPT | Mod: CPTII,,, | Performed by: NURSE PRACTITIONER

## 2025-02-05 PROCEDURE — 3079F DIAST BP 80-89 MM HG: CPT | Mod: CPTII,,, | Performed by: NURSE PRACTITIONER

## 2025-02-05 PROCEDURE — 99214 OFFICE O/P EST MOD 30 MIN: CPT | Mod: S$PBB,,, | Performed by: NURSE PRACTITIONER

## 2025-02-05 PROCEDURE — 3008F BODY MASS INDEX DOCD: CPT | Mod: CPTII,,, | Performed by: NURSE PRACTITIONER

## 2025-02-05 PROCEDURE — 1159F MED LIST DOCD IN RCRD: CPT | Mod: CPTII,,, | Performed by: NURSE PRACTITIONER

## 2025-02-05 PROCEDURE — 1160F RVW MEDS BY RX/DR IN RCRD: CPT | Mod: CPTII,,, | Performed by: NURSE PRACTITIONER

## 2025-02-05 RX ORDER — GALCANEZUMAB 120 MG/ML
INJECTION, SOLUTION SUBCUTANEOUS
Qty: 1 EACH | Refills: 0 | Status: SHIPPED | OUTPATIENT
Start: 2025-02-05 | End: 2025-02-24 | Stop reason: SDUPTHER

## 2025-02-05 RX ORDER — VENLAFAXINE HYDROCHLORIDE 37.5 MG/1
37.5 CAPSULE, EXTENDED RELEASE ORAL NIGHTLY
Qty: 30 CAPSULE | Refills: 6 | Status: SHIPPED | OUTPATIENT
Start: 2025-02-05 | End: 2025-02-24 | Stop reason: SDUPTHER

## 2025-02-05 RX ORDER — UBROGEPANT 100 MG/1
100 TABLET ORAL 2 TIMES DAILY PRN
Qty: 16 TABLET | Refills: 4 | Status: SHIPPED | OUTPATIENT
Start: 2025-02-05 | End: 2025-02-24 | Stop reason: SDUPTHER

## 2025-02-05 NOTE — PROGRESS NOTES
[facility] Neurology Follow Up Office Visit Note    Initial Visit Date: 10/5/2023  Last Visit Date:  4/182024  Current Visit Date:  02/05/2025    Chief Complaint:     Chief Complaint   Patient presents with    Migraine     Pt states migraines have been bad. She states she went to ER on 2/1/2025 for migraines.     History of Present Illness:      This is 49 y.o. female with history of HTN, insomnia, acute neck pain, migraine, Vitamin D deficiency, RA followed by rheumatology, TTP, gastritis, GERD, who was referred headache disorder. During initial visit, venlafaxine 37.5mg Qday, MagOx 400mg daily, Riboflavin 400mg daily, baclofen PRN, Zavspret PRN, and Ubrelvy were continued. Emgality was restarted. Dexamethasone 4mg PO BID x 5 days was provided.     Today, Pt states she continues to be off several of her medications since DC from hospital last year. Migraines have increased to 4x per week. Has not received Emgality since last year. Last migraine last Friday. Awakening w/migraine. Sleep study yesterday w/Dr. Rodriguez. Unsure of medication list as new PCP has held several medications until next visit. Ubrelvy not taken at onset of migraine, so not very effective. Is walking and trying to exercise more.    Age of Onset : 16 YO    Headache Description: Starts to R trapezius and radiates up neck to R side of head, to forehead and across to other side, on a bad day must lay in a dark room, severe in nature. Worsened w/activity. Unresponsive to Ubrelvy. May last from 1 hour to 3-4 days. May resolved after a nap, not always. Accompanied by photophobia/phonophobia, nausea, flashes of lights to both eyes, numbness to her face.    Frequency: 20 headache days per month.     Provocation Factors: odors,     Risk Factors  - Family history of headache disorder: Yes daughter  - History of focal CNS lesions: No  - History of CNS infections: No  - History head trauma: No  - History of underlying mood disorder: Yes diagnosed w/major  depressive d/o  - History of sleep disorder: Yes mild GIAN; but not qualified for machine  - Recreational drug use: No  - Tobacco use: No  - Alcohol use: No  - Weight fluctuation: No  - Isotretinoin or Tetracycline use:  No  - Family planning and contraceptive use: No    Medications:     Current Prophylactic  Gabapentin 600mg PO BID (4/26/2023 - present) ineffective for HA  Toprol XL 100mg nightly (1/17/2023 - present) ineffective for HA  Venlafaxine 37.5mg PO Qday (10/5/2023 - present) effective for mood - not currently taking  Emgality 120mg SQ monthly (1/11/2024 - present) - not currently taking    Current Abortive  Baclofen 5mg PO PRN - (10/5/2023 - present)  Zavzpret 10mg nasal spray - (10/19/2023 - present) effective  Ubrelvy 100mg PO BID (4/10/2023 - current)- only effective for mild-mod migraine    Prior Prophylactic  Amitriptyline 25 mg nightly - brain fog/over sedated  Emgality 120mg/ml SQ Qmonth (2/13/2023 - 4/10/2023)  Olmestartan/HCTZ (1/17/2023 - 7/16/2023) ineffective for HA  Sertraline 25mg Qday (3/9/2023 - 6/19/2023) ineffective for HA  Valsartan 80mg PO Qday (6/27/2023 - 11/20/2023) ineffective for HA  Verapamil ER 180mg daily (3/4/2023 - 7/4/2023) ineffective for HA    Prior Abortive  Nurtec - ineffective  Imitrex - ineffective  Rizatriptan - ineffective  Fioricet PRN (6/27/2023 - ?)   Fioricet PRN - ineffective  Tizanidine 4mg PRN (2/22/2023 - 5/24/2023)- worsened migraine  Ondansetron (7/25/2023 - 9/11/2023)    Devices:     - VNS:  - TNS  - TMS:     Procedures:     - Botox:  - PSG block:   - Occipital nerve block:     Labs:     Results for orders placed or performed during the hospital encounter of 01/31/25   Comprehensive metabolic panel    Collection Time: 01/31/25  3:25 PM   Result Value Ref Range    Sodium 142 136 - 145 mmol/L    Potassium 4.1 3.5 - 5.1 mmol/L    Chloride 107 98 - 107 mmol/L    CO2 26 22 - 29 mmol/L    Glucose 99 74 - 100 mg/dL    Blood Urea Nitrogen 17.9 9.8 - 20.1 mg/dL     Creatinine 0.92 0.55 - 1.02 mg/dL    Calcium 9.2 8.4 - 10.2 mg/dL    Protein Total 6.8 6.4 - 8.3 gm/dL    Albumin 3.8 3.5 - 5.0 g/dL    Globulin 3.0 2.4 - 3.5 gm/dL    Albumin/Globulin Ratio 1.3 1.1 - 2.0 ratio    Bilirubin Total 0.6 <=1.5 mg/dL     40 - 150 unit/L    ALT 12 0 - 55 unit/L    AST 13 5 - 34 unit/L    eGFR >60 mL/min/1.73/m2    Anion Gap 9.0 mEq/L    BUN/Creatinine Ratio 19    CBC with Differential    Collection Time: 01/31/25  3:25 PM   Result Value Ref Range    WBC 6.05 4.50 - 11.50 x10(3)/mcL    RBC 6.24 (H) 4.20 - 5.40 x10(6)/mcL    Hgb 13.4 12.0 - 16.0 g/dL    Hct 43.9 37.0 - 47.0 %    MCV 70.4 (L) 80.0 - 94.0 fL    MCH 21.5 (L) 27.0 - 31.0 pg    MCHC 30.5 (L) 33.0 - 36.0 g/dL    RDW 17.3 (H) 11.5 - 17.0 %    Platelet 288 130 - 400 x10(3)/mcL    MPV 9.9 7.4 - 10.4 fL    Neut % 62.5 %    Lymph % 19.8 %    Mono % 13.2 %    Eos % 3.5 %    Basophil % 0.7 %    Imm Grans % 0.3 %    Neut # 3.78 2.1 - 9.2 x10(3)/mcL    Lymph # 1.20 0.6 - 4.6 x10(3)/mcL    Mono # 0.80 0.1 - 1.3 x10(3)/mcL    Eos # 0.21 0 - 0.9 x10(3)/mcL    Baso # 0.04 <=0.2 x10(3)/mcL    Imm Gran # 0.02 0.00 - 0.04 x10(3)/mcL    NRBC% 0.0 %       Studies:     - MRI Brain:   - MRA Head w/o Joaquin:   - MRV Head w/o Joaquin:   - NCHCT:  - Lumbar Puncture:    Review of Systems:     ROS  As per HPI; all other systems negative  Physical Exams:     Vitals:    02/05/25 1448   BP: 129/87   Pulse: 92   Resp: 18   Temp: 97.9 °F (36.6 °C)       Physical Exam  HENT:      Head: Normocephalic.      Right Ear: External ear normal.      Left Ear: External ear normal.      Nose: Nose normal.      Mouth/Throat:      Mouth: Mucous membranes are moist.   Eyes:      Pupils: Pupils are equal, round, and reactive to light.   Cardiovascular:      Rate and Rhythm: Normal rate.      Pulses: Normal pulses.   Pulmonary:      Effort: Pulmonary effort is normal.   Abdominal:      General: There is no distension.      Tenderness: There is no guarding.      Comments:  round   Musculoskeletal:         General: Normal range of motion.      Cervical back: Normal range of motion.   Skin:     General: Skin is warm and dry.      Capillary Refill: Capillary refill takes less than 2 seconds.      Findings: No lesion or rash.   Neurological:      General: No focal deficit present.      Mental Status: She is alert.   Psychiatric:         Mood and Affect: Mood normal.     Comprehensive Neurological Exam:  Mental Status: Alert Oriented to Self, Date, and Place. Naming, repetition, and reading wnl. Comprehension wnl. No dysarthria.   CN II - XII: ATIYA, No APD, VA grossly intact to finger counting at 6 ft, VFFC, No ptosis OU, EOMI without nystagmus LT/Temp symmetric in CN V1-3 distribution, Hearing grossly intact, Face Symmetric, Tongue and Uvula midline, Trapezius symmetric bilateral.   Motor: tone and bulk wnl throughout, no abnormal involuntary or voluntary movements, 5/5 to confrontation, Fine finger movements wnl b/l, No pronator drift.   Sensory: LT, Proprioception, Vibration, PP, Temp symmetric. No sensory simultagnosia.   Reflexes: 2+ throughout  Cerebellar: FNF wnl b/l  Romberg: Negative  Gait: antalgic, utilizing cane    Assessment:     This is 49 y.o. female with history of HTN, insomnia, acute neck pain, migraine, RA (non-compliant), TTP, gastritis, GERD, Vitamin D deficiency, who was referred headache disorder. Pt experiencing 20 migraine w/aura HA days per month since being out of Emgality for several months. Has tried and failed multiple abortive therapies, including two triptans, current regimen not very effective. Emgality for preventative, Ubrelvy for abortive. This is not a medication overuse HA. Has been dx with GIAN not currently utilizing machine. Ubrelvy effective as abortive therapy, but must take at onset. Very unsure of medication list and what she takes medication for as PCP has discontinued several meds until next visit. Walking and chair exercises. Admits to not  likely to take medication    Problem List Items Addressed This Visit          Neuro    Chronic migraine with aura without status migrainosus, not intractable - Primary    Relevant Medications    EMGALITY  mg/mL PnIj    UBRELVY 100 mg tablet    venlafaxine (EFFEXOR-XR) 37.5 MG 24 hr capsule       Endocrine    Class 3 severe obesity due to excess calories with serious comorbidity and body mass index (BMI) of 40.0 to 44.9 in adult       Other    Insomnia     Other Visit Diagnoses       Chronic migraine with aura and with status migrainosus, not intractable        Relevant Medications    UBRELVY 100 mg tablet    venlafaxine (EFFEXOR-XR) 37.5 MG 24 hr capsule            Plan:     [] restart Emgality 120mg SQ Q month - requires PA  [] c/w venlafaxine 37.5mg nightly  [] c/w Baclofen 5 mg nightly for muscle tension, may increase to 2 tabs nightly- take consistently  [] c/w Ubrelvy 100mg PO BID PRN at onset of migraine for mild-moderate migraine  [] Limit water intake to approximately 120 oz/day  [] c/w riboflavin (Vit B2) 400mg daily  [] start exercise program 30 min daily x 5 days weekly for overall health and wellness  [] call office for migraine > 24 hrs and failed abortive therapy; will call in headache cocktail    RTC 4 mths - TM okay    Headache education provided: good sleep hygiene and 7 hours of sleep per night, stress management, medication overuse education provided. Using more 3 OTC per week may worsen headaches, high intensity interval training has shown to reduce headache frequency. Low carb, high protein has shown to reduce headache frequency. Patient is instructed in keep headache diary.     I have explained the treatment plan, diagnosis, and prognosis to patient. All questions are answered to the best of my knowledge.     Face to face time 30 minutes, including documentation, chart review, counseling, education, review of test results, relevant medical records, and coordination of care.     Kristina  JODI Boykin  General Neurology    02/05/2025

## 2025-02-11 ENCOUNTER — OFFICE VISIT (OUTPATIENT)
Dept: ORTHOPEDICS | Facility: CLINIC | Age: 50
End: 2025-02-11
Payer: MEDICAID

## 2025-02-11 ENCOUNTER — HOSPITAL ENCOUNTER (OUTPATIENT)
Dept: RADIOLOGY | Facility: HOSPITAL | Age: 50
Discharge: HOME | End: 2025-02-11
Attending: NURSE PRACTITIONER
Payer: MEDICAID

## 2025-02-11 VITALS
BODY MASS INDEX: 40.74 KG/M2 | WEIGHT: 252.44 LBS | OXYGEN SATURATION: 99 % | HEART RATE: 78 BPM | SYSTOLIC BLOOD PRESSURE: 136 MMHG | DIASTOLIC BLOOD PRESSURE: 83 MMHG

## 2025-02-11 DIAGNOSIS — M25.512 ARTHRALGIA OF SHOULDER REGION, LEFT: ICD-10-CM

## 2025-02-11 DIAGNOSIS — M75.102 ROTATOR CUFF SYNDROME, LEFT: ICD-10-CM

## 2025-02-11 DIAGNOSIS — M25.512 LEFT SHOULDER PAIN, UNSPECIFIED CHRONICITY: ICD-10-CM

## 2025-02-11 DIAGNOSIS — R20.0 BILATERAL HAND NUMBNESS: Primary | ICD-10-CM

## 2025-02-11 PROCEDURE — 3075F SYST BP GE 130 - 139MM HG: CPT | Mod: CPTII,,, | Performed by: NURSE PRACTITIONER

## 2025-02-11 PROCEDURE — 99214 OFFICE O/P EST MOD 30 MIN: CPT | Mod: S$PBB,,, | Performed by: NURSE PRACTITIONER

## 2025-02-11 PROCEDURE — 3079F DIAST BP 80-89 MM HG: CPT | Mod: CPTII,,, | Performed by: NURSE PRACTITIONER

## 2025-02-11 PROCEDURE — 99215 OFFICE O/P EST HI 40 MIN: CPT | Mod: PBBFAC,25 | Performed by: NURSE PRACTITIONER

## 2025-02-11 PROCEDURE — 3008F BODY MASS INDEX DOCD: CPT | Mod: CPTII,,, | Performed by: NURSE PRACTITIONER

## 2025-02-11 PROCEDURE — 73030 X-RAY EXAM OF SHOULDER: CPT | Mod: TC,LT

## 2025-02-11 PROCEDURE — 1160F RVW MEDS BY RX/DR IN RCRD: CPT | Mod: CPTII,,, | Performed by: NURSE PRACTITIONER

## 2025-02-11 PROCEDURE — 1159F MED LIST DOCD IN RCRD: CPT | Mod: CPTII,,, | Performed by: NURSE PRACTITIONER

## 2025-02-11 RX ORDER — LINACLOTIDE 145 UG/1
CAPSULE, GELATIN COATED ORAL
COMMUNITY
Start: 2025-01-31

## 2025-02-11 NOTE — PROGRESS NOTES
[facility] - Orthopedics & Sports Medicine Clinic  Subjective:   PATIENT ID: Nicole Quiñones is a 49 y.o. female.   CHIEF COMPLAINT: Pain of the Left Shoulder ( LEFT SHOULDER PAIN/LOV 3/2024/)    HPI:  Bilateral L > R , left shoulder pain and numbness, tingling in upper extremity, radiates down arm from neck. Right dominant   Injury/ Surgical HX r/t CC:  no HX of prior significant joint injury, carpal tunnel release left hand symptoms unchanged   Onset: Several years  Worsening over time   Modifying Factors: exacerbated by:  increased activity, lifting objects > 5 pounds, gripping improved with:  rest   Associated Symptoms:  [] Weakness w/ dropping items  [x] Weakness w/o dropping items  [x] Difficulty sleeping s/t symptoms [x] Radiating into forearm  [x] Radiating into neck  [x] Joint stiffness (shoulders)        Activity:  sedentary with light activity and pain moderately interferes with ADLs    Previous Treatments:  [x] HEP > 6 weeks  [x] wrist splint  [] RX OT [x] OTC Pain Relievers: Tylenol, ibuprofen  [x] RX Medications: topical diclofenac, Tramadol, baclofen  [] CSI   PMH:  Cervical pain, HTN, obesity , non-smoker   Family History: +RA   NOTE: Follow up, seen by me since 2024  referred for left shoulder pain, in clinic c/o radiating pain down b/l upper extremities and feels this is her most concerning symptom, more so than left shoulder pain at this time with noted history of some conservative treatments.  Symptoms affecting ADLs.    Employment HX: , currently employed.    History of no EMG study.     Current Outpatient Medications:     albuterol (PROVENTIL) 2.5 mg /3 mL (0.083 %) nebulizer solution, USE 1 VIAL IN NEBULIZER EVERY 8 HOURS AS NEEDED, Disp: 1 each, Rfl: 3    baclofen (LIORESAL) 5 mg Tab tablet, Take 1 tablet (5 mg total) by mouth 2 (two) times daily as needed (tension)., Disp: 30 tablet, Rfl: 0    cetirizine (ZYRTEC) 10 MG tablet, Take 10 mg by mouth  once daily., Disp: , Rfl:     cloNIDine (CATAPRES) 0.1 MG tablet, Take 1 tablet (0.1 mg total) by mouth daily as needed., Disp: 90 tablet, Rfl: 3    DULERA 100-5 mcg/actuation HFAA, Inhale 1 puff into the lungs 2 (two) times daily., Disp: 8.8 g, Rfl: 5    EMGALITY  mg/mL PnIj, AS DIRECTED SUBCUTANEOUS MONTHLY 30 DAY(S), Disp: 1 each, Rfl: 0    ergocalciferol (ERGOCALCIFEROL) 50,000 unit Cap, Take 1 capsule (50,000 Units total) by mouth every 7 days., Disp: 12 capsule, Rfl: 3    ezetimibe (ZETIA) 10 mg tablet, Take 1 tablet (10 mg total) by mouth every evening., Disp: 90 tablet, Rfl: 3    fluticasone propionate (FLONASE) 50 mcg/actuation nasal spray, 2 sprays (100 mcg total) by Each Nostril route daily as needed for Allergies or Rhinitis., Disp: 18.2 mL, Rfl: 3    folic acid (FOLVITE) 1 MG tablet, Take 1 tablet (1 mg total) by mouth once daily. After food, Disp: 30 tablet, Rfl: 5    gabapentin (NEURONTIN) 600 MG tablet, Take 600 mg by mouth 2 (two) times daily., Disp: , Rfl:     isosorbide dinitrate (ISORDIL) 40 MG Tab, Take 1 tablet (40 mg total) by mouth once daily., Disp: 90 tablet, Rfl: 3    LINZESS 145 mcg Cap capsule, SMARTSI MCG By Mouth Daily, Disp: , Rfl:     metoclopramide HCl (REGLAN) 10 MG tablet, Take 1 tablet (10 mg total) by mouth every 6 (six) hours., Disp: 20 tablet, Rfl: 0    metoprolol succinate (TOPROL-XL) 100 MG 24 hr tablet, Take 1 tablet (100 mg total) by mouth every evening., Disp: 90 tablet, Rfl: 3    multivitamin with minerals tablet, Take 1 tablet by mouth once daily., Disp: , Rfl:     nitroGLYCERIN (NITROSTAT) 0.4 MG SL tablet, Place 1 tablet (0.4 mg total) under the tongue every 5 (five) minutes as needed for Chest pain., Disp: 25 tablet, Rfl: 4    omega 3-dha-epa-fish oil (FISH OIL) 1,000 mg (120 mg-180 mg) Cap, , Disp: , Rfl:     ondansetron (ZOFRAN) 8 MG tablet, Take 1 tablet (8 mg total) by mouth 2 (two) times daily as needed for Nausea. TAKE 1 TABLET BY MOUTH EVERY 8 HOURS  AS NEEDED FOR NAUSEA AND VOMITING, Disp: 30 tablet, Rfl: 1    pantoprazole (PROTONIX) 40 MG tablet, Take 40 mg by mouth every morning., Disp: , Rfl:     RESTASIS 0.05 % ophthalmic emulsion, Place 1 drop into both eyes 2 (two) times daily., Disp: , Rfl:     UBRELVY 100 mg tablet, Take 1 tablet (100 mg total) by mouth 2 (two) times daily as needed for Migraine., Disp: 16 tablet, Rfl: 4    venlafaxine (EFFEXOR-XR) 37.5 MG 24 hr capsule, Take 1 capsule (37.5 mg total) by mouth every evening., Disp: 30 capsule, Rfl: 6  Review of patient's allergies indicates:   Allergen Reactions    Nsaids (non-steroidal anti-inflammatory drug) Other (See Comments)     Causes ulcers to bleed.    Ibuprofen     Latex     Meloxicam     Nsaids (non-steroidal anti-inflammatory drug) Nausea Only     REVIEW OF SYSTEMS:  A ten-point review of systems was performed and is negative, except as mentioned above  Objective:   Body mass index is 40.74 kg/m².   Vitals:    02/11/25 1022   BP: 136/83   Pulse: 78   SpO2: 99%   Weight: 114.5 kg (252 lb 6.8 oz)   PainSc:   8   PainLoc: Shoulder      MSK Hand/ Wrist Exam  General:  no apparent distress, no pain indicators,  obesity  Inspection:  LEFT HAND RIGHT HAND   no joint swelling, no mass/ cyst noted, no soft tissue swelling, no erythema, no lesions, no contusion, no gross deformities, no nodules, no atrophy of thenar or hypothenar eminence,  no scars, no discoloration noted no joint swelling, no mass/ cyst noted, no soft tissue swelling, no erythema, no lesions, no contusion, no gross deformities, no nodules, no atrophy of thenar or hypothenar eminence,  no scars, no discoloration noted   Palpation:     LEFT HAND RIGHT HAND   no joint tenderness, normal temperature, no palpable palm nodules, no finger joint tenderness or crepitus, no active triggering or locking of digits and no tenderness of digital flexor tendons, no tenderness of thenar and hypothenar eminence no joint tenderness, normal  temperature, no palpable palm nodules, no finger joint tenderness or crepitus, no active triggering or locking of digits and no tenderness of digital flexor tendons, no tenderness of thenar and hypothenar eminence     ROM LEFT HAND RIGHT HAND   Wrist Flexion / Extension   80 / 75 80 / 75   Wrist Radial / Ulnar Deviation   15 / 30 15 / 30   Thumb CMC/ MCP/ IP WNL w/o pain WNL w/o pain   Finger MCP/PIP/DIP WNL w/o pain WNL w/o pain      Strength LEFT HAND RIGHT HAND    4 / 5 w/o pain 4 / 5 w/o pain       Special Testing: L+ L-- R+ R-- Not Tested     Carpal Tunnel Syndrome         Phalen [x] [] [x] [] []    Ambrosio Carpal Compression [x] [] [x] [] []    Cubital Tunnel Syndrome         Tinel's Test @ elbow [x] [] [x] [] []    De Quervain's Tenosynovitis         Finkelstein Test [] [x] [] [x] []    Ligament Injury         Scaphoid Shift Test [] [] [] [] [x]    Lunotriquetral Shuck Test [] [] [] [] [x]    UCL Ligament Thumb Test [] [] [] [] [x]    Ulnar Fovea Sign [] [] [] [] [x]    Nerve Injury  Radial Nerve  IP joint extension against resistance intact   Median Nerve Palmar abduction of thumb intact   Anterior Interosseous Branch OK sign intact   Ulnar Nerve   Abduction of fingers against resistance intact, Froment's sign negative   Neuro/ Vascular: Spurling's Test positive, Intact to light touch, capillary refill 2 seconds,  radial pulse equal 2+, 2 point discrimination intact, Robbin's test intact  Psych: Awake, alert, oriented, normal mood and affect  Lymphatic: No LAD  Skin/ Soft Tissue: no rash, skin intact  Cardio: no edema, vascular integrity noted  Resp: no increased respiratory effort noted   Assessment:   IMAGING: XR Ordered by me today 4 views of left shoulder reviewed and independently interpreted by me with findings suggestive of arthritic changes .  Awaiting radiologist findings.  Findings discussed with patient today.    Labs:    Hemoglobin A1c   Date Value Ref Range Status   05/23/2023 5.3 <=7.0 %  Final      EMG Study: none  EMR REVIEW: completed with noted prior visit 3/27/24 reviewed.  DX & Plan:   DIAGNOSIS: Moderate exacerbation of chronic Left  GH arthritis with rotator cuff syndrome complicated by bilateral hand numbness    1. Bilateral hand numbness    2. Rotator cuff syndrome, left    3. Arthralgia of shoulder region, left    4. BMI 40.0-44.9, adult       TREATMENT PLAN:  Orders Placed This Encounter    X-Ray Shoulder 2 or More Views Left     Treatment Plan: EMG study needed for definitive DX. Patient referred to [facility] [phone] Address: [address] [place] [address] [address] [place] [address]. Patient instructed to call clinic for f/u once study completed. Appropriate treatment plan will be based on EMG findings.   Ongoing education about DX and treatment recommendations including conservative treatments of daily HEP for carpal tunnel, nocturnal splinting of wrist PRN and OTC NSAID as needed according to label instructions if able to tolerate.   Procedure: n/a  RX Medications: continue medications as RX per PCP .     RTC: after EMG study complete       Leah Menard-Neumann FNP [facility]  Procedure Note:   None   Time Based Billing   Total Time Spent with Patient: 30 minutes .  Visit Start Time: 1040  10 minutes spent prior to visit reviewing EMR, prior labs and x-rays.  10 minutes spent in visit with patient face-to-face time completing exam, obtaining history, educating on DX and treatment plan.   10 minutes spent after visit completing EMR documentation.   Visit End Time: 1110      Please be aware that this note has been generated with the assistance of [employer] voice-to-text.  Please excuse any spelling or grammatical errors.

## 2025-02-24 DIAGNOSIS — E78.49 OTHER HYPERLIPIDEMIA: ICD-10-CM

## 2025-02-24 DIAGNOSIS — G43.E09 CHRONIC MIGRAINE WITH AURA WITHOUT STATUS MIGRAINOSUS, NOT INTRACTABLE: ICD-10-CM

## 2025-02-24 DIAGNOSIS — I10 PRIMARY HYPERTENSION: ICD-10-CM

## 2025-02-24 DIAGNOSIS — G43.E01 CHRONIC MIGRAINE WITH AURA AND WITH STATUS MIGRAINOSUS, NOT INTRACTABLE: ICD-10-CM

## 2025-02-24 DIAGNOSIS — Z76.89 ENCOUNTER TO ESTABLISH CARE: ICD-10-CM

## 2025-02-24 DIAGNOSIS — Z76.0 ENCOUNTER FOR MEDICATION REFILL: ICD-10-CM

## 2025-02-24 RX ORDER — METOPROLOL SUCCINATE 100 MG/1
100 TABLET, EXTENDED RELEASE ORAL NIGHTLY
Qty: 90 TABLET | Refills: 3 | Status: SHIPPED | OUTPATIENT
Start: 2025-02-24

## 2025-02-24 RX ORDER — ISOSORBIDE DINITRATE 40 MG/1
40 TABLET ORAL DAILY
Qty: 90 TABLET | Refills: 3 | Status: SHIPPED | OUTPATIENT
Start: 2025-02-24

## 2025-02-24 RX ORDER — VENLAFAXINE HYDROCHLORIDE 37.5 MG/1
37.5 CAPSULE, EXTENDED RELEASE ORAL NIGHTLY
Qty: 30 CAPSULE | Refills: 6 | Status: SHIPPED | OUTPATIENT
Start: 2025-02-24 | End: 2026-02-24

## 2025-02-24 RX ORDER — EZETIMIBE 10 MG/1
10 TABLET ORAL NIGHTLY
Qty: 90 TABLET | Refills: 3 | Status: SHIPPED | OUTPATIENT
Start: 2025-02-24

## 2025-02-24 RX ORDER — NITROGLYCERIN 0.4 MG/1
0.4 TABLET SUBLINGUAL EVERY 5 MIN PRN
Qty: 25 TABLET | Refills: 4 | Status: SHIPPED | OUTPATIENT
Start: 2025-02-24

## 2025-02-24 RX ORDER — UBROGEPANT 100 MG/1
100 TABLET ORAL 2 TIMES DAILY PRN
Qty: 16 TABLET | Refills: 4 | Status: SHIPPED | OUTPATIENT
Start: 2025-02-24

## 2025-02-24 RX ORDER — GALCANEZUMAB 120 MG/ML
INJECTION, SOLUTION SUBCUTANEOUS
Qty: 1 EACH | Refills: 4 | Status: SHIPPED | OUTPATIENT
Start: 2025-02-24

## 2025-02-26 ENCOUNTER — PATIENT MESSAGE (OUTPATIENT)
Dept: NEUROLOGY | Facility: CLINIC | Age: 50
End: 2025-02-26
Payer: MEDICAID

## 2025-02-26 ENCOUNTER — PATIENT MESSAGE (OUTPATIENT)
Dept: ORTHOPEDICS | Facility: CLINIC | Age: 50
End: 2025-02-26
Payer: MEDICAID

## 2025-03-17 ENCOUNTER — PATIENT MESSAGE (OUTPATIENT)
Dept: RHEUMATOLOGY | Facility: CLINIC | Age: 50
End: 2025-03-17
Payer: MEDICAID

## 2025-03-17 ENCOUNTER — PATIENT MESSAGE (OUTPATIENT)
Dept: ORTHOPEDICS | Facility: CLINIC | Age: 50
End: 2025-03-17
Payer: MEDICAID

## 2025-03-17 DIAGNOSIS — G43.E01 CHRONIC MIGRAINE WITH AURA AND WITH STATUS MIGRAINOSUS, NOT INTRACTABLE: ICD-10-CM

## 2025-03-17 DIAGNOSIS — G43.E09 CHRONIC MIGRAINE WITH AURA WITHOUT STATUS MIGRAINOSUS, NOT INTRACTABLE: ICD-10-CM

## 2025-03-17 DIAGNOSIS — M31.19 TTP (THROMBOTIC THROMBOCYTOPENIC PURPURA): Primary | ICD-10-CM

## 2025-03-17 NOTE — PROGRESS NOTES
Subjective:       Patient ID: Nicole Quiñones is a 50 y.o. female.    Chief Complaint:  I feel great    Diagnosis: Relapsed thrombotic thrombocytopenic purpura                    Microangiopathic hemolytic anemia                    Rheumatoid arthritis    Treatment History  Plasma exchange 2/27-24-3/04/24  Steroid taper (completed 4/03/24)  Rituximab x 4 (completed 3/21/24)    Current Treatment: Observation    Clinical History:  Black female with a history of TTP initially diagnosed in 2015.  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  She has been followed by a hematologist in [place].  She relocated to [place] to live with her daughter approximately 8 months ago.  She presented to the ER 2/25/24 with low-grade fever, nausea/vomiting and abdominal discomfort.  She denied overt diarrhea.  Laboratory on presentation showed a platelet count of 5000.  Hemoglobin was 12.4 with microcytic, hypochromic indices and WBC 6.4.  LDH was elevated at 711 and haptoglobin was undetectable.  UPT was negative.  CMP showed mild renal insufficiency with a BUN of 24.9 and creatinine 1.36.  Peripheral smear showed microcytic, hypochromic red blood cells, increased reticulocytes and the presence of schistocytes and target cells.  Platelets were markedly decreased.  She was transferred to [facility] for initiation of plasmapheresis.  She received 1 unit of platelets at the outside facility prior to transfer.  On arrival here, she was started on prednisone 1 mg/kg daily and received 1 unit of FFP.     Hospital Course  2/27/24:  Started daily plasma exchange 1 plasma volume (40 cc/kg) exchange with FFP.  Platelet count 12,000. ADAMTS-13 level drawn 2/26/24 <5%.  2/28/24:  Day 2 plasma exchange.  Change to 1/2 albumin + 1/2 FFP secondary to significant urticaria.  2/29/24: Day 3 plasma exchange.  1/2 albumin + 1/2 FFP.  03/1/24: Day 4 plasma exchange.  1/2 albumin + 1/2 FFP  03/4/24:  Day 5  plasma exchange.  1/2 albumin + 1/2 FFP  03/6/24: Discharged from the hospital    Outpatient plasma exchange could not be coordinated with the hospital.  Systemic therapy was recommended with Rituxan weekly x4. She completed 4 weeks of treatment 3/21/24.  Prednisone was tapered and discontinued by 4/3/24.  She has remained stable on follow-up without evidence of relapse.     Interval History  She returns to the office today by herself for a four-month follow-up visit.  She continues to feel well.  She reports an excellent energy level.  She has had no recent illnesses or infections.  No changes in her medications.  Her CBC remains entirely normal.  She denies any fever, chills, night sweats or weight loss.  No abnormal bruising or bleeding.       Review of Systems   Constitutional:  Negative for appetite change, fatigue, fever and unexpected weight change.   HENT:  Negative for mouth sores, sore throat and trouble swallowing.    Eyes: Negative.    Respiratory:  Negative for cough and shortness of breath.    Cardiovascular:  Negative for chest pain, palpitations and leg swelling.   Gastrointestinal:  Negative for abdominal distention, abdominal pain, constipation, diarrhea and nausea.   Genitourinary:  Negative for dysuria, flank pain, frequency and hematuria.   Musculoskeletal:  Positive for arthralgias. Negative for back pain.   Integumentary:  Negative for pallor and rash.   Neurological:  Negative for dizziness, weakness, numbness and headaches.   Hematological:  Negative for adenopathy. Does not bruise/bleed easily.   Psychiatric/Behavioral: Negative.         PMHx:  HTN, hyperlipidemia, rheumatoid arthritis, migraine headaches, TTP  PSHx:  Endometrial ablation, left carpal tunnel release, right shoulder surgery  SH:  Lifetime nonsmoker.  Previous alcohol, none since age 20.  Lives in [place] with her daughter.  Provides  in her home.  FH:  PGF had a CVA.  No family history of cancer.    Objective:      Vitals:    03/31/25 0927   BP: 130/86   Pulse: 73   Resp: 15   Temp: 97.8 °F (36.6 °C)     Physical Exam  Constitutional:       Comments: Morbidly obese black female in NAD   HENT:      Head: Normocephalic.      Mouth/Throat:      Mouth: Mucous membranes are moist.      Pharynx: Oropharynx is clear.   Eyes:      General: No scleral icterus.     Extraocular Movements: Extraocular movements intact.      Pupils: Pupils are equal, round, and reactive to light.   Cardiovascular:      Rate and Rhythm: Normal rate and regular rhythm.      Heart sounds: No murmur heard.  Pulmonary:      Comments: Lungs clear to auscultation  Abdominal:      General: Bowel sounds are normal. There is no distension.      Palpations: Abdomen is soft. There is no mass.      Tenderness: There is no abdominal tenderness.   Musculoskeletal:         General: No swelling or tenderness. Normal range of motion.      Cervical back: Neck supple. No tenderness.   Skin:     General: Skin is warm and dry.      Findings: No bruising or rash.      Comments: Resolving bruise, right chest above breast   Neurological:      General: No focal deficit present.      Mental Status: She is alert and oriented to person, place, and time.      Sensory: Sensory deficit (fingertip neuropathy, grade 1, pre-existing) present.          LABORATORY  Recent Results (from the past 2 weeks)   CBC with Differential    Collection Time: 03/31/25  9:16 AM   Result Value Ref Range    WBC 5.29 4.50 - 11.50 x10(3)/mcL    RBC 6.06 (H) 4.20 - 5.40 x10(6)/mcL    Hgb 13.6 12.0 - 16.0 g/dL    Hct 43.0 37.0 - 47.0 %    MCV 71.0 (L) 80.0 - 94.0 fL    MCH 22.4 (L) 27.0 - 31.0 pg    MCHC 31.6 (L) 33.0 - 36.0 g/dL    RDW 16.4 11.5 - 17.0 %    Platelet 268 130 - 400 x10(3)/mcL    MPV 9.8 7.4 - 10.4 fL    Neut % 62.8 %    Lymph % 19.7 %    Mono % 12.3 %    Eos % 4.2 %    Basophil % 0.8 %    Imm Grans % 0.2 %    Neut # 3.33 2.1 - 9.2 x10(3)/mcL    Lymph # 1.04 0.6 - 4.6 x10(3)/mcL    Mono # 0.65  0.1 - 1.3 x10(3)/mcL    Eos # 0.22 0 - 0.9 x10(3)/mcL    Baso # 0.04 <=0.2 x10(3)/mcL    Imm Gran # 0.01 0.00 - 0.04 x10(3)/mcL       Assessment:   TTP -  in clinical remission  Microangiopathic hemolytic anemia - resolved  Rheumatoid arthritis    Plan:   CBC today is normal with no findings suspicious for disease recurrence.  RTC in 4 months for a follow-up visit with a CBC and ADAMTS 13 level.      NEO LUJAN MD    Other Physicians  Zari Jett, MOSHE Block

## 2025-03-18 DIAGNOSIS — Z76.0 ENCOUNTER FOR ISSUE OF REPEAT PRESCRIPTION: ICD-10-CM

## 2025-03-18 RX ORDER — GALCANEZUMAB 120 MG/ML
INJECTION, SOLUTION SUBCUTANEOUS
Qty: 1 EACH | Refills: 4 | Status: SHIPPED | OUTPATIENT
Start: 2025-03-18

## 2025-03-18 RX ORDER — VENLAFAXINE HYDROCHLORIDE 37.5 MG/1
37.5 CAPSULE, EXTENDED RELEASE ORAL NIGHTLY
Qty: 30 CAPSULE | Refills: 6 | Status: SHIPPED | OUTPATIENT
Start: 2025-03-18 | End: 2026-03-18

## 2025-03-25 ENCOUNTER — HOSPITAL ENCOUNTER (EMERGENCY)
Facility: HOSPITAL | Age: 50
Discharge: HOME | End: 2025-03-25
Attending: INTERNAL MEDICINE
Payer: MEDICAID

## 2025-03-25 VITALS
BODY MASS INDEX: 40.66 KG/M2 | DIASTOLIC BLOOD PRESSURE: 76 MMHG | HEIGHT: 66 IN | RESPIRATION RATE: 18 BRPM | SYSTOLIC BLOOD PRESSURE: 132 MMHG | TEMPERATURE: 98 F | OXYGEN SATURATION: 100 % | WEIGHT: 253 LBS | HEART RATE: 82 BPM

## 2025-03-25 DIAGNOSIS — M54.12 CERVICAL RADICULOPATHY: Primary | ICD-10-CM

## 2025-03-25 PROCEDURE — 63600175 PHARM REV CODE 636 W HCPCS

## 2025-03-25 PROCEDURE — 99284 EMERGENCY DEPT VISIT MOD MDM: CPT | Mod: 25

## 2025-03-25 PROCEDURE — 96372 THER/PROPH/DIAG INJ SC/IM: CPT

## 2025-03-25 RX ORDER — METHOCARBAMOL 500 MG/1
1000 TABLET, FILM COATED ORAL 3 TIMES DAILY
Qty: 30 TABLET | Refills: 0 | Status: SHIPPED | OUTPATIENT
Start: 2025-03-25 | End: 2025-03-31

## 2025-03-25 RX ORDER — GABAPENTIN 600 MG/1
600 TABLET ORAL 3 TIMES DAILY
Qty: 90 TABLET | Refills: 0 | Status: SHIPPED | OUTPATIENT
Start: 2025-03-25 | End: 2025-04-24

## 2025-03-25 RX ORDER — DEXAMETHASONE SODIUM PHOSPHATE 4 MG/ML
8 INJECTION, SOLUTION INTRA-ARTICULAR; INTRALESIONAL; INTRAMUSCULAR; INTRAVENOUS; SOFT TISSUE
Status: COMPLETED | OUTPATIENT
Start: 2025-03-25 | End: 2025-03-25

## 2025-03-25 RX ADMIN — DEXAMETHASONE SODIUM PHOSPHATE 8 MG: 4 INJECTION, SOLUTION INTRA-ARTICULAR; INTRALESIONAL; INTRAMUSCULAR; INTRAVENOUS; SOFT TISSUE at 11:03

## 2025-03-25 NOTE — ED PROVIDER NOTES
Encounter Date: 3/25/2025       History     Chief Complaint   Patient presents with    Neck Pain     Reports neck pain that radiates down right side. States that it started 4 days ago. Denies trauma       A 50 y.o. female patient with a history of anemia, arthritis, fibromyalgia, HLD, HTN, TTP presents to the ED with neck pain radiating down the right shoulder and arm. The onset is 4 days ago. Patient admits similar episodes down the left side. Patient denies any injury or trauma. Denies incontinence.       The history is provided by the patient.     Review of patient's allergies indicates:   Allergen Reactions    Nsaids (non-steroidal anti-inflammatory drug) Other (See Comments)     Causes ulcers to bleed.    Ibuprofen     Latex     Meloxicam     Nsaids (non-steroidal anti-inflammatory drug) Nausea Only     Past Medical History:   Diagnosis Date    Anemia     Arthritis     Fibromyalgia     Hypercholesterolemia     Hypertension     Migraines     Personal history of colonic polyps 09/05/2023    colonoscopy/polypectomy    Thrombocytopenia, unspecified     TTP (thrombotic thrombocytopenic purpura)     TTP (thrombotic thrombocytopenic purpura)      Past Surgical History:   Procedure Laterality Date    CARPAL TUNNEL RELEASE      COLONOSCOPY W/ BIOPSIES AND POLYPECTOMY  09/05/2023    Deepak De Luna MD 5 years    ESOPHAGOGASTRODUODENOSCOPY  04/2024    Right shouler surgery      SURGICAL REMOVAL OF ENDOMETRIOSIS       Family History   Problem Relation Name Age of Onset    Hypertension Mother Mom     Arthritis Mother Mom     Depression Mother Mom     Thyroid disease Sister Sis     Asthma Sister Sis     Intellectual disability Brother Bro     Epilepsy Brother Bro     Stroke Maternal Grandfather Kid      Social History[1]  Review of Systems   Constitutional:  Negative for chills and fever.   Eyes:  Negative for visual disturbance.   Respiratory:  Negative for shortness of breath.    Cardiovascular:  Negative for chest pain.    Gastrointestinal:  Negative for nausea and vomiting.   Genitourinary:  Negative for difficulty urinating and dysuria.   Musculoskeletal:  Positive for back pain and myalgias.   Skin:  Negative for color change and rash.   Neurological:  Negative for weakness and headaches.   Hematological:  Does not bruise/bleed easily.   Psychiatric/Behavioral:  Negative for confusion.    All other systems reviewed and are negative.      Physical Exam     Initial Vitals [03/25/25 0803]   BP Pulse Resp Temp SpO2   (!) 139/95 73 18 96.3 °F (35.7 °C) 100 %      MAP       --         Physical Exam    Nursing note and vitals reviewed.  Constitutional: She appears well-developed and well-nourished.   HENT:   Head: Normocephalic and atraumatic.   Right Ear: External ear normal.   Left Ear: External ear normal.   Nose: Nose normal. Mouth/Throat: Oropharynx is clear and moist.   Eyes: Conjunctivae and EOM are normal.   Neck: Neck supple. There are no signs of injury.   Cardiovascular:  Normal rate, regular rhythm and normal heart sounds.     Exam reveals no gallop and no friction rub.       No murmur heard.  Pulmonary/Chest: Breath sounds normal. No respiratory distress. She has no wheezes. She has no rhonchi. She has no rales.   Musculoskeletal:      Cervical back: Neck supple. Tenderness and bony tenderness present. No swelling, edema, deformity, signs of trauma or spasms. Pain with movement present. Normal range of motion.        Back:      Neurological: She is alert and oriented to person, place, and time. GCS score is 15. GCS eye subscore is 4. GCS verbal subscore is 5. GCS motor subscore is 6.   Skin: Skin is warm and dry. Capillary refill takes less than 2 seconds.         ED Course   Procedures  Labs Reviewed - No data to display       Imaging Results    None          Medications   dexAMETHasone injection 8 mg (8 mg Intramuscular Given 3/25/25 1112)     Medical Decision Making  A 50 y.o. female patient with a history of anemia,  arthritis, fibromyalgia, HLD, HTN, TTP presents to the ED with neck pain radiating down the right shoulder and arm. The onset is 4 days ago. Patient admits similar episodes down the left side. Patient denies any injury or trauma. Denies incontinence.       Patient's condition improved with the following Medications  dexAMETHasone injection 8 mg (8 mg Intramuscular Given 3/25/25 1112)    Clinical impression:  Cervical radiculopathy (Primary)    Patient is non-toxic appearing and tolerating nutritional intake. Patient's vital signs and clinical condition are stable for DC with ED Prescriptions     Medication Sig Dispense Start Date End Date Auth. Provider    gabapentin (NEURONTIN) 600 MG tablet Take 1 tablet (600 mg total) by   mouth 3 (three) times daily. 90 tablet 3/25/2025 4/24/2025 Rin Erwin PA    methocarbamoL (ROBAXIN) 500 MG Tab Take 2 tablets (1,000 mg total) by   mouth 3 (three) times daily. for 5 days 30 tablet 3/25/2025 3/30/2025   Rin Erwin PA         Follow-up: PCP or Internal medicine clinic within 3 days  Referrals made: none    Strict follow-up precautions given. Patient verbalizes understanding of treatment plan and ED return precautions.         Risk  Prescription drug management.  Risk Details: Given strict ED return precautions. I have spoken with the patient and/or caregivers. I have explained the patient's condition, diagnoses and treatment plan based on the information available to me at this time. I have answered the patient's and/or caregiver's questions and addressed any concerns. The patient and/or caregivers have as good an understanding of the patient's diagnosis, condition and treatment plan as can be expected at this point. The patient's condition is stable and appropriate for discharge from the emergency department.      The patient will pursue further outpatient evaluation with the primary care physician or other designated or consulting physician as outlined in the  discharge instructions. The patient and/or caregivers are agreeable to this plan of care and follow-up instructions have been explained in detail. The patient and/or caregivers have received these instructions in written format and have expressed an understanding of the discharge instructions. The patient and/or caregivers are aware that any significant change in condition or worsening of symptoms should prompt an immediate return to this or the closest emergency department or a call to 911.               Additional MDM:   Differential Diagnosis:   Other: The following diagnoses were also considered and will be evaluated: Contusion, Strain and Sprain.                                   Clinical Impression:  Final diagnoses:  [M54.12] Cervical radiculopathy (Primary)          ED Disposition Condition    Discharge Stable          ED Prescriptions       Medication Sig Dispense Start Date End Date Auth. Provider    gabapentin (NEURONTIN) 600 MG tablet Take 1 tablet (600 mg total) by mouth 3 (three) times daily. 90 tablet 3/25/2025 4/24/2025 Rin Erwin PA    methocarbamoL (ROBAXIN) 500 MG Tab Take 2 tablets (1,000 mg total) by mouth 3 (three) times daily. for 5 days 30 tablet 3/25/2025 3/30/2025 Rin Erwin PA          Follow-up Information       Follow up With Specialties Details Why Contact Info    [facility] Emergency Medicine Go to  If symptoms worsen, As needed [address] [facility] [place] [address] [phone]    Zari Jett NP Family Medicine In 3 days  [address], [address]  [place] [address] [phone]                 [1]   Social History  Tobacco Use    Smoking status: Never     Passive exposure: Never    Smokeless tobacco: Never   Substance Use Topics    Alcohol use: Not Currently     Comment: Last drink @ 20    Drug use: Never        Rin Erwin PA  03/25/25 3009

## 2025-03-26 RX ORDER — GABAPENTIN 600 MG/1
TABLET ORAL
Qty: 30 TABLET | Refills: 3 | OUTPATIENT
Start: 2025-03-26

## 2025-03-31 ENCOUNTER — OFFICE VISIT (OUTPATIENT)
Dept: HEMATOLOGY/ONCOLOGY | Facility: CLINIC | Age: 50
End: 2025-03-31
Payer: MEDICAID

## 2025-03-31 ENCOUNTER — LAB VISIT (OUTPATIENT)
Dept: LAB | Facility: HOSPITAL | Age: 50
End: 2025-03-31
Attending: INTERNAL MEDICINE
Payer: MEDICAID

## 2025-03-31 VITALS
DIASTOLIC BLOOD PRESSURE: 86 MMHG | TEMPERATURE: 98 F | SYSTOLIC BLOOD PRESSURE: 130 MMHG | HEIGHT: 66 IN | RESPIRATION RATE: 15 BRPM | HEART RATE: 73 BPM | BODY MASS INDEX: 41.04 KG/M2 | OXYGEN SATURATION: 98 % | WEIGHT: 255.38 LBS

## 2025-03-31 DIAGNOSIS — M31.19 TTP (THROMBOTIC THROMBOCYTOPENIC PURPURA): Primary | ICD-10-CM

## 2025-03-31 DIAGNOSIS — M31.19 TTP (THROMBOTIC THROMBOCYTOPENIC PURPURA): ICD-10-CM

## 2025-03-31 LAB
BASOPHILS # BLD AUTO: 0.04 X10(3)/MCL
BASOPHILS NFR BLD AUTO: 0.8 %
EOSINOPHIL # BLD AUTO: 0.22 X10(3)/MCL (ref 0–0.9)
EOSINOPHIL NFR BLD AUTO: 4.2 %
ERYTHROCYTE [DISTWIDTH] IN BLOOD BY AUTOMATED COUNT: 16.4 % (ref 11.5–17)
HCT VFR BLD AUTO: 43 % (ref 37–47)
HGB BLD-MCNC: 13.6 G/DL (ref 12–16)
IMM GRANULOCYTES # BLD AUTO: 0.01 X10(3)/MCL (ref 0–0.04)
IMM GRANULOCYTES NFR BLD AUTO: 0.2 %
LYMPHOCYTES # BLD AUTO: 1.04 X10(3)/MCL (ref 0.6–4.6)
LYMPHOCYTES NFR BLD AUTO: 19.7 %
MCH RBC QN AUTO: 22.4 PG (ref 27–31)
MCHC RBC AUTO-ENTMCNC: 31.6 G/DL (ref 33–36)
MCV RBC AUTO: 71 FL (ref 80–94)
MONOCYTES # BLD AUTO: 0.65 X10(3)/MCL (ref 0.1–1.3)
MONOCYTES NFR BLD AUTO: 12.3 %
NEUTROPHILS # BLD AUTO: 3.33 X10(3)/MCL (ref 2.1–9.2)
NEUTROPHILS NFR BLD AUTO: 62.8 %
PLATELET # BLD AUTO: 268 X10(3)/MCL (ref 130–400)
PMV BLD AUTO: 9.8 FL (ref 7.4–10.4)
RBC # BLD AUTO: 6.06 X10(6)/MCL (ref 4.2–5.4)
WBC # BLD AUTO: 5.29 X10(3)/MCL (ref 4.5–11.5)

## 2025-03-31 PROCEDURE — 85025 COMPLETE CBC W/AUTO DIFF WBC: CPT

## 2025-03-31 PROCEDURE — 36415 COLL VENOUS BLD VENIPUNCTURE: CPT

## 2025-03-31 PROCEDURE — 3075F SYST BP GE 130 - 139MM HG: CPT | Mod: CPTII,,, | Performed by: INTERNAL MEDICINE

## 2025-03-31 PROCEDURE — 99999 PR PBB SHADOW E&M-EST. PATIENT-LVL V: CPT | Mod: PBBFAC,,, | Performed by: INTERNAL MEDICINE

## 2025-03-31 PROCEDURE — 3008F BODY MASS INDEX DOCD: CPT | Mod: CPTII,,, | Performed by: INTERNAL MEDICINE

## 2025-03-31 PROCEDURE — 99213 OFFICE O/P EST LOW 20 MIN: CPT | Mod: S$PBB,,, | Performed by: INTERNAL MEDICINE

## 2025-03-31 PROCEDURE — 1159F MED LIST DOCD IN RCRD: CPT | Mod: CPTII,,, | Performed by: INTERNAL MEDICINE

## 2025-03-31 PROCEDURE — 1160F RVW MEDS BY RX/DR IN RCRD: CPT | Mod: CPTII,,, | Performed by: INTERNAL MEDICINE

## 2025-03-31 PROCEDURE — 99215 OFFICE O/P EST HI 40 MIN: CPT | Mod: PBBFAC | Performed by: INTERNAL MEDICINE

## 2025-03-31 PROCEDURE — 3079F DIAST BP 80-89 MM HG: CPT | Mod: CPTII,,, | Performed by: INTERNAL MEDICINE

## 2025-04-09 ENCOUNTER — HOSPITAL ENCOUNTER (EMERGENCY)
Facility: HOSPITAL | Age: 50
Discharge: LEFT BEFORE DISCHARGE | End: 2025-04-10
Payer: MEDICAID

## 2025-04-09 VITALS
WEIGHT: 253 LBS | TEMPERATURE: 98 F | DIASTOLIC BLOOD PRESSURE: 88 MMHG | SYSTOLIC BLOOD PRESSURE: 129 MMHG | RESPIRATION RATE: 16 BRPM | HEIGHT: 66 IN | OXYGEN SATURATION: 98 % | HEART RATE: 67 BPM | BODY MASS INDEX: 40.66 KG/M2

## 2025-04-09 DIAGNOSIS — R53.1 WEAKNESS: ICD-10-CM

## 2025-04-09 LAB
ALBUMIN SERPL-MCNC: 3.6 G/DL (ref 3.5–5)
ALBUMIN/GLOB SERPL: 1.1 RATIO (ref 1.1–2)
ALP SERPL-CCNC: 153 UNIT/L (ref 40–150)
ALT SERPL-CCNC: 10 UNIT/L (ref 0–55)
ANION GAP SERPL CALC-SCNC: 7 MEQ/L
AST SERPL-CCNC: 14 UNIT/L (ref 11–45)
BACTERIA #/AREA URNS AUTO: ABNORMAL /HPF
BASOPHILS # BLD AUTO: 0.04 X10(3)/MCL
BASOPHILS NFR BLD AUTO: 1 %
BILIRUB SERPL-MCNC: 0.6 MG/DL
BILIRUB UR QL STRIP.AUTO: NEGATIVE
BUN SERPL-MCNC: 15.2 MG/DL (ref 9.8–20.1)
CALCIUM SERPL-MCNC: 8.6 MG/DL (ref 8.4–10.2)
CHLORIDE SERPL-SCNC: 114 MMOL/L (ref 98–107)
CLARITY UR: CLEAR
CO2 SERPL-SCNC: 22 MMOL/L (ref 22–29)
COLOR UR AUTO: YELLOW
CREAT SERPL-MCNC: 0.88 MG/DL (ref 0.55–1.02)
CREAT/UREA NIT SERPL: 17
EOSINOPHIL # BLD AUTO: 0.14 X10(3)/MCL (ref 0–0.9)
EOSINOPHIL NFR BLD AUTO: 3.3 %
ERYTHROCYTE [DISTWIDTH] IN BLOOD BY AUTOMATED COUNT: 17.7 % (ref 11.5–17)
GFR SERPLBLD CREATININE-BSD FMLA CKD-EPI: >60 ML/MIN/1.73/M2
GLOBULIN SER-MCNC: 3.3 GM/DL (ref 2.4–3.5)
GLUCOSE SERPL-MCNC: 112 MG/DL (ref 74–100)
GLUCOSE UR QL STRIP: NORMAL
HCT VFR BLD AUTO: 46.3 % (ref 37–47)
HGB BLD-MCNC: 14 G/DL (ref 12–16)
HGB UR QL STRIP: NEGATIVE
IMM GRANULOCYTES # BLD AUTO: 0.01 X10(3)/MCL (ref 0–0.04)
IMM GRANULOCYTES NFR BLD AUTO: 0.2 %
KETONES UR QL STRIP: NEGATIVE
LEUKOCYTE ESTERASE UR QL STRIP: NEGATIVE
LIPASE SERPL-CCNC: 18 U/L
LYMPHOCYTES # BLD AUTO: 0.99 X10(3)/MCL (ref 0.6–4.6)
LYMPHOCYTES NFR BLD AUTO: 23.6 %
MCH RBC QN AUTO: 21.7 PG (ref 27–31)
MCHC RBC AUTO-ENTMCNC: 30.2 G/DL (ref 33–36)
MCV RBC AUTO: 71.9 FL (ref 80–94)
MONOCYTES # BLD AUTO: 0.4 X10(3)/MCL (ref 0.1–1.3)
MONOCYTES NFR BLD AUTO: 9.5 %
MUCOUS THREADS URNS QL MICRO: ABNORMAL /LPF
NEUTROPHILS # BLD AUTO: 2.62 X10(3)/MCL (ref 2.1–9.2)
NEUTROPHILS NFR BLD AUTO: 62.4 %
NITRITE UR QL STRIP: NEGATIVE
NRBC BLD AUTO-RTO: 0 %
PH UR STRIP: 5.5 [PH]
PLATELET # BLD AUTO: 334 X10(3)/MCL (ref 130–400)
PMV BLD AUTO: 9.9 FL (ref 7.4–10.4)
POTASSIUM SERPL-SCNC: 4 MMOL/L (ref 3.5–5.1)
PROT SERPL-MCNC: 6.9 GM/DL (ref 6.4–8.3)
PROT UR QL STRIP: ABNORMAL
RBC # BLD AUTO: 6.44 X10(6)/MCL (ref 4.2–5.4)
RBC #/AREA URNS AUTO: ABNORMAL /HPF
SODIUM SERPL-SCNC: 143 MMOL/L (ref 136–145)
SP GR UR STRIP.AUTO: 1.03 (ref 1–1.03)
SQUAMOUS #/AREA URNS LPF: ABNORMAL /HPF
TROPONIN I SERPL-MCNC: <0.01 NG/ML (ref 0–0.04)
UROBILINOGEN UR STRIP-ACNC: NORMAL
WBC # BLD AUTO: 4.2 X10(3)/MCL (ref 4.5–11.5)
WBC #/AREA URNS AUTO: ABNORMAL /HPF

## 2025-04-09 PROCEDURE — 85025 COMPLETE CBC W/AUTO DIFF WBC: CPT | Performed by: NURSE PRACTITIONER

## 2025-04-09 PROCEDURE — 99284 EMERGENCY DEPT VISIT MOD MDM: CPT | Mod: 25

## 2025-04-09 PROCEDURE — 81001 URINALYSIS AUTO W/SCOPE: CPT | Performed by: NURSE PRACTITIONER

## 2025-04-09 PROCEDURE — 93010 ELECTROCARDIOGRAM REPORT: CPT | Mod: ,,, | Performed by: INTERNAL MEDICINE

## 2025-04-09 PROCEDURE — 84484 ASSAY OF TROPONIN QUANT: CPT | Performed by: NURSE PRACTITIONER

## 2025-04-09 PROCEDURE — 83690 ASSAY OF LIPASE: CPT | Performed by: NURSE PRACTITIONER

## 2025-04-09 PROCEDURE — 80053 COMPREHEN METABOLIC PANEL: CPT | Performed by: NURSE PRACTITIONER

## 2025-04-09 PROCEDURE — 93005 ELECTROCARDIOGRAM TRACING: CPT

## 2025-04-09 RX ORDER — SODIUM CHLORIDE 9 MG/ML
1000 INJECTION, SOLUTION INTRAVENOUS
Status: DISCONTINUED | OUTPATIENT
Start: 2025-04-09 | End: 2025-04-10 | Stop reason: HOSPADM

## 2025-04-09 NOTE — FIRST PROVIDER EVALUATION
Medical screening examination initiated.  I have conducted a focused provider triage encounter, findings are as follows:    Brief history of present illness:  Patient states that her blood pressure was low today when her nurse checked it. States nausea and dizzy.     Vitals:       Pertinent physical exam:  Awake, alert    Brief workup plan:  Labs, Imaging, EKG    Preliminary workup initiated; this workup will be continued and followed by the physician or advanced practice provider that is assigned to the patient when roomed.

## 2025-04-10 LAB
OHS QRS DURATION: 84 MS
OHS QTC CALCULATION: 453 MS

## 2025-06-06 ENCOUNTER — TELEPHONE (OUTPATIENT)
Dept: NEUROLOGY | Facility: CLINIC | Age: 50
End: 2025-06-06
Payer: MEDICAID

## 2025-06-09 ENCOUNTER — TELEPHONE (OUTPATIENT)
Dept: RHEUMATOLOGY | Facility: CLINIC | Age: 50
End: 2025-06-09
Payer: MEDICAID

## 2025-06-10 ENCOUNTER — TELEPHONE (OUTPATIENT)
Dept: NEUROLOGY | Facility: CLINIC | Age: 50
End: 2025-06-10
Payer: MEDICAID

## 2025-06-10 DIAGNOSIS — G43.E01 CHRONIC MIGRAINE WITH AURA AND WITH STATUS MIGRAINOSUS, NOT INTRACTABLE: ICD-10-CM

## 2025-06-10 RX ORDER — UBROGEPANT 100 MG/1
100 TABLET ORAL 2 TIMES DAILY PRN
Qty: 16 TABLET | Refills: 0 | Status: SHIPPED | OUTPATIENT
Start: 2025-06-10

## 2025-06-10 NOTE — TELEPHONE ENCOUNTER
LOV 2/5/25    NOV 6/30/2025    ----- Message from Landy sent at 6/10/2025 10:20 AM CDT -----  Regarding: Medication Refill  Pt needs a refill of RX: UBRELVY 100 mg tabletPharmacy: [id number] [place] [facility] [address] [facility] [address] AT [facility] Phone: [phone]: [phone]

## 2025-06-14 ENCOUNTER — HOSPITAL ENCOUNTER (EMERGENCY)
Facility: HOSPITAL | Age: 50
Discharge: HOME | End: 2025-06-14
Attending: INTERNAL MEDICINE
Payer: MEDICAID

## 2025-06-14 VITALS
RESPIRATION RATE: 16 BRPM | HEART RATE: 88 BPM | HEIGHT: 66 IN | SYSTOLIC BLOOD PRESSURE: 121 MMHG | OXYGEN SATURATION: 100 % | WEIGHT: 251 LBS | BODY MASS INDEX: 40.34 KG/M2 | TEMPERATURE: 98 F | DIASTOLIC BLOOD PRESSURE: 76 MMHG

## 2025-06-14 DIAGNOSIS — F48.9 TENSION: ICD-10-CM

## 2025-06-14 DIAGNOSIS — S46.211A STRAIN OF RIGHT BICEPS, INITIAL ENCOUNTER: Primary | ICD-10-CM

## 2025-06-14 LAB
ALBUMIN SERPL-MCNC: 3.6 G/DL (ref 3.5–5)
ALBUMIN/GLOB SERPL: 0.9 RATIO (ref 1.1–2)
ALP SERPL-CCNC: 147 UNIT/L (ref 40–150)
ALT SERPL-CCNC: 11 UNIT/L (ref 0–55)
ANION GAP SERPL CALC-SCNC: 9 MEQ/L
APTT PPP: 31.2 SECONDS (ref 23.2–33.7)
AST SERPL-CCNC: 15 UNIT/L (ref 11–45)
BACTERIA #/AREA URNS AUTO: ABNORMAL /HPF
BASOPHILS # BLD AUTO: 0.03 X10(3)/MCL
BASOPHILS NFR BLD AUTO: 0.7 %
BILIRUB SERPL-MCNC: 0.5 MG/DL
BILIRUB UR QL STRIP.AUTO: NEGATIVE
BUN SERPL-MCNC: 16.7 MG/DL (ref 9.8–20.1)
CALCIUM SERPL-MCNC: 9 MG/DL (ref 8.4–10.2)
CHLORIDE SERPL-SCNC: 110 MMOL/L (ref 98–107)
CLARITY UR: CLEAR
CO2 SERPL-SCNC: 25 MMOL/L (ref 22–29)
COLOR UR AUTO: ABNORMAL
CREAT SERPL-MCNC: 0.8 MG/DL (ref 0.55–1.02)
CREAT/UREA NIT SERPL: 21
D DIMER PPP IA.FEU-MCNC: 0.47 UG/ML FEU (ref 0–0.5)
EOSINOPHIL # BLD AUTO: 0.17 X10(3)/MCL (ref 0–0.9)
EOSINOPHIL NFR BLD AUTO: 4 %
ERYTHROCYTE [DISTWIDTH] IN BLOOD BY AUTOMATED COUNT: 15.3 % (ref 11.5–17)
GFR SERPLBLD CREATININE-BSD FMLA CKD-EPI: >60 ML/MIN/1.73/M2
GLOBULIN SER-MCNC: 3.8 GM/DL (ref 2.4–3.5)
GLUCOSE SERPL-MCNC: 104 MG/DL (ref 74–100)
GLUCOSE UR QL STRIP: NORMAL
HCT VFR BLD AUTO: 42.9 % (ref 37–47)
HGB BLD-MCNC: 13.2 G/DL (ref 12–16)
HGB UR QL STRIP: NEGATIVE
HOLD SPECIMEN: NORMAL
HYALINE CASTS #/AREA URNS LPF: ABNORMAL /LPF
IMM GRANULOCYTES # BLD AUTO: 0 X10(3)/MCL (ref 0–0.04)
IMM GRANULOCYTES NFR BLD AUTO: 0 %
INR PPP: 1
KETONES UR QL STRIP: NEGATIVE
LEUKOCYTE ESTERASE UR QL STRIP: NEGATIVE
LYMPHOCYTES # BLD AUTO: 1.02 X10(3)/MCL (ref 0.6–4.6)
LYMPHOCYTES NFR BLD AUTO: 24.2 %
MCH RBC QN AUTO: 21.7 PG (ref 27–31)
MCHC RBC AUTO-ENTMCNC: 30.8 G/DL (ref 33–36)
MCV RBC AUTO: 70.7 FL (ref 80–94)
MONOCYTES # BLD AUTO: 0.39 X10(3)/MCL (ref 0.1–1.3)
MONOCYTES NFR BLD AUTO: 9.2 %
MUCOUS THREADS URNS QL MICRO: ABNORMAL /LPF
NEUTROPHILS # BLD AUTO: 2.61 X10(3)/MCL (ref 2.1–9.2)
NEUTROPHILS NFR BLD AUTO: 61.9 %
NITRITE UR QL STRIP: NEGATIVE
NRBC BLD AUTO-RTO: 0 %
PH UR STRIP: 5.5 [PH]
PLATELET # BLD AUTO: 278 X10(3)/MCL (ref 130–400)
PMV BLD AUTO: 9.8 FL (ref 7.4–10.4)
POTASSIUM SERPL-SCNC: 3.6 MMOL/L (ref 3.5–5.1)
PROT SERPL-MCNC: 7.4 GM/DL (ref 6.4–8.3)
PROT UR QL STRIP: NEGATIVE
PROTHROMBIN TIME: 13.3 SECONDS (ref 11.4–14)
RBC # BLD AUTO: 6.07 X10(6)/MCL (ref 4.2–5.4)
RBC #/AREA URNS AUTO: ABNORMAL /HPF
SODIUM SERPL-SCNC: 144 MMOL/L (ref 136–145)
SP GR UR STRIP.AUTO: 1.03 (ref 1–1.03)
SQUAMOUS #/AREA URNS LPF: ABNORMAL /HPF
UROBILINOGEN UR STRIP-ACNC: NORMAL
WBC # BLD AUTO: 4.22 X10(3)/MCL (ref 4.5–11.5)
WBC #/AREA URNS AUTO: ABNORMAL /HPF

## 2025-06-14 PROCEDURE — 99283 EMERGENCY DEPT VISIT LOW MDM: CPT

## 2025-06-14 PROCEDURE — 85025 COMPLETE CBC W/AUTO DIFF WBC: CPT | Performed by: INTERNAL MEDICINE

## 2025-06-14 PROCEDURE — 80053 COMPREHEN METABOLIC PANEL: CPT | Performed by: INTERNAL MEDICINE

## 2025-06-14 PROCEDURE — 81001 URINALYSIS AUTO W/SCOPE: CPT | Performed by: INTERNAL MEDICINE

## 2025-06-14 PROCEDURE — 85379 FIBRIN DEGRADATION QUANT: CPT | Performed by: INTERNAL MEDICINE

## 2025-06-14 PROCEDURE — 85610 PROTHROMBIN TIME: CPT | Performed by: INTERNAL MEDICINE

## 2025-06-14 PROCEDURE — 85730 THROMBOPLASTIN TIME PARTIAL: CPT | Performed by: INTERNAL MEDICINE

## 2025-06-14 RX ORDER — BACLOFEN 5 MG/1
5 TABLET ORAL 2 TIMES DAILY PRN
Qty: 20 TABLET | Refills: 0 | Status: SHIPPED | OUTPATIENT
Start: 2025-06-14 | End: 2025-06-24

## 2025-06-14 NOTE — DISCHARGE INSTRUCTIONS
Take medicines as prescribed    See your family doctor in one to 2 days for further evaluation, workup, and treatment as necessary    Avoid driving or operating machinery while taking medicines as some medicines might cause drowsiness and may cause problems. Also pain medicines have potential of being addictive  so use Pain meds specially Narcotics Sparingly.    The exam and treatment you received in Emergency Room was for an urgent problem and NOT INTENDED AS COMPLETE CARE. It is important that you FOLLOW UP with a doctor for ongoing care. If your symptoms become WORSE or you DO NOT IMPROVE and you are unable to reach your health care provider, you should RETURN to the emergency department. The Emergency Room doctor has provided a PRELIMINARY INTERPRETATION of all your STUDIES. A final interpretation may be done after you are discharged. IF A CHANGE in your diagnosis or treatment is needed WE WILL CONTACT YOU. It is critical that we have a CURRENT PHONE NUMBER FOR YOU.    None

## 2025-06-14 NOTE — ED PROVIDER NOTES
"Encounter Date: 6/14/2025       History     Chief Complaint   Patient presents with    Arm Injury     Pt reports that it is very painful to bend right arm, reports this pain started in the chest " a day or so ago", denies injury, and has completely migrated tor right bicep/shoulder. She reports she can move arm but it is very painful to do simple tasks like getting dressed. Vss. Pt reports that her right arm was swollen yesterday and has improved.        Nicole Quiñones is 50 y.o. female who  has a past medical history of Anemia, Arthritis, Fibromyalgia, Hypercholesterolemia, Hypertension, Migraines, Personal history of colonic polyps (09/05/2023), Thrombocytopenia, unspecified, TTP (thrombotic thrombocytopenic purpura), and TTP (thrombotic thrombocytopenic purpura). arrives in ER with c/o Arm Injury (Pt reports that it is very painful to bend right arm, reports this pain started in the chest " a day or so ago", denies injury, and has completely migrated tor right bicep/shoulder. She reports she can move arm but it is very painful to do simple tasks like getting dressed. Vss. Pt reports that her right arm was swollen yesterday and has improved. )        The history is provided by the patient.     Review of patient's allergies indicates:   Allergen Reactions    Nsaids (non-steroidal anti-inflammatory drug) Other (See Comments)     Causes ulcers to bleed.    Ibuprofen     Latex     Meloxicam     Nsaids (non-steroidal anti-inflammatory drug) Nausea Only     Past Medical History:   Diagnosis Date    Anemia     Arthritis     Fibromyalgia     Hypercholesterolemia     Hypertension     Migraines     Personal history of colonic polyps 09/05/2023    colonoscopy/polypectomy    Thrombocytopenia, unspecified     TTP (thrombotic thrombocytopenic purpura)     TTP (thrombotic thrombocytopenic purpura)      Past Surgical History:   Procedure Laterality Date    CARPAL TUNNEL RELEASE      COLONOSCOPY W/ BIOPSIES AND " POLYPECTOMY  09/05/2023    Deepak De Luna MD 5 years    ESOPHAGOGASTRODUODENOSCOPY  04/2024    Right shouler surgery      SURGICAL REMOVAL OF ENDOMETRIOSIS       Family History   Problem Relation Name Age of Onset    Hypertension Mother Mom     Arthritis Mother Mom     Depression Mother Mom     Thyroid disease Sister Sis     Asthma Sister Sis     Intellectual disability Brother Bro     Epilepsy Brother Bro     Stroke Maternal Grandfather Kid      Social History[1]  Review of Systems   Constitutional:  Negative for fever.   HENT:  Negative for trouble swallowing and voice change.    Eyes:  Negative for visual disturbance.   Respiratory:  Negative for cough and shortness of breath.    Cardiovascular:  Negative for chest pain.   Gastrointestinal:  Negative for abdominal pain, diarrhea and vomiting.   Genitourinary:  Negative for dysuria and hematuria.   Musculoskeletal:  Negative for back pain and gait problem.        Right biceps pain and tenderness   Skin:  Negative for color change and rash.   Neurological:  Negative for headaches.   Psychiatric/Behavioral:  Negative for behavioral problems and sleep disturbance.    All other systems reviewed and are negative.      Physical Exam     Initial Vitals [06/14/25 1117]   BP Pulse Resp Temp SpO2   119/71 89 20 98.2 °F (36.8 °C) 98 %      MAP       --         Physical Exam    Nursing note and vitals reviewed.  Constitutional: She appears well-developed and well-nourished. No distress.   HENT:   Head: Atraumatic.   Eyes: EOM are normal.   Neck: Neck supple.   Cardiovascular:  Normal rate and regular rhythm.           Pulmonary/Chest: Breath sounds normal. No respiratory distress.   Abdominal: Abdomen is soft. Bowel sounds are normal.   Musculoskeletal:         General: Normal range of motion.      Cervical back: Neck supple.      Comments: Patient's right arm is bulky years in the left arm, she has 1+ radial pulse, good capillary refill, tenderness in the biceps, no triceps  tenderness, no tenderness in the forearm.     Neurological: She is alert and oriented to person, place, and time.   Skin: Skin is warm and dry.   Psychiatric: She has a normal mood and affect.         ED Course   Procedures  Orders Placed This Encounter    APTT    Protime-INR    Comprehensive metabolic panel    CBC auto differential    D dimer, quantitative    Urinalysis ($)    CBC with Differential    EXTRA TUBES    Red Top Hold    Light Green Top Hold    Gold Top Hold    Pink Top Hold    baclofen (LIORESAL) 5 mg Tab tablet       Labs Reviewed   COMPREHENSIVE METABOLIC PANEL - Abnormal       Result Value    Sodium 144      Potassium 3.6      Chloride 110 (*)     CO2 25      Glucose 104 (*)     Blood Urea Nitrogen 16.7      Creatinine 0.80      Calcium 9.0      Protein Total 7.4      Albumin 3.6      Globulin 3.8 (*)     Albumin/Globulin Ratio 0.9 (*)     Bilirubin Total 0.5            ALT 11      AST 15      eGFR >60      Anion Gap 9.0      BUN/Creatinine Ratio 21     CBC WITH DIFFERENTIAL - Abnormal    WBC 4.22 (*)     RBC 6.07 (*)     Hgb 13.2      Hct 42.9      MCV 70.7 (*)     MCH 21.7 (*)     MCHC 30.8 (*)     RDW 15.3      Platelet 278      MPV 9.8      Neut % 61.9      Lymph % 24.2      Mono % 9.2      Eos % 4.0      Basophil % 0.7      Imm Grans % 0.0      Neut # 2.61      Lymph # 1.02      Mono # 0.39      Eos # 0.17      Baso # 0.03      Imm Gran # 0.00      NRBC% 0.0     APTT - Normal    PTT 31.2     PROTIME-INR - Normal    PT 13.3      INR 1.0      Narrative:     Protimes are used to monitor anticoagulant agents such as warfarin. PT INR values are based on the current patient normal mean and the TOPHER value for the specific instrument reagent used.  **Routine theraputic target values for the INR are 2.0-3.0**   D DIMER, QUANTITATIVE - Normal    D-Dimer 0.47     CBC W/ AUTO DIFFERENTIAL    Narrative:     The following orders were created for panel order CBC auto differential.  Procedure               "                 Abnormality         Status                     ---------                               -----------         ------                     CBC with Differential[[id number]]       Abnormal            Final result                 Please view results for these tests on the individual orders.   URINALYSIS   EXTRA TUBES    Narrative:     The following orders were created for panel order EXTRA TUBES.  Procedure                               Abnormality         Status                     ---------                               -----------         ------                     Red Top Hold[[id number]]                                    In process                 Light Green Top Hold[[id number]]                            In process                 Gold Top Hold[[id number]]                                   In process                 Pink Top Hold[[id number]]                                   In process                   Please view results for these tests on the individual orders.   RED TOP HOLD   LIGHT GREEN TOP HOLD   GOLD TOP HOLD   PINK TOP HOLD          Imaging Results    None          Medications - No data to display  Medical Decision Making    Nicole Quiñones is 50 y.o. female who  has a past medical history of Anemia, Arthritis, Fibromyalgia, Hypercholesterolemia, Hypertension, Migraines, Personal history of colonic polyps (09/05/2023), Thrombocytopenia, unspecified, TTP (thrombotic thrombocytopenic purpura), and TTP (thrombotic thrombocytopenic purpura). arrives in ER with c/o Arm Injury (Pt reports that it is very painful to bend right arm, reports this pain started in the chest " a day or so ago", denies injury, and has completely migrated tor right bicep/shoulder. She reports she can move arm but it is very painful to do simple tasks like getting dressed. Vss. Pt reports that her right arm was swollen yesterday and has improved. )    Patient comes to the emergency room with complaint of right " arm pain which started yesterday, patient says that a couple of days back she started having chest pain, the chest pain is gone but the pain went to her right arm.  Usually she go says the pain goes to the left arm, on examination she does have right arm bigger than the left arm, she has a history of TTP, I do not see any bruising as such, I will do CBC CMP D-dimer and PT INR on her.  And decide further.    Amount and/or Complexity of Data Reviewed  Labs: ordered.    Risk  Prescription drug management.               ED Course as of 06/14/25 1239   Sat Jun 14, 2025   1236 Patient's platelet count is normal, her D-dimer is normal, bicarb, kidney function is normal, she has good blood supply to the hand, I will advise her to take Tylenol 3 times a day for pain give it a couple of days and see how it goes, it appears like just a strained biceps.  That is no evidence of biceps tendon rupture anything in there.  I will let her go home. [GQ]      ED Course User Index  [GQ] Mora Mahoney MD                           Clinical Impression:  Final diagnoses:  [S46.211A] Strain of right biceps, initial encounter (Primary)          ED Disposition Condition    Discharge Stable          ED Prescriptions       Medication Sig Dispense Start Date End Date Auth. Provider    baclofen (LIORESAL) 5 mg Tab tablet Take 1 tablet (5 mg total) by mouth 2 (two) times daily as needed (tension). 20 tablet 6/14/2025 6/24/2025 Mora Mahoney MD          Follow-up Information       Follow up With Specialties Details Why Contact Info    PMD  In 2 days                     [1]   Social History  Tobacco Use    Smoking status: Never     Passive exposure: Never    Smokeless tobacco: Never   Substance Use Topics    Alcohol use: Not Currently     Comment: Last drink @ 20    Drug use: Never        Mora Mahoney MD  06/14/25 1235

## 2025-06-23 ENCOUNTER — OFFICE VISIT (OUTPATIENT)
Dept: ORTHOPEDICS | Facility: CLINIC | Age: 50
End: 2025-06-23
Payer: MEDICAID

## 2025-06-23 VITALS
HEART RATE: 72 BPM | OXYGEN SATURATION: 99 % | DIASTOLIC BLOOD PRESSURE: 89 MMHG | TEMPERATURE: 99 F | RESPIRATION RATE: 19 BRPM | WEIGHT: 244.63 LBS | SYSTOLIC BLOOD PRESSURE: 136 MMHG | BODY MASS INDEX: 39.31 KG/M2 | HEIGHT: 66 IN

## 2025-06-23 DIAGNOSIS — G56.03 BILATERAL CARPAL TUNNEL SYNDROME: Primary | ICD-10-CM

## 2025-06-23 DIAGNOSIS — G56.23 CUBITAL TUNNEL SYNDROME OF BOTH UPPER EXTREMITIES: ICD-10-CM

## 2025-06-23 PROCEDURE — 99215 OFFICE O/P EST HI 40 MIN: CPT | Mod: PBBFAC | Performed by: NURSE PRACTITIONER

## 2025-06-23 PROCEDURE — 3079F DIAST BP 80-89 MM HG: CPT | Mod: CPTII,,, | Performed by: NURSE PRACTITIONER

## 2025-06-23 PROCEDURE — 3075F SYST BP GE 130 - 139MM HG: CPT | Mod: CPTII,,, | Performed by: NURSE PRACTITIONER

## 2025-06-23 PROCEDURE — 1160F RVW MEDS BY RX/DR IN RCRD: CPT | Mod: CPTII,,, | Performed by: NURSE PRACTITIONER

## 2025-06-23 PROCEDURE — 4010F ACE/ARB THERAPY RXD/TAKEN: CPT | Mod: CPTII,,, | Performed by: NURSE PRACTITIONER

## 2025-06-23 PROCEDURE — 3008F BODY MASS INDEX DOCD: CPT | Mod: CPTII,,, | Performed by: NURSE PRACTITIONER

## 2025-06-23 PROCEDURE — 1159F MED LIST DOCD IN RCRD: CPT | Mod: CPTII,,, | Performed by: NURSE PRACTITIONER

## 2025-06-23 PROCEDURE — 99214 OFFICE O/P EST MOD 30 MIN: CPT | Mod: 25,S$PBB,, | Performed by: NURSE PRACTITIONER

## 2025-06-23 NOTE — PROGRESS NOTES
[facility]  Subjective:   PATIENT ID: Nicole Quiñones is a 50 y.o. female.   CHIEF COMPLAINT: Results of the Left Shoulder    HPI:  Bilateral L > R , left shoulder pain and numbness, tingling in upper extremity, radiates down arm from neck. Right dominant   Injury/ Surgical HX r/t CC:  no HX of prior significant joint injury, carpal tunnel release left hand symptoms unchanged   Onset: Several years  Worsening over time   Modifying Factors: exacerbated by:  increased activity, lifting objects > 5 pounds, gripping improved with:  rest   Associated Symptoms:  [] Weakness w/ dropping items  [x] Weakness w/o dropping items  [x] Difficulty sleeping s/t symptoms [x] Radiating into forearm  [x] Radiating into neck  [x] Joint stiffness (shoulders)        Activity:  sedentary with light activity and pain moderately interferes with ADLs    Previous Treatments:  [x] HEP > 6 weeks  [x] wrist splint  [] RX OT [x] OTC Pain Relievers: Tylenol, ibuprofen  [x] RX Medications: topical diclofenac, Tramadol, baclofen  [] CSI   PMH:  Cervical pain, HTN, obesity, non-smoker   Family History: +RA   NOTE: Follow up, seen by me since 2024 for left shoulder pain, in clinic c/o radiating pain down b/l upper extremities and feels this is her most concerning symptom, more so than left shoulder pain at this time.  Here for EMG study.  Employment HX: , currently employed.    History of abnormal EMG study.     Current Outpatient Medications:     albuterol (PROVENTIL) 2.5 mg /3 mL (0.083 %) nebulizer solution, USE 1 VIAL IN NEBULIZER EVERY 8 HOURS AS NEEDED, Disp: 1 each, Rfl: 3    baclofen (LIORESAL) 5 mg Tab tablet, Take 1 tablet (5 mg total) by mouth 2 (two) times daily as needed (tension)., Disp: 20 tablet, Rfl: 0    cetirizine (ZYRTEC) 10 MG tablet, Take 10 mg by mouth once daily., Disp: , Rfl:     cloNIDine (CATAPRES) 0.1 MG tablet, Take 1 tablet (0.1 mg total) by  mouth daily as needed., Disp: 90 tablet, Rfl: 3    DULERA 100-5 mcg/actuation HFAA, Inhale 1 puff into the lungs 2 (two) times daily., Disp: 8.8 g, Rfl: 5    EMGALITY  mg/mL PnIj, AS DIRECTED SUBCUTANEOUS MONTHLY 30 DAY(S), Disp: 1 each, Rfl: 4    ergocalciferol (ERGOCALCIFEROL) 50,000 unit Cap, Take 1 capsule (50,000 Units total) by mouth every 7 days., Disp: 12 capsule, Rfl: 3    ezetimibe (ZETIA) 10 mg tablet, Take 1 tablet (10 mg total) by mouth every evening., Disp: 90 tablet, Rfl: 3    fluticasone propionate (FLONASE) 50 mcg/actuation nasal spray, 2 sprays (100 mcg total) by Each Nostril route daily as needed for Allergies or Rhinitis., Disp: 18.2 mL, Rfl: 3    gabapentin (NEURONTIN) 600 MG tablet, Take 600 mg by mouth 2 (two) times daily., Disp: , Rfl:     isosorbide dinitrate (ISORDIL) 40 MG Tab, Take 1 tablet (40 mg total) by mouth once daily., Disp: 90 tablet, Rfl: 3    LINZESS 145 mcg Cap capsule, SMARTSI MCG By Mouth Daily, Disp: , Rfl:     metoclopramide HCl (REGLAN) 10 MG tablet, Take 1 tablet (10 mg total) by mouth every 6 (six) hours., Disp: 20 tablet, Rfl: 0    metoprolol succinate (TOPROL-XL) 100 MG 24 hr tablet, Take 1 tablet (100 mg total) by mouth every evening., Disp: 90 tablet, Rfl: 3    multivitamin with minerals tablet, Take 1 tablet by mouth once daily., Disp: , Rfl:     nitroGLYCERIN (NITROSTAT) 0.4 MG SL tablet, Place 1 tablet (0.4 mg total) under the tongue every 5 (five) minutes as needed for Chest pain., Disp: 25 tablet, Rfl: 4    omega 3-dha-epa-fish oil (FISH OIL) 1,000 mg (120 mg-180 mg) Cap, , Disp: , Rfl:     ondansetron (ZOFRAN) 8 MG tablet, Take 1 tablet (8 mg total) by mouth 2 (two) times daily as needed for Nausea. TAKE 1 TABLET BY MOUTH EVERY 8 HOURS AS NEEDED FOR NAUSEA AND VOMITING, Disp: 30 tablet, Rfl: 1    pantoprazole (PROTONIX) 40 MG tablet, Take 40 mg by mouth every morning., Disp: , Rfl:     RESTASIS 0.05 % ophthalmic emulsion, Place 1 drop into both eyes 2  "(two) times daily., Disp: , Rfl:     UBRELVY 100 mg tablet, Take 1 tablet (100 mg total) by mouth 2 (two) times daily as needed for Migraine., Disp: 16 tablet, Rfl: 0    venlafaxine (EFFEXOR-XR) 37.5 MG 24 hr capsule, Take 1 capsule (37.5 mg total) by mouth every evening., Disp: 30 capsule, Rfl: 6    folic acid (FOLVITE) 1 MG tablet, Take 1 tablet (1 mg total) by mouth once daily. After food, Disp: 30 tablet, Rfl: 5  Review of patient's allergies indicates:   Allergen Reactions    Nsaids (non-steroidal anti-inflammatory drug) Other (See Comments)     Causes ulcers to bleed.    Ibuprofen     Latex     Meloxicam     Nsaids (non-steroidal anti-inflammatory drug) Nausea Only     REVIEW OF SYSTEMS:  A ten-point review of systems was performed and is negative, except as mentioned above  Objective:   Body mass index is 39.48 kg/m².   Vitals:    06/23/25 0736   BP: 136/89   Pulse: 72   Resp: 19   Temp: 98.8 °F (37.1 °C)   TempSrc: Oral   SpO2: 99%   Weight: 110.9 kg (244 lb 9.6 oz)   Height: 5' 6" (1.676 m)   PainSc:   5   PainLoc: Shoulder      MSK Hand/ Wrist Exam  General:  no apparent distress, no pain indicators,  obesity  Inspection:  LEFT HAND RIGHT HAND   no joint swelling, no mass/ cyst noted, no soft tissue swelling, no erythema, no lesions, no contusion, no gross deformities, no nodules, no atrophy of thenar or hypothenar eminence,  no scars, no discoloration noted no joint swelling, no mass/ cyst noted, no soft tissue swelling, no erythema, no lesions, no contusion, no gross deformities, no nodules, no atrophy of thenar or hypothenar eminence,  no scars, no discoloration noted   Palpation:     LEFT HAND RIGHT HAND   no joint tenderness, normal temperature, no palpable palm nodules, no finger joint tenderness or crepitus, no active triggering or locking of digits and no tenderness of digital flexor tendons, no tenderness of thenar and hypothenar eminence no joint tenderness, normal temperature, no palpable palm " nodules, no finger joint tenderness or crepitus, no active triggering or locking of digits and no tenderness of digital flexor tendons, no tenderness of thenar and hypothenar eminence     ROM LEFT HAND RIGHT HAND   Wrist Flexion / Extension   80 / 75 80 / 75   Wrist Radial / Ulnar Deviation   15 / 30 15 / 30   Thumb CMC/ MCP/ IP WNL w/o pain WNL w/o pain   Finger MCP/PIP/DIP WNL w/o pain WNL w/o pain      Strength LEFT HAND RIGHT HAND    4 / 5 w/o pain 4 / 5 w/o pain       Special Testing: L+ L-- R+ R-- Not Tested     Carpal Tunnel Syndrome         Phalen [x] [] [x] [] []    Ambrosio Carpal Compression [x] [] [x] [] []    Cubital Tunnel Syndrome         Tinel's Test @ elbow [x] [] [x] [] []    De Quervain's Tenosynovitis         Finkelstein Test [] [x] [] [x] []    Ligament Injury         Scaphoid Shift Test [] [] [] [] [x]    Lunotriquetral Shuck Test [] [] [] [] [x]    UCL Ligament Thumb Test [] [] [] [] [x]    Ulnar Fovea Sign [] [] [] [] [x]    Nerve Injury  Radial Nerve  IP joint extension against resistance intact   Median Nerve Palmar abduction of thumb intact   Anterior Interosseous Branch OK sign intact   Ulnar Nerve   Abduction of fingers against resistance intact, Froment's sign negative   Neuro/ Vascular: Spurling's Test positive, Intact to light touch, capillary refill 2 seconds,  radial pulse equal 2+, 2 point discrimination intact, Robbin's test intact  Psych: Awake, alert, oriented, normal mood and affect  Lymphatic: No LAD  Skin/ Soft Tissue: no rash, skin intact  Assessment:   IMAGING: XR noted in EMR dated 2/11/25 4 views of left shoulder reviewed and independently interpreted by me with findings suggestive of arthritic changes .  Radiologist findings reviewed.  Findings discussed with patient today.    EXAMINATION: XR SHOULDER COMPLETE 2 OR MORE VIEWS LEFT 2/11/25  CLINICAL HISTORY:  Pain in left shoulder  COMPARISON:  None.  FINDINGS:  No acute displaced fractures or dislocations  There is  narrowing of the acromioclavicular joint with degenerative changes of the glenohumeral joint with presence of marginal osteophytes articular spaces are otherwise preserved with smooth articular surfaces  No blastic or lytic lesions.  Soft tissues within normal limits.  Impression:  Degenerative changes.    Labs:    Hemoglobin A1c   Date Value Ref Range Status   05/23/2023 5.3 <=7.0 % Final      EMG Study: dated 6/3/25 per [facility] This is moderate carpal tunnel syndrome as well as cubital tunnel syndrome bilaterally   EMR REVIEW: completed with noted prior visit 2/11/25 reviewed.  DX & Plan:   DIAGNOSIS: Moderate exacerbation of chronic Left GH arthritis with rotator cuff syndrome complicated by b/l carpal tunnel/ cubital tunnel moderate   1. Bilateral carpal tunnel syndrome    2. Cubital tunnel syndrome of both upper extremities    3. BMI 39.0-39.9,adult      TREATMENT PLAN:     Treatment Plan: Discussed and reviewed EMG study.  Patient offered surgical eval and/ or CSI but declines at this time.  Requesting to try wrist splints and HEP. Will call for appointment if she decides she would like to try other treatments.   Ongoing education about DX and treatment recommendations including conservative treatments of daily HEP for carpal tunnel, nocturnal splinting of wrist PRN and OTC NSAID as needed according to label instructions if able to tolerate.   Procedure: offered, patient declines at this time.   RX Medications: continue medications as RX per PCP .    RTC: PRN if symptoms worsen or return       Leah Menard-Neumann FNP [facility]  Procedure Note:   None   Time Based Billing   Total Time Spent with Patient: 30 minutes .  Visit Start Time: 0800  10 minutes spent prior to visit reviewing EMR, prior labs and x-rays.  10 minutes spent in visit with patient face-to-face time completing exam, obtaining history, educating on DX and treatment plan.   10 minutes spent after visit  completing EMR documentation.   Visit End Time: 0830      Please be aware that this note has been generated with the assistance of [employer] voice-to-text.  Please excuse any spelling or grammatical errors.

## 2025-06-24 ENCOUNTER — TELEPHONE (OUTPATIENT)
Dept: NEUROLOGY | Facility: CLINIC | Age: 50
End: 2025-06-24
Payer: MEDICAID

## 2025-06-30 ENCOUNTER — OFFICE VISIT (OUTPATIENT)
Dept: NEUROLOGY | Facility: CLINIC | Age: 50
End: 2025-06-30
Payer: MEDICAID

## 2025-06-30 ENCOUNTER — PATIENT MESSAGE (OUTPATIENT)
Dept: NEUROLOGY | Facility: CLINIC | Age: 50
End: 2025-06-30

## 2025-06-30 DIAGNOSIS — E66.01 CLASS 2 SEVERE OBESITY WITH SERIOUS COMORBIDITY AND BODY MASS INDEX (BMI) OF 39.0 TO 39.9 IN ADULT, UNSPECIFIED OBESITY TYPE: ICD-10-CM

## 2025-06-30 DIAGNOSIS — G47.00 INSOMNIA, UNSPECIFIED TYPE: Chronic | ICD-10-CM

## 2025-06-30 DIAGNOSIS — E66.812 CLASS 2 SEVERE OBESITY WITH SERIOUS COMORBIDITY AND BODY MASS INDEX (BMI) OF 39.0 TO 39.9 IN ADULT, UNSPECIFIED OBESITY TYPE: ICD-10-CM

## 2025-06-30 DIAGNOSIS — G47.33 OSA (OBSTRUCTIVE SLEEP APNEA): ICD-10-CM

## 2025-06-30 DIAGNOSIS — G43.E09 CHRONIC MIGRAINE WITH AURA WITHOUT STATUS MIGRAINOSUS, NOT INTRACTABLE: Primary | Chronic | ICD-10-CM

## 2025-06-30 RX ORDER — POLYETHYLENE GLYCOL 3350 17 G/17G
POWDER, FOR SOLUTION ORAL
COMMUNITY
Start: 2025-06-01

## 2025-06-30 RX ORDER — ESZOPICLONE 3 MG/1
3 TABLET, FILM COATED ORAL
COMMUNITY
Start: 2025-05-30

## 2025-06-30 RX ORDER — OLMESARTAN MEDOXOMIL AND HYDROCHLOROTHIAZIDE 20/12.5 20; 12.5 MG/1; MG/1
TABLET ORAL
COMMUNITY
End: 2025-06-30

## 2025-06-30 RX ORDER — TIZANIDINE 2 MG/1
TABLET ORAL
COMMUNITY

## 2025-06-30 RX ORDER — ATORVASTATIN CALCIUM 20 MG/1
20 TABLET, FILM COATED ORAL
COMMUNITY
Start: 2025-03-14

## 2025-06-30 RX ORDER — VALSARTAN 80 MG/1
80 TABLET ORAL
COMMUNITY
Start: 2025-05-29

## 2025-06-30 RX ORDER — FERROUS SULFATE 325(65) MG
TABLET ORAL
COMMUNITY
Start: 2025-06-16

## 2025-06-30 RX ORDER — FLUCONAZOLE 150 MG/1
150 TABLET ORAL
COMMUNITY
Start: 2025-06-06 | End: 2025-06-30

## 2025-06-30 RX ORDER — VERAPAMIL HYDROCHLORIDE 180 MG/1
TABLET, EXTENDED RELEASE ORAL
COMMUNITY
End: 2025-06-30

## 2025-06-30 RX ORDER — AMLODIPINE BESYLATE 10 MG/1
10 TABLET ORAL
COMMUNITY
Start: 2025-06-01

## 2025-06-30 RX ORDER — SERTRALINE HYDROCHLORIDE 25 MG/1
TABLET, FILM COATED ORAL
COMMUNITY

## 2025-06-30 RX ORDER — SUMATRIPTAN SUCCINATE 100 MG/1
100 TABLET ORAL 2 TIMES DAILY PRN
Qty: 12 TABLET | Refills: 2 | Status: SHIPPED | OUTPATIENT
Start: 2025-06-30 | End: 2025-07-30

## 2025-06-30 NOTE — PROGRESS NOTES
[facility]rology Telemedicine Follow Up Visit Note    Initial Visit Date: 10/5/2023  Last Visit Date:  2/5/2025  Current Visit Date:  06/30/2025    This is a real-time audio/video visit that was performed with the originating site at patient's home and the distant site, [facility]. Verbal consent to participate in interactive audio & video visit was obtained.    I discussed with the patient regarding the nature of our telehealth visits, that:    - Our sessions are not being recorded and that personal health information is protected  - Provider would evaluate the patient and recommend diagnostics and treatments based on my assessment  - [facility] will provide follow up care in person if/when the patient needs it.     Chief Complaint:     Chief Complaint   Patient presents with    Migraine     Pt states 6 migraines in a month     History of Present Illness:      This is 50 y.o.  female with hx of HTN, insomnia, acute neck pain, migraine, Vitamin D deficiency, RA followed by rheumatology, TTP, gastritis, GERD, who was referred headache disorder. During last visit, venlafaxine 37.5mg Qday, MagOx 400mg daily, Riboflavin 400mg daily, baclofen 5mg PO BID PRN, Zavspret PRN, and Ubrelvy 100mg PO BID PRN were continued. Emgality 120mg/mL SQ monthly was restarted.     Today, Pt states Emgality effective as preventative therapy. Migraines average 6 migraines per month. Ubrelvy ineffective as abortive therapy. Zavspret effective, but did not like the taste down her throat.Last migraine last Thursday. Awakening w/migraine on occasion. Sleep study w/Dr. Rodriguez and dx with mild GIAN, no CPAP, on Lunesta which she states is not effective. No exercise at this time due to back pain.     Age of Onset : 16 YO    Headache Description: Starts to R trapezius and radiates up neck to R side of head, to forehead and across to other side, on  a bad day must lay in a dark room, severe in nature. Worsened w/activity. Unresponsive to Ubrelvy. May last from 1 hour to 3-4 days. May resolved after a nap, not always. Accompanied by photophobia/phonophobia, nausea, flashes of lights to both eyes, numbness to her face.    Frequency: 20 headache days per month; 6/month on Emgality     Provocation Factors: odors,     Risk Factors  - Family history of headache disorder: Yes daughter  - History of focal CNS lesions: No  - History of CNS infections: No  - History head trauma: No  - History of underlying mood disorder: Yes diagnosed w/major depressive d/o  - History of sleep disorder: Yes mild GIAN; but not qualified for machine  - Recreational drug use: No  - Tobacco use: No  - Alcohol use: No  - Weight fluctuation: No  - Isotretinoin or Tetracycline use:  No  - Family planning and contraceptive use: No    Medications:     Current Prophylactic  Gabapentin 600mg PO BID (4/26/2023 - present) ineffective for HA  Toprol XL 100mg nightly (1/17/2023 - present) ineffective for HA  Venlafaxine 37.5mg PO Qday (10/5/2023 - present) effective for mood - not currently taking  Emgality 120mg SQ monthly (1/11/2024 - present) - effective  Valsartan 80mg PO Qday (11/13/2024 - present) partially effective    Current Abortive  Baclofen 5mg PO PRN - (10/5/2023 - present)  Ubrelvy 100mg PO BID (4/10/2023 - current)- only effective for mild-mod migraine    Prior Prophylactic  Amitriptyline 25 mg nightly - brain fog/over sedated  Emgality 120mg/ml SQ Qmonth (2/13/2023 - 4/10/2023)  Olmestartan/HCTZ (1/17/2023 - 7/16/2023) ineffective for HA  Sertraline 25mg Qday (3/9/2023 - 6/19/2023) ineffective for HA  Valsartan 80mg PO Qday (6/27/2023 - 11/20/2023) ineffective for HA  Verapamil ER 180mg daily (3/4/2023 - 7/4/2023) ineffective for HA    Prior Abortive  Nurtec - ineffective  Imitrex - ineffective  Rizatriptan - ineffective  Fioricet PRN (6/27/2023 - ?)   Fioricet PRN -  ineffective  Tizanidine 4mg PRN (2/22/2023 - 5/24/2023)- worsened migraine  Ondansetron (7/25/2023 - 9/11/2023)  Zavzpret 10mg nasal spray - (10/19/2023 - present) effective  Devices:     - VNS:  - TNS  - TMS:     Procedures:     - Botox:  - PSG block:   - Occipital nerve block:     Labs:     Results for orders placed or performed during the hospital encounter of 06/14/25   APTT    Collection Time: 06/14/25 11:32 AM   Result Value Ref Range    PTT 31.2 23.2 - 33.7 seconds   Protime-INR    Collection Time: 06/14/25 11:32 AM   Result Value Ref Range    PT 13.3 11.4 - 14.0 seconds    INR 1.0 <=1.3   Comprehensive metabolic panel    Collection Time: 06/14/25 11:32 AM   Result Value Ref Range    Sodium 144 136 - 145 mmol/L    Potassium 3.6 3.5 - 5.1 mmol/L    Chloride 110 (H) 98 - 107 mmol/L    CO2 25 22 - 29 mmol/L    Glucose 104 (H) 74 - 100 mg/dL    Blood Urea Nitrogen 16.7 9.8 - 20.1 mg/dL    Creatinine 0.80 0.55 - 1.02 mg/dL    Calcium 9.0 8.4 - 10.2 mg/dL    Protein Total 7.4 6.4 - 8.3 gm/dL    Albumin 3.6 3.5 - 5.0 g/dL    Globulin 3.8 (H) 2.4 - 3.5 gm/dL    Albumin/Globulin Ratio 0.9 (L) 1.1 - 2.0 ratio    Bilirubin Total 0.5 <=1.5 mg/dL     40 - 150 unit/L    ALT 11 0 - 55 unit/L    AST 15 11 - 45 unit/L    eGFR >60 mL/min/1.73/m2    Anion Gap 9.0 mEq/L    BUN/Creatinine Ratio 21    D dimer, quantitative    Collection Time: 06/14/25 11:32 AM   Result Value Ref Range    D-Dimer 0.47 0.00 - 0.50 ug/mL FEU   CBC with Differential    Collection Time: 06/14/25 11:32 AM   Result Value Ref Range    WBC 4.22 (L) 4.50 - 11.50 x10(3)/mcL    RBC 6.07 (H) 4.20 - 5.40 x10(6)/mcL    Hgb 13.2 12.0 - 16.0 g/dL    Hct 42.9 37.0 - 47.0 %    MCV 70.7 (L) 80.0 - 94.0 fL    MCH 21.7 (L) 27.0 - 31.0 pg    MCHC 30.8 (L) 33.0 - 36.0 g/dL    RDW 15.3 11.5 - 17.0 %    Platelet 278 130 - 400 x10(3)/mcL    MPV 9.8 7.4 - 10.4 fL    Neut % 61.9 %    Lymph % 24.2 %    Mono % 9.2 %    Eos % 4.0 %    Basophil % 0.7 %    Imm Grans % 0.0 %     Neut # 2.61 2.1 - 9.2 x10(3)/mcL    Lymph # 1.02 0.6 - 4.6 x10(3)/mcL    Mono # 0.39 0.1 - 1.3 x10(3)/mcL    Eos # 0.17 0 - 0.9 x10(3)/mcL    Baso # 0.03 <=0.2 x10(3)/mcL    Imm Gran # 0.00 0.00 - 0.04 x10(3)/mcL    NRBC% 0.0 %   Red Top Hold    Collection Time: 06/14/25 11:35 AM   Result Value Ref Range    Extra Tube Hold for add-ons.    Light Green Top Hold    Collection Time: 06/14/25 11:35 AM   Result Value Ref Range    Extra Tube Hold for add-ons.    Gold Top Hold    Collection Time: 06/14/25 11:35 AM   Result Value Ref Range    Extra Tube Hold for add-ons.    Pink Top Hold    Collection Time: 06/14/25 11:35 AM   Result Value Ref Range    Extra Tube Hold for add-ons.    Urinalysis ($)    Collection Time: 06/14/25 12:56 PM   Result Value Ref Range    Color, UA Light-Yellow Yellow, Light-Yellow, Dark Yellow, Abby, Straw    Appearance, UA Clear Clear    Specific Gravity, UA 1.026 1.005 - 1.030    pH, UA 5.5 5.0 - 8.5    Protein, UA Negative Negative    Glucose, UA Normal Negative, Normal    Ketones, UA Negative Negative    Blood, UA Negative Negative    Bilirubin, UA Negative Negative    Urobilinogen, UA Normal 0.2, 1.0, Normal    Nitrites, UA Negative Negative    Leukocyte Esterase, UA Negative Negative    RBC, UA 0-5 None Seen, 0-2, 3-5, 0-5 /HPF    WBC, UA 0-5 None Seen, 0-2, 3-5, 0-5 /HPF    Bacteria, UA Trace (A) None Seen /HPF    Squamous Epithelial Cells, UA Few (A) None Seen /HPF    Mucous, UA Occasional (A) None Seen /LPF    Hyaline Casts, UA None Seen None Seen /lpf       Studies:     - MRI Brain:   - MRA Head w/o Joaquin:   - MRV Head w/o Joaquin:   - NCHCT:  - Lumbar Puncture:    Review of Systems:     ROS  As per HPI; all other systems negative  Physical Exams:     There were no vitals filed for this visit.      Physical Exam  Constitutional:       Appearance: Normal appearance. She is obese.   HENT:      Head: Normocephalic.      Right Ear: External ear normal.      Left Ear: External ear normal.       Nose: Nose normal.      Mouth/Throat:      Mouth: Mucous membranes are moist.   Eyes:      Conjunctiva/sclera: Conjunctivae normal.   Pulmonary:      Effort: Pulmonary effort is normal.   Abdominal:      General: There is no distension.      Tenderness: There is no guarding.      Comments: round   Musculoskeletal:         General: Normal range of motion.      Cervical back: Normal range of motion.   Skin:     Capillary Refill: Capillary refill takes less than 2 seconds.      Findings: No lesion or rash.   Neurological:      General: No focal deficit present.      Mental Status: She is alert.   Psychiatric:         Mood and Affect: Mood normal.     Comprehensive Neurological Exam:  Mental Status: Alert Oriented to Self, Date, and Place. Naming, repetition, and reading wnl. Comprehension wnl. No dysarthria.   CN II - XII: ATIYA, No APD, VA grossly intact to finger counting at 6 ft, VFFC, No ptosis OU, EOMI without nystagmus LT/Temp symmetric in CN V1-3 distribution, Hearing grossly intact, Face Symmetric, Tongue and Uvula midline, Trapezius symmetric bilateral.   Motor: tone and bulk wnl throughout, no abnormal involuntary or voluntary movements, 5/5 to confrontation, Fine finger movements wnl b/l, No pronator drift.   Sensory: LT, Proprioception, Vibration, PP, Temp symmetric. No sensory simultagnosia.   Reflexes: 2+ throughout  Cerebellar: FNF wnl b/l  Romberg: Negative  Gait: antalgic, utilizing cane    Assessment:     This is 50 y.o. female with history of HTN, insomnia, acute neck pain, migraine, RA (non-compliant), TTP, gastritis, GERD, Vitamin D deficiency, who was referred headache disorder. Pt experiencing 20 migraine w/aura HA days per month since being out of Emgality for several months. Has tried and failed multiple abortive therapies, including two triptans, current regimen not very effective. Pt states Emgality effective as preventative therapy. Migraines average 6 migraines per month. Ubrelvy ineffective  as abortive therapy. Zavspret effective, but did not like the taste down her throat.Last migraine last Thursday. Awakening w/migraine on occasion. Sleep study w/Dr. Rodriguez and dx with mild GIAN, no CPAP, on Lunesta which she states is not effective. No exercise at this time due to back pain.     Problem List Items Addressed This Visit          Neuro    Chronic migraine with aura without status migrainosus, not intractable - Primary (Chronic)    Relevant Medications    sumatriptan (IMITREX) 100 MG tablet       Endocrine    Class 2 severe obesity with serious comorbidity and body mass index (BMI) of 39.0 to 39.9 in adult       Other    Insomnia (Chronic)    GIAN (obstructive sleep apnea)         Plan:     [] restart Emgality 120mg SQ Q month - requires PA  [] c/w venlafaxine 37.5mg nightly  [] c/w Baclofen 5 mg nightly for muscle tension, may increase to 2 tabs nightly- take consistently  [] d/c Ubrelvy 100mg PO BID PRN  [] start Sumatriptan 100mg PO BID PRN as migraine abortive therapy  [] Limit water intake to approximately 120 oz/day  [] c/w riboflavin (Vit B2) 400mg daily  [] consider aquatic therapy for back pain   [] call office for migraine > 24 hrs and failed abortive therapy; will call in headache cocktail    RTC 4 mths - TM okay    Headache education provided: good sleep hygiene and 7 hours of sleep per night, stress management, medication overuse education provided. Using more 3 OTC per week may worsen headaches, high intensity interval training has shown to reduce headache frequency. Low carb, high protein has shown to reduce headache frequency. Patient is instructed in keep headache diary.     I have explained the treatment plan, diagnosis, and prognosis to patient. All questions are answered to the best of my knowledge.     Face to face time 30 minutes, including documentation, chart review, counseling, education, review of test results, relevant medical records, and coordination of care.     Kristina  JODI Boykin  General Neurology    06/30/2025     The patient location is: [place]  The chief complaint leading to consultation is: migraine    Visit type: audiovisual    Face to Face time with patient: 30  30 minutes of total time spent on the encounter, which includes face to face time and non-face to face time preparing to see the patient (eg, review of tests), Obtaining and/or reviewing separately obtained history, Documenting clinical information in the electronic or other health record, Independently interpreting results (not separately reported) and communicating results to the patient/family/caregiver, or Care coordination (not separately reported).         Each patient to whom he or she provides medical services by telemedicine is:  (1) informed of the relationship between the physician and patient and the respective role of any other health care provider with respect to management of the patient; and (2) notified that he or she may decline to receive medical services by telemedicine and may withdraw from such care at any time.    Notes:

## 2025-07-14 ENCOUNTER — PATIENT MESSAGE (OUTPATIENT)
Dept: NEUROLOGY | Facility: CLINIC | Age: 50
End: 2025-07-14
Payer: MEDICAID